# Patient Record
Sex: FEMALE | Race: WHITE | NOT HISPANIC OR LATINO | Employment: OTHER | URBAN - METROPOLITAN AREA
[De-identification: names, ages, dates, MRNs, and addresses within clinical notes are randomized per-mention and may not be internally consistent; named-entity substitution may affect disease eponyms.]

---

## 2017-01-02 NOTE — PROGRESS NOTES
"  SUBJECTIVE:                                                    Enid Lombardo is a 56 year old female who presents to clinic today for the following health issues:      HPI    Rash     Onset: 7 weeks    Description:   Location: Right side of nose  Character: blotchy, raised, flakey, red  Itching (Pruritis): no     Progression of Symptoms:  intermittent    Accompanying Signs & Symptoms:  Fever: no   Body aches or joint pain: no   Sore throat symptoms: no   Recent cold symptoms: YES   History:   Previous similar rash: YES - left upper chest 8 months    Precipitating factors:   Exposure to similar rash: no   New exposures: None   Recent travel: no     Alleviating factors:  Hydrocortisone cream x 1 week     Therapies Tried and outcome: Hydrocortisone cream x 1 week - symptoms improving, not resolved      Problem list and histories reviewed & adjusted, as indicated.  Additional history: as documented        Problem list, Medication list, Allergies, and Medical/Social/Surgical histories reviewed in EPIC and updated as appropriate.    ROS:  C: NEGATIVE for fever, chills, change in weight  INTEGUMENTARY/SKIN: red skin patch which flakes and look better at times   E/M: NEGATIVE for ear, mouth and throat problems  R: NEGATIVE for significant cough or SOB  CV: NEGATIVE for chest pain, palpitations or peripheral edema    OBJECTIVE:                                                    /72 mmHg  Pulse 60  Temp(Src) 98.5  F (36.9  C) (Temporal)  Resp 18  Ht 5' 5\" (1.651 m)  Wt 170 lb (77.111 kg)  BMI 28.29 kg/m2  LMP 10/14/2001  Body mass index is 28.29 kg/(m^2).   GENERAL: healthy, alert, well nourished, well hydrated, no distress  RESP: lungs clear to auscultation - no rales, no rhonchi, no wheezes  CV: regular rates and rhythm, normal S1 S2, no S3 or S4 and no murmur, no click or rub -  SKIN: Examination of the rash reveals: dry skin patch with a few extra vessels leading to it.           ASSESSMENT/PLAN:        "                                                 ICD-10-CM    1. AK (actinic keratosis) L57.0 DESTRUCT PREMALIGNANT LESION, FIRST       Discussed that this is a precancerous lesion and that it should be destroyed.  After discussion risks/benefits, we destroyed with LN*3 today with good results and no immediate complications.  Will put petroleum jelly on area to decrease scarring for the next 2 weeks or until healed.  RTC to repeat if some is left behind following this.    Shi Alonso MD, MD  [facility]

## 2017-01-05 ENCOUNTER — OFFICE VISIT (OUTPATIENT)
Dept: FAMILY MEDICINE | Facility: OTHER | Age: 57
End: 2017-01-05
Payer: COMMERCIAL

## 2017-01-05 VITALS
HEIGHT: 65 IN | BODY MASS INDEX: 28.32 KG/M2 | DIASTOLIC BLOOD PRESSURE: 72 MMHG | HEART RATE: 60 BPM | RESPIRATION RATE: 18 BRPM | TEMPERATURE: 98.5 F | SYSTOLIC BLOOD PRESSURE: 108 MMHG | WEIGHT: 170 LBS

## 2017-01-05 DIAGNOSIS — L57.0 AK (ACTINIC KERATOSIS): Primary | ICD-10-CM

## 2017-01-05 PROCEDURE — 17000 DESTRUCT PREMALG LESION: CPT | Performed by: FAMILY MEDICINE

## 2017-01-05 ASSESSMENT — PAIN SCALES - GENERAL: PAINLEVEL: NO PAIN (0)

## 2017-01-05 NOTE — NURSING NOTE
"Chief Complaint   Patient presents with     Derm Problem     skin issues     Panel Management     height,  flu, hep c       Initial /72 mmHg  Pulse 60  Temp(Src) 98.5  F (36.9  C) (Temporal)  Resp 18  Ht 5' 5\" (1.651 m)  Wt 170 lb (77.111 kg)  BMI 28.29 kg/m2  LMP 10/14/2001 Estimated body mass index is 28.29 kg/(m^2) as calculated from the following:    Height as of this encounter: 5' 5\" (1.651 m).    Weight as of this encounter: 170 lb (77.111 kg).  BP completed using cuff size: regular  "

## 2017-01-11 ENCOUNTER — MYC MEDICAL ADVICE (OUTPATIENT)
Dept: FAMILY MEDICINE | Facility: OTHER | Age: 57
End: 2017-01-11

## 2017-01-11 DIAGNOSIS — J01.90 ACUTE SINUSITIS WITH SYMPTOMS > 10 DAYS: Primary | ICD-10-CM

## 2017-02-04 ENCOUNTER — HOSPITAL ENCOUNTER (EMERGENCY)
Facility: CLINIC | Age: 57
Discharge: HOME | End: 2017-02-04
Attending: NURSE PRACTITIONER | Admitting: NURSE PRACTITIONER
Payer: COMMERCIAL

## 2017-02-04 VITALS
WEIGHT: 165 LBS | BODY MASS INDEX: 27.49 KG/M2 | RESPIRATION RATE: 20 BRPM | TEMPERATURE: 97.7 F | OXYGEN SATURATION: 99 % | DIASTOLIC BLOOD PRESSURE: 89 MMHG | SYSTOLIC BLOOD PRESSURE: 131 MMHG | HEIGHT: 65 IN

## 2017-02-04 DIAGNOSIS — M54.41 ACUTE RIGHT-SIDED LOW BACK PAIN WITH RIGHT-SIDED SCIATICA: ICD-10-CM

## 2017-02-04 LAB
ALBUMIN UR-MCNC: NEGATIVE MG/DL
APPEARANCE UR: CLEAR
BILIRUB UR QL STRIP: NEGATIVE
COLOR UR AUTO: YELLOW
GLUCOSE UR STRIP-MCNC: NEGATIVE MG/DL
HGB UR QL STRIP: NEGATIVE
KETONES UR STRIP-MCNC: NEGATIVE MG/DL
LEUKOCYTE ESTERASE UR QL STRIP: NEGATIVE
NITRATE UR QL: NEGATIVE
NON-SQ EPI CELLS #/AREA URNS LPF: NORMAL /LPF
PH UR STRIP: 6 PH (ref 5–7)
RBC #/AREA URNS AUTO: NORMAL /HPF (ref 0–2)
SP GR UR STRIP: <=1.005 (ref 1–1.03)
URN SPEC COLLECT METH UR: NORMAL
UROBILINOGEN UR STRIP-ACNC: 0.2 EU/DL (ref 0.2–1)
WBC #/AREA URNS AUTO: NORMAL /HPF (ref 0–2)

## 2017-02-04 PROCEDURE — 81001 URINALYSIS AUTO W/SCOPE: CPT | Performed by: NURSE PRACTITIONER

## 2017-02-04 PROCEDURE — 25000132 ZZH RX MED GY IP 250 OP 250 PS 637: Performed by: NURSE PRACTITIONER

## 2017-02-04 PROCEDURE — 99283 EMERGENCY DEPT VISIT LOW MDM: CPT

## 2017-02-04 PROCEDURE — 99284 EMERGENCY DEPT VISIT MOD MDM: CPT | Performed by: NURSE PRACTITIONER

## 2017-02-04 RX ORDER — LIDOCAINE 50 MG/G
2 PATCH TOPICAL ONCE
Status: COMPLETED | OUTPATIENT
Start: 2017-02-04 | End: 2017-02-04

## 2017-02-04 RX ORDER — LIDOCAINE 50 MG/G
PATCH TOPICAL
Qty: 30 PATCH | Refills: 0 | Status: SHIPPED | OUTPATIENT
Start: 2017-02-04 | End: 2017-02-04

## 2017-02-04 RX ORDER — DIAZEPAM 5 MG
10 TABLET ORAL ONCE
Status: COMPLETED | OUTPATIENT
Start: 2017-02-04 | End: 2017-02-04

## 2017-02-04 RX ORDER — OXYCODONE AND ACETAMINOPHEN 5; 325 MG/1; MG/1
1-2 TABLET ORAL EVERY 6 HOURS PRN
Qty: 20 TABLET | Refills: 0 | Status: SHIPPED | OUTPATIENT
Start: 2017-02-04 | End: 2017-06-14

## 2017-02-04 RX ORDER — CYCLOBENZAPRINE HCL 10 MG
10 TABLET ORAL 3 TIMES DAILY PRN
Qty: 20 TABLET | Refills: 0 | Status: SHIPPED | OUTPATIENT
Start: 2017-02-04 | End: 2017-02-10

## 2017-02-04 RX ORDER — LIDOCAINE 50 MG/G
PATCH TOPICAL
Qty: 30 PATCH | Refills: 0 | Status: SHIPPED | OUTPATIENT
Start: 2017-02-04 | End: 2017-06-14

## 2017-02-04 RX ORDER — OXYCODONE AND ACETAMINOPHEN 5; 325 MG/1; MG/1
2 TABLET ORAL ONCE
Status: COMPLETED | OUTPATIENT
Start: 2017-02-04 | End: 2017-02-04

## 2017-02-04 RX ADMIN — OXYCODONE HYDROCHLORIDE AND ACETAMINOPHEN 2 TABLET: 5; 325 TABLET ORAL at 21:05

## 2017-02-04 RX ADMIN — DIAZEPAM 10 MG: 5 TABLET ORAL at 21:27

## 2017-02-04 RX ADMIN — LIDOCAINE 2 PATCH: 50 PATCH CUTANEOUS at 21:29

## 2017-02-04 ASSESSMENT — ENCOUNTER SYMPTOMS: BACK PAIN: 1

## 2017-02-04 NOTE — ED AVS SNAPSHOT
[facility] Emergency Department    [address] [place] [address]    Phone:  [phone]    Fax:  [phone]                                       Enid Lombardo   MRN: [id number]    Department:  [facility] Emergency Department   Date of Visit:  2/4/2017           Patient Information     Date Of Birth          1960        Your diagnoses for this visit were:     Acute right-sided low back pain with right-sided sciatica        You were seen by Nasreen Toribio, HERMILO CNP.      Follow-up Information     Follow up with Shi Alonso MD In 1 week.    Specialty:  Family Practice    Contact information:    [facility]   [address]  [facility] [address]  [phone]          Follow up with [facility] Emergency Department.    Specialty:  EMERGENCY MEDICINE    Why:  If symptoms worsen    Contact information:    [address] [place] [address] [phone]    Additional information:    From [address]: Exit at [address] on [address] side of [place]. Turn right on [address]. Turn left at stoplight on [address]. [facility] will be in view two blocks ahead        Discharge Instructions       Home  Back  SP  RU  VI  CH     *BACK PAIN [acute or chronic]    Back pain is usually caused by an injury to the muscles or ligaments of the spine. Sometimes the disks that separate each bone in the spine may bulge and cause pain by pressing on a nearby nerve. Back pain may also appear after a sudden twisting/bending force (such as in a car accident), after a simple awkward movement, or lifting something heavy with poor body positioning. In either case, muscle spasm is often present and adds to the pain.  Acute back pain usually gets better in one to two weeks. Back pain related to disk disease, arthritis in the spinal joints or spinal stenosis (narrowing of the spinal canal) can become chronic and last for months or  years.  Unless you had a physical injury (for example, a car accident or fall) X-rays are usually not ordered for the initial evaluation of back pain. If pain continues and does not respond to medical treatment, x-rays and other tests may be performed at a later time.  HOME CARE:  1. You should rest as needed. But, as soon as possible, begin sitting or walking to avoid problems with prolonged bed rest (muscle weakness, worsening back stiffness and pain, blood clots in the legs).  2. When in bed, try to find a position of comfort. A firm mattress is best. Try lying flat on your back with pillows under your knees. You can also try lying on your side with your knees bent up towards your chest and a pillow between your knees.  3. Avoid prolonged sitting. This puts more stress on the lower back than standing or walking.  4. During the first two days after injury, apply an ICE PACK to the painful area for 20 minutes every 2-4 hours. This will reduce swelling and pain. HEAT (hot shower, hot bath or heating pad) works well for muscle spasm. You can start with ice, then switch to heat after two days. Some patients feel best alternating ice and heat treatments. Use the one method that feels the best to you.  5. You may use acetaminophen (Tylenol) 650-1000 mg every 6 hours or ibuprofen (Motrin, Advil) 600 mg every 6-8 hours with food to control pain, if you are able to take these medicines. [NOTE: If you have chronic liver or kidney disease or ever had a stomach ulcer or GI bleeding, talk with your doctor before using these medicines.]  6. Be aware of safe lifting methods and do not lift anything over 15 pounds until all the pain is gone.   FOLLOW UP with your doctor if your symptoms do not start to improve after one week. Your doctor may consider using physical therapy to help your recover.   GET PROMPT MEDICAL ATTENTION if any of the following occur:     Pain becomes worse or spreads to your legs    Weakness or numbness in  one or both legs    Loss of bowel or bladder control    Numbness in the groin or genital area     8572-8695 [facility], [address] [place] [address], [place] [address]. All rights reserved. This information is not intended as a substitute for professional medical care. Always follow your healthcare professional's instructions.    Future Appointments        Provider Department Dept Phone Center    6/29/2017 1:00 PM [facility] [phone] [facility]    6/29/2017 1:30 PM Ghislaine Putnam PA-C [facility] [phone] [facility]    10/9/2017 10:00 AM Paz Osborn MD [facility] [phone] [facility]      24 Hour Appointment Hotline       To make an appointment at any [facility], call [phone] ([phone]). If you don't have a family doctor or clinic, we will help you find one. [facility] are conveniently located to serve the needs of you and your family.             Review of your medicines      START taking        Dose / Directions Last dose taken    cyclobenzaprine 10 MG tablet   Commonly known as:  FLEXERIL   Dose:  10 mg   Quantity:  20 tablet        Take 1 tablet (10 mg) by mouth 3 times daily as needed for muscle spasms   Refills:  0        lidocaine 5 % Patch   Commonly known as:  LIDODERM   Quantity:  30 patch        Apply up to 3 patches to painful area at once for up to 12 h within a 24 h period.  Remove after 12 hours.   Refills:  0        oxyCODONE-acetaminophen 5-325 MG per tablet   Commonly known as:  PERCOCET   Dose:  1-2 tablet   Quantity:  20 tablet        Take 1-2 tablets by mouth every 6 hours as needed for moderate to severe pain   Refills:  0          Our records show that you are taking the medicines listed below. If these are incorrect, please call your family doctor or clinic.        Dose / Directions Last dose taken    ADVIL PO   Dose:  200 mg        Take 200 mg by mouth as needed for moderate pain    Refills:  0        CENTRUM ULTRA WOMENS PO   Dose:  1 tablet        Take 1 tablet by mouth daily.   Refills:  0        CITRUCEL 500 MG Tabs tablet   Dose:  500 mg   Generic drug:  methylcellulose        Take 500 mg by mouth daily   Refills:  0        Fish Oil 1000 MG Cpdr        Take  by mouth.   Refills:  0        fluticasone 50 MCG/ACT spray   Commonly known as:  FLONASE   Quantity:  16 mL        USE ONE OR TWO SPRAYS IN EACH NOSTRIL DAILY   Refills:  6        loratadine 10 MG tablet   Commonly known as:  CLARITIN   Quantity:  30 tablet        TAKE ONE TABLET BY MOUTH DAILY AS NEEDED FOR ALLERGY SYMPTOMS ### DO NOT FILL NOW.  Please update patient's profile to reflect additional refills.  ####   Refills:  10        omeprazole 20 MG CR capsule   Commonly known as:  priLOSEC   Dose:  20 mg   Quantity:  90 capsule        Take 1 capsule (20 mg) by mouth daily   Refills:  3        sertraline 25 MG tablet   Commonly known as:  ZOLOFT   Quantity:  90 tablet        Take 1/2 orally daily   Refills:  3                Prescriptions were sent or printed at these locations (3 Prescriptions)                   [facility]   [address] [address]    Telephone:  [phone]   Fax:  [phone]   Hours:                  Printed at Department/Unit printer (1 of 3)         oxyCODONE-acetaminophen (PERCOCET) 5-325 MG per tablet                 These medications are not ready yet because we are checking if your insurance will help you pay for them. Call your pharmacy to confirm that your medication is ready for pickup. It may take up to 24 hours for them to receive the prescription. If the prescription is not ready within 3 business days, please contact your clinic or your provider (2 of 3)         cyclobenzaprine (FLEXERIL) 10 MG tablet               lidocaine (LIDODERM) 5 % Patch                Procedures and tests performed during your visit     UA with Microscopic      Orders  Needing Specimen Collection     None      Pending Results     No orders found from 2/3/2017 to 2/5/2017.            Pending Culture Results     No orders found from 2/3/2017 to 2/5/2017.            Thank you for choosing [facility]       Thank you for choosing [facility] for your care. Our goal is always to provide you with excellent care. Hearing back from our patients is one way we can continue to improve our services. Please take a few minutes to complete the written survey that you may receive in the mail after you visit with us. Thank you!        [employer] Information     [employer] gives you secure access to your electronic health record. If you see a primary care provider, you can also send messages to your care team and make appointments. If you have questions, please call your primary care clinic.  If you do not have a primary care provider, please call [phone] and they will assist you.        Care EveryWhere ID     This is your Care EveryWhere ID. This could be used by other organizations to access your [facility] medical records  [id number]        After Visit Summary       This is your record. Keep this with you and show to your community pharmacist(s) and doctor(s) at your next visit.

## 2017-02-04 NOTE — ED AVS SNAPSHOT
[facility] Emergency Department    [address] [place] [address]    Phone:  [phone]    Fax:  [phone]                                       Enid Lombardo   MRN: [id number]    Department:  [facility] Emergency Department   Date of Visit:  2/4/2017           After Visit Summary Signature Page     I have received my discharge instructions, and my questions have been answered. I have discussed any challenges I see with this plan with the nurse or doctor.    ..........................................................................................................................................  Patient/Patient Representative Signature      ..........................................................................................................................................  Patient Representative Print Name and Relationship to Patient    ..................................................               ................................................  Date                                            Time    ..........................................................................................................................................  Reviewed by Signature/Title    ...................................................              ..............................................  Date                                                            Time

## 2017-02-05 NOTE — DISCHARGE INSTRUCTIONS
Home  Back  SP  RU  VI  CH     *BACK PAIN [acute or chronic]    Back pain is usually caused by an injury to the muscles or ligaments of the spine. Sometimes the disks that separate each bone in the spine may bulge and cause pain by pressing on a nearby nerve. Back pain may also appear after a sudden twisting/bending force (such as in a car accident), after a simple awkward movement, or lifting something heavy with poor body positioning. In either case, muscle spasm is often present and adds to the pain.  Acute back pain usually gets better in one to two weeks. Back pain related to disk disease, arthritis in the spinal joints or spinal stenosis (narrowing of the spinal canal) can become chronic and last for months or years.  Unless you had a physical injury (for example, a car accident or fall) X-rays are usually not ordered for the initial evaluation of back pain. If pain continues and does not respond to medical treatment, x-rays and other tests may be performed at a later time.  HOME CARE:  1. You should rest as needed. But, as soon as possible, begin sitting or walking to avoid problems with prolonged bed rest (muscle weakness, worsening back stiffness and pain, blood clots in the legs).  2. When in bed, try to find a position of comfort. A firm mattress is best. Try lying flat on your back with pillows under your knees. You can also try lying on your side with your knees bent up towards your chest and a pillow between your knees.  3. Avoid prolonged sitting. This puts more stress on the lower back than standing or walking.  4. During the first two days after injury, apply an ICE PACK to the painful area for 20 minutes every 2-4 hours. This will reduce swelling and pain. HEAT (hot shower, hot bath or heating pad) works well for muscle spasm. You can start with ice, then switch to heat after two days. Some patients feel best alternating ice and heat treatments. Use the one method that feels the best to  you.  5. You may use acetaminophen (Tylenol) 650-1000 mg every 6 hours or ibuprofen (Motrin, Advil) 600 mg every 6-8 hours with food to control pain, if you are able to take these medicines. [NOTE: If you have chronic liver or kidney disease or ever had a stomach ulcer or GI bleeding, talk with your doctor before using these medicines.]  6. Be aware of safe lifting methods and do not lift anything over 15 pounds until all the pain is gone.   FOLLOW UP with your doctor if your symptoms do not start to improve after one week. Your doctor may consider using physical therapy to help your recover.   GET PROMPT MEDICAL ATTENTION if any of the following occur:     Pain becomes worse or spreads to your legs    Weakness or numbness in one or both legs    Loss of bowel or bladder control    Numbness in the groin or genital area     2609-5758 [facility], [address] [place] [address], [place] [address]. All rights reserved. This information is not intended as a substitute for professional medical care. Always follow your healthcare professional's instructions.

## 2017-02-05 NOTE — ED PROVIDER NOTES
"  History     Chief Complaint   Patient presents with     Back Pain     HPI  Enid Lombardo is a 57 year old female who presents to the emergency department today with complaints of right lower back pain that radiates to her right buttock.  Patient reports the pain started last night but has become more intense throughout the day, patient reports she has been working a lot this last week and lifting a lot as well.  Patient has been taking ibuprofen for pain with minimal relief.  Patient denies any loss of bowel or bladder control, she denies any lower extremity weakness.  Patient denies any history of kidney stones.    I have reviewed the Medications, Allergies, Past Medical and Surgical History, and Social History in the Epic system.    Review of Systems   Musculoskeletal: Positive for back pain.   All other systems reviewed and are negative.      Physical Exam   BP: (!) 158/101 mmHg  Heart Rate: 95  Temp: 97.7  F (36.5  C)  Resp: 20  Height: 165.1 cm (5' 5\")  Weight: 74.844 kg (165 lb)  SpO2: 99 %  Physical Exam   Constitutional: She is oriented to person, place, and time. She appears well-developed and well-nourished. She appears distressed (secondary to pain).   HENT:   Head: Normocephalic.   Eyes: Conjunctivae are normal.   Cardiovascular: Normal rate and regular rhythm.    Pulmonary/Chest: Effort normal and breath sounds normal.   Abdominal: Soft. Bowel sounds are normal.   Musculoskeletal: She exhibits tenderness.   Patient exhibits tenderness to right sciatica.  Strength is 5 out of 5 to bilateral lower extremities, sensation is intact bilaterally, distal proximal pulses are intact.  Patient exhibits no concerning signs for cauda equina.   Neurological: She is alert and oriented to person, place, and time. She has normal reflexes.   Skin: Skin is warm and dry.   Psychiatric: She has a normal mood and affect.       ED Course   Procedures      Labs Ordered and Resulted from Time of ED Arrival Up to the Time " of Departure from the ED   UA WITH MICROSCOPIC       Assessments & Plan (with Medical Decision Making)  Enid is an otherwise healthy 57-year-old female who presents to the emergency department today with acute onset of right lower lumbar back pain with sciatica.  Please refer to HPI and focused exam, patient's pain is consistent with an acute back pain with sciatica, however, pain does start around right flank area, urinalysis was obtained to rule out any concerns for kidney stone, it is negative for infection or blood.  Patient exhibits no neural/focal deficits on exam.  Patient was given oral Percocet and Valium while in the emergency department, lidocaine patches were applied to area of pain, patient was monitored for close to one hour and is reporting that her symptoms are vastly improved.  Patient feels she is stable to be discharged home.  I discussed with patient's ongoing care at home including continuing ibuprofen, I will give her Percocet for severe pain, I will give her Flexeril for muscle spasms as well as lidocaine patches for home use.  Patient denies any history of chronic back issues and has not been on hardly any narcotics in the past.  I did discuss with her narcotic safety at home, I would like her evaluated in clinic in the next week, reasons to return to the emergency department were discussed.  Also recommend alternating ice and heat to area pain as well as gentle stretching exercises.  Patient is agreeable to plan of care questions were answered prior to discharge.    Patient discharged from the emergency department with her  driving in a stable condition.       I have reviewed the nursing notes.    I have reviewed the findings, diagnosis, plan and need for follow up with the patient.    New Prescriptions    CYCLOBENZAPRINE (FLEXERIL) 10 MG TABLET    Take 1 tablet (10 mg) by mouth 3 times daily as needed for muscle spasms    LIDOCAINE (LIDODERM) 5 % PATCH    Apply up to 3 patches to  painful area at once for up to 12 h within a 24 h period.  Remove after 12 hours.    OXYCODONE-ACETAMINOPHEN (PERCOCET) 5-325 MG PER TABLET    Take 1-2 tablets by mouth every 6 hours as needed for moderate to severe pain       Final diagnoses:   Acute right-sided low back pain with right-sided sciatica       2/4/2017   [facility] EMERGENCY DEPARTMENT      Nasreen Toribio APRN CNP  02/04/17 [id number]

## 2017-02-06 ENCOUNTER — MYC MEDICAL ADVICE (OUTPATIENT)
Dept: FAMILY MEDICINE | Facility: OTHER | Age: 57
End: 2017-02-06

## 2017-03-27 ENCOUNTER — MYC MEDICAL ADVICE (OUTPATIENT)
Dept: FAMILY MEDICINE | Facility: OTHER | Age: 57
End: 2017-03-27

## 2017-03-27 DIAGNOSIS — R03.0 ELEVATED BLOOD PRESSURE (NOT HYPERTENSION): Primary | ICD-10-CM

## 2017-03-31 DIAGNOSIS — Z11.59 NEED FOR HEPATITIS C SCREENING TEST: ICD-10-CM

## 2017-03-31 DIAGNOSIS — R03.0 ELEVATED BLOOD PRESSURE (NOT HYPERTENSION): ICD-10-CM

## 2017-03-31 DIAGNOSIS — E78.5 HYPERLIPIDEMIA WITH TARGET LDL LESS THAN 130: ICD-10-CM

## 2017-03-31 LAB
ANION GAP SERPL CALCULATED.3IONS-SCNC: 7 MMOL/L (ref 3–14)
BUN SERPL-MCNC: 12 MG/DL (ref 7–30)
CALCIUM SERPL-MCNC: 8.9 MG/DL (ref 8.5–10.1)
CHLORIDE SERPL-SCNC: 106 MMOL/L (ref 94–109)
CHOLEST SERPL-MCNC: 210 MG/DL
CO2 SERPL-SCNC: 28 MMOL/L (ref 20–32)
CREAT SERPL-MCNC: 0.7 MG/DL (ref 0.52–1.04)
GFR SERPL CREATININE-BSD FRML MDRD: 87 ML/MIN/1.7M2
GLUCOSE SERPL-MCNC: 95 MG/DL (ref 70–99)
HCV AB SERPL QL IA: NORMAL
HDLC SERPL-MCNC: 71 MG/DL
LDLC SERPL CALC-MCNC: 119 MG/DL
NONHDLC SERPL-MCNC: 139 MG/DL
POTASSIUM SERPL-SCNC: 3.9 MMOL/L (ref 3.4–5.3)
SODIUM SERPL-SCNC: 141 MMOL/L (ref 133–144)
TRIGL SERPL-MCNC: 102 MG/DL

## 2017-03-31 PROCEDURE — 36415 COLL VENOUS BLD VENIPUNCTURE: CPT | Performed by: FAMILY MEDICINE

## 2017-03-31 PROCEDURE — 80048 BASIC METABOLIC PNL TOTAL CA: CPT | Performed by: FAMILY MEDICINE

## 2017-03-31 PROCEDURE — 86803 HEPATITIS C AB TEST: CPT | Performed by: FAMILY MEDICINE

## 2017-03-31 PROCEDURE — 80061 LIPID PANEL: CPT | Performed by: FAMILY MEDICINE

## 2017-04-01 NOTE — PROGRESS NOTES
Enid, your Hep C is negative and cholesterol is mildly increased.  Please let me know if you have any questions.    Shi Alonso MD

## 2017-04-03 DIAGNOSIS — J31.0 CHRONIC RHINITIS: ICD-10-CM

## 2017-04-03 NOTE — TELEPHONE ENCOUNTER
claritin      Last Written Prescription Date: 0/29/16  Last Fill Quantity: 30,  # refills: 10   Last Office Visit with G, P or [facility] prescribing provider: 01/05/17

## 2017-04-04 DIAGNOSIS — J31.0 CHRONIC RHINITIS: ICD-10-CM

## 2017-04-04 RX ORDER — LORATADINE 10 MG/1
TABLET ORAL
Qty: 30 TABLET | Refills: 9 | Status: SHIPPED | OUTPATIENT
Start: 2017-04-04 | End: 2018-01-18

## 2017-04-04 NOTE — TELEPHONE ENCOUNTER
flonase      Last Written Prescription Date: 09/13/16  Last Fill Quantity: 16ml,  # refills: 6   Last Office Visit with [facility], [facility] or [facility] prescribing provider: 01/05/17

## 2017-04-04 NOTE — TELEPHONE ENCOUNTER
Prescription approved per [facility] Refill Protocol.  Tiffanie Bass, RN, BSN

## 2017-04-05 RX ORDER — FLUTICASONE PROPIONATE 50 MCG
SPRAY, SUSPENSION (ML) NASAL
Qty: 16 ML | Refills: 6 | Status: SHIPPED | OUTPATIENT
Start: 2017-04-05 | End: 2017-10-21

## 2017-04-05 NOTE — TELEPHONE ENCOUNTER
Prescription approved per [facility] Refill Protocol.  Vy Moore, RN, BSN

## 2017-06-08 NOTE — PROGRESS NOTES
SUBJECTIVE:     CC: Enid Lombardo is an 57 year old woman who presents for preventive health visit.     Physical   Annual:     Getting at least 3 servings of Calcium per day::  Yes    Bi-annual eye exam::  Yes    Dental care twice a year::  Yes    Sleep apnea or symptoms of sleep apnea::  None    Diet::  Regular (no restrictions)    Frequency of exercise::  4-5 days/week    Duration of exercise::  30-45 minutes    Taking medications regularly::  Yes    Medication side effects::  None    Additional concerns today::  No    Answers for HPI/ROS submitted by the patient on 6/14/2017   PHQ-2 Score: 0    ENDO: The patient reports that her hot flashes symptoms have resolved with the Zoloft.    Today's PHQ-2 Score:   PHQ-2 ( 1999 Pfizer) 6/14/2017   Q1: Little interest or pleasure in doing things 0   Q2: Feeling down, depressed or hopeless 0   PHQ-2 Score 0   Q1: Little interest or pleasure in doing things Not at all   Q2: Feeling down, depressed or hopeless Not at all   PHQ-2 Score 0       Abuse: Current or Past(Physical, Sexual or Emotional)- No  Do you feel safe in your environment - Yes    Social History   Substance Use Topics     Smoking status: Never Smoker     Smokeless tobacco: Never Used     Alcohol use Yes      Comment: occasionally     The patient does not drink >3 drinks per day nor >7 drinks per week.    Recent Labs   Lab Test  03/31/17   0741  03/11/16   0753  08/06/14   0744  05/17/12   0759   CHOL  210*  223*  232*  223*   HDL  71  61  60  55   LDL  119*  142*  148*  150*   TRIG  102  100  122  91   CHOLHDLRATIO   --    --   3.9  4.0   NHDL  139*  162*   --    --        Reviewed orders with patient.  Reviewed health maintenance and updated orders accordingly - Yes    Mammo Decision Support:  Patient over age 50, mutual decision to screen reflected in health maintenance.    Pertinent mammograms are reviewed under the imaging tab.  History of abnormal Pap smear: NO - age 30- 65 PAP every 3 years  "recommended    Reviewed and updated as needed this visit by clinical staff  Tobacco  Allergies  Med Hx  Surg Hx  Fam Hx  Soc Hx        Reviewed and updated as needed this visit by Provider        Past Medical History:   Diagnosis Date     Breast cancer (H) 2004    lumpectomy, radiation, tamoxifen     Cancer (H) 2004    Breast     Cataracts, bilateral      Coronary artery disease 11/24/2014     Diabetes (H)     Gestational     Enthesopathy of hip region 6/06    right hip     Esophageal reflux 2/06     History of gestational diabetes      Hypertension 2012     Macular drusen      Shingles 01/23/2012    left T6-T7 dermatome        ROS:  Constitutional, HEENT, cardiovascular, pulmonary, GI, , musculoskeletal, neuro, skin, endocrine and psych systems are negative, except as in HPI or otherwise noted     This document serves as a record of the services and decisions personally performed and made by Shi Alonso MD. It was created on her behalf by Mohinder Jacob , a trained medical scribe. The creation of this document is based the provider's statements to the medical scribe.  Mohinder Jacob, June 14, 2017 9:53 AM     Problem list, Medication list, Allergies, and Medical/Social/Surgical histories reviewed in [facility] and updated as appropriate.  OBJECTIVE:     /74 (BP Location: Right arm, Patient Position: Chair, Cuff Size: Adult Regular)  Pulse 69  Temp 97.9  F (36.6  C)  Resp 16  Ht 5' 5\" (1.651 m)  Wt 174 lb (78.9 kg)  LMP 10/14/2001  SpO2 96%  BMI 28.96 kg/m2  EXAM:  GENERAL APPEARANCE: healthy, alert and no distress  EYES: Eyes grossly normal to inspection, PERRL and conjunctivae and sclerae normal  HENT: ear canals and TM's normal, nose and mouth without ulcers or lesions, oropharynx clear and oral mucous membranes moist  NECK: no adenopathy, no asymmetry, masses, or scars and thyroid normal to palpation  RESP: lungs clear to auscultation - no rales, rhonchi or wheezes  BREAST: normal without masses, " tenderness or nipple discharge and no palpable axillary masses or adenopathy  CV: regular rate and rhythm, normal S1 S2, no S3 or S4, no murmur, click or rub, no peripheral edema and peripheral pulses strong  ABDOMEN: soft, nontender, no hepatosplenomegaly, no masses and bowel sounds normal  MS: no musculoskeletal defects are noted and gait is age appropriate without ataxia  SKIN: no suspicious lesions or rashes  NEURO: Normal strength and tone, sensory exam grossly normal, mentation intact and speech normal  PSYCH: mentation appears normal and affect normal/bright    ASSESSMENT/PLAN:         ICD-10-CM    1. Encounter for routine adult health examination without abnormal findings Z00.00    2. Malignant neoplasm of right female breast, unspecified site of breast (H) C50.911    3. Hyperlipidemia with target LDL less than 130 E78.5    4. Elevated blood pressure (not hypertension) R03.0    5. Hot flushes, perimenopausal N95.1 sertraline (ZOLOFT) 25 MG tablet   6. Gastroesophageal reflux disease without esophagitis K21.9    7. Macular drusen, unspecified laterality H35.369    8. Meibomian gland dysfunction - Both Eyes H02.89      Patient reports being stable and well at the moment, will be retiring soon. Advised continued healthy diet and exercise.  -advised the patient that she may discontinue her Zoloft since her hot flash symptoms have resolved, the patient will consider discontinuing after her daughters wedding.  -Discussed cancer screening due to past hx and will continue annual pelvic exam, and pap 5 year cotest  Seeing ophthal for eyes.  Oncology for cancer survivorship program  BP looks great now, will resolve HTN.      COUNSELING:  Reviewed preventive health counseling, as reflected in patient instructions       Regular exercise       Healthy diet/nutrition       Vision screening       Hearing screening       Colon cancer screening       Cancer screening due to past hx     reports that she has never smoked. She  "has never used smokeless tobacco.    Estimated body mass index is 28.96 kg/(m^2) as calculated from the following:    Height as of this encounter: 5' 5\" (1.651 m).    Weight as of this encounter: 174 lb (78.9 kg).   Weight management plan: Discussed healthy diet and exercise guidelines and patient will follow up in 12 months in clinic to re-evaluate.    Counseling Resources:  ATP IV Guidelines  Pooled Cohorts Equation Calculator  Breast Cancer Risk Calculator  FRAX Risk Assessment  ICSI Preventive Guidelines  Dietary Guidelines for Americans, 2010  USDA's MyPlate  ASA Prophylaxis  Lung CA Screening    The information in this document, created by the medical scribe for me, accurately reflects the services I personally performed and the decisions made by me. I have reviewed and approved this document for accuracy.   MD Shi Medel MD, MD  [facility]  "

## 2017-06-14 ENCOUNTER — OFFICE VISIT (OUTPATIENT)
Dept: FAMILY MEDICINE | Facility: OTHER | Age: 57
End: 2017-06-14
Payer: COMMERCIAL

## 2017-06-14 VITALS
OXYGEN SATURATION: 96 % | WEIGHT: 174 LBS | HEIGHT: 65 IN | SYSTOLIC BLOOD PRESSURE: 106 MMHG | RESPIRATION RATE: 16 BRPM | BODY MASS INDEX: 28.99 KG/M2 | TEMPERATURE: 97.9 F | DIASTOLIC BLOOD PRESSURE: 74 MMHG | HEART RATE: 69 BPM

## 2017-06-14 DIAGNOSIS — Z00.00 ENCOUNTER FOR ROUTINE ADULT HEALTH EXAMINATION WITHOUT ABNORMAL FINDINGS: Primary | ICD-10-CM

## 2017-06-14 DIAGNOSIS — R03.0 ELEVATED BLOOD PRESSURE (NOT HYPERTENSION): ICD-10-CM

## 2017-06-14 DIAGNOSIS — C50.911 MALIGNANT NEOPLASM OF RIGHT FEMALE BREAST, UNSPECIFIED SITE OF BREAST: ICD-10-CM

## 2017-06-14 DIAGNOSIS — N95.1 HOT FLUSHES, PERIMENOPAUSAL: ICD-10-CM

## 2017-06-14 DIAGNOSIS — H02.889 MEIBOMIAN GLAND DYSFUNCTION: ICD-10-CM

## 2017-06-14 DIAGNOSIS — K21.9 GASTROESOPHAGEAL REFLUX DISEASE WITHOUT ESOPHAGITIS: ICD-10-CM

## 2017-06-14 DIAGNOSIS — E78.5 HYPERLIPIDEMIA WITH TARGET LDL LESS THAN 130: ICD-10-CM

## 2017-06-14 DIAGNOSIS — H35.369: ICD-10-CM

## 2017-06-14 PROCEDURE — 99396 PREV VISIT EST AGE 40-64: CPT | Performed by: FAMILY MEDICINE

## 2017-06-14 RX ORDER — SERTRALINE HYDROCHLORIDE 25 MG/1
TABLET, FILM COATED ORAL
Qty: 90 TABLET | Refills: 3 | Status: CANCELLED | OUTPATIENT
Start: 2017-06-14

## 2017-06-14 RX ORDER — SERTRALINE HYDROCHLORIDE 25 MG/1
TABLET, FILM COATED ORAL
Qty: 90 TABLET | Refills: 0 | Status: SHIPPED | OUTPATIENT
Start: 2017-06-14 | End: 2017-07-06

## 2017-06-14 ASSESSMENT — PAIN SCALES - GENERAL: PAINLEVEL: NO PAIN (0)

## 2017-06-14 NOTE — MR AVS SNAPSHOT
After Visit Summary   6/14/2017    Enid Lombardo    MRN: [id number]           Patient Information     Date Of Birth          1960        Visit Information        Provider Department      6/14/2017 9:30 AM Shi Alonso MD [facility]        Today's Diagnoses     Encounter for routine adult health examination without abnormal findings    -  1    Malignant neoplasm of right female breast, unspecified site of breast (H)        Hyperlipidemia with target LDL less than 130        Elevated blood pressure (not hypertension)        Hot flushes, perimenopausal        Gastroesophageal reflux disease without esophagitis        Macular drusen, unspecified laterality        Meibomian gland dysfunction - Both Eyes          Care Instructions      Preventive Health Recommendations  Female Ages 50 - 64    Yearly exam: See your health care provider every year in order to  o Review health changes.   o Discuss preventive care.    o Review your medicines if your doctor has prescribed any.      Get a Pap test every three years (unless you have an abnormal result and your provider advises testing more often).    If you get Pap tests with HPV test, you only need to test every 5 years, unless you have an abnormal result.     You do not need a Pap test if your uterus was removed (hysterectomy) and you have not had cancer.    You should be tested each year for STDs (sexually transmitted diseases) if you're at risk.     Have a mammogram every 1 to 2 years.    Have a colonoscopy at age 50, or have a yearly FIT test (stool test). These exams screen for colon cancer.      Have a cholesterol test every 5 years, or more often if advised.    Have a diabetes test (fasting glucose) every three years. If you are at risk for diabetes, you should have this test more often.     If you are at risk for osteoporosis (brittle bone disease), think about having a bone density scan (DEXA).    Shots: Get a flu shot each  year. Get a tetanus shot every 10 years.    Nutrition:     Eat at least 5 servings of fruits and vegetables each day.    Eat whole-grain bread, whole-wheat pasta and brown rice instead of white grains and rice.    Talk to your provider about Calcium and Vitamin D.     Lifestyle    Exercise at least 150 minutes a week (30 minutes a day, 5 days a week). This will help you control your weight and prevent disease.    Limit alcohol to one drink per day.    No smoking.     Wear sunscreen to prevent skin cancer.     See your dentist every six months for an exam and cleaning.    See your eye doctor every 1 to 2 years.            Follow-ups after your visit        Your next 10 appointments already scheduled     Jul 06, 2017 10:30 AM CDT   (Arrive by 10:15 AM)   MA DIAGNOSTIC DIGITAL BILATERAL with [id number]   [facility] ([facility])    [address] [place] [id number] [phone]           Do not use any powder, lotion or deodorant under your arms or on your breast. If you do, we will ask you to remove it before your exam.  Wear comfortable, two-piece clothing.  If you have any allergies, tell your care team.  Bring any previous mammograms from other facilities or have them mailed to the breast center.            Jul 06, 2017 11:45 AM CDT   (Arrive by 11:30 AM)   Survivorship Visit with Ghislaine Putnam PA-C   [facility] ([facility])    [address] [place] [id number] [phone]            Oct 09, 2017 10:00 AM CDT   RETURN RETINA with Paz Osborn MD   Eye Clinic ([facility])    [address]  [address]  [address] Clin [address]a  [facility] [id number] [phone]              Who to contact     If you have questions or need follow up information about today's clinic visit or your schedule please contact [facility] REJI QUINTANILLA directly at  "[phone].  Normal or non-critical lab and imaging results will be communicated to you by Telderihart, letter or phone within 4 business days after the clinic has received the results. If you do not hear from us within 7 days, please contact the clinic through Uni-Controlt or phone. If you have a critical or abnormal lab result, we will notify you by phone as soon as possible.  Submit refill requests through GateMe or call your pharmacy and they will forward the refill request to us. Please allow 3 business days for your refill to be completed.          Additional Information About Your Visit        Telderihart Information     GateMe gives you secure access to your electronic health record. If you see a primary care provider, you can also send messages to your care team and make appointments. If you have questions, please call your primary care clinic.  If you do not have a primary care provider, please call [phone] and they will assist you.        Care EveryWhere ID     This is your Care EveryWhere ID. This could be used by other organizations to access your [facility] medical records  [id number]        Your Vitals Were     Pulse Temperature Respirations Height Last Period Pulse Oximetry    69 97.9  F (36.6  C) 16 5' 5\" (1.651 m) 10/14/2001 96%    BMI (Body Mass Index)                   28.96 kg/m2            Blood Pressure from Last 3 Encounters:   06/14/17 106/74   02/04/17 131/89   01/05/17 108/72    Weight from Last 3 Encounters:   06/14/17 174 lb (78.9 kg)   02/04/17 165 lb (74.8 kg)   01/05/17 170 lb (77.1 kg)              Today, you had the following     No orders found for display         Today's Medication Changes          These changes are accurate as of: 6/14/17 12:07 PM.  If you have any questions, ask your nurse or doctor.               Stop taking these medicines if you haven't already. Please contact your care team if you have questions.     lidocaine 5 % Patch   Commonly known as:  LIDODERM   Stopped " by:  Shi Alonso MD           oxyCODONE-acetaminophen 5-325 MG per tablet   Commonly known as:  PERCOCET   Stopped by:  Shi Alonso MD                Where to get your medicines      These medications were sent to [place] [id number] [place] [facility] [place] - [address]  [address], [place] [address]     Phone:  [phone]     sertraline 25 MG tablet                Primary Care Provider Office Phone # Fax #    Shi Alonso [phone]       [facility]  [address]  [place] [address]        Thank you!     Thank you for choosing [facility] [place]  for your care. Our goal is always to provide you with excellent care. Hearing back from our patients is one way we can continue to improve our services. Please take a few minutes to complete the written survey that you may receive in the mail after your visit with us. Thank you!             Your Updated Medication List - Protect others around you: Learn how to safely use, store and throw away your medicines at www.disposemymeds.org.          This list is accurate as of: 6/14/17 12:07 PM.  Always use your most recent med list.                   Brand Name Dispense Instructions for use    ADVIL PO      Take 200 mg by mouth as needed for moderate pain       CENTRUM ULTRA WOMENS PO      Take 1 tablet by mouth daily.       CITRUCEL 500 MG Tabs tablet   Generic drug:  methylcellulose      Take 500 mg by mouth daily       Fish Oil 1000 MG Cpdr      Take  by mouth.       fluticasone 50 MCG/ACT spray    FLONASE    16 mL    USE ONE OR TWO SPRAYS IN EACH NOSTRIL DAILY       loratadine 10 MG tablet    CLARITIN    30 tablet    TAKE ONE TABLET BY MOUTH EVERY DAY AS NEEDED FOR ALLERGY SYMPTOMS       omeprazole 20 MG CR capsule    priLOSEC    90 capsule    Take 1 capsule (20 mg) by mouth daily       sertraline 25 MG tablet    ZOLOFT    90 tablet    Take 1/2 orally daily

## 2017-06-14 NOTE — NURSING NOTE
"Chief Complaint   Patient presents with     Physical     Panel Management       Initial /74 (BP Location: Right arm, Patient Position: Chair, Cuff Size: Adult Regular)  Pulse 69  Temp 97.9  F (36.6  C)  Resp 16  Ht 5' 5\" (1.651 m)  Wt 174 lb (78.9 kg)  LMP 10/14/2001  SpO2 96%  BMI 28.96 kg/m2 Estimated body mass index is 28.96 kg/(m^2) as calculated from the following:    Height as of this encounter: 5' 5\" (1.651 m).    Weight as of this encounter: 174 lb (78.9 kg).  Medication Reconciliation: complete   Jacqueline Agrawal CMA (AAMA)      "

## 2017-06-23 ENCOUNTER — MYC MEDICAL ADVICE (OUTPATIENT)
Dept: FAMILY MEDICINE | Facility: OTHER | Age: 57
End: 2017-06-23

## 2017-07-06 ENCOUNTER — ONCOLOGY SURVIVORSHIP VISIT (OUTPATIENT)
Dept: ONCOLOGY | Facility: CLINIC | Age: 57
End: 2017-07-06
Attending: PHYSICIAN ASSISTANT
Payer: COMMERCIAL

## 2017-07-06 VITALS
HEIGHT: 65 IN | RESPIRATION RATE: 16 BRPM | TEMPERATURE: 97.4 F | WEIGHT: 176.2 LBS | SYSTOLIC BLOOD PRESSURE: 118 MMHG | DIASTOLIC BLOOD PRESSURE: 74 MMHG | OXYGEN SATURATION: 98 % | HEART RATE: 63 BPM | BODY MASS INDEX: 29.36 KG/M2

## 2017-07-06 DIAGNOSIS — C50.911 MALIGNANT NEOPLASM OF RIGHT BREAST IN FEMALE, ESTROGEN RECEPTOR POSITIVE, UNSPECIFIED SITE OF BREAST (H): Primary | ICD-10-CM

## 2017-07-06 DIAGNOSIS — Z17.0 MALIGNANT NEOPLASM OF RIGHT BREAST IN FEMALE, ESTROGEN RECEPTOR POSITIVE, UNSPECIFIED SITE OF BREAST (H): Primary | ICD-10-CM

## 2017-07-06 DIAGNOSIS — N95.1 HOT FLUSHES, PERIMENOPAUSAL: ICD-10-CM

## 2017-07-06 PROCEDURE — 99213 OFFICE O/P EST LOW 20 MIN: CPT | Mod: ZP | Performed by: PHYSICIAN ASSISTANT

## 2017-07-06 PROCEDURE — 99212 OFFICE O/P EST SF 10 MIN: CPT | Mod: ZF

## 2017-07-06 RX ORDER — SERTRALINE HYDROCHLORIDE 25 MG/1
TABLET, FILM COATED ORAL
Qty: 90 TABLET | Refills: 3 | Status: SHIPPED | OUTPATIENT
Start: 2017-07-06 | End: 2018-07-05

## 2017-07-06 ASSESSMENT — PAIN SCALES - GENERAL: PAINLEVEL: NO PAIN (0)

## 2017-07-06 NOTE — MR AVS SNAPSHOT
After Visit Summary   7/6/2017    Enid Lombardo    MRN: [id number]           Patient Information     Date Of Birth          1960        Visit Information        Provider Department      7/6/2017 11:45 AM Ghislaine Putnam PA-C [facility]        Today's Diagnoses     Malignant neoplasm of right breast in female, estrogen receptor positive, unspecified site of breast (H)    -  1       Follow-ups after your visit        Your next 10 appointments already scheduled     Oct 10, 2017 10:00 AM CDT   RETURN RETINA with Paz Osborn MD   Eye Clinic ([facility])    [address]  [address]  9[address] Clin 9a  [place] [address] [phone]              Who to contact     If you have questions or need follow up information about today's clinic visit or your schedule please contact [facility] directly at [phone].  Normal or non-critical lab and imaging results will be communicated to you by MyChart, letter or phone within 4 business days after the clinic has received the results. If you do not hear from us within 7 days, please contact the clinic through Datameerhart or phone. If you have a critical or abnormal lab result, we will notify you by phone as soon as possible.  Submit refill requests through Noom or call your pharmacy and they will forward the refill request to us. Please allow 3 business days for your refill to be completed.          Additional Information About Your Visit        MyChart Information     Noom gives you secure access to your electronic health record. If you see a primary care provider, you can also send messages to your care team and make appointments. If you have questions, please call your primary care clinic.  If you do not have a primary care provider, please call [phone] and they will assist you.        Care EveryWhere ID     This is your Care EveryWhere ID. This could be used by  "other organizations to access your [facility] medical records  [id number]        Your Vitals Were     Pulse Temperature Respirations Height Last Period Pulse Oximetry    63 97.4  F (36.3  C) (Oral) 16 1.651 m (5' 5\") 10/14/2001 98%    BMI (Body Mass Index)                   29.32 kg/m2            Blood Pressure from Last 3 Encounters:   07/06/17 118/74   06/14/17 106/74   02/04/17 131/89    Weight from Last 3 Encounters:   07/06/17 79.9 kg (176 lb 3.2 oz)   06/14/17 78.9 kg (174 lb)   02/04/17 74.8 kg (165 lb)              Today, you had the following     No orders found for display         Today's Medication Changes          These changes are accurate as of: 7/6/17 11:59 PM.  If you have any questions, ask your nurse or doctor.               These medicines have changed or have updated prescriptions.        Dose/Directions    sertraline 25 MG tablet   Commonly known as:  ZOLOFT   This may have changed:  additional instructions   Used for:  Hot flushes, perimenopausal   Changed by:  Ghislaine Putnam PA-C        Take 1 tablet daily.   Quantity:  90 tablet   Refills:  3            Where to get your medicines      These medications were sent to [place] [id number] [place] [facility] [place] [place] - [address] [address] [place] [address]  [address] [address] [place] [address]     Phone:  [phone]     sertraline 25 MG tablet                Primary Care Provider Office Phone # Fax #    Shi Alonso [phone]       [facility]  [address] [facility] [address]        Equal Access to Services     [facility] Hospital[facility]ICE AH: Hadii prem osorio hadasho Soomaali, waaxda luqadaha, qaybta kaalmada adeegyada, michelle tong. So [facility] [phone].    ATENCIÓN: Si habla español, tiene a rivera disposición servicios gratuitos de asistencia lingüística. Llame al [phone].    We comply with applicable federal civil rights laws and [place] laws. We do not discriminate on the basis of race, color, national origin, age, " disability sex, sexual orientation or gender identity.            Thank you!     Thank you for choosing [facility]  for your care. Our goal is always to provide you with excellent care. Hearing back from our patients is one way we can continue to improve our services. Please take a few minutes to complete the written survey that you may receive in the mail after your visit with us. Thank you!             Your Updated Medication List - Protect others around you: Learn how to safely use, store and throw away your medicines at www.disposemymeds.org.          This list is accurate as of: 7/6/17 11:59 PM.  Always use your most recent med list.                   Brand Name Dispense Instructions for use Diagnosis    ADVIL PO      Take 200 mg by mouth as needed for moderate pain        CENTRUM ULTRA WOMENS PO      Take 1 tablet by mouth daily.        CITRUCEL 500 MG Tabs tablet   Generic drug:  methylcellulose      Take 500 mg by mouth daily        Fish Oil 1000 MG Cpdr      Take  by mouth.        fluticasone 50 MCG/ACT spray    FLONASE    16 mL    USE ONE OR TWO SPRAYS IN EACH NOSTRIL DAILY    Chronic rhinitis       loratadine 10 MG tablet    CLARITIN    30 tablet    TAKE ONE TABLET BY MOUTH EVERY DAY AS NEEDED FOR ALLERGY SYMPTOMS    Chronic rhinitis       omeprazole 20 MG CR capsule    priLOSEC    90 capsule    Take 1 capsule (20 mg) by mouth daily    Gastroesophageal reflux disease, esophagitis presence not specified       sertraline 25 MG tablet    ZOLOFT    90 tablet    Take 1 tablet daily.    Hot flushes, perimenopausal

## 2017-07-06 NOTE — PROGRESS NOTES
DIAGNOSIS:  CANCER SURVIVORSHIP PROGRAM  Stage I (TI N0 M0) infiltrating ductal carcinoma of the right breast. She was originally diagnosed in 12/2004. The patient had an abnormal screening mammogram with abnormalities noted on the right. She had a biopsy performed at an outside facility which demonstrated infiltrating ductal carcinoma, grade 1, ER positive, MI negative, HER2 negative. On 01/07/2005, she had a right-sided lumpectomy with sentinel lymph node dissection. This demonstrated infiltrating ductal carcinoma, grade 1. There was no angiolymphatic invasion. Tumor size was 0.6 cm. Close margins were noted on the lumpectomy specimen. She had 0 of 3 sentinel nodes positive for malignancy. She underwent a repeat excision for margins on 01/21/2005 with no further malignancy noted. She completed right breast radiation and then was on tamoxifen from 03/2005 until 11/2008.      CANCER TREATMENT SUMMARY:  *Abnormal screening mammogram with abnormalities noted on the right.  *Biopsy performed at an outside facility which demonstrated infiltrating ductal carcinoma, grade 1, ER positive, MI negative, HER2 negative.  *On 01/07/2005, she had a right-sided lumpectomy with sentinel lymph node dissection.   *Repeat excision for margins on 01/21/2005 with no further malignancy noted.  *Right breast radiation.  *Tamoxifen from 03/2005 until 11/2008.     INTERVAL HISTORY:  Ms. Lombardo returns to the clinic for follow up of her breast cancer.  She has done well over the last year.  Her hot flashes are better on the Zoloft 25 mg daily.  She is exercising 150 minutes of cardio a week.  She is also doing strength training.  She is struggling with losing weight.  She denies chest pain, shortness of breath, cough, abdominal pain, nausea, vomiting, new bone pains or headaches.    MEDICATIONS:   Current Outpatient Prescriptions   Medication Sig Dispense Refill     [DISCONTINUED] sertraline (ZOLOFT) 25 MG tablet Take 1/2 orally daily 90  "tablet 0     fluticasone (FLONASE) 50 MCG/ACT spray USE ONE OR TWO SPRAYS IN EACH NOSTRIL DAILY 16 mL 6     loratadine (CLARITIN) 10 MG tablet TAKE ONE TABLET BY MOUTH EVERY DAY AS NEEDED FOR ALLERGY SYMPTOMS 30 tablet 9     omeprazole (PRILOSEC) 20 MG CR capsule Take 1 capsule (20 mg) by mouth daily 90 capsule 3     Ibuprofen (ADVIL PO) Take 200 mg by mouth as needed for moderate pain       methylcellulose (CITRUCEL) 500 MG TABS Take 500 mg by mouth daily       Omega-3 Fatty Acids (FISH OIL) 1000 MG CPDR Take  by mouth.       Multiple Vitamins-Minerals (CENTRUM ULTRA WOMENS PO) Take 1 tablet by mouth daily.       sertraline (ZOLOFT) 25 MG tablet Take 1 tablet daily. 90 tablet 3         ALLERGIES:    Allergies   Allergen Reactions     Alphagan P Rash     Thrush and numbness in mouth     Benadryl [Anti-Itch]      palpitations       PHYSICAL EXAMINATION:  Vitals: /74  Pulse 63  Temp 97.4  F (36.3  C) (Oral)  Resp 16  Ht 1.651 m (5' 5\")  Wt 79.9 kg (176 lb 3.2 oz)  LMP 10/14/2001  SpO2 98%  BMI 29.32 kg/m2  GENERAL:  A pleasant person in no acute distress.   HEENT:  Sclerae are nonicteric.    NECK:  Supple.   LYMPH NODES:  No peripheral lymphadenopathy noted in the axillary, supraclavicular, or cervical regions.   LUNGS:  Clear to auscultation bilaterally.   HEART:  Regular rate and rhythm, with no murmur appreciated.   ABDOMEN:  Bowel sounds are active.  Soft and nontender.  No hepatosplenomegaly or other masses appreciated.  LOWER EXTREMITIES:  Without pitting edema to the knees bilaterally.   NEUROLOGICAL:  Alert/orientated/able to answer all questions.  CN grossly intact.  PSYCH:  Normal affect.  SKIN:  No suspicious lesions on areas of exposed skin.  BREAST: Right breast notable for the well healed surgical incision 10-12 o'clock, no masses. Left breast normal to inspection, no masses.       RADIOLOGY:  Bilateral mammogram today: prelim, no cancer.  Awaiting final report.     IMPRESSION/PLAN:   1. " Stage I (TI N0 M0) infiltrating ductal carcinoma of the right breast. ER positive, SD negative, HER2 negative.  She was treated as above with surgery, radiation and tamoxifen. Ms. Lombardo continues to do well at this time. She is not having any signs or symptoms that would suggest recurrence of her breast carcinoma. She had her yearly mammogram performed today and preliminary results showed no cancer. I will wait for the final interpretation. Ms. Lombardo wishes to continue to follow up in the Cancer Survivorship Program, and I will see her in a year with her mammogram.   2. Bone health. DEXA scan on 06/2015 showed low bone density.   She will continue calcium 1200 daily with associated vitamin D. She does do weightbearing exercises 2-3 days a week.  3. Radiation side effects. Radiation would have included the bones, lung and skin. Currently, she is asymptomatic from a respiratory standpoint, and no further imaging is advised. Should she develop hemoptysis, shortness of breath or cough, we could consider a chest x-ray or CT scan of the chest. She should watch for any new lesions in the area of her radiation and be seen by Primary Care or Dermatology.   Her bones are at risk for fractures.  4. Cancer screening.  She should undergo routine screening for women in her age group. She continues to have Pap and pelvic exams through her primary care provider. She is status post hysterectomy secondary to fibroids. She had her colonoscopy in July 2015 and had a hyperplastic polyp removed.  Follow up colonoscopy in 3-5 years.  She should limit her sun exposure and when out in the sun use sunscreens.   5. Healthy lifestyle. I encouraged her to continue her exercise routine with recommendations for 150 minutes of cardio exercise a week. Her diet should include lots of fruits and vegetables and low fat. Her BMI should be between 20 and 25. She should see her primary care provider for routine screening/treatment for  cholesterol, diabetes and blood pressure. She should continue to see eye doctor and dentist.  She should receive the annual influenza vaccine. When age appropriate, she should receive the pneumococcal vaccine.    6.  Hot flashes.  Ms. Lombardo will continue on the Zoloft 25 mg daily.  7.  Weight.  I suggested calorie counting to try and lost weight.    If there are no interval concerns, she will follow up in 1 year.    ADDENDUM:  Mammogram 07/06/17:  Benign finding.  Result released to [facility].  DANIE

## 2017-07-06 NOTE — NURSING NOTE
"Oncology Rooming Note    July 6, 2017 11:29 AM   Enid Lombardo is a 57 year old female who presents for:    Chief Complaint   Patient presents with     Oncology Clinic Visit     Breast Ca- F/U     Initial Vitals: /74  Pulse 63  Temp 97.4  F (36.3  C) (Oral)  Resp 16  Ht 1.651 m (5' 5\")  Wt 79.9 kg (176 lb 3.2 oz)  LMP 10/14/2001  SpO2 98%  BMI 29.32 kg/m2 Estimated body mass index is 29.32 kg/(m^2) as calculated from the following:    Height as of this encounter: 1.651 m (5' 5\").    Weight as of this encounter: 79.9 kg (176 lb 3.2 oz). Body surface area is 1.91 meters squared.  No Pain (0) Comment: Data Unavailable   Patient's last menstrual period was 10/14/2001.  Allergies reviewed: Yes  Medications reviewed: Yes    Medications: Medication refills not needed today.  Pharmacy name entered into [facility]:    [id number] [place], [place] - [address] [place] [address] [id number] - [place], [place] - [address] [facility]    Clinical concerns: no clinical concerns  provider was notified.    7 minutes for nursing intake (face to face time)     yV Damico CMA              "

## 2017-07-06 NOTE — LETTER
7/6/2017      RE: Enid Lombardo  [address] [facility] [address]       DIAGNOSIS:  CANCER SURVIVORSHIP PROGRAM  Stage I (TI N0 M0) infiltrating ductal carcinoma of the right breast. She was originally diagnosed in 12/2004. The patient had an abnormal screening mammogram with abnormalities noted on the right. She had a biopsy performed at an outside facility which demonstrated infiltrating ductal carcinoma, grade 1, ER positive, AR negative, HER2 negative. On 01/07/2005, she had a right-sided lumpectomy with sentinel lymph node dissection. This demonstrated infiltrating ductal carcinoma, grade 1. There was no angiolymphatic invasion. Tumor size was 0.6 cm. Close margins were noted on the lumpectomy specimen. She had 0 of 3 sentinel nodes positive for malignancy. She underwent a repeat excision for margins on 01/21/2005 with no further malignancy noted. She completed right breast radiation and then was on tamoxifen from 03/2005 until 11/2008.      CANCER TREATMENT SUMMARY:  *Abnormal screening mammogram with abnormalities noted on the right.  *Biopsy performed at an outside facility which demonstrated infiltrating ductal carcinoma, grade 1, ER positive, AR negative, HER2 negative.  *On 01/07/2005, she had a right-sided lumpectomy with sentinel lymph node dissection.   *Repeat excision for margins on 01/21/2005 with no further malignancy noted.  *Right breast radiation.  *Tamoxifen from 03/2005 until 11/2008.     INTERVAL HISTORY:  Ms. Lombardo returns to the clinic for follow up of her breast cancer.  She has done well over the last year.  Her hot flashes are better on the Zoloft 25 mg daily.  She is exercising 150 minutes of cardio a week.  She is also doing strength training.  She is struggling with losing weight.  She denies chest pain, shortness of breath, cough, abdominal pain, nausea, vomiting, new bone pains or headaches.    MEDICATIONS:   Current Outpatient Prescriptions   Medication Sig Dispense  "Refill     [DISCONTINUED] sertraline (ZOLOFT) 25 MG tablet Take 1/2 orally daily 90 tablet 0     fluticasone (FLONASE) 50 MCG/ACT spray USE ONE OR TWO SPRAYS IN EACH NOSTRIL DAILY 16 mL 6     loratadine (CLARITIN) 10 MG tablet TAKE ONE TABLET BY MOUTH EVERY DAY AS NEEDED FOR ALLERGY SYMPTOMS 30 tablet 9     omeprazole (PRILOSEC) 20 MG CR capsule Take 1 capsule (20 mg) by mouth daily 90 capsule 3     Ibuprofen (ADVIL PO) Take 200 mg by mouth as needed for moderate pain       methylcellulose (CITRUCEL) 500 MG TABS Take 500 mg by mouth daily       Omega-3 Fatty Acids (FISH OIL) 1000 MG CPDR Take  by mouth.       Multiple Vitamins-Minerals (CENTRUM ULTRA WOMENS PO) Take 1 tablet by mouth daily.       sertraline (ZOLOFT) 25 MG tablet Take 1 tablet daily. 90 tablet 3         ALLERGIES:    Allergies   Allergen Reactions     Alphagan P Rash     Thrush and numbness in mouth     Benadryl [Anti-Itch]      palpitations       PHYSICAL EXAMINATION:  Vitals: /74  Pulse 63  Temp 97.4  F (36.3  C) (Oral)  Resp 16  Ht 1.651 m (5' 5\")  Wt 79.9 kg (176 lb 3.2 oz)  LMP 10/14/2001  SpO2 98%  BMI 29.32 kg/m2  GENERAL:  A pleasant person in no acute distress.   HEENT:  Sclerae are nonicteric.    NECK:  Supple.   LYMPH NODES:  No peripheral lymphadenopathy noted in the axillary, supraclavicular, or cervical regions.   LUNGS:  Clear to auscultation bilaterally.   HEART:  Regular rate and rhythm, with no murmur appreciated.   ABDOMEN:  Bowel sounds are active.  Soft and nontender.  No hepatosplenomegaly or other masses appreciated.  LOWER EXTREMITIES:  Without pitting edema to the knees bilaterally.   NEUROLOGICAL:  Alert/orientated/able to answer all questions.  CN grossly intact.  PSYCH:  Normal affect.  SKIN:  No suspicious lesions on areas of exposed skin.  BREAST: Right breast notable for the well healed surgical incision 10-12 o'clock, no masses. Left breast normal to inspection, no masses.       RADIOLOGY:  Bilateral " mammogram today: prelim, no cancer.  Awaiting final report.     IMPRESSION/PLAN:   1. Stage I (TI N0 M0) infiltrating ductal carcinoma of the right breast. ER positive, [place] negative, HER2 negative.  She was treated as above with surgery, radiation and tamoxifen. Ms. Lomabrdo continues to do well at this time. She is not having any signs or symptoms that would suggest recurrence of her breast carcinoma. She had her yearly mammogram performed today and preliminary results showed no cancer. I will wait for the final interpretation. Ms. Lombardo wishes to continue to follow up in the Cancer Survivorship Program, and I will see her in a year with her mammogram.   2. Bone health. DEXA scan on 06/2015 showed low bone density.   She will continue calcium 1200 daily with associated vitamin D. She does do weightbearing exercises 2-3 days a week.  3. Radiation side effects. Radiation would have included the bones, lung and skin. Currently, she is asymptomatic from a respiratory standpoint, and no further imaging is advised. Should she develop hemoptysis, shortness of breath or cough, we could consider a chest x-ray or CT scan of the chest. She should watch for any new lesions in the area of her radiation and be seen by Primary Care or Dermatology.   Her bones are at risk for fractures.  4. Cancer screening.  She should undergo routine screening for women in her age group. She continues to have Pap and pelvic exams through her primary care provider. She is status post hysterectomy secondary to fibroids. She had her colonoscopy in July 2015 and had a hyperplastic polyp removed.  Follow up colonoscopy in 3-5 years.  She should limit her sun exposure and when out in the sun use sunscreens.   5. Healthy lifestyle. I encouraged her to continue her exercise routine with recommendations for 150 minutes of cardio exercise a week. Her diet should include lots of fruits and vegetables and low fat. Her BMI should be between 20 and 25.  She should see her primary care provider for routine screening/treatment for cholesterol, diabetes and blood pressure. She should continue to see eye doctor and dentist.  She should receive the annual influenza vaccine. When age appropriate, she should receive the pneumococcal vaccine.    6.  Hot flashes.  Ms. Lombardo will continue on the Zoloft 25 mg daily.  7.  Weight.  I suggested calorie counting to try and lost weight.    If there are no interval concerns, she will follow up in 1 year.    ADDENDUM:  Mammogram 07/06/17:  Benign finding.  Result released to [facility].  DANIE Putnam PA-C

## 2017-09-18 ENCOUNTER — TELEPHONE (OUTPATIENT)
Dept: FAMILY MEDICINE | Facility: OTHER | Age: 57
End: 2017-09-18

## 2017-09-18 NOTE — TELEPHONE ENCOUNTER
"Enid Lombardo is a 57 year old female who calls with rash.    NURSING ASSESSMENT:  Description: Patient has a blistery rash on her left arm that is spreading. \"It feels like shingles, I've had it before\". The blisters are painful and itchy. They ooze when they are broken open.   Onset/duration:  6 days ago she first noticed it  Precip. factors:  History of shingles  Associated symptoms:  Pain and itching.  Improves/worsens symptoms:  Not asked  Allergies:   Allergies   Allergen Reactions     Alphagan P Rash     Thrush and numbness in mouth     Benadryl [Anti-Itch]      palpitations       RECOMMENDED DISPOSITION: No available appointments today - patient will try [facility]. If they are able to help, she will call back to cancel her appointment in the morning.   Will comply with recommendation: YES   If further questions/concerns or if Sx do not improve, worsen or new Sx develop, call your PCP or [facility] as soon as possible.    NOTES:  Disposition was determined by the first positive assessment question, therefore all previous assessment questions were negative.     Guideline used:  Telephone Triage Protocols for Nurses, Fifth Edition, Fadumo Moore, RN, BSN      "

## 2017-09-18 NOTE — PROGRESS NOTES
"  SUBJECTIVE:                                                    Enid Lombardo is a 57 year old female who presents to clinic today for the following health issues:      HPI    Rash  Onset: 1 week    Description:   Location: Left arm and torso  Character: round, raised, painful, red  Itching (Pruritis): YES    Progression of Symptoms:  worsening    Accompanying Signs & Symptoms:  Fever: no   Body aches or joint pain: no   Sore throat symptoms: no   Recent cold symptoms: no     History:   Previous similar rash: no     Precipitating factors:   Exposure to similar rash: no   New exposures: None   Recent travel: no     Alleviating factors:  Benadryl     Therapies Tried and outcome: Benadryl - helps to sleep, cortisone - doesn't seem to be doing anything      - Started in antecubital area on a hot day  - Spread down arm  - When new one forms gets prinky sensation   - Has had shingles in the past  - Itches  - Starts with tiny red dot, then get blister but no oozing, then scab over   - Now on torso (front only) but not on other arm  - Sleeping not good, since itchy and prickly  - Tried cold compresses, hydrocortisone, and benadryl oral   - Takes claritin for allergies, usually more sinus stuff     Problem list and histories reviewed & adjusted, as indicated.  Additional history: as documented  medro  Labs reviewed in EPIC    ROS:  Constitutional, HEENT, cardiovascular, pulmonary, gi and gu systems are negative, except as otherwise noted.      OBJECTIVE:   /80 (BP Location: Left arm, Patient Position: Chair, Cuff Size: Adult Regular)  Pulse 68  Temp 98  F (36.7  C) (Oral)  Resp 16  Ht 5' 5\" (1.651 m)  Wt 176 lb 9.6 oz (80.1 kg)  LMP 10/14/2001  BMI 29.39 kg/m2  Body mass index is 29.39 kg/(m^2).  GENERAL APPEARANCE: healthy, alert and no distress  EYES: Eyes grossly normal to inspection, PERRLA, conjunctivae and sclerae without injection or discharge, EOM intact   MS: No musculoskeletal defects are noted " and gait is age appropriate without ataxia   SKIN: Scattered maculopapular rash on left palmar forearm and a couple on olecranon process, varying sizes but all circular, blanchable, non-tender, not warm, no other suspicious lesions or rashes, hydration status appears adeuqate with normal skin turgor   PSYCH: Alert and oriented x3; speech- coherent , normal rate and volume; able to articulate logical thoughts, able to abstract reason, no tangential thoughts, no hallucinations or delusions, mentation appears normal, Mood is euthymic. Affect is appropriate for this mood state and bright. Thought content is free of suicidal ideation, hallucinations, and delusions. Dress is adequate and upkept. Eye contact is good during conversation.       Diagnostic Test Results:  none     ASSESSMENT/PLAN:       ICD-10-CM    1. Dermatitis L30.9 methylPREDNISolone (MEDROL DOSEPAK) 4 MG tablet     hydrOXYzine (ATARAX) 25 MG tablet     - Unclear etiology, discussed with patient appearance of dermatitis, doesn't appear to be shingles, bacterial, or fungal, like either contact dermatitis or allergic dermatitis due to patient's history of allergies   - Either way, recommend treatment to calm immune system  - Discussed Medrol dose pack, discussed use and side effects  - Will also give Hydroxyzine for break though itching, discussed use and side effects  - Can continue OTC topical hydrocortisone, use sparingly, not more than 2 weeks   - If not improved in 1 week, will refer to dermatology     The patient indicates understanding of these issues and agrees with the plan.    Follow up: JESSICA Bethea PA-C  [facility]

## 2017-09-18 NOTE — TELEPHONE ENCOUNTER
Reason for Call:  Same Day Appointment, Requested Provider:  Any     PCP: Shi Alonso    Reason for visit: Rash     Duration of symptoms: 1 wk    Have you been treated for this in the past? Yes    Additional comments: patient stated it looks and feels like it is shingles. Has appointment for tomorrow but would like to come in today if possible     Can we leave a detailed message on this number? YES    Phone number patient can be reached at: Home number on file [phone] (home)    Best Time: any    Call taken on 9/18/2017 at 1:09 PM by Dipika Jones

## 2017-09-19 ENCOUNTER — OFFICE VISIT (OUTPATIENT)
Dept: FAMILY MEDICINE | Facility: OTHER | Age: 57
End: 2017-09-19
Payer: COMMERCIAL

## 2017-09-19 VITALS
BODY MASS INDEX: 29.42 KG/M2 | HEART RATE: 68 BPM | HEIGHT: 65 IN | SYSTOLIC BLOOD PRESSURE: 118 MMHG | TEMPERATURE: 98 F | RESPIRATION RATE: 16 BRPM | WEIGHT: 176.6 LBS | DIASTOLIC BLOOD PRESSURE: 80 MMHG

## 2017-09-19 DIAGNOSIS — L30.9 DERMATITIS: Primary | ICD-10-CM

## 2017-09-19 PROCEDURE — 99213 OFFICE O/P EST LOW 20 MIN: CPT | Performed by: PHYSICIAN ASSISTANT

## 2017-09-19 RX ORDER — HYDROXYZINE HYDROCHLORIDE 25 MG/1
25-50 TABLET, FILM COATED ORAL EVERY 6 HOURS PRN
Qty: 20 TABLET | Refills: 1 | Status: SHIPPED | OUTPATIENT
Start: 2017-09-19 | End: 2017-09-25

## 2017-09-19 RX ORDER — METHYLPREDNISOLONE 4 MG
TABLET, DOSE PACK ORAL
Qty: 21 TABLET | Refills: 0 | Status: SHIPPED | OUTPATIENT
Start: 2017-09-19 | End: 2017-09-25

## 2017-09-19 NOTE — PATIENT INSTRUCTIONS
- Start Medrol Dose pack (steriod)   - Hydroxyzine use 1-2 tablets as needed for itching   - OTC topical hydrocortisone also as needed     - If not better in 5-7 days, call for referral to [facility]

## 2017-09-19 NOTE — NURSING NOTE
"Chief Complaint   Patient presents with     Derm Problem     Panel Management     flu, honoring choices       Initial /80 (BP Location: Left arm, Patient Position: Chair, Cuff Size: Adult Regular)  Pulse 68  Temp 98  F (36.7  C) (Oral)  Resp 16  Ht 5' 5\" (1.651 m)  Wt 176 lb 9.6 oz (80.1 kg)  LMP 10/14/2001  BMI 29.39 kg/m2 Estimated body mass index is 29.39 kg/(m^2) as calculated from the following:    Height as of this encounter: 5' 5\" (1.651 m).    Weight as of this encounter: 176 lb 9.6 oz (80.1 kg).  Medication Reconciliation: complete   Tanesha Castro CMA      "

## 2017-09-19 NOTE — MR AVS SNAPSHOT
After Visit Summary   9/19/2017    Enid Lombardo    MRN: [id number]           Patient Information     Date Of Birth          1960        Visit Information        Provider Department      9/19/2017 9:00 AM Ev Bethea PA-C [facility]        Today's Diagnoses     Dermatitis    -  1      Care Instructions    - Start Medrol Dose pack (steriod)   - Hydroxyzine use 1-2 tablets as needed for itching   - OTC topical hydrocortisone also as needed     - If not better in 5-7 days, call for referral to [facility]           Follow-ups after your visit        Your next 10 appointments already scheduled     Oct 10, 2017 10:00 AM CDT   RETURN RETINA with Paz Osborn MD   Eye Clinic ([facility])    [facility]  [address]  [address] [facility] [address]  [facility] [id number] [phone]            Jul 05, 2018 11:45 AM CDT   (Arrive by 11:30 AM)   Survivorship Visit with Ghislaine Putnam PA-C   [facility] ([facility])    [address]  [address] [facility] [place] [address] [phone]              Who to contact     If you have questions or need follow up information about today's clinic visit or your schedule please contact [facility] directly at [phone].  Normal or non-critical lab and imaging results will be communicated to you by MyChart, letter or phone within 4 business days after the clinic has received the results. If you do not hear from us within 7 days, please contact the clinic through MyChart or phone. If you have a critical or abnormal lab result, we will notify you by phone as soon as possible.  Submit refill requests through [employer] or call your pharmacy and they will forward the refill request to us. Please allow 3 business days for your refill to be completed.          Additional Information About Your Visit        MyChart Information      "[employer] gives you secure access to your electronic health record. If you see a primary care provider, you can also send messages to your care team and make appointments. If you have questions, please call your primary care clinic.  If you do not have a primary care provider, please call [phone] and they will assist you.        Care EveryWhere ID     This is your Care EveryWhere ID. This could be used by other organizations to access your [facility] medical records  [id number]        Your Vitals Were     Pulse Temperature Respirations Height Last Period BMI (Body Mass Index)    68 98  F (36.7  C) (Oral) 16 5' 5\" (1.651 m) 10/14/2001 29.39 kg/m2       Blood Pressure from Last 3 Encounters:   09/19/17 118/80   07/06/17 118/74   06/14/17 106/74    Weight from Last 3 Encounters:   09/19/17 176 lb 9.6 oz (80.1 kg)   07/06/17 176 lb 3.2 oz (79.9 kg)   06/14/17 174 lb (78.9 kg)              Today, you had the following     No orders found for display         Today's Medication Changes          These changes are accurate as of: 9/19/17  9:37 AM.  If you have any questions, ask your nurse or doctor.               Start taking these medicines.        Dose/Directions    hydrOXYzine 25 MG tablet   Commonly known as:  ATARAX   Used for:  Dermatitis   Started by:  Ev Bethea PA-C        Dose:  25-50 mg   Take 1-2 tablets (25-50 mg) by mouth every 6 hours as needed for itching   Quantity:  20 tablet   Refills:  1       methylPREDNISolone 4 MG tablet   Commonly known as:  MEDROL DOSEPAK   Used for:  Dermatitis   Started by:  Ev Bethea PA-C        Follow package instructions   Quantity:  21 tablet   Refills:  0            Where to get your medicines      These medications were sent to [place] [address] [place] [facility] [place] - [address]  [address], [place] [address]     Phone:  [phone]     hydrOXYzine 25 MG tablet    methylPREDNISolone 4 MG tablet                Primary Care " Provider Office Phone # Fax #    Shi Sasha Alonso [phone]       [address] [place] [address]        Equal Access to Services     RAIZA TOLBERT : Hadii aad ku hadjacsilverio Morrison, wendyshruthi johnsontimurha, sunshineta kabacilio oscarkath, michelle brennain hayaatrevon moorerosita delgadillo liliana tong. So [facility] [phone].    ATENCIÓN: Si habla español, tiene a rivera disposición servicios gratuitos de asistencia lingüística. Llame al [phone].    We comply with applicable federal civil rights laws and [place] laws. We do not discriminate on the basis of race, color, national origin, age, disability sex, sexual orientation or gender identity.            Thank you!     Thank you for choosing [facility]  for your care. Our goal is always to provide you with excellent care. Hearing back from our patients is one way we can continue to improve our services. Please take a few minutes to complete the written survey that you may receive in the mail after your visit with us. Thank you!             Your Updated Medication List - Protect others around you: Learn how to safely use, store and throw away your medicines at www.disposemymeds.org.          This list is accurate as of: 9/19/17  9:37 AM.  Always use your most recent med list.                   Brand Name Dispense Instructions for use Diagnosis    ADVIL PO      Take 200 mg by mouth as needed for moderate pain        CENTRUM ULTRA WOMENS PO      Take 1 tablet by mouth daily.        CITRUCEL 500 MG Tabs tablet   Generic drug:  methylcellulose      Take 500 mg by mouth daily        Fish Oil 1000 MG Cpdr      Take  by mouth.        fluticasone 50 MCG/ACT spray    FLONASE    16 mL    USE ONE OR TWO SPRAYS IN EACH NOSTRIL DAILY    Chronic rhinitis       hydrOXYzine 25 MG tablet    ATARAX    20 tablet    Take 1-2 tablets (25-50 mg) by mouth every 6 hours as needed for itching    Dermatitis       loratadine 10 MG tablet    CLARITIN    30 tablet    TAKE ONE TABLET BY MOUTH  EVERY DAY AS NEEDED FOR ALLERGY SYMPTOMS    Chronic rhinitis       methylPREDNISolone 4 MG tablet    MEDROL DOSEPAK    21 tablet    Follow package instructions    Dermatitis       omeprazole 20 MG CR capsule    priLOSEC    90 capsule    Take 1 capsule (20 mg) by mouth daily    Gastroesophageal reflux disease, esophagitis presence not specified       sertraline 25 MG tablet    ZOLOFT    90 tablet    Take 1 tablet daily.    Hot flushes, perimenopausal

## 2017-09-24 ENCOUNTER — MYC MEDICAL ADVICE (OUTPATIENT)
Dept: FAMILY MEDICINE | Facility: OTHER | Age: 57
End: 2017-09-24

## 2017-09-25 ENCOUNTER — OFFICE VISIT (OUTPATIENT)
Dept: FAMILY MEDICINE | Facility: OTHER | Age: 57
End: 2017-09-25
Payer: COMMERCIAL

## 2017-09-25 VITALS
WEIGHT: 173 LBS | TEMPERATURE: 99 F | DIASTOLIC BLOOD PRESSURE: 64 MMHG | OXYGEN SATURATION: 95 % | RESPIRATION RATE: 16 BRPM | SYSTOLIC BLOOD PRESSURE: 110 MMHG | BODY MASS INDEX: 28.79 KG/M2 | HEART RATE: 80 BPM

## 2017-09-25 DIAGNOSIS — L30.9 DERMATITIS: Primary | ICD-10-CM

## 2017-09-25 PROCEDURE — 99212 OFFICE O/P EST SF 10 MIN: CPT | Performed by: FAMILY MEDICINE

## 2017-09-25 RX ORDER — HYDROXYZINE HYDROCHLORIDE 25 MG/1
25-50 TABLET, FILM COATED ORAL EVERY 6 HOURS PRN
Qty: 20 TABLET | Refills: 1 | Status: SHIPPED | OUTPATIENT
Start: 2017-09-25 | End: 2018-02-05

## 2017-09-25 RX ORDER — DESONIDE 0.5 MG/G
CREAM TOPICAL
Qty: 15 G | Refills: 1 | Status: SHIPPED | OUTPATIENT
Start: 2017-09-25 | End: 2018-02-05

## 2017-09-25 NOTE — PROGRESS NOTES
"  SUBJECTIVE:                                                    Enid Lombardo is a 57 year old female who presents to clinic today for the following health issues:    HPI    Rash  Onset: 2 weeks    Description:   Location: left forearm/torso  Character: round, red  Itching (Pruritis): YES    Progression of Symptoms:  Improved while taking Prednisone, rash started to return as she tapered down    Accompanying Signs & Symptoms:  Fever: no   Body aches or joint pain: no   Sore throat symptoms: no   Recent cold symptoms: no     History:   Previous similar rash: no     Precipitating factors:   Exposure to similar rash: no   New exposures: None   Recent travel: no     Alleviating factors:  Prednisone    Therapies Tried and outcome: Prednisone     Started 2 weeks ago, thought it was a heat rash in her left elbow and abdominal region. She tried a cold compress. She notes pruritis and that her symptoms were alleviated with prednisone but when she tapered off they returned in her left forearm area and right wrist area. She says she can feel when they are about to come on because she can feel a \"prickly\" and pruritic sensation.     She states she uses dry free lotions and soaps.     Problem list and histories reviewed & adjusted, as indicated.  Additional history: as documented    Patient Active Problem List   Diagnosis     Malignant neoplasm of female breast (H)     Esophageal reflux     Disorder of synovium, tendon, and bursa     Cataract     Shingles     Pain, radicular, lumbar     Right hip pain     Low back pain     IT band syndrome     Macular drusen     Cervicalgia     Headache     Keratoconus     Meibomian gland dysfunction - Both Eyes     Tear film insufficiency     Hyperlipidemia with target LDL less than 130     Benign neoplasm of colon     Skin abscess     Past Surgical History:   Procedure Laterality Date     BIOPSY  2004    Breast     BREAST SURGERY  2004     C VAGINAL HYSTERECTOMY  12/2001    Ovaries " intact, due to fibroids     COLONOSCOPY       ENDOSCOPY  2007    Upper GI     EYE SURGERY       GYN SURGERY       HC BIOPSY/EXCISION LYMPH NODE OPEN DEEP CERVICAL W EXC FAT PAD  2005    Right axillary sentinel node biopsy X 3.     HC COLONOSCOPY W BIOPSY  05/10/10     HC EXCISION BREAST LESION, OPEN >=1  2005    Right breast.     HC REMV CATARACT EXTRACAP,INSERT LENS  08    bilateral     HC REMV TARSAL/METATARSAL BENIGN BONE LESN  1982    Bone spur - right     ORTHOPEDIC SURGERY  1983    Right foot       Social History   Substance Use Topics     Smoking status: Never Smoker     Smokeless tobacco: Never Used     Alcohol use Yes      Comment: occasionally     Family History   Problem Relation Age of Onset     Adopted: Yes     CANCER Mother      lung cancer  at age 52     Thyroid Disease Sister      half sister, had thyroid removed     Unknown/Adopted Father      Unknown/Adopted Maternal Grandmother      Unknown/Adopted Maternal Grandfather      Unknown/Adopted Paternal Grandmother      Unknown/Adopted Paternal Grandfather      Glaucoma No family hx of      DIABETES No family hx of          ROS:  Constitutional, HEENT, cardiovascular, pulmonary, GI, , musculoskeletal, neuro, skin, endocrine and psych systems are negative, except as in HPI or otherwise noted.     This document serves as a record of the services and decisions personally performed and made by Shi Alonso MD. It was created on her behalf by Cleve Rodriguez, a trained medical scribe. The creation of this document is based the provider's statements to the medical scribe.  Cleve Rodriguez, 2017 12:38 PM     OBJECTIVE:                                                    /64 (BP Location: Right arm, Patient Position: Chair, Cuff Size: Adult Regular)  Pulse 80  Temp 99  F (37.2  C) (Temporal)  Resp 16  Wt 78.5 kg (173 lb)  LMP 10/14/2001  SpO2 95%  BMI 28.79 kg/m2  Body mass index is 28.79 kg/(m^2).      GENERAL: healthy, alert, well nourished, well hydrated, no distress, overweight  SKIN: Examination of the rash reveals: intensely inflamed clusters of erythematous papules on left forearm and right wrist  PSYCH: Alert and oriented times 3; speech- coherent , normal rate and volume; able to articulate logical thoughts, able to abstract reason, no tangential thoughts, no hallucinations or delusions, affect- normal    No results found for this or any previous visit (from the past 24 hour(s)).     ASSESSMENT/PLAN:                                                        ICD-10-CM    1. Dermatitis L30.9      Patient is encouraged to use topical treatments (see patient instructions) rather than systemic for her dermatitis.    Order placed for steroid cream. Medication direction, dosage, and side effects discussed with patient. Atarax refilled.     Patient Instructions   Topical steroids to break itch/scratch cycle and therefore, reoccurrences of the dermatitis.     Try not to scratch or itch the areas to avoid aggravating the rash.     The information in this document, created by the medical scribe for me, accurately reflects the services I personally performed and the decisions made by me. I have reviewed and approved this document for accuracy.   MD Shi Medel MD, MD  [facility]

## 2017-09-25 NOTE — MR AVS SNAPSHOT
After Visit Summary   9/25/2017    Enid Lombardo    MRN: [id number]           Patient Information     Date Of Birth          1960        Visit Information        Provider Department      9/25/2017 12:30 PM Shi Alonso MD [facility]        Today's Diagnoses     Dermatitis    -  1      Care Instructions    Topical steroids to break itch/scratch cycle and therefore, reoccurrences of the dermatitis.     Try not to scratch or itch the areas to avoid aggravating the rash.           Follow-ups after your visit        Your next 10 appointments already scheduled     Jan 16, 2018 10:00 AM CST   RETURN RETINA with Paz Osborn MD   Eye Clinic ([facility])    [address]  [address]  9th Fl Clin 9a  [facility] [address] [phone]            Jul 05, 2018 11:45 AM CDT   (Arrive by 11:30 AM)   Survivorship Visit with Ghislaine Putnam PA-C   [facility]    [address]  [address] [facility] [address] [phone]              Who to contact     If you have questions or need follow up information about today's clinic visit or your schedule please contact [facility] directly at [phone].  Normal or non-critical lab and imaging results will be communicated to you by MyChart, letter or phone within 4 business days after the clinic has received the results. If you do not hear from us within 7 days, please contact the clinic through MyChart or phone. If you have a critical or abnormal lab result, we will notify you by phone as soon as possible.  Submit refill requests through [employer] or call your pharmacy and they will forward the refill request to us. Please allow 3 business days for your refill to be completed.          Additional Information About Your Visit        [employer]icanvash[employer]plience Information     [employer] gives you secure access to your electronic health record.  If you see a primary care provider, you can also send messages to your care team and make appointments. If you have questions, please call your primary care clinic.  If you do not have a primary care provider, please call [phone] and they will assist you.        Care EveryWhere ID     This is your Care EveryWhere ID. This could be used by other organizations to access your [facility] medical records  [id number]        Your Vitals Were     Pulse Temperature Respirations Last Period Pulse Oximetry BMI (Body Mass Index)    80 99  F (37.2  C) (Temporal) 16 10/14/2001 95% 28.79 kg/m2       Blood Pressure from Last 3 Encounters:   09/25/17 110/64   09/19/17 118/80   07/06/17 118/74    Weight from Last 3 Encounters:   09/25/17 173 lb (78.5 kg)   09/19/17 176 lb 9.6 oz (80.1 kg)   07/06/17 176 lb 3.2 oz (79.9 kg)              Today, you had the following     No orders found for display         Today's Medication Changes          These changes are accurate as of: 9/25/17 12:51 PM.  If you have any questions, ask your nurse or doctor.               Start taking these medicines.        Dose/Directions    desonide 0.05 % cream   Commonly known as:  DESOWEN   Used for:  Dermatitis   Started by:  Shi Alonso MD        Apply sparingly to affected area three times daily for 14 days.   Quantity:  15 g   Refills:  1            Where to get your medicines      These medications were sent to [place] [address] [place] [facility] [address] [address]  [address] [address] [facility] [address]     Phone:  [phone]     desonide 0.05 % cream    hydrOXYzine 25 MG tablet                Primary Care Provider Office Phone # Fax #    Shi Alonso [phone]       [address] [facility] [address]        Equal Access to Services     ELISEO TOLBERT AH: Lillie Morrison, wajreda luqadaha, qaybta kaalmashruthi crockett, michelle tong. So [facility] [phone].    ATENCIÓN: Si cullen ma  rivera disposición servicios gratuitos de asistencia lingüística. Wei doss [phone].    We comply with applicable federal civil rights laws and [place] laws. We do not discriminate on the basis of race, color, national origin, age, disability sex, sexual orientation or gender identity.            Thank you!     Thank you for choosing [facility]  for your care. Our goal is always to provide you with excellent care. Hearing back from our patients is one way we can continue to improve our services. Please take a few minutes to complete the written survey that you may receive in the mail after your visit with us. Thank you!             Your Updated Medication List - Protect others around you: Learn how to safely use, store and throw away your medicines at www.disposemymeds.org.          This list is accurate as of: 9/25/17 12:51 PM.  Always use your most recent med list.                   Brand Name Dispense Instructions for use Diagnosis    ADVIL PO      Take 200 mg by mouth as needed for moderate pain        CENTRUM ULTRA WOMENS PO      Take 1 tablet by mouth daily.        CITRUCEL 500 MG Tabs tablet   Generic drug:  methylcellulose      Take 500 mg by mouth daily        desonide 0.05 % cream    DESOWEN    15 g    Apply sparingly to affected area three times daily for 14 days.    Dermatitis       Fish Oil 1000 MG Cpdr      Take  by mouth.        fluticasone 50 MCG/ACT spray    FLONASE    16 mL    USE ONE OR TWO SPRAYS IN EACH NOSTRIL DAILY    Chronic rhinitis       hydrOXYzine 25 MG tablet    ATARAX    20 tablet    Take 1-2 tablets (25-50 mg) by mouth every 6 hours as needed for itching    Dermatitis       loratadine 10 MG tablet    CLARITIN    30 tablet    TAKE ONE TABLET BY MOUTH EVERY DAY AS NEEDED FOR ALLERGY SYMPTOMS    Chronic rhinitis       omeprazole 20 MG CR capsule    priLOSEC    90 capsule    Take 1 capsule (20 mg) by mouth daily    Gastroesophageal reflux disease, esophagitis presence not  specified       sertraline 25 MG tablet    ZOLOFT    90 tablet    Take 1 tablet daily.    Hot flushes, perimenopausal

## 2017-09-25 NOTE — NURSING NOTE
"No chief complaint on file.      Initial /64 (BP Location: Right arm, Patient Position: Chair, Cuff Size: Adult Regular)  Pulse 80  Temp 99  F (37.2  C) (Temporal)  Resp 16  Wt 173 lb (78.5 kg)  LMP 10/14/2001  SpO2 95%  BMI 28.79 kg/m2 Estimated body mass index is 28.79 kg/(m^2) as calculated from the following:    Height as of 9/19/17: 5' 5\" (1.651 m).    Weight as of this encounter: 173 lb (78.5 kg).  Medication Reconciliation: complete   Mahi Montes CMA      "

## 2017-09-25 NOTE — PATIENT INSTRUCTIONS
Topical steroids to break itch/scratch cycle and therefore, reoccurrences of the dermatitis.     Try not to scratch or itch the areas to avoid aggravating the rash.

## 2017-10-06 ENCOUNTER — TELEPHONE (OUTPATIENT)
Dept: FAMILY MEDICINE | Facility: OTHER | Age: 57
End: 2017-10-06

## 2017-10-06 DIAGNOSIS — L30.9 DERMATITIS: Primary | ICD-10-CM

## 2017-10-06 RX ORDER — TRIAMCINOLONE ACETONIDE 5 MG/G
CREAM TOPICAL
Qty: 30 G | Refills: 1 | Status: SHIPPED | OUTPATIENT
Start: 2017-10-06 | End: 2018-02-05

## 2017-10-06 NOTE — TELEPHONE ENCOUNTER
Fax from [facility], they have Rx for:        Disp Refills Start End BRIGIDO      desonide (DESOWEN) 0.05 % cream 15 g 1 9/25/2017  --     Sig: Apply sparingly to affected area three times daily for 14 days.     Class: E-Prescribe     Order: [id number]       They are stating patient would like new Rx for different cream that may be less expensive.

## 2017-10-12 DIAGNOSIS — L30.9 DERMATITIS: ICD-10-CM

## 2017-10-12 RX ORDER — DESONIDE 0.5 MG/G
CREAM TOPICAL
Qty: 15 G | Refills: 1 | Status: CANCELLED | OUTPATIENT
Start: 2017-10-12

## 2017-10-12 NOTE — TELEPHONE ENCOUNTER
Desonide 0.05% cream  Last Written Prescription Date: 9-25-17  Last Fill Quantity: 15g,  # refills: 1   Last Office Visit with G, P or [facility] prescribing provider: 9-25-17

## 2017-10-16 NOTE — TELEPHONE ENCOUNTER
Routing refill request to provider for review/approval because:  Prescription is written for short term use.     Vy Moore, RN, BSN

## 2017-10-21 DIAGNOSIS — J31.0 CHRONIC RHINITIS: ICD-10-CM

## 2017-10-24 RX ORDER — FLUTICASONE PROPIONATE 50 MCG
SPRAY, SUSPENSION (ML) NASAL
Qty: 16 ML | Refills: 10 | Status: SHIPPED | OUTPATIENT
Start: 2017-10-24 | End: 2018-08-30

## 2017-10-24 NOTE — TELEPHONE ENCOUNTER
Flonase:  Prescription approved per [facility] Refill Protocol.    Vy Moore, RN, BSN

## 2017-11-05 ENCOUNTER — MYC MEDICAL ADVICE (OUTPATIENT)
Dept: FAMILY MEDICINE | Facility: OTHER | Age: 57
End: 2017-11-05

## 2017-11-06 NOTE — TELEPHONE ENCOUNTER
Spoke with pharmacy and clarified prescription. Patient notified via MyChart.    Gabriella Diaz CMA

## 2017-11-29 ENCOUNTER — MYC MEDICAL ADVICE (OUTPATIENT)
Dept: FAMILY MEDICINE | Facility: OTHER | Age: 57
End: 2017-11-29

## 2017-11-29 NOTE — TELEPHONE ENCOUNTER
Will flag for RN to review/advise, if appropriate. May have different home cares patient could try. But would need an appointment for abx etc.  Lilia Richards, CMA

## 2018-01-02 DIAGNOSIS — C50.911 MALIGNANT NEOPLASM OF RIGHT BREAST IN FEMALE, ESTROGEN RECEPTOR POSITIVE, UNSPECIFIED SITE OF BREAST (H): Primary | ICD-10-CM

## 2018-01-02 DIAGNOSIS — Z17.0 MALIGNANT NEOPLASM OF RIGHT BREAST IN FEMALE, ESTROGEN RECEPTOR POSITIVE, UNSPECIFIED SITE OF BREAST (H): Primary | ICD-10-CM

## 2018-01-16 ENCOUNTER — OFFICE VISIT (OUTPATIENT)
Dept: OPHTHALMOLOGY | Facility: CLINIC | Age: 58
End: 2018-01-16
Attending: OPHTHALMOLOGY
Payer: COMMERCIAL

## 2018-01-16 DIAGNOSIS — H43.813 VITREOUS DEGENERATION, BILATERAL: ICD-10-CM

## 2018-01-16 DIAGNOSIS — H35.363 DRUSEN (DEGENERATIVE) OF RETINA, BILATERAL: ICD-10-CM

## 2018-01-16 PROCEDURE — 92134 CPTRZ OPH DX IMG PST SGM RTA: CPT | Mod: ZF | Performed by: OPHTHALMOLOGY

## 2018-01-16 PROCEDURE — G0463 HOSPITAL OUTPT CLINIC VISIT: HCPCS | Mod: ZF

## 2018-01-16 RX ORDER — ANTIOX #8/OM3/DHA/EPA/LUT/ZEAX 250-2.5 MG
1 CAPSULE ORAL 2 TIMES DAILY
Qty: 60 CAPSULE | Refills: 11 | Status: SHIPPED | OUTPATIENT
Start: 2018-01-16 | End: 2018-11-01

## 2018-01-16 ASSESSMENT — VISUAL ACUITY
OS_CC+: -1
OD_CC: 20/15
METHOD: SNELLEN - LINEAR
OD_CC+: -1
OS_CC: 20/15
CORRECTION_TYPE: GLASSES

## 2018-01-16 ASSESSMENT — TONOMETRY
IOP_METHOD: TONOPEN
OS_IOP_MMHG: 13
OD_IOP_MMHG: 13

## 2018-01-16 ASSESSMENT — REFRACTION_WEARINGRX
OD_SPHERE: +0.75
OS_AXIS: 167
OS_CYLINDER: +0.50
OD_AXIS: 050
OD_CYLINDER: +1.25
OS_SPHERE: +0.50
OS_ADD: +2.75
OD_ADD: +2.75
SPECS_TYPE: PAL

## 2018-01-16 ASSESSMENT — CUP TO DISC RATIO
OD_RATIO: 0.2
OS_RATIO: 0.2

## 2018-01-16 ASSESSMENT — EXTERNAL EXAM - LEFT EYE: OS_EXAM: NORMAL

## 2018-01-16 ASSESSMENT — CONF VISUAL FIELD
OS_NORMAL: 1
METHOD: COUNTING FINGERS
OD_NORMAL: 1

## 2018-01-16 ASSESSMENT — EXTERNAL EXAM - RIGHT EYE: OD_EXAM: NORMAL

## 2018-01-16 ASSESSMENT — SLIT LAMP EXAM - LIDS
COMMENTS: NORMAL
COMMENTS: NORMAL

## 2018-01-16 NOTE — MR AVS SNAPSHOT
"              After Visit Summary   1/16/2018    Enid Lombardo    MRN: [id number]           Patient Information     Date Of Birth          1960        Visit Information        Provider Department      1/16/2018 10:00 AM [facility]        Today's Diagnoses     Drusen (degenerative) of retina, bilateral        Vitreous degeneration, bilateral          Care Instructions    Future Appointments  Date Time Provider Department Center   7/5/2018 10:30 AM [facility]MA2 [facility]   7/5/2018 11:45 AM Ghislaine Putnam PA-C [facility]   1/21/2019 10:15 AM Paz Osborn MD [facility] CLIN               Follow-ups after your visit        Follow-up notes from your care team     Return in about 1 year (around 1/16/2019) for OCT OU.      Your next 10 appointments already scheduled     Jul 05, 2018 10:30 AM CDT   (Arrive by 10:15 AM)   MA DIAGNOSTIC DIGITAL BILATERAL with UCBCMA2   [facility] ([facility])    [address] [place] [address] [phone]           Do not use any powder, lotion or deodorant under your arms or on your breast. If you do, we will ask you to remove it before your exam.  Wear comfortable, two-piece clothing.  If you have any allergies, tell your care team.  Bring any previous mammograms from other facilities or have them mailed to the breast center.  Three-dimensional (3D) mammograms are available at [place] locations in [facility], [facility], [place], [place], and [place]. [facility] locations include [place] [facility] in [place]. Benefits of 3D mammograms include: - Improved rate of cancer detection - Decreases your chance of having to go back for more tests, which means fewer: - \"False-positive\" results (This means that there is an abnormal area but it isn't cancer.) - Invasive testing procedures, such as a biopsy or surgery " - Can provide clearer images of the breast if you have dense breast tissue. 3D mammography is an optional exam that anyone can have with a 2D mammogram. It doesn't replace or take the place of a 2D mammogram. 2D mammograms remain an effective screening test for all women.  Not all insurance companies cover the cost of a 3D mammogram. Check with your insurance.            Jul 05, 2018 11:45 AM CDT   (Arrive by 11:30 AM)   Survivorship Visit with Ghislaine Putnam PA-C   [facility] ([facility])    [address] [place] [address]  [address]  [facility] [address] [phone]              Future tests that were ordered for you today     Open Future Orders        Priority Expected Expires Ordered    OCT Retina Spectralis OU (both eyes) Routine  7/20/2019 1/16/2018            Who to contact     Please call your clinic at [phone] to:    Ask questions about your health    Make or cancel appointments    Discuss your medicines    Learn about your test results    Speak to your doctor   If you have compliments or concerns about an experience at your clinic, or if you wish to file a complaint, please contact [facility] Patient Relations at [phone] or email us at [email or web address]         Additional Information About Your Visit        [employer] Information     [employer] gives you secure access to your electronic health record. If you see a primary care provider, you can also send messages to your care team and make appointments. If you have questions, please call your primary care clinic.  If you do not have a primary care provider, please call [phone] and they will assist you.      [employer] is an electronic gateway that provides easy, online access to your medical records. With [employer], you can request a clinic appointment, read your test results, renew a prescription or communicate with your care team.     To access your existing  account, please contact your [facility] or call [phone] for assistance.        Care EveryWhere ID     This is your Care EveryWhere ID. This could be used by other organizations to access your [facility] medical records  [id number]        Your Vitals Were     Last Period                   10/14/2001            Blood Pressure from Last 3 Encounters:   09/25/17 110/64   09/19/17 118/80   07/06/17 118/74    Weight from Last 3 Encounters:   09/25/17 78.5 kg (173 lb)   09/19/17 80.1 kg (176 lb 9.6 oz)   07/06/17 79.9 kg (176 lb 3.2 oz)              We Performed the Following     OCT Retina Spectralis OU (both eyes)          Today's Medication Changes          These changes are accurate as of: 1/16/18 11:29 AM.  If you have any questions, ask your nurse or doctor.               These medicines have changed or have updated prescriptions.        Dose/Directions    * PRESERVISION AREDS 2 Caps   This may have changed:  You were already taking a medication with the same name, and this prescription was added. Make sure you understand how and when to take each.   Used for:  Drusen (degenerative) of retina, bilateral, Vitreous degeneration, bilateral   Changed by:  Paz Osborn MD        Dose:  1 tablet   Take 1 tablet by mouth 2 times daily   Quantity:  60 capsule   Refills:  11       * CENTRUM ULTRA WOMENS PO   This may have changed:  Another medication with the same name was added. Make sure you understand how and when to take each.   Changed by:  Kaden Holt MD        Dose:  1 tablet   Take 1 tablet by mouth daily.   Refills:  0       * Notice:  This list has 2 medication(s) that are the same as other medications prescribed for you. Read the directions carefully, and ask your doctor or other care provider to review them with you.         Where to get your medicines      These medications were sent to [facility] [address] [place], [place] - [address]  [address]  [address], [facility] [address]     Phone:  [phone]     PRESERVISION AREDS 2 Caps                Primary Care Provider Office Phone # Fax #    Shi Alonso [phone]       [address] [place] [address]        Equal Access to Services     RAIZA TOLBERT : Hadii prem ku hadasho Soomaali, waaxda luqadaha, qaybta kaalmada adeegyada, waxay trena carytrevon sandsgladiscircket tong. So [facility] [phone].    ATENCIÓN: Si habla español, tiene a rivera disposición servicios gratuitos de asistencia lingüística. [facility] [phone].    We comply with applicable federal civil rights laws and [place] laws. We do not discriminate on the basis of race, color, national origin, age, disability, sex, sexual orientation, or gender identity.            Thank you!     Thank you for choosing [facility]  for your care. Our goal is always to provide you with excellent care. Hearing back from our patients is one way we can continue to improve our services. Please take a few minutes to complete the written survey that you may receive in the mail after your visit with us. Thank you!             Your Updated Medication List - Protect others around you: Learn how to safely use, store and throw away your medicines at www.disposemymeds.org.          This list is accurate as of: 1/16/18 11:29 AM.  Always use your most recent med list.                   Brand Name Dispense Instructions for use Diagnosis    ADVIL PO      Take 200 mg by mouth as needed for moderate pain        * PRESERVISION AREDS 2 Caps     60 capsule    Take 1 tablet by mouth 2 times daily    Drusen (degenerative) of retina, bilateral, Vitreous degeneration, bilateral       * CENTRUM ULTRA WOMENS PO      Take 1 tablet by mouth daily.        CITRUCEL 500 MG Tabs tablet   Generic drug:  methylcellulose      Take 500 mg by mouth daily        desonide 0.05 % cream    DESOWEN    15 g    Apply sparingly to affected area three times daily for 14 days.    Dermatitis       Fish  Oil 1000 MG Cpdr      Take  by mouth.        fluticasone 50 MCG/ACT spray    FLONASE    16 mL    USE ONE OR TWO SPRAYS IN EACH NOSTRIL DAILY    Chronic rhinitis       hydrOXYzine 25 MG tablet    ATARAX    20 tablet    Take 1-2 tablets (25-50 mg) by mouth every 6 hours as needed for itching    Dermatitis       loratadine 10 MG tablet    CLARITIN    30 tablet    TAKE ONE TABLET BY MOUTH EVERY DAY AS NEEDED FOR ALLERGY SYMPTOMS    Chronic rhinitis       omeprazole 20 MG CR capsule    priLOSEC    90 capsule    Take 1 capsule (20 mg) by mouth daily    Gastroesophageal reflux disease, esophagitis presence not specified       sertraline 25 MG tablet    ZOLOFT    90 tablet    Take 1 tablet daily.    Hot flushes, perimenopausal       triamcinolone 0.5 % cream    KENALOG    30 g    Apply sparingly to affected area three times daily.    Dermatitis       * Notice:  This list has 2 medication(s) that are the same as other medications prescribed for you. Read the directions carefully, and ask your doctor or other care provider to review them with you.

## 2018-01-16 NOTE — PATIENT INSTRUCTIONS
Future Appointments  Date Time Provider Department Center   7/5/2018 10:30 AM [facility]BCMA2 [facility]   7/5/2018 11:45 AM Ghislaine Putnam PA-C [facility]   1/21/2019 10:15 AM Paz Osborn MD [facility] CLIN

## 2018-01-16 NOTE — NURSING NOTE
Chief Complaints and History of Present Illnesses   Patient presents with     Follow Up For     15 month f/u for Drusen OU      HPI    Affected eye(s):  Both   Symptoms:     No floaters (Comment: nothing new, OU )   No flashes   No itching   No burning            Comments:  Patient was last seen by [facility] 10/03/2016 and is back for a f/u for Drusen OU and OCTNolberto Rossi isn't noticing any changes with her vision or to the [place] Grid. She wears her glasses full time and feels like she's seeing well overall.     Hx. Gestational diabetes but no issues today     Kaylyn KHAN 10:07 AM 01/16/18  CARIDAD Mccormick 10:24 AM 01/16/2018

## 2018-01-16 NOTE — PROGRESS NOTES
CC - drusen    INTERVAL HISTORY -    no chagnes since NILES    [facility] [place] -  Enid Lombardo is a  57 year old year-old patient presenting for drusen both eyes.   No h/o smoking.  H/o breast CA diagnosis ~ 2004, tamoxifen x 3 years. No va changes or amsler changes noted by patient.    PAST OCULAR SURGERY  CE/IOL OU ~2008      RETINAL IMAGING  OCT 1-16-18  OD - drusen vs pseudodrusen, no fluid  OS -  drusen vs pseudodrusen, no fluid    AF  9-29-14  both eyes - irregular c/w drusen      ASSESSMENT & PLAN  1.  Drusen OU   - doubt AMD, doubt from tamoxifen   - observe for now   - unclear benefit from AREDS but could try   - drusen meet AREDS 3 criteria if AMD   - gave Rx for AREDS vs healthy diet    2.  Vitreous degeneration OU   - advised S/Sx RD    3.  H/o breast CA s/p tamoxifen   - no ocular involvement    4.  Pseudophakia OU        return to clinic: 1 year, OCT OU    ATTESTATION     Attending Physician Attestation:      Complete documentation of historical and exam elements from today's encounter can be found in the full encounter summary report (not reduplicated in this progress note).  I personally obtained the chief complaint(s) and history of present illness.  I confirmed and edited as necessary the review of systems, past medical/surgical history, family history, social history, and examination findings as documented by others; and I examined the patient myself.  I personally reviewed the relevant tests, images, and reports as documented above.  I formulated and edited as necessary the assessment and plan and discussed the findings and management plan with the patient and family    Paz Osborn MD, PhD  , Vitreoretinal Surgery  Department of Ophthalmology  [facility]

## 2018-01-18 DIAGNOSIS — J31.0 CHRONIC RHINITIS: ICD-10-CM

## 2018-01-19 RX ORDER — LORATADINE 10 MG/1
TABLET ORAL
Qty: 30 TABLET | Refills: 4 | Status: SHIPPED | OUTPATIENT
Start: 2018-01-19 | End: 2018-05-14

## 2018-01-19 NOTE — TELEPHONE ENCOUNTER
"Requested Prescriptions   Pending Prescriptions Disp Refills     loratadine (CLARITIN) 10 MG tablet [Pharmacy Med Name: LORATADINE 10 MG TABLET] 30 tablet 9     Sig: TAKE ONE TABLET BY MOUTH EVERY DAY AS NEEDED FOR ALLERGY SYMPTOMS    Antihistamines Protocol Passed    1/18/2018  1:10 AM       Passed - Recent or future visit with authorizing provider's specialty    Patient had office visit in the last year or has a visit in the next 30 days with authorizing provider.  See \"Patient Info\" tab in inbasket, or \"Choose Columns\" in Meds & Orders section of the refill encounter.            Passed - Patient is age 3 or older    Apply age and/or weight-based dosing for peds patients age 3 and older.    Forward request to provider for patients under the age of 3.          loratadine (CLARITIN) 10 MG tablet  Prescription approved per [facility] Refill Protocol.    Due for next physical 06/2018  Liliya Pepper, RN, BSN     "

## 2018-01-29 NOTE — PROGRESS NOTES
SUBJECTIVE:   Enid Lombardo is a 57 year old female who presents to clinic today for the following health issues:       HPI  Concern - Skin changes on upper lip/under nose  Onset: September    Description:   Area under the nose, above the lip has a small sore that is coming and going but never fully healing.  Sometimes it gets crusty and red.      Intensity: moderate    Progression of Symptoms:  intermittent    Precipitating factors:   Worsened by: if it gets dry, then it cracks and gets worse. Pt tries to keep it moist    Alleviating factors:  Improved by: none     Therapies Tried and outcome: keeping it clean by washing the area       Problem list and histories reviewed & adjusted, as indicated.  Additional history: as documented      Patient Active Problem List   Diagnosis     Malignant neoplasm of female breast (H)     Esophageal reflux     Disorder of synovium, tendon, and bursa     Cataract     Shingles     Pain, radicular, lumbar     Right hip pain     Low back pain     IT band syndrome     Macular drusen     Cervicalgia     Headache     Keratoconus     Meibomian gland dysfunction - Both Eyes     Tear film insufficiency     Hyperlipidemia with target LDL less than 130     Benign neoplasm of colon     Skin abscess     Past Surgical History:   Procedure Laterality Date     BIOPSY  2004    Breast     BREAST SURGERY  2004     C VAGINAL HYSTERECTOMY  12/2001    Ovaries intact, due to fibroids     COLONOSCOPY       ENDOSCOPY  05/09/2007    Upper GI     EYE SURGERY       GYN SURGERY  2001     HC BIOPSY/EXCISION LYMPH NODE OPEN DEEP CERVICAL W EXC FAT PAD  1/7/2005    Right axillary sentinel node biopsy X 3.     HC COLONOSCOPY W BIOPSY  05/10/10     HC EXCISION BREAST LESION, OPEN >=1  1/7/2005    Right breast.     HC REMV CATARACT EXTRACAP,INSERT LENS  12/18/08    bilateral     HC REMV TARSAL/METATARSAL BENIGN BONE LESN  1982    Bone spur - right     ORTHOPEDIC SURGERY  1983    Right foot       Social History  "  Substance Use Topics     Smoking status: Never Smoker     Smokeless tobacco: Never Used     Alcohol use Yes      Comment: occasionally     Family History   Problem Relation Age of Onset     Adopted: Yes     CANCER Mother      lung cancer  at age 52     Thyroid Disease Sister      half sister, had thyroid removed     Unknown/Adopted Father      Unknown/Adopted Maternal Grandmother      Unknown/Adopted Maternal Grandfather      Unknown/Adopted Paternal Grandmother      Unknown/Adopted Paternal Grandfather      Glaucoma No family hx of      DIABETES No family hx of            ROS:  Constitutional, HEENT, cardiovascular, pulmonary, GI, , musculoskeletal, neuro, skin, endocrine and psych systems are negative, except as in HPI or otherwise noted.     This document serves as a record of the services and decisions personally performed and made by Shi Alonso MD. It was created on her behalf by Shahida Briseno, a trained medical scribe. The creation of this document is based the provider's statements to the medical scribe.  Shahida Briseno, 2018 12:59 PM       OBJECTIVE:                                                    /72 (BP Location: Right arm, Patient Position: Chair, Cuff Size: Adult Regular)  Pulse 78  Temp 99  F (37.2  C) (Temporal)  Resp 16  Ht 5' 4.96\" (1.65 m)  Wt 178 lb (80.7 kg)  LMP 10/14/2001  BMI 29.66 kg/m2  Body mass index is 29.66 kg/(m^2).   GENERAL: healthy, alert, well nourished, well hydrated, no distress  SKIN: raised lesion on her left upper lip, about 5 mm in diameter, with some developing vasculature, suspect basal cell carcinoma  PSYCH: Alert and oriented times 3; speech- coherent , normal rate and volume; able to articulate logical thoughts, able to abstract reason, no tangential thoughts, no hallucinations or delusions, affect- normal    Diagnostic test results:  No results found for this or any previous visit (from the past 24 hour(s)).     ASSESSMENT/PLAN:                "                                         ICD-10-CM    1. Atypical mole D22.9 Surgical pathology exam     Single Lesion       Skin Lesion: Discussed with patient options for having the lesion biopsied as it is suspected to be basal cell carcinoma, which will need to be removed by dermatology, most likely with Mohs surgery. Will biopsy today to determine precisely what it is. It is most likely either cancerous or pre-cancerous. If it is not cancerous then it can be destroyed with freezing when she comes for suture removal. She has another lesion on the right side of her neck that is pre-cancerous and can be frozen today as well.    PROCEDURE-Freeze removal: Explained the risks and benefits of the procedure with the patient and obtained the patient's verbal consent. The area was treated with liquid nitrogen in freeze/thaw cycles x 3. Patient tolerated the procedure well.     PROCEDURE-Skin Biopsy:  Explained the risks and benefits of the procedure with the patient and obtained the patient's written consent. Area was cleansed with 70% alcohol and numbed with Lidocaine 1% with epinephrine. Under sterile conditions, the lesion was biopsied using a 2 mm punch and bleeding was stopped using 4-0 prolene suture times 1. Wound dresses and lesion sent to pathology. Appropriate wound care dressing applied. Patient tolerated the procedure well.  Wound care instructions given.  Will call with pathology results. Expect suture removal in 1 week.    Patient instructions discussed with patient    The information in this document, created by the medical scribe for me, accurately reflects the services I personally performed and the decisions made by me. I have reviewed and approved this document for accuracy.   MD Shi Medel MD, MD  [facility]

## 2018-02-05 ENCOUNTER — TELEPHONE (OUTPATIENT)
Dept: LAB | Facility: OTHER | Age: 58
End: 2018-02-05

## 2018-02-05 ENCOUNTER — OFFICE VISIT (OUTPATIENT)
Dept: FAMILY MEDICINE | Facility: OTHER | Age: 58
End: 2018-02-05
Payer: COMMERCIAL

## 2018-02-05 ENCOUNTER — TELEPHONE (OUTPATIENT)
Dept: FAMILY MEDICINE | Facility: OTHER | Age: 58
End: 2018-02-05

## 2018-02-05 VITALS
TEMPERATURE: 99 F | HEART RATE: 78 BPM | RESPIRATION RATE: 16 BRPM | SYSTOLIC BLOOD PRESSURE: 118 MMHG | BODY MASS INDEX: 29.66 KG/M2 | WEIGHT: 178 LBS | HEIGHT: 65 IN | DIASTOLIC BLOOD PRESSURE: 72 MMHG

## 2018-02-05 DIAGNOSIS — D22.9 ATYPICAL MOLE: Primary | ICD-10-CM

## 2018-02-05 PROCEDURE — 11100 HC BIOPSY SKIN/SUBQ/MUC MEM, SINGLE LESION: CPT | Performed by: FAMILY MEDICINE

## 2018-02-05 PROCEDURE — 88305 TISSUE EXAM BY PATHOLOGIST: CPT | Performed by: FAMILY MEDICINE

## 2018-02-05 NOTE — NURSING NOTE
"Chief Complaint   Patient presents with     Skin Spots     spot on face     Panel Management     height, honoring choices       Initial /72 (BP Location: Right arm, Patient Position: Chair, Cuff Size: Adult Regular)  Pulse 78  Temp 99  F (37.2  C) (Temporal)  Resp 16  Ht 5' 4.96\" (1.65 m)  Wt 178 lb (80.7 kg)  LMP 10/14/2001  BMI 29.66 kg/m2 Estimated body mass index is 29.66 kg/(m^2) as calculated from the following:    Height as of this encounter: 5' 4.96\" (1.65 m).    Weight as of this encounter: 178 lb (80.7 kg).  Medication Reconciliation: complete    "

## 2018-02-05 NOTE — PATIENT INSTRUCTIONS
Biopsy will be done today to determine the pathology of the sore. You will be contacted with the results of this test. If it is basal cell carcinoma as suspected, you should plan on seeing dermatology for removal.    Follow-up for suture removal Thursday afternoon.     Suture Care:    Wash the area with soap and water. Use water on a cotton swab to loosen and remove any blood or crust that forms.    After cleaning, keep the wound clean and dry. Talk with your doctor before applying any antibiotic ointment to the wound.    You may shower as usual after the first 24 hours. But don't soak the area in water until the sutures are removed. This means no tub baths or swimming.

## 2018-02-05 NOTE — MR AVS SNAPSHOT
After Visit Summary   2/5/2018    Enid Lombardo    MRN: [id number]           Patient Information     Date Of Birth          1960        Visit Information        Provider Department      2/5/2018 12:30 PM Shi Alonso MD [facility]        Today's Diagnoses     Atypical mole    -  1      Care Instructions    Biopsy will be done today to determine the pathology of the sore. You will be contacted with the results of this test. If it is basal cell carcinoma as suspected, you should plan on seeing dermatology for removal.    Follow-up for suture removal Thursday afternoon.     Suture Care:    Wash the area with soap and water. Use water on a cotton swab to loosen and remove any blood or crust that forms.    After cleaning, keep the wound clean and dry. Talk with your doctor before applying any antibiotic ointment to the wound.    You may shower as usual after the first 24 hours. But don't soak the area in water until the sutures are removed. This means no tub baths or swimming.            Follow-ups after your visit        Follow-up notes from your care team     Return as needed.      Your next 10 appointments already scheduled     Feb 08, 2018  4:00 PM CST   Nurse Only with NL RN TEAM A, ERC   [facility]    [address] [place] [address] [address]  [facility] [address]   [phone]            Jul 05, 2018 10:30 AM CDT   (Arrive by 10:15 AM)   MA DIAGNOSTIC DIGITAL BILATERAL with UCBCMA2   [facility] ([facility])    [address]  2nd Floor  [facility] [id number] [phone]           Do not use any powder, lotion or deodorant under your arms or on your breast. If you do, we will ask you to remove it before your exam.  Wear comfortable, two-piece clothing.  If you have any allergies, tell your care team.  Bring any previous mammograms from other facilities or have them mailed to the breast  "center.  Three-dimensional (3D) mammograms are available at [place] locations in [place], [place], [place], [place], [facility], [place], [place], and [place]. [facility] locations include [facility] [place] in [place]. Benefits of 3D mammograms include: - Improved rate of cancer detection - Decreases your chance of having to go back for more tests, which means fewer: - \"False-positive\" results (This means that there is an abnormal area but it isn't cancer.) - Invasive testing procedures, such as a biopsy or surgery - Can provide clearer images of the breast if you have dense breast tissue. 3D mammography is an optional exam that anyone can have with a 2D mammogram. It doesn't replace or take the place of a 2D mammogram. 2D mammograms remain an effective screening test for all women.  Not all insurance companies cover the cost of a 3D mammogram. Check with your insurance.            Jul 05, 2018 11:45 AM CDT   (Arrive by 11:30 AM)   Survivorship Visit with Ghislaine Putnam PA-C   [facility] ([facility])    [address]  [address]  [facility] [id number] [phone]            Jan 21, 2019 10:15 AM CST   RETURN RETINA with Paz Osborn MD   [facility]    [facility]  [address] [place] [address] Clin [address]  [facility] [address] [phone]              Who to contact     If you have questions or need follow up information about today's clinic visit or your schedule please contact [facility] directly at [phone].  Normal or non-critical lab and imaging results will be communicated to you by MyChart, letter or phone within 4 business days after the clinic has received the results. If you do not hear from us within 7 days, please contact the clinic through MyChart or phone. If you have a critical or abnormal lab result, we will notify you by phone as soon as " "possible.  Submit refill requests through [employer] or call your pharmacy and they will forward the refill request to us. Please allow 3 business days for your refill to be completed.          Additional Information About Your Visit        [employer] Capture Information     [employer] gives you secure access to your electronic health record. If you see a primary care provider, you can also send messages to your care team and make appointments. If you have questions, please call your primary care clinic.  If you do not have a primary care provider, please call [phone] and they will assist you.        Care EveryWhere ID     This is your Care EveryWhere ID. This could be used by other organizations to access your [facility] medical records  [id number]        Your Vitals Were     Pulse Temperature Respirations Height Last Period BMI (Body Mass Index)    78 99  F (37.2  C) (Temporal) 16 5' 4.96\" (1.65 m) 10/14/2001 29.66 kg/m2       Blood Pressure from Last 3 Encounters:   02/05/18 118/72   09/25/17 110/64   09/19/17 118/80    Weight from Last 3 Encounters:   02/05/18 178 lb (80.7 kg)   09/25/17 173 lb (78.5 kg)   09/19/17 176 lb 9.6 oz (80.1 kg)              We Performed the Following     Single Lesion     Surgical pathology exam          Today's Medication Changes          These changes are accurate as of 2/5/18  8:09 PM.  If you have any questions, ask your nurse or doctor.               Stop taking these medicines if you haven't already. Please contact your care team if you have questions.     desonide 0.05 % cream   Commonly known as:  DESOWEN   Stopped by:  Shi Alonso MD           hydrOXYzine 25 MG tablet   Commonly known as:  ATARAX   Stopped by:  Shi Alonso MD           triamcinolone 0.5 % cream   Commonly known as:  KENALOG   Stopped by:  Shi Alonso MD                    Primary Care Provider Office Phone # Fax #    Shi Alonso [phone]       [address] [facility] " [phone]        Equal Access to Services     [facility] : Hadii prem osorio alexandresilverio La Nenaali, wajerda luqtimurha, qaybta kadipeshmichelle sagastume. So [facility] [phone].    ATENCIÓN: Si habla español, tiene a rivera disposición servicios gratuitos de asistencia lingüística. Ricoame al [phone].    We comply with applicable federal civil rights laws and [place] laws. We do not discriminate on the basis of race, color, national origin, age, disability, sex, sexual orientation, or gender identity.            Thank you!     Thank you for choosing [facility]  for your care. Our goal is always to provide you with excellent care. Hearing back from our patients is one way we can continue to improve our services. Please take a few minutes to complete the written survey that you may receive in the mail after your visit with us. Thank you!             Your Updated Medication List - Protect others around you: Learn how to safely use, store and throw away your medicines at www.disposemymeds.org.          This list is accurate as of 2/5/18  8:09 PM.  Always use your most recent med list.                   Brand Name Dispense Instructions for use Diagnosis    ADVIL PO      Take 200 mg by mouth as needed for moderate pain        CITRUCEL 500 MG Tabs tablet   Generic drug:  methylcellulose      Take 500 mg by mouth daily        Fish Oil 1000 MG Cpdr      Take  by mouth.        fluticasone 50 MCG/ACT spray    FLONASE    16 mL    USE ONE OR TWO SPRAYS IN EACH NOSTRIL DAILY    Chronic rhinitis       loratadine 10 MG tablet    CLARITIN    30 tablet    TAKE ONE TABLET BY MOUTH EVERY DAY AS NEEDED FOR ALLERGY SYMPTOMS    Chronic rhinitis       omeprazole 20 MG CR capsule    priLOSEC    90 capsule    Take 1 capsule (20 mg) by mouth daily    Gastroesophageal reflux disease, esophagitis presence not specified       PRESERVISION AREDS 2 Caps     60 capsule    Take 1 tablet by mouth 2 times daily     Drusen (degenerative) of retina, bilateral, Vitreous degeneration, bilateral       sertraline 25 MG tablet    ZOLOFT    90 tablet    Take 1 tablet daily.    Hot flushes, perimenopausal

## 2018-02-06 NOTE — TELEPHONE ENCOUNTER
Called to let her know Thursday this week is too early.  Can we change the appt for the 2/15 in the AM.  Call to let her know what time works.  Patient expecting a call to confirm time.  Shi Alonso MD

## 2018-02-08 ENCOUNTER — MYC MEDICAL ADVICE (OUTPATIENT)
Dept: FAMILY MEDICINE | Facility: OTHER | Age: 58
End: 2018-02-08

## 2018-02-08 LAB — COPATH REPORT: NORMAL

## 2018-02-08 NOTE — PROGRESS NOTES
Enid, your results are in and normal. We can freeze the area whenever you are ready to destroy the rest.  Please let me know if you have any questions.    Shi Alonso MD

## 2018-02-09 ENCOUNTER — ALLIED HEALTH/NURSE VISIT (OUTPATIENT)
Dept: FAMILY MEDICINE | Facility: OTHER | Age: 58
End: 2018-02-09
Payer: COMMERCIAL

## 2018-02-09 DIAGNOSIS — Z48.02 VISIT FOR SUTURE REMOVAL: Primary | ICD-10-CM

## 2018-02-09 PROCEDURE — 99207 ZZC NO CHARGE NURSE ONLY: CPT

## 2018-02-09 NOTE — PROGRESS NOTES
Enid presents to the clinic today for  removal of suture.  The patient has had the suture in place for 4 days.    There has been no history of infection or drainage.    O: 1 suture are seen located on the upper lip, left side.  The wound is healing well with no signs of infection.    Tetanus status is up to date.    A: Suture removal.    P:  All suture were easily removed today.  Routine wound care discussed.  The patient will follow up as needed.    Liliya Pepper RN, BSN

## 2018-02-09 NOTE — MR AVS SNAPSHOT
"              After Visit Summary   2/9/2018    Enid Lombardo    MRN: [id number]           Patient Information     Date Of Birth          1960        Visit Information        Provider Department      2/9/2018 1:30 PM NL RN TEAM A, [facility]        Today's Diagnoses     Visit for suture removal    -  1       Follow-ups after your visit        Your next 10 appointments already scheduled     Feb 26, 2018  8:30 AM CST   Office Visit with Shi Alonso MD   [facility] ([facility])    [address]  [facility] [address] [phone]           Bring a current list of meds and any records pertaining to this visit. For Physicals, please bring immunization records and any forms needing to be filled out. Please arrive 10 minutes early to complete paperwork.            Jul 05, 2018 10:30 AM CDT   (Arrive by 10:15 AM)   MA DIAGNOSTIC DIGITAL BILATERAL with [email or web address]   [facility] ([facility] [place])    [address]  2nd Floor  [facility] [id number] [phone]           Do not use any powder, lotion or deodorant under your arms or on your breast. If you do, we will ask you to remove it before your exam.  Wear comfortable, two-piece clothing.  If you have any allergies, tell your care team.  Bring any previous mammograms from other facilities or have them mailed to the breast center.  Three-dimensional (3D) mammograms are available at [place] locations in [facility], [place], [place], [facility], [place], [place], and [place]. [facility] locations include [place] [facility] Center in [place]. Benefits of 3D mammograms include: - Improved rate of cancer detection - Decreases your chance of having to go back for more tests, which means fewer: - \"False-positive\" results (This means that there is an abnormal area but it isn't cancer.) - Invasive testing procedures, such as a " biopsy or surgery - Can provide clearer images of the breast if you have dense breast tissue. 3D mammography is an optional exam that anyone can have with a 2D mammogram. It doesn't replace or take the place of a 2D mammogram. 2D mammograms remain an effective screening test for all women.  Not all insurance companies cover the cost of a 3D mammogram. Check with your insurance.            Jul 05, 2018 11:45 AM CDT   (Arrive by 11:30 AM)   Survivorship Visit with Ghislaine Putnam PA-C   [facility] ([facility])    [address]  [address]  [facility] [id number] [phone]            Jan 21, 2019 10:15 AM CST   RETURN RETINA with Paz Osborn MD   [facility] Clinic ([facility])    [address]  [facility] [address] [phone]              Who to contact     If you have questions or need follow up information about today's clinic visit or your schedule please contact [facility] directly at [phone].  Normal or non-critical lab and imaging results will be communicated to you by [employer], letter or phone within 4 business days after the clinic has received the results. If you do not hear from us within 7 days, please contact the clinic through [employer] or phone. If you have a critical or abnormal lab result, we will notify you by phone as soon as possible.  Submit refill requests through [email or web address] or call your pharmacy and they will forward the refill request to us. Please allow 3 business days for your refill to be completed.          Additional Information About Your Visit        [employer] Information     [email or web address] gives you secure access to your electronic health record. If you see a primary care provider, you can also send messages to your care team and make appointments. If you have questions, please call your primary care clinic.  If you do not have a primary care provider,  please call [phone] and they will assist you.        Care EveryWhere ID     This is your Care EveryWhere ID. This could be used by other organizations to access your [place] medical records  [id number]        Your Vitals Were     Last Period                   10/14/2001            Blood Pressure from Last 3 Encounters:   02/05/18 118/72   09/25/17 110/64   09/19/17 118/80    Weight from Last 3 Encounters:   02/05/18 178 lb (80.7 kg)   09/25/17 173 lb (78.5 kg)   09/19/17 176 lb 9.6 oz (80.1 kg)              Today, you had the following     No orders found for display       Primary Care Provider Office Phone # Fax #    Shi Alonso [phone]       [address] [facility] [address]        Equal Access to Services     [facility] HospitalEUNICE : Hadii aad jo hadasho Soomaali, waaxda luqadaha, qaybta kaalmada adeegyada, michelle ford . So [facility] [phone].    ATENCIÓN: Si habla español, tiene a rivera disposición servicios gratuitos de asistencia lingüística. Wei al [phone].    We comply with applicable federal civil rights laws and [place] laws. We do not discriminate on the basis of race, color, national origin, age, disability, sex, sexual orientation, or gender identity.            Thank you!     Thank you for choosing [facility]  for your care. Our goal is always to provide you with excellent care. Hearing back from our patients is one way we can continue to improve our services. Please take a few minutes to complete the written survey that you may receive in the mail after your visit with us. Thank you!             Your Updated Medication List - Protect others around you: Learn how to safely use, store and throw away your medicines at www.disposemymeds.org.          This list is accurate as of 2/9/18 11:59 PM.  Always use your most recent med list.                   Brand Name Dispense Instructions for use Diagnosis    ADVIL PO      Take 200 mg by  mouth as needed for moderate pain        CITRUCEL 500 MG Tabs tablet   Generic drug:  methylcellulose      Take 500 mg by mouth daily        Fish Oil 1000 MG Cpdr      Take  by mouth.        fluticasone 50 MCG/ACT spray    FLONASE    16 mL    USE ONE OR TWO SPRAYS IN EACH NOSTRIL DAILY    Chronic rhinitis       loratadine 10 MG tablet    CLARITIN    30 tablet    TAKE ONE TABLET BY MOUTH EVERY DAY AS NEEDED FOR ALLERGY SYMPTOMS    Chronic rhinitis       omeprazole 20 MG CR capsule    priLOSEC    90 capsule    Take 1 capsule (20 mg) by mouth daily    Gastroesophageal reflux disease, esophagitis presence not specified       PRESERVISION AREDS 2 Caps     60 capsule    Take 1 tablet by mouth 2 times daily    Drusen (degenerative) of retina, bilateral, Vitreous degeneration, bilateral       sertraline 25 MG tablet    ZOLOFT    90 tablet    Take 1 tablet daily.    Hot flushes, perimenopausal

## 2018-02-20 DIAGNOSIS — K21.9 GASTROESOPHAGEAL REFLUX DISEASE, ESOPHAGITIS PRESENCE NOT SPECIFIED: ICD-10-CM

## 2018-02-20 NOTE — PROGRESS NOTES
SUBJECTIVE:   Enid Lombardo is a 58 year old female who presents to clinic today for the following health issues:    HPI     Patient is here to recheck spot on face (above lips) and possibly have it freezed.    The spot healed well after her last treatment. Was seen on 2/5/18 and the lesion was suspected basal cell carcinoma or pre-cancerous lesion. Results of biopsy did not show BCC, so patient has returned today to have the lesion frozen.     Problem list and histories reviewed & adjusted, as indicated.  Additional history: as documented    Patient Active Problem List   Diagnosis     Malignant neoplasm of female breast (H)     Esophageal reflux     Disorder of synovium, tendon, and bursa     Cataract     Shingles     Pain, radicular, lumbar     Right hip pain     Low back pain     IT band syndrome     Macular drusen     Cervicalgia     Headache     Keratoconus     Meibomian gland dysfunction - Both Eyes     Tear film insufficiency     Hyperlipidemia with target LDL less than 130     Benign neoplasm of colon     Skin abscess     Past Surgical History:   Procedure Laterality Date     BIOPSY  2004    Breast     BREAST SURGERY  2004     C VAGINAL HYSTERECTOMY  12/2001    Ovaries intact, due to fibroids     COLONOSCOPY       ENDOSCOPY  05/09/2007    Upper GI     EYE SURGERY       GYN SURGERY  2001     HC BIOPSY/EXCISION LYMPH NODE OPEN DEEP CERVICAL W EXC FAT PAD  1/7/2005    Right axillary sentinel node biopsy X 3.     HC COLONOSCOPY W BIOPSY  05/10/10     HC EXCISION BREAST LESION, OPEN >=1  1/7/2005    Right breast.     HC REMV CATARACT EXTRACAP,INSERT LENS  12/18/08    bilateral     HC REMV TARSAL/METATARSAL BENIGN BONE LESN  1982    Bone spur - right     ORTHOPEDIC SURGERY  1983    Right foot       Social History   Substance Use Topics     Smoking status: Never Smoker     Smokeless tobacco: Never Used     Alcohol use Yes      Comment: occasionally     Family History   Problem Relation Age of Onset      Adopted: Yes     CANCER Mother      lung cancer  at age 52     Thyroid Disease Sister      half sister, had thyroid removed     Unknown/Adopted Father      Unknown/Adopted Maternal Grandmother      Unknown/Adopted Maternal Grandfather      Unknown/Adopted Paternal Grandmother      Unknown/Adopted Paternal Grandfather      Glaucoma No family hx of      DIABETES No family hx of            ROS:  Constitutional, HEENT, cardiovascular, pulmonary, GI, , musculoskeletal, neuro, skin, endocrine and psych systems are negative, except as in HPI or otherwise noted.     This document serves as a record of the services and decisions personally performed and made by Shi Alonso MD. It was created on her behalf by Shahida Briseno, a trained medical scribe. The creation of this document is based the provider's statements to the medical scribe.  Shahida Briseno, 2018 8:43 AM       OBJECTIVE:                                                    /64  Pulse 76  Temp 98.3  F (36.8  C) (Temporal)  Wt 182 lb (82.6 kg)  LMP 10/14/2001  SpO2 96%  Breastfeeding? No  BMI 30.32 kg/m2  Body mass index is 30.32 kg/(m^2).   GENERAL: healthy, alert, well nourished, well hydrated, no distress  SKIN: actinic keratosis on left upper lip, previously treated  PSYCH: Alert and oriented times 3; speech- coherent , normal rate and volume; able to articulate logical thoughts, able to abstract reason, no tangential thoughts, no hallucinations or delusions, affect- normal    Diagnostic test results:  No results found for this or any previous visit (from the past 24 hour(s)).     ASSESSMENT/PLAN:                                                        ICD-10-CM    1. Actinic keratosis L57.0 DESTRUCT PREMALIGNANT LESION, FIRST     Had biopsy previously and did well from this, is healing well, but still with remnants that have to be destroyed.  Discussed and plans and would like freezing with LN.    PROCEDURE-Skin Lesion Freeze: Explained the  risks and benefits of the procedure with the patient and obtained the patient's verbal consent. The area was treated with liquid nitrogen in freeze/thaw cycles x 3. Patient tolerated the procedure well.       The information in this document, created by the medical scribe for me, accurately reflects the services I personally performed and the decisions made by me. I have reviewed and approved this document for accuracy.   MD Shi Medel MD, MD  [facility]

## 2018-02-20 NOTE — TELEPHONE ENCOUNTER
omeprazole (PRILOSEC) 20 MG CR capsule  Routing refill request to provider for review/approval because:  A break in medication, should have needed refills 12/2017    Next 5 appointments (look out 90 days)     Feb 26, 2018  8:30 AM CST   [facility]    [address] [address]   [phone]              Liliya Pepper RN, BSN

## 2018-02-26 ENCOUNTER — OFFICE VISIT (OUTPATIENT)
Dept: FAMILY MEDICINE | Facility: OTHER | Age: 58
End: 2018-02-26
Payer: COMMERCIAL

## 2018-02-26 VITALS
WEIGHT: 182 LBS | OXYGEN SATURATION: 96 % | TEMPERATURE: 98.3 F | BODY MASS INDEX: 30.32 KG/M2 | SYSTOLIC BLOOD PRESSURE: 110 MMHG | DIASTOLIC BLOOD PRESSURE: 64 MMHG | HEART RATE: 76 BPM

## 2018-02-26 DIAGNOSIS — L57.0 ACTINIC KERATOSIS: Primary | ICD-10-CM

## 2018-02-26 PROCEDURE — 99207 ZZC DROP WITH A PROCEDURE: CPT | Performed by: FAMILY MEDICINE

## 2018-02-26 PROCEDURE — 17000 DESTRUCT PREMALG LESION: CPT | Performed by: FAMILY MEDICINE

## 2018-02-26 NOTE — PATIENT INSTRUCTIONS
Advised to follow up if the lesion does not disappear after it has healed from the liquid nitrogen treatment and it can be refrozen.

## 2018-02-26 NOTE — MR AVS SNAPSHOT
"              After Visit Summary   2/26/2018    Enid Lombardo    MRN: [id number]           Patient Information     Date Of Birth          1960        Visit Information        Provider Department      2/26/2018 8:30 AM Shi Alonso MD [facility]        Today's Diagnoses     Actinic keratosis    -  1      Care Instructions    Advised to follow up if the lesion does not disappear after it has healed from the liquid nitrogen treatment and it can be refrozen.           Follow-ups after your visit        Follow-up notes from your care team     Return if symptoms worsen or fail to improve.      Your next 10 appointments already scheduled     Jul 05, 2018 10:30 AM CDT   (Arrive by 10:15 AM)   MA DIAGNOSTIC DIGITAL BILATERAL with UCBCMA2   [facility] ([facility] [place])    [address] [place] [address] [phone]           Do not use any powder, lotion or deodorant under your arms or on your breast. If you do, we will ask you to remove it before your exam.  Wear comfortable, two-piece clothing.  If you have any allergies, tell your care team.  Bring any previous mammograms from other facilities or have them mailed to the breast center.  Three-dimensional (3D) mammograms are available at [place] locations in [facility], [facility], [facility], and [place]. [facility] locations include [place] [facility] in [place]. Benefits of 3D mammograms include: - Improved rate of cancer detection - Decreases your chance of having to go back for more tests, which means fewer: - \"False-positive\" results (This means that there is an abnormal area but it isn't cancer.) - Invasive testing procedures, such as a biopsy or surgery - Can provide clearer images of the breast if you have dense breast tissue. 3D mammography is an optional exam that anyone can have with a 2D mammogram. It " doesn't replace or take the place of a 2D mammogram. 2D mammograms remain an effective screening test for all women.  Not all insurance companies cover the cost of a 3D mammogram. Check with your insurance.            Jul 05, 2018 11:45 AM CDT   (Arrive by 11:30 AM)   Survivorship Visit with Ghislaine Putnam PA-C   [facility] ([facility])    [address]  [address]  [facility] [id number] [phone]            Jan 21, 2019 10:15 AM CST   RETURN RETINA with Paz Osborn MD   Eye Clinic ([email or web address] Clinics)    [facility]  [address] [place] [address]  [address] [address]  [facility] [id number] [phone]              Who to contact     If you have questions or need follow up information about today's clinic visit or your schedule please contact [facility] directly at [phone].  Normal or non-critical lab and imaging results will be communicated to you by [employer], letter or phone within 4 business days after the clinic has received the results. If you do not hear from us within 7 days, please contact the clinic through [employer] or phone. If you have a critical or abnormal lab result, we will notify you by phone as soon as possible.  Submit refill requests through "[employer]" or call your pharmacy and they will forward the refill request to us. Please allow 3 business days for your refill to be completed.          Additional Information About Your Visit        [employer] Information     "[employer]" gives you secure access to your electronic health record. If you see a primary care provider, you can also send messages to your care team and make appointments. If you have questions, please call your primary care clinic.  If you do not have a primary care provider, please call [phone] and they will assist you.        Care EveryWhere ID     This is your Care EveryWhere ID. This could be used by other organizations to access your  [facility] medical records  [id number]        Your Vitals Were     Pulse Temperature Last Period Pulse Oximetry Breastfeeding? BMI (Body Mass Index)    76 98.3  F (36.8  C) (Temporal) 10/14/2001 96% No 30.32 kg/m2       Blood Pressure from Last 3 Encounters:   02/26/18 110/64   02/05/18 118/72   09/25/17 110/64    Weight from Last 3 Encounters:   02/26/18 182 lb (82.6 kg)   02/05/18 178 lb (80.7 kg)   09/25/17 173 lb (78.5 kg)              We Performed the Following     DESTRUCT PREMALIGNANT LESION, FIRST        Primary Care Provider Office Phone # Fax #    Shi Alonso [phone]       [address] [place] [address]        Equal Access to Services     ELISEO TOLBERT : Lillie ornelas Sojohanny, waaxda luqadaha, qaybta kaalmada keira, michelle ford . So [facility] [phone].    ATENCIÓN: Si habla español, tiene a rivera disposición servicios gratuitos de asistencia lingüística. Wei al [phone].    We comply with applicable federal civil rights laws and [place] laws. We do not discriminate on the basis of race, color, national origin, age, disability, sex, sexual orientation, or gender identity.            Thank you!     Thank you for choosing [facility]  for your care. Our goal is always to provide you with excellent care. Hearing back from our patients is one way we can continue to improve our services. Please take a few minutes to complete the written survey that you may receive in the mail after your visit with us. Thank you!             Your Updated Medication List - Protect others around you: Learn how to safely use, store and throw away your medicines at www.disposemymeds.org.          This list is accurate as of 2/26/18 10:35 AM.  Always use your most recent med list.                   Brand Name Dispense Instructions for use Diagnosis    ADVIL PO      Take 200 mg by mouth as needed for moderate pain        CITRUCEL 500 MG Tabs tablet    Generic drug:  methylcellulose      Take 500 mg by mouth daily        Fish Oil 1000 MG Cpdr      Take  by mouth.        fluticasone 50 MCG/ACT spray    FLONASE    16 mL    USE ONE OR TWO SPRAYS IN EACH NOSTRIL DAILY    Chronic rhinitis       loratadine 10 MG tablet    CLARITIN    30 tablet    TAKE ONE TABLET BY MOUTH EVERY DAY AS NEEDED FOR ALLERGY SYMPTOMS    Chronic rhinitis       omeprazole 20 MG CR capsule    priLOSEC    90 capsule    TAKE 1 CAPSULE (20 MG) BY MOUTH DAILY    Gastroesophageal reflux disease, esophagitis presence not specified       PRESERVISION AREDS 2 Caps     60 capsule    Take 1 tablet by mouth 2 times daily    Drusen (degenerative) of retina, bilateral, Vitreous degeneration, bilateral       sertraline 25 MG tablet    ZOLOFT    90 tablet    Take 1 tablet daily.    Hot flushes, perimenopausal

## 2018-03-08 ENCOUNTER — MYC MEDICAL ADVICE (OUTPATIENT)
Dept: FAMILY MEDICINE | Facility: OTHER | Age: 58
End: 2018-03-08

## 2018-04-09 ENCOUNTER — RADIANT APPOINTMENT (OUTPATIENT)
Dept: GENERAL RADIOLOGY | Facility: OTHER | Age: 58
End: 2018-04-09
Attending: PHYSICIAN ASSISTANT
Payer: COMMERCIAL

## 2018-04-09 ENCOUNTER — OFFICE VISIT (OUTPATIENT)
Dept: FAMILY MEDICINE | Facility: OTHER | Age: 58
End: 2018-04-09
Payer: COMMERCIAL

## 2018-04-09 VITALS
SYSTOLIC BLOOD PRESSURE: 116 MMHG | BODY MASS INDEX: 29.99 KG/M2 | DIASTOLIC BLOOD PRESSURE: 78 MMHG | WEIGHT: 180 LBS | TEMPERATURE: 98.3 F | RESPIRATION RATE: 16 BRPM | OXYGEN SATURATION: 97 % | HEART RATE: 68 BPM

## 2018-04-09 DIAGNOSIS — S63.502A WRIST SPRAIN, LEFT, INITIAL ENCOUNTER: Primary | ICD-10-CM

## 2018-04-09 DIAGNOSIS — M25.532 LEFT WRIST PAIN: ICD-10-CM

## 2018-04-09 PROCEDURE — 73110 X-RAY EXAM OF WRIST: CPT | Mod: LT

## 2018-04-09 PROCEDURE — 99214 OFFICE O/P EST MOD 30 MIN: CPT | Performed by: PHYSICIAN ASSISTANT

## 2018-04-09 ASSESSMENT — PAIN SCALES - GENERAL: PAINLEVEL: SEVERE PAIN (6)

## 2018-04-09 NOTE — MR AVS SNAPSHOT
After Visit Summary   4/9/2018    Enid Lombardo    MRN: [id number]           Patient Information     Date Of Birth          1960        Visit Information        Provider Department      4/9/2018 9:00 AM Ev Bethea PA-C [facility]        Today's Diagnoses     Wrist sprain, left, initial encounter    -  1    Left wrist pain          Care Instructions    Rest the affected painful area as much as possible by wearing brace as much as possible for 1-2 weeks. Remove 2-3/day and do stretches     Apply ice for 15-20 minutes intermittently (3-5x/day) as needed and especially after any offending activity.     Daily stretching.  As pain recedes, begin normal activities slowly as tolerated.  Consider Physical Therapy or orthopedics referral if symptoms not better with symptomatic care in 2-3 weeks                        Wrist Sprain            What is a wrist sprain?   A sprain is an injury to a joint that causes a stretch or tear in a ligament. Ligaments are strong bands of tissue that connect one bone to another. Your wrist is made up of 8 bones that are attached to your hand bones and the bones of your forearm. The wrist joint is covered by a joint capsule and the bones are connected by ligaments.   How does it occur?   A wrist sprain can happen when you fall on your wrist or hand, when you are struck by an object, or during a forced motion of the wrist.   What are the symptoms?   You have pain, swelling, and tenderness in your wrist.   How is it diagnosed?   Your healthcare provider will review your symptoms and examine your wrist. You may have an X-ray to be sure you have not broken any bones in your wrist.   How is it treated?   To treat this condition:   Put an ice pack, gel pack, or package of frozen vegetables, wrapped in a cloth on the wrist every 3 to 4 hours, for up to 20 minutes at a time.   Raise your wrist on a pillow when you sit or lie down.   Take an  anti-inflammatory such as ibuprofen, or other medicine as directed by your provider. Nonsteroidal anti-inflammatory medicines (NSAIDs) may cause stomach bleeding and other problems. These risks increase with age. Read the label and take as directed. Unless recommended by your healthcare provider, do not take for more than 10 days.   Wear a splint or cast on your wrist as directed by your provider.   Follow your provider's instructions for doing exercises to help you recover.   Some serious wrist sprains that involve ligament tears may need surgery.   While you are recovering from your injury you will need to change your sport or activity to one that does not make your condition worse. For example, you may need to run instead of playing basketball.   How long will the effects last?   The length of recovery depends on many factors such as your age and health, and if you have had a previous wrist injury. Recovery time also depends on the severity of the wrist sprain. Pain from a wrist sprain may last several weeks or longer. You need to stop doing the activities that cause pain until your wrist has improved. If you continue doing activities that cause pain, your symptoms will return and it will take longer to recover.   When can I return to my normal activities?   Everyone recovers from an injury at a different rate. Return to your activities depends on how soon your wrist recovers, not by how many days or weeks it has been since your injury has occurred. In general, the longer you have symptoms before you start treatment, the longer it will take to get better. The goal of rehabilitation is to return you to your normal activities as soon as is safely possible. If you return too soon you may worsen your injury.   You may return to your activities when the injured wrist can move normally without pain. Your injured wrist, hand, and forearm need to have the same strength as the uninjured side.   How can I prevent a wrist  sprain?   A wrist sprain usually occurs during an accident that is not preventable. However, when you are doing activities such as rollerblading be sure to wear protective wrist guards.          Wrist Sprain Rehabilitation Exercises              You may do the stretching exercises when the sharp wrist pain goes away. You may do the strengthening exercises when stretching is nearly painless.   Stretching exercises   Wrist Range of Motion   0. Flexion: Gently bend your wrist forward. Hold for 5 seconds. Do 3 sets of 10.   0. Extension: Gently bend your wrist backward. Hold this position 5 seconds. Do 3 sets of 10.   0. Side to side: Gently move your wrist from side to side (a handshake motion). Hold for 5 seconds in each direction. Do 3 sets of 10.   Wrist stretch: Press the back of the hand on your injured side with your other hand to help bend your wrist. Hold for 15 to 30 seconds. Next, stretch the hand back by pressing the fingers in a backward direction. Hold for 15 to 30 seconds. Keep the arm on your injured side straight during this exercise. Do 3 sets.   Wrist extension stretch: Stand at a table with your palms down, fingers flat, and elbows straight. Lean your body weight forward. Hold this position for 15 seconds. Repeat 3 times.   Wrist flexion stretch: Stand with the back of your hands on a table, palms facing up, fingers pointing toward your body, and elbows straight. Lean away from the table. Hold this position for 15 to 30 seconds. Repeat 3 times.   Forearm pronation and supination: Bend the elbow of your injured arm 90 degrees, keeping your elbow at your side. Turn your palm up and hold for 5 seconds. Then slowly turn your palm down and hold for 5 seconds. Make sure you keep your elbow at your side and bent 90 degrees while you do the exercise. Do 3 sets of 10. When this exercise becomes pain free, do it with some weight in your hand such   as a soup can or hammer handle.   Strengthening exercises    Wrist flexion: Hold a can or hammer handle in your hand with your palm facing up. Bend your wrist upward. Slowly lower the weight and return to the starting position. Do 3 sets of 10. Gradually increase the weight of the can or weight you are holding.   Wrist extension: Hold a soup can or hammer handle in your hand with your palm facing down. Slowly bend your wrist up. Slowly lower the weight down into the starting position. Do 3 sets of 10. Gradually increase the weight of the object you are holding.    strengthening: Squeeze a soft rubber ball and hold the squeeze for 5 seconds. Do 3 sets of 10.     Published by [employer].  This content is reviewed periodically and is subject to change as new health information becomes available. The information is intended to inform and educate and is not a replacement for medical evaluation, advice, diagnosis or treatment by a healthcare professional.   Written by Fe Hernandez, MS, PT, and Ashley Koenig PT, [facility], \A Chronology of [place] [facility]\"", for [employer].     ? 2010 [facility] and/or its affiliates. All Rights Reserved.   Copyright   Clinical Reference Systems 2011  Adult Health Advisor                        Follow-ups after your visit        Follow-up notes from your care team     Return if symptoms worsen or fail to improve.      Your next 10 appointments already scheduled     Jul 05, 2018 10:30 AM CDT   (Arrive by 10:15 AM)   MA DIAGNOSTIC DIGITAL BILATERAL with UCBCMA2   [facility] ([facility])    [address] [place] [address] [phone]           Do not use any powder, lotion or deodorant under your arms or on your breast. If you do, we will ask you to remove it before your exam.  Wear comfortable, two-piece clothing.  If you have any allergies, tell your care team.  Bring any previous mammograms from other facilities or have them mailed to the breast center.  Three-dimensional (3D) mammograms are available  "at [place] locations in [place], [place], [place], [place], [facility], [place], [place], and [place]. [facility] locations include [place] and [facility] in [place]. Benefits of 3D mammograms include: - Improved rate of cancer detection - Decreases your chance of having to go back for more tests, which means fewer: - \"False-positive\" results (This means that there is an abnormal area but it isn't cancer.) - Invasive testing procedures, such as a biopsy or surgery - Can provide clearer images of the breast if you have dense breast tissue. 3D mammography is an optional exam that anyone can have with a 2D mammogram. It doesn't replace or take the place of a 2D mammogram. 2D mammograms remain an effective screening test for all women.  Not all insurance companies cover the cost of a 3D mammogram. Check with your insurance.            Jul 05, 2018 11:45 AM CDT   (Arrive by 11:30 AM)   Survivorship Visit with Ghislaine Putnam PA-C   [facility] ([facility] [place])    [address]  [address]  [facility] [address] [phone]            Jan 21, 2019 10:15 AM CST   RETURN RETINA with Paz Osborn MD   Eye Clinic ([facility])    [address] Clin [address]  [facility] [address] [phone]              Who to contact     If you have questions or need follow up information about today's clinic visit or your schedule please contact [facility] directly at [phone].  Normal or non-critical lab and imaging results will be communicated to you by MyChart, letter or phone within 4 business days after the clinic has received the results. If you do not hear from us within 7 days, please contact the clinic through MyChart or phone. If you have a critical or abnormal lab result, we will notify you by phone as soon as possible.  Submit refill requests through MyChart or call " your pharmacy and they will forward the refill request to us. Please allow 3 business days for your refill to be completed.          Additional Information About Your Visit        MyChart Information     [employer] gives you secure access to your electronic health record. If you see a primary care provider, you can also send messages to your care team and make appointments. If you have questions, please call your primary care clinic.  If you do not have a primary care provider, please call [phone] and they will assist you.        Care EveryWhere ID     This is your Care EveryWhere ID. This could be used by other organizations to access your [facility] medical records  [id number]        Your Vitals Were     Pulse Temperature Respirations Last Period Pulse Oximetry BMI (Body Mass Index)    68 98.3  F (36.8  C) (Oral) 16 10/14/2001 97% 29.99 kg/m2       Blood Pressure from Last 3 Encounters:   04/09/18 116/78   02/26/18 110/64   02/05/18 118/72    Weight from Last 3 Encounters:   04/09/18 180 lb (81.6 kg)   02/26/18 182 lb (82.6 kg)   02/05/18 178 lb (80.7 kg)               Primary Care Provider Office Phone # Fax #    Shi Alonso [phone]       [address] [place] [address]        Equal Access to Services     RAIZA TOLBERT AH: Hadii prem ku hadasho Soomaali, waaxda luqadaha, qaybta kaalmada adeegyada, michelle albrecht hayjhoana ford . So [facility] [phone].    ATENCIÓN: Si habla español, tiene a rivera disposición servicios gratuitos de asistencia lingüística. Llame al [phone].    We comply with applicable federal civil rights laws and [place] laws. We do not discriminate on the basis of race, color, national origin, age, disability, sex, sexual orientation, or gender identity.            Thank you!     Thank you for choosing [facility]  for your care. Our goal is always to provide you with excellent care. Hearing back from our patients is one way we can continue  to improve our services. Please take a few minutes to complete the written survey that you may receive in the mail after your visit with us. Thank you!             Your Updated Medication List - Protect others around you: Learn how to safely use, store and throw away your medicines at www.disposemymeds.org.          This list is accurate as of 4/9/18 10:01 AM.  Always use your most recent med list.                   Brand Name Dispense Instructions for use Diagnosis    ADVIL PO      Take 200 mg by mouth as needed for moderate pain        Fish Oil 1000 MG Cpdr      Take  by mouth.        fluticasone 50 MCG/ACT spray    FLONASE    16 mL    USE ONE OR TWO SPRAYS IN EACH NOSTRIL DAILY    Chronic rhinitis       loratadine 10 MG tablet    CLARITIN    30 tablet    TAKE ONE TABLET BY MOUTH EVERY DAY AS NEEDED FOR ALLERGY SYMPTOMS    Chronic rhinitis       omeprazole 20 MG CR capsule    priLOSEC    90 capsule    TAKE 1 CAPSULE (20 MG) BY MOUTH DAILY    Gastroesophageal reflux disease, esophagitis presence not specified       PRESERVISION AREDS 2 Caps     60 capsule    Take 1 tablet by mouth 2 times daily    Drusen (degenerative) of retina, bilateral, Vitreous degeneration, bilateral       sertraline 25 MG tablet    ZOLOFT    90 tablet    Take 1 tablet daily.    Hot flushes, perimenopausal

## 2018-04-09 NOTE — NURSING NOTE
"Chief Complaint   Patient presents with     Trauma     wrist injury     Panel Management       Initial /78 (BP Location: Left arm, Patient Position: Chair, Cuff Size: Adult Regular)  Pulse 68  Temp 98.3  F (36.8  C) (Oral)  Resp 16  Wt 180 lb (81.6 kg)  LMP 10/14/2001  SpO2 97%  BMI 29.99 kg/m2 Estimated body mass index is 29.99 kg/(m^2) as calculated from the following:    Height as of 2/5/18: 5' 4.96\" (1.65 m).    Weight as of this encounter: 180 lb (81.6 kg).  Medication Reconciliation: complete  "

## 2018-04-09 NOTE — PROGRESS NOTES
SUBJECTIVE:   Enid Lombardo is a 58 year old female who presents to clinic today for the following health issues:      HPI  Joint Pain    Onset: 3 weeks ago    Description:   Location: left wrist  Character: throbbing    Intensity: moderate    Progression of Symptoms: worse    Accompanying Signs & Symptoms:  Other symptoms: loss of strength in wrist, swelling, pain with certain movements like pushing, can't lift sideways, push up or push down    History:   Previous similar pain: no       Precipitating factors:   Trauma or overuse: YES, was pushing herself up in bed and bent it forward very far    Alleviating factors:  Improved by: rest/inactivity, ice (consistently), and support wrap (ace wrap) not consistently  Therapies Tried and outcome:     - Didn't bruise   - Sore and gets swollen by end of day  - Hurts inside   - Can't lift things       Problem list and histories reviewed & adjusted, as indicated.  Additional history: as documented    ROS:  Constitutional, HEENT, cardiovascular, pulmonary, gi and gu systems are negative, except as otherwise noted.    OBJECTIVE:   /78 (BP Location: Left arm, Patient Position: Chair, Cuff Size: Adult Regular)  Pulse 68  Temp 98.3  F (36.8  C) (Oral)  Resp 16  Wt 180 lb (81.6 kg)  LMP 10/14/2001  SpO2 97%  BMI 29.99 kg/m2  Body mass index is 29.99 kg/(m^2).  GENERAL APPEARANCE: healthy, alert and no distress  EYES: Eyes grossly normal to inspection, PERRLA, conjunctivae and sclerae without injection or discharge, EOM intact   RESP: Lungs clear to auscultation - no rales, rhonchi or wheezes    CV: Regular rates and rhythm, normal S1 S2, no S3 or S4, no murmur, click or rub, no peripheral edema and peripheral pulses strong and symmetric bilaterally   MS: No musculoskeletal defects are noted and gait is age appropriate without ataxia       Left wrist: Decreased ROM with flexion only, remainder ROM normal, tender to palpation of palmar radial side and dorsal radial  side of wrist, no scaphoid tenderness, mild edema to radial side with small area of resolving ecchymosis dorsally, normal sensory exam, decreased strength       Left elbow: Normal ROM, non-tender, no edema, CMS intact, normal sensory exam, normal strength      Right wrist: Normal ROM, non-tender, no edema, CMS intact, normal sensory exam, normal strength  SKIN: No suspicious lesions or rashes, hydration status appears adeuqate with normal skin turgor   PSYCH: Alert and oriented x3; speech- coherent , normal rate and volume; able to articulate logical thoughts, able to abstract reason, no tangential thoughts, no hallucinations or delusions, mentation appears normal, Mood is euthymic. Affect is appropriate for this mood state and bright. Thought content is free of suicidal ideation, hallucinations, and delusions. Dress is adequate and upkept. Eye contact is good during conversation.       Diagnostic Test Results:  XR left wrist: Preliminary reading - normal bone structure with no evidence of fracture. Final pending review by radiology.       ASSESSMENT/PLAN:       ICD-10-CM    1. Wrist sprain, left, initial encounter S63.502A    2. Left wrist pain M25.532 XR Wrist Left G/E 3 Views     - No signs of acute ventura pathology   - Likely sprain that wasn't given enough rest to heal  - Recommend RICE therapy   - Brace given     Patient instructions:   Rest the affected painful area as much as possible by wearing brace as much as possible for 1-2 weeks. Remove 2-3/day and do stretches     Apply ice for 15-20 minutes intermittently (3-5x/day) as needed and especially after any offending activity.     Daily stretching.  As pain recedes, begin normal activities slowly as tolerated.  Consider Physical Therapy or orthopedics referral if symptoms not better with symptomatic care in 2-3 weeks       The patient indicates understanding of these issues and agrees with the plan.    Follow up: JESSICA Bethea,  KAMLESH  [facility]

## 2018-04-09 NOTE — PATIENT INSTRUCTIONS
Rest the affected painful area as much as possible by wearing brace as much as possible for 1-2 weeks. Remove 2-3/day and do stretches     Apply ice for 15-20 minutes intermittently (3-5x/day) as needed and especially after any offending activity.     Daily stretching.  As pain recedes, begin normal activities slowly as tolerated.  Consider Physical Therapy or orthopedics referral if symptoms not better with symptomatic care in 2-3 weeks                        Wrist Sprain            What is a wrist sprain?   A sprain is an injury to a joint that causes a stretch or tear in a ligament. Ligaments are strong bands of tissue that connect one bone to another. Your wrist is made up of 8 bones that are attached to your hand bones and the bones of your forearm. The wrist joint is covered by a joint capsule and the bones are connected by ligaments.   How does it occur?   A wrist sprain can happen when you fall on your wrist or hand, when you are struck by an object, or during a forced motion of the wrist.   What are the symptoms?   You have pain, swelling, and tenderness in your wrist.   How is it diagnosed?   Your healthcare provider will review your symptoms and examine your wrist. You may have an X-ray to be sure you have not broken any bones in your wrist.   How is it treated?   To treat this condition:   Put an ice pack, gel pack, or package of frozen vegetables, wrapped in a cloth on the wrist every 3 to 4 hours, for up to 20 minutes at a time.   Raise your wrist on a pillow when you sit or lie down.   Take an anti-inflammatory such as ibuprofen, or other medicine as directed by your provider. Nonsteroidal anti-inflammatory medicines (NSAIDs) may cause stomach bleeding and other problems. These risks increase with age. Read the label and take as directed. Unless recommended by your healthcare provider, do not take for more than 10 days.   Wear a splint or cast on your wrist as directed by your provider.   Follow your  provider's instructions for doing exercises to help you recover.   Some serious wrist sprains that involve ligament tears may need surgery.   While you are recovering from your injury you will need to change your sport or activity to one that does not make your condition worse. For example, you may need to run instead of playing basketball.   How long will the effects last?   The length of recovery depends on many factors such as your age and health, and if you have had a previous wrist injury. Recovery time also depends on the severity of the wrist sprain. Pain from a wrist sprain may last several weeks or longer. You need to stop doing the activities that cause pain until your wrist has improved. If you continue doing activities that cause pain, your symptoms will return and it will take longer to recover.   When can I return to my normal activities?   Everyone recovers from an injury at a different rate. Return to your activities depends on how soon your wrist recovers, not by how many days or weeks it has been since your injury has occurred. In general, the longer you have symptoms before you start treatment, the longer it will take to get better. The goal of rehabilitation is to return you to your normal activities as soon as is safely possible. If you return too soon you may worsen your injury.   You may return to your activities when the injured wrist can move normally without pain. Your injured wrist, hand, and forearm need to have the same strength as the uninjured side.   How can I prevent a wrist sprain?   A wrist sprain usually occurs during an accident that is not preventable. However, when you are doing activities such as rollerblading be sure to wear protective wrist guards.          Wrist Sprain Rehabilitation Exercises              You may do the stretching exercises when the sharp wrist pain goes away. You may do the strengthening exercises when stretching is nearly painless.   Stretching exercises    Wrist Range of Motion   0. Flexion: Gently bend your wrist forward. Hold for 5 seconds. Do 3 sets of 10.   0. Extension: Gently bend your wrist backward. Hold this position 5 seconds. Do 3 sets of 10.   0. Side to side: Gently move your wrist from side to side (a handshake motion). Hold for 5 seconds in each direction. Do 3 sets of 10.   Wrist stretch: Press the back of the hand on your injured side with your other hand to help bend your wrist. Hold for 15 to 30 seconds. Next, stretch the hand back by pressing the fingers in a backward direction. Hold for 15 to 30 seconds. Keep the arm on your injured side straight during this exercise. Do 3 sets.   Wrist extension stretch: Stand at a table with your palms down, fingers flat, and elbows straight. Lean your body weight forward. Hold this position for 15 seconds. Repeat 3 times.   Wrist flexion stretch: Stand with the back of your hands on a table, palms facing up, fingers pointing toward your body, and elbows straight. Lean away from the table. Hold this position for 15 to 30 seconds. Repeat 3 times.   Forearm pronation and supination: Bend the elbow of your injured arm 90 degrees, keeping your elbow at your side. Turn your palm up and hold for 5 seconds. Then slowly turn your palm down and hold for 5 seconds. Make sure you keep your elbow at your side and bent 90 degrees while you do the exercise. Do 3 sets of 10. When this exercise becomes pain free, do it with some weight in your hand such   as a soup can or hammer handle.   Strengthening exercises   Wrist flexion: Hold a can or hammer handle in your hand with your palm facing up. Bend your wrist upward. Slowly lower the weight and return to the starting position. Do 3 sets of 10. Gradually increase the weight of the can or weight you are holding.   Wrist extension: Hold a soup can or hammer handle in your hand with your palm facing down. Slowly bend your wrist up. Slowly lower the weight down into the starting  position. Do 3 sets of 10. Gradually increase the weight of the object you are holding.    strengthening: Squeeze a soft rubber ball and hold the squeeze for 5 seconds. Do 3 sets of 10.     Published by [employer].  This content is reviewed periodically and is subject to change as new health information becomes available. The information is intended to inform and educate and is not a replacement for medical evaluation, advice, diagnosis or treatment by a healthcare professional.   Written by Fe Hernandez MS, PT, and Ashley Koenig PT, [facility], [facility], for [employer].     ? 2010 [facility] and/or its affiliates. All Rights Reserved.   Copyright   [employer] 2011  [employer]

## 2018-04-10 ENCOUNTER — MYC MEDICAL ADVICE (OUTPATIENT)
Dept: FAMILY MEDICINE | Facility: OTHER | Age: 58
End: 2018-04-10

## 2018-04-10 PROBLEM — M85.842 OSTEOPENIA OF BOTH HANDS: Status: ACTIVE | Noted: 2018-04-10

## 2018-04-10 PROBLEM — M85.841 OSTEOPENIA OF BOTH HANDS: Status: ACTIVE | Noted: 2018-04-10

## 2018-04-10 NOTE — PROGRESS NOTES
Cal Rossi     Radiologist didn't see any abnormalities in your wrist (no fracture). However, it did show osteopenia which is a precursor to osteoporosis. Make sure you are taking 1,200 mg of calcium daily and 800-1,000 IU of vitamin D. This should help prevent getting osteoporosis.       Ev Bethea PA-C

## 2018-05-14 DIAGNOSIS — J31.0 CHRONIC RHINITIS: ICD-10-CM

## 2018-05-15 RX ORDER — LORATADINE 10 MG/1
TABLET ORAL
Qty: 30 TABLET | Refills: 1 | Status: SHIPPED | OUTPATIENT
Start: 2018-05-15 | End: 2018-06-11

## 2018-05-15 NOTE — TELEPHONE ENCOUNTER
Loratadine    Prescription approved per [facility] [place].    Ivet Dunbar, RN, BSN

## 2018-06-11 ENCOUNTER — TELEPHONE (OUTPATIENT)
Dept: FAMILY MEDICINE | Facility: OTHER | Age: 58
End: 2018-06-11

## 2018-06-11 DIAGNOSIS — J31.0 CHRONIC RHINITIS, UNSPECIFIED TYPE: ICD-10-CM

## 2018-06-11 RX ORDER — LORATADINE 10 MG/1
TABLET ORAL
Qty: 90 TABLET | Refills: 1 | Status: SHIPPED | OUTPATIENT
Start: 2018-06-11 | End: 2018-12-08

## 2018-06-11 RX ORDER — LORATADINE 10 MG/1
TABLET ORAL
Qty: 90 TABLET | Refills: 1 | Status: CANCELLED | OUTPATIENT
Start: 2018-06-11

## 2018-06-11 NOTE — TELEPHONE ENCOUNTER
Message from Pharmacy: Patient is requesting to have a 90 day supply of her loratadine 10 mg tablet.

## 2018-07-05 ENCOUNTER — ONCOLOGY SURVIVORSHIP VISIT (OUTPATIENT)
Dept: ONCOLOGY | Facility: CLINIC | Age: 58
End: 2018-07-05
Attending: PHYSICIAN ASSISTANT
Payer: COMMERCIAL

## 2018-07-05 ENCOUNTER — RADIANT APPOINTMENT (OUTPATIENT)
Dept: MAMMOGRAPHY | Facility: CLINIC | Age: 58
End: 2018-07-05
Attending: PHYSICIAN ASSISTANT
Payer: COMMERCIAL

## 2018-07-05 VITALS
RESPIRATION RATE: 14 BRPM | TEMPERATURE: 98 F | HEART RATE: 63 BPM | OXYGEN SATURATION: 97 % | WEIGHT: 179 LBS | BODY MASS INDEX: 29.82 KG/M2 | DIASTOLIC BLOOD PRESSURE: 83 MMHG | SYSTOLIC BLOOD PRESSURE: 141 MMHG

## 2018-07-05 DIAGNOSIS — N95.1 HOT FLUSHES, PERIMENOPAUSAL: ICD-10-CM

## 2018-07-05 DIAGNOSIS — C50.911 MALIGNANT NEOPLASM OF RIGHT BREAST IN FEMALE, ESTROGEN RECEPTOR POSITIVE, UNSPECIFIED SITE OF BREAST (H): ICD-10-CM

## 2018-07-05 DIAGNOSIS — Z17.0 MALIGNANT NEOPLASM OF RIGHT BREAST IN FEMALE, ESTROGEN RECEPTOR POSITIVE, UNSPECIFIED SITE OF BREAST (H): Primary | ICD-10-CM

## 2018-07-05 DIAGNOSIS — C50.911 MALIGNANT NEOPLASM OF RIGHT BREAST IN FEMALE, ESTROGEN RECEPTOR POSITIVE, UNSPECIFIED SITE OF BREAST (H): Primary | ICD-10-CM

## 2018-07-05 DIAGNOSIS — K13.0 LIP LESION: ICD-10-CM

## 2018-07-05 DIAGNOSIS — Z17.0 MALIGNANT NEOPLASM OF RIGHT BREAST IN FEMALE, ESTROGEN RECEPTOR POSITIVE, UNSPECIFIED SITE OF BREAST (H): ICD-10-CM

## 2018-07-05 DIAGNOSIS — R92.8 ABNORMAL MAMMOGRAM: ICD-10-CM

## 2018-07-05 PROCEDURE — 99214 OFFICE O/P EST MOD 30 MIN: CPT | Mod: ZP | Performed by: PHYSICIAN ASSISTANT

## 2018-07-05 PROCEDURE — G0463 HOSPITAL OUTPT CLINIC VISIT: HCPCS | Mod: ZF

## 2018-07-05 RX ORDER — SERTRALINE HYDROCHLORIDE 25 MG/1
TABLET, FILM COATED ORAL
Qty: 30 TABLET | Refills: 11 | Status: SHIPPED | OUTPATIENT
Start: 2018-07-05 | End: 2019-10-31

## 2018-07-05 NOTE — NURSING NOTE
"Oncology Rooming Note    July 5, 2018 11:43 AM   Enid Lombardo is a 58 year old female who presents for:    Chief Complaint   Patient presents with     Oncology Clinic Visit     Initial Vitals: /83  Pulse 63  Temp 98  F (36.7  C) (Oral)  Resp 14  Wt 81.2 kg (179 lb)  LMP 10/14/2001  SpO2 97%  BMI 29.82 kg/m2 Estimated body mass index is 29.82 kg/(m^2) as calculated from the following:    Height as of 2/5/18: 1.65 m (5' 4.96\").    Weight as of this encounter: 81.2 kg (179 lb). Body surface area is 1.93 meters squared.  Data Unavailable Comment: Data Unavailable   Patient's last menstrual period was 10/14/2001.  Allergies reviewed: Yes  Medications reviewed: Yes    Medications: Medication refills not needed today.  Pharmacy name entered into [employer]:    [id number] [place], [place] - [address] [place] [address] [id number] - [place], [place] - [address] [facility]    Clinical concerns: Yes, Mammogram that was done today, and a spot that is not healing well on left side of upper lip. Ghislaine was notified.    10 minutes for nursing intake (face to face time)     Ria Rodriguez CMA            "

## 2018-07-05 NOTE — LETTER
7/5/2018      RE: Enid Lombardo  [address] [facility] [address]       DIAGNOSIS:  CANCER SURVIVORSHIP PROGRAM  Stage I (TI N0 M0) infiltrating ductal carcinoma of the right breast. She was originally diagnosed in 12/2004. The patient had an abnormal screening mammogram with abnormalities noted on the right. She had a biopsy performed at an outside facility which demonstrated infiltrating ductal carcinoma, grade 1, ER positive, CA negative, HER2 negative. On 01/07/2005, she had a right-sided lumpectomy with sentinel lymph node dissection. This demonstrated infiltrating ductal carcinoma, grade 1. There was no angiolymphatic invasion. Tumor size was 0.6 cm. Close margins were noted on the lumpectomy specimen. She had 0 of 3 sentinel nodes positive for malignancy. She underwent a repeat excision for margins on 01/21/2005 with no further malignancy noted. She completed right breast radiation and then was on tamoxifen from 03/2005 until 11/2008.       CANCER TREATMENT SUMMARY:  *Abnormal screening mammogram with abnormalities noted on the right.  *Biopsy performed at an outside facility which demonstrated infiltrating ductal carcinoma, grade 1, ER positive, CA negative, HER2 negative.  *On 01/07/2005, she had a right-sided lumpectomy with sentinel lymph node dissection.   *Repeat excision for margins on 01/21/2005 with no further malignancy noted.  *Right breast radiation.  *Tamoxifen from 03/2005 until 11/2008.     INTERVAL HISTORY:  Enid returns to the clinic for follow up of her breast cancer.  She feeling well at this time.  She is exercising with an incline  and strength training.  She is exercising greater than 150 minutes of cardiovascular exercise per week.  Her hot flashes are controlled with the Zoloft.  She had a spot removed by dermatology on her upper lip.  Recently has noted irritation in that area and wanted me to take a look at the area today.  She denies chest pain, shortness of breath,  cough, abdominal pain, nausea, vomiting, or new bone pains.     Mammogram today showed calcifications, US and mammogram did not show a mass.  Further evaluation advised with a contrast mammogram and biopsy.  Enid has not noted any changes in the breast by her exam.    MEDICATIONS:   Current Outpatient Prescriptions   Medication Sig Dispense Refill     fluticasone (FLONASE) 50 MCG/ACT spray USE ONE OR TWO SPRAYS IN EACH NOSTRIL DAILY 16 mL 10     Ibuprofen (ADVIL PO) Take 200 mg by mouth as needed for moderate pain       loratadine (CLARITIN) 10 MG tablet TAKE ONE TABLET BY MOUTH EVERY DAY AS NEEDED FOR ALLERGY SYMPTOMS 90 tablet 1     Multiple Vitamins-Minerals (PRESERVISION AREDS 2) CAPS Take 1 tablet by mouth 2 times daily 60 capsule 11     Omega-3 Fatty Acids (FISH OIL) 1000 MG CPDR Take  by mouth.       omeprazole (PRILOSEC) 20 MG CR capsule TAKE 1 CAPSULE (20 MG) BY MOUTH DAILY 90 capsule 3     [DISCONTINUED] sertraline (ZOLOFT) 25 MG tablet Take 1 tablet daily. 90 tablet 3     sertraline (ZOLOFT) 25 MG tablet Take 1 tablet daily. 30 tablet 11         ALLERGIES:    Allergies   Allergen Reactions     Alphagan P Rash     Thrush and numbness in mouth       PHYSICAL EXAMINATION:  Vitals: /83  Pulse 63  Temp 98  F (36.7  C) (Oral)  Resp 14  Wt 81.2 kg (179 lb)  LMP 10/14/2001  SpO2 97%  BMI 29.82 kg/m2  GENERAL:  A pleasant person in no acute distress.   HEENT:  Sclerae are nonicteric.    NECK:  Supple.   LYMPH NODES:  No peripheral lymphadenopathy noted in the axillary, supraclavicular, or cervical regions.   LUNGS:  Clear to auscultation bilaterally.   HEART:  Regular rate and rhythm, with no murmur appreciated.   ABDOMEN:  Bowel sounds are active.  Soft and nontender.  No hepatosplenomegaly or other masses appreciated.  LOWER EXTREMITIES:  Without pitting edema to the knees bilaterally.   NEUROLOGICAL:  Alert/orientated/able to answer all questions.  CN grossly intact.  PSYCH:  Normal affect.  SKIN:   Small raised erythematous lesion on the upper lip, medial/left side.  No discharge.  BREAST: Right breast, well healed surgical incision along the areola.  Mild fullness (no palpable mass) in the 6 o'clock position.  Left breast, normal to inspection, no masses.         RADIOLOGY:  Examination: Bilateral digital diagnostic mammography and digital  breast tomosynthesis with computer aided detection, and focused right  breast ultrasound. 7/5/2018     Comparison: 7/6/2017, 6/28/2016, 6/23/2015, 6/22/2012, 6/18/2009,  12/21/2004     History/Family history: No current breast symptom.  Right breast  cancer status post breast conservation therapy - infiltrating ductal  carcinoma with DCIS.     Technique:  Tomosynthesis     BREAST DENSITY: Scattered fibroglandular densities     Findings:   Mammographic views demonstrate approximately 3.2 cm segmental  amorphous calcifications inferior right breast mid to posterior depth  6:00 with associated focal asymmetry.  Right breast conservation  therapy changes superior right breast.  No significant change on the  left.      Targeted right breast ultrasound by radiologist and technologist  demonstrates a benign appearing cyst 6:00 position 5 cm from nipple  that measures up to 9 mm.  Posterior to this cyst there is a poorly  defined heterogeneous island of glandular tissue with calcifications.   Ultrasound findings appear to correlate with focal asymmetry on  mammography; however suspicious right breast findings are best seen  with mammography.  No discrete mass with ultrasound.        Benign right axillary lymph nodes.         IMPRESSION: BI-RADS CATEGORY: 4 - Suspicious Abnormality-Biopsy Should  Be Considered.     RECOMMENDED FOLLOW-UP: Biopsy.     Contrast enhanced mammography followed by stereotactic guided right  breast biopsy.    IMPRESSION/PLAN:   1. Stage I (TI N0 M0) infiltrating ductal carcinoma of the right breast. ER positive, TX negative, HER2 negative.  She was  treated as above with surgery, radiation and tamoxifen. Enid's breast imaging showed calcifications, but no mass.  Radiologist recommended contrast mammogram and biopsy.  This testing will be scheduled in the near future.  I reviewed the results with Enid today and she had no further questions.  I will plan to contact her with biopsy results and the plan.  2. Radiation side effects. Radiation would have included the bones, lung and skin. Currently, she is asymptomatic from a respiratory standpoint, and no further imaging is advised. Should she develop hemoptysis, shortness of breath or cough, we could consider a chest x-ray or CT scan of the chest. She should watch for any new lesions in the area of her radiation and be seen by Primary Care or Dermatology.   Her bones are at risk for fractures.  No concerns today.  3. Cancer screening.  She should undergo routine screening for women in her age group. Pap and pelvic exams as directed by her primary care provider. She is status post hysterectomy secondary to fibroids. She had her colonoscopy in July 2015 and had a hyperplastic polyp removed.  Follow up colonoscopy in 3-5 years.  She should limit her sun exposure and when out in the sun use sunscreens.   4. Healthy lifestyle. I encouraged her to continue her current exercise routine. Her diet should include lots of fruits and vegetables and low fat. Her BMI should be between 20 and 25. She should see her primary care provider for routine health maintenance.  She should continue to see eye doctor and dentist.  She should receive the annual influenza vaccine. When age appropriate, she should receive the pneumococcal vaccine.    5.  Hot flashes.  Ms. Lombardo will continue on the Zoloft 25 mg daily.  Prescription refilled today.  6.  Lip lesion.  I recommended trying keeping the area clean, no makeup.  She can try vaseline or aquaphor for a couple of days.  If not better, try OTC hydrocortisone cream.  If the area  persists or worsens, she will follow up with dermatology.      Contrast mammogram and biopsy to be scheduled asap.  I will plan to contact Enid with the results of the biopsy and further plan of care.  Follow up appointments will be determined by results of the biopsy.        Ghislaine Putnam PA-C

## 2018-07-05 NOTE — MR AVS SNAPSHOT
After Visit Summary   7/5/2018    Enid Lombardo    MRN: [id number]           Patient Information     Date Of Birth          1960        Visit Information        Provider Department      7/5/2018 11:45 AM Ghislaine Putnam PA-C [facility]        Today's Diagnoses     Malignant neoplasm of right breast in female, estrogen receptor positive, unspecified site of breast (H)    -  1    Abnormal mammogram        Lip lesion           Follow-ups after your visit        Your next 10 appointments already scheduled     Jul 11, 2018  1:15 PM CDT   MA CONTRAST ENHANCED DIGITAL MAMMOGRAM with MGMA2, MG MA TECH, MGCT1   [facility] ([facility]    [address] [place] [id number] [phone]           Do not use any powder, lotion or deodorant under your arms or on your breast. If you do, we will ask you to remove it before your exam.  Wear comfortable, two-piece clothing.  If you have any allergies, tell your care team.  Bring any previous mammograms from other facilities or have them mailed to the breast center.            Jul 11, 2018  1:45 PM CDT   MA STEREOTACTIC BREAST BIOPSY VACUUM ASST RIGHT with MGMA2, MG MA TECH, MG BREAST NURSE, MG NM RAD   [facility] ([facility])    [address] [place] [id number] [phone]           Do not use any powder, lotion or deodorant under your arms or on your breast. If you do, we will ask you to remove it before your exam.  Wear comfortable clothing and a well-fitted bra to the clinic. (A sports bra is best.) Bring a list of your medicines, including vitamins, minerals and over-the-counter drugs. It is best to leave valuables at home.  Tell your care team if:   You have any allergies.   You are taking aspirin, ibuprofen, naproxen or any drug that could increase bleeding.  Bring any previous mammograms from other facilities or have them mailed  to the breast center.    Stop taking Coumadin (warfarin) 5 days before treatment. Restart the day after treatment.   If you take Plavix, Ticlid, Pletal or Persantine, ask your doctor if you should stop these medicines. You may need extra tests on the morning of your scan            Jul 23, 2018  9:45 AM CDT   MyChart Physical Adult with Shi Alonso MD   [facility] ([facility])    [address]  [facility] [address]   [phone]            Jan 21, 2019 10:15 AM CST   RETURN RETINA with Paz Osborn MD   [facility] ([facility])    [address]  [address]  [facility] [id number] [phone]              Future tests that were ordered for you today     Open Future Orders        Priority Expected Expires Ordered    MA Stereotactic Breast Biopsy Vacuum Assist Right Routine  7/5/2019 7/5/2018    MA Contrast Enhanced Digital Mammogram Routine  7/5/2019 7/5/2018            Who to contact     If you have questions or need follow up information about today's clinic visit or your schedule please contact [facility] directly at [phone].  Normal or non-critical lab and imaging results will be communicated to you by MyChart, letter or phone within 4 business days after the clinic has received the results. If you do not hear from us within 7 days, please contact the clinic through [employer] or phone. If you have a critical or abnormal lab result, we will notify you by phone as soon as possible.  Submit refill requests through [employer] or call your pharmacy and they will forward the refill request to us. Please allow 3 business days for your refill to be completed.          Additional Information About Your Visit        [employer] Information     [employer] gives you secure access to your electronic health record. If you see a primary care provider, you can also send messages to your care team and make  appointments. If you have questions, please call your primary care clinic.  If you do not have a primary care provider, please call [phone] and they will assist you.        Care EveryWhere ID     This is your Care EveryWhere ID. This could be used by other organizations to access your [place] medical records  [id number]        Your Vitals Were     Pulse Temperature Respirations Last Period Pulse Oximetry BMI (Body Mass Index)    63 98  F (36.7  C) (Oral) 14 10/14/2001 97% 29.82 kg/m2       Blood Pressure from Last 3 Encounters:   07/05/18 141/83   04/09/18 116/78   02/26/18 110/64    Weight from Last 3 Encounters:   07/05/18 81.2 kg (179 lb)   04/09/18 81.6 kg (180 lb)   02/26/18 82.6 kg (182 lb)              Today, you had the following     No orders found for display         Where to get your medicines      These medications were sent to [place] [address] [place] [facility] - [facility] [address] [address] [place] [address]  [address] [address] [place] [address], [facility] [address]     Phone:  [phone]     sertraline 25 MG tablet          Primary Care Provider Office Phone # Fax #    Shi Alonso [phone]       [address] [place] [address]        Equal Access to Services     RAIZA TOLBERT AH: Hadii prem ku hadasho Soomaali, waaxda luqadaha, qaybta kaalmada adeegyada, michelle albrecht hayjhoana ford . So [facility] [phone].    ATENCIÓN: Si habla español, tiene a rivera disposición servicios gratuitos de asistencia lingüística. Llame al [phone].    We comply with applicable federal civil rights laws and [place] laws. We do not discriminate on the basis of race, color, national origin, age, disability, sex, sexual orientation, or gender identity.            Thank you!     Thank you for choosing [facility]  for your care. Our goal is always to provide you with excellent care. Hearing back from our patients is one way we can continue to improve our services. Please take a few minutes to complete the written  survey that you may receive in the mail after your visit with us. Thank you!             Your Updated Medication List - Protect others around you: Learn how to safely use, store and throw away your medicines at www.disposemymeds.org.          This list is accurate as of 7/5/18 12:41 PM.  Always use your most recent med list.                   Brand Name Dispense Instructions for use Diagnosis    ADVIL PO      Take 200 mg by mouth as needed for moderate pain        Fish Oil 1000 MG Cpdr      Take  by mouth.        fluticasone 50 MCG/ACT spray    FLONASE    16 mL    USE ONE OR TWO SPRAYS IN EACH NOSTRIL DAILY    Chronic rhinitis       loratadine 10 MG tablet    CLARITIN    90 tablet    TAKE ONE TABLET BY MOUTH EVERY DAY AS NEEDED FOR ALLERGY SYMPTOMS    Chronic rhinitis, unspecified type       omeprazole 20 MG CR capsule    priLOSEC    90 capsule    TAKE 1 CAPSULE (20 MG) BY MOUTH DAILY    Gastroesophageal reflux disease, esophagitis presence not specified       PRESERVISION AREDS 2 Caps     60 capsule    Take 1 tablet by mouth 2 times daily    Drusen (degenerative) of retina, bilateral, Vitreous degeneration, bilateral       sertraline 25 MG tablet    ZOLOFT    30 tablet    Take 1 tablet daily.    Hot flushes, perimenopausal

## 2018-07-05 NOTE — PROGRESS NOTES
DIAGNOSIS:  CANCER SURVIVORSHIP PROGRAM  Stage I (TI N0 M0) infiltrating ductal carcinoma of the right breast. She was originally diagnosed in 12/2004. The patient had an abnormal screening mammogram with abnormalities noted on the right. She had a biopsy performed at an outside facility which demonstrated infiltrating ductal carcinoma, grade 1, ER positive, RI negative, HER2 negative. On 01/07/2005, she had a right-sided lumpectomy with sentinel lymph node dissection. This demonstrated infiltrating ductal carcinoma, grade 1. There was no angiolymphatic invasion. Tumor size was 0.6 cm. Close margins were noted on the lumpectomy specimen. She had 0 of 3 sentinel nodes positive for malignancy. She underwent a repeat excision for margins on 01/21/2005 with no further malignancy noted. She completed right breast radiation and then was on tamoxifen from 03/2005 until 11/2008.       CANCER TREATMENT SUMMARY:  *Abnormal screening mammogram with abnormalities noted on the right.  *Biopsy performed at an outside facility which demonstrated infiltrating ductal carcinoma, grade 1, ER positive, RI negative, HER2 negative.  *On 01/07/2005, she had a right-sided lumpectomy with sentinel lymph node dissection.   *Repeat excision for margins on 01/21/2005 with no further malignancy noted.  *Right breast radiation.  *Tamoxifen from 03/2005 until 11/2008.     INTERVAL HISTORY:  Enid returns to the clinic for follow up of her breast cancer.  She feeling well at this time.  She is exercising with an incline  and strength training.  She is exercising greater than 150 minutes of cardiovascular exercise per week.  Her hot flashes are controlled with the Zoloft.  She had a spot removed by dermatology on her upper lip.  Recently has noted irritation in that area and wanted me to take a look at the area today.  She denies chest pain, shortness of breath, cough, abdominal pain, nausea, vomiting, or new bone pains.     Mammogram today  showed calcifications, US and mammogram did not show a mass.  Further evaluation advised with a contrast mammogram and biopsy.  Enid has not noted any changes in the breast by her exam.    MEDICATIONS:   Current Outpatient Prescriptions   Medication Sig Dispense Refill     fluticasone (FLONASE) 50 MCG/ACT spray USE ONE OR TWO SPRAYS IN EACH NOSTRIL DAILY 16 mL 10     Ibuprofen (ADVIL PO) Take 200 mg by mouth as needed for moderate pain       loratadine (CLARITIN) 10 MG tablet TAKE ONE TABLET BY MOUTH EVERY DAY AS NEEDED FOR ALLERGY SYMPTOMS 90 tablet 1     Multiple Vitamins-Minerals (PRESERVISION AREDS 2) CAPS Take 1 tablet by mouth 2 times daily 60 capsule 11     Omega-3 Fatty Acids (FISH OIL) 1000 MG CPDR Take  by mouth.       omeprazole (PRILOSEC) 20 MG CR capsule TAKE 1 CAPSULE (20 MG) BY MOUTH DAILY 90 capsule 3     [DISCONTINUED] sertraline (ZOLOFT) 25 MG tablet Take 1 tablet daily. 90 tablet 3     sertraline (ZOLOFT) 25 MG tablet Take 1 tablet daily. 30 tablet 11         ALLERGIES:    Allergies   Allergen Reactions     Alphagan P Rash     Thrush and numbness in mouth       PHYSICAL EXAMINATION:  Vitals: /83  Pulse 63  Temp 98  F (36.7  C) (Oral)  Resp 14  Wt 81.2 kg (179 lb)  LMP 10/14/2001  SpO2 97%  BMI 29.82 kg/m2  GENERAL:  A pleasant person in no acute distress.   HEENT:  Sclerae are nonicteric.    NECK:  Supple.   LYMPH NODES:  No peripheral lymphadenopathy noted in the axillary, supraclavicular, or cervical regions.   LUNGS:  Clear to auscultation bilaterally.   HEART:  Regular rate and rhythm, with no murmur appreciated.   ABDOMEN:  Bowel sounds are active.  Soft and nontender.  No hepatosplenomegaly or other masses appreciated.  LOWER EXTREMITIES:  Without pitting edema to the knees bilaterally.   NEUROLOGICAL:  Alert/orientated/able to answer all questions.  CN grossly intact.  PSYCH:  Normal affect.  SKIN:  Small raised erythematous lesion on the upper lip, medial/left side.  No  discharge.  BREAST: Right breast, well healed surgical incision along the areola.  Mild fullness (no palpable mass) in the 6 o'clock position.  Left breast, normal to inspection, no masses.         RADIOLOGY:  Examination: Bilateral digital diagnostic mammography and digital  breast tomosynthesis with computer aided detection, and focused right  breast ultrasound. 7/5/2018     Comparison: 7/6/2017, 6/28/2016, 6/23/2015, 6/22/2012, 6/18/2009,  12/21/2004     History/Family history: No current breast symptom.  Right breast  cancer status post breast conservation therapy - infiltrating ductal  carcinoma with DCIS.     Technique:  Tomosynthesis     BREAST DENSITY: Scattered fibroglandular densities     Findings:   Mammographic views demonstrate approximately 3.2 cm segmental  amorphous calcifications inferior right breast mid to posterior depth  6:00 with associated focal asymmetry.  Right breast conservation  therapy changes superior right breast.  No significant change on the  left.      Targeted right breast ultrasound by radiologist and technologist  demonstrates a benign appearing cyst 6:00 position 5 cm from nipple  that measures up to 9 mm.  Posterior to this cyst there is a poorly  defined heterogeneous island of glandular tissue with calcifications.   Ultrasound findings appear to correlate with focal asymmetry on  mammography; however suspicious right breast findings are best seen  with mammography.  No discrete mass with ultrasound.        Benign right axillary lymph nodes.         IMPRESSION: BI-RADS CATEGORY: 4 - Suspicious Abnormality-Biopsy Should  Be Considered.     RECOMMENDED FOLLOW-UP: Biopsy.     Contrast enhanced mammography followed by stereotactic guided right  breast biopsy.    IMPRESSION/PLAN:   1. Stage I (TI N0 M0) infiltrating ductal carcinoma of the right breast. ER positive, [place] negative, HER2 negative.  She was treated as above with surgery, radiation and tamoxifen. [facility]'s breast imaging  showed calcifications, but no mass.  Radiologist recommended contrast mammogram and biopsy.  This testing will be scheduled in the near future.  I reviewed the results with Enid today and she had no further questions.  I will plan to contact her with biopsy results and the plan.  2. Radiation side effects. Radiation would have included the bones, lung and skin. Currently, she is asymptomatic from a respiratory standpoint, and no further imaging is advised. Should she develop hemoptysis, shortness of breath or cough, we could consider a chest x-ray or CT scan of the chest. She should watch for any new lesions in the area of her radiation and be seen by Primary Care or Dermatology.   Her bones are at risk for fractures.  No concerns today.  3. Cancer screening.  She should undergo routine screening for women in her age group. Pap and pelvic exams as directed by her primary care provider. She is status post hysterectomy secondary to fibroids. She had her colonoscopy in July 2015 and had a hyperplastic polyp removed.  Follow up colonoscopy in 3-5 years.  She should limit her sun exposure and when out in the sun use sunscreens.   4. Healthy lifestyle. I encouraged her to continue her current exercise routine. Her diet should include lots of fruits and vegetables and low fat. Her BMI should be between 20 and 25. She should see her primary care provider for routine health maintenance.  She should continue to see eye doctor and dentist.  She should receive the annual influenza vaccine. When age appropriate, she should receive the pneumococcal vaccine.    5.  Hot flashes.  Ms. Lombardo will continue on the Zoloft 25 mg daily.  Prescription refilled today.  6.  Lip lesion.  I recommended trying keeping the area clean, no makeup.  She can try vaseline or aquaphor for a couple of days.  If not better, try OTC hydrocortisone cream.  If the area persists or worsens, she will follow up with dermatology.      Contrast mammogram  and biopsy to be scheduled asap.  I will plan to contact Enid with the results of the biopsy and further plan of care.  Follow up appointments will be determined by results of the biopsy.

## 2018-07-11 ENCOUNTER — RADIANT APPOINTMENT (OUTPATIENT)
Dept: MAMMOGRAPHY | Facility: CLINIC | Age: 58
End: 2018-07-11
Attending: PHYSICIAN ASSISTANT
Payer: COMMERCIAL

## 2018-07-11 DIAGNOSIS — Z17.0 MALIGNANT NEOPLASM OF RIGHT BREAST IN FEMALE, ESTROGEN RECEPTOR POSITIVE, UNSPECIFIED SITE OF BREAST (H): ICD-10-CM

## 2018-07-11 DIAGNOSIS — R92.0 MAMMOGRAPHIC MICROCALCIFICATION: Primary | ICD-10-CM

## 2018-07-11 DIAGNOSIS — C50.911 MALIGNANT NEOPLASM OF RIGHT BREAST IN FEMALE, ESTROGEN RECEPTOR POSITIVE, UNSPECIFIED SITE OF BREAST (H): ICD-10-CM

## 2018-07-11 PROCEDURE — 88360 TUMOR IMMUNOHISTOCHEM/MANUAL: CPT | Mod: 59 | Performed by: PHYSICIAN ASSISTANT

## 2018-07-11 PROCEDURE — 88305 TISSUE EXAM BY PATHOLOGIST: CPT | Performed by: PHYSICIAN ASSISTANT

## 2018-07-11 PROCEDURE — 88360 TUMOR IMMUNOHISTOCHEM/MANUAL: CPT | Performed by: PHYSICIAN ASSISTANT

## 2018-07-11 PROCEDURE — 19081 BX BREAST 1ST LESION STRTCTC: CPT | Mod: RT

## 2018-07-11 PROCEDURE — 77066 DX MAMMO INCL CAD BI: CPT

## 2018-07-11 RX ORDER — IOPAMIDOL 755 MG/ML
100 INJECTION, SOLUTION INTRAVASCULAR ONCE
Status: COMPLETED | OUTPATIENT
Start: 2018-07-11 | End: 2018-07-11

## 2018-07-11 RX ORDER — LIDOCAINE HYDROCHLORIDE AND EPINEPHRINE 10; 10 MG/ML; UG/ML
16 INJECTION, SOLUTION INFILTRATION; PERINEURAL ONCE
Status: COMPLETED | OUTPATIENT
Start: 2018-07-11 | End: 2018-07-11

## 2018-07-11 RX ADMIN — IOPAMIDOL 100 ML: 755 INJECTION, SOLUTION INTRAVASCULAR at 13:47

## 2018-07-11 RX ADMIN — LIDOCAINE HYDROCHLORIDE AND EPINEPHRINE 16 ML: 10; 10 INJECTION, SOLUTION INFILTRATION; PERINEURAL at 14:27

## 2018-07-11 RX ADMIN — Medication 1 MEQ: at 14:27

## 2018-07-15 LAB — COPATH REPORT: NORMAL

## 2018-07-17 ENCOUNTER — TELEPHONE (OUTPATIENT)
Dept: ONCOLOGY | Facility: CLINIC | Age: 58
End: 2018-07-17

## 2018-07-17 ENCOUNTER — MYC MEDICAL ADVICE (OUTPATIENT)
Dept: FAMILY MEDICINE | Facility: OTHER | Age: 58
End: 2018-07-17

## 2018-07-17 NOTE — TELEPHONE ENCOUNTER
I received an inbasket message stating patient had not heard about an appointment with Dr. Sanchez (information was given to the surgery group yesterday).      I called Enid and she stated she now has been contacted regarding the appointment with surgery.  She asked about potential surgery options and treatment after the surgery based on the pathology results.  I answered all her questions to the best of my ability.  Enid had no further questions.    I updated Dr. Brar who agrees she should see her after the surgery.  I will send a message to the surgical staff regarding this information.    Ghislaine Putnam PA-C

## 2018-07-17 NOTE — TELEPHONE ENCOUNTER
1.  Date of consult: 7/26/18    2.  Reason for consult: right DCIS    3.  Referring provider/facility: [facility]    4. Scheduled by: per Supriya banks / patito    5.  Outside records requested from: [facility], [facility], [place] [facility]    6.  Additional Information:

## 2018-07-17 NOTE — TELEPHONE ENCOUNTER
Sent to provider for review.    Shi Perez, [facility]  July 17, 2018

## 2018-07-18 NOTE — PROGRESS NOTES
SUBJECTIVE:   CC: Enid Lombardo is an 58 year old woman who presents for preventive health visit.     Physical   Annual:     Getting at least 3 servings of Calcium per day:  Yes    Bi-annual eye exam:  Yes    Dental care twice a year:  Yes    Sleep apnea or symptoms of sleep apnea:  None    Diet:  Regular (no restrictions)    Frequency of exercise:  6-7 days/week    Duration of exercise:  30-45 minutes    Taking medications regularly:  Yes    Medication side effects:  Not applicable    Additional concerns today:  YES (recently diagnosed with breast cancer)      Answers for HPI/ROS submitted by the patient on 7/23/2018   PHQ-2 Score: 0    Today's PHQ-2 Score:   PHQ-2 ( 1999 [employer]) 7/5/2018   Q1: Little interest or pleasure in doing things 0   Q2: Feeling down, depressed or hopeless 0   PHQ-2 Score 0   Q1: Little interest or pleasure in doing things -   Q2: Feeling down, depressed or hopeless -   PHQ-2 Score -     Abuse: Current or Past(Physical, Sexual or Emotional)- No  Do you feel safe in your environment - Yes    Social History   Substance Use Topics     Smoking status: Never Smoker     Smokeless tobacco: Never Used     Alcohol use Yes      Comment: occasionally     No flowsheet data found.No flowsheet data found.    Reviewed orders with patient.  Reviewed health maintenance and updated orders accordingly - Yes  Labs reviewed in EPIC  BP Readings from Last 3 Encounters:   07/23/18 108/60   07/05/18 141/83   04/09/18 116/78    Wt Readings from Last 3 Encounters:   07/23/18 79.8 kg (176 lb)   07/05/18 81.2 kg (179 lb)   04/09/18 81.6 kg (180 lb)         Alternate mammogram schedule due to breast cancer history - Recent diagnosis of Breast Cancer    Pertinent mammograms are reviewed under the imaging tab.  History of abnormal Pap smear: NO - age 30-65 PAP every 5 years with negative HPV co-testing recommended  PAP / HPV 5/19/2014 8/14/2009 8/8/2006   PAP NIL NIL NIL     Reviewed and updated as needed this  visit by clinical staff         Reviewed and updated as needed this visit by Provider        Past Medical History:   Diagnosis Date     Breast cancer (H) 2004    lumpectomy, radiation, tamoxifen     Cancer (H) 2004    Breast     Cataracts, bilateral      Coronary artery disease 11/24/2014     Diabetes (H)     Gestational     Enthesopathy of hip region 6/06    right hip     Esophageal reflux 2/06     History of gestational diabetes      Hypertension 2012     Macular drusen      Shingles 01/23/2012    left T6-T7 dermatome      Past Surgical History:   Procedure Laterality Date     BIOPSY  2004    Breast     BREAST SURGERY  2004     C VAGINAL HYSTERECTOMY  12/2001    Ovaries intact, due to fibroids     COLONOSCOPY       ENDOSCOPY  05/09/2007    Upper GI     EYE SURGERY       GYN SURGERY  2001     HC BIOPSY/EXCISION LYMPH NODE OPEN DEEP CERVICAL W EXC FAT PAD  1/7/2005    Right axillary sentinel node biopsy X 3.     HC COLONOSCOPY W BIOPSY  05/10/10     HC EXCISION BREAST LESION, OPEN >=1  1/7/2005    Right breast.     HC REMV CATARACT EXTRACAP,INSERT LENS  12/18/08    bilateral     HC REMV TARSAL/METATARSAL BENIGN BONE LESN  1982    Bone spur - right     ORTHOPEDIC SURGERY  1983    Right foot       Review of Systems   Constitutional: Negative for chills and fever.   HENT: Negative for congestion, ear pain, hearing loss and sore throat.    Eyes: Negative for pain and visual disturbance.   Respiratory: Negative for cough and shortness of breath.    Cardiovascular: Negative for chest pain, palpitations and peripheral edema.   Gastrointestinal: Negative for abdominal pain, constipation, diarrhea, heartburn, hematochezia and nausea.   Breasts:  Negative for tenderness, breast mass and discharge.   Genitourinary: Negative for dysuria, frequency, genital sores, hematuria, pelvic pain, urgency, vaginal bleeding and vaginal discharge.   Musculoskeletal: Negative for arthralgias, joint swelling and myalgias.   Skin: Negative for  rash.   Neurological: Negative for dizziness, weakness, headaches and paresthesias.   Psychiatric/Behavioral: Negative for mood changes. The patient is nervous/anxious.      This document serves as a record of the services and decisions personally performed and made by Shi Alonso MD. It was created on her behalf by Shahida Briseno, a trained medical scribe. The creation of this document is based the provider's statements to the medical scribe.  Shahida Briseno, July 23, 2018 10:06 AM     OBJECTIVE:   LMP 10/14/2001  Physical Exam  GENERAL APPEARANCE: healthy, alert and no distress  EYES: Eyes grossly normal to inspection, PERRL and conjunctivae and sclerae normal  HENT: ear canals and TM's normal, nose and mouth without ulcers or lesions, oropharynx clear and oral mucous membranes moist  NECK: no adenopathy, no asymmetry, masses, or scars and thyroid normal to palpation  RESP: lungs clear to auscultation - no rales, rhonchi or wheezes  BREAST: normal without masses, tenderness or nipple discharge and no palpable axillary masses or adenopathy  CV: regular rate and rhythm, normal S1 S2, no S3 or S4, no murmur, click or rub, no peripheral edema and peripheral pulses strong  ABDOMEN: soft, nontender, no hepatosplenomegaly, no masses and bowel sounds normal  MS: no musculoskeletal defects are noted and gait is age appropriate without ataxia  SKIN: no suspicious lesions or rashes  NEURO: Normal strength and tone, sensory exam grossly normal, mentation intact and speech normal  PSYCH: mentation appears normal and affect normal/bright    Diagnostic Test Results:  No results found for this or any previous visit (from the past 24 hour(s)).    ASSESSMENT/PLAN:       ICD-10-CM    1. Encounter for routine adult health examination without abnormal findings Z00.00 EKG 12-lead complete w/read - Clinics   2. Hyperlipidemia with target LDL less than 130 E78.5 Lipid panel reflex to direct LDL Non-fasting   3. Malignant neoplasm of right breast  "in female, estrogen receptor positive, unspecified site of breast (H) C50.911     Z17.0    4. Pre-op exam Z01.818 EKG 12-lead complete w/read - Clinics     HLD: Patient would like to have a cholesterol lab checked today.     Breast Cancer: Patient is expecting to have lumpectomy or a mastectomy to treat her breast cancer soon, Pre-op completed today, see additional note.     COUNSELING:  Reviewed preventive health counseling, as reflected in patient instructions       Consider Hep C screening for patients born between 1945 and 1965       HIV screeninx in teen years, 1x in adult years, and at intervals if high risk    Patient defers these screenings today.     BP Readings from Last 1 Encounters:   18 141/83     Estimated body mass index is 29.82 kg/(m^2) as calculated from the following:    Height as of 18: 5' 4.96\" (1.65 m).    Weight as of 18: 179 lb (81.2 kg).    Weight management plan: Discussed healthy diet and exercise guidelines and patient will follow up in 12 months in clinic to re-evaluate.     reports that she has never smoked. She has never used smokeless tobacco.    Counseling Resources:  ATP IV Guidelines  Pooled Cohorts Equation Calculator  Breast Cancer Risk Calculator  FRAX Risk Assessment  ICSI Preventive Guidelines  Dietary Guidelines for Americans, 2010  USDA's MyPlate  ASA Prophylaxis  Lung CA Screening      The information in this document, created by the medical scribe for me, accurately reflects the services I personally performed and the decisions made by me. I have reviewed and approved this document for accuracy.   MD Shi Medel MD, MD  [facility]"

## 2018-07-23 ENCOUNTER — OFFICE VISIT (OUTPATIENT)
Dept: FAMILY MEDICINE | Facility: OTHER | Age: 58
End: 2018-07-23
Payer: COMMERCIAL

## 2018-07-23 VITALS
TEMPERATURE: 98.4 F | OXYGEN SATURATION: 97 % | BODY MASS INDEX: 29.32 KG/M2 | HEIGHT: 65 IN | DIASTOLIC BLOOD PRESSURE: 60 MMHG | HEART RATE: 79 BPM | SYSTOLIC BLOOD PRESSURE: 108 MMHG | WEIGHT: 176 LBS

## 2018-07-23 DIAGNOSIS — C50.911 MALIGNANT NEOPLASM OF RIGHT BREAST IN FEMALE, ESTROGEN RECEPTOR POSITIVE, UNSPECIFIED SITE OF BREAST (H): ICD-10-CM

## 2018-07-23 DIAGNOSIS — Z00.00 ENCOUNTER FOR ROUTINE ADULT HEALTH EXAMINATION WITHOUT ABNORMAL FINDINGS: Primary | ICD-10-CM

## 2018-07-23 DIAGNOSIS — Z17.0 MALIGNANT NEOPLASM OF RIGHT BREAST IN FEMALE, ESTROGEN RECEPTOR POSITIVE, UNSPECIFIED SITE OF BREAST (H): ICD-10-CM

## 2018-07-23 DIAGNOSIS — E78.5 HYPERLIPIDEMIA WITH TARGET LDL LESS THAN 130: ICD-10-CM

## 2018-07-23 DIAGNOSIS — Z01.818 PRE-OP EXAM: ICD-10-CM

## 2018-07-23 LAB
CHOLEST SERPL-MCNC: 269 MG/DL
HDLC SERPL-MCNC: 62 MG/DL
LDLC SERPL CALC-MCNC: 153 MG/DL
NONHDLC SERPL-MCNC: 207 MG/DL
TRIGL SERPL-MCNC: 270 MG/DL

## 2018-07-23 PROCEDURE — 80061 LIPID PANEL: CPT | Performed by: FAMILY MEDICINE

## 2018-07-23 PROCEDURE — 99396 PREV VISIT EST AGE 40-64: CPT | Performed by: FAMILY MEDICINE

## 2018-07-23 PROCEDURE — 93000 ELECTROCARDIOGRAM COMPLETE: CPT | Performed by: FAMILY MEDICINE

## 2018-07-23 PROCEDURE — 36415 COLL VENOUS BLD VENIPUNCTURE: CPT | Performed by: FAMILY MEDICINE

## 2018-07-23 ASSESSMENT — ENCOUNTER SYMPTOMS
FEVER: 0
HEARTBURN: 0
DIZZINESS: 0
ARTHRALGIAS: 0
NAUSEA: 0
HEADACHES: 0
HEMATURIA: 0
PARESTHESIAS: 0
BREAST MASS: 0
EYE PAIN: 0
JOINT SWELLING: 0
DYSURIA: 0
FREQUENCY: 0
DIARRHEA: 0
MYALGIAS: 0
PALPITATIONS: 0
NERVOUS/ANXIOUS: 1
SHORTNESS OF BREATH: 0
CHILLS: 0
WEAKNESS: 0
HEMATOCHEZIA: 0
COUGH: 0
CONSTIPATION: 0
SORE THROAT: 0
ABDOMINAL PAIN: 0

## 2018-07-23 NOTE — PROGRESS NOTES
[address]  [facility] [address]  [phone]  Dept: [phone]    PRE-OP EVALUATION:  Today's date: 2018    Enid Lombardo (: 1960) presents for pre-operative evaluation assessment as requested by Dr. Sanchez.  She requires evaluation and anesthesia risk assessment prior to undergoing surgery/procedure for treatment of Breast Cancer, DCIS Stage 0.     Proposed Surgery/ Procedure: Lumpectomy or Mastectomy  Date of Surgery/ Procedure: Unknown  Time of Surgery/ Procedure: Unknown  Hospital/Surgical Facility: Unknown  Primary Physician: Shi Alonso  Type of Anesthesia Anticipated: General    Patient has a Health Care Directive or Living Will:  NO    1. NO - Do you have a history of heart attack, stroke, stent, bypass or surgery on an artery in the head, neck, heart or legs?  2. NO - Do you ever have any pain or discomfort in your chest?  3. NO - Do you have a history of  Heart Failure?  4. NO - Are you troubled by shortness of breath when: walking on the level, up a slight hill or at night?  5. NO - Do you currently have a cold, bronchitis or other respiratory infection?  6. NO - Do you have a cough, shortness of breath or wheezing?  7. NO - Do you sometimes get pains in the calves of your legs when you walk?  8. NO - Do you or anyone in your family have previous history of blood clots?  9. NO - Do you or does anyone in your family have a serious bleeding problem such as prolonged bleeding following surgeries or cuts?  10. NO - Have you ever had problems with anemia or been told to take iron pills?  11. NO - Have you had any abnormal blood loss such as black, tarry or bloody stools, or abnormal vaginal bleeding?  12. NO - Have you ever had a blood transfusion?  13. NO - Have you or any of your relatives ever had problems with anesthesia?  14. NO - Do you have sleep apnea, excessive snoring or daytime drowsiness?  15. NO - Do you have any prosthetic heart  valves?  16. NO - Do you have prosthetic joints?  17. NO - Is there any chance that you may be pregnant?      HPI:     HPI related to upcoming procedure: She was diagnosed with breast cancer recurrence at the beginning of July. She was first diagnosed with breast cancer in 2004, and was successfully treated with surgery and radiation. She hasn't scheduled the surgery yet, but is meeting with her oncologist, Dr. Sanchez on Thursday 7/26.     See problem list for active medical problems.  Problems all longstanding and stable, except as noted/documented.  See ROS for pertinent symptoms related to these conditions.                                                                                                                                                          .  MEDICAL HISTORY:     Patient Active Problem List    Diagnosis Date Noted     Osteopenia of both hands 04/10/2018     Priority: Medium     Skin abscess 05/03/2016     Priority: Medium     Benign neoplasm of colon 03/18/2015     Priority: Medium     Keratoconus 10/16/2014     Priority: Medium     Meibomian gland dysfunction - Both Eyes 10/16/2014     Priority: Medium     Tear film insufficiency 10/16/2014     Priority: Medium     Problem list name updated by automated process. Provider to review       Cervicalgia 06/11/2014     Priority: Medium     Headache 06/11/2014     Priority: Medium     Problem list name updated by automated process. Provider to review       Hyperlipidemia with target LDL less than 130 05/19/2014     Priority: Medium     Diagnosis updated by automated process. Provider to review and confirm.       Macular drusen      Priority: Medium     Low back pain 07/22/2013     Priority: Medium     Diagnosis updated by automated process. Provider to review and confirm.       IT band syndrome 07/22/2013     Priority: Medium     Right hip pain 04/08/2013     Priority: Medium     Pain, radicular, lumbar 03/21/2013     Priority: Medium     Shingles  01/27/2012     Priority: Medium     Cataract 12/11/2008     Priority: Medium     Utility update for deleted IMO code  Imo Update utility       Disorder of synovium, tendon, and bursa 12/11/2007     Priority: Medium     Problem list name updated by automated process. Provider to review       Esophageal reflux 08/08/2006     Priority: Medium     Malignant neoplasm of female breast (H) 12/22/2004     Priority: Medium     s/p lumpectomy 1/05 and radiation  Problem list name updated by automated process. Provider to review        Past Medical History:   Diagnosis Date     Breast cancer (H) 2004    lumpectomy, radiation, tamoxifen     Cancer (H) 2004    Breast     Cataracts, bilateral      Coronary artery disease 11/24/2014     Diabetes (H)     Gestational     Enthesopathy of hip region 6/06    right hip     Esophageal reflux 2/06     History of gestational diabetes      Hypertension 2012     Macular drusen      Shingles 01/23/2012    left T6-T7 dermatome     Past Surgical History:   Procedure Laterality Date     BIOPSY  2004    Breast     BREAST SURGERY  2004     C VAGINAL HYSTERECTOMY  12/2001    Ovaries intact, due to fibroids     COLONOSCOPY       ENDOSCOPY  05/09/2007    Upper GI     EYE SURGERY       GYN SURGERY  2001     HC BIOPSY/EXCISION LYMPH NODE OPEN DEEP CERVICAL W EXC FAT PAD  1/7/2005    Right axillary sentinel node biopsy X 3.     HC COLONOSCOPY W BIOPSY  05/10/10     HC EXCISION BREAST LESION, OPEN >=1  1/7/2005    Right breast.     HC REMV CATARACT EXTRACAP,INSERT LENS  12/18/08    bilateral     HC REMV TARSAL/METATARSAL BENIGN BONE LESN  1982    Bone spur - right     ORTHOPEDIC SURGERY  1983    Right foot     Current Outpatient Prescriptions   Medication Sig Dispense Refill     fluticasone (FLONASE) 50 MCG/ACT spray USE ONE OR TWO SPRAYS IN EACH NOSTRIL DAILY 16 mL 10     Ibuprofen (ADVIL PO) Take 200 mg by mouth as needed for moderate pain       loratadine (CLARITIN) 10 MG tablet TAKE ONE TABLET BY  "MOUTH EVERY DAY AS NEEDED FOR ALLERGY SYMPTOMS 90 tablet 1     Multiple Vitamins-Minerals (PRESERVISION AREDS 2) CAPS Take 1 tablet by mouth 2 times daily 60 capsule 11     Omega-3 Fatty Acids (FISH OIL) 1000 MG CPDR Take  by mouth.       omeprazole (PRILOSEC) 20 MG CR capsule TAKE 1 CAPSULE (20 MG) BY MOUTH DAILY 90 capsule 3     sertraline (ZOLOFT) 25 MG tablet Take 1 tablet daily. 30 tablet 11     OTC products: NSAIDS    Allergies   Allergen Reactions     Alphagan P Rash     Thrush and numbness in mouth      Latex Allergy: NO    Social History   Substance Use Topics     Smoking status: Never Smoker     Smokeless tobacco: Never Used     Alcohol use Yes      Comment: occasionally     History   Drug Use No       REVIEW OF SYSTEMS:   Constitutional, HEENT, cardiovascular, pulmonary, GI, , musculoskeletal, neuro, skin, endocrine and psych systems are negative, except as in HPI or otherwise noted.     This document serves as a record of the services and decisions personally performed and made by Shi Alonso MD. It was created on her behalf by Shahida Briseno, a trained medical scribe. The creation of this document is based the provider's statements to the medical scribe.  Shahida Briseno, July 23, 2018 10:08 AM     EXAM:   /60  Pulse 79  Temp 98.4  F (36.9  C) (Temporal)  Ht 1.651 m (5' 5\")  Wt 79.8 kg (176 lb)  LMP 10/14/2001  SpO2 97%  Breastfeeding? No  BMI 29.29 kg/m2    GENERAL APPEARANCE: healthy, alert and no distress     EYES: EOMI, PERRL     HENT: ear canals and TM's normal and nose and mouth without ulcers or lesions     NECK: no adenopathy, no asymmetry, masses, or scars and thyroid normal to palpation     RESP: lungs clear to auscultation - no rales, rhonchi or wheezes     BREAST: normal without masses, tenderness or nipple discharge and no palpable axillary masses or adenopathy     CV: regular rates and rhythm, normal S1 S2, no S3 or S4 and no murmur, click or rub     ABDOMEN:  soft, nontender, no HSM " "or masses and bowel sounds normal     MS: extremities normal- no gross deformities noted, no evidence of inflammation in joints, FROM in all extremities.     SKIN: no suspicious lesions or rashes to visible skin     NEURO: Normal strength and tone, sensory exam grossly normal, mentation intact and speech normal     PSYCH: mentation appears normal. and affect normal/bright     LYMPHATICS: No cervical adenopathy    DIAGNOSTICS:     EKG: appears normal, NSR, normal axis, normal intervals, no acute ST/T changes c/w ischemia, no LVH by voltage criteria, unchanged from previous tracings    Labs Resulted Today:   Results for orders placed or performed in visit on 07/11/18   MA Stereotactic Breast Biopsy Vacuum Assist Right    Addendum: 7/16/2018    Addendum: Pathology results of grade 2 DCIS, solid and cribriform  types with comedonecrosis are concordant with imaging.    Recommendations: Surgical consultation.    UMER TOLENTINO MD      Narrative    EXAMINATION: MA STEREOTACTIC BREAST BIOPSY VACUUM RT, 7/11/2018 3:09  PM     COMPARISON: Contrast-enhanced mammogram 7/11/2018, mammogram 7/5/2018    HISTORY:  History of right-sided breast cancer status post breast  conserving therapy. Enhancing focal asymmetry and microcalcifications  in the inferior right breast.    FINDINGS: Procedure, risks, benefits and alternatives were discussed  with the patient and the patient gave written and verbal consent. The  patient was positioned for stereotactic biopsy. Aseptic technique was  utilized. Approximately 5 cc of 1% lidocaine with bicarbonate and 15  cc of 1% lidocaine with epinephrine were utilized for local  anesthesia.      Using stereotactic technique and an 9 gauge vacuum assisted biopsy  needle, multiple specimens were obtained. Calcifications are seen in  the biopsy specimen. A SecurMark (shaped like a \"T\") clip was placed.   Pressure was held over this area for approximately 15 minutes. A  dressing was placed and care " instructions were discussed with the  patient.    Post biopsy mammogram demonstrates clip deployment corresponding to  the focal asymmetry with mass enhancement and calcification.      Impression    IMPRESSION:    Uncomplicated stereotactic vacuum assisted core needle  biopsy right breast.    Procedure performed by Dr. Snider under my supervision.  I, Umer Grey MD, was present for the entire procedure.    I have personally reviewed the examination and initial interpretation  and I agree with the findings.    UMER GREY MD   Surgical pathology exam   Result Value Ref Range    Copath Report       Patient Name: MICHEL LOMAX  MR#: [id number]  Specimen #: [id number]  Collected: 7/11/2018  Received: 7/12/2018  Reported: 7/15/2018 21:12  Ordering Phy(s): MARK FRANCISCO    For improved result formatting, select 'View Enhanced Report Format' under   Linked Documents section.    SPECIMEN(S):  Breast needle biopsy, right 6:00    FINAL DIAGNOSIS:  Breast, right, 6:00, calcifications, stereotactic core biopsy:  -Ductal carcinoma in situ (DCIS), nuclear grade 2, solid and cribriform   type(s), with comedonecrosis  -DCIS is estrogen receptor positive and progesterone receptor positive by   immunohistochemistry (see below)  -Calcifications associated with DCIS    Report Name: Breast Biomarkers       Status: Submitted Checklist Inst: 1      Last Updated By: Shakila Lipscomb M.D., [facility], 07/15/2018  21:11:04  Part(s) Involved:  A: Breast needle biopsy, right 6:00    Synoptic Report:    TEST(S) PERFORMED    ER      Estrogen Receptor (ER) Status:          - Positive        Percenta ge of Cells With Nuclear Positivity: 98%        Average Intensity of Staining:            - Strong      Test Type:          - Food and Drug Administration (FDA) cleared: [place]      Primary Antibody:          - SP1      Scoring System:          - No separate scoring system used    PgR      Progesterone Receptor (PgR)  "Status:          - Positive        Percentage of Cells with Nuclear Positivity: 75%        Average Intensity of Staining:            - Moderate      Test Type:          - Food and Drug Administration (FDA) cleared: [place]      Primary Antibody:          - 1E2      Scoring System:          - No separate scoring system used    METHODS    Cold Ischemia and Fixation Times:        - Meet requirements specified in latest version of the ASCO / CAP        Guidelines    Cold Ischemia Time: 0 minutes    Time of Fixation: 25.3 hours    Testing Performed on Block Number(s): A2    Fixative:        - Formalin    Image Analysis:        - Not performed    2017 June AJCC 8th Edition CAP  Annual Release    I have personally reviewed all specimens and/or slides, including the   listed special stains, and used them  with my medical judgement to determine or confirm the final diagnosis.    Electronically signed out by:    Shakila Lipscomb M.D., [facility]    CLINICAL HISTORY:  The patient is a 58 year old woman with a history of right breast invasive   ductal carcinoma (grade 1,  pT1bN0(sn)), DCIS and LCIS, treated with lumpectomy and sentinel lymph   node biopsy in 2005, then adjuvant  radiation and Tamoxifen (Tamoxifen until 2008)).  A recent diagnostic   mammogram showed 3.2 cm segmental right  breast calcifications, mid to posterior depth at 6:00 with associated   focal asymmetry (lumpectomy site changes  in superior right breast).  Targeted right breast ultrasound showed a   right breast 9 mm benign appearing cyst  at 6:00, 5 cm from nipple, and poorly defined heterogeneous island of   glandular tissue with calcifications,  posterior to the cyst.  Contrast enhanced m ammogram shows 2.4 x 1.2 cm   right breast mass enhancement at 6:00,  6 cm from the nipple, and shows non-mass enhancement in a similar   distribution to the calcifications,  measuring up to 3.5 cm.  Procedure: right breast stereotactic core biopsy   (\"T\" shaped " "biopsy marker).  GROSS:  The specimen is received in formalin with proper patient identification,   labeled \"right breast 6:00\".  The  specimen includes multiple tan-yellow, lobulated segments of adipose   tissue, 3.7 x 2.8 x 0.8 cm in aggregate.  Also in the container are five tan-yellow soft tissue fragments that range   from 0.5-1.3 cm in greatest  dimension, received in a yellow cassette.  The specimen is entirely   submitted:    Summary of Sections:  A1CA - tissue received in a yellow cassette  A2-A3 - remainder of specimen    The specimen is collected and placed in formalin at 1500 on 7/11/2018.   (Dictated by: Tiffanie ROJAS  7/12/2018 11:19 AM)    MICROSCOPIC:  Microscopic examination is performed.    CPT Codes:   A: 10949-OV0, 60074-FX, 77082-QL    TESTING LAB LOCATION:  [facility], [address]   [address] [phone]    COLLECTION SITE:  Client: [facility]  Location: [facility] ()         Labs Drawn and in Process:   Unresulted Labs Ordered in the Past 30 Days of this Admission     No orders found from 5/24/2018 to 7/24/2018.          Recent Labs   Lab Test  03/31/17   0741  11/23/14   1805   HGB   --   13.8   PLT   --   217   NA  141  142   POTASSIUM  3.9  3.6   CR  0.70  0.70     IMPRESSION:   Reason for surgery/procedure: DCIS / Lumpectomy OR Mastectomy  Diagnosis/reason for consult: Healthy female patient with recurrence of DCIS breast cancer, initial diagnosis in 2004. Is consulting with oncologist on 7/26/18.     The proposed surgical procedure is considered LOW risk.    REVISED CARDIAC RISK INDEX  The patient has the following serious cardiovascular risks for perioperative complications such as (MI, PE, VFib and 3  AV Block):  No serious cardiac risks  INTERPRETATION: 0 risks: Class I (very low risk - 0.4% complication rate)    The patient has the following additional risks " for perioperative complications:  No identified additional risks      ICD-10-CM    1. Encounter for routine adult health examination without abnormal findings Z00.00    2. Hyperlipidemia with target LDL less than 130 E78.5    3. Malignant neoplasm of right breast in female, estrogen receptor positive, unspecified site of breast (H) C50.911     Z17.0        RECOMMENDATIONS:     --Consult hospital rounder / IM to assist post-op medical management    --Patient is to take all scheduled medications on the day of surgery EXCEPT for modifications listed below.    Anticoagulant or Antiplatelet Medication Use  NSAIDS: Ibuprofen (Motrin):         Stop five days prior to surgery        APPROVAL GIVEN to proceed with proposed procedure, without further diagnostic evaluation     The information in this document, created by the medical scribe for me, accurately reflects the services I personally performed and the decisions made by me. I have reviewed and approved this document for accuracy.   Shi Alonso MD     Signed Electronically by: Shi Alonso MD, MD    Copy of this evaluation report is provided to requesting physician.    [place] Preop Guidelines    Revised Cardiac Risk Index

## 2018-07-23 NOTE — MR AVS SNAPSHOT
After Visit Summary   7/23/2018    Enid Lombardo    MRN: [id number]           Patient Information     Date Of Birth          1960        Visit Information        Provider Department      7/23/2018 9:45 AM Shi Alonso MD [facility]        Today's Diagnoses     Encounter for routine adult health examination without abnormal findings    -  1    Hyperlipidemia with target LDL less than 130        Malignant neoplasm of right breast in female, estrogen receptor positive, unspecified site of breast (H)        Pre-op exam          Care Instructions      Preventive Health Recommendations  Female Ages 50 - 64    Yearly exam: See your health care provider every year in order to  o Review health changes.   o Discuss preventive care.    o Review your medicines if your doctor has prescribed any.      Get a Pap test every three years (unless you have an abnormal result and your provider advises testing more often).    If you get Pap tests with HPV test, you only need to test every 5 years, unless you have an abnormal result.     You do not need a Pap test if your uterus was removed (hysterectomy) and you have not had cancer.    You should be tested each year for STDs (sexually transmitted diseases) if you're at risk.     Have a mammogram every 1 to 2 years.    Have a colonoscopy at age 50, or have a yearly FIT test (stool test). These exams screen for colon cancer.      Have a cholesterol test every 5 years, or more often if advised.    Have a diabetes test (fasting glucose) every three years. If you are at risk for diabetes, you should have this test more often.     If you are at risk for osteoporosis (brittle bone disease), think about having a bone density scan (DEXA).    Shots: Get a flu shot each year. Get a tetanus shot every 10 years.    Nutrition:     Eat at least 5 servings of fruits and vegetables each day.    Eat whole-grain bread, whole-wheat pasta and brown rice instead of  white grains and rice.    Get adequate Calcium and Vitamin D.     Lifestyle    Exercise at least 150 minutes a week (30 minutes a day, 5 days a week). This will help you control your weight and prevent disease.    Limit alcohol to one drink per day.    No smoking.     Wear sunscreen to prevent skin cancer.     See your dentist every six months for an exam and cleaning.    See your eye doctor every 1 to 2 years.            Follow-ups after your visit        Your next 10 appointments already scheduled     Jul 26, 2018  9:00 AM CDT   (Arrive by 8:45 AM)   New Patient Visit with Rakesh Sanchez MD   [facility] ([facility])    [address]  [address]  [facility] [address] [phone]            Jan 21, 2019 10:15 AM CST   RETURN RETINA with Paz Osborn MD   [facility] ([facility])    [address]  [address] [address]  [facility] [id number] [phone]              Who to contact     If you have questions or need follow up information about today's clinic visit or your schedule please contact [facility] directly at [phone].  Normal or non-critical lab and imaging results will be communicated to you by MyChart, letter or phone within 4 business days after the clinic has received the results. If you do not hear from us within 7 days, please contact the clinic through MyChart or phone. If you have a critical or abnormal lab result, we will notify you by phone as soon as possible.  Submit refill requests through [employer] or call your pharmacy and they will forward the refill request to us. Please allow 3 business days for your refill to be completed.          Additional Information About Your Visit        [employer] Information     [employer] gives you secure access to your electronic health record. If you see a primary care provider, you can also send messages to your care team and make appointments. If  "you have questions, please call your primary care clinic.  If you do not have a primary care provider, please call [phone] and they will assist you.        Care EveryWhere ID     This is your Care EveryWhere ID. This could be used by other organizations to access your [place] medical records  [id number]        Your Vitals Were     Pulse Temperature Height Last Period Pulse Oximetry Breastfeeding?    79 98.4  F (36.9  C) (Temporal) 5' 5\" (1.651 m) 10/14/2001 97% No    BMI (Body Mass Index)                   29.29 kg/m2            Blood Pressure from Last 3 Encounters:   07/23/18 108/60   07/05/18 141/83   04/09/18 116/78    Weight from Last 3 Encounters:   07/23/18 176 lb (79.8 kg)   07/05/18 179 lb (81.2 kg)   04/09/18 180 lb (81.6 kg)              We Performed the Following     EKG 12-lead complete w/read - Clinics     Lipid panel reflex to direct LDL Non-fasting        Primary Care Provider Office Phone # Fax #    Shi Alonso [phone]       [address] [facility] [address]        Equal Access to Services     ELISEO TOLBERT AH: Hadii prem schroedero Sojohanny, waaxda luqadaha, qaybta kaalmada adeegyada, michelle tong. So [facility] [phone].    ATENCIÓN: Si habla español, tiene a rivera disposición servicios gratuitos de asistencia lingüística. Ll[facility] [phone].    We comply with applicable federal civil rights laws and [place] laws. We do not discriminate on the basis of race, color, national origin, age, disability, sex, sexual orientation, or gender identity.            Thank you!     Thank you for choosing [facility]  for your care. Our goal is always to provide you with excellent care. Hearing back from our patients is one way we can continue to improve our services. Please take a few minutes to complete the written survey that you may receive in the mail after your visit with us. Thank you!             Your Updated Medication List - " Protect others around you: Learn how to safely use, store and throw away your medicines at www.disposemymeds.org.          This list is accurate as of 7/23/18 11:50 AM.  Always use your most recent med list.                   Brand Name Dispense Instructions for use Diagnosis    ADVIL PO      Take 200 mg by mouth as needed for moderate pain        Fish Oil 1000 MG Cpdr      Take  by mouth.        fluticasone 50 MCG/ACT spray    FLONASE    16 mL    USE ONE OR TWO SPRAYS IN EACH NOSTRIL DAILY    Chronic rhinitis       loratadine 10 MG tablet    CLARITIN    90 tablet    TAKE ONE TABLET BY MOUTH EVERY DAY AS NEEDED FOR ALLERGY SYMPTOMS    Chronic rhinitis, unspecified type       omeprazole 20 MG CR capsule    priLOSEC    90 capsule    TAKE 1 CAPSULE (20 MG) BY MOUTH DAILY    Gastroesophageal reflux disease, esophagitis presence not specified       PRESERVISION AREDS 2 Caps     60 capsule    Take 1 tablet by mouth 2 times daily    Drusen (degenerative) of retina, bilateral, Vitreous degeneration, bilateral       sertraline 25 MG tablet    ZOLOFT    30 tablet    Take 1 tablet daily.    Hot flushes, perimenopausal

## 2018-07-26 ENCOUNTER — PRE VISIT (OUTPATIENT)
Dept: ONCOLOGY | Facility: CLINIC | Age: 58
End: 2018-07-26

## 2018-07-26 ENCOUNTER — TELEPHONE (OUTPATIENT)
Dept: SURGERY | Facility: CLINIC | Age: 58
End: 2018-07-26

## 2018-07-26 ENCOUNTER — ONCOLOGY VISIT (OUTPATIENT)
Dept: ONCOLOGY | Facility: CLINIC | Age: 58
End: 2018-07-26
Attending: SURGERY
Payer: COMMERCIAL

## 2018-07-26 VITALS
RESPIRATION RATE: 16 BRPM | BODY MASS INDEX: 29.87 KG/M2 | HEART RATE: 75 BPM | SYSTOLIC BLOOD PRESSURE: 133 MMHG | TEMPERATURE: 97.9 F | DIASTOLIC BLOOD PRESSURE: 79 MMHG | OXYGEN SATURATION: 96 % | WEIGHT: 179.31 LBS | HEIGHT: 65 IN

## 2018-07-26 DIAGNOSIS — C50.911 MALIGNANT NEOPLASM OF RIGHT BREAST (H): Primary | ICD-10-CM

## 2018-07-26 PROCEDURE — G0463 HOSPITAL OUTPT CLINIC VISIT: HCPCS | Mod: ZF

## 2018-07-26 RX ORDER — CEFAZOLIN SODIUM 1 G/50ML
1 INJECTION, SOLUTION INTRAVENOUS SEE ADMIN INSTRUCTIONS
Status: CANCELLED | OUTPATIENT
Start: 2018-07-26 | End: 2019-07-26

## 2018-07-26 ASSESSMENT — PAIN SCALES - GENERAL: PAINLEVEL: NO PAIN (0)

## 2018-07-26 NOTE — TELEPHONE ENCOUNTER
Patient returned call and scheduled with Dr. Amin tomorrow 7/27/18 at 2pm.    Kajal Mcallister LPN

## 2018-07-26 NOTE — MR AVS SNAPSHOT
After Visit Summary   7/26/2018    Enid Lombardo    MRN: [id number]           Patient Information     Date Of Birth          1960        Visit Information        Provider Department      7/26/2018 9:00 AM Rakesh Sanchez MD [facility]        Today's Diagnoses     Malignant neoplasm of right breast (H)    -  1       Follow-ups after your visit        Your next 10 appointments already scheduled     Jul 27, 2018  2:00 PM CDT   New Visit with DENNISE Amin MD   [facility]    [address] [place] [id number]   [phone]            Jan 21, 2019 10:15 AM CST   RETURN RETINA with Paz Osborn MD   [facility] ([facility])    [address] [id number] [phone]              Future tests that were ordered for you today     Open Future Orders        Priority Expected Expires Ordered    NM Lymphoscintigraphy Injection only Routine  7/26/2019 7/26/2018            Who to contact     If you have questions or need follow up information about today's clinic visit or your schedule please contact [facility] directly at [phone].  Normal or non-critical lab and imaging results will be communicated to you by MyChart, letter or phone within 4 business days after the clinic has received the results. If you do not hear from us within 7 days, please contact the clinic through VIDDIXhart or phone. If you have a critical or abnormal lab result, we will notify you by phone as soon as possible.  Submit refill requests through Hypereight or call your pharmacy and they will forward the refill request to us. Please allow 3 business days for your refill to be completed.          Additional Information About Your Visit        VIDDIXhart Information     H[employer]ight gives you secure access to your electronic health record. If you see a primary care  "provider, you can also send messages to your care team and make appointments. If you have questions, please call your primary care clinic.  If you do not have a primary care provider, please call [phone] and they will assist you.        Care EveryWhere ID     This is your Care EveryWhere ID. This could be used by other organizations to access your [place] medical records  [id number]        Your Vitals Were     Pulse Temperature Respirations Height Last Period Pulse Oximetry    75 97.9  F (36.6  C) (Oral) 16 1.651 m (5' 5\") 10/14/2001 96%    Breastfeeding? BMI (Body Mass Index)                No 29.84 kg/m2           Blood Pressure from Last 3 Encounters:   07/26/18 133/79   07/23/18 108/60   07/05/18 141/83    Weight from Last 3 Encounters:   07/26/18 81.3 kg (179 lb 5 oz)   07/23/18 79.8 kg (176 lb)   07/05/18 81.2 kg (179 lb)              We Performed the Following     [facility]-Operative Worksheet ([facility] - Plastics)        Primary Care Provider Office Phone # Fax #    Shi Alonso -[phone]       [address] [place] [address]        Equal Access to Services     RAIZA TOLBERT : Hadii prem ku hadasho Solorenaali, waaxda luqadaha, qaybta kaalmada adeegyada, michelle ford . So [facility] [phone].    ATENCIÓN: Si habla español, tiene a rivera disposición servicios gratuitos de asistencia lingüística. Llame al [phone].    We comply with applicable federal civil rights laws and [place] laws. We do not discriminate on the basis of race, color, national origin, age, disability, sex, sexual orientation, or gender identity.            Thank you!     Thank you for choosing [facility]  for your care. Our goal is always to provide you with excellent care. Hearing back from our patients is one way we can continue to improve our services. Please take a few minutes to complete the written survey that you may receive in the mail after your visit with us. " Thank you!             Your Updated Medication List - Protect others around you: Learn how to safely use, store and throw away your medicines at www.disposemymeds.org.          This list is accurate as of 7/26/18 11:59 PM.  Always use your most recent med list.                   Brand Name Dispense Instructions for use Diagnosis    ADVIL PO      Take 200 mg by mouth as needed for moderate pain        Fish Oil 1000 MG Cpdr      Take  by mouth.        fluticasone 50 MCG/ACT spray    FLONASE    16 mL    USE ONE OR TWO SPRAYS IN EACH NOSTRIL DAILY    Chronic rhinitis       loratadine 10 MG tablet    CLARITIN    90 tablet    TAKE ONE TABLET BY MOUTH EVERY DAY AS NEEDED FOR ALLERGY SYMPTOMS    Chronic rhinitis, unspecified type       omeprazole 20 MG CR capsule    priLOSEC    90 capsule    TAKE 1 CAPSULE (20 MG) BY MOUTH DAILY    Gastroesophageal reflux disease, esophagitis presence not specified       PRESERVISION AREDS 2 Caps     60 capsule    Take 1 tablet by mouth 2 times daily    Drusen (degenerative) of retina, bilateral, Vitreous degeneration, bilateral       sertraline 25 MG tablet    ZOLOFT    30 tablet    Take 1 tablet daily.    Hot flushes, perimenopausal

## 2018-07-26 NOTE — NURSING NOTE
"Oncology Rooming Note    July 26, 2018 9:04 AM   Enid Lombardo is a 58 year old female who presents for:    Chief Complaint   Patient presents with     Oncology Clinic Visit     New Pt. DCIS     Initial Vitals: /79  Pulse 75  Temp 97.9  F (36.6  C) (Oral)  Resp 16  Ht 1.651 m (5' 5\")  Wt 81.3 kg (179 lb 5 oz)  LMP 10/14/2001  SpO2 96%  Breastfeeding? No  BMI 29.84 kg/m2 Estimated body mass index is 29.84 kg/(m^2) as calculated from the following:    Height as of this encounter: 1.651 m (5' 5\").    Weight as of this encounter: 81.3 kg (179 lb 5 oz). Body surface area is 1.93 meters squared.  No Pain (0) Comment: Data Unavailable   Patient's last menstrual period was 10/14/2001.  Allergies reviewed: Yes  Medications reviewed: Yes    Medications: Medication refills not needed today.  Pharmacy name entered into wildcraft:    [id number] [place], [place] - [address] [address] [place] [address] [id number] [place], [place] - [address] [facility]    Clinical concerns: new patient here today for DCIS consult Dr. Sanchez was notified.    10 minutes for nursing intake (face to face time)     Jerome Izaguirre CMA              "

## 2018-07-26 NOTE — TELEPHONE ENCOUNTER
Pt called, No answer.  does not identify pt. Left general message for pt to call the [facility] back at [phone].    Per Dr. Amin's staff message request dated 7/26/18, writer called and left message for patient to call to schedule a breast reconstruction consult. Ok to double book 7/27/18.    Kajal Mcallister LPN

## 2018-07-26 NOTE — PROGRESS NOTES
Enid Lombardo is a 58-year-old woman who I was asked to see at the request of Ghislaine Putnam for evaluation of ductal carcinoma in situ of the right breast.  In 2015, I performed a right lumpectomy and a sentinel lymph node biopsy for a stage I breast cancer.  She was  estrogen receptor positive.  She received radiation therapy and tamoxifen for about 2-1/2 years which then she had to stop.  She had been doing well but had a screening mammogram, which revealed a new area of microcalcifications in her right breast.  It measures approximately 3 cm in size.  She had a contrast-enhanced mammography that showed the same thing, about 3 cm in size.  This was remote from her lumpectomy cavity.  Her contralateral breast is normal.  She underwent a needle biopsy which demonstrated a grade 2 ductal carcinoma in situ with comedo necrosis, ER positive, HI positive.  She is now here to talk about treatment options.  She has not had any new breast complaints.      PAST MEDICAL HISTORY:  Remains negative for any cardiac, pulmonary, renal or hepatic diseases.  She does not have diabetes.      FAMILY HISTORY:  Unknown, she is adopted.  She underwent genetic testing 13 years ago and did not have a mutation.      PHYSICAL EXAMINATION:  She is a well-appearing woman in no apparent distress.  Head and neck examination reveals no cervical, supraclavicular or infraclavicular lymphadenopathy.  Her right breast is slightly smaller than left, but otherwise, she had a very good cosmetic result.  She has a periareolar incision.  I can appreciate no palpable masses or lymphadenopathy.      IMPRESSION:  Ductal carcinoma in situ, right breast.      PLAN:  I talked to her about the diagnosis and treatment options.  I told her the options would be either repeat lumpectomy or a mastectomy with or without reconstruction.  I told her the survival outcomes would be the same.  However, she may not have a great cosmetic outcome after having a repeat  lumpectomy in that breast.  She also brought up having a double mastectomy.  I told her the benefits of a double mastectomy in her situation would be avoidance of endocrine therapy and future mammograms surveillance and potentially better symmetry.  The disadvantage is that her complication rate would be higher and her recovery would be longer.  I am going to have her see Plastic Surgery how to decide her reconstruction options.  I am going to have her follow up again with genetic counseling to determine whether or not she needs further genetic testing.  We will tentatively schedule her for a mastectomy, non-nipple sparing, and a sentinel lymph node biopsy.      TT:  40 minutes.  CT:  30 minutes.      cc:   Charity Brar MD   [address], [place] [id number]      Ghislaine Putnam PA-C   [address], [place] [id number]

## 2018-07-27 ENCOUNTER — OFFICE VISIT (OUTPATIENT)
Dept: SURGERY | Facility: CLINIC | Age: 58
End: 2018-07-27
Payer: COMMERCIAL

## 2018-07-27 VITALS — WEIGHT: 179.5 LBS | BODY MASS INDEX: 29.91 KG/M2 | HEIGHT: 65 IN

## 2018-07-27 DIAGNOSIS — D05.91 RECURRENT CARCINOMA IN SITU OF BREAST, RIGHT: Primary | ICD-10-CM

## 2018-07-27 PROCEDURE — 99203 OFFICE O/P NEW LOW 30 MIN: CPT | Performed by: PLASTIC SURGERY

## 2018-07-27 RX ORDER — CEFAZOLIN SODIUM 1 G/50ML
1 INJECTION, SOLUTION INTRAVENOUS SEE ADMIN INSTRUCTIONS
Status: CANCELLED | OUTPATIENT
Start: 2018-07-27 | End: 2019-07-27

## 2018-07-27 NOTE — PROGRESS NOTES
REFERRING PHYSICIAN:  Rakesh Sanchez MD, [facility]      PRESENTING COMPLAINT:  Consultation for breast reconstruction.      HISTORY OF PRESENTING COMPLAINT:   Ms. Lombardo is 58 years old.  She was diagnosed with right-sided breast cancer in 2004.  Underwent a lumpectomy and adjuvant radiation therapy thereafter.  No left-sided surgeries were done.  She recently has been diagnosed with DCIS of her right breast.  She is now interested in a bilateral mastectomy, including a prophylactic left-sided mastectomy.  Genetic workup is still pending, but irrespective to the findings of genetic workup, she wants both breasts removed.  She wears a B-cup bra.  She would like to be around the same size.  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  She has no back pain.  She would prefer to keep her original nipple areolar complex, but is okay losing it if it is recommended to do so, either oncologically or aesthetically.      PAST MEDICAL HISTORY:  Nil.      PAST SURGICAL HISTORY:  Vaginal hysterectomy, breast surgery and right foot surgery.      MEDICATIONS:  Sertraline and omeprazole.      ALLERGIES:  Nil.      SOCIAL HISTORY:  Does not smoke, socially drinks.  Lives in [place].  Is a retired teacher.       REVIEW OF SYSTEMS:  Denies chest pain, shortness of breath, MI, CVA, DVT and PE.      PHYSICAL EXAMINATION:  On exam vital signs stable.  She is afebrile in no obvious distress.  She is 5 feet 5 inches, 179 pounds with BMI 29.9 kg/m2.  On examination of her breasts, she has a larger left breast compared to the right side.  The right side has minor radiation changes.  She has an old scar in the periareolar area that has some indentation.  On examination of her abdomen, she has no hernia, no scars.  She has enough abdominal tissue to give her a similar sized breast on both sides.  She has no scars in the back.  Skin pinch thickness about 4 cm.      ASSESSMENT AND PLAN:  Based on above findings, a  diagnosis of recurrent right-sided breast cancer with a history of radiation therapy.  Being worked up for bilateral mastectomy, interested in immediate breast reconstruction was made.  I had a long discussion with the patient and her  about breast reconstruction.  I explained to them the concept behind breast reconstruction, the staged nature of reconstruction, immediate versus delayed reconstruction and autologous versus expander-based reconstruction.  We did discuss the fact that she has had radiation in the past that puts her at high risk for complications including wound problems, capsular contracture and infections.  The patient is very interested in a bilateral mastectomy.  I will leave the oncologic reasons for keeping or not keeping the nipple to Dr. Sanchez and his team.  From an aesthetic standpoint, obviously the natural nipple always gives the best aesthetic results but the patient has undergone radiation therapy in the past and, therefore, has a higher risk for nipple issues and skin healing issues with a nipple-sparing mastectomy.  This was advised to the patient.  The patient would like both nipples removed if she goes down the route of a nipple nonsparing mastectomy which seems to be the way the patient is inclined to proceed.  I went over the first stage of reconstruction being an expander-based reconstruction.  All risks, benefits and alternatives of the expander based reconstruction including pain, infection, bleeding, scarring, asymmetry, seromas, hematomas, wound breakdown, wound dehiscence, implant problems, implant visibility, palpability, capsule contracture, exposure, requirement of further surgeries in the future, injury to deeper structures, loss of the implant, DVT, PE, MI, CVA, pneumonia and death were all explained.  She understood them all and wants to proceed.  I had a renetta conversation about the fact that at the time of surgery if the blood flow to the breast skin flaps is  poor, delayed reconstruction will be advocated.  The use of acellular dermal matrix and SPY angiogram were explained.  All questions were answered.  They were happy with the visit.  They are inclined toward free TRAM flap reconstruction in the future.  Look forward to helping them out in the near future.  We will schedule her as soon as possible.  Consent obtained.  Photographs obtained.        Total time spent with patient 30 minutes, of which more than half was counseling.      cc:   Rakesh Sanchez MD   [facility]

## 2018-07-27 NOTE — LETTER
7/27/2018         RE: Enid Lombardo  [address] [facility] [address]        Dear Colleague,    Thank you for referring your patient, Enid Lombardo, to the [facility]. Please see a copy of my visit note below.    REFERRING PHYSICIAN:  Rakesh Sanchez MD, [facility]      PRESENTING COMPLAINT:  Consultation for breast reconstruction.      HISTORY OF PRESENTING COMPLAINT:   Ms. Lombardo is 58 years old.  She was diagnosed with right-sided breast cancer in 2004.  Underwent a lumpectomy and adjuvant radiation therapy thereafter.  No left-sided surgeries were done.  She recently has been diagnosed with DCIS of her right breast.  She is now interested in a bilateral mastectomy, including a prophylactic left-sided mastectomy.  Genetic workup is still pending, but irrespective to the findings of genetic workup, she wants both breasts removed.  She wears a B-cup bra.  She would like to be around the same size.  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  She has no back pain.  She would prefer to keep her original nipple areolar complex, but is okay losing it if it is recommended to do so, either oncologically or aesthetically.      PAST MEDICAL HISTORY:  Nil.      PAST SURGICAL HISTORY:  Vaginal hysterectomy, breast surgery and right foot surgery.      MEDICATIONS:  Sertraline and omeprazole.      ALLERGIES:  Nil.      SOCIAL HISTORY:  Does not smoke, socially drinks.  Lives in [place].  Is a retired teacher.       REVIEW OF SYSTEMS:  Denies chest pain, shortness of breath, MI, CVA, DVT and PE.      PHYSICAL EXAMINATION:  On exam vital signs stable.  She is afebrile in no obvious distress.  She is 5 feet 5 inches, 179 pounds with BMI 29.9 kg/m2.  On examination of her breasts, she has a larger left breast compared to the right side.  The right side has minor radiation changes.  She has an old scar in the periareolar area that has some indentation.  On examination of  her abdomen, she has no hernia, no scars.  She has enough abdominal tissue to give her a similar sized breast on both sides.  She has no scars in the back.  Skin pinch thickness about 4 cm.      ASSESSMENT AND PLAN:  Based on above findings, a diagnosis of recurrent right-sided breast cancer with a history of radiation therapy.  Being worked up for bilateral mastectomy, interested in immediate breast reconstruction was made.  I had a long discussion with the patient and her  about breast reconstruction.  I explained to them the concept behind breast reconstruction, the staged nature of reconstruction, immediate versus delayed reconstruction and autologous versus expander-based reconstruction.  We did discuss the fact that she has had radiation in the past that puts her at high risk for complications including wound problems, capsular contracture and infections.  The patient is very interested in a bilateral mastectomy.  I will leave the oncologic reasons for keeping or not keeping the nipple to Dr. Sanchez and his team.  From an aesthetic standpoint, obviously the natural nipple always gives the best aesthetic results but the patient has undergone radiation therapy in the past and, therefore, has a higher risk for nipple issues and skin healing issues with a nipple-sparing mastectomy.  This was advised to the patient.  The patient would like both nipples removed if she goes down the route of a nipple nonsparing mastectomy which seems to be the way the patient is inclined to proceed.  I went over the first stage of reconstruction being an expander-based reconstruction.  All risks, benefits and alternatives of the expander based reconstruction including pain, infection, bleeding, scarring, asymmetry, seromas, hematomas, wound breakdown, wound dehiscence, implant problems, implant visibility, palpability, capsule contracture, exposure, requirement of further surgeries in the future, injury to deeper structures,  loss of the implant, DVT, PE, MI, CVA, pneumonia and death were all explained.  She understood them all and wants to proceed.  I had a renetta conversation about the fact that at the time of surgery if the blood flow to the breast skin flaps is poor, delayed reconstruction will be advocated.  The use of acellular dermal matrix and SPY angiogram were explained.  All questions were answered.  They were happy with the visit.  They are inclined toward free TRAM flap reconstruction in the future.  Look forward to helping them out in the near future.  We will schedule her as soon as possible.  Consent obtained.  Photographs obtained.        Total time spent with patient 30 minutes, of which more than half was counseling.      cc:   Rakesh Sanchez MD   [facility]         Again, thank you for allowing me to participate in the care of your patient.        Sincerely,        DENNISE Amin MD

## 2018-07-27 NOTE — NURSING NOTE
"Enid Lombardo's goals for this visit include: consult breast reconstruction   She requests these members of her care team be copied on today's visit information:     PCP: Shi Alonso    Referring Provider:  No referring provider defined for this encounter.    Ht 1.651 m (5' 5\")  Wt 81.4 kg (179 lb 8 oz)  LMP 10/14/2001  BMI 29.87 kg/m2    Do you need any medication refills at today's visit? N/a.Dorina Zepeda RN      "

## 2018-07-30 ENCOUNTER — DOCUMENTATION ONLY (OUTPATIENT)
Dept: SURGERY | Facility: CLINIC | Age: 58
End: 2018-07-30

## 2018-08-01 ENCOUNTER — ONCOLOGY VISIT (OUTPATIENT)
Dept: ONCOLOGY | Facility: CLINIC | Age: 58
End: 2018-08-01
Payer: COMMERCIAL

## 2018-08-01 DIAGNOSIS — Z85.3 HISTORY OF RIGHT BREAST CANCER: ICD-10-CM

## 2018-08-01 DIAGNOSIS — D05.91 RECURRENT CARCINOMA IN SITU OF BREAST, RIGHT: Primary | ICD-10-CM

## 2018-08-01 LAB — MISCELLANEOUS TEST: NORMAL

## 2018-08-01 PROCEDURE — 96040 ZZC GENETIC COUNSELING, EACH 30 MIN: CPT | Performed by: GENETIC COUNSELOR, MS

## 2018-08-01 NOTE — PATIENT INSTRUCTIONS
Assessing Cancer Risk  Only about 5-10% of cancers are thought to be due to an inherited cancer susceptibility gene.    These families often have:    Several people with the same or related types of cancer    Cancers diagnosed at a young age (before age 50)    Individuals with more than one primary cancer    Multiple generations of the family affected with cancer    Some people may be candidates for genetic testing of more than one gene.  For these families, genetic testing using a cancer panel may be offered.  These panels will test different genes known to increase the risk for breast, ovarian, uterine, and/or other cancers. All of the genes discussed below have published clinical management guidelines for individuals who are found to carry a mutation. The purpose of this handout is to serve as a brief summary of the genes analyzed by the panels used to inquire about hereditary breast and gynecologic cancer:  BRIAN, BRCA1, BRCA2, BRIP1, CDH1, CHEK2, MLH1, MSH2, MSH6, PMS2, EPCAM, PTEN, PALB2, RAD51C, RAD51D, and TP53.  ______________________________________________________________________________  Hereditary Breast and Ovarian Cancer Syndrome   (BRCA1 and BRCA2)  A single mutation in one of the copies of BRCA1 or BRCA2 increases the risk for breast and ovarian cancer, among others.  The risk for pancreatic cancer and melanoma may also be slightly increased in some families.  The chart below shows the chance that someone with a BRCA mutation would develop cancer in his or her lifetime1,2,3,4.        A person s ethnic background is also important to consider, as individuals of Ashkenazi Taoist ancestry have a higher chance of having a BRCA gene mutation.  There are three BRCA mutations that occur more frequently in this population.    Orozco Syndrome   (MLH1, MSH2, MSH6, PMS2, and EPCAM)  Currently five genes are known to cause Orozco Syndrome: MLH1, MSH2, MSH6, PMS2, and EPCAM.  A single mutation in one of the  Orozco Syndrome genes increases the risk for colon, endometrial, ovarian, and stomach cancers.  Other cancers that occur less commonly in Orozco Syndrome include urinary tract, skin, and brain cancers.  The chart below shows the chance that a person with Orozco syndrome would develop cancer in his or her lifetime5.      *Cancer risk varies depending on Orozco syndrome gene found    Cowden Syndrome   (PTEN)  Cowden syndrome is a hereditary condition that increases the risk for breast, thyroid, endometrial, colon, and kidney cancer.  Cowden syndrome is caused by a mutation in the PTEN gene.  A single mutation in one of the copies of PTEN causes Cowden syndrome and increases cancer risk.  The chart below shows the chance that someone with a PTEN mutation would develop cancer in their lifetime6,7.  Other benign features seen in some individuals with Cowden syndrome include benign skin lesions (facial papules, keratoses, lipomas), learning disability, autism, thyroid nodules, colon polyps, and larger head size.      *One recent study found breast cancer risk to be increased to 85%    Li-Fraumeni Syndrome   (TP53)  Li-Fraumeni Syndrome (LFS) is a cancer predisposition syndrome caused by a mutation in the TP53 gene. A single mutation in one of the copies of TP53 increases the risk for multiple cancers. Individuals with LFS are at an increased risk for developing cancer at a young age. The lifetime risk for development of a LFS-associated cancer is 50% by age 30 and 90% by age 60.     Core Cancers: Sarcomas, Breast, Brain, Lung, Leukemias/Lymphomas, Adrenocortical carcinomas  Other Cancers: Gastrointestinal, Thyroid, Skin, Genitourinary    Hereditary Diffuse Gastric Cancer   (CDH1)  Currently, one gene is known to cause hereditary diffuse gastric cancer (HDGC): CDH1.  Individuals with HDGC are at increased risk for diffuse gastric cancer and lobular breast cancer. Of people diagnosed with HDGC, 30-50% have a mutation in the  CDH1 gene.  This suggests there are likely other genes that may cause HDGC that have not been identified yet.      Lifetime Cancer Risks    General Population HDGC    Diffuse Gastric  <1% ~80%   Breast 12% 39-52%         Additional Genes  BRIAN  BRIAN is a moderate-risk breast cancer gene. Women who have a mutation in BRIAN can have between a 2-4 fold increased risk for breast cancer compared to the general population8. BRIAN mutations have also been associated with increased risk for pancreatic cancer, however an estimate of this cancer risk is not well understood9. Individuals who inherit two BRIAN mutations have a condition called ataxia-telangiectasia (AT).  This rare autosomal recessive condition affects the nervous system and immune system, and is associated with progressive cerebellar ataxia beginning in childhood.  Individuals with ataxia-telangiectasia often have a weakened immune system and have an increased risk for childhood cancers.    PALB2  Mutations in PALB2 have been shown to increase the risk of breast cancer up to 33-58% in some families; where individuals fall within this risk range is dependent upon family [id number]. PALB2 mutations have also been associated with increased risk for pancreatic cancer, although this risk has not been quantified yet.  Individuals who inherit two PALB2 mutations--one from their mother and one from their father--have a condition called Fanconi Anemia.  This rare autosomal recessive condition is associated with short stature, developmental delay, bone marrow failure, and increased risk for childhood cancers.    CHEK2   CHEK2 is a moderate-risk breast cancer gene.  Women who have a mutation in CHEK2 have around a 2-fold increased risk for breast cancer compared to the general population, and this risk may be higher depending upon family history.11,12,13 Mutations in CHEK2 have also been shown to increase the risk of a number of other cancers, including colon and prostate,  however these cancer risks are currently not well understood.    BRIP1, RAD51C and RAD51D  Mutations in BRIP1, RAD51C, and RAD51D have been shown to increase the risk of ovarian cancer and possibly female breast cancer as well14,15 .       Lifetime Cancer Risk    General Population BRIP1 RAD51C RAD51D   Ovarian 1-2% ~5-8% ~5-9% ~7-15%               Inheritance  All of the cancer syndromes reviewed above are inherited in an autosomal dominant pattern.  This means that if a parent has a mutation, each of his or her children will have a 50% chance of inheriting that same mutation.  Therefore, each child--male or female--would have a 50% chance of being at increased risk for developing cancer.      Image obtained from [employer], 2013     Mutations in some genes can occur de xavier, which means that a person s mutation occurred for the first time in them and was not inherited from a parent.  Now that they have the mutation, however, it can be passed on to future generations.    Genetic Testing  Genetic testing involves a blood test and will look at the genetic information in the BRIAN, BRCA1, BRCA2, BRIP1, CDH1, CHEK2, MLH1, MSH2, MSH6, PMS2, EPCAM, PTEN, PALB2, RAD51C, RAD51D, and TP53 genes for any harmful mutations that are associated with increased cancer risk.  If possible, it is recommended that the person(s) who has had cancer be tested before other family members.  That person will give us the most useful information about whether or not a specific gene is associated with the cancer in the family.    Results  There are three possible results of genetic testing:    Positive--a harmful mutation was identified in one or more of the genes    Negative--no mutation was identified in any of the genes on this panel    Variant of unknown significance--a variation in one of the genes was identified, but it is unclear how this impacts cancer risk in the family    Advantages and Disadvantages   There are advantages  and disadvantages to genetic testing.    Advantages    May clarify your cancer risk    Can help you make medical decisions    May explain the cancers in your family    May give useful information to your family members (if you share your results)    Disadvantages    Possible negative emotional impact of learning about inherited cancer risk    Uncertainty in interpreting a negative test result in some situations    Possible genetic discrimination concerns (see below)    Genetic Information Nondiscrimination Act (SAMANTHA)  SAMANTHA is a federal law that protects individuals from health insurance or employment discrimination based on a genetic test result alone.  Although rare, there are currently no legal discrimination protections in terms of life insurance, long term care, or disability insurances.  Visit the [employer] website to learn more.    Reducing Cancer Risk  All of the genes described above have nationally recognized cancer screening guidelines that would be recommended for individuals who test positive.  In addition to increased cancer screening, surgeries may be offered or recommended to reduce cancer risk.  Recommendations are based upon an individual s genetic test result as well as their personal and family history of cancer.    Questions to Think About Regarding Genetic Testing:    What effect will the test result have on me and my relationship with my family members if I have an inherited gene mutation?  If I don t have a gene mutation?    Should I share my test results, and how will my family react to this news, which may also affect them?    Are my children ready to learn new information that may one day affect their own health?    Hereditary Cancer Resources    FORCE: Facing Our Risk of Cancer Empowered facingourrisk.org   Bright Pink bebrightpink.org   Li-Fraumeni Syndrome Association lfsassociation.org   PTEN World PTENworld.com   No stomach for cancer, Inc.  nostomachforcancer.org   Stomach cancer relief network Scrnet.org   Collaborative Group of the Americas on Inherited Colorectal Cancer (CGA) cgaicc.com    Cancer Care cancercare.org   American Cancer Society (ACS) cancer.org   National Cancer [place] (NCI) cancer.gov     Cancer Risk Management Program [phone]-CANCER ([phone])  ? Nereyda Fernandez, MS, [facility]  [phone]  ? Alethea Sue, MS, [facility]  [phone]  ? Liane Goel, MS, [facility]  [phone]  ? Anu Elizabeth, MS, [facility]  [phone]  ? Ruthie Moore, MS, [facility]  [phone]    References  1. Evelyne MOSLEY, Chris PDP, Anne Marie S, Baldemar KENDALL, Gregorio JE, Terri JL, Fernando N, Michael H, Nereyda O, Joseph A, Yelitza B, Ruthy P, Mankadeem S, Guerrero DM, Enrique N, Nasir E, Gustabo H, Keith E, Dorothy J, Grondemetrio J, Hayley B, Tulinius H, Thorlacius S, Eerola H, Nevibanlinna H, Korin K, Isabelle OP. Average risks of breast and ovarian cancer associated with BRCA1 or BRCA2 mutations detected in case series unselected for family history: a combined analysis of 222 studies. Am J Hum Jodi. 2003;72:1117-30.  2. Dipak N, Belén M, Moise G.  BRCA1 and BRCA2 Hereditary Breast and Ovarian Cancer. Gene Reviews online. 2013.  3. Nicolas YC, Sachi S, Brendon G, Nobles S. Breast cancer risk among male BRCA1 and BRCA2 mutation carriers. J Natl Cancer Inst. 2007;99:1811-4.  4. Keith OLIVARES, Vesta I, Andrzej J, Gale E, Marina ER, Jess F. Risk of breast cancer in male BRCA2 carriers. J Med Jodi. 2010;47:710-1.  5. National Comprehensive Cancer Network. Clinical practice guidelines in oncology, colorectal cancer screening. Available online (registration required). 2015.  6. Jef ROBLES, Francine GOYAL, Lainey GOYAL, Kaitlynn LA, Wagner MS, Eng C. Lifetime cancer risks in individuals with germline PTEN mutations. Clin Cancer Res. 2012;18:400-7.  7. Ruy DAWSON. Cowden Syndrome: A Critical Review of the Clinical Literature. J Jodi . 2009:18:13-27.  8. Maisha MOSLEY, David NAQVI, Wali S, Beverly P,  Kira T, Caitie M, Rod B, Jalen H, Leisa R, Trell K, Natalie L, Keith DG, Guerrero D, Eligio DF, Mae MR, The Breast Cancer Susceptibility Collaboration () & David NAPIER. BRIAN mutations that cause ataxia-telangiectasia are breast cancer susceptibility alleles. Nature Genetics. 2006;38:873-875  9. Joshua N , Karthik Y, Jessica J, Tobias L, Hayden GM , Jhony ML, Gallinger S, Baltazar AG, Syngal S, Linda ML, Sammie J , Mel R, Saniya SZ, Esdaniel JR, Anton VE, Brad M, Vogelstein B, Evelin N, Victoria RH, Keyonna KW, and Ruel AP. BRIAN mutations in patients with hereditary pancreatic cancer. Cancer Discover. 2012;2:41-46  10. Evelyne KAHN, et al. Breast-Cancer Risk in Families with Mutations in PALB2. NEJM. 2014; 371(6):497-506.  11. CHEK2 Breast Cancer Case-Control Consortium. CHEK2*1100delC and susceptibility to breast cancer: A collaborative analysis involving 10,860 breast cancer cases and 9,065 controls from 10 studies. Am J Hum Jodi, 74 (2004), pp. 3161-7715  12. Duyen T, Dread S, Garett K, et al. Spectrum of Mutations in BRCA1, BRCA2, CHEK2, and TP53 in Families at High Risk of Breast Cancer. DARYA. 2006;295(12):1116-6557.   13. Marya C, Marivel D, Jasvir A, et al. Risk of breast cancer in women with a CHEK2 mutation with and without a family history of breast cancer. J Clin Oncol. 2011;29:8470-5125.  14. Dannie H, Didi E, Moy SJ, et al. Contribution of germline mutations in the RAD51B, RAD51C, and RAD51D genes to ovarian cancer in the population. J Clin Oncol. 2015;33(26):6046-5438. Doi:10.1200/JCO.2015.61.2408.  15. Ki LIMA, Hernán WYATT, Kei P, et al. Mutations in BRIP1 confer high risk of ovarian cancer. Michelle Jodi. 2011;43(11):6493-3655. doi:10.1038/ng.955.

## 2018-08-01 NOTE — PROGRESS NOTES
2018    Referring Provider: Rakesh Sanchez MD    Presenting Information:   I met with Enid Lombardo today for genetic counseling at the Cancer Risk Management Program at the [facility] to discuss her personal history of breast cancer.  She is here alone today to review this history, cancer screening recommendations, and available genetic testing options.    Personal History:  Enid is a 58 year old female.  She was diagnosed with infiltrating ductal carcinoma, grade 1 at age 44. She had a lumpectomy and right breast radiation. She was then was on tamoxifen from [id number]. She is being followed by Ghislaine Putnam PA-C in the survivorship program. Enid also had genetic testing for the BRCA1/2 genes in  that was negative (please see notes by Jinny Drew [facility]). 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 Please see Dr. Sanchez's notes for further details. Enid has surgery planned for the end of August.      Enid had a hysterectomy at 41 for fibroids; she retains her ovaries. She has had two colonoscopies. One was in  that demonstrated a 3mm adenomatous polyp of the mid-ascending colon. Subsequent colonoscopy in  demonstrated one small hyperplastic polyp.  Enid reported no tobacco use, and occasional alcohol use.    Family History: (Please see scanned pedigree for detailed family history information). Originally obtained in  by Jinny Drew, and was updated today.     Enid is adopted and has limited information on her biological family.     One maternal half-sister has thyroid issues.    Mother  at 52 from lung cancer.    Maternal grandfather  from colon cancer.    Paternal family history is unknown.     Enid has three healthy adult children.     Discussion:    Enid's personal history of early-onset breast cancer is suggestive of a hereditary cancer syndrome. She did have previous negative BRCA1/2 sequencing and LUCINA testing. We reviewed  BRCA1/2 again today. Mutations in either BRCA1 or BRCA2 cause Hereditary Breast and Ovarian Cancer syndrome (HBOC).  Women with HBOC have an increased risk for breast and ovarian cancer.  There is also an increased risk for prostate and breast cancer among males with a BRCA1/BRCA2 mutation.  Males and females with BRCA1/BRCA2 mutations also face a slightly increased risk for pancreatic cancer.    We did discuss that newer testing technology may be able to identify mutations in BRCA1/2 that were missed by older testing technology. Testing is also available for additional cancer risk genes via panel testing.     Based on her personal and family history, Enid meets current National Comprehensive Cancer Network (NCCN) criteria for genetic testing of BRCA1/2.  NCCN guidelines also mention that there may be a role for multi-gene panel genetic testing for patients who have previously tested negative for a single hereditary cancer syndrome, but have a suspicious personal or family history.    A detailed handout regarding breast and gynecologic cancer risk genes and the information we discussed was provided to Enid at the end of our appointment today and can be found in the after visit summary.  Topics included: inheritance pattern, cancer risks, cancer screening recommendations, and also risks, benefits and limitations of testing.    We discussed that there are additional genes that could cause increased risk for breast and other cancers.  As many of these genes present with overlapping features in a family and accurate cancer risk cannot always be established based upon the pedigree analysis alone, it would be reasonable for Enid to consider panel genetic testing to analyze multiple genes at once.    We discussed that given her upcoming surgery, we could start testing with a smaller panel, then add additional genes later. The BRCAPlus panel has a quick turnaround time, and results can be obtained in 1-2 weeks.     We  discussed that the genes in the BRCAplus panel all have published management guidelines: Hereditary Breast and Ovarian Cancer syndrome (BRCA1, BRCA2), Hereditary Diffuse Gastric Cancer (CDH1), Cowden syndrome (PTEN), and Li Fraumeni syndrome (TP53). Published screening and/or surgery guidelines are available to help manage these cancer risks.  The remaining genes (BRIAN, CHEK2, and PALB2) are associated with moderate breast cancer risk and have published screening guidelines.    Consent was obtained and genetic testing for BRCAplus was sent to [facility].    We also discussed some further options for testing, which can be added later on: actionable high/moderate risk panel testing (GYNplus, 13 genes), expanded high and moderate risk panel testing (OvaNext, 25 genes), and expanded testing of genes for other cancer risks (CancerNext, 34 genes).  Enid expressed an interest in learning as much information as possible from the testing, and would like to add the genes from CancerNext later.    Medical Management: For Enid, we reviewed that the information from genetic testing may determine:    a surgical decision for her current diagnosis, though Enid is currently considering double-mastectomy,    additional cancer screening for which Enid may qualify (i.e. more frequent colonoscopies, more frequent dermatologic exams, etc.),    options for risk reducing surgeries Enid could consider (i.e. surgery to remove the ovaries, etc.),      and targeted chemotherapies under certain circumstances (i.e. immunotherapy, PARP inhibitors, etc.).     These recommendations and possible targeted chemotherapies will be discussed in detail once genetic testing is completed.     Plan:  1) Today Enid elected to proceed with BRCAPlus.  2) This information should be available in 1-2 weeks.  3) I will call Enid to discuss the results    Face to face time: 35 minutes    Alethea Sue MS, [facility]  Licensed Genetic Counselor  [place]  [phone]  F. [phone]

## 2018-08-01 NOTE — MR AVS SNAPSHOT
After Visit Summary   8/1/2018    Enid Lombardo    MRN: [id number]           Patient Information     Date Of Birth          1960        Visit Information        Provider Department      8/1/2018 3:30 PM Alethea Sue, [facility]        Today's Diagnoses     Recurrent carcinoma in situ of breast, right    -  1    History of right breast cancer          Care Instructions        Assessing Cancer Risk  Only about 5-10% of cancers are thought to be due to an inherited cancer susceptibility gene.    These families often have:    Several people with the same or related types of cancer    Cancers diagnosed at a young age (before age 50)    Individuals with more than one primary cancer    Multiple generations of the family affected with cancer    Some people may be candidates for genetic testing of more than one gene.  For these families, genetic testing using a cancer panel may be offered.  These panels will test different genes known to increase the risk for breast, ovarian, uterine, and/or other cancers. All of the genes discussed below have published clinical management guidelines for individuals who are found to carry a mutation. The purpose of this handout is to serve as a brief summary of the genes analyzed by the panels used to inquire about hereditary breast and gynecologic cancer:  BRIAN, BRCA1, BRCA2, BRIP1, CDH1, CHEK2, MLH1, MSH2, MSH6, PMS2, EPCAM, PTEN, PALB2, RAD51C, RAD51D, and TP53.  ______________________________________________________________________________  Hereditary Breast and Ovarian Cancer Syndrome   (BRCA1 and BRCA2)  A single mutation in one of the copies of BRCA1 or BRCA2 increases the risk for breast and ovarian cancer, among others.  The risk for pancreatic cancer and melanoma may also be slightly increased in some families.  The chart below shows the chance that someone with a BRCA mutation would develop cancer in his or her lifetime1,2,3,4.         A person s ethnic background is also important to consider, as individuals of Ashkenazi Sabianist ancestry have a higher chance of having a BRCA gene mutation.  There are three BRCA mutations that occur more frequently in this population.    Orozco Syndrome   (MLH1, MSH2, MSH6, PMS2, and EPCAM)  Currently five genes are known to cause Orozco Syndrome: MLH1, MSH2, MSH6, PMS2, and EPCAM.  A single mutation in one of the Orozco Syndrome genes increases the risk for colon, endometrial, ovarian, and stomach cancers.  Other cancers that occur less commonly in Orozco Syndrome include urinary tract, skin, and brain cancers.  The chart below shows the chance that a person with Orozco syndrome would develop cancer in his or her lifetime5.      *Cancer risk varies depending on Orozco syndrome gene found    Cowden Syndrome   (PTEN)  Cowden syndrome is a hereditary condition that increases the risk for breast, thyroid, endometrial, colon, and kidney cancer.  Cowden syndrome is caused by a mutation in the PTEN gene.  A single mutation in one of the copies of PTEN causes Cowden syndrome and increases cancer risk.  The chart below shows the chance that someone with a PTEN mutation would develop cancer in their lifetime6,7.  Other benign features seen in some individuals with Cowden syndrome include benign skin lesions (facial papules, keratoses, lipomas), learning disability, autism, thyroid nodules, colon polyps, and larger head size.      *One recent study found breast cancer risk to be increased to 85%    Li-Fraumeni Syndrome   (TP53)  Li-Fraumeni Syndrome (LFS) is a cancer predisposition syndrome caused by a mutation in the TP53 gene. A single mutation in one of the copies of TP53 increases the risk for multiple cancers. Individuals with LFS are at an increased risk for developing cancer at a young age. The lifetime risk for development of a LFS-associated cancer is 50% by age 30 and 90% by age 60.     Core Cancers: Sarcomas,  Breast, Brain, Lung, Leukemias/Lymphomas, Adrenocortical carcinomas  Other Cancers: Gastrointestinal, Thyroid, Skin, Genitourinary    Hereditary Diffuse Gastric Cancer   (CDH1)  Currently, one gene is known to cause hereditary diffuse gastric cancer (HDGC): CDH1.  Individuals with HDGC are at increased risk for diffuse gastric cancer and lobular breast cancer. Of people diagnosed with HDGC, 30-50% have a mutation in the CDH1 gene.  This suggests there are likely other genes that may cause HDGC that have not been identified yet.      Lifetime Cancer Risks    General Population HDGC    Diffuse Gastric  <1% ~80%   Breast 12% 39-52%         Additional Genes  BRIAN  BRIAN is a moderate-risk breast cancer gene. Women who have a mutation in BRIAN can have between a 2-4 fold increased risk for breast cancer compared to the general population8. BRIAN mutations have also been associated with increased risk for pancreatic cancer, however an estimate of this cancer risk is not well understood9. Individuals who inherit two BRIAN mutations have a condition called ataxia-telangiectasia (AT).  This rare autosomal recessive condition affects the nervous system and immune system, and is associated with progressive cerebellar ataxia beginning in childhood.  Individuals with ataxia-telangiectasia often have a weakened immune system and have an increased risk for childhood cancers.    PALB2  Mutations in PALB2 have been shown to increase the risk of breast cancer up to 33-58% in some families; where individuals fall within this risk range is dependent upon family qathqmb20. PALB2 mutations have also been associated with increased risk for pancreatic cancer, although this risk has not been quantified yet.  Individuals who inherit two PALB2 mutations--one from their mother and one from their father--have a condition called Fanconi Anemia.  This rare autosomal recessive condition is associated with short stature, developmental delay, bone marrow  failure, and increased risk for childhood cancers.    CHEK2   CHEK2 is a moderate-risk breast cancer gene.  Women who have a mutation in CHEK2 have around a 2-fold increased risk for breast cancer compared to the general population, and this risk may be higher depending upon family history.11,12,13 Mutations in CHEK2 have also been shown to increase the risk of a number of other cancers, including colon and prostate, however these cancer risks are currently not well understood.    BRIP1, RAD51C and RAD51D  Mutations in BRIP1, RAD51C, and RAD51D have been shown to increase the risk of ovarian cancer and possibly female breast cancer as well14,15 .       Lifetime Cancer Risk    General Population BRIP1 RAD51C RAD51D   Ovarian 1-2% ~5-8% ~5-9% ~7-15%               Inheritance  All of the cancer syndromes reviewed above are inherited in an autosomal dominant pattern.  This means that if a parent has a mutation, each of his or her children will have a 50% chance of inheriting that same mutation.  Therefore, each child--male or female--would have a 50% chance of being at increased risk for developing cancer.      Image obtained from Genetics Home Reference, 2013     Mutations in some genes can occur de xavier, which means that a person s mutation occurred for the first time in them and was not inherited from a parent.  Now that they have the mutation, however, it can be passed on to future generations.    Genetic Testing  Genetic testing involves a blood test and will look at the genetic information in the BRIAN, BRCA1, BRCA2, BRIP1, CDH1, CHEK2, MLH1, MSH2, MSH6, PMS2, EPCAM, PTEN, PALB2, RAD51C, RAD51D, and TP53 genes for any harmful mutations that are associated with increased cancer risk.  If possible, it is recommended that the person(s) who has had cancer be tested before other family members.  That person will give us the most useful information about whether or not a specific gene is associated with the cancer in the  family.    Results  There are three possible results of genetic testing:    Positive--a harmful mutation was identified in one or more of the genes    Negative--no mutation was identified in any of the genes on this panel    Variant of unknown significance--a variation in one of the genes was identified, but it is unclear how this impacts cancer risk in the family    Advantages and Disadvantages   There are advantages and disadvantages to genetic testing.    Advantages    May clarify your cancer risk    Can help you make medical decisions    May explain the cancers in your family    May give useful information to your family members (if you share your results)    Disadvantages    Possible negative emotional impact of learning about inherited cancer risk    Uncertainty in interpreting a negative test result in some situations    Possible genetic discrimination concerns (see below)    Genetic Information Nondiscrimination Act (SAMANTHA)  SAMANTHA is a federal law that protects individuals from health insurance or employment discrimination based on a genetic test result alone.  Although rare, there are currently no legal discrimination protections in terms of life insurance, long term care, or disability insurances.  Visit the National [employer] [place] website to learn more.    Reducing Cancer Risk  All of the genes described above have nationally recognized cancer screening guidelines that would be recommended for individuals who test positive.  In addition to increased cancer screening, surgeries may be offered or recommended to reduce cancer risk.  Recommendations are based upon an individual s genetic test result as well as their personal and family history of cancer.    Questions to Think About Regarding Genetic Testing:    What effect will the test result have on me and my relationship with my family members if I have an inherited gene mutation?  If I don t have a gene mutation?    Should I share my test  results, and how will my family react to this news, which may also affect them?    Are my children ready to learn new information that may one day affect their own health?    Hereditary Cancer Resources    FORCE: Facing Our Risk of Cancer Empowered facingourrisk.org   Bright Pink bebrightpink.org   Li-Fraumeni Syndrome Association lfsassociation.org   PTEN World PTENworld.MumumÃ­o   No stomach for cancer, Inc. nostomacforcancer.org   Stomach cancer relief network Scrnet.org   Collaborative Group of the Americas on Inherited Colorectal Cancer (CGA) cgaicc.com    Cancer Care cancercare.org   American Cancer Society (ACS) cancer.org   National Cancer Aurora (NCI) cancer.gov     Cancer Risk Management Program [phone] ([phone])  ? Nereyda Jim, MS, [facility]  [phone]  ? Alethea Sue, MS, [facility]  [phone]  ? Liane Goel, MS, [facility]  [phone]  ? Anu Elizabeth, MS, [facility]  [phone]  ? Ruthie Moore, MS, [facility]  [phone]    References  1. Evelyne A, Chris PDP, Anne Marie S, Baldemar KENDALL, Gregorio JE, Terri JL, Fernando N, Michael H, Nereyda O, Joseph A, Yelitza B, Ruthy P, Ender S, Guerrero DM, Enrique N, Nasir E, Gustabo H, Parker E, Lubinski J, Gronwald J, Hayley B, Tuljacyus H, Thorlacius S, Eerola H, Galina H, Korin K, Isabelle OP. Average risks of breast and ovarian cancer associated with BRCA1 or BRCA2 mutations detected in case series unselected for family history: a combined analysis of 222 studies. Am J Hum Jodi. 2003;72:1117-30.  2. Dipak NAPIER, Belén BOWDEN, Suma BYERS.  BRCA1 and BRCA2 Hereditary Breast and Ovarian Cancer. Gene Reviews online. 2013.  3. Nicolas YC, Sachi S, Brendon G, Nobles S. Breast cancer risk among male BRCA1 and BRCA2 mutation carriers. J Natl Cancer Inst. 2007;99:1811-4.  4. Keith OLIVARES, Vetsa I, Andrzej J, Gale E, Marina ER, Jess F. Risk of breast cancer in male BRCA2 carriers. J Med Jodi. 2010;47:710-1.  5. National Comprehensive Cancer Network. Clinical practice  guidelines in oncology, colorectal cancer screening. Available online (registration required). 2015.  6. Jef MH, Francine J, Lainey J, Kaitlynn LA, Wagner MS, Latonya C. Lifetime cancer risks in individuals with germline PTEN mutations. Clin Cancer Res. 2012;18:400-7.  7. Ruy DAWSON. Cowden Syndrome: A Critical Review of the Clinical Literature. J Jodi . 2009:18:13-27.  8. Maisha A, David D, Wali S, Beverly P, Kira T, Caitie M, Rod B, Jalen H, Leisa R, Trell K, Natalie L, Keith DG, Guerrero D, Eligio DF, Mae MR, The Breast Cancer Susceptibility Collaboration (UK) & David NAPIER. BRIAN mutations that cause ataxia-telangiectasia are breast cancer susceptibility alleles. Nature Genetics. 2006;38:873-875  9. Joshua N , Karthik Y, Jessica J, Tobias L, Hayden GM , Jhony ML, Gallinger S, Baltazar AG, Syngal S, Linda ML, Sammie J , Mel R, Saniya SZ, Valerie JR, Anton VE, Brad M, Vogelstein B, Evelin N, Danieln RH, Keyonna KW, and Lipscomb AP. BRIAN mutations in patients with hereditary pancreatic cancer. Cancer Discover. 2012;2:41-46  10. Evelyne KAHN, et al. Breast-Cancer Risk in Families with Mutations in PALB2. NEJM. 2014; 371(6):497-506.  11. CHEK2 Breast Cancer Case-Control Consortium. CHEK2*1100delC and susceptibility to breast cancer: A collaborative analysis involving 10,860 breast cancer cases and 9,065 controls from 10 studies. Am J Hum Jodi, 74 (2004), pp. 7211-1818  12. Duyen T, Dread S, Garett K, et al. Spectrum of Mutations in BRCA1, BRCA2, CHEK2, and TP53 in Families at High Risk of Breast Cancer. DARYA. 2006;295(12):7194-2462.   13. Marya SUAREZ, Marivel NAQVI, Jasvir MOSLEY, et al. Risk of breast cancer in women with a CHEK2 mutation with and without a family history of breast cancer. J Clin Oncol. 2011;29:2117-1158.  14. Dannie H, Didi E, Moy WOLF, et al. Contribution of germline mutations in the RAD51B, RAD51C, and RAD51D genes to ovarian cancer in the population. J Clin Oncol.  2015;33(26):0549-7512. Doi:10.1200/JCO.2015.61.2408.  15. Ki T, Hernán DF, Kei P, et al. Mutations in BRIP1 confer high risk of ovarian cancer. Michelle Jodi. 2011;43(11):7648-0991. doi:10.1038/ng.955.            Follow-ups after your visit        Your next 10 appointments already scheduled     Aug 22, 2018  9:15 AM CDT   NM SENTINEL NODE INJECTION BILATERAL with [id number]   [facility], [place], Nuclear Medicine ([facility], [facility])    [address] [address]   [phone]           Please bring a list of your medicines to the exam. (Include vitamins, minerals and over-the-counter drugs.) You should wear comfortable clothes. Leave your valuables at home. Please bring related prior results and films.  Tell your doctor:   If you are breastfeeding or may be pregnant.   If you have had a barium test within the past few days. Barium may change the results of certain exams.   If you think you may need sedation (medicine to help you relax).  You may eat and drink as normal.  Please call your Imaging Department at your exam site with any questions.            Aug 22, 2018   Procedure with Rakesh Sanchez MD   [facility], Same Day Surgery (--)    [address] [place] [address]   [phone]            Aug 28, 2018 11:45 AM CDT   (Arrive by 11:30 AM)   Post-Op with DENNISE Amin MD   [facility])    [address] [place] [address]  [address] [place] [address]   [phone]            Sep 06, 2018 10:30 AM CDT   (Arrive by 10:15 AM)   Post-Op with Rakesh Sanchez MD   [facility])    [address] [place] [address]  [address]  [facility] [address]   [phone]            Jan 21, 2019 10:15 AM CST   RETURN RETINA with Paz Osborn MD   Eye Clinic ([facility])    [place] [address]  9th [address] Clin 9a  [facility]  [address]   [phone]              Who to contact     If you have questions or need follow up information about today's clinic visit or your schedule please contact [facility] directly at [phone].  Normal or non-critical lab and imaging results will be communicated to you by Clickatellhart, letter or phone within 4 business days after the clinic has received the results. If you do not hear from us within 7 days, please contact the clinic through Clickatellhart or phone. If you have a critical or abnormal lab result, we will notify you by phone as soon as possible.  Submit refill requests through ieCrowd or call your pharmacy and they will forward the refill request to us. Please allow 3 business days for your refill to be completed.          Additional Information About Your Visit        ClickatellharRayneer Information     ieCrowd gives you secure access to your electronic health record. If you see a primary care provider, you can also send messages to your care team and make appointments. If you have questions, please call your primary care clinic.  If you do not have a primary care provider, please call [phone] and they will assist you.      ieCrowd is an electronic gateway that provides easy, online access to your medical records. With ieCrowd, you can request a clinic appointment, read your test results, renew a prescription or communicate with your care team.     To access your existing account, please contact your [facility] or call [phone] for assistance.        Care EveryWhere ID     This is your Care EveryWhere ID. This could be used by other organizations to access your [facility] medical records  [id number]        Your Vitals Were     Last Period                   10/14/2001            Blood Pressure from Last 3 Encounters:   07/26/18 133/79   07/23/18 108/60   07/05/18 141/83    Weight from Last 3 Encounters:   07/27/18 81.4 kg (179 lb 8 oz)   07/26/18 81.3 kg  (179 lb 5 oz)   07/23/18 79.8 kg (176 lb)               Primary Care Provider Office Phone # Fax #    Shi Alonso [phone]       [address] [place] [address]        Equal Access to Services     RAIZA TOLBERT AH: Hadii prem osorio hadjaco Soomaali, waaxda luqadaha, qaybta kaalmada adeegyada, waxtom albrecht caryn jh leona la'niralitrevon tong. So [facility] [phone].    ATENCIÓN: Si habla español, tiene a rivera disposición servicios gratuitos de asistencia lingüística. Llame al [phone].    We comply with applicable federal civil rights laws and [place] laws. We do not discriminate on the basis of race, color, national origin, age, disability, sex, sexual orientation, or gender identity.            Thank you!     Thank you for choosing [facility]  for your care. Our goal is always to provide you with excellent care. Hearing back from our patients is one way we can continue to improve our services. Please take a few minutes to complete the written survey that you may receive in the mail after your visit with us. Thank you!             Your Updated Medication List - Protect others around you: Learn how to safely use, store and throw away your medicines at www.disposemymeds.org.          This list is accurate as of 8/1/18  3:58 PM.  Always use your most recent med list.                   Brand Name Dispense Instructions for use Diagnosis    ADVIL PO      Take 200 mg by mouth as needed for moderate pain        Fish Oil 1000 MG Cpdr      Take  by mouth.        fluticasone 50 MCG/ACT spray    FLONASE    16 mL    USE ONE OR TWO SPRAYS IN EACH NOSTRIL DAILY    Chronic rhinitis       loratadine 10 MG tablet    CLARITIN    90 tablet    TAKE ONE TABLET BY MOUTH EVERY DAY AS NEEDED FOR ALLERGY SYMPTOMS    Chronic rhinitis, unspecified type       omeprazole 20 MG CR capsule    priLOSEC    90 capsule    TAKE 1 CAPSULE (20 MG) BY MOUTH DAILY    Gastroesophageal reflux disease, esophagitis presence  not specified       PRESERVISION AREDS 2 Caps     60 capsule    Take 1 tablet by mouth 2 times daily    Drusen (degenerative) of retina, bilateral, Vitreous degeneration, bilateral       sertraline 25 MG tablet    ZOLOFT    30 tablet    Take 1 tablet daily.    Hot flushes, perimenopausal

## 2018-08-01 NOTE — LETTER
2018         RE: Enid Lombardo  [address] [facility] [address]        Dear Colleague,    Thank you for referring your patient, Enid Lombardo, to the [facility]. Please see a copy of my visit note below.    2018    Referring Provider: Rakesh Sanchez MD    Presenting Information:   I met with Enid Lombardo today for genetic counseling at the Cancer Risk Management Program at the [facility] to discuss her personal history of breast cancer.  She is here alone today to review this history, cancer screening recommendations, and available genetic testing options.    Personal History:  Enid is a 58 year old female.  She was diagnosed with infiltrating ductal carcinoma, grade 1 at age 44. She had a lumpectomy and right breast radiation. She was then was on tamoxifen from 8441-8774. She is being followed by Ghislaine Putnam PA-C in the survivorship program. Enid also had genetic testing for the BRCA1/2 genes in  that was negative (please see notes by Jinny Drew [facility]). 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 Please see Dr. Sanchez's notes for further details. Enid has surgery planned for the end of August.      Enid had a hysterectomy at 41 for fibroids; she retains her ovaries. She has had two colonoscopies. One was in  that demonstrated a 3mm adenomatous polyp of the mid-ascending colon. Subsequent colonoscopy in  demonstrated one small hyperplastic polyp.  Enid reported no tobacco use, and occasional alcohol use.    Family History: (Please see scanned pedigree for detailed family history information). Originally obtained in  by Jinny Drew, and was updated today.     Enid is adopted and has limited information on her biological family.     One maternal half-sister has thyroid issues.    Mother  at 52 from lung cancer.    Maternal grandfather  from colon cancer.    Paternal family history is unknown.      Enid has three healthy adult children.     Discussion:    Enid's personal history of early-onset breast cancer is suggestive of a hereditary cancer syndrome. She did have previous negative BRCA1/2 sequencing and LUCINA testing. We reviewed BRCA1/2 again today. Mutations in either BRCA1 or BRCA2 cause Hereditary Breast and Ovarian Cancer syndrome (HBOC).  Women with HBOC have an increased risk for breast and ovarian cancer.  There is also an increased risk for prostate and breast cancer among males with a BRCA1/BRCA2 mutation.  Males and females with BRCA1/BRCA2 mutations also face a slightly increased risk for pancreatic cancer.    We did discuss that newer testing technology may be able to identify mutations in BRCA1/2 that were missed by older testing technology. Testing is also available for additional cancer risk genes via panel testing.     Based on her personal and family history, Enid meets current National Comprehensive Cancer Network (NCCN) criteria for genetic testing of BRCA1/2.  NCCN guidelines also mention that there may be a role for multi-gene panel genetic testing for patients who have previously tested negative for a single hereditary cancer syndrome, but have a suspicious personal or family history.    A detailed handout regarding breast and gynecologic cancer risk genes and the information we discussed was provided to Enid at the end of our appointment today and can be found in the after visit summary.  Topics included: inheritance pattern, cancer risks, cancer screening recommendations, and also risks, benefits and limitations of testing.    We discussed that there are additional genes that could cause increased risk for breast and other cancers.  As many of these genes present with overlapping features in a family and accurate cancer risk cannot always be established based upon the pedigree analysis alone, it would be reasonable for Enid to consider panel genetic testing to analyze  multiple genes at once.    We discussed that given her upcoming surgery, we could start testing with a smaller panel, then add additional genes later. The BRCAPlus panel has a quick turnaround time, and results can be obtained in 1-2 weeks.     We discussed that the genes in the BRCAplus panel all have published management guidelines: Hereditary Breast and Ovarian Cancer syndrome (BRCA1, BRCA2), Hereditary Diffuse Gastric Cancer (CDH1), Cowden syndrome (PTEN), and Li Fraumeni syndrome (TP53). Published screening and/or surgery guidelines are available to help manage these cancer risks.  The remaining genes (BRIAN, CHEK2, and PALB2) are associated with moderate breast cancer risk and have published screening guidelines.    Consent was obtained and genetic [facility] for BRCAplus [facility].    We also discussed some further options for testing, which can be added later on: actionable high/moderate risk panel testing (GYNplus, 13 genes), expanded high and moderate risk panel testing (OvaNext, 25 genes), and expanded testing of genes for other cancer risks (CancerNext, 34 genes).  Enid expressed an interest in learning as much information as possible from the testing, and would like to add the genes from CancerNext later.    Medical Management: For Enid, we reviewed that the information from genetic testing may determine:    a surgical decision for her current diagnosis, though Enid is currently considering double-mastectomy,    additional cancer screening for which Enid may qualify (i.e. more frequent colonoscopies, more frequent dermatologic exams, etc.),    options for risk reducing surgeries Enid could consider (i.e. surgery to remove the ovaries, etc.),      and targeted chemotherapies under certain circumstances (i.e. immunotherapy, PARP inhibitors, etc.).     These recommendations and possible targeted chemotherapies will be discussed in detail once genetic testing is completed.      Plan:  1) Today Enid elected to proceed with BRCAPlus.  2) This information should be available in 1-2 weeks.  3) I will call Enid to discuss the results    Face to face time: 35 minutes    Alethea Sue MS, [facility]  Licensed Genetic Counselor  P. [phone]  F. [phone]      Again, thank you for allowing me to participate in the care of your patient.        Sincerely,        Alethea Sue, GC

## 2018-08-01 NOTE — LETTER
Cancer Risk Management  Program Locations    [facility]  [facility]  Mailing Address  Cancer Risk Management Program  [facility]  [address]  [facility] [address]  [place] [address]    New patient appointments  [phone]  August 1, 2018    Enid Lombardo  [address]  [facility] [address]      Dear Enid,    It was a pleasure meeting with you at the [facility] on 8/1/2018.  Here is a copy of the progress note from your recent genetic counseling visit to the Cancer Risk Management Program.  If you have any additional questions, please feel free to call.      Referring Provider: Rakesh Sanchez MD    Presenting Information:   I met with Enid Lombardo today for genetic counseling at the Cancer Risk Management Program at the [facility] to discuss her personal history of breast cancer.  She is here alone today to review this history, cancer screening recommendations, and available genetic testing options.    Personal History:  Enid is a 58 year old female.  She was diagnosed with infiltrating ductal carcinoma, grade 1 at age 44. She had a lumpectomy and right breast radiation. She was then was on tamoxifen from 5298-6939. She is being followed by Ghislaine Putnam PA-C in the survivorship program. Enid also had genetic testing for the BRCA1/2 genes in 2008 that was negative (please see notes by Jinny Drew [facility]). 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 Please see Dr. Sanchez's notes for further details. Enid has surgery planned for the end of August.      Enid had a hysterectomy at 41 for fibroids; she retains her ovaries. She has had two colonoscopies. One was in 2010 that demonstrated a 3mm adenomatous polyp of the mid-ascending colon. Subsequent colonoscopy in 2015  demonstrated one small hyperplastic polyp.  Enid reported no tobacco use, and occasional alcohol use.      Family History: (Please see scanned pedigree for detailed family history information). Originally obtained in  by Jinyn Drew, and was updated today.     Enid is adopted and has limited information on her biological family.     One maternal half-sister has thyroid issues.    Mother  at 52 from lung cancer.    Maternal grandfather  from colon cancer.    Paternal family history is unknown.     Enid has three healthy adult children.     Discussion:    Enid's personal history of early-onset breast cancer is suggestive of a hereditary cancer syndrome. She did have previous negative BRCA1/2 sequencing and LUCINA testing. We reviewed BRCA1/2 again today. Mutations in either BRCA1 or BRCA2 cause Hereditary Breast and Ovarian Cancer syndrome (HBOC).  Women with HBOC have an increased risk for breast and ovarian cancer.  There is also an increased risk for prostate and breast cancer among males with a BRCA1/BRCA2 mutation.  Males and females with BRCA1/BRCA2 mutations also face a slightly increased risk for pancreatic cancer.    We did discuss that newer testing technology may be able to identify mutations in BRCA1/2 that were missed by older testing technology. Testing is also available for additional cancer risk genes via panel testing.     Based on her personal and family history, Enid meets current National Comprehensive Cancer Network (NCCN) criteria for genetic testing of BRCA1/2.  NCCN guidelines also mention that there may be a role for multi-gene panel genetic testing for patients who have previously tested negative for a single hereditary cancer syndrome, but have a suspicious personal or family history.    A detailed handout regarding breast and gynecologic cancer risk genes and the information we discussed was provided to Enid at the end of our appointment today and can be found in the  after visit summary.  Topics included: inheritance pattern, cancer risks, cancer screening recommendations, and also risks, benefits and limitations of testing.    We discussed that there are additional genes that could cause increased risk for breast and other cancers.  As many of these genes present with overlapping features in a family and accurate cancer risk cannot always be established based upon the pedigree analysis alone, it would be reasonable for Enid to consider panel genetic testing to analyze multiple genes at once.    We discussed that given her upcoming surgery, we could start testing with a smaller panel, then add additional genes later. The BRCAPlus panel has a quick turnaround time, and results can be obtained in 1-2 weeks.     We discussed that the genes in the BRCAplus panel all have published management guidelines: Hereditary Breast and Ovarian Cancer syndrome (BRCA1, BRCA2), Hereditary Diffuse Gastric Cancer (CDH1), Cowden syndrome (PTEN), and Li Fraumeni syndrome (TP53). Published screening and/or surgery guidelines are available to help manage these cancer risks.  The remaining genes (BRIAN, CHEK2, and PALB2) are associated with moderate breast cancer risk and have published screening guidelines.    Consent was obtained and genetic testing for BRCAplus was sent to [facility].    We also discussed some further options for testing, which can be added later on: actionable high/moderate risk panel testing (GYNplus, 13 genes), expanded high and moderate risk panel testing (OvaNext, 25 genes), and expanded testing of genes for other cancer risks (CancerNext, 34 genes).  Enid expressed an interest in learning as much information as possible from the testing, and would like to add the genes from CancerNext later.    Medical Management: For Enid, we reviewed that the information from genetic testing may determine:    a surgical decision for her current diagnosis, though Enid is  currently considering double-mastectomy,    additional cancer screening for which Enid may qualify (i.e. more frequent colonoscopies, more frequent dermatologic exams, etc.),    options for risk reducing surgeries Enid could consider (i.e. surgery to remove the ovaries, etc.),      and targeted chemotherapies under certain circumstances (i.e. immunotherapy, PARP inhibitors, etc.).     These recommendations and possible targeted chemotherapies will be discussed in detail once genetic testing is completed.     Plan:  1) Today Enid elected to proceed with BRCAPlus.  2) This information should be available in 1-2 weeks.  3) I will call Enid to discuss the results    Face to face time: 35 minutes    Alethea Sue MS, [facility]  Licensed Genetic Counselor  P. [phone]  F. [phone]

## 2018-08-03 ENCOUNTER — MYC MEDICAL ADVICE (OUTPATIENT)
Dept: FAMILY MEDICINE | Facility: OTHER | Age: 58
End: 2018-08-03

## 2018-08-03 NOTE — TELEPHONE ENCOUNTER
Provider please review/advise.  Sent [employer] message to patient informing her provider will be back in clinic on Monday.  Sim Caruso, CMA

## 2018-08-04 DIAGNOSIS — N95.1 HOT FLUSHES, PERIMENOPAUSAL: ICD-10-CM

## 2018-08-06 ENCOUNTER — MYC MEDICAL ADVICE (OUTPATIENT)
Dept: FAMILY MEDICINE | Facility: OTHER | Age: 58
End: 2018-08-06

## 2018-08-06 ENCOUNTER — TELEPHONE (OUTPATIENT)
Dept: FAMILY MEDICINE | Facility: OTHER | Age: 58
End: 2018-08-06

## 2018-08-06 NOTE — TELEPHONE ENCOUNTER
"Forms placed in AE bin for review. I printed the pre-op note and attached it. EKG printed.    The form patient brought in says she needs \"Hemoglobin, wbc, Hemogram, BMP/CMP\"  Not done at appointment. May need to come in for lab only for this?    iLlia Richards, CHERYL        "

## 2018-08-06 NOTE — TELEPHONE ENCOUNTER
These are check boxes taht are usually chosen and clicked. None are checked on this list. It looks like she should be good to go. Preop is in system, so should not need to be faxed either.  Shi Alonso MD

## 2018-08-06 NOTE — TELEPHONE ENCOUNTER
Reason for call:  Form  Reason for Call:  Form, our goal is to have forms completed with 72 hours, however, some forms may require a visit or additional information.     Type of letter, form or note:  FMLA     Who is the form from?: Patient     Where did the form come from: Patient or family brought in        What clinic location was the form placed at?: [facility] - [phone]     Where the form was placed: [facility]     What number is listed as a contact on the form?:      Additional comments: fax to [phone]     Call taken on 8/6/2018 at 10:13 AM by Valerie Parry

## 2018-08-06 NOTE — TELEPHONE ENCOUNTER
Reason for call:  Form  Reason for Call:  Form, our goal is to have forms completed with 72 hours, however, some forms may require a visit or additional information.    Type of letter, form or note:  FMLA    Who is the form from?: Patient    Where did the form come from: Patient or family brought in       What clinic location was the form placed at?: [facility] - [phone]    Where the form was placed: Dr's [facility]    What number is listed as a contact on the form?:        Additional comments: fax to [phone]    Call taken on 8/6/2018 at 10:13 AM by Valerie Parry

## 2018-08-07 ENCOUNTER — DOCUMENTATION ONLY (OUTPATIENT)
Dept: OTHER | Facility: CLINIC | Age: 58
End: 2018-08-07

## 2018-08-07 RX ORDER — SERTRALINE HYDROCHLORIDE 25 MG/1
TABLET, FILM COATED ORAL
Qty: 90 TABLET | Refills: 2 | OUTPATIENT
Start: 2018-08-07

## 2018-08-12 ENCOUNTER — OFFICE VISIT (OUTPATIENT)
Dept: URGENT CARE | Facility: RETAIL CLINIC | Age: 58
End: 2018-08-12
Payer: COMMERCIAL

## 2018-08-12 VITALS — DIASTOLIC BLOOD PRESSURE: 85 MMHG | HEART RATE: 77 BPM | SYSTOLIC BLOOD PRESSURE: 139 MMHG | TEMPERATURE: 98.2 F

## 2018-08-12 DIAGNOSIS — H60.393 INFECTIVE OTITIS EXTERNA, BILATERAL: Primary | ICD-10-CM

## 2018-08-12 PROCEDURE — 99213 OFFICE O/P EST LOW 20 MIN: CPT | Performed by: PHYSICIAN ASSISTANT

## 2018-08-12 RX ORDER — NEOMYCIN SULFATE, POLYMYXIN B SULFATE, HYDROCORTISONE 3.5; 10000; 1 MG/ML; [USP'U]/ML; MG/ML
SOLUTION/ DROPS AURICULAR (OTIC)
Qty: 20 ML | Refills: 0
Start: 2018-08-12 | End: 2018-09-04

## 2018-08-12 RX ORDER — NEOMYCIN SULFATE, POLYMYXIN B SULFATE AND HYDROCORTISONE 10; 3.5; 1 MG/ML; MG/ML; [USP'U]/ML
4 SUSPENSION/ DROPS AURICULAR (OTIC) 3 TIMES DAILY
Qty: 10 ML | Refills: 0 | Status: SHIPPED | OUTPATIENT
Start: 2018-08-12 | End: 2018-08-12

## 2018-08-12 NOTE — PROGRESS NOTES
Chief Complaint   Patient presents with     Otalgia     left ear pain x 2 days, now right ear started hurting, ear hurts to touch and both itch, pain seem like its more on the outside, no fevers        SUBJECTIVE:  Enid Lombardo is a 58 year old female who presents with bilateral ear itching pain for 2-3 days. Tender to touch left ear. No drainage from ears. Worse on left side  Severity: moderate   Timing:gradual onset, still present and worsening  Additional symptoms include none.    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, states symptoms feel very similar  Diagnosed with breast CA, scheduled for surgery on 8/22/18  Was advised by surgeon to discontinue NSAIDs 5 day prior to surgery    Past Medical History:   Diagnosis Date     Breast cancer (H) 2004    lumpectomy, radiation, tamoxifen     Cancer (H) 2004    Breast     Cataracts, bilateral      Coronary artery disease 11/24/2014     Diabetes (H)     Gestational     Enthesopathy of hip region 6/06    right hip     Esophageal reflux 2/06     History of gestational diabetes      Hypertension 2012     Macular drusen      Shingles 01/23/2012    left T6-T7 dermatome     Current Outpatient Prescriptions   Medication Sig Dispense Refill     fluticasone (FLONASE) 50 MCG/ACT spray USE ONE OR TWO SPRAYS IN EACH NOSTRIL DAILY 16 mL 10     Ibuprofen (ADVIL PO) Take 200 mg by mouth as needed for moderate pain       loratadine (CLARITIN) 10 MG tablet TAKE ONE TABLET BY MOUTH EVERY DAY AS NEEDED FOR ALLERGY SYMPTOMS 90 tablet 1     Multiple Vitamins-Minerals (PRESERVISION AREDS 2) CAPS Take 1 tablet by mouth 2 times daily 60 capsule 11     neomycin-polymyxin-HC (CORTISPORIN) 1 % SOLN  Place 4 drops in both ears 3 times daily for 9 days 20 mL 0     Omega-3 Fatty Acids (FISH OIL) 1000 MG CPDR Take  by mouth.       omeprazole (PRILOSEC) 20 MG CR capsule TAKE 1 CAPSULE (20 MG) BY MOUTH DAILY 90 capsule 3     sertraline  (ZOLOFT) 25 MG tablet Take 1 tablet daily. 30 tablet 11        Allergies   Allergen Reactions     Alphagan P Rash     Thrush and numbness in mouth        History   Smoking Status     Never Smoker   Smokeless Tobacco     Never Used       ROS:   CONSTITUTIONAL:NEGATIVE for fever, chills  ENT/MOUTH: POSITIVE for ear discomfort, tender to touch, itching and NEGATIVE for nasal congestion and sore throat  RESP:NEGATIVE for significant cough or wheezing    OBJECTIVE:  /85 (BP Location: Left arm)  Pulse 77  Temp 98.2  F (36.8  C) (Oral)  LMP 10/14/2001  The right auditory canal is slightly swollen, erythematous.   The right TM is normal: no effusions, no erythema, and normal landmarks     Left tragus tender to manipulation.  The left auditory canal is erythematous, swollen and tender, small amt of cerumen in distal canal, non obstructing  The left TM is normal: no effusions, no erythema, and normal landmarks  Oropharynx exam is normal: no lesions, erythema, adenopathy or exudate.  NECK: supple, non-tender to palpation, no adenopathy noted  RESP: lungs clear to auscultation - no rales, rhonchi or wheezes  CV: regular rates and rhythm, normal S1 S2, no murmur noted  SKIN: no suspicious lesions or rashes     ASSESSMENT:  (H60.393) Infective otitis externa, bilateral  (primary encounter diagnosis)    PLAN:  Initially prescribed CORTISPORIN otic suspension but pharmacy out of stock  Changed Rx to neomycin-polymyxin-HC (CORTISPORIN) 1 % SOLN,   Use antibiotic ear drop 4 drops in both ears 3x a day for 9 days - to use until day before surgery  Symptomatic measures reviewed including over the counter pain reliever such asi tylenol or buprofen as needed. - ibuprofen for first few days until advised to discontinue by surgeon, ok to use tylenol after that    Keep water out of ear until the infection is cleared.   During long showers this week, use cotton ball in ear to keep water out of ear.  May swim with wax ear plug in  affected ear this week if not painful.   No Qtips.   Warm compress next to ear   Call surgeon's nurse - and discuss diagnosed with outer ear infection (bilateral otitis externa and prescribed antibiotic ear drops for 9 days, will finish day before surgery)  Please follow up with primary care provider if not improving, worsening or new symptoms or for any adverse reactions to medications.     Barbara Bey PA-C  [facility]

## 2018-08-12 NOTE — MR AVS SNAPSHOT
After Visit Summary   8/12/2018    Enid Lombardo    MRN: [id number]           Patient Information     Date Of Birth          1960        Visit Information        Provider Department      8/12/2018 9:00 AM Barbara Bey PA-C [facility] [place] [facility]        Today's Diagnoses     Infective otitis externa, bilateral    -  1      Care Instructions    Use antibiotic ear drop 4 drops in both ears 3x a day for 9 days (start with right ear first, then left ear)  Symptomatic measures reviewed including over the counter pain reliever such asi tylenol or buprofen as needed. - ibuprofen for few days until advised to discontinue by surgeon, ok to use tylenol after that    Keep water out of ear until the infection is cleared.   During long showers this week, use cotton ball in ear to keep water out of ear.  May swim with wax ear plug in affected ear this week if not painful.   No Qtips.   Warm compress next to ear   Call surgeon nurse - and discuss diagnosed with outer ear infection (bialteral otitis externa and prescribed antibiotic ear drops for 9 days, will finish day before surgery)  Please follow up with primary care provider if not improving, worsening or new symptoms or for any adverse reactions to medications.           Follow-ups after your visit        Your next 10 appointments already scheduled     Aug 22, 2018  9:15 AM CDT   NM SENTINEL NODE INJECTION BILATERAL with [id number]   [facility]    [address] [place] [id number] [phone]           Please bring a list of your medicines to the exam. (Include vitamins, minerals and over-the-counter drugs.) You should wear comfortable clothes. Leave your valuables at home. Please bring related prior results and films.  Tell your doctor:   If you are breastfeeding or may be pregnant.   If you have had a barium test within the past few days.  Barium may change the results of certain exams.   If you think you may need sedation (medicine to help you relax).  You may eat and drink as normal.  Please call your Imaging Department at your exam site with any questions.            Aug 22, 2018   Procedure with Rakesh Sanchez MD   [facility], [place], Same Day Surgery (--)    [address] [facility] [address]   [phone]            Aug 28, 2018 11:45 AM CDT   (Arrive by 11:30 AM)   Post-Op with DENNISE Amin MD   [facility] ([facility])    [address] [place] [address]  [address]  [facility] [address]   [phone]            Aug 28, 2018  3:00 PM CDT   (Arrive by 2:45 PM)   New Patient Visit with Charity Brar MD   [facility] ([facility])    [address]  [address]  [facility] [address]   [phone]            Sep 06, 2018 10:30 AM CDT   (Arrive by 10:15 AM)   Post-Op with Rakesh Sanchez MD   [facility] ([facility])    [address]  [address]  [facility] [address]   [phone]            Jan 21, 2019 10:15 AM CST   RETURN RETINA with Paz Osborn MD   [facility] ([facility])    [address]  [facility] [address]   [phone]              Who to contact     You can reach your care team any time of the day by calling [phone].  Notification of test results:  If you have an abnormal lab result, we will notify you by phone as soon as possible.         Additional Information About Your Visit        [employer] Information     [employer] gives you secure access to your electronic health record. If you see a primary care provider, you can also send messages to your care team and make appointments. If you have questions, please call your primary care clinic.  If you do not have a primary care provider, please call [phone] and they will  assist you.        Care EveryWhere ID     This is your Care EveryWhere ID. This could be used by other organizations to access your [facility] medical records  [id number]        Your Vitals Were     Pulse Temperature Last Period             77 98.2  F (36.8  C) (Oral) 10/14/2001          Blood Pressure from Last 3 Encounters:   08/12/18 139/85   07/26/18 133/79   07/23/18 108/60    Weight from Last 3 Encounters:   07/27/18 179 lb 8 oz (81.4 kg)   07/26/18 179 lb 5 oz (81.3 kg)   07/23/18 176 lb (79.8 kg)              Today, you had the following     No orders found for display         Today's Medication Changes          These changes are accurate as of 8/12/18  9:53 AM.  If you have any questions, ask your nurse or doctor.               Start taking these medicines.        Dose/Directions    neomycin-polymyxin-HC 1 % Soln   Commonly known as:  CORTISPORIN   Used for:  Infective otitis externa, bilateral        Place 4 drops in both ears 3 times daily for 9 days   Quantity:  20 mL   Refills:  0            Where to get your medicines      Some of these will need a paper prescription and others can be bought over the counter.  Ask your nurse if you have questions.     You don't need a prescription for these medications     neomycin-polymyxin-HC 1 % Soln                Primary Care Provider Office Phone # Fax #    Shi Sasha Alonso [phone]       [address] [place] [address]        Equal Access to Services     [facility]: Hadii prem ornelas Sojohanny, waaxda luqadaha, qaybta kaalmada keira, waxay trena tong. So [facility] [phone].    ATENCIÓN: Si habla español, tiene a rivera disposición servicios gratuitos de asistencia lingüística. Llame al [phone].    We comply with applicable federal civil rights laws and [place] laws. We do not discriminate on the basis of race, color, national origin, age, disability, sex, sexual orientation, or gender identity.             Thank you!     Thank you for choosing [facility]  for your care. Our goal is always to provide you with excellent care. Hearing back from our patients is one way we can continue to improve our services. Please take a few minutes to complete the written survey that you may receive in the mail after your visit with us. Thank you!             Your Updated Medication List - Protect others around you: Learn how to safely use, store and throw away your medicines at www.disposemymeds.org.          This list is accurate as of 8/12/18  9:53 AM.  Always use your most recent med list.                   Brand Name Dispense Instructions for use Diagnosis    ADVIL PO      Take 200 mg by mouth as needed for moderate pain        Fish Oil 1000 MG Cpdr      Take  by mouth.        fluticasone 50 MCG/ACT spray    FLONASE    16 mL    USE ONE OR TWO SPRAYS IN EACH NOSTRIL DAILY    Chronic rhinitis       loratadine 10 MG tablet    CLARITIN    90 tablet    TAKE ONE TABLET BY MOUTH EVERY DAY AS NEEDED FOR ALLERGY SYMPTOMS    Chronic rhinitis, unspecified type       neomycin-polymyxin-HC 1 % Soln    CORTISPORIN    20 mL    Place 4 drops in both ears 3 times daily for 9 days    Infective otitis externa, bilateral       omeprazole 20 MG CR capsule    priLOSEC    90 capsule    TAKE 1 CAPSULE (20 MG) BY MOUTH DAILY    Gastroesophageal reflux disease, esophagitis presence not specified       PRESERVISION AREDS 2 Caps     60 capsule    Take 1 tablet by mouth 2 times daily    Drusen (degenerative) of retina, bilateral, Vitreous degeneration, bilateral       sertraline 25 MG tablet    ZOLOFT    30 tablet    Take 1 tablet daily.    Hot flushes, perimenopausal

## 2018-08-12 NOTE — PATIENT INSTRUCTIONS
Use antibiotic ear drop 4 drops in both ears 3x a day for 9 days (start with right ear first, then left ear)  Symptomatic measures reviewed including over the counter pain reliever such asi tylenol or buprofen as needed. - ibuprofen for few days until advised to discontinue by surgeon, ok to use tylenol after that    Keep water out of ear until the infection is cleared.   During long showers this week, use cotton ball in ear to keep water out of ear.  May swim with wax ear plug in affected ear this week if not painful.   No Qtips.   Warm compress next to ear   Call surgeon nurse - and discuss diagnosed with outer ear infection (bialteral otitis externa and prescribed antibiotic ear drops for 9 days, will finish day before surgery)  Please follow up with primary care provider if not improving, worsening or new symptoms or for any adverse reactions to medications.

## 2018-08-14 ENCOUNTER — TELEPHONE (OUTPATIENT)
Dept: ONCOLOGY | Facility: CLINIC | Age: 58
End: 2018-08-14

## 2018-08-14 NOTE — TELEPHONE ENCOUNTER
8/14/2018    I called Enid today to discuss her genetic test results, but was unable to reach her.  I left a non-detailed voicemail with my name and phone number.    Alethea Sue MS, [facility]  Licensed Genetic Counselor  P. [phone]  F. [phone]

## 2018-08-15 ENCOUNTER — TELEPHONE (OUTPATIENT)
Dept: ONCOLOGY | Facility: CLINIC | Age: 58
End: 2018-08-15

## 2018-08-15 NOTE — TELEPHONE ENCOUNTER
8/15/2018    I called Enid with her BRCAPlus results today.  Enid is NEGATIVE for mutations in BRIAN, BRCA1, BRCA2, CHEK2, CDH1, PALB2, PTEN, and TP53 by sequencing and deletion/duplication analysis.     We had previously discussed CancerNext testing. I will notify the lab to start this panel. These results should be completed within the next 2-3 weeks.  I will call Enid to discuss the results of the panel testing.      Plan:  1. She plans to follow-up with her physicians.  2. I will call to discuss the remainder of the CancerNext panel, once results are available    If Enid has any further questions, I encouraged her to contact me at [phone].    Alethea Sue MS, [facility]  Licensed Genetic Counselor  P. [phone]  F. [phone]

## 2018-08-21 ENCOUNTER — TELEPHONE (OUTPATIENT)
Dept: ONCOLOGY | Facility: CLINIC | Age: 58
End: 2018-08-21

## 2018-08-21 ENCOUNTER — ANESTHESIA EVENT (OUTPATIENT)
Dept: SURGERY | Facility: CLINIC | Age: 58
DRG: 583 | End: 2018-08-21
Payer: COMMERCIAL

## 2018-08-21 ASSESSMENT — ENCOUNTER SYMPTOMS: DYSRHYTHMIAS: 0

## 2018-08-21 NOTE — TELEPHONE ENCOUNTER
8/21/2018    Referring Provider: Rakesh Sanchez MD    Presenting Information:  I spoke to Enid by phone today to discuss her genetic testing results. Her blood was drawn on 8/1/2018. The CustomNext-Cancer (CancerNext genes) test was ordered  from [employer]. This testing was done because of Enid's personal and family history of cancer.    Genetic Testing Result: NEGATIVE  Enid is negative for mutations in the APC, BRIAN, BARD1, BRCA1, BRCA2, BRIP1, BMPR1A, CDH1, CDK4, CDKN2A, CHEK2, DICER1, EPCAM, HOXB13, GREM1, MLH1, MRE11A, MSH2, MSH6, MUTYH, NBN, NF1, PALB2, PMS2, POLD1, POLE, PTEN, RAD50, RAD51C, RAD51D, SMAD4, SMARCA4, STK11, and TP53 genes. No mutations were found in any of the 34 genes analyzed.  This test involved sequencing and deletion/duplication analysis of all genes with the exception of EPCAM and GREM1 (deletions only).    Testing did not detect an identifiable mutation associated with  Hereditary Breast and Ovarian Cancer syndrome (BRCA1, BRCA2), Orozco syndrome (MLH1, MSH2, MSH6, PMS2, EPCAM), Familial Adenomatous Polyposis (APC), Hereditary Diffuse Gastric Cancer (CDH1), Familial Atypical Multiple Mole Melanoma syndrome (CDK4, CDKN2A), Juvenile Polyposis syndrome (BMPR1A, SMAD4), Cowden syndrome (PTEN), Li Fraumeni syndrome (TP53), Peutz-Jeghers syndrome (STK11), MUTYH Associated Polyposis (MUTYH), or Neurofibromatosis type 1 (NF1).    Interpretation:  We discussed several different interpretations of this negative test result.    1. One explanation may be that there is a different gene or combination of genes and environment that are associated with the cancers in Enid and/or her relatives.    2. It is possible that another biological relative did have a mutation in a cancer risk gene and she did not inherit it.  3. There is also a small possibility that there is a mutation in one of these genes, and we could not find it with our current testing methods.       Screening:  Based on this negative  test result, it is important for Enid and her relatives to refer back to the family history for appropriate cancer screening.      Enid s close female relatives remain at increased risk for breast cancer given their family history.  Breast cancer screening is generally recommended to begin approximately 10 years younger than the earliest age of breast cancer diagnosis in the family, or at age 40, whichever comes first.  In this family, screening may begin at age 32.  Breast screening options should be discussed with an individual's primary care provider and a genetic counselor, to determine what screening is appropriate, or if additional screening (such as breast MRI) is necessary based on personal/family history factors.      Other population cancer screening, such as recommendations by the American Cancer Society and the National Comprehensive Cancer Network (NCCN), are also appropriate for Enid and her family.  These screening recommendations may change if there are changes to Enid's personal and/or family history.  Final screening recommendations should be made by each individual's managing physician.    Inheritance:  We discussed that Enid did not pass on an identifiable mutation in these genes to her children based on this test result.  Mutations in these genes do not skip generations.      Summary:  We do not have an explanation for Enid's personal or family history of cancer.  Because of that, it is important that she continue with cancer screening based on her personal and family history as discussed above. Genetic testing is rapidly advancing, and new cancer susceptibility genes will most likely be identified in the future.  Therefore, I encouraged Enid to contact me annually or if there are changes in her personal or family history.  This may change how we assess her cancer risk, screening, and the testing we would offer.    Plan:  1. A copy of the test results and a handout summarizing negative  genetic test results (see after visit summary) will be mailed to Enid.  2. She plans to follow-up with her physicians.  3. She should contact me annually, or sooner if her personal and/or family history changes.    If Enid has any further questions, I encouraged her to contact me at [phone].    Time spent on the phone: 10 minutes.    Alethea Sue MS, [facility]  Licensed Genetic Counselor  P. [phone]  F. [phone]

## 2018-08-21 NOTE — LETTER
Cancer Risk Management  Program Locations    [facility]  [facility]  [facility]  Mailing Address  Cancer Risk Management Program  [facility]  [address] [place] [address]    New patient appointments  [phone]  August 21, 2018    Enid Lombardo  [address]  [facility] [address]      Dear Enid,    It was a pleasure speaking with you on the phone on 8/21.  Here is a copy of the progress note from our discussion.  If you have any additional questions, please feel free to call.    Referring Provider: Rakesh Sanchez MD    Presenting Information:  I spoke to Enid by phone today to discuss her genetic testing results. Her blood was drawn on 8/1/2018. The CustomNext-Cancer (CancerNext genes) test was ordered  from [employer]. This testing was done because of Enid's personal and family history of cancer.    Genetic Testing Result: NEGATIVE  Enid is negative for mutations in the APC, BRIAN, BARD1, BRCA1, BRCA2, BRIP1, BMPR1A, CDH1, CDK4, CDKN2A, CHEK2, DICER1, EPCAM, HOXB13, GREM1, MLH1, MRE11A, MSH2, MSH6, MUTYH, NBN, NF1, PALB2, PMS2, POLD1, POLE, PTEN, RAD50, RAD51C, RAD51D, SMAD4, SMARCA4, STK11, and TP53 genes. No mutations were found in any of the 34 genes analyzed.  This test involved sequencing and deletion/duplication analysis of all genes with the exception of EPCAM and GREM1 (deletions only).    Testing did not detect an identifiable mutation associated with  Hereditary Breast and Ovarian Cancer syndrome (BRCA1, BRCA2), Orozco syndrome (MLH1, MSH2, MSH6, PMS2, EPCAM), Familial Adenomatous Polyposis (APC), Hereditary Diffuse Gastric Cancer (CDH1), Familial Atypical Multiple Mole Melanoma syndrome (CDK4, CDKN2A), Juvenile Polyposis syndrome (BMPR1A, SMAD4), Cowden syndrome (PTEN), Li Fraumeni syndrome (TP53),  Peutz-Jeghers syndrome (STK11), MUTYH Associated Polyposis (MUTYH), or Neurofibromatosis type 1 (NF1).      Interpretation:  We discussed several different interpretations of this negative test result.    1. One explanation may be that there is a different gene or combination of genes and environment that are associated with the cancers in Enid and/or her relatives.    2. It is possible that another biological relative did have a mutation in a cancer risk gene and she did not inherit it.  3. There is also a small possibility that there is a mutation in one of these genes, and we could not find it with our current testing methods.       Screening:  Based on this negative test result, it is important for Enid and her relatives to refer back to the family history for appropriate cancer screening.      Enid s close female relatives remain at increased risk for breast cancer given their family history.  Breast cancer screening is generally recommended to begin approximately 10 years younger than the earliest age of breast cancer diagnosis in the family, or at age 40, whichever comes first.  In this family, screening may begin at age 32.  Breast screening options should be discussed with an individual's primary care provider and a genetic counselor, to determine what screening is appropriate, or if additional screening (such as breast MRI) is necessary based on personal/family history factors.      Other population cancer screening, such as recommendations by the American Cancer Society and the National Comprehensive Cancer Network (NCCN), are also appropriate for Enid and her family.  These screening recommendations may change if there are changes to Enid's personal and/or family history.  Final screening recommendations should be made by each individual's managing physician.    Inheritance:  We discussed that Enid did not pass on an identifiable mutation in these genes to her children based on this test result.   Mutations in these genes do not skip generations.      Summary:  We do not have an explanation for Enid's personal or family history of cancer.  Because of that, it is important that she continue with cancer screening based on her personal and family history as discussed above. Genetic testing is rapidly advancing, and new cancer susceptibility genes will most likely be identified in the future.  Therefore, I encouraged Enid to contact me annually or if there are changes in her personal or family history.  This may change how we assess her cancer risk, screening, and the testing we would offer.    Plan:  1. A copy of the test results and a handout summarizing negative genetic test results (see after visit summary) will be mailed to Enid.  2. She plans to follow-up with her physicians.  3. She should contact me annually, or sooner if her personal and/or family history changes.    If Enid has any further questions, I encouraged her to contact me at [phone].    Alethea Sue MS, [facility]  Licensed Genetic Counselor  P. [phone]  F. [phone]

## 2018-08-22 ENCOUNTER — ANESTHESIA (OUTPATIENT)
Dept: SURGERY | Facility: CLINIC | Age: 58
DRG: 583 | End: 2018-08-22
Payer: COMMERCIAL

## 2018-08-22 ENCOUNTER — HOSPITAL ENCOUNTER (INPATIENT)
Facility: CLINIC | Age: 58
LOS: 2 days | Discharge: HOME | DRG: 583 | End: 2018-08-24
Attending: SURGERY | Admitting: SURGERY
Payer: COMMERCIAL

## 2018-08-22 ENCOUNTER — HOSPITAL ENCOUNTER (OUTPATIENT)
Dept: NUCLEAR MEDICINE | Facility: CLINIC | Age: 58
Setting detail: NUCLEAR MEDICINE
Discharge: HOME | DRG: 583 | End: 2018-08-22
Attending: SURGERY | Admitting: SURGERY
Payer: COMMERCIAL

## 2018-08-22 ENCOUNTER — TRANSFERRED RECORDS (OUTPATIENT)
Dept: HEALTH INFORMATION MANAGEMENT | Facility: CLINIC | Age: 58
End: 2018-08-22

## 2018-08-22 DIAGNOSIS — Z90.13 S/P BILATERAL MASTECTOMY: Primary | ICD-10-CM

## 2018-08-22 DIAGNOSIS — G89.18 POSTOPERATIVE PAIN: ICD-10-CM

## 2018-08-22 DIAGNOSIS — C50.911 MALIGNANT NEOPLASM OF RIGHT BREAST (H): ICD-10-CM

## 2018-08-22 PROBLEM — D05.90 BREAST CANCER IN SITU: Status: ACTIVE | Noted: 2018-08-22

## 2018-08-22 LAB
CREAT SERPL-MCNC: 0.7 MG/DL (ref 0.52–1.04)
GFR SERPL CREATININE-BSD FRML MDRD: 86 ML/MIN/1.7M2
GLUCOSE BLDC GLUCOMTR-MCNC: 90 MG/DL (ref 70–99)
HGB BLD-MCNC: 14.2 G/DL (ref 11.7–15.7)
POTASSIUM SERPL-SCNC: 4 MMOL/L (ref 3.4–5.3)

## 2018-08-22 PROCEDURE — 25000128 H RX IP 250 OP 636: Performed by: PLASTIC SURGERY

## 2018-08-22 PROCEDURE — 85018 HEMOGLOBIN: CPT | Performed by: ANESTHESIOLOGY

## 2018-08-22 PROCEDURE — 25000128 H RX IP 250 OP 636: Performed by: ANESTHESIOLOGY

## 2018-08-22 PROCEDURE — P9041 ALBUMIN (HUMAN),5%, 50ML: HCPCS | Performed by: NURSE ANESTHETIST, CERTIFIED REGISTERED

## 2018-08-22 PROCEDURE — 37000008 ZZH ANESTHESIA TECHNICAL FEE, 1ST 30 MIN: Performed by: SURGERY

## 2018-08-22 PROCEDURE — 88342 IMHCHEM/IMCYTCHM 1ST ANTB: CPT | Performed by: SURGERY

## 2018-08-22 PROCEDURE — 36000057 ZZH SURGERY LEVEL 3 1ST 30 MIN - UMMC: Performed by: SURGERY

## 2018-08-22 PROCEDURE — 82565 ASSAY OF CREATININE: CPT | Performed by: ANESTHESIOLOGY

## 2018-08-22 PROCEDURE — 25000128 H RX IP 250 OP 636: Performed by: STUDENT IN AN ORGANIZED HEALTH CARE EDUCATION/TRAINING PROGRAM

## 2018-08-22 PROCEDURE — 36000059 ZZH SURGERY LEVEL 3 EA 15 ADDTL MIN UMMC: Performed by: SURGERY

## 2018-08-22 PROCEDURE — 25000565 ZZH ISOFLURANE, EA 15 MIN: Performed by: SURGERY

## 2018-08-22 PROCEDURE — 71000014 ZZH RECOVERY PHASE 1 LEVEL 2 FIRST HR: Performed by: SURGERY

## 2018-08-22 PROCEDURE — L8600 IMPLANT BREAST SILICONE/EQ: HCPCS | Performed by: SURGERY

## 2018-08-22 PROCEDURE — 25000132 ZZH RX MED GY IP 250 OP 250 PS 637: Performed by: SURGERY

## 2018-08-22 PROCEDURE — 4A1GXSH MONITORING OF SKIN AND BREAST VASCULAR PERFUSION USING INDOCYANINE GREEN DYE, EXTERNAL APPROACH: ICD-10-PCS | Performed by: PLASTIC SURGERY

## 2018-08-22 PROCEDURE — 25000128 H RX IP 250 OP 636: Performed by: NURSE ANESTHETIST, CERTIFIED REGISTERED

## 2018-08-22 PROCEDURE — A9520 TC99 TILMANOCEPT DIAG 0.5MCI: HCPCS | Performed by: SURGERY

## 2018-08-22 PROCEDURE — 27210995 ZZH RX 272: Performed by: PLASTIC SURGERY

## 2018-08-22 PROCEDURE — 25000132 ZZH RX MED GY IP 250 OP 250 PS 637

## 2018-08-22 PROCEDURE — 71000015 ZZH RECOVERY PHASE 1 LEVEL 2 EA ADDTL HR: Performed by: SURGERY

## 2018-08-22 PROCEDURE — 25000125 ZZHC RX 250: Performed by: SURGERY

## 2018-08-22 PROCEDURE — 34300033 ZZH RX 343: Performed by: SURGERY

## 2018-08-22 PROCEDURE — 00000146 ZZHCL STATISTIC GLUCOSE BY METER IP

## 2018-08-22 PROCEDURE — 00000159 ZZHCL STATISTIC H-SEND OUTS PREP: Performed by: SURGERY

## 2018-08-22 PROCEDURE — 38792 RA TRACER ID OF SENTINL NODE: CPT

## 2018-08-22 PROCEDURE — 27210794 ZZH OR GENERAL SUPPLY STERILE: Performed by: SURGERY

## 2018-08-22 PROCEDURE — 25000125 ZZHC RX 250: Performed by: ANESTHESIOLOGY

## 2018-08-22 PROCEDURE — 37000009 ZZH ANESTHESIA TECHNICAL FEE, EACH ADDTL 15 MIN: Performed by: SURGERY

## 2018-08-22 PROCEDURE — 40000171 ZZH STATISTIC PRE-PROCEDURE ASSESSMENT III: Performed by: SURGERY

## 2018-08-22 PROCEDURE — 0HBV0ZZ EXCISION OF BILATERAL BREAST, OPEN APPROACH: ICD-10-PCS | Performed by: SURGERY

## 2018-08-22 PROCEDURE — 12000008 ZZH R&B INTERMEDIATE UMMC

## 2018-08-22 PROCEDURE — 0HHV0NZ INSERTION OF TISSUE EXPANDER INTO BILATERAL BREAST, OPEN APPROACH: ICD-10-PCS | Performed by: PLASTIC SURGERY

## 2018-08-22 PROCEDURE — 88341 IMHCHEM/IMCYTCHM EA ADD ANTB: CPT | Performed by: SURGERY

## 2018-08-22 PROCEDURE — 36415 COLL VENOUS BLD VENIPUNCTURE: CPT | Performed by: ANESTHESIOLOGY

## 2018-08-22 PROCEDURE — 88360 TUMOR IMMUNOHISTOCHEM/MANUAL: CPT | Performed by: SURGERY

## 2018-08-22 PROCEDURE — 84132 ASSAY OF SERUM POTASSIUM: CPT | Performed by: ANESTHESIOLOGY

## 2018-08-22 PROCEDURE — 88377 M/PHMTRC ALYS ISHQUANT/SEMIQ: CPT | Performed by: PATHOLOGY

## 2018-08-22 PROCEDURE — C9290 INJ, BUPIVACAINE LIPOSOME: HCPCS | Performed by: STUDENT IN AN ORGANIZED HEALTH CARE EDUCATION/TRAINING PROGRAM

## 2018-08-22 PROCEDURE — 00000158 ZZHCL STATISTIC H-FISH PROCESS B/S: Performed by: SURGERY

## 2018-08-22 PROCEDURE — 88307 TISSUE EXAM BY PATHOLOGIST: CPT | Performed by: SURGERY

## 2018-08-22 PROCEDURE — 25000128 H RX IP 250 OP 636: Performed by: SURGERY

## 2018-08-22 PROCEDURE — 25000125 ZZHC RX 250: Performed by: NURSE ANESTHETIST, CERTIFIED REGISTERED

## 2018-08-22 PROCEDURE — C9399 UNCLASSIFIED DRUGS OR BIOLOG: HCPCS | Performed by: NURSE ANESTHETIST, CERTIFIED REGISTERED

## 2018-08-22 DEVICE — IMPLANTABLE DEVICE
Type: IMPLANTABLE DEVICE | Site: BREAST | Status: NON-FUNCTIONAL
Removed: 2018-10-08

## 2018-08-22 RX ORDER — LIDOCAINE 40 MG/G
CREAM TOPICAL
Status: DISCONTINUED | OUTPATIENT
Start: 2018-08-22 | End: 2018-08-22 | Stop reason: HOSPADM

## 2018-08-22 RX ORDER — ACETAMINOPHEN 325 MG/1
975 TABLET ORAL EVERY 8 HOURS
Status: DISCONTINUED | OUTPATIENT
Start: 2018-08-22 | End: 2018-08-24 | Stop reason: HOSPADM

## 2018-08-22 RX ORDER — ACETAMINOPHEN 325 MG/1
975 TABLET ORAL ONCE
Status: COMPLETED | OUTPATIENT
Start: 2018-08-22 | End: 2018-08-22

## 2018-08-22 RX ORDER — SULFAMETHOXAZOLE/TRIMETHOPRIM 800-160 MG
1 TABLET ORAL 2 TIMES DAILY
Qty: 28 TABLET | Refills: 0 | Status: SHIPPED | OUTPATIENT
Start: 2018-08-22 | End: 2018-09-05

## 2018-08-22 RX ORDER — NALOXONE HYDROCHLORIDE 0.4 MG/ML
.1-.4 INJECTION, SOLUTION INTRAMUSCULAR; INTRAVENOUS; SUBCUTANEOUS
Status: DISCONTINUED | OUTPATIENT
Start: 2018-08-22 | End: 2018-08-22 | Stop reason: HOSPADM

## 2018-08-22 RX ORDER — FENTANYL CITRATE 50 UG/ML
25-50 INJECTION, SOLUTION INTRAMUSCULAR; INTRAVENOUS
Status: DISCONTINUED | OUTPATIENT
Start: 2018-08-22 | End: 2018-08-22 | Stop reason: HOSPADM

## 2018-08-22 RX ORDER — ONDANSETRON 4 MG/1
4 TABLET, ORALLY DISINTEGRATING ORAL EVERY 6 HOURS PRN
Status: DISCONTINUED | OUTPATIENT
Start: 2018-08-22 | End: 2018-08-24 | Stop reason: HOSPADM

## 2018-08-22 RX ORDER — ACETAMINOPHEN 325 MG/1
975 TABLET ORAL EVERY 8 HOURS
Status: DISCONTINUED | OUTPATIENT
Start: 2018-08-22 | End: 2018-08-22

## 2018-08-22 RX ORDER — SODIUM CHLORIDE, SODIUM LACTATE, POTASSIUM CHLORIDE, CALCIUM CHLORIDE 600; 310; 30; 20 MG/100ML; MG/100ML; MG/100ML; MG/100ML
INJECTION, SOLUTION INTRAVENOUS CONTINUOUS
Status: DISCONTINUED | OUTPATIENT
Start: 2018-08-22 | End: 2018-08-23

## 2018-08-22 RX ORDER — NALOXONE HYDROCHLORIDE 0.4 MG/ML
.1-.4 INJECTION, SOLUTION INTRAMUSCULAR; INTRAVENOUS; SUBCUTANEOUS
Status: DISCONTINUED | OUTPATIENT
Start: 2018-08-22 | End: 2018-08-22

## 2018-08-22 RX ORDER — ALBUMIN, HUMAN INJ 5% 5 %
SOLUTION INTRAVENOUS CONTINUOUS PRN
Status: DISCONTINUED | OUTPATIENT
Start: 2018-08-22 | End: 2018-08-22

## 2018-08-22 RX ORDER — DEXAMETHASONE SODIUM PHOSPHATE 4 MG/ML
INJECTION, SOLUTION INTRA-ARTICULAR; INTRALESIONAL; INTRAMUSCULAR; INTRAVENOUS; SOFT TISSUE PRN
Status: DISCONTINUED | OUTPATIENT
Start: 2018-08-22 | End: 2018-08-22

## 2018-08-22 RX ORDER — FENTANYL CITRATE 50 UG/ML
INJECTION, SOLUTION INTRAMUSCULAR; INTRAVENOUS PRN
Status: DISCONTINUED | OUTPATIENT
Start: 2018-08-22 | End: 2018-08-22

## 2018-08-22 RX ORDER — CEFAZOLIN SODIUM 1 G/3ML
1 INJECTION, POWDER, FOR SOLUTION INTRAMUSCULAR; INTRAVENOUS SEE ADMIN INSTRUCTIONS
Status: DISCONTINUED | OUTPATIENT
Start: 2018-08-22 | End: 2018-08-22 | Stop reason: HOSPADM

## 2018-08-22 RX ORDER — ONDANSETRON 2 MG/ML
4 INJECTION INTRAMUSCULAR; INTRAVENOUS EVERY 30 MIN PRN
Status: DISCONTINUED | OUTPATIENT
Start: 2018-08-22 | End: 2018-08-22 | Stop reason: HOSPADM

## 2018-08-22 RX ORDER — GABAPENTIN 300 MG/1
300 CAPSULE ORAL ONCE
Status: COMPLETED | OUTPATIENT
Start: 2018-08-22 | End: 2018-08-22

## 2018-08-22 RX ORDER — LIDOCAINE 40 MG/G
CREAM TOPICAL
Status: DISCONTINUED | OUTPATIENT
Start: 2018-08-22 | End: 2018-08-24 | Stop reason: HOSPADM

## 2018-08-22 RX ORDER — NALOXONE HYDROCHLORIDE 0.4 MG/ML
.1-.4 INJECTION, SOLUTION INTRAMUSCULAR; INTRAVENOUS; SUBCUTANEOUS
Status: DISCONTINUED | OUTPATIENT
Start: 2018-08-22 | End: 2018-08-24 | Stop reason: HOSPADM

## 2018-08-22 RX ORDER — ONDANSETRON 4 MG/1
4 TABLET, ORALLY DISINTEGRATING ORAL EVERY 30 MIN PRN
Status: DISCONTINUED | OUTPATIENT
Start: 2018-08-22 | End: 2018-08-22 | Stop reason: HOSPADM

## 2018-08-22 RX ORDER — BUPIVACAINE HYDROCHLORIDE 2.5 MG/ML
INJECTION, SOLUTION EPIDURAL; INFILTRATION; INTRACAUDAL PRN
Status: DISCONTINUED | OUTPATIENT
Start: 2018-08-22 | End: 2018-08-22

## 2018-08-22 RX ORDER — PROCHLORPERAZINE MALEATE 5 MG
10 TABLET ORAL EVERY 6 HOURS PRN
Status: DISCONTINUED | OUTPATIENT
Start: 2018-08-22 | End: 2018-08-24 | Stop reason: HOSPADM

## 2018-08-22 RX ORDER — FLUMAZENIL 0.1 MG/ML
0.2 INJECTION, SOLUTION INTRAVENOUS
Status: DISCONTINUED | OUTPATIENT
Start: 2018-08-22 | End: 2018-08-22 | Stop reason: HOSPADM

## 2018-08-22 RX ORDER — SODIUM CHLORIDE, SODIUM LACTATE, POTASSIUM CHLORIDE, CALCIUM CHLORIDE 600; 310; 30; 20 MG/100ML; MG/100ML; MG/100ML; MG/100ML
INJECTION, SOLUTION INTRAVENOUS CONTINUOUS
Status: DISCONTINUED | OUTPATIENT
Start: 2018-08-22 | End: 2018-08-22 | Stop reason: HOSPADM

## 2018-08-22 RX ORDER — ONDANSETRON 2 MG/ML
4 INJECTION INTRAMUSCULAR; INTRAVENOUS EVERY 6 HOURS PRN
Status: DISCONTINUED | OUTPATIENT
Start: 2018-08-22 | End: 2018-08-24 | Stop reason: HOSPADM

## 2018-08-22 RX ORDER — PROPOFOL 10 MG/ML
INJECTION, EMULSION INTRAVENOUS PRN
Status: DISCONTINUED | OUTPATIENT
Start: 2018-08-22 | End: 2018-08-22

## 2018-08-22 RX ORDER — ISOSULFAN BLUE 50 MG/5ML
INJECTION, SOLUTION SUBCUTANEOUS PRN
Status: DISCONTINUED | OUTPATIENT
Start: 2018-08-22 | End: 2018-08-22 | Stop reason: HOSPADM

## 2018-08-22 RX ORDER — HYDROMORPHONE HYDROCHLORIDE 1 MG/ML
.3-.5 INJECTION, SOLUTION INTRAMUSCULAR; INTRAVENOUS; SUBCUTANEOUS EVERY 5 MIN PRN
Status: DISCONTINUED | OUTPATIENT
Start: 2018-08-22 | End: 2018-08-22 | Stop reason: HOSPADM

## 2018-08-22 RX ORDER — SERTRALINE HYDROCHLORIDE 25 MG/1
25 TABLET, FILM COATED ORAL EVERY MORNING
Status: DISCONTINUED | OUTPATIENT
Start: 2018-08-23 | End: 2018-08-24 | Stop reason: HOSPADM

## 2018-08-22 RX ORDER — ONDANSETRON 2 MG/ML
INJECTION INTRAMUSCULAR; INTRAVENOUS PRN
Status: DISCONTINUED | OUTPATIENT
Start: 2018-08-22 | End: 2018-08-22

## 2018-08-22 RX ORDER — SULFAMETHOXAZOLE/TRIMETHOPRIM 800-160 MG
1 TABLET ORAL 2 TIMES DAILY
Status: DISCONTINUED | OUTPATIENT
Start: 2018-08-22 | End: 2018-08-24 | Stop reason: HOSPADM

## 2018-08-22 RX ORDER — CEFAZOLIN SODIUM 2 G/100ML
2 INJECTION, SOLUTION INTRAVENOUS
Status: COMPLETED | OUTPATIENT
Start: 2018-08-22 | End: 2018-08-22

## 2018-08-22 RX ORDER — EPHEDRINE SULFATE 50 MG/ML
INJECTION, SOLUTION INTRAMUSCULAR; INTRAVENOUS; SUBCUTANEOUS PRN
Status: DISCONTINUED | OUTPATIENT
Start: 2018-08-22 | End: 2018-08-22

## 2018-08-22 RX ORDER — INDOCYANINE GREEN AND WATER 25 MG
KIT INJECTION PRN
Status: DISCONTINUED | OUTPATIENT
Start: 2018-08-22 | End: 2018-08-22

## 2018-08-22 RX ORDER — CEFAZOLIN SODIUM 2 G/100ML
2 INJECTION, SOLUTION INTRAVENOUS
Status: DISCONTINUED | OUTPATIENT
Start: 2018-08-22 | End: 2018-08-22 | Stop reason: HOSPADM

## 2018-08-22 RX ADMIN — PROPOFOL 20 MG: 10 INJECTION, EMULSION INTRAVENOUS at 13:41

## 2018-08-22 RX ADMIN — INDOCYANINE GREEN 12.5 MG: KIT INTRAVENOUS at 15:03

## 2018-08-22 RX ADMIN — BUPIVACAINE HYDROCHLORIDE 20 ML: 2.5 INJECTION, SOLUTION EPIDURAL; INFILTRATION; INTRACAUDAL at 10:11

## 2018-08-22 RX ADMIN — PROCHLORPERAZINE EDISYLATE 10 MG: 5 INJECTION INTRAMUSCULAR; INTRAVENOUS at 17:05

## 2018-08-22 RX ADMIN — PHENYLEPHRINE HYDROCHLORIDE 100 MCG: 10 INJECTION, SOLUTION INTRAMUSCULAR; INTRAVENOUS; SUBCUTANEOUS at 12:57

## 2018-08-22 RX ADMIN — FENTANYL CITRATE 50 MCG: 50 INJECTION INTRAMUSCULAR; INTRAVENOUS at 10:01

## 2018-08-22 RX ADMIN — BUPIVACAINE 20 ML: 13.3 INJECTION, SUSPENSION, LIPOSOMAL INFILTRATION at 10:11

## 2018-08-22 RX ADMIN — Medication 5 MG: at 14:41

## 2018-08-22 RX ADMIN — PHENYLEPHRINE HYDROCHLORIDE 100 MCG: 10 INJECTION, SOLUTION INTRAMUSCULAR; INTRAVENOUS; SUBCUTANEOUS at 15:41

## 2018-08-22 RX ADMIN — PHENYLEPHRINE HYDROCHLORIDE 100 MCG: 10 INJECTION, SOLUTION INTRAMUSCULAR; INTRAVENOUS; SUBCUTANEOUS at 15:00

## 2018-08-22 RX ADMIN — PROPOFOL 30 MG: 10 INJECTION, EMULSION INTRAVENOUS at 12:56

## 2018-08-22 RX ADMIN — Medication 5 MG: at 14:02

## 2018-08-22 RX ADMIN — ROCURONIUM BROMIDE 10 MG: 10 INJECTION INTRAVENOUS at 15:59

## 2018-08-22 RX ADMIN — Medication 5 MG: at 11:20

## 2018-08-22 RX ADMIN — SULFAMETHOXAZOLE AND TRIMETHOPRIM 1 TABLET: 800; 160 TABLET ORAL at 20:33

## 2018-08-22 RX ADMIN — SUGAMMADEX 150 MG: 100 INJECTION, SOLUTION INTRAVENOUS at 16:22

## 2018-08-22 RX ADMIN — ALBUMIN HUMAN: 0.05 INJECTION, SOLUTION INTRAVENOUS at 13:12

## 2018-08-22 RX ADMIN — FENTANYL CITRATE 25 MCG: 50 INJECTION, SOLUTION INTRAMUSCULAR; INTRAVENOUS at 17:01

## 2018-08-22 RX ADMIN — ROCURONIUM BROMIDE 10 MG: 10 INJECTION INTRAVENOUS at 13:58

## 2018-08-22 RX ADMIN — FENTANYL CITRATE 50 MCG: 50 INJECTION, SOLUTION INTRAMUSCULAR; INTRAVENOUS at 17:15

## 2018-08-22 RX ADMIN — CEFAZOLIN 1 G: 1 INJECTION, POWDER, FOR SOLUTION INTRAMUSCULAR; INTRAVENOUS at 13:35

## 2018-08-22 RX ADMIN — CEFAZOLIN 1 G: 1 INJECTION, POWDER, FOR SOLUTION INTRAMUSCULAR; INTRAVENOUS at 15:30

## 2018-08-22 RX ADMIN — PHENYLEPHRINE HYDROCHLORIDE 100 MCG: 10 INJECTION, SOLUTION INTRAMUSCULAR; INTRAVENOUS; SUBCUTANEOUS at 15:29

## 2018-08-22 RX ADMIN — ROCURONIUM BROMIDE 10 MG: 10 INJECTION INTRAVENOUS at 12:10

## 2018-08-22 RX ADMIN — FENTANYL CITRATE 75 MCG: 50 INJECTION, SOLUTION INTRAMUSCULAR; INTRAVENOUS at 11:18

## 2018-08-22 RX ADMIN — MIDAZOLAM 1 MG: 1 INJECTION INTRAMUSCULAR; INTRAVENOUS at 13:47

## 2018-08-22 RX ADMIN — FENTANYL CITRATE 25 MCG: 50 INJECTION, SOLUTION INTRAMUSCULAR; INTRAVENOUS at 17:08

## 2018-08-22 RX ADMIN — FENTANYL CITRATE 25 MCG: 50 INJECTION, SOLUTION INTRAMUSCULAR; INTRAVENOUS at 12:01

## 2018-08-22 RX ADMIN — MIDAZOLAM 1 MG: 1 INJECTION INTRAMUSCULAR; INTRAVENOUS at 15:34

## 2018-08-22 RX ADMIN — ONDANSETRON 4 MG: 2 INJECTION INTRAMUSCULAR; INTRAVENOUS at 16:48

## 2018-08-22 RX ADMIN — ROCURONIUM BROMIDE 20 MG: 10 INJECTION INTRAVENOUS at 14:31

## 2018-08-22 RX ADMIN — PROPOFOL 140 MG: 10 INJECTION, EMULSION INTRAVENOUS at 11:18

## 2018-08-22 RX ADMIN — ACETAMINOPHEN 975 MG: 325 TABLET, FILM COATED ORAL at 20:32

## 2018-08-22 RX ADMIN — DEXAMETHASONE SODIUM PHOSPHATE 6 MG: 4 INJECTION, SOLUTION INTRA-ARTICULAR; INTRALESIONAL; INTRAMUSCULAR; INTRAVENOUS; SOFT TISSUE at 16:12

## 2018-08-22 RX ADMIN — ALBUMIN HUMAN: 0.05 INJECTION, SOLUTION INTRAVENOUS at 15:43

## 2018-08-22 RX ADMIN — SODIUM CHLORIDE, POTASSIUM CHLORIDE, SODIUM LACTATE AND CALCIUM CHLORIDE: 600; 310; 30; 20 INJECTION, SOLUTION INTRAVENOUS at 10:58

## 2018-08-22 RX ADMIN — CEFAZOLIN SODIUM 2 G: 2 INJECTION, SOLUTION INTRAVENOUS at 11:36

## 2018-08-22 RX ADMIN — Medication 0.2 MG: at 17:40

## 2018-08-22 RX ADMIN — ROCURONIUM BROMIDE 20 MG: 10 INJECTION INTRAVENOUS at 15:06

## 2018-08-22 RX ADMIN — FENTANYL CITRATE 50 MCG: 50 INJECTION, SOLUTION INTRAMUSCULAR; INTRAVENOUS at 15:09

## 2018-08-22 RX ADMIN — ROCURONIUM BROMIDE 50 MG: 10 INJECTION INTRAVENOUS at 11:18

## 2018-08-22 RX ADMIN — ROCURONIUM BROMIDE 20 MG: 10 INJECTION INTRAVENOUS at 12:56

## 2018-08-22 RX ADMIN — FENTANYL CITRATE 50 MCG: 50 INJECTION INTRAMUSCULAR; INTRAVENOUS at 10:16

## 2018-08-22 RX ADMIN — SODIUM CHLORIDE, POTASSIUM CHLORIDE, SODIUM LACTATE AND CALCIUM CHLORIDE: 600; 310; 30; 20 INJECTION, SOLUTION INTRAVENOUS at 20:54

## 2018-08-22 RX ADMIN — ROCURONIUM BROMIDE 20 MG: 10 INJECTION INTRAVENOUS at 13:40

## 2018-08-22 RX ADMIN — FENTANYL CITRATE 25 MCG: 50 INJECTION, SOLUTION INTRAMUSCULAR; INTRAVENOUS at 14:33

## 2018-08-22 RX ADMIN — Medication 5 MG: at 14:46

## 2018-08-22 RX ADMIN — Medication: at 17:04

## 2018-08-22 RX ADMIN — GABAPENTIN 300 MG: 300 CAPSULE ORAL at 09:38

## 2018-08-22 RX ADMIN — ONDANSETRON 4 MG: 2 INJECTION INTRAMUSCULAR; INTRAVENOUS at 16:12

## 2018-08-22 RX ADMIN — ACETAMINOPHEN 975 MG: 325 TABLET, FILM COATED ORAL at 09:38

## 2018-08-22 RX ADMIN — FENTANYL CITRATE 25 MCG: 50 INJECTION, SOLUTION INTRAMUSCULAR; INTRAVENOUS at 13:46

## 2018-08-22 RX ADMIN — SODIUM CHLORIDE, POTASSIUM CHLORIDE, SODIUM LACTATE AND CALCIUM CHLORIDE: 600; 310; 30; 20 INJECTION, SOLUTION INTRAVENOUS at 17:14

## 2018-08-22 ASSESSMENT — ACTIVITIES OF DAILY LIVING (ADL): ADLS_ACUITY_SCORE: 9

## 2018-08-22 ASSESSMENT — PAIN DESCRIPTION - DESCRIPTORS
DESCRIPTORS: ACHING
DESCRIPTORS: SORE
DESCRIPTORS: ACHING

## 2018-08-22 NOTE — ANESTHESIA POSTPROCEDURE EVALUATION
Patient: Enid Lombardo    Procedure(s):  Bilateral Mastectomy with Right Transfer Node Biopsy, Bilateral Breast Reconstruction, Anesthesia Block - Wound Class: I-Clean   - Wound Class: I-Clean    Diagnosis:Right Breast Cancer   Diagnosis Additional Information: No value filed.    Anesthesia Type:  General, ETT    Note:  Anesthesia Post Evaluation    Patient location during evaluation: PACU  Patient participation: Able to fully participate in evaluation  Level of consciousness: awake and alert  Pain management: adequate  Airway patency: patent  Cardiovascular status: acceptable  Respiratory status: acceptable  Hydration status: acceptable  PONV: none             Last vitals:  Vitals:    08/22/18 1700 08/22/18 1715 08/22/18 1730   BP: 107/66 102/63 113/64   Resp: 16 14 13   Temp:  36.8  C (98.2  F)    SpO2: 99% 99% 98%         Electronically Signed By: Erick Peterson MD  August 22, 2018  5:54 PM

## 2018-08-22 NOTE — IP AVS SNAPSHOT
[address] [address]    Phone:  [phone]                                       After Visit Summary   8/22/2018    Enid Lombardo    MRN: [id number]           After Visit Summary Signature Page     I have received my discharge instructions, and my questions have been answered. I have discussed any challenges I see with this plan with the nurse or doctor.    ..........................................................................................................................................  Patient/Patient Representative Signature      ..........................................................................................................................................  Patient Representative Print Name and Relationship to Patient    ..................................................               ................................................  Date                                            Time    ..........................................................................................................................................  Reviewed by Signature/Title    ...................................................              ..............................................  Date                                                            Time          22EPIC Rev 08/18

## 2018-08-22 NOTE — BRIEF OP NOTE
[facility] [place]    Brief Operative Note    Pre-operative diagnosis: Right Breast Cancer   Post-operative diagnosis * No post-op diagnosis entered *  Procedure: Procedure(s):  Bilateral Mastectomy with Right [place] Node Biopsy, Bilateral Breast Reconstruction, Anesthesia Block - Wound Class: I-Clean   - Wound Class: I-Clean  Surgeon: Surgeon(s) and Role:  Panel 1:     * Rakesh Sanchez MD - Primary     * Jacqueline Banuelos MD - Resident - Assisting    Panel 2:     * DENNISE Amin MD - Primary  Anesthesia: Combined General with Block   Estimated blood loss: Less than 50 ml  Drains: None  Specimens:   ID Type Source Tests Collected by Time Destination   1 : Blood and urine sent to [facility] Other (specify in comments) Other OR DOCUMENTATION ONLY Rakesh Sanchez MD 8/22/2018 12:24 PM    A : Right Breast    Stitch at 12 O'clock Tissue Breast, Right SURGICAL PATHOLOGY EXAM Rakesh Sanchez MD 8/22/2018  1:03 PM    B : Left Breast Tissue Breast, Left SURGICAL PATHOLOGY EXAM Rakesh Sanchez MD 8/22/2018  2:35 PM      Findings:   None.  Complications: No axillary lymph node on R found, likely due to prior R lumpectomy with SLNB in 2005.  Implants: None.    Dr. Amin's team will take over for reconstruction.

## 2018-08-22 NOTE — OP NOTE
PREOPERATIVE DIAGNOSIS: Status post right breast cancer requiring bilateral nipple-non-sparing mastectomy and immediate reconstruction.     POSTOPERATIVE DIAGNOSIS: Status post right breast cancer requiring bilateral nipple-non-sparing mastectomy and immediate reconstruction.     PROCEDURES:   1. Bilateral immediate breast reconstruction using pre-pectoral expander (CPX4 anatomic [place] breast expander with a base diameter 14.0 cm and a total fill volume of 650 mL filled with air without tension for about a 550 gram mastectomy specimen. Medical Indication for Use in the Pre-pectoral plane: Prevent animation defect, decrease daphne-operative pain, decrease anatomical destruction/distortion of the pectoral muscle).   2. Bilateral placement of acellular dermal matrix (AlloDerm, Ready To Use, Cobtour, LARGE perforated thick to cover the expander anteriorly, all used: Medical  Indication for Use: Help thicken the subcutaneous tissues, prevent capsular contracture, and get a better breast reconstruction shape).   3. Intraoperative chemical angiography with injection of ICG dye and interpretation of the angiogram using the SPY Elite System (indication of use was the fact the patient had had XRT in the past, had undergone a nipple-non-sparing mastectomy and we needed to evaluate the blood flow to the skin flaps to decide appropriate reconstruction as well as debridement).    IMPLANTS:       Implant Name Type Inv. Item Serial No.  Lot No. LRB No. Used   mentor breast tissue expander with suture tabs    [employer] [id number] Right 1   mentor breast tissue expander with suture tabs    [employer] [id number] Right 1   ALLODERM SELECT DUO       [id number] [id number] Right 1         SURGEON: Nedra Amin MD     RESIDENT: Rosendo Beaver MD    ANESTHESIA: General anesthesia with endotracheal intubation.     COMPLICATIONS: Nil.     DRAINS: One 15-Arabic channel ALESSANDRA drain on each side.    SPECIMENS: Nil.      BLOOD LOSS: 5 mL      DESCRIPTION OF PROCEDURE: After informed consent was taken from the patient, the proper site and procedure was ascertained with her and she was appropriately marked and taken to the operating room. She was placed in a supine position with her knees comfortably flexed, pillows underneath them and pneumoboots placed and running prior to induction of anesthesia. Preoperative antibiotics were given in the OR and appropriately redosed during the case. General anesthesia was administered without any complications. A Rodríguez catheter was inserted. Her arms were abducted to about 50 degrees and appropriately padded. Surgical Oncology took over and began the procedure. The bilateral nipple-non sparing mastectomy was completed and I was called to the operating room. She was reprepped and draped and I began the procedure by first analyzing the skin flaps. Clinically everything looked good. I went ahead and carried out the preoperative chemical angiogram using the SPY Elite System and injection of ICG dye. A total of 5 mL was injected at this time and at 30 seconds we evaluated the blood flow to the surrounding skin flaps. Overall both skin flaps had some dark daphne-incisional areas especially in the superior flaps. These were marked out for later excision. Given the clinical findings, we decided to proceed with the immediate pre-pectoral reconstruction. Identical steps were performed bilaterally.The dead-space in the lateral areas were closed off and the LMF and IMF were recreated with 0-vicryl sutures. 0-Vicryl sutures were placed in an inturrupted fashion in the MMF and IMF for the Alloderm to be sewn to. We measured the base width at about 14.0 cm. The surgical pocket was then irrigated with triple antibiotic solution and betadine. We changed our gloves. The expander was then removed from the package and a piece of large AlloDerm was opened, rinsed with triple antibiotic irrigation (1L), and then was  wrapped around each expander with the Alloderm anteriorly while filled to the amount we chose. The Alloderm was held in place with 0 vicryl spanning sutures posteriorly. The expander was placed in the pockets. The medial and inferior edges of the Alloderm were sutured down to the present sutures. Air was removed to fill out the breast skin envelop well, without tension. A 15-[place] channeled ALESSANDRA drain was then placed on each side and brought out through a separate stab incision and sewn into position. The edges of the wounds were refreshened per the SPY and there was bleeding seen at the edges of the wounds and then the skin was closed using 2-0 Monocryl suture in a deep dermal layer. We went ahead and then placed a 3-0 Monocryl suture in a running intracuticular manner followed by Sophia. Appropriate dressings were placed over the drain sites. The patient was wrapped. The patient tolerated the procedure well. All counts were correct at the end of the case. The patient was extubated and sent to recovery room in stable condition.

## 2018-08-22 NOTE — ANESTHESIA CARE TRANSFER NOTE
Patient: Enid Lombardo    Procedure(s):  Bilateral Mastectomy with Right [place] Node Biopsy, Bilateral Breast Reconstruction, Anesthesia Block - Wound Class: I-Clean   - Wound Class: I-Clean    Diagnosis: Right Breast Cancer   Diagnosis Additional Information: No value filed.    Anesthesia Type:   General, ETT     Note:  Airway :Nasal Cannula  Patient transferred to:PACU  Comments: Spont resp, VSS, report to RNHandoff Report: Identifed the Patient, Identified the Reponsible Provider, Reviewed the pertinent medical history, Discussed the surgical course, Reviewed Intra-OP anesthesia mangement and issues during anesthesia, Set expectations for post-procedure period and Allowed opportunity for questions and acknowledgement of understanding      Vitals: (Last set prior to Anesthesia Care Transfer)    CRNA VITALS  8/22/2018 1612 - 8/22/2018 1645      8/22/2018             Resp Rate (set): 10                Electronically Signed By: HERMILO Berkowitz CRNA  August 22, 2018  4:45 PM

## 2018-08-22 NOTE — ANESTHESIA PREPROCEDURE EVALUATION
Anesthesia Evaluation     . Pt has had prior anesthetic. Type: General           ROS/MED HX    ENT/Pulmonary:  - neg pulmonary ROS     Neurologic:  - neg neurologic ROS    (-) CVA   Cardiovascular:     (+) hypertension--CAD (Stress ECHO 2014 found to be normal), --. : . . . :. . Previous cardiac testing date:results:Stress Testdate:2014 results:No stress induced wall motion abnormalities  Normal stress ECHOECG reviewed date:7/23/18 results:Sinus Rhythm date: results:         (-) arrhythmias   METS/Exercise Tolerance:     Hematologic:         Musculoskeletal:  - neg musculoskeletal ROS       GI/Hepatic:     (+) GERD Asymptomatic on medication,       Renal/Genitourinary:         Endo: Comment: Gestational diabetes resolved        Psychiatric:  - neg psychiatric ROS       Infectious Disease:  - neg infectious disease ROS       Malignancy:   (+) Malignancy History of Breast  Breast CA Active status post.         Other:                                    Anesthesia Plan      History & Physical Review      ASA Status:  2 .    NPO Status:  > 8 hours    Plan for General and ETT with Intravenous induction. Maintenance will be Balanced.    PONV prophylaxis:  Ondansetron (or other 5HT-3) and Dexamethasone or Solumedrol       Postoperative Care  Postoperative pain management:  IV analgesics and Oral pain medications.      Consents          ANESTHESIA PREOP EVALUATION    PROCEDURE: Procedure(s):  Bilateral Mastectomy with Right [place] Node Biopsy, Bilateral Breast Reconstruction, Anesthesia Block - Wound Class: I-Clean   - Wound Class: I-Clean    HPI: Enid Lombardo is a 58 year old female with hx of breast cancer s/p lumpectomy in 2015 now with new microcalcifications on mammogram who presents for the above procedure.     PAST MEDICAL HISTORY:  Past Medical History:   Diagnosis Date     Breast cancer (H) 2004    lumpectomy, radiation, tamoxifen     Cancer (H) 2004    Breast     Cataracts, bilateral      Complication of  anesthesia     No versed. It stresses her out. She wants to remember everything.     Coronary artery disease 11/24/2014     Diabetes (H)     Gestational     Enthesopathy of hip region 6/06    right hip     Esophageal reflux 2/06     History of gestational diabetes      Hypertension 2012     Macular drusen      Shingles 01/23/2012    left T6-T7 dermatome       PAST SURGICAL HISTORY:  Past Surgical History:   Procedure Laterality Date     BIOPSY  2004    Breast     BREAST SURGERY  2004     C VAGINAL HYSTERECTOMY  12/2001    Ovaries intact, due to fibroids     COLONOSCOPY       ENDOSCOPY  05/09/2007    Upper GI     EYE SURGERY       GYN SURGERY  2001     HC BIOPSY/EXCISION LYMPH NODE OPEN DEEP CERVICAL W EXC FAT PAD  1/7/2005    Right axillary sentinel node biopsy X 3.     HC COLONOSCOPY W BIOPSY  05/10/10     HC EXCISION BREAST LESION, OPEN >=1  1/7/2005    Right breast.     HC REMV CATARACT EXTRACAP,INSERT LENS  12/18/08    bilateral     HC REMV TARSAL/METATARSAL BENIGN BONE LESN  1982    Bone spur - right     ORTHOPEDIC SURGERY  1983    Right foot       SOCIAL HISTORY:   Social History   Substance Use Topics     Smoking status: Never Smoker     Smokeless tobacco: Never Used     Alcohol use Yes      Comment: occasionally       ALLERGIES:   Allergies   Allergen Reactions     Alphagan P Rash     Thrush and numbness in mouth       MEDICATIONS:  No prescriptions prior to admission.       ASSESSMENT & PLAN:  - ASA 2  - Preinduction with Versed   - GETA with standard ASA monitors, IV induction, and balanced anesthetic  - PIV x1  - Antibiotics per surgery  - PONV prophylaxis with Zofran and Decadron    Mark Anthony Faria CA-1                    .

## 2018-08-22 NOTE — IP AVS SNAPSHOT
MRN:[id number]                      After Visit Summary   8/22/2018    Enid Lombardo    MRN: [id number]           Thank you!     Thank you for choosing [facility] for your care. Our goal is always to provide you with excellent care. Hearing back from our patients is one way we can continue to improve our services. Please take a few minutes to complete the written survey that you may receive in the mail after you visit with us. Thank you!        Patient Information     Date Of Birth          1960        Designated Caregiver       Most Recent Value    Caregiver    Will someone help with your care after discharge? yes    Name of designated caregiver jay spouse [phone]    Phone number of caregiver jay spouse [phone]    Caregiver address per facesheet      About your hospital stay     You were admitted on:  August 22, 2018 You last received care in the:  [address] [facility]    You were discharged on:  August 24, 2018        Reason for your hospital stay       Bilateral skin sparing mastectomy with immediate reconstruction.                  Who to Call     For medical emergencies, please call 911.  For non-urgent questions about your medical care, please call your primary care provider or clinic, [phone]  For questions related to your surgery, please call your surgery clinic        Attending Provider     Provider Specialty    Rakesh Sanchez MD General Surgery       Primary Care Provider Office Phone # Fax #    Shi Alonso [phone]      After Care Instructions     Discharge Instructions       From Dr Sanchez:    1. Patient to follow up with appointment in 2 weeks with Dr. Sanchez. Follow up with Dr. Amin in 1 week.   2. Do not drive while taking narcotic pain medication (oxycodone).   3. May take plain Tylenol as needed for pain.   4. Avoid non-steroidal anti-inflammatory medications (Advil, Ibuprofen, Naproxen, aspirin, etc) for 5-7 days or until  "approved by the Plastic Surgery team.   5. Caution with putting ice on the incision as it will be numb you will not realize it if you leave the ice for too long and can damage your skin.  6. If you develop any fever/chills, worsening pain, redness, swelling, or drainage from your wound please call the clinic (Monday through Friday 8:00am-5:00pm [phone] Supriya BOYER) or on-call surgical oncology resident (nights and weekends [phone] and ask \"I would like to page the Surgical Oncology Resident on call.\")   7. No lifting over 5-10 lbs and no strenuous physical activity for 6 weeks.   8. Keep dressing clean and dry. Please refer to Dr Amin's wound care instructions.   9. Monitor the drain output. Record the drain output per 24 hours. Your drain will be removed by Dr. Amin at a follow up appointment.   Drain care:  Please keep drain site clean and dry.   Please call the clinic (see phone numbers above) if:  Your drain falls out.  The stitch that is holding the drain in place at the skin is coming loose or missing.  The drainage fluid is very thick and/or has a bad odor.  The squeeze bulb does not stay collapsed.  The drainage suddenly stops or dramatically decreases when the drain has been having steady amounts of drainage.  Please keep a log of the drainage amounts every time you empty the drain.    Please \"strip\" the drain 3 times per day:  Wash your hands with soap and water and dry.  Gently squeeze the tubing if you see a clot to loosen it up.   and pinch the drain where it comes out of the skin with one hand, to steady it.  With the other hand,  and pinch the drain and slide your fingers down the tubing, towards the drainage bulb.  Then release your  on the end where the tube comes out of your body, followed by releasing your  at the end of the drainage bulb.    Repeat several times.  It may be easier to 'strip' the drain with an alcohol swab or with hand cleanser on the hand that is " moving along the tube.  Wash your hands again.            Discharge Instructions       From Dr. Amin:  - Please avoid taking showers while drains are in place to prevent infections.    - Strip drains 2-3 times/day and record output volume for each drain separately everyday. Bring the log of outputs to clinic visit. The output volume determines when the drains can be removed.   - Take the antibiotics (Bactrim) while drains are in place.    - You may keep an ACE wrap on the breasts for comfort as needed. May wear a cotton bra instead. Make sure it is not too tight.  - Rest with head of bed elevated to relieve swelling at the surgical site.    You already have follow up scheduled on 8/28/2018 at 11:45 AM. Please come to this appointment and call with any questions (may call the clinic at [phone] during normal business hours; if after hours, please call [phone] and ask to speak to the Plastic Surgery resident on call - but for questions for Dr. Sanchez's team, please ask to speak to the Surgical Oncology resident on call).                  Follow-up Appointments     Adult [facility] Follow-up and recommended labs and tests       Follow up with Dr. Amin in 1 week. Follow up with Dr. Sanchez in 2 weeks.     Appointments on [place] and/or [facility] (with [facility] or [facility] provider or service). Call [phone] if you haven't heard regarding these appointments within 7 days of discharge.                  Your next 10 appointments already scheduled     Aug 28, 2018 11:45 AM CDT   (Arrive by 11:30 AM)   Post-Op with  Nedra Amin MD   [facility] ([facility])    [address] [place] [address]  [address] [place] [address]   [phone]            Aug 28, 2018  3:00 PM CDT   (Arrive by 2:45 PM)   New Patient Visit with Charity Brar MD   [facility] ([facility])    [address] [place] [address]  [address] [place] [address]  "[id number]   [phone]            Sep 06, 2018 10:30 AM CDT   (Arrive by 10:15 AM)   Post-Op with Rakesh Sanchez MD   [facility] ([facility])    [address]  [address]  [facility] [address]   [phone]            Jan 21, 2019 10:15 AM CST   RETURN RETINA with Paz Osborn MD   [facility] ([facility])    [address]  [facility] [address]   [phone]              Additional Information     If you use hormonal birth control (such as the pill, patch, ring or implants): You'll need a second form of birth control for 7 days (condoms, a diaphragm or contraceptive foam). While in the hospital, you received a medicine called Bridion. Your normal birth control will not work as well for a week after taking this medicine.          Pending Results     Date and Time Order Name Status Description    8/22/2018 1304 Surgical pathology exam In process             Statement of Approval     Ordered          08/24/18 0650  I have reviewed and agree with all the recommendations and orders detailed in this document.  EFFECTIVE NOW     Approved and electronically signed by:  Sena Rodrigues PA-C           08/23/18 4470  I have reviewed and agree with all the recommendations and orders detailed in this document.  EFFECTIVE NOW     Approved and electronically signed by:  Sena Rodrigues PA-C             Admission Information     Date & Time Provider Department Dept. Phone    8/22/2018 Rakesh Sanchez MD [address] [facility] [phone]      Your Vitals Were     Blood Pressure Pulse Temperature Respirations Height Weight    107/61 (BP Location: Left arm) 72 97.5  F (36.4  C) (Oral) 18 1.651 m (5' 5\") 81.9 kg (180 lb 8 oz)    Last Period Pulse Oximetry BMI (Body Mass Index)             10/14/2001 93% 30.04 kg/m2         MyChart Information     [facility] gives you secure access to your electronic health " record. If you see a primary care provider, you can also send messages to your care team and make appointments. If you have questions, please call your primary care clinic.  If you do not have a primary care provider, please call [phone] and they will assist you.        Care EveryWhere ID     This is your Care EveryWhere ID. This could be used by other organizations to access your [facility] medical records  [id number]        Equal Access to Services     RAIZA TOLBERT : Hadmaria victoria ornelas Sojohanny, waaxda luqadaha, qaybta kaalmada johnda, michelle sandsgladiscricket ford . So [facility] [phone].    ATENCIÓN: Si iadanla carley, tiene a rivera disposición servicios gratuitos de asistencia lingüística. Llame al [phone].    We comply with applicable federal civil rights laws and [place] laws. We do not discriminate on the basis of race, color, national origin, age, disability, sex, sexual orientation, or gender identity.               Review of your medicines      START taking        Dose / Directions    cyclobenzaprine 5 MG tablet   Commonly known as:  FLEXERIL   Used for:  Postoperative pain        Dose:  5 mg   Take 1 tablet (5 mg) by mouth 3 times daily as needed (For intercostal muscle spasm.)   Quantity:  10 tablet   Refills:  0       oxyCODONE IR 5 MG tablet   Commonly known as:  ROXICODONE   Used for:  Postoperative pain        Dose:  5-10 mg   Take 1-2 tablets (5-10 mg) by mouth every 6 hours as needed for severe pain   Quantity:  30 tablet   Refills:  0       polyethylene glycol Packet   Commonly known as:  MIRALAX/GLYCOLAX   Used for:  S/P bilateral mastectomy        Dose:  17 g   Take 17 g by mouth daily as needed for constipation   Quantity:  7 packet   Refills:  0       sulfamethoxazole-trimethoprim 800-160 MG per tablet   Commonly known as:  BACTRIM DS/SEPTRA DS   Used for:  S/P bilateral mastectomy        Dose:  1 tablet   Take 1 tablet by mouth 2 times daily for 14 days   Quantity:  28  tablet   Refills:  0         CONTINUE these medicines which may have CHANGED, or have new prescriptions. If we are uncertain of the size of tablets/capsules you have at home, strength may be listed as something that might have changed.        Dose / Directions    sertraline 25 MG tablet   Commonly known as:  ZOLOFT   This may have changed:    - how much to take  - when to take this  - additional instructions   Used for:  Hot flushes, perimenopausal        Take 1 tablet daily.   Quantity:  30 tablet   Refills:  11         CONTINUE these medicines which have NOT CHANGED        Dose / Directions    ACETAMINOPHEN PO        Dose:  325 mg   Take 325 mg by mouth every 8 hours as needed for pain   Refills:  0       Fish Oil 1000 MG Cpdr        Take  by mouth.   Refills:  0       fluticasone 50 MCG/ACT spray   Commonly known as:  FLONASE   Used for:  Chronic rhinitis        USE ONE OR TWO SPRAYS IN EACH NOSTRIL DAILY   Quantity:  16 mL   Refills:  10       loratadine 10 MG tablet   Commonly known as:  CLARITIN   Used for:  Chronic rhinitis, unspecified type        TAKE ONE TABLET BY MOUTH EVERY DAY AS NEEDED FOR ALLERGY SYMPTOMS   Quantity:  90 tablet   Refills:  1       neomycin-polymyxin-HC 1 % Soln   Commonly known as:  CORTISPORIN   Used for:  Infective otitis externa, bilateral        Place 4 drops in both ears 3 times daily for 9 days   Quantity:  20 mL   Refills:  0       omeprazole 20 MG CR capsule   Commonly known as:  priLOSEC   Used for:  Gastroesophageal reflux disease, esophagitis presence not specified        TAKE 1 CAPSULE (20 MG) BY MOUTH DAILY   Quantity:  90 capsule   Refills:  3       PRESERVISION AREDS 2 Caps   Used for:  Drusen (degenerative) of retina, bilateral, Vitreous degeneration, bilateral        Dose:  1 tablet   Take 1 tablet by mouth 2 times daily   Quantity:  60 capsule   Refills:  11         STOP taking     ADVIL PO                Where to get your medicines      These medications were sent  "to [facility] - [place], [place] - [address]  [address], [facility] [address]     Phone:  [phone]     cyclobenzaprine 5 MG tablet    polyethylene glycol Packet    sulfamethoxazole-trimethoprim 800-160 MG per tablet         Some of these will need a paper prescription and others can be bought over the counter. Ask your nurse if you have questions.     Bring a paper prescription for each of these medications     oxyCODONE IR 5 MG tablet                Protect others around you: Learn how to safely use, store and throw away your medicines at www.disposemymeds.org.        Information about your nerve block     Today you received a block to numb the nerves near your surgery site.    This is a block using local anesthetic or \"numbing\" medication injected around the nerves to anesthetize or \"numb\" the area supplied by those nerves. This block is injected into the muscle layer near your surgical site. The type of anesthesia (Exparel) your anesthesia team used to numb your abdomen may give you relief for up to 72 hours.     Diet: There are no diet restrictions, but you should drink plenty of fluids, unless you are on a fluid-restricted diet.     Activity: If your surgical site is an arm or leg you should be careful with your affected limb, since it is possible to injure your limb without being aware of it due to the numbing. Until full feeling returns, you should guard against bumping or hitting your limb, and avoid extreme hot or cold temperatures on the skin.    Pain Medication: As the block wears off, the feeling will return as a tingling or prickly sensation near your surgical site. You will experience more discomfort from your incisions as the feeling returns. You may want to take a pain pill (a narcotic or Tylenol if this was prescribed by your surgeon) when you start to experience mild pain, before the pain becomes more severe. If your pain medications do not control your " pain, you should notify your surgeon. If you are taking narcotics for pain management, do not drink alcohol, drive a car, or perform hazardous activities.  If you have questions or concerns you may call your surgeon at the number provided with your discharge instructions.     Call your surgeon if you experience blurry vision, ringing in the ears or metallic taste in your mouth.         ANTIBIOTIC INSTRUCTION     You've Been Prescribed an Antibiotic - Now What?  Your healthcare team thinks that you or your loved one might have an infection. Some infections can be treated with antibiotics, which are powerful, life-saving drugs. Like all medications, antibiotics have side effects and should only be used when necessary. There are some important things you should know about your antibiotic treatment.      Your healthcare team may run tests before you start taking an antibiotic.    Your team may take samples (e.g., from your blood, urine or other areas) to run tests to look for bacteria. These test can be important to determine if you need an antibiotic at all and, if you do, which antibiotic will work best.      Within a few days, your healthcare team might change or even stop your antibiotic.    Your team may start you on an antibiotic while they are working to find out what is making you sick.    Your team might change your antibiotic because test results show that a different antibiotic would be better to treat your infection.    In some cases, once your team has more information, they learn that you do not need an antibiotic at all. They may find out that you don't have an infection, or that the antibiotic you're taking won't work against your infection. For example, an infection caused by a virus can't be treated with antibiotics. Staying on an antibiotic when you don't need it is more likely to be harmful than helpful.      You may experience side effects from your antibiotic.    Like all medications, antibiotics  have side effects. Some of these can be serious.    Let you healthcare team know if you have any known allergies when you are admitted to the hospital.    One significant side effect of nearly all antibiotics is the risk of severe and sometimes deadly diarrhea caused by Clostridium difficile (C. Difficile). This occurs when a person takes antibiotics because some good germs are destroyed. Antibiotic use allows C. diificile to take over, putting patients at high risk for this serious infection.    As a patient or caregiver, it is important to understand your or your loved one's antibiotic treatment. It is especially important for caregivers to speak up when patients can't speak for themselves. Here are some important questions to ask your healthcare team.    What infection is this antibiotic treating and how do you know I have that infection?    What side effects might occur from this antibiotic?    How long will I need to take this antibiotic?    Is it safe to take this antibiotic with other medications or supplements (e.g., vitamins) that I am taking?     Are there any special directions I need to know about taking this antibiotic? For example, should I take it with food?    How will I be monitored to know whether my infection is responding to the antibiotic?    What tests may help to make sure the right antibiotic is prescribed for me?      Information provided by:  www.cdc.gov/getsmart  U.S. Department of Health and Human Services  Centers for disease Control and Prevention  National Center for Emerging and Zoonotic Infectious Diseases  Division of Healthcare Quality Promotion        Information about OPIOIDS     PRESCRIPTION OPIOIDS: WHAT YOU NEED TO KNOW   We gave you an opioid (narcotic) pain medicine. It is important to manage your pain, but opioids are not always the best choice. You should first try all the other options your care team gave you. Take this medicine for as short a time (and as few doses) as  possible.    Some activities can increase your pain, such as bandage changes or therapy sessions. It may help to take your pain medicine 30 to 60 minutes before these activities. Reduce your stress by getting enough sleep, working on hobbies you enjoy and practicing relaxation or meditation. Talk to your care team about ways to manage your pain beyond prescription opioids.    These medicines have risks:    DO NOT drive when on new or higher doses of pain medicine. These medicines can affect your alertness and reaction times, and you could be arrested for driving under the influence (DUI). If you need to use opioids long-term, talk to your care team about driving.    DO NOT operate heavy machinery    DO NOT do any other dangerous activities while taking these medicines.    DO NOT drink any alcohol while taking these medicines.     If the opioid prescribed includes acetaminophen, DO NOT take with any other medicines that contain acetaminophen. Read all labels carefully. Look for the word  acetaminophen  or  Tylenol.  Ask your pharmacist if you have questions or are unsure.    You can get addicted to pain medicines, especially if you have a history of addiction (chemical, alcohol or substance dependence). Talk to your care team about ways to reduce this risk.    All opioids tend to cause constipation. Drink plenty of water and eat foods that have a lot of fiber, such as fruits, vegetables, prune juice, apple juice and high-fiber cereal. Take a laxative (Miralax, milk of magnesia, Colace, Senna) if you don t move your bowels at least every other day. Other side effects include upset stomach, sleepiness, dizziness, throwing up, tolerance (needing more of the medicine to have the same effect), physical dependence and slowed breathing.    Store your pills in a secure place, locked if possible. We will not replace any lost or stolen medicine. If you don t finish your medicine, please throw away (dispose) as directed by your  pharmacist. The [place] Pollution Control Agency has more information about safe disposal: https://www.pca.[facility].[place].us/living-green/managing-unwanted-medications             Medication List: This is a list of all your medications and when to take them. Check marks below indicate your daily home schedule. Keep this list as a reference.      Medications           Morning Afternoon Evening Bedtime As Needed    ACETAMINOPHEN PO   Take 325 mg by mouth every 8 hours as needed for pain   Last time this was given:  975 mg on 8/24/2018  7:55 AM                                cyclobenzaprine 5 MG tablet   Commonly known as:  FLEXERIL   Take 1 tablet (5 mg) by mouth 3 times daily as needed (For intercostal muscle spasm.)   Last time this was given:  5 mg on 8/23/2018  6:36 PM                                Fish Oil 1000 MG Cpdr   Take  by mouth.                                fluticasone 50 MCG/ACT spray   Commonly known as:  FLONASE   USE ONE OR TWO SPRAYS IN EACH NOSTRIL DAILY                                loratadine 10 MG tablet   Commonly known as:  CLARITIN   TAKE ONE TABLET BY MOUTH EVERY DAY AS NEEDED FOR ALLERGY SYMPTOMS                                neomycin-polymyxin-HC 1 % Soln   Commonly known as:  CORTISPORIN   Place 4 drops in both ears 3 times daily for 9 days                                omeprazole 20 MG CR capsule   Commonly known as:  priLOSEC   TAKE 1 CAPSULE (20 MG) BY MOUTH DAILY                                oxyCODONE IR 5 MG tablet   Commonly known as:  ROXICODONE   Take 1-2 tablets (5-10 mg) by mouth every 6 hours as needed for severe pain   Last time this was given:  10 mg on 8/24/2018  4:42 AM                                polyethylene glycol Packet   Commonly known as:  MIRALAX/GLYCOLAX   Take 17 g by mouth daily as needed for constipation                                PRESERVISION AREDS 2 Caps   Take 1 tablet by mouth 2 times daily                                sertraline 25 MG tablet    Commonly known as:  ZOLOFT   Take 1 tablet daily.   Last time this was given:  25 mg on 8/24/2018  7:55 AM                                sulfamethoxazole-trimethoprim 800-160 MG per tablet   Commonly known as:  BACTRIM DS/SEPTRA DS   Take 1 tablet by mouth 2 times daily for 14 days   Last time this was given:  1 tablet on 8/24/2018  7:55 AM

## 2018-08-22 NOTE — ANESTHESIA PROCEDURE NOTES
Peripheral Nerve Block Procedure Note    Staff:     Anesthesiologist:  ZOHREH JARVIS    Resident/CRNA:  TERRY BERKOWITZ    Block performed by resident/CRNA in the presence of a teaching physician    Location: Pre-op  Procedure Start/Stop TImes:      8/22/2018 9:41 AM     8/22/2018 10:18 AM    patient identified, IV checked, site marked, risks and benefits discussed, informed consent, monitors and equipment checked, pre-op evaluation, at physician/surgeon's request and post-op pain management      Correct Patient: Yes      Correct Position: Yes      Correct Site: Yes      Correct Procedure: Yes      Correct Laterality:  Yes    Site Marked:  Yes  Procedure details:     Procedure:  Pectoralis    ASA:  2    Diagnosis:  Post operative pain control    Laterality:  Bilateral    Position:  Supine    Sterile Prep: chloraprep, mask and sterile gloves      Local skin infiltration:  1% lidocaine    amount (mL):  3    Needle:  Short bevel    Needle gauge:  21    Needle length (mm):  110    Ultrasound: Yes      Ultrasound used to identify targeted nerve, plexus, or vascular structure and placed a needle adjacent to it      Permanent Image entered into patiient's record      Abnormal pain on injection: No      Blood Aspirated: No      Paresthesias:  No    Bleeding at site: No      Bolus via:  Needle    Infusion Method:  Single Shot    Complications:  None

## 2018-08-23 LAB
ANION GAP SERPL CALCULATED.3IONS-SCNC: 4 MMOL/L (ref 3–14)
BUN SERPL-MCNC: 6 MG/DL (ref 7–30)
CALCIUM SERPL-MCNC: 8.3 MG/DL (ref 8.5–10.1)
CHLORIDE SERPL-SCNC: 106 MMOL/L (ref 94–109)
CO2 SERPL-SCNC: 28 MMOL/L (ref 20–32)
CREAT SERPL-MCNC: 0.65 MG/DL (ref 0.52–1.04)
ERYTHROCYTE [DISTWIDTH] IN BLOOD BY AUTOMATED COUNT: 13.7 % (ref 10–15)
GFR SERPL CREATININE-BSD FRML MDRD: >90 ML/MIN/1.7M2
GLUCOSE SERPL-MCNC: 102 MG/DL (ref 70–99)
HCT VFR BLD AUTO: 35.5 % (ref 35–47)
HGB BLD-MCNC: 11.6 G/DL (ref 11.7–15.7)
MCH RBC QN AUTO: 30.7 PG (ref 26.5–33)
MCHC RBC AUTO-ENTMCNC: 32.7 G/DL (ref 31.5–36.5)
MCV RBC AUTO: 94 FL (ref 78–100)
PLATELET # BLD AUTO: 172 10E9/L (ref 150–450)
POTASSIUM SERPL-SCNC: 4.1 MMOL/L (ref 3.4–5.3)
RBC # BLD AUTO: 3.78 10E12/L (ref 3.8–5.2)
SODIUM SERPL-SCNC: 138 MMOL/L (ref 133–144)
WBC # BLD AUTO: 8.1 10E9/L (ref 4–11)

## 2018-08-23 PROCEDURE — 80048 BASIC METABOLIC PNL TOTAL CA: CPT | Performed by: SURGERY

## 2018-08-23 PROCEDURE — 25000132 ZZH RX MED GY IP 250 OP 250 PS 637: Performed by: PHYSICIAN ASSISTANT

## 2018-08-23 PROCEDURE — 12000008 ZZH R&B INTERMEDIATE UMMC

## 2018-08-23 PROCEDURE — 25000132 ZZH RX MED GY IP 250 OP 250 PS 637: Performed by: SURGERY

## 2018-08-23 PROCEDURE — 36415 COLL VENOUS BLD VENIPUNCTURE: CPT | Performed by: SURGERY

## 2018-08-23 PROCEDURE — 25000128 H RX IP 250 OP 636: Performed by: SURGERY

## 2018-08-23 PROCEDURE — 85027 COMPLETE CBC AUTOMATED: CPT | Performed by: SURGERY

## 2018-08-23 RX ORDER — OXYCODONE HYDROCHLORIDE 5 MG/1
5-10 TABLET ORAL EVERY 4 HOURS PRN
Status: DISCONTINUED | OUTPATIENT
Start: 2018-08-23 | End: 2018-08-23

## 2018-08-23 RX ORDER — OXYCODONE HYDROCHLORIDE 5 MG/1
5-10 TABLET ORAL EVERY 6 HOURS PRN
Qty: 30 TABLET | Refills: 0 | Status: SHIPPED | OUTPATIENT
Start: 2018-08-23 | End: 2018-09-04

## 2018-08-23 RX ORDER — CYCLOBENZAPRINE HCL 5 MG
5 TABLET ORAL 3 TIMES DAILY PRN
Status: DISCONTINUED | OUTPATIENT
Start: 2018-08-23 | End: 2018-08-24 | Stop reason: HOSPADM

## 2018-08-23 RX ORDER — HYDROMORPHONE HYDROCHLORIDE 1 MG/ML
.3-.5 INJECTION, SOLUTION INTRAMUSCULAR; INTRAVENOUS; SUBCUTANEOUS
Status: DISCONTINUED | OUTPATIENT
Start: 2018-08-23 | End: 2018-08-24 | Stop reason: HOSPADM

## 2018-08-23 RX ORDER — OXYCODONE HYDROCHLORIDE 5 MG/1
5-10 TABLET ORAL
Status: DISCONTINUED | OUTPATIENT
Start: 2018-08-23 | End: 2018-08-24 | Stop reason: HOSPADM

## 2018-08-23 RX ADMIN — SODIUM CHLORIDE, POTASSIUM CHLORIDE, SODIUM LACTATE AND CALCIUM CHLORIDE: 600; 310; 30; 20 INJECTION, SOLUTION INTRAVENOUS at 05:34

## 2018-08-23 RX ADMIN — CYCLOBENZAPRINE HYDROCHLORIDE 5 MG: 5 TABLET, FILM COATED ORAL at 10:28

## 2018-08-23 RX ADMIN — Medication 0.3 MG: at 20:02

## 2018-08-23 RX ADMIN — SERTRALINE HYDROCHLORIDE 25 MG: 25 TABLET ORAL at 08:13

## 2018-08-23 RX ADMIN — ACETAMINOPHEN 975 MG: 325 TABLET, FILM COATED ORAL at 15:25

## 2018-08-23 RX ADMIN — OXYCODONE HYDROCHLORIDE 10 MG: 5 TABLET ORAL at 18:35

## 2018-08-23 RX ADMIN — OXYCODONE HYDROCHLORIDE 10 MG: 5 TABLET ORAL at 15:26

## 2018-08-23 RX ADMIN — OXYCODONE HYDROCHLORIDE 5 MG: 5 TABLET ORAL at 09:32

## 2018-08-23 RX ADMIN — SULFAMETHOXAZOLE AND TRIMETHOPRIM 1 TABLET: 800; 160 TABLET ORAL at 20:52

## 2018-08-23 RX ADMIN — CYCLOBENZAPRINE HYDROCHLORIDE 5 MG: 5 TABLET, FILM COATED ORAL at 18:36

## 2018-08-23 RX ADMIN — Medication 0.3 MG: at 16:38

## 2018-08-23 RX ADMIN — Medication 0.3 MG: at 13:40

## 2018-08-23 RX ADMIN — OXYCODONE HYDROCHLORIDE 5 MG: 5 TABLET ORAL at 09:05

## 2018-08-23 RX ADMIN — ACETAMINOPHEN 975 MG: 325 TABLET, FILM COATED ORAL at 08:12

## 2018-08-23 RX ADMIN — OXYCODONE HYDROCHLORIDE 10 MG: 5 TABLET ORAL at 21:43

## 2018-08-23 RX ADMIN — OXYCODONE HYDROCHLORIDE 10 MG: 5 TABLET ORAL at 12:09

## 2018-08-23 RX ADMIN — SULFAMETHOXAZOLE AND TRIMETHOPRIM 1 TABLET: 800; 160 TABLET ORAL at 08:13

## 2018-08-23 ASSESSMENT — PAIN DESCRIPTION - DESCRIPTORS
DESCRIPTORS: ACHING;SORE
DESCRIPTORS: SHARP
DESCRIPTORS: HEADACHE
DESCRIPTORS: ACHING;SORE
DESCRIPTORS: ACHING;SORE
DESCRIPTORS: SHARP

## 2018-08-23 ASSESSMENT — ACTIVITIES OF DAILY LIVING (ADL)
ADLS_ACUITY_SCORE: 9

## 2018-08-23 NOTE — PLAN OF CARE
Problem: Patient Care Overview  Goal: Plan of Care/Patient Progress Review  Outcome: No Change  Patient up from PACU at 1830 after bilateral mastectomy with reconsrtuction. Received TAP blocks. Bilateral breast incisions CDI, covered with abd and ace wrap. Bilateral ALESSANDRA drains putting out bloody drainage. Tolerating clear liquids. Nausea has resolved. Urinary catheter in place with adequate urine output. Pain controlled with tylenol and dilaudid PCA. Stood at bedside and marched in place. Oxygen at 1 LPM while sleeping to keep sats above 92%. Encouraged incentive spirometer.

## 2018-08-23 NOTE — PROGRESS NOTES
"Surg Onc Progress Note  08/23/2018    Stable overnight. Pain controlled with PCA. Minimal PO. Good UOP. Aguilar still in.     /56 (BP Location: Left arm)  Pulse 96  Temp 98  F (36.7  C) (Oral)  Resp 15  Ht 1.651 m (5' 5\")  Wt 80.5 kg (177 lb 7.5 oz)  LMP 10/14/2001  SpO2 94%  BMI 29.53 kg/m2    ALESSANDRA R 50, 20 since mn  ALESSANDRA L 45, 40 since mn  UOP 1125, 475 since mn    PE  NAD  NLB on RA  B/l mastectomy incisions with expanders covered, CDI, JPs s/s    Labs pending    A/P: 58F with PMH of recurrent DCIS, POD1 s/p b/l mastectomy with tissue expanders with plastics. Stable.    -PO pain meds, dc PCA  -regular diet  -aguilar out  -ambulate  -home later today if pain controlled, eating, voiding, ambulating    Will discuss with staff.    Jacqueline Jose MD PGY3  General Surgery      "

## 2018-08-23 NOTE — PROGRESS NOTES
SURGERY POST-OP CHECK  08/22/2018 9:24 PM    Patient: Enid Lombardo  MRN: [id number]    SUBJECTIVE  Patient was evaluated at the bedside and was in no acute distress. Pain well-controlled on PCA. Tolerating sips. Patient denies any nausea, vomiting, chest pain, or shortness of breath. Recovering well. Right ALESSANDRA output 50 ml sanguineous fluid and left ALESSANDRA with 50 ml sanguineous output.    OBJECTIVE  Temp:  [95.9  F (35.5  C)-98.3  F (36.8  C)] 95.9  F (35.5  C)  Pulse:  [83-96] 96  Heart Rate:  [68-96] 80  Resp:  [10-20] 15  BP: (100-150)/(56-92) 118/60  SpO2:  [89 %-100 %] 96 %     I/O last 3 completed shifts:  In: 1250 [I.V.:1000]  Out: 875 [Urine:825; Blood:50]    Physical Exam:  General: AAO, NAD, lying in bed, appears comfortable  CV: RRR, no murmurs, gallops, or rubs  Pulm: CTAB, breathing comfortably on 1L O2 NC.  Abd: soft, nt/nd  Incisions c/d/i with with ace wrap. B/L ALESSANDRA drains in place with s/s output  Extremities: warm, well-perfused without edema  Neuro: No focal deficits noted, patient moves all extremities spontaneously      Recent Labs  Lab 08/22/18  0929   HGB 14.2         Recent Labs  Lab 08/22/18  0929   POTASSIUM 4.0   CR 0.70         ASSESSMENT/PLAN  Enid Lombardo is a 58 year old female POD#0 s/p bilateral mastectomy with immediate reconstruction. Patient recovering as expected.    -Neuro: Continue current pain management   -CV: Hemodynamically stable. Continue monitoring vitals.  -Pulm: Satting 96% on 1L O2 NC; encouraged IS use.  -FEN: Continue sips; Advance Diet as Tolerated,LR @ 100 ml/hr; monitor I/O  -Prophylaxis: SCDs  -Disposition per primary team's decision.      Gerald Morgan MD  General Surgery (PGY-1)  [phone] (pager)

## 2018-08-23 NOTE — PROGRESS NOTES
Plastic Surgery Progress Note      Subjective/Interval History:  NAEON. Doing well. Pain well controlled, using PCA and not taking Tylenol. Tolerating po but clear only. Only got up to stand yesterday, plans to walk today.      Objective:  Temp:  [95.9  F (35.5  C)-98.3  F (36.8  C)] 98  F (36.7  C)  Pulse:  [83-96] 96  Heart Rate:  [68-96] 81  Resp:  [10-20] 15  BP: (100-150)/(56-92) 113/56  SpO2:  [89 %-100 %] 94 %     General appearance: in NAD  Pulm: Non-labored breathing on RA  CV: WWP  Breast: ACE wrap in place, breast flaps warm and well perfused with cap refill < 2 sec, no ecchymosis, ALESSANDRA x2 serosanguinous  Extremities: SCDs in place       I/O last 3 completed shifts:  In: 2820 [P.O.:245; I.V.:2075]  Out: 1280 [Urine:1125; Drains:100; Blood:55]   L ALESSANDRA 50 / 20  R ALESSANDRA 50 / 40     Assessment/Plan:   Enid Lombardo is a 58 year old woman with a h/o right breast cancer s/p prior lumpectomy and radiation (2004) now with R DCIS s/p bilateral mastectomy and R SLNB with immediate reconstruction with tissue expanders 8/22/2018.      - Strip JPs q4h and record output qshift.   - ALESSANDRA teaching by RN prior to discharge, pt to record outputs and bring to to follow up.  - Loosen ACE wrap today (ordered).  - Bactrim while drains in place (ordered in Discharge Navigator).  - No showers while drains in place (instructions ordered in Discharge Navigator).  - Encourage ambulation as tolerated.  - Pain control per primary.  - Ok to discharge per primary team, follow up with Plastic Surgery in 1 week (already scheduled).      - - - - - - - - - - - - - - - - - -  Katerina Byers MD  General Surgery PGY-3      See [facility] for on-call pager information.

## 2018-08-23 NOTE — PLAN OF CARE
Problem: Patient Care Overview  Goal: Plan of Care/Patient Progress Review  Outcome: Improving  A&Ox4. VSS. DC'd PCA at 0900. Pts pain partially controlled with tylenol, Oxycodone given q3hrs, iv dilaudid given x1, and flexeril x1. Pt is up SBA. Breast incisions transverse, covered with ABDs and ACE wrap. C/d/i with derma bond, slight bruising. ACE wrap loosened per orders. Per MD ace wrap is for comfort measures after 24 hours, pt can use sports bra if more comfortable. Bilateral JPs, with sanguinous drainage. ALESSANDRA education completed with pt and spouse, demonstrated understanding.  +bowel sounds, not passing gas. Voiding spontaneously with adequate urine output. Pt voided x2 one of the times missed the hat. PIVx2 saline locked.Tolerating regular diet with fair appetite. PLAN: Control pain,

## 2018-08-23 NOTE — OP NOTE
Procedure Date: 08/22/2018      DATE OF OPERATION: 08/22/2018      PREOPERATIVE DIAGNOSIS:  Recurrent ductal carcinoma in situ, right breast.      PROCEDURE:  Lymphatic mapping, bilateral skin-sparing mastectomies.      ATTENDING SURGEON:  Rakesh Sanchez MD      ANESTHESIA:  General with endotracheal tube intubation.      INDICATIONS FOR SURGERY:  The patient is a 58-year-old woman who had a previous lumpectomy and radiation for right-sided breast cancer.  She now presents with ductal carcinoma in situ of the right breast and she desires a bilateral mastectomy.      PROCEDURE IN DETAIL:  After informed consent, the patient was brought to the operating room, given a general anesthetic and orotracheally intubated.  I injected technetium sulfur colloid and isosulfan blue in the right breast.  She was prepped and draped in the usual fashion.  We started on the right side and made a small elliptical skin incision to include the nipple areolar complex. This was extended laterally.  The Bovie cautery was used to incise the subcutaneous tissues.  Flaps were raised in all directions with the Bovie cautery.  A superior skin flap was raised to the clavicle, a medial skin flap was raised to the lateral border of the sternum, and inferior skin flap was raised to the inframammary fold.  Next, we removed the breast and pectoralis fascia from the pectoralis major muscle from superior to inferior and from medial to lateral.  After the specimen was dissected off the lateral border of the pectoralis major muscle, it was divided, oriented and sent to pathology.        Next, we attempted to identify a right axillary sentinel lymph node. There was no radioactivity identified in the clavipectoral fascia.  We did incise and tried to find a blue lymph node.  We could find no blue or radioactive lymph nodes.        We then made a mirror image incision on the patient's left side.  Again, the Bovie cautery was used to incise the subcutaneous  tissues.  Flaps were raised in all directions.  A superior skin flap was raised to the clavicle, a medial skin flap was raised to the lateral border of the sternum, and inferior skin flap was raised to the inframammary fold.  The breast and pectoralis fascia were removed from the pectoralis major muscle from superior to inferior and from medial to lateral.  After the specimen was dissected off the lateral border of the pectoralis major muscle, it was sent to pathology.  Again, strict hemostasis was obtained with the Bovie cautery.  I packed both chests with a laparotomy pad and stapled the skin shut temporarily so that Dr. Amin could assess the skin flaps before reconstruction.         JORGE BOCANEGRA MD             D: 2018   T: 2018   MT:       Name:     MICHEL LOMAX   MRN:      [id number]        Account:        [id number]   :      1960           Procedure Date: 2018      Document: [id number]

## 2018-08-23 NOTE — PLAN OF CARE
"Problem: Patient Care Overview  Goal: Plan of Care/Patient Progress Review  Outcome: Improving  Assumed care of pt from 1006-4066. AVSS. A&Ox4. Apical pulse regular. Lungs CTA. Bilateral breast incisions c/d/i with dermabond, some bruising. Bilateral JPs with sanguinous output. Bowel sounds active in all quadrants. Tolerating clear liquid diet, advance as tolerated. Aguilar catheter draining with adequate output, urine is clear blue, aguilar to be removed today after pt is moving better. MIVF running at 100cc/hr. Pain controlled with PCA Dilaudid, Tylenol not given at this time per pt preference, states PCA is \"covering my pain adequately.\" Continue with POC. Pt would like bedside report if awake.    Addendum: PCA to be discontinued this morning, Aguilar removed around 0645.  "

## 2018-08-23 NOTE — PROGRESS NOTES
"[facility]  SUBJECTIVE  Interval history: Patient reports pain moderately controlled with current analgesics (see below) and nerve block.  Currently rating pain 5/10 at rest and 8/10 with activity.  Patient is tolerating a diet, denies nausea.  Denies any weakness, paresthesias, circumoral numbness, metallic taste or tinnitus.       Clinically Aligned Pain Assessment (CAPA):  Comfort (How is your pain?): Tolerable with discomfort  Change in Pain (Since your last medication/intervention?): About the same  Pain Control (How are your pain treatments working?):  Partially effective pain control    Medications related to Pain Management (Future)    Start     Dose/Rate Route Frequency Ordered Stop    08/26/18 0000  lidocaine 1 % 1 mL      1 mL Other EVERY 1 HOUR PRN 08/22/18 1825      08/23/18 0632  HYDROmorphone (PF) (DILAUDID) injection 0.3-0.5 mg      0.3-0.5 mg Intravenous EVERY 2 HOURS PRN 08/23/18 0632      08/23/18 0631  oxyCODONE IR (ROXICODONE) tablet 5-10 mg      5-10 mg Oral EVERY 4 HOURS PRN 08/23/18 0632      08/23/18 0000  oxyCODONE IR (ROXICODONE) 5 MG tablet      5-10 mg Oral EVERY 6 HOURS PRN 08/23/18 0600      08/22/18 1825  bupivacaine liposome (EXPAREL) LONG ACTING injection was administered into the infiltration site to produce postsurgical analgesia. Duration of action is up to 72 hours, and other \"zeinab\" medications should not be given for 96 hours with the exception of the lidocaine 5% patch (LIDODERM) and the lidocaine 10mg in potassium infusions. This entry is for INFORMATION ONLY.       Does not apply CONTINUOUS PRN 08/22/18 1825 08/26/18 1824    08/22/18 1825  lidocaine (LMX4) cream       Topical EVERY 1 HOUR PRN 08/22/18 1825      08/22/18 1730  acetaminophen (TYLENOL) tablet 975 mg      975 mg Oral EVERY 8 HOURS 08/22/18 1726            OBJECTIVE:    /60 (BP Location: Left arm)  Pulse 96  Temp 97.3  F (36.3  C) (Oral)  Resp 17  Ht 1.651 m (5' 5\")  Wt 80.5 kg " (177 lb 7.5 oz)  LMP 10/14/2001  SpO2 96%  BMI 29.53 kg/m2  Exam:  General: alert and no distress  Neuro: Strength 5/5 LE    ASSESSMENT/PLAN:    Enid Lombardo is a 58 year old female with R) breast cancer, now POD #1 s/p  COMBINED MASTECTOMY SIMPLE BILATERAL, SENTINEL NODE BILATERAL  RECONSTRUCT BREAST with single shot injection bilateral pectoral (PECS) nerve block.  Total bupivacaine 0.25% with epinephrine 1:200,000 20mL total and liposomal (long-acting) bupivacaine (Exparel) 1.3% 20mL total administered 8/22/18 for postop pain control.  Pt is ambulating without difficulty.  No evidence of adverse side effects associated with nerve block injections.  Pt acheiving adequate pain control, with nerve block, oral and IV analgesics.  Anticipate 48-72 hours of pain control with long-acting local anesthetic. Pt will continue to require multimodal analgesia for visceral/muscle/musculoskeletal pain not controlled with long-acting local anesthetic.      - NO other local anesthetic use within 96 hours of liposomal bupivacaine (Exparel), unless approved by [facility]  - patient received verbal and written instructions about liposomal bupivacaine and counseling about pharmacologic and nonpharmacologic measures for acute postoperative pain management  - please call [facility] if questions or concerns    HERMILO Camara CNP  [facility] ([facility])  8/23/2018 9:51 AM    [facility] Contact Info (for in-house use only):  Job code ID: [id number]   [facility] 0599  Peds [phone]  [employer] phone: dial [phone], enter jobcode ID, then enter call-back number.    Text: Use [employer] on the Intranet <Paging/Directory> tab and enter Jobcode ID.   If no call back at any time, contact the hospital  and ask for [facility] attending or backup

## 2018-08-24 VITALS
WEIGHT: 180.5 LBS | TEMPERATURE: 97.5 F | SYSTOLIC BLOOD PRESSURE: 107 MMHG | HEART RATE: 72 BPM | OXYGEN SATURATION: 93 % | BODY MASS INDEX: 30.07 KG/M2 | DIASTOLIC BLOOD PRESSURE: 61 MMHG | HEIGHT: 65 IN | RESPIRATION RATE: 18 BRPM

## 2018-08-24 PROCEDURE — 25000132 ZZH RX MED GY IP 250 OP 250 PS 637: Performed by: PHYSICIAN ASSISTANT

## 2018-08-24 PROCEDURE — 25000132 ZZH RX MED GY IP 250 OP 250 PS 637: Performed by: SURGERY

## 2018-08-24 PROCEDURE — 25000125 ZZHC RX 250: Performed by: SURGERY

## 2018-08-24 RX ORDER — POLYETHYLENE GLYCOL 3350 17 G/17G
17 POWDER, FOR SOLUTION ORAL DAILY
Status: DISCONTINUED | OUTPATIENT
Start: 2018-08-24 | End: 2018-08-24 | Stop reason: HOSPADM

## 2018-08-24 RX ORDER — POLYETHYLENE GLYCOL 3350 17 G/17G
17 POWDER, FOR SOLUTION ORAL DAILY PRN
Qty: 7 PACKET | Refills: 0 | Status: SHIPPED | OUTPATIENT
Start: 2018-08-24 | End: 2018-09-04

## 2018-08-24 RX ORDER — CYCLOBENZAPRINE HCL 5 MG
5 TABLET ORAL 3 TIMES DAILY PRN
Qty: 10 TABLET | Refills: 0 | Status: SHIPPED | OUTPATIENT
Start: 2018-08-24 | End: 2018-09-04

## 2018-08-24 RX ADMIN — OXYCODONE HYDROCHLORIDE 10 MG: 5 TABLET ORAL at 00:40

## 2018-08-24 RX ADMIN — SERTRALINE HYDROCHLORIDE 25 MG: 25 TABLET ORAL at 07:55

## 2018-08-24 RX ADMIN — ACETAMINOPHEN 975 MG: 325 TABLET, FILM COATED ORAL at 00:40

## 2018-08-24 RX ADMIN — ONDANSETRON 4 MG: 4 TABLET, ORALLY DISINTEGRATING ORAL at 02:46

## 2018-08-24 RX ADMIN — POLYETHYLENE GLYCOL 3350 17 G: 17 POWDER, FOR SOLUTION ORAL at 07:57

## 2018-08-24 RX ADMIN — OXYCODONE HYDROCHLORIDE 10 MG: 5 TABLET ORAL at 04:42

## 2018-08-24 RX ADMIN — OXYCODONE HYDROCHLORIDE 10 MG: 5 TABLET ORAL at 07:58

## 2018-08-24 RX ADMIN — SULFAMETHOXAZOLE AND TRIMETHOPRIM 1 TABLET: 800; 160 TABLET ORAL at 07:55

## 2018-08-24 RX ADMIN — ACETAMINOPHEN 975 MG: 325 TABLET, FILM COATED ORAL at 07:55

## 2018-08-24 ASSESSMENT — PAIN DESCRIPTION - DESCRIPTORS
DESCRIPTORS: SORE
DESCRIPTORS: ACHING;CONSTANT
DESCRIPTORS: ACHING;CONSTANT

## 2018-08-24 ASSESSMENT — ACTIVITIES OF DAILY LIVING (ADL)
ADLS_ACUITY_SCORE: 9

## 2018-08-24 NOTE — PLAN OF CARE
Problem: Patient Care Overview  Goal: Plan of Care/Patient Progress Review  Outcome: Improving  VSS. Pain controlled with scheduled Tylenol and PRN Oxy. Nausea controlled with Zofran. ALESSANDRA x2. UTV incisions, chest wrapped with ace bandage, CDI. Regular diet. +gas, denies BM. SBA. Possible DC home today. Continue to monitor.

## 2018-08-24 NOTE — PROGRESS NOTES
Surgical Oncology Progress Note    Interval History:  No acute events overnight. Pain better controlled this morning. Tolerating regular diet.     Physical Exam:   Temp:  [97.3  F (36.3  C)-99  F (37.2  C)] 98  F (36.7  C)  Pulse:  [76-80] 80  Heart Rate:  [81-89] 81  Resp:  [16-21] 16  BP: (107-146)/(60-78) 129/66  SpO2:  [87 %-96 %] 93 %  General: Alert, oriented, appears comfortable, NAD.  Respiratory: breathing non labored  Chest incisions are clean dry and intact without bruising or sign of hematoma. Right ALESSANDRA drain serosanguinous but more serous, left drain serosanguinous but more sanguinous.     Data:   All laboratory and imaging data in the past 24 hours reviewed  I/O last 3 completed shifts:  In: 2310 [P.O.:1520; I.V.:790]  Out: 2410 [Urine:2150; Drains:260]  Recent Labs   Lab Test  08/23/18   0730  08/22/18   0929  11/23/14   1805   WBC  8.1   --   5.0   HGB  11.6*  14.2  13.8   PLT  172   --   217      Recent Labs   Lab Test  08/23/18   0730  08/22/18   0929  03/31/17   0741  11/23/14   1805   NA  138   --   141  142   POTASSIUM  4.1  4.0  3.9  3.6   CHLORIDE  106   --   106  109   CO2  28   --   28  26   BUN  6*   --   12  11   CR  0.65  0.70  0.70  0.70   ANIONGAP  4   --   7  7   ROSENDO  8.3*   --   8.9  8.7   GLC  102*   --   95  111*     Recent Labs   Lab Test  11/23/14   1805   PROTTOTAL  6.4*   ALBUMIN  3.5   BILITOTAL  0.2   ALKPHOS  48   AST  15   ALT  25       Assessment and Plan:     Enid Lombardo is a 58F with PMH of recurrent DCIS, POD2 s/p b/l mastectomy with tissue expanders with plastics. Stable.     -Post operative pain: oxycodone PRN, tylenol, flexeril PRN  -regular diet  -ALESSANDRA drain teaching  -home today    Seen with Chief resident who will discuss with Staff.     Sena Rodrigues PAAlanC   Surgical Oncology

## 2018-08-24 NOTE — PLAN OF CARE
Problem: Surgery Nonspecified (Adult)  Goal: Signs and Symptoms of Listed Potential Problems Will be Absent, Minimized or Managed (Surgery Nonspecified)  Signs and symptoms of listed potential problems will be absent, minimized or managed by discharge/transition of care (reference Surgery Nonspecified (Adult) CPG).   Outcome: Improving  POD1. VSS. Pt reports continued pain, better control with Oxy x2, Flexeril x1, Tylenol x1, & IV Dil x2. Incisions with dermabond, some bruising, no redness or drainage. L ALESSANDRA: 50 ml BRB & R ALESSANDRA: 35 ml BRB. CMS intact BUE. Tolerating reg diet, BS active x4, pt reports passing gas. Pt up with SBA, amb in halls x2, voiding adequately. Capnography D/C'd @ 2000. Possible D/C tomorrow if pain controlled.

## 2018-08-24 NOTE — DISCHARGE SUMMARY
SURGICAL ONCOLOGY DISCHARGE SUMMARY  Patient Name: Enid Lombardo  MR#: [id number]  Date of Admission: 8/22/2018  7:05 AM  Date of Discharge: 8/24/2018  Discharging Physician: Dr. Sanchez  Operating Physician: Dr. Sanchez    Discharge Diagnoses:  1. S/P bilateral mastectomy    2. Postoperative pain        Procedures Performed this admission:  Bilateral Mastectomy with Right [place] Node Biopsy, Bilateral Breast Reconstruction 8/22    Consultations:  Plastic surgery    Brief HPI:  58F with DCIS of right breast who was seen in clinic with Dr. Sanchez. In 2015, she underwent a right lumpectomy and a sentinel lymph node biopsy for a stage I breast cancer.  She was estrogen receptor positive.  She received radiation therapy and tamoxifen for about 2-1/2 years. She had been doing well but had a screening mammogram, which revealed a new area of microcalcifications in her right breast.  It measured approximately 3 cm in size.  She had a contrast-enhanced mammography that showed the same thing, about 3 cm in size.  This was remote from her lumpectomy cavity.  Her contralateral breast was normal.  She underwent a needle biopsy which demonstrated a grade 2 ductal carcinoma in situ with comedo necrosis, ER positive, NE positive. She was seen in clinic to talk about treatment options and a decision was made to undergo a bilateral mastectomy with reconstruction. She saw Dr. Amin preoperatively and a joint case between surgical oncology and plastic surgery was booked.     Hospital Course:   Enid Lombardo was admitted s/p the aforementioned procedure which she tolerated well. The patient was transferred to the general floor for postoperative recovery. Cardiopulmonary and renal status remained stable throughout the admission. Diet was advanced and patient achieved full return of bowel function. The post-operative course was significant for postoperative pain that required a PCA and then IV pain medication and muscle  "relaxants. On POD2, her pain was controlled with PO pain meds, she was tolerating a diet, ambulating, and received appropriate drain cares. She was discharged to home in stable condition.      Pathology:  Pending    Discharge Exam:  /61 (BP Location: Left arm)  Pulse 72  Temp 97.5  F (36.4  C) (Oral)  Resp 18  Ht 1.651 m (5' 5\")  Wt 81.9 kg (180 lb 8 oz)  LMP 10/14/2001  SpO2 93%  BMI 30.04 kg/m2.  General: Alert, in no acute distress   HEENT: Normocephalic, atraumatic. Patent nares.   Respiratory: Breathing comfortably on room air  Cardiovascular: Regular rate and rhythm   Gastrointestinal: abdomen benign  Bilateral chest wall incisions c/d/i, expanders in place, ALESSANDRA drains s/s  Extremities: Moving all four extremities. No limb deformities. No pedal edema   Skin: No rashes or lesions appreciated      Medications on Discharge:      Review of your medicines      START taking       Dose / Directions    cyclobenzaprine 5 MG tablet   Commonly known as:  FLEXERIL   Used for:  Postoperative pain        Dose:  5 mg   Take 1 tablet (5 mg) by mouth 3 times daily as needed (For intercostal muscle spasm.)   Quantity:  10 tablet   Refills:  0       oxyCODONE IR 5 MG tablet   Commonly known as:  ROXICODONE   Used for:  Postoperative pain        Dose:  5-10 mg   Take 1-2 tablets (5-10 mg) by mouth every 6 hours as needed for severe pain   Quantity:  30 tablet   Refills:  0       polyethylene glycol Packet   Commonly known as:  MIRALAX/GLYCOLAX   Used for:  S/P bilateral mastectomy        Dose:  17 g   Take 17 g by mouth daily as needed for constipation   Quantity:  7 packet   Refills:  0       sulfamethoxazole-trimethoprim 800-160 MG per tablet   Commonly known as:  BACTRIM DS/SEPTRA DS   Used for:  S/P bilateral mastectomy        Dose:  1 tablet   Take 1 tablet by mouth 2 times daily for 14 days   Quantity:  28 tablet   Refills:  0         CONTINUE these medicines which may have CHANGED, or have new prescriptions. " If we are uncertain of the size of tablets/capsules you have at home, strength may be listed as something that might have changed.       Dose / Directions    sertraline 25 MG tablet   Commonly known as:  ZOLOFT   This may have changed:    - how much to take  - when to take this  - additional instructions   Used for:  Hot flushes, perimenopausal        Take 1 tablet daily.   Quantity:  30 tablet   Refills:  11         CONTINUE these medicines which have NOT CHANGED       Dose / Directions    ACETAMINOPHEN PO        Dose:  325 mg   Take 325 mg by mouth every 8 hours as needed for pain   Refills:  0       Fish Oil 1000 MG Cpdr        Take  by mouth.   Refills:  0       fluticasone 50 MCG/ACT spray   Commonly known as:  FLONASE   Used for:  Chronic rhinitis        USE ONE OR TWO SPRAYS IN EACH NOSTRIL DAILY   Quantity:  16 mL   Refills:  10       loratadine 10 MG tablet   Commonly known as:  CLARITIN   Used for:  Chronic rhinitis, unspecified type        TAKE ONE TABLET BY MOUTH EVERY DAY AS NEEDED FOR ALLERGY SYMPTOMS   Quantity:  90 tablet   Refills:  1       neomycin-polymyxin-HC 1 % Soln   Commonly known as:  CORTISPORIN   Used for:  Infective otitis externa, bilateral        Place 4 drops in both ears 3 times daily for 9 days   Quantity:  20 mL   Refills:  0       omeprazole 20 MG CR capsule   Commonly known as:  priLOSEC   Used for:  Gastroesophageal reflux disease, esophagitis presence not specified        TAKE 1 CAPSULE (20 MG) BY MOUTH DAILY   Quantity:  90 capsule   Refills:  3       PRESERVISION AREDS 2 Caps   Used for:  Drusen (degenerative) of retina, bilateral, Vitreous degeneration, bilateral        Dose:  1 tablet   Take 1 tablet by mouth 2 times daily   Quantity:  60 capsule   Refills:  11         STOP taking          ADVIL PO                Where to get your medicines      These medications were sent to [facility] - [place], [place] - [address]  [address] [facility],  "[facility] [address]     Phone:  [phone]      cyclobenzaprine 5 MG tablet     polyethylene glycol Packet     sulfamethoxazole-trimethoprim 800-160 MG per tablet         Some of these will need a paper prescription and others can be bought over the counter. Ask your nurse if you have questions.     Bring a paper prescription for each of these medications      oxyCODONE IR 5 MG tablet             Discharge Procedure Orders  Reason for your hospital stay   Order Comments: Bilateral skin sparing mastectomy with immediate reconstruction.     Adult [facility] Follow-up and recommended labs and tests   Order Comments: Follow up with Dr. Amin in 1 week. Follow up with Dr. Sanchez in 2 weeks.     Appointments on [place] and/or [facility] (with [facility] or [facility] provider or service). Call [phone] if you haven't heard regarding these appointments within 7 days of discharge.     Discharge Instructions   Order Comments: From Dr Sanchez:    1. Patient to follow up with appointment in 2 weeks with Dr. Sanchez. Follow up with Dr. Amin in 1 week.   2. Do not drive while taking narcotic pain medication (oxycodone).   3. May take plain Tylenol as needed for pain.   4. Avoid non-steroidal anti-inflammatory medications (Advil, Ibuprofen, Naproxen, aspirin, etc) for 5-7 days or until approved by the Plastic Surgery team.   5. Caution with putting ice on the incision as it will be numb you will not realize it if you leave the ice for too long and can damage your skin.  6. If you develop any fever/chills, worsening pain, redness, swelling, or drainage from your wound please call the clinic (Monday through Friday 8:00am-5:00pm [phone] Supriya BOYER) or on-call surgical oncology resident (nights and weekends [phone] and ask \"I would like to page the Surgical Oncology Resident on call.\")   7. No lifting over 5-10 lbs and no strenuous physical activity for 6 weeks.   8. Keep dressing clean and dry. Please refer to Dr" "Eloisa's wound care instructions.   9. Monitor the drain output. Record the drain output per 24 hours. Your drain will be removed by Dr. Amin at a follow up appointment.   Drain care:  Please keep drain site clean and dry.   Please call the clinic (see phone numbers above) if:  Your drain falls out.  The stitch that is holding the drain in place at the skin is coming loose or missing.  The drainage fluid is very thick and/or has a bad odor.  The squeeze bulb does not stay collapsed.  The drainage suddenly stops or dramatically decreases when the drain has been having steady amounts of drainage.  Please keep a log of the drainage amounts every time you empty the drain.    Please \"strip\" the drain 3 times per day:  Wash your hands with soap and water and dry.  Gently squeeze the tubing if you see a clot to loosen it up.   and pinch the drain where it comes out of the skin with one hand, to steady it.  With the other hand,  and pinch the drain and slide your fingers down the tubing, towards the drainage bulb.  Then release your  on the end where the tube comes out of your body, followed by releasing your  at the end of the drainage bulb.    Repeat several times.  It may be easier to 'strip' the drain with an alcohol swab or with hand cleanser on the hand that is moving along the tube.  Wash your hands again.     Discharge Instructions   Order Comments: From Dr. Amin:  - Please avoid taking showers while drains are in place to prevent infections.    - Strip drains 2-3 times/day and record output volume for each drain separately everyday. Bring the log of outputs to clinic visit. The output volume determines when the drains can be removed.   - Take the antibiotics (Bactrim) while drains are in place.    - You may keep an ACE wrap on the breasts for comfort as needed. May wear a cotton bra instead. Make sure it is not too tight.  - Rest with head of bed elevated to relieve swelling at the surgical " site.    You already have follow up scheduled on 8/28/2018 at 11:45 AM. Please come to this appointment and call with any questions (may call the clinic at [phone] during normal business hours; if after hours, please call [phone] and ask to speak to the Plastic Surgery resident on call - but for questions for Dr. Sanchez's team, please ask to speak to the Surgical Oncology resident on call).         All patient's and family's concerns were addressed prior to discharge. Patient discussed with team on the day of discharge.    Discussed with Dr. Sanchez on day of discharge.     Jacqueline Jose MD PGY3  General Surgery

## 2018-08-24 NOTE — PROGRESS NOTES
Plastic Surgery Progress Note      Subjective/Interval History:  NAEON. Doing well. Pain well controlled with oral medication (scheduled tylenol with oxycodone). Tolerating po intake. Ambulating independently without issue.      Objective:  Temp:  [97.3  F (36.3  C)-99  F (37.2  C)] 97.5  F (36.4  C)  Pulse:  [72-80] 72  Heart Rate:  [81-89] 81  Resp:  [16-21] 18  BP: (107-146)/(60-78) 107/61  SpO2:  [87 %-96 %] 93 %     General appearance: in NAD  Pulm: Non-labored breathing on RA  CV: WWP  Breast: ACE wrap in place, breast flaps warm and well perfused with cap refill < 2 sec, no ecchymosis, no hematoma apparent, ALESSANDRA x2 serosanguinous (left more sanguinous than serous, but no increased output when stripped)  Extremities: SCDs in place       I/O last 3 completed shifts:  In: 1880 [P.O.:1880]  Out: 2900 [Urine:2650; Drains:250]   L ALESSANDRA 155 / 20 (50)  R ALESSANDRA 105 / 30 (50)     Assessment/Plan:   Enid Lombardo is a 58 year old woman with a h/o right breast cancer s/p prior lumpectomy and radiation (2004) now with R DCIS s/p bilateral mastectomy and R SLNB with immediate reconstruction with tissue expanders 8/22/2018.      - Strip JPs q4h and record output qshift.   - ALESSANDRA teaching by RN prior to discharge, pt to record outputs and bring to to follow up.  - ACE wrap PRN for comfort; ok to wear sports bra if more comfortable.  - Bactrim while drains in place (ordered in Discharge Navigator).  - No showers while drains in place (instructions ordered in Discharge Navigator).  - Encourage ambulation as tolerated.  - Pain control per primary.  - Ok to discharge per primary team, follow up with Plastic Surgery in 1 week (already scheduled).      - - - - - - - - - - - - - - - - - -  Katerina Byers MD  General Surgery PGY-3      See [facility] for on-call pager information.

## 2018-08-24 NOTE — PLAN OF CARE
Problem: Patient Care Overview  Goal: Plan of Care/Patient Progress Review  Outcome: Adequate for Discharge Date Met: 08/24/18  Pt DC to home. DC instructions discussed with Pt and paperwork signed. PIV removed. Medications picked up at DC pharmacy. All belongings with Pt.

## 2018-08-27 ENCOUNTER — TELEPHONE (OUTPATIENT)
Dept: FAMILY MEDICINE | Facility: OTHER | Age: 58
End: 2018-08-27

## 2018-08-27 ASSESSMENT — ENCOUNTER SYMPTOMS
TASTE DISTURBANCE: 0
SINUS PAIN: 0
BREAST PAIN: 1
NAUSEA: 0
JAUNDICE: 0
BLOOD IN STOOL: 0
ABDOMINAL PAIN: 0
TASTE DISTURBANCE: 0
HOARSE VOICE: 0
BREAST PAIN: 1
BOWEL INCONTINENCE: 0
SMELL DISTURBANCE: 0
VOMITING: 0
CONSTIPATION: 1
SORE THROAT: 0
NECK MASS: 0
DIARRHEA: 0
NAUSEA: 0
BLOOD IN STOOL: 0
BLOATING: 1
SMELL DISTURBANCE: 0
SINUS PAIN: 0
RECTAL PAIN: 0
BREAST MASS: 0
ABDOMINAL PAIN: 0
NECK MASS: 0
SORE THROAT: 0
BOWEL INCONTINENCE: 0
SINUS CONGESTION: 0
DIARRHEA: 0
SINUS CONGESTION: 0
HEARTBURN: 0
BREAST MASS: 0
RECTAL PAIN: 0
HEARTBURN: 0
CONSTIPATION: 1
VOMITING: 0
HOARSE VOICE: 0
JAUNDICE: 0
TROUBLE SWALLOWING: 0
BLOATING: 1
TROUBLE SWALLOWING: 0

## 2018-08-27 NOTE — TELEPHONE ENCOUNTER
"Hospital/TCU/ED for chronic condition Discharge Protocol    \"Hi, my name is Liliya Pepper, a registered nurse, and I am calling from [facility]  I am calling to follow up and see how things are going for you after your recent emergency visit/hospital/TCU stay.\"    Tell me how you are doing now that you are home? \"Okay. Getting better each day.\"      Discharge Instructions    \"Let's review your discharge instructions.  What is/are the follow-up recommendations?  Pt. Response: Dr. Amin in 1 week. Follow up with Dr. Sanchez in 2 weeks    \"Has an appointment with your primary care provider been scheduled?\"   Yes. (confirm)     \"When you see the provider, I would recommend that you bring your medications with you.\"    Medications    \"Tell me what changed about your medicines when you discharged?\"    Changes to chronic meds?    0-1    \"What questions do you have about your medications?\"    None     New diagnoses of heart failure, COPD, diabetes, or MI?    No    Medication reconciliation completed? Yes  Was MTM referral placed (*Make sure to put transitions as reason for referral)?   No    Call Summary    \"What questions or concerns do you have about your recent visit and your follow-up care?\"     none    \"If you have questions or things don't continue to improve, we encourage you contact us through the main clinic number (give number).  Even if the clinic is not open, triage nurses are available 24/7 to help you.     We would like you to know that our clinic has extended hours (provide information).  We also have urgent care (provide details on closest location and hours/contact info)\"      \"Thank you for your time and take care!\"    Liliya Pepper, RN, BSN     "

## 2018-08-27 NOTE — TELEPHONE ENCOUNTER
RN to call for hospital follow up:    Reason for follow up: Enid Lombardo appeared on our list for being seen in an Emergency Room or a recent Hospital discharge.    Admitting date: 8/22/18  Discharge date: 8/24/18  Location: [place]  Reason for visit:   Chief Complaint   Patient presents with     Hospital F/U     Bilateral Mastectomy     Vy Moore RN, BSN

## 2018-08-28 ENCOUNTER — CARE COORDINATION (OUTPATIENT)
Dept: ONCOLOGY | Facility: CLINIC | Age: 58
End: 2018-08-28

## 2018-08-28 ENCOUNTER — OFFICE VISIT (OUTPATIENT)
Dept: PLASTIC SURGERY | Facility: CLINIC | Age: 58
End: 2018-08-28
Attending: PLASTIC SURGERY
Payer: COMMERCIAL

## 2018-08-28 ENCOUNTER — ONCOLOGY VISIT (OUTPATIENT)
Dept: ONCOLOGY | Facility: CLINIC | Age: 58
End: 2018-08-28
Attending: INTERNAL MEDICINE
Payer: COMMERCIAL

## 2018-08-28 VITALS
RESPIRATION RATE: 16 BRPM | WEIGHT: 174.56 LBS | SYSTOLIC BLOOD PRESSURE: 108 MMHG | BODY MASS INDEX: 29.08 KG/M2 | DIASTOLIC BLOOD PRESSURE: 77 MMHG | HEIGHT: 65 IN | HEART RATE: 81 BPM | TEMPERATURE: 98 F | OXYGEN SATURATION: 97 %

## 2018-08-28 VITALS
HEIGHT: 65 IN | SYSTOLIC BLOOD PRESSURE: 108 MMHG | RESPIRATION RATE: 16 BRPM | DIASTOLIC BLOOD PRESSURE: 77 MMHG | OXYGEN SATURATION: 97 % | BODY MASS INDEX: 29.09 KG/M2 | HEART RATE: 81 BPM | TEMPERATURE: 98 F | WEIGHT: 174.6 LBS

## 2018-08-28 DIAGNOSIS — Z98.890 S/P BREAST RECONSTRUCTION, BILATERAL: ICD-10-CM

## 2018-08-28 DIAGNOSIS — D05.91 RECURRENT CARCINOMA IN SITU OF BREAST, RIGHT: Primary | ICD-10-CM

## 2018-08-28 DIAGNOSIS — Z98.890 S/P BREAST RECONSTRUCTION: Primary | ICD-10-CM

## 2018-08-28 PROCEDURE — G0463 HOSPITAL OUTPT CLINIC VISIT: HCPCS | Mod: ZF

## 2018-08-28 PROCEDURE — 99214 OFFICE O/P EST MOD 30 MIN: CPT | Mod: GC | Performed by: INTERNAL MEDICINE

## 2018-08-28 RX ORDER — CEFAZOLIN SODIUM 1 G/50ML
1 INJECTION, SOLUTION INTRAVENOUS SEE ADMIN INSTRUCTIONS
Status: CANCELLED | OUTPATIENT
Start: 2018-08-28 | End: 2019-08-28

## 2018-08-28 RX ORDER — TRAMADOL HYDROCHLORIDE 50 MG/1
50 TABLET ORAL EVERY 6 HOURS PRN
Qty: 20 TABLET | Refills: 0 | Status: SHIPPED | OUTPATIENT
Start: 2018-08-28 | End: 2018-10-03

## 2018-08-28 ASSESSMENT — PAIN SCALES - GENERAL
PAINLEVEL: MODERATE PAIN (4)
PAINLEVEL: MODERATE PAIN (4)

## 2018-08-28 NOTE — LETTER
8/28/2018       RE: Enid Lombardo  [address] [facility] [address]     Dear Colleague,    Thank you for referring your patient, Enid Lombardo, to the [facility]. Please see a copy of my visit note below.    PRESENTING COMPLAINT:  Postoperative visit, status post right breast cancer requiring bilateral nipple-nonsparing mastectomy and immediate expander-based reconstruction with a prepectoral expander and AlloDerm done on 08/22/2018.      HISTORY OF PRESENTING COMPLAINT:  Ms. Lombardo is 58 years old.  She is less than a week out from surgery, overall doing very well.  ALESSANDRA drainage is about 30-40 mL a day.  No major issues.  She is on antibiotics and tolerating it well.  She does not require chemo or radiation therapy.      PHYSICAL EXAMINATION:  Vital signs are stable.  She is afebrile, in no obvious distress.  She is healing in well.  No evidence for infection, seroma or hematoma.      ASSESSMENT AND PLAN:  Based upon the above findings, a diagnosis of bilateral breast reconstruction for breast cancer was made.  The plan now is to start moisturizing.  See me back in a week's time to remove the ALESSANDRA drains.  The patient will then start expansion in about 2 weeks' time, and then we will get her ready for the next stage of reconstruction, which she would like autologous reconstruction.  I went over all her questions, I gave her an overview of the next steps, and all her questions were answered.  She was happy with the visit.  I will see her back in a week's time.       Again, thank you for allowing me to participate in the care of your patient.      Sincerely,    DENNISE Amin MD

## 2018-08-28 NOTE — MR AVS SNAPSHOT
After Visit Summary   8/28/2018    Enid Lombardo    MRN: [id number]           Patient Information     Date Of Birth          1960        Visit Information        Provider Department      8/28/2018 11:45 AM DENNISE Amin MD [facility]        Today's Diagnoses     S/P breast reconstruction    -  1       Follow-ups after your visit        Follow-up notes from your care team     Return in about 1 week (around 9/4/2018).      Your next 10 appointments already scheduled     Sep 04, 2018  3:50 PM CDT   [facility]ISE [facility]   [facility]    [address] [place] [address]   [phone]            Sep 06, 2018 10:30 AM CDT   (Arrive by 10:15 AM)   Post-Op with Rakesh Sanchez MD   [facility] ([facility])    [address] [place] [address]  [address] [place] [id number] [phone]            Jan 21, 2019 10:15 AM CST   RETURN RETINA with Paz Osborn MD   [facility] ([facility])    [address]  [facility] [address] [phone]            Aug 27, 2019 10:15 AM CDT   (Arrive by 10:00 AM)   Return Visit with Mira Bryant MD   [facility] ([facility])    [address] [place] [address]  [address] [place] [id number] [phone]              Who to contact     If you have questions or need follow up information about today's clinic visit or your schedule please contact [facility] directly at [phone].  Normal or non-critical lab and imaging results will be communicated to you by MyChart, letter or phone within 4 business days after the clinic has received the results. If you do not hear from us within 7 days, please contact the clinic through MyChart or phone. If you have a critical or abnormal lab result, we will  "notify you by phone as soon as possible.  Submit refill requests through [employer] or call your pharmacy and they will forward the refill request to us. Please allow 3 business days for your refill to be completed.          Additional Information About Your Visit        [employer] Information     [employer] gives you secure access to your electronic health record. If you see a primary care provider, you can also send messages to your care team and make appointments. If you have questions, please call your primary care clinic.  If you do not have a primary care provider, please call [phone] and they will assist you.        Care EveryWhere ID     This is your Care EveryWhere ID. This could be used by other organizations to access your [facility] medical records  [id number]        Your Vitals Were     Pulse Temperature Respirations Height Last Period Pulse Oximetry    81 98  F (36.7  C) (Oral) 16 5' 5\" 10/14/2001 97%    BMI (Body Mass Index)                   29.05 kg/m2            Blood Pressure from Last 3 Encounters:   08/28/18 108/77   08/28/18 108/77   08/24/18 107/61    Weight from Last 3 Encounters:   08/28/18 174 lb 9 oz   08/28/18 174 lb 9.6 oz   08/23/18 180 lb 8 oz              Today, you had the following     No orders found for display         Today's Medication Changes          These changes are accurate as of 8/28/18 11:59 PM.  If you have any questions, ask your nurse or doctor.               Start taking these medicines.        Dose/Directions    traMADol 50 MG tablet   Commonly known as:  ULTRAM   Used for:  S/P breast reconstruction   Started by:  DENNISE Amin MD        Dose:  50 mg   Take 1 tablet (50 mg) by mouth every 6 hours as needed for severe pain   Quantity:  20 tablet   Refills:  0         These medicines have changed or have updated prescriptions.        Dose/Directions    sertraline 25 MG tablet   Commonly known as:  ZOLOFT   This may have changed:    - how much to take  - when to " take this  - additional instructions   Used for:  Hot flushes, perimenopausal        Take 1 tablet daily.   Quantity:  30 tablet   Refills:  11            Where to get your medicines      Some of these will need a paper prescription and others can be bought over the counter.  Ask your nurse if you have questions.     Bring a paper prescription for each of these medications     traMADol 50 MG tablet               Information about OPIOIDS     PRESCRIPTION OPIOIDS: WHAT YOU NEED TO KNOW   We gave you an opioid (narcotic) pain medicine. It is important to manage your pain, but opioids are not always the best choice. You should first try all the other options your care team gave you. Take this medicine for as short a time (and as few doses) as possible.    Some activities can increase your pain, such as bandage changes or therapy sessions. It may help to take your pain medicine 30 to 60 minutes before these activities. Reduce your stress by getting enough sleep, working on hobbies you enjoy and practicing relaxation or meditation. Talk to your care team about ways to manage your pain beyond prescription opioids.    These medicines have risks:    DO NOT drive when on new or higher doses of pain medicine. These medicines can affect your alertness and reaction times, and you could be arrested for driving under the influence (DUI). If you need to use opioids long-term, talk to your care team about driving.    DO NOT operate heavy machinery    DO NOT do any other dangerous activities while taking these medicines.    DO NOT drink any alcohol while taking these medicines.     If the opioid prescribed includes acetaminophen, DO NOT take with any other medicines that contain acetaminophen. Read all labels carefully. Look for the word  acetaminophen  or  Tylenol.  Ask your pharmacist if you have questions or are unsure.    You can get addicted to pain medicines, especially if you have a history of addiction (chemical, alcohol or  substance dependence). Talk to your care team about ways to reduce this risk.    All opioids tend to cause constipation. Drink plenty of water and eat foods that have a lot of fiber, such as fruits, vegetables, prune juice, apple juice and high-fiber cereal. Take a laxative (Miralax, milk of magnesia, Colace, Senna) if you don t move your bowels at least every other day. Other side effects include upset stomach, sleepiness, dizziness, throwing up, tolerance (needing more of the medicine to have the same effect), physical dependence and slowed breathing.    Store your pills in a secure place, locked if possible. We will not replace any lost or stolen medicine. If you don t finish your medicine, please throw away (dispose) as directed by your pharmacist. The [place] Pollution Control Agency has more information about safe disposal: https://www.pca.[facility].[place].us/living-green/managing-unwanted-medications         Primary Care Provider Office Phone # Fax #    Shi Alonso [phone]       [address] [place] [address]        Equal Access to Services     : Hadii prem osorio hadasho Sojohanny, waaxda luqadaha, qaybta kaalmashruthi crockett, michelle ford . So [facility] [phone].    ATENCIÓN: Si habla español, tiene a rivera disposición servicios gratuitos de asistencia lingüística. Wei al [phone].    We comply with applicable federal civil rights laws and [place] laws. We do not discriminate on the basis of race, color, national origin, age, disability, sex, sexual orientation, or gender identity.            Thank you!     Thank you for choosing [facility]  for your care. Our goal is always to provide you with excellent care. Hearing back from our patients is one way we can continue to improve our services. Please take a few minutes to complete the written survey that you may receive in the mail after your visit with us. Thank you!             Your  Updated Medication List - Protect others around you: Learn how to safely use, store and throw away your medicines at www.disposemymeds.org.          This list is accurate as of 8/28/18 11:59 PM.  Always use your most recent med list.                   Brand Name Dispense Instructions for use Diagnosis    ACETAMINOPHEN PO      Take 325 mg by mouth every 8 hours as needed for pain        cyclobenzaprine 5 MG tablet    FLEXERIL    10 tablet    Take 1 tablet (5 mg) by mouth 3 times daily as needed (For intercostal muscle spasm.)    Postoperative pain       Fish Oil 1000 MG Cpdr      Take  by mouth.        fluticasone 50 MCG/ACT spray    FLONASE    16 mL    USE ONE OR TWO SPRAYS IN EACH NOSTRIL DAILY    Chronic rhinitis       loratadine 10 MG tablet    CLARITIN    90 tablet    TAKE ONE TABLET BY MOUTH EVERY DAY AS NEEDED FOR ALLERGY SYMPTOMS    Chronic rhinitis, unspecified type       neomycin-polymyxin-HC 1 % Soln    CORTISPORIN    20 mL    Place 4 drops in both ears 3 times daily for 9 days    Infective otitis externa, bilateral       omeprazole 20 MG CR capsule    priLOSEC    90 capsule    TAKE 1 CAPSULE (20 MG) BY MOUTH DAILY    Gastroesophageal reflux disease, esophagitis presence not specified       oxyCODONE IR 5 MG tablet    ROXICODONE    30 tablet    Take 1-2 tablets (5-10 mg) by mouth every 6 hours as needed for severe pain    Postoperative pain       polyethylene glycol Packet    MIRALAX/GLYCOLAX    7 packet    Take 17 g by mouth daily as needed for constipation    S/P bilateral mastectomy       PRESERVISION AREDS 2 Caps     60 capsule    Take 1 tablet by mouth 2 times daily    Drusen (degenerative) of retina, bilateral, Vitreous degeneration, bilateral       sertraline 25 MG tablet    ZOLOFT    30 tablet    Take 1 tablet daily.    Hot flushes, perimenopausal       sulfamethoxazole-trimethoprim 800-160 MG per tablet    BACTRIM DS/SEPTRA DS    28 tablet    Take 1 tablet by mouth 2 times daily for 14 days    S/P  bilateral mastectomy       traMADol 50 MG tablet    ULTRAM    20 tablet    Take 1 tablet (50 mg) by mouth every 6 hours as needed for severe pain    S/P breast reconstruction

## 2018-08-28 NOTE — MR AVS SNAPSHOT
After Visit Summary   8/28/2018    Enid Lombardo    MRN: [id number]           Patient Information     Date Of Birth          1960        Visit Information        Provider Department      8/28/2018 3:00 PM Charity Brar MD [facility]         Follow-ups after your visit        Your next 10 appointments already scheduled     Aug 28, 2018  3:00 PM CDT   (Arrive by 2:45 PM)   New Patient Visit with Charity Brar MD   [facility] ([facility])    [address] [place] [address]  [address]  [facility] [address]   [phone]            Sep 04, 2018  3:50 PM CDT   Return Visit with DENNISE Amin MD   [facility])    [address] [place] [address]   [phone]            Sep 06, 2018 10:30 AM CDT   (Arrive by 10:15 AM)   Post-Op with Rakesh Sanchez MD   [facility] ([facility])    [address] [place] [address]  [address]  [facility] [id number] [phone]            Jan 21, 2019 10:15 AM CST   RETURN RETINA with Paz Osborn MD   [facility] ([facility])    [address]  [facility] [address] [phone]            Aug 27, 2019 10:15 AM CDT   (Arrive by 10:00 AM)   Return Visit with Mira Bryant MD   [facility] ([facility])    [address] [place] [address]  [address]  [facility] [id number] [phone]              Who to contact     If you have questions or need follow up information about today's clinic visit or your schedule please contact [facility] directly at [phone].  Normal or non-critical lab and imaging results will be communicated to you by MyChart, letter or phone within 4 business days after the clinic has received the results. If you do not hear from us within 7  "days, please contact the clinic through [employer] or phone. If you have a critical or abnormal lab result, we will notify you by phone as soon as possible.  Submit refill requests through [employer] or call your pharmacy and they will forward the refill request to us. Please allow 3 business days for your refill to be completed.          Additional Information About Your Visit        [employer] Information     [employer] gives you secure access to your electronic health record. If you see a primary care provider, you can also send messages to your care team and make appointments. If you have questions, please call your primary care clinic.  If you do not have a primary care provider, please call [phone] and they will assist you.        Care EveryWhere ID     This is your Care EveryWhere ID. This could be used by other organizations to access your [facility] medical records  [id number]        Your Vitals Were     Pulse Temperature Respirations Height Last Period Pulse Oximetry    81 98  F (36.7  C) (Oral) 16 1.651 m (5' 5\") 10/14/2001 97%    BMI (Body Mass Index)                   29.05 kg/m2            Blood Pressure from Last 3 Encounters:   08/28/18 108/77   08/28/18 108/77   08/24/18 107/61    Weight from Last 3 Encounters:   08/28/18 79.2 kg (174 lb 9 oz)   08/28/18 79.2 kg (174 lb 9.6 oz)   08/23/18 81.9 kg (180 lb 8 oz)              Today, you had the following     No orders found for display         Today's Medication Changes          These changes are accurate as of 8/28/18  2:34 PM.  If you have any questions, ask your nurse or doctor.               Start taking these medicines.        Dose/Directions    traMADol 50 MG tablet   Commonly known as:  ULTRAM   Used for:  S/P breast reconstruction   Started by:  DENNISE Amin MD        Dose:  50 mg   Take 1 tablet (50 mg) by mouth every 6 hours as needed for severe pain   Quantity:  20 tablet   Refills:  0         These medicines have changed or have updated " prescriptions.        Dose/Directions    sertraline 25 MG tablet   Commonly known as:  ZOLOFT   This may have changed:    - how much to take  - when to take this  - additional instructions   Used for:  Hot flushes, perimenopausal        Take 1 tablet daily.   Quantity:  30 tablet   Refills:  11            Where to get your medicines      Some of these will need a paper prescription and others can be bought over the counter.  Ask your nurse if you have questions.     Bring a paper prescription for each of these medications     traMADol 50 MG tablet                Primary Care Provider Office Phone # Fax #    Shi Alonso [phone]       [address] [place] [address]        Equal Access to Services     [facility]: Hadii prem Morrison, watoi betts, charline cifuentesalmashruthi crockett, michelle tong. So [facility] [phone].    ATENCIÓN: Si habla español, tiene a rivera disposición servicios gratuitos de asistencia lingüística. [facility] [phone].    We comply with applicable federal civil rights laws and [place] laws. We do not discriminate on the basis of race, color, national origin, age, disability, sex, sexual orientation, or gender identity.            Thank you!     Thank you for choosing [facility]  for your care. Our goal is always to provide you with excellent care. Hearing back from our patients is one way we can continue to improve our services. Please take a few minutes to complete the written survey that you may receive in the mail after your visit with us. Thank you!             Your Updated Medication List - Protect others around you: Learn how to safely use, store and throw away your medicines at www.disposemymeds.org.          This list is accurate as of 8/28/18  2:34 PM.  Always use your most recent med list.                   Brand Name Dispense Instructions for use Diagnosis    ACETAMINOPHEN PO      Take 325 mg by mouth  every 8 hours as needed for pain        cyclobenzaprine 5 MG tablet    FLEXERIL    10 tablet    Take 1 tablet (5 mg) by mouth 3 times daily as needed (For intercostal muscle spasm.)    Postoperative pain       Fish Oil 1000 MG Cpdr      Take  by mouth.        fluticasone 50 MCG/ACT spray    FLONASE    16 mL    USE ONE OR TWO SPRAYS IN EACH NOSTRIL DAILY    Chronic rhinitis       loratadine 10 MG tablet    CLARITIN    90 tablet    TAKE ONE TABLET BY MOUTH EVERY DAY AS NEEDED FOR ALLERGY SYMPTOMS    Chronic rhinitis, unspecified type       neomycin-polymyxin-HC 1 % Soln    CORTISPORIN    20 mL    Place 4 drops in both ears 3 times daily for 9 days    Infective otitis externa, bilateral       omeprazole 20 MG CR capsule    priLOSEC    90 capsule    TAKE 1 CAPSULE (20 MG) BY MOUTH DAILY    Gastroesophageal reflux disease, esophagitis presence not specified       oxyCODONE IR 5 MG tablet    ROXICODONE    30 tablet    Take 1-2 tablets (5-10 mg) by mouth every 6 hours as needed for severe pain    Postoperative pain       polyethylene glycol Packet    MIRALAX/GLYCOLAX    7 packet    Take 17 g by mouth daily as needed for constipation    S/P bilateral mastectomy       PRESERVISION AREDS 2 Caps     60 capsule    Take 1 tablet by mouth 2 times daily    Drusen (degenerative) of retina, bilateral, Vitreous degeneration, bilateral       sertraline 25 MG tablet    ZOLOFT    30 tablet    Take 1 tablet daily.    Hot flushes, perimenopausal       sulfamethoxazole-trimethoprim 800-160 MG per tablet    BACTRIM DS/SEPTRA DS    28 tablet    Take 1 tablet by mouth 2 times daily for 14 days    S/P bilateral mastectomy       traMADol 50 MG tablet    ULTRAM    20 tablet    Take 1 tablet (50 mg) by mouth every 6 hours as needed for severe pain    S/P breast reconstruction

## 2018-08-28 NOTE — PROGRESS NOTES
Spoke to patient and  [place] and gave a business card to contact the [facility]. Patient is active on MyChart and instructed patient to contact the nurse triage with any symptom management rather than sent a MyChart message. Gave brochures for [employer], [employer], [employer] and [employer] for patient's .  Took to scheduling to arrange follow-up as Dr Brar recommended.  Answered all patient and 's questions and verbalized understanding. Lissy Waddell RN, BSN.

## 2018-08-28 NOTE — PROGRESS NOTES
[facility] Hematology/Oncology Consultation    Outpatient Visit Note:    Patient: Enid Lombardo  MRN: [id number]  : 1960  DEIDRA: 2018    Reason for Consultation:  DCIS s/p bilateral mastectomy.     History of Present Illness:  Enid Lombardo is a 58 year old female who presents today for oncology consultation regarding new DCIS.  She was originally diagnosed in 2004 with a stage I infiltrating ductal carcinoma of the right breast.  This was found on abnormal screening mammogram.  Biopsy was obtained which showed a grade 1 infiltrating ductal carcinoma, ER positive, WI negative, HER-2 negative.  In 2005 she underwent a right-sided lumpectomy with sentinel lymph node dissection.  Tumor size was 0.6 cm and she had 0 of 3 sentinel lymph nodes positive for malignancy.  She underwent further excision for close margins and no further malignancy was seen on pathology.  She then completed right breast radiation and was started on tamoxifen in 2005 and remained on that until 2008.  She stopped tamoxifen therapy early due to the diagnosis of cataracts.  She had been doing well until screening mammogram in 2018 showed calcifications and she was then underwent further imaging which showed approximately 3 cm size area of calcifications in an area separate from her previous lumpectomy.  She then underwent a needle biopsy which showed a grade 2 ductal carcinoma in situ which was ER, WI positive.  She then underwent a bilateral mastectomy with reconstruction with Dr. Sanchez.  She also saw a genetic counselor and further BCRA testing was negative.    Currently she is having  a significant amount of pain from her surgery.  She likens it to a sock full of rocks in her breast.  She otherwise denies any other symptoms including shortness of breath, cough, swelling in her legs or rash.  She has had some issues with constipation that was finally relieved with senna.   She is here today to discuss if she needs further treatment with chemotherapy or radiation prior to next steps in her breast reconstruction surgery.      Past Medical History:  Past Medical History:   Diagnosis Date     Breast cancer (H) 2004    lumpectomy, radiation, tamoxifen     Cancer (H) 2004    Breast     Cataracts, bilateral      Complication of anesthesia     No versed. It stresses her out. She wants to remember everything.     Coronary artery disease 11/24/2014     Diabetes (H)     Gestational     Enthesopathy of hip region 6/06    right hip     Esophageal reflux 2/06     History of gestational diabetes      Hypertension 2012     Macular drusen      Shingles 01/23/2012    left T6-T7 dermatome       Past Surgical History:  Past Surgical History:   Procedure Laterality Date     BIOPSY  2004    Breast     BREAST SURGERY  2004     C VAGINAL HYSTERECTOMY  12/2001    Ovaries intact, due to fibroids     COLONOSCOPY       ENDOSCOPY  05/09/2007    Upper GI     EYE SURGERY       GYN SURGERY  2001     HC BIOPSY/EXCISION LYMPH NODE OPEN DEEP CERVICAL W EXC FAT PAD  1/7/2005    Right axillary sentinel node biopsy X 3.     HC COLONOSCOPY W BIOPSY  05/10/10     HC EXCISION BREAST LESION, OPEN >=1  1/7/2005    Right breast.     HC REMV CATARACT EXTRACAP,INSERT LENS  12/18/08    bilateral     HC REMV TARSAL/METATARSAL BENIGN BONE LESN  1982    Bone spur - right     MASTECTOMY SIMPLE BILATERAL, SENTINEL NODE BILATERAL, COMBINED Bilateral 8/22/2018    Procedure: COMBINED MASTECTOMY SIMPLE BILATERAL, SENTINEL NODE BILATERAL;  Bilateral Mastectomy with Right [place] Node Biopsy, Bilateral Breast Reconstruction, Anesthesia Block;  Surgeon: Rakesh Sanchez MD;  Location:  OR     ORTHOPEDIC SURGERY  1983    Right foot     RECONSTRUCT BREAST Bilateral 8/22/2018    Procedure: RECONSTRUCT BREAST;;  Surgeon: DENNISE Amin MD;  Location: [facility] OR       Medications:  Current Outpatient Prescriptions   Medication Sig Dispense  "Refill     ACETAMINOPHEN PO Take 325 mg by mouth every 8 hours as needed for pain       cyclobenzaprine (FLEXERIL) 5 MG tablet Take 1 tablet (5 mg) by mouth 3 times daily as needed (For intercostal muscle spasm.) 10 tablet 0     fluticasone (FLONASE) 50 MCG/ACT spray USE ONE OR TWO SPRAYS IN EACH NOSTRIL DAILY 16 mL 10     loratadine (CLARITIN) 10 MG tablet TAKE ONE TABLET BY MOUTH EVERY DAY AS NEEDED FOR ALLERGY SYMPTOMS 90 tablet 1     Multiple Vitamins-Minerals (PRESERVISION AREDS 2) CAPS Take 1 tablet by mouth 2 times daily 60 capsule 11     neomycin-polymyxin-HC (CORTISPORIN) 1 % SOLN  Place 4 drops in both ears 3 times daily for 9 days 20 mL 0     Omega-3 Fatty Acids (FISH OIL) 1000 MG CPDR Take  by mouth.       omeprazole (PRILOSEC) 20 MG CR capsule TAKE 1 CAPSULE (20 MG) BY MOUTH DAILY 90 capsule 3     oxyCODONE IR (ROXICODONE) 5 MG tablet Take 1-2 tablets (5-10 mg) by mouth every 6 hours as needed for severe pain 30 tablet 0     polyethylene glycol (MIRALAX/GLYCOLAX) Packet Take 17 g by mouth daily as needed for constipation 7 packet 0     sertraline (ZOLOFT) 25 MG tablet Take 1 tablet daily. (Patient taking differently: 25 mg every morning Take 1 tablet daily.) 30 tablet 11     sulfamethoxazole-trimethoprim (BACTRIM DS/SEPTRA DS) 800-160 MG per tablet Take 1 tablet by mouth 2 times daily for 14 days 28 tablet 0        Allergies:  Allergies   Allergen Reactions     Alphagan P Rash     Thrush and numbness in mouth       ROS:  A 14 point ROS is negative except as stated in the HPI    Social History:  Denies any tobacco use. No significant alcohol use. Denies any illicit drug use. Patient worked as a , is now retired.  Lives with  in [place], [place].  3 grown children.     Family History:  Adopted.  Mother had lung cancer but was smoker.  Grandfather may have had pancreatic cancer.      Objective:  /77  Pulse 81  Temp 98  F (36.7  C) (Oral)  Resp 16  Ht 1.651 m (5' 5\")  Wt 79.2 " kg (174 lb 9 oz)  LMP 10/14/2001  SpO2 97%  BMI 29.05 kg/m2  Exam:   General: awake, sitting in chair, appears comfortable, in NAD  HEENT: MMM, EOM intact, sclerae clear and anicteric  CV: RRR, no murmurs appreciated  Resp: Clear to auscultation bilaterally, breathing comfortably on RA, occasional crackle  Abd: Soft, non-tender, no masses appreciated, drains in place  MSK:  Warm, FROM, no joint swelling  Skin: no rash or lesions on limited exam  Neuro: CN2-12 grossly intact, no lateralizing symptoms or focal neurologic deficits  Psych: Mood and affect WNL      Data:  Patient Name: MICHEL LOMAX   MR#: [id number]   Specimen #: [id number]   Collected: 7/11/2018   Received: 7/12/2018   Reported: 7/15/2018 21:12   Ordering Phy(s): MARK FRANCISCO       SPECIMEN(S):   Breast needle biopsy, right 6:00   FINAL DIAGNOSIS:   Breast, right, 6:00, calcifications, stereotactic core biopsy:   -Ductal carcinoma in situ (DCIS), nuclear grade 2, solid and cribriform   type(s), with comedonecrosis   -DCIS is estrogen receptor positive and progesterone receptor positive by   immunohistochemistry (see below)   -Calcifications associated with DCIS     Final mastectomy pathology pending.    Imaging:  Diagnostic MMG 7/11/18  History: Right-sided breast cancer status post breast conserving  therapy 2005. Focal asymmetry and calcifications in the inferior right  breast.     Contrast: 100 mL isovue-370     Technique: Following the administration of intravenous contrast,  dual-energy mammography of both breasts is performed.     Findings: Changes of breast conserving therapy in the right breast. At  the 6:00 position 6 cm from the nipple there is mass enhancement  associated with the focal asymmetry measuring up to 2.4 x 1.2 cm on  the craniocaudal image (although not completely included in the  field-of-view). There is additional circumferential non-mass  enhancement in a similar distribution to adjacent  microcalcifications  measuring a total diameter of up to 3.5 x 3.0 cm on the craniocaudal  image. No suspicious enhancement in the left breast.         IMPRESSION: BI-RADS CATEGORY: 4 - Suspicious Abnormality-Biopsy Should  Be Considered.     RECOMMENDED FOLLOW-UP:  Biopsy. Stereotactic right breast biopsy  subsequently performed same day.    Assessment:  In summary, Enid Lombardo is a 58 year old female with history of Stage 1 ER positive infiltrating ductal carcinoma diagnosed in 2004 s/p lumpectomy, radiation and 2.5 years tamoxifen who presents now after bilateral mastectomy for DCIS.  Final pathology has not been completed at this time and we will plan to follow-up with the patient after that.  We primarily spent today discussing DCIS and that with a bilateral mastectomy there is no role for chemotherapy or even hormone therapy.  We did discuss that if final pathology does reveal any component of invasive disease, we would bring her back to discuss the possibility of endocrine therapy.  We also discussed that there is no role for radiation at this time.  We discussed that we would continue to clinically observe her for signs of recurrence but that given her mastectomies we would not need to continue mammogram imaging.  We also discussed that her daughters are at increased risk of breast cancer given her history and would recommend that they be seen in our high risk clinic and have a screening mammogram between ages of 32 and 35.    Plan:  1. Will call the patient with final pathology results  2. Continue annual follow up in our clinic (stagger with PCP visits every 6 months)    Patient seen and discussed with attending physician, Dr. Brar.     Iesha Marin MD  Heme/Onc PGY6  08/28/2018          The patient was seen and evaluated by me with Dr. Marin.  I reviewed the above note to reflect my evaluation.  The patient is a 58-year-old postmenopausal female with a history of a stage I breast cancer  treated more than 10 years ago with resection, radiation and adjuvant tamoxifen.  She subsequently developed a new mass identifiable on mammogram, measuring approximately 3.5 cm.  Biopsy was consistent with ductal carcinoma in situ, estrogen receptor positive, progesterone receptor positive.  She underwent a mastectomy bilaterally with Dr. Rakesh Sanchez.  She had immediate reconstruction.  She comes in today to discuss next steps.      I reviewed with her that overall we unfortunately do not have her final pathology.  We did discuss that assuming this is only DCIS, she will not need any further therapy for her breast cancer.  She would need to focus on surgical recovery as well as reconstruction.      In the event that she has invasive disease present, we would discuss the role of adjuvant endocrine therapy.  I discussed that I think the chances of something such as chemotherapy are extremely small.  We will review this with her when we have these results.      She had met with Genetics and her testing was negative.     Charity Brar MD      Addendum:  On review of path, pt has invasive disease 8mm.  I'd like oncotype to be run on the tumor sample, and for patient to return to see me in 2-3 weeks to discuss the results.    She will need adjuvant endocrine therapy.    Charity Brar MD

## 2018-08-28 NOTE — PROGRESS NOTES
PRESENTING COMPLAINT:  Postoperative visit, status post right breast cancer requiring bilateral nipple-nonsparing mastectomy and immediate expander-based reconstruction with a prepectoral expander and AlloDerm done on 08/22/2018.      HISTORY OF PRESENTING COMPLAINT:  Ms. Lombardo is 58 years old.  She is less than a week out from surgery, overall doing very well.  ALESSANDRA drainage is about 30-40 mL a day.  No major issues.  She is on antibiotics and tolerating it well.  She does not require chemo or radiation therapy.      PHYSICAL EXAMINATION:  Vital signs are stable.  She is afebrile, in no obvious distress.  She is healing in well.  No evidence for infection, seroma or hematoma.      ASSESSMENT AND PLAN:  Based upon the above findings, a diagnosis of bilateral breast reconstruction for breast cancer was made.  The plan now is to start moisturizing.  See me back in a week's time to remove the ALESSANDRA drains.  The patient will then start expansion in about 2 weeks' time, and then we will get her ready for the next stage of reconstruction, which she would like autologous reconstruction.  I went over all her questions, I gave her an overview of the next steps, and all her questions were answered.  She was happy with the visit.  I will see her back in a week's time.

## 2018-08-28 NOTE — LETTER
2018       RE: Enid Lombardo  [address] [facility] [address]     Dear Colleague,    Thank you for referring your patient, Enid Lombardo, to the [facility]. Please see a copy of my visit note below.        [facility] Hematology/Oncology Consultation    Outpatient Visit Note:    Patient: Enid Lombardo  MRN: [id number]  : 1960  DEIDRA: 2018    Reason for Consultation:  DCIS s/p bilateral mastectomy.     History of Present Illness:  Enid Lombardo is a 58 year old female who presents today for oncology consultation regarding new DCIS.  She was originally diagnosed in 2004 with a stage I infiltrating ductal carcinoma of the right breast.  This was found on abnormal screening mammogram.  Biopsy was obtained which showed a grade 1 infiltrating ductal carcinoma, ER positive, OR negative, HER-2 negative.  In 2005 she underwent a right-sided lumpectomy with sentinel lymph node dissection.  Tumor size was 0.6 cm and she had 0 of 3 sentinel lymph nodes positive for malignancy.  She underwent further excision for close margins and no further malignancy was seen on pathology.  She then completed right breast radiation and was started on tamoxifen in 2005 and remained on that until 2008.  She stopped tamoxifen therapy early due to the diagnosis of cataracts.  She had been doing well until screening mammogram in 2018 showed calcifications and she was then underwent further imaging which showed approximately 3 cm size area of calcifications in an area separate from her previous lumpectomy.  She then underwent a needle biopsy which showed a grade 2 ductal carcinoma in situ which was ER, OR positive.  She then underwent a bilateral mastectomy with reconstruction with Dr. Sanchez.  She also saw a genetic counselor and further BCRA testing was negative.    Currently she is having  a significant amount of pain from her surgery.   She likens it to a sock full of rocks in her breast.  She otherwise denies any other symptoms including shortness of breath, cough, swelling in her legs or rash.  She has had some issues with constipation that was finally relieved with senna.  She is here today to discuss if she needs further treatment with chemotherapy or radiation prior to next steps in her breast reconstruction surgery.      Past Medical History:  Past Medical History:   Diagnosis Date     Breast cancer (H) 2004    lumpectomy, radiation, tamoxifen     Cancer (H) 2004    Breast     Cataracts, bilateral      Complication of anesthesia     No versed. It stresses her out. She wants to remember everything.     Coronary artery disease 11/24/2014     Diabetes (H)     Gestational     Enthesopathy of hip region 6/06    right hip     Esophageal reflux 2/06     History of gestational diabetes      Hypertension 2012     Macular drusen      Shingles 01/23/2012    left T6-T7 dermatome       Past Surgical History:  Past Surgical History:   Procedure Laterality Date     BIOPSY  2004    Breast     BREAST SURGERY  2004     C VAGINAL HYSTERECTOMY  12/2001    Ovaries intact, due to fibroids     COLONOSCOPY       ENDOSCOPY  05/09/2007    Upper GI     EYE SURGERY       GYN SURGERY  2001     HC BIOPSY/EXCISION LYMPH NODE OPEN DEEP CERVICAL W EXC FAT PAD  1/7/2005    Right axillary sentinel node biopsy X 3.     HC COLONOSCOPY W BIOPSY  05/10/10     HC EXCISION BREAST LESION, OPEN >=1  1/7/2005    Right breast.     HC REMV CATARACT EXTRACAP,INSERT LENS  12/18/08    bilateral     HC REMV TARSAL/METATARSAL BENIGN BONE LESN  1982    Bone spur - right     MASTECTOMY SIMPLE BILATERAL, SENTINEL NODE BILATERAL, COMBINED Bilateral 8/22/2018    Procedure: COMBINED MASTECTOMY SIMPLE BILATERAL, SENTINEL NODE BILATERAL;  Bilateral Mastectomy with Right [place] Node Biopsy, Bilateral Breast Reconstruction, Anesthesia Block;  Surgeon: Rakesh Sanchez MD;  Location:  OR      ORTHOPEDIC SURGERY  1983    Right foot     RECONSTRUCT BREAST Bilateral 8/22/2018    Procedure: RECONSTRUCT BREAST;;  Surgeon: DENNISE Amin MD;  Location:  OR       Medications:  Current Outpatient Prescriptions   Medication Sig Dispense Refill     ACETAMINOPHEN PO Take 325 mg by mouth every 8 hours as needed for pain       cyclobenzaprine (FLEXERIL) 5 MG tablet Take 1 tablet (5 mg) by mouth 3 times daily as needed (For intercostal muscle spasm.) 10 tablet 0     fluticasone (FLONASE) 50 MCG/ACT spray USE ONE OR TWO SPRAYS IN EACH NOSTRIL DAILY 16 mL 10     loratadine (CLARITIN) 10 MG tablet TAKE ONE TABLET BY MOUTH EVERY DAY AS NEEDED FOR ALLERGY SYMPTOMS 90 tablet 1     Multiple Vitamins-Minerals (PRESERVISION AREDS 2) CAPS Take 1 tablet by mouth 2 times daily 60 capsule 11     neomycin-polymyxin-HC (CORTISPORIN) 1 % SOLN  Place 4 drops in both ears 3 times daily for 9 days 20 mL 0     Omega-3 Fatty Acids (FISH OIL) 1000 MG CPDR Take  by mouth.       omeprazole (PRILOSEC) 20 MG CR capsule TAKE 1 CAPSULE (20 MG) BY MOUTH DAILY 90 capsule 3     oxyCODONE IR (ROXICODONE) 5 MG tablet Take 1-2 tablets (5-10 mg) by mouth every 6 hours as needed for severe pain 30 tablet 0     polyethylene glycol (MIRALAX/GLYCOLAX) Packet Take 17 g by mouth daily as needed for constipation 7 packet 0     sertraline (ZOLOFT) 25 MG tablet Take 1 tablet daily. (Patient taking differently: 25 mg every morning Take 1 tablet daily.) 30 tablet 11     sulfamethoxazole-trimethoprim (BACTRIM DS/SEPTRA DS) 800-160 MG per tablet Take 1 tablet by mouth 2 times daily for 14 days 28 tablet 0        Allergies:  Allergies   Allergen Reactions     Alphagan P Rash     Thrush and numbness in mouth       ROS:  A 14 point ROS is negative except as stated in the HPI    Social History:  Denies any tobacco use. No significant alcohol use. Denies any illicit drug use. Patient worked as a , is now retired.  Lives [place]  in  [place], [place].  3 grown children.     Family History:  Adopted.  Mother had lung cancer but was smoker.  Grandfather may have had pancreatic cancer.      Objective:  /77  Pulse 81  Temp 98  F (36.7  C) (Oral)  Resp 16  Ht 1.651 m (5' 5\")  Wt 79.2 kg (174 lb 9 oz)  LMP 10/14/2001  SpO2 97%  BMI 29.05 kg/m2  Exam:   General: awake, sitting in chair, appears comfortable, in NAD  HEENT: MMM, EOM intact, sclerae clear and anicteric  CV: RRR, no murmurs appreciated  Resp: Clear to auscultation bilaterally, breathing comfortably on RA, occasional crackle  Abd: Soft, non-tender, no masses appreciated, drains in place  MSK:  Warm, FROM, no joint swelling  Skin: no rash or lesions on limited exam  Neuro: CN2-12 grossly intact, no lateralizing symptoms or focal neurologic deficits  Psych: Mood and affect WNL      Data:  Patient Name: MICHEL LOMAX   MR#: [id number]   Specimen #: [id number]   Collected: 7/11/2018   Received: 7/12/2018   Reported: 7/15/2018 21:12   Ordering Phy(s): MARK FRANCISCO       SPECIMEN(S):   Breast needle biopsy, right 6:00   FINAL DIAGNOSIS:   Breast, right, 6:00, calcifications, stereotactic core biopsy:   -Ductal carcinoma in situ (DCIS), nuclear grade 2, solid and cribriform   type(s), with comedonecrosis   -DCIS is estrogen receptor positive and progesterone receptor positive by   immunohistochemistry (see below)   -Calcifications associated with DCIS     Final mastectomy pathology pending.    Imaging:  Diagnostic MMG 7/11/18  History: Right-sided breast cancer status post breast conserving  therapy 2005. Focal asymmetry and calcifications in the inferior right  breast.     Contrast: 100 mL isovue-370     Technique: Following the administration of intravenous contrast,  dual-energy mammography of both breasts is performed.     Findings: Changes of breast conserving therapy in the right breast. At  the 6:00 position 6 cm from the nipple there is mass enhancement  associated with " the focal asymmetry measuring up to 2.4 x 1.2 cm on  the craniocaudal image (although not completely included in the  field-of-view). There is additional circumferential non-mass  enhancement in a similar distribution to adjacent microcalcifications  measuring a total diameter of up to 3.5 x 3.0 cm on the craniocaudal  image. No suspicious enhancement in the left breast.         IMPRESSION: BI-RADS CATEGORY: 4 - Suspicious Abnormality-Biopsy Should  Be Considered.     RECOMMENDED FOLLOW-UP:  Biopsy. Stereotactic right breast biopsy  subsequently performed same day.    Assessment:  In summary, Enid Lombardo is a 58 year old female with history of Stage 1 ER positive infiltrating ductal carcinoma diagnosed in 2004 s/p lumpectomy, radiation and 2.5 years tamoxifen who presents now after bilateral mastectomy for DCIS.  Final pathology has not been completed at this time and we will plan to follow-up with the patient after that.  We primarily spent today discussing DCIS and that with a bilateral mastectomy there is no role for chemotherapy or even hormone therapy.  We did discuss that if final pathology does reveal any component of invasive disease, we would bring her back to discuss the possibility of endocrine therapy.  We also discussed that there is no role for radiation at this time.  We discussed that we would continue to clinically observe her for signs of recurrence but that given her mastectomies we would not need to continue mammogram imaging.  We also discussed that her daughters are at increased risk of breast cancer given her history and would recommend that they be seen in our high risk clinic and have a screening mammogram between ages of 32 and 35.    Plan:  1. Will call the patient with final pathology results  2. Continue annual follow up in our clinic (stagger with PCP visits every 6 months)    Patient seen and discussed with attending physician, Dr. Brar.     Iesha Marin MD  Heme/Onc  PGY6  08/28/2018          The patient was seen and evaluated by me with Dr. Marin.  I reviewed the above note to reflect my evaluation.  The patient is a 58-year-old postmenopausal female with a history of a stage I breast cancer treated more than 10 years ago with resection, radiation and adjuvant tamoxifen.  She subsequently developed a new mass identifiable on mammogram, measuring approximately 3.5 cm.  Biopsy was consistent with ductal carcinoma in situ, estrogen receptor positive, progesterone receptor positive.  She underwent a mastectomy bilaterally with Dr. Rakesh Sanchez.  She had immediate reconstruction.  She comes in today to discuss next steps.      I reviewed with her that overall we unfortunately do not have her final pathology.  We did discuss that assuming this is only DCIS, she will not need any further therapy for her breast cancer.  She would need to focus on surgical recovery as well as reconstruction.      In the event that she has invasive disease present, we would discuss the role of adjuvant endocrine therapy.  I discussed that I think the chances of something such as chemotherapy are extremely small.  We will review this with her when we have these results.      She had met with Genetics and her testing was negative.     Charity Brar MD      Addendum:  On review of path, pt has invasive disease 8mm.  I'd like oncotype to be run on the tumor sample, and for patient to return to see me in 2-3 weeks to discuss the results.    She will need adjuvant endocrine therapy.    Charity Brar MD

## 2018-08-28 NOTE — NURSING NOTE
"Oncology Rooming Note    August 28, 2018 1:02 PM   Enid Lombardo is a 58 year old female who presents for:    Chief Complaint   Patient presents with     Oncology Clinic Visit     New Pt. Consult     Initial Vitals: /77  Pulse 81  Temp 98  F (36.7  C) (Oral)  Resp 16  Ht 1.651 m (5' 5\")  Wt 79.2 kg (174 lb 9 oz)  LMP 10/14/2001  SpO2 97%  BMI 29.05 kg/m2 Estimated body mass index is 29.05 kg/(m^2) as calculated from the following:    Height as of this encounter: 1.651 m (5' 5\").    Weight as of this encounter: 79.2 kg (174 lb 9 oz). Body surface area is 1.91 meters squared.  Moderate Pain (4) Comment: across entire bottom of breasts   Patient's last menstrual period was 10/14/2001.  Allergies reviewed: Yes  Medications reviewed: Yes    Medications: Medication refills not needed today.  Pharmacy name entered into vushaper:    [id number] [place] [facility] [place], [place] - [address] [address] [place] [address] [id number] [place], [place] - [address] [facility]    Clinical concerns: new patient here today for consult Dr. Brar was notified.    10 minutes for nursing intake (face to face time)     Jerome Izaguirre CMA              "

## 2018-08-28 NOTE — NURSING NOTE
"Oncology Rooming Note    August 28, 2018 11:45 AM   Enid Lombardo is a 58 year old female who presents for:    Chief Complaint   Patient presents with     Oncology Clinic Visit     Return : Post Op     Initial Vitals: /77 (BP Location: Right arm, Patient Position: Sitting, Cuff Size: Adult Regular)  Pulse 81  Temp 98  F (36.7  C) (Oral)  Resp 16  Ht 1.651 m (5' 5\")  Wt 79.2 kg (174 lb 9.6 oz)  LMP 10/14/2001  SpO2 97%  BMI 29.05 kg/m2 Estimated body mass index is 29.05 kg/(m^2) as calculated from the following:    Height as of this encounter: 1.651 m (5' 5\").    Weight as of this encounter: 79.2 kg (174 lb 9.6 oz). Body surface area is 1.91 meters squared.  Moderate Pain (4) Comment: all the way across, bottom of breast   Patient's last menstrual period was 10/14/2001.  Allergies reviewed: Yes  Medications reviewed: Yes    Medications: Medication refills not needed today.  Pharmacy name entered into [facility]:    [id number] [place] [facility] - [place], [place] - [address] [address] [place] [address] [id number] - [place], [place] - [address] [facility]    Clinical concerns: Patient states no further concerns at this time.     5 minutes for nursing intake (face to face time)     Florinda Altamirano CMA              "

## 2018-08-29 LAB — COPATH REPORT: NORMAL

## 2018-08-30 DIAGNOSIS — J31.0 CHRONIC RHINITIS: ICD-10-CM

## 2018-08-30 LAB
COPATH REPORT: NORMAL
LAB SCANNED RESULT: NORMAL

## 2018-08-30 RX ORDER — FLUTICASONE PROPIONATE 50 MCG
SPRAY, SUSPENSION (ML) NASAL
Qty: 16 ML | Refills: 10 | Status: SHIPPED | OUTPATIENT
Start: 2018-08-30 | End: 2019-07-09

## 2018-08-30 NOTE — TELEPHONE ENCOUNTER
Flonase:  Prescription approved per [facility] Refill Protocol.    Vy [facility], RN, BSN

## 2018-08-31 ENCOUNTER — TELEPHONE (OUTPATIENT)
Dept: ONCOLOGY | Facility: CLINIC | Age: 58
End: 2018-08-31

## 2018-08-31 NOTE — TELEPHONE ENCOUNTER
Call to pt who had bilateral mastectomies/recomstruction on 8/22 for recurrent breast cancer. Pt has been having moderate discomfort since surgery but states that has lessened in last last few days. Discussed her pathology which was an 8 mm invasive cancer, grade 2, ER/DC + and Her 2 negative. Dr Brar decided to order an Oncotype which was requested today. Pt will see Dr Brar when results are available to discuss next steps. All patient's questions were answered.

## 2018-09-04 ENCOUNTER — OFFICE VISIT (OUTPATIENT)
Dept: SURGERY | Facility: CLINIC | Age: 58
End: 2018-09-04
Payer: COMMERCIAL

## 2018-09-04 DIAGNOSIS — Z98.890 S/P BREAST RECONSTRUCTION, BILATERAL: Primary | ICD-10-CM

## 2018-09-04 PROCEDURE — 99024 POSTOP FOLLOW-UP VISIT: CPT | Performed by: PLASTIC SURGERY

## 2018-09-04 RX ORDER — OMEGA-3 FATTY ACIDS/FISH OIL 300-1000MG
200 CAPSULE ORAL EVERY 4 HOURS PRN
Status: ON HOLD | COMMUNITY
End: 2018-10-11

## 2018-09-04 ASSESSMENT — PAIN SCALES - GENERAL: PAINLEVEL: MILD PAIN (2)

## 2018-09-04 NOTE — NURSING NOTE
Enid Lombardo's goals for this visit include: return    She requests these members of her care team be copied on today's visit information:     PCP: Shi Alonso    Referring Provider:  Referred Self, MD  No address on file    LMP 10/14/2001    Do you need any medication refills at today's visit?

## 2018-09-04 NOTE — LETTER
9/4/2018         RE: Enid Lombardo  [address] [facility] [address]        Dear Colleague,    Thank you for referring your patient, Enid Lombardo, to the [facility]. Please see a copy of my visit note below.    PRESENTING COMPLAINT:  Postoperative visit, status post bilateral breast reconstruction for right-sided breast cancer, underwent bilateral nipple-nonsparing mastectomy and immediate expander placement on 08/22/2018.        HISTORY OF PRESENTING COMPLAINT:  Ms. Lombardo is 58 years old.  She is 10 days out from surgery and has done extremely well.  ALESSANDRA drainage is about 30-40 mL a day.  No issues.  No radiation is required.  She had a bit of an invasive cancer in her specimen.  They are still deciding whether chemotherapy is needed.      PHYSICAL EXAMINATION:  Vital signs are stable.  She is afebrile, in no obvious distress.  Both breasts are healing in well.      ASSESSMENT AND PLAN:  Based upon the above findings, a diagnosis of bilateral breast reconstruction for breast cancer was made.  The plan is to continue with aggressive moisturization.  The patient will call me in the next few days to let us know once the drainage is less than 30 mL to come in and have my nurse pull the ALESSANDRA drains.  In the interim, she will see me back in about 1 week to 10 days to start expansion.  By then, hopefully we will know if she needs chemotherapy, and we can plan the timing of the next stage of reconstruction.  She would like autologous reconstruction.  All questions were answered.  She was happy with the visit.         Again, thank you for allowing me to participate in the care of your patient.        Sincerely,        DENNISE Amin MD

## 2018-09-04 NOTE — PROGRESS NOTES
PRESENTING COMPLAINT:  Postoperative visit, status post bilateral breast reconstruction for right-sided breast cancer, underwent bilateral nipple-nonsparing mastectomy and immediate expander placement on 08/22/2018.        HISTORY OF PRESENTING COMPLAINT:  Ms. Lombardo is 58 years old.  She is 10 days out from surgery and has done extremely well.  ALESSANDRA drainage is about 30-40 mL a day.  No issues.  No radiation is required.  She had a bit of an invasive cancer in her specimen.  They are still deciding whether chemotherapy is needed.      PHYSICAL EXAMINATION:  Vital signs are stable.  She is afebrile, in no obvious distress.  Both breasts are healing in well.      ASSESSMENT AND PLAN:  Based upon the above findings, a diagnosis of bilateral breast reconstruction for breast cancer was made.  The plan is to continue with aggressive moisturization.  The patient will call me in the next few days to let us know once the drainage is less than 30 mL to come in and have my nurse pull the ALESSANDRA drains.  In the interim, she will see me back in about 1 week to 10 days to start expansion.  By then, hopefully we will know if she needs chemotherapy, and we can plan the timing of the next stage of reconstruction.  She would like autologous reconstruction.  All questions were answered.  She was happy with the visit.

## 2018-09-04 NOTE — MR AVS SNAPSHOT
After Visit Summary   9/4/2018    Enid Lombardo    MRN: [id number]           Patient Information     Date Of Birth          1960        Visit Information        Provider Department      9/4/2018 3:50 PM DENNISE Amin MD [facility]        Today's Diagnoses     S/P breast reconstruction, bilateral    -  1       Follow-ups after your visit        Follow-up notes from your care team     Return in about 1 week (around 9/11/2018).      Your next 10 appointments already scheduled     Sep 06, 2018 10:30 AM CDT   (Arrive by 10:15 AM)   Post-Op with Rakesh Sanchez MD   [facility] [place] [place] ([facility])    [address] [place] [address]  [address]  [facility] [address]   [phone]            Sep 18, 2018  2:30 PM CDT   (Arrive by 2:15 PM)   Return Visit with Charity Brar MD   [facility] ([facility])    [address] [place] [address]  [address]  [facility] [address]   [phone]            Jan 21, 2019 10:15 AM CST   RETURN RETINA with Paz Osborn MD   Eye Clinic ([facility])    [address]  [facility] [address]   [phone]            Aug 27, 2019 10:15 AM CDT   (Arrive by 10:00 AM)   Return Visit with Mira Bryant MD   [facility] ([facility])    [address] [place] [address]  [address]  [facility] [address]   [phone]              Who to contact     If you have questions or need follow up information about today's clinic visit or your schedule please contact [facility] directly at [phone].  Normal or non-critical lab and imaging results will be communicated to you by MyChart, letter or phone within 4 business days after the clinic has received the results. If you do not hear from us within 7 days, please contact the clinic through MyChart or phone.  If you have a critical or abnormal lab result, we will notify you by phone as soon as possible.  Submit refill requests through Kliqed or call your pharmacy and they will forward the refill request to us. Please allow 3 business days for your refill to be completed.          Additional Information About Your Visit        Oneflarehart Information     Kliqed gives you secure access to your electronic health record. If you see a primary care provider, you can also send messages to your care team and make appointments. If you have questions, please call your primary care clinic.  If you do not have a primary care provider, please call [phone] and they will assist you.      Kliqed is an electronic gateway that provides easy, online access to your medical records. With Kliqed, you can request a clinic appointment, read your test results, renew a prescription or communicate with your care team.     To access your existing account, please contact your [facility] or call [phone] for assistance.        Care EveryWhere ID     This is your Care EveryWhere ID. This could be used by other organizations to access your [facility] medical records  [id number]        Your Vitals Were     Last Period                   10/14/2001            Blood Pressure from Last 3 Encounters:   08/28/18 108/77   08/28/18 108/77   08/24/18 107/61    Weight from Last 3 Encounters:   08/28/18 174 lb 9 oz   08/28/18 174 lb 9.6 oz   08/23/18 180 lb 8 oz              Today, you had the following     No orders found for display         Today's Medication Changes          These changes are accurate as of 9/4/18 11:59 PM.  If you have any questions, ask your nurse or doctor.               These medicines have changed or have updated prescriptions.        Dose/Directions    sertraline 25 MG tablet   Commonly known as:  ZOLOFT   This may have changed:    - how much to take  - when to take this  - additional instructions    Used for:  Hot flushes, perimenopausal        Take 1 tablet daily.   Quantity:  30 tablet   Refills:  11                Primary Care Provider Office Phone # Fax #    Shi Alonso [phone]       [address] [place] [address]        Equal Access to Services     RAIZA TOLBERT : Hadii prem osorio hadjaco Soomaali, waaxda luqadaha, qaybta kaalmada adeegyada, waxtom sandsgladiscricket tong. So [facility] [phone].    ATENCIÓN: Si habla español, tiene a rivera disposición servicios gratuitos de asistencia lingüística. Ricoame al [phone].    We comply with applicable federal civil rights laws and [place] laws. We do not discriminate on the basis of race, color, national origin, age, disability, sex, sexual orientation, or gender identity.            Thank you!     Thank you for choosing [facility]  for your care. Our goal is always to provide you with excellent care. Hearing back from our patients is one way we can continue to improve our services. Please take a few minutes to complete the written survey that you may receive in the mail after your visit with us. Thank you!             Your Updated Medication List - Protect others around you: Learn how to safely use, store and throw away your medicines at www.disposemymeds.org.          This list is accurate as of 9/4/18 11:59 PM.  Always use your most recent med list.                   Brand Name Dispense Instructions for use Diagnosis    ACETAMINOPHEN PO      Take 325 mg by mouth every 8 hours as needed for pain        ADVIL 200 MG capsule   Generic drug:  ibuprofen      Take 200 mg by mouth every 4 hours as needed for fever        Fish Oil 1000 MG Cpdr      Take  by mouth.        fluticasone 50 MCG/ACT spray    FLONASE    16 mL    USE ONE OR TWO SPRAYS IN EACH NOSTRIL DAILY    Chronic rhinitis       loratadine 10 MG tablet    CLARITIN    90 tablet    TAKE ONE TABLET BY MOUTH EVERY DAY AS NEEDED FOR ALLERGY SYMPTOMS     Chronic rhinitis, unspecified type       omeprazole 20 MG CR capsule    priLOSEC    90 capsule    TAKE 1 CAPSULE (20 MG) BY MOUTH DAILY    Gastroesophageal reflux disease, esophagitis presence not specified       PRESERVISION AREDS 2 Caps     60 capsule    Take 1 tablet by mouth 2 times daily    Drusen (degenerative) of retina, bilateral, Vitreous degeneration, bilateral       sertraline 25 MG tablet    ZOLOFT    30 tablet    Take 1 tablet daily.    Hot flushes, perimenopausal       sulfamethoxazole-trimethoprim 800-160 MG per tablet    BACTRIM DS/SEPTRA DS    28 tablet    Take 1 tablet by mouth 2 times daily for 14 days    S/P bilateral mastectomy       traMADol 50 MG tablet    ULTRAM    20 tablet    Take 1 tablet (50 mg) by mouth every 6 hours as needed for severe pain    S/P breast reconstruction

## 2018-09-06 ENCOUNTER — TELEPHONE (OUTPATIENT)
Dept: PLASTIC SURGERY | Facility: CLINIC | Age: 58
End: 2018-09-06

## 2018-09-06 ENCOUNTER — OFFICE VISIT (OUTPATIENT)
Dept: ONCOLOGY | Facility: CLINIC | Age: 58
End: 2018-09-06
Attending: SURGERY
Payer: COMMERCIAL

## 2018-09-06 VITALS
HEIGHT: 65 IN | BODY MASS INDEX: 29.02 KG/M2 | SYSTOLIC BLOOD PRESSURE: 118 MMHG | RESPIRATION RATE: 16 BRPM | HEART RATE: 65 BPM | DIASTOLIC BLOOD PRESSURE: 82 MMHG | WEIGHT: 174.2 LBS | TEMPERATURE: 97.6 F | OXYGEN SATURATION: 98 %

## 2018-09-06 DIAGNOSIS — C50.919 BREAST CANCER (H): Primary | ICD-10-CM

## 2018-09-06 DIAGNOSIS — C50.911 MALIGNANT NEOPLASM OF RIGHT BREAST IN FEMALE, ESTROGEN RECEPTOR POSITIVE, UNSPECIFIED SITE OF BREAST (H): Primary | ICD-10-CM

## 2018-09-06 DIAGNOSIS — Z17.0 MALIGNANT NEOPLASM OF RIGHT BREAST IN FEMALE, ESTROGEN RECEPTOR POSITIVE, UNSPECIFIED SITE OF BREAST (H): Primary | ICD-10-CM

## 2018-09-06 PROCEDURE — G0463 HOSPITAL OUTPT CLINIC VISIT: HCPCS | Mod: ZF

## 2018-09-06 ASSESSMENT — PAIN SCALES - GENERAL: PAINLEVEL: NO PAIN (0)

## 2018-09-06 NOTE — LETTER
9/6/2018       RE: Enid Lombardo  [address] [facility] [place] [address]     Dear Colleague,    Thank you for referring your patient, Enid Lombardo, to the [facility] at [facility]. Please see a copy of my visit note below.    Enid Lombardo is here for a postoperative visit after undergoing a bilateral mastectomy.  Her final pathology report demonstrated that she had an 8 mm invasive cancer.  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  She has seen Dr. Brar who has ordered a 21 gene recurrence score.  She has seen Dr. Amin.  Her drain output is still higher than 30 mL per day.      PHYSICAL EXAMINATION:  Her incisions are healing well.  She has no evidence of infection.      IMPRESSION:  Postop check.      PLAN:  I will see her in the future if any problems arise.         Again, thank you for allowing me to participate in the care of your patient.      Sincerely,    Rakesh Sanchez MD

## 2018-09-06 NOTE — PROGRESS NOTES
Enid Lombardo is here for a postoperative visit after undergoing a bilateral mastectomy.  Her final pathology report demonstrated that she had an 8 mm invasive cancer.  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  She has seen Dr. Brar who has ordered a 21 gene recurrence score.  She has seen Dr. Amin.  Her drain output is still higher than 30 mL per day.      PHYSICAL EXAMINATION:  Her incisions are healing well.  She has no evidence of infection.      IMPRESSION:  Postop check.      PLAN:  I will see her in the future if any problems arise.

## 2018-09-06 NOTE — NURSING NOTE
"Oncology Rooming Note    September 6, 2018 10:44 AM   Enid Lombardo is a 58 year old female who presents for:    Chief Complaint   Patient presents with     RECHECK     Post op surgery      Initial Vitals: /82 (BP Location: Right arm, Patient Position: Sitting, Cuff Size: Adult Regular)  Pulse 65  Temp 97.6  F (36.4  C) (Oral)  Resp 16  Ht 1.651 m (5' 5\")  Wt 79 kg (174 lb 3.2 oz)  LMP 10/14/2001  SpO2 98%  BMI 28.99 kg/m2 Estimated body mass index is 28.99 kg/(m^2) as calculated from the following:    Height as of this encounter: 1.651 m (5' 5\").    Weight as of this encounter: 79 kg (174 lb 3.2 oz). Body surface area is 1.9 meters squared.  No Pain (0) Comment: Data Unavailable   Patient's last menstrual period was 10/14/2001.  Allergies reviewed: Yes  Medications reviewed: Yes    Medications: Medication refills not needed today.  Pharmacy name entered into [facility]:    [id number] [place] [facility] [place], [place] - [address] [address] [place] [address] [id number] - [place], [place] - [address] [facility]    Clinical concerns: none      8 minutes for nursing intake (face to face time)     Mechelle CHERYL Jacobson              "

## 2018-09-06 NOTE — MR AVS SNAPSHOT
After Visit Summary   9/6/2018    Enid Lombardo    MRN: [id number]           Patient Information     Date Of Birth          1960        Visit Information        Provider Department      9/6/2018 10:30 AM Rakesh Sanchez MD [facility]        Today's Diagnoses     Malignant neoplasm of right breast in female, estrogen receptor positive, unspecified site of breast (H)    -  1       Follow-ups after your visit        Your next 10 appointments already scheduled     Sep 10, 2018  1:30 PM CDT   Nurse Only with NURSE ONLY MG DERM   [facility]    [address] [place] [address]   [phone]            Sep 18, 2018 11:30 AM CDT   (Arrive by 11:15 AM)   Return Visit with DENNISE Amin MD   [facility] ([facility])    [address] [place] [address]  [address]  [facility] [id number] [phone]            Sep 18, 2018  2:30 PM CDT   (Arrive by 2:15 PM)   Return Visit with Charity Brar MD   [facility] ([facility])    [address] [place] [address]  [address]  [facility] [id number] [phone]            Jan 21, 2019 10:15 AM CST   RETURN RETINA with Paz Osborn MD   [facility] ([facility])    [address]  [facility] [address]   [phone]            Aug 27, 2019 10:15 AM CDT   (Arrive by 10:00 AM)   Return Visit with Mira Bryant MD   [facility] ([facility])    [address] [place] [address]  [address]  [facility] [id number] [phone]              Who to contact     If you have questions or need follow up information about today's clinic visit or your schedule please contact [facility] directly at [phone].  Normal or non-critical lab and imaging results will be communicated to you by [facility]  "letter or phone within 4 business days after the clinic has received the results. If you do not hear from us within 7 days, please contact the clinic through bitmovin or phone. If you have a critical or abnormal lab result, we will notify you by phone as soon as possible.  Submit refill requests through bitmovin or call your pharmacy and they will forward the refill request to us. Please allow 3 business days for your refill to be completed.          Additional Information About Your Visit        [facility] Information     bitmovin gives you secure access to your electronic health record. If you see a primary care provider, you can also send messages to your care team and make appointments. If you have questions, please call your primary care clinic.  If you do not have a primary care provider, please call [phone] and they will assist you.        Care EveryWhere ID     This is your Care EveryWhere ID. This could be used by other organizations to access your [facility] medical records  [id number]        Your Vitals Were     Pulse Temperature Respirations Height Last Period Pulse Oximetry    65 97.6  F (36.4  C) (Oral) 16 1.651 m (5' 5\") 10/14/2001 98%    BMI (Body Mass Index)                   28.99 kg/m2            Blood Pressure from Last 3 Encounters:   09/06/18 118/82   08/28/18 108/77   08/28/18 108/77    Weight from Last 3 Encounters:   09/06/18 79 kg (174 lb 3.2 oz)   08/28/18 79.2 kg (174 lb 9 oz)   08/28/18 79.2 kg (174 lb 9.6 oz)              Today, you had the following     No orders found for display         Today's Medication Changes          These changes are accurate as of 9/6/18 11:59 PM.  If you have any questions, ask your nurse or doctor.               These medicines have changed or have updated prescriptions.        Dose/Directions    sertraline 25 MG tablet   Commonly known as:  ZOLOFT   This may have changed:    - how much to take  - when to take this  - additional instructions   Used for:  Hot " flushes, perimenopausal        Take 1 tablet daily.   Quantity:  30 tablet   Refills:  11                Primary Care Provider Office Phone # Fax #    Shi Alonso [phone]       [address] [place] [address]        Equal Access to Services     RAIZA TOLBERT AH: Hadii prem osorio hadjaco Soomaali, waaxda luqadaha, qaybta kaalmada adeegyada, michelle albrecht carytrevon sandsgladiscricket tong. So [facility] [phone].    ATENCIÓN: Si habla español, tiene a rivera disposición servicios gratuitos de asistencia lingüística. Llame al [phone].    We comply with applicable federal civil rights laws and [place] laws. We do not discriminate on the basis of race, color, national origin, age, disability, sex, sexual orientation, or gender identity.            Thank you!     Thank you for choosing [facility]  for your care. Our goal is always to provide you with excellent care. Hearing back from our patients is one way we can continue to improve our services. Please take a few minutes to complete the written survey that you may receive in the mail after your visit with us. Thank you!             Your Updated Medication List - Protect others around you: Learn how to safely use, store and throw away your medicines at www.disposemymeds.org.          This list is accurate as of 9/6/18 11:59 PM.  Always use your most recent med list.                   Brand Name Dispense Instructions for use Diagnosis    ACETAMINOPHEN PO      Take 325 mg by mouth every 8 hours as needed for pain        ADVIL 200 MG capsule   Generic drug:  ibuprofen      Take 200 mg by mouth every 4 hours as needed for fever        Fish Oil 1000 MG Cpdr      Take  by mouth.        fluticasone 50 MCG/ACT spray    FLONASE    16 mL    USE ONE OR TWO SPRAYS IN EACH NOSTRIL DAILY    Chronic rhinitis       loratadine 10 MG tablet    CLARITIN    90 tablet    TAKE ONE TABLET BY MOUTH EVERY DAY AS NEEDED FOR ALLERGY SYMPTOMS    Chronic rhinitis,  unspecified type       omeprazole 20 MG CR capsule    priLOSEC    90 capsule    TAKE 1 CAPSULE (20 MG) BY MOUTH DAILY    Gastroesophageal reflux disease, esophagitis presence not specified       PRESERVISION AREDS 2 Caps     60 capsule    Take 1 tablet by mouth 2 times daily    Drusen (degenerative) of retina, bilateral, Vitreous degeneration, bilateral       sertraline 25 MG tablet    ZOLOFT    30 tablet    Take 1 tablet daily.    Hot flushes, perimenopausal       traMADol 50 MG tablet    ULTRAM    20 tablet    Take 1 tablet (50 mg) by mouth every 6 hours as needed for severe pain    S/P breast reconstruction

## 2018-09-06 NOTE — TELEPHONE ENCOUNTER
Patient would like a call to discuss the flap vs. Implants.     I spoke with the RNCC and she will call her

## 2018-09-07 ENCOUNTER — MYC MEDICAL ADVICE (OUTPATIENT)
Dept: ONCOLOGY | Facility: CLINIC | Age: 58
End: 2018-09-07

## 2018-09-07 ENCOUNTER — MYC MEDICAL ADVICE (OUTPATIENT)
Dept: PLASTIC SURGERY | Facility: CLINIC | Age: 58
End: 2018-09-07

## 2018-09-07 NOTE — TELEPHONE ENCOUNTER
Pt called in to triage after sending [employer] message regarding rash under L armpit. She state now she also has a bright, red, flat splotch under R armpit. Areas are not tender, swollen, raised, draining, itchy or painful. She has been wearing a front zip sports bra since her double mastectomy 2 weeks ago but this does not cover up area where rash is. Advised pt ok to continue washing with water and cetaphil, she may apply hydrocortisone 1% to the areas as needed through out the day to see if redness decreases. If areas become worse, itchy or painful to call us back. Pt verbalized understanding.

## 2018-09-10 ENCOUNTER — CARE COORDINATION (OUTPATIENT)
Dept: PLASTIC SURGERY | Facility: CLINIC | Age: 58
End: 2018-09-10

## 2018-09-10 ENCOUNTER — ALLIED HEALTH/NURSE VISIT (OUTPATIENT)
Dept: NURSING | Facility: CLINIC | Age: 58
End: 2018-09-10
Payer: COMMERCIAL

## 2018-09-10 DIAGNOSIS — Z48.03 CHANGE OR REMOVAL OF DRAINS: Primary | ICD-10-CM

## 2018-09-10 PROCEDURE — 99207 ZZC NO CHARGE NURSE ONLY: CPT

## 2018-09-10 NOTE — NURSING NOTE
Enid Lombardo comes into clinic today at the request of   Ordering Provider for Drain removal .    Per Dr Amin if output <30cc for two days drains to be removed. Pt reports drain output <30cc for two days.  Drains removed without difficulty. Drain sites cleaned with sterile water and covered with vaseline and a dry dressing. Patient advised to monitor sites for increased redness, swelling, pain or drainage and call the clinic back if any of those symptoms develop.  Patient to follow up on 9/18/18 with  .      This service provided today was under the supervising provider of the day Dr. Trevino , who was available if needed.    Dorina Zepeda RN

## 2018-09-10 NOTE — PROGRESS NOTES
Order placed for CTA and pt contacted and given number to schedule. Instructed to schedule in the next 2-3 weeks.  Pt verbalized understanding.

## 2018-09-10 NOTE — MR AVS SNAPSHOT
After Visit Summary   9/10/2018    Enid Lombardo    MRN: [id number]           Patient Information     Date Of Birth          1960        Visit Information        Provider Department      9/10/2018 1:30 PM NURSE ONLY [facility]        Today's Diagnoses     Change or removal of drains    -  1       Follow-ups after your visit        Your next 10 appointments already scheduled     Sep 11, 2018  1:30 PM CDT   CTA ABDOMEN PELVIS WITH CONTRAST with MGCT1   [facility] ([facility])    [address] [place] [address] [phone]           How do I prepare for my exam? (Food and drink instructions) **You will have contrast for this exam.** Do not eat or drink for 2 hours before your exam. If you need to take medicine, you may take it with small sips of water. (We may ask you to take liquid medicine as well.)  The day before your exam, drink extra fluids at least six 8-ounce glasses (unless your doctor tells you to restrict your fluids).  How do I prepare for my exam? (Other instructions) Patients over 70 or patients with diabetes or kidney problems: If you haven t had a blood test (creatinine test) within the last 30 days, the Cardiologist/Radiologist may require you to get this test prior to your exam.  What should I wear: Please wear loose clothing, such as a sweat suit or jogging clothes.  Avoid snaps, zippers and other metal. We may ask you to undress and put on a hospital gown.  How long does the exam take: Most scans take less than 20 minutes.  What should I bring: Please bring any scans or X-rays taken at other hospitals, if similar tests were done. Also bring a list of your medicines, including vitamins, minerals and over-the-counter drugs. It is safest to leave personal items at home.  Do I need a :  No  is needed.  What do I need to tell my doctor? Be sure to tell your doctor: * If you have any  allergies. * If there s any chance you are pregnant. * If you are breastfeeding. * If you have diabetes as your medication may need to be adjusted for this exam.  What should I do after the exam: No restrictions, You may resume normal activities.  What is this test: A CT (computed tomography) scan is a series of pictures that allows us to look inside your body. The scanner creates images of the body in cross sections, much like slices of bread. This helps us see any problems more clearly. You may receive contrast (X-ray dye) before or during your scan. Contrast is given through an IV (small needle in your arm).  Who should I call with questions: If you have any questions, please call the Imaging Department where you will have your exam. Directions, parking instructions, and other information is available on our website, [email or web address].            Sep 18, 2018 11:30 AM CDT   (Arrive by 11:15 AM)   Return Visit with DENNISE Amin MD   [facility] ([facility])    [address] [place] [address]  [address] [place] [address] [phone]            Sep 18, 2018  2:30 PM CDT   (Arrive by 2:15 PM)   Return Visit with Charity Brar MD   [facility] ([facility])    [address] [place] [address]  [address]  [facility] [address] [phone]            Jan 21, 2019 10:15 AM CST   RETURN RETINA with Paz Osborn MD   [facility] ([facility])    [address]  [facility] [address]   [phone]            Aug 27, 2019 10:15 AM CDT   (Arrive by 10:00 AM)   Return Visit with Mira Bryant MD   [facility] ([facility])    [address] [place] [address]  [address]  [facility] [address] [phone]              Who to contact     If you have questions or need follow up information about today's clinic visit or  your schedule please contact [facility] directly at [phone].  Normal or non-critical lab and imaging results will be communicated to you by [employer], letter or phone within 4 business days after the clinic has received the results. If you do not hear from us within 7 days, please contact the clinic through [employer] or phone. If you have a critical or abnormal lab result, we will notify you by phone as soon as possible.  Submit refill requests through "[employer] or call your pharmacy and they will forward the refill request to us. Please allow 3 business days for your refill to be completed.          Additional Information About Your Visit        "[employer] Information     "[employer] gives you secure access to your electronic health record. If you see a primary care provider, you can also send messages to your care team and make appointments. If you have questions, please call your primary care clinic.  If you do not have a primary care provider, please call [phone] and they will assist you.      "[employer] is an electronic gateway that provides easy, online access to your medical records. With "[employer] you can request a clinic appointment, read your test results, renew a prescription or communicate with your care team.     To access your existing account, please contact your [facility] or call [phone] for assistance.        Care EveryWhere ID     This is your Care EveryWhere ID. This could be used by other organizations to access your [place] medical records  [id number]        Your Vitals Were     Last Period                   10/14/2001            Blood Pressure from Last 3 Encounters:   09/06/18 118/82   08/28/18 108/77   08/28/18 108/77    Weight from Last 3 Encounters:   09/06/18 79 kg (174 lb 3.2 oz)   08/28/18 79.2 kg (174 lb 9 oz)   08/28/18 79.2 kg (174 lb 9.6 oz)              Today, you had the following     No orders found for display         Today's  Medication Changes          These changes are accurate as of 9/10/18  2:01 PM.  If you have any questions, ask your nurse or doctor.               These medicines have changed or have updated prescriptions.        Dose/Directions    sertraline 25 MG tablet   Commonly known as:  ZOLOFT   This may have changed:    - how much to take  - when to take this  - additional instructions   Used for:  Hot flushes, perimenopausal        Take 1 tablet daily.   Quantity:  30 tablet   Refills:  11                Primary Care Provider Office Phone # Fax #    Shi Sasha Alonso [phone]       [address] [place] [address]        Equal Access to Services     [facility]: Hadii prem ornelas Sojohanny, waaxda alpesh, qaybta kaalmashruthi crockett, michelle ford . So [facility] [phone].    ATENCIÓN: Si habla español, tiene a rivera disposición servicios gratuitos de asistencia lingüística. [facility] [phone].    We comply with applicable federal civil rights laws and [place] laws. We do not discriminate on the basis of race, color, national origin, age, disability, sex, sexual orientation, or gender identity.            Thank you!     Thank you for choosing [facility]  for your care. Our goal is always to provide you with excellent care. Hearing back from our patients is one way we can continue to improve our services. Please take a few minutes to complete the written survey that you may receive in the mail after your visit with us. Thank you!             Your Updated Medication List - Protect others around you: Learn how to safely use, store and throw away your medicines at www.disposemymeds.org.          This list is accurate as of 9/10/18  2:01 PM.  Always use your most recent med list.                   Brand Name Dispense Instructions for use Diagnosis    ACETAMINOPHEN PO      Take 325 mg by mouth every 8 hours as needed for pain        ADVIL 200 MG capsule    Generic drug:  ibuprofen      Take 200 mg by mouth every 4 hours as needed for fever        Fish Oil 1000 MG Cpdr      Take  by mouth.        fluticasone 50 MCG/ACT spray    FLONASE    16 mL    USE ONE OR TWO SPRAYS IN EACH NOSTRIL DAILY    Chronic rhinitis       loratadine 10 MG tablet    CLARITIN    90 tablet    TAKE ONE TABLET BY MOUTH EVERY DAY AS NEEDED FOR ALLERGY SYMPTOMS    Chronic rhinitis, unspecified type       omeprazole 20 MG CR capsule    priLOSEC    90 capsule    TAKE 1 CAPSULE (20 MG) BY MOUTH DAILY    Gastroesophageal reflux disease, esophagitis presence not specified       PRESERVISION AREDS 2 Caps     60 capsule    Take 1 tablet by mouth 2 times daily    Drusen (degenerative) of retina, bilateral, Vitreous degeneration, bilateral       sertraline 25 MG tablet    ZOLOFT    30 tablet    Take 1 tablet daily.    Hot flushes, perimenopausal       traMADol 50 MG tablet    ULTRAM    20 tablet    Take 1 tablet (50 mg) by mouth every 6 hours as needed for severe pain    S/P breast reconstruction

## 2018-09-11 ENCOUNTER — RADIANT APPOINTMENT (OUTPATIENT)
Dept: CT IMAGING | Facility: CLINIC | Age: 58
End: 2018-09-11
Attending: PLASTIC SURGERY
Payer: COMMERCIAL

## 2018-09-11 DIAGNOSIS — C50.919 BREAST CANCER (H): ICD-10-CM

## 2018-09-11 PROCEDURE — 74174 CTA ABD&PLVS W/CONTRAST: CPT | Performed by: RADIOLOGY

## 2018-09-11 RX ORDER — IOPAMIDOL 755 MG/ML
100 INJECTION, SOLUTION INTRAVASCULAR ONCE
Status: COMPLETED | OUTPATIENT
Start: 2018-09-11 | End: 2018-09-11

## 2018-09-11 RX ADMIN — IOPAMIDOL 100 ML: 755 INJECTION, SOLUTION INTRAVASCULAR at 13:31

## 2018-09-16 LAB — LAB SCANNED RESULT: NORMAL

## 2018-09-17 ENCOUNTER — TELEPHONE (OUTPATIENT)
Dept: PLASTIC SURGERY | Facility: CLINIC | Age: 58
End: 2018-09-17

## 2018-09-17 NOTE — TELEPHONE ENCOUNTER
Patient is scheduled for surgery with Dr. Amin      Spoke or left message with: Spoke to patient on 09/17/18    Date of Surgery: 10/22/18 @ 7:30 a.m.    Location: [facility]    Informed patient they will need an adult      Pre-op with surgeon (if applicable): 10/09/18 @ 10:30 a.m.    H&P: Scheduled with PAC on 10/09/18 @ 8:45 a.m.    Additional imaging/appointments: Post op - 10/30/18 @ 10:30 a.m.    Surgery packet:     Additional comments:

## 2018-09-18 ENCOUNTER — OFFICE VISIT (OUTPATIENT)
Dept: PLASTIC SURGERY | Facility: CLINIC | Age: 58
End: 2018-09-18
Attending: PLASTIC SURGERY
Payer: COMMERCIAL

## 2018-09-18 ENCOUNTER — ONCOLOGY VISIT (OUTPATIENT)
Dept: ONCOLOGY | Facility: CLINIC | Age: 58
End: 2018-09-18
Attending: INTERNAL MEDICINE
Payer: COMMERCIAL

## 2018-09-18 VITALS
HEIGHT: 65 IN | BODY MASS INDEX: 29.09 KG/M2 | HEART RATE: 77 BPM | WEIGHT: 174.6 LBS | SYSTOLIC BLOOD PRESSURE: 122 MMHG | DIASTOLIC BLOOD PRESSURE: 78 MMHG | RESPIRATION RATE: 16 BRPM | TEMPERATURE: 97.7 F | OXYGEN SATURATION: 97 %

## 2018-09-18 VITALS
BODY MASS INDEX: 29.09 KG/M2 | RESPIRATION RATE: 16 BRPM | HEART RATE: 77 BPM | OXYGEN SATURATION: 97 % | SYSTOLIC BLOOD PRESSURE: 122 MMHG | DIASTOLIC BLOOD PRESSURE: 78 MMHG | TEMPERATURE: 97.7 F | WEIGHT: 174.6 LBS | HEIGHT: 65 IN

## 2018-09-18 DIAGNOSIS — C50.919 RECURRENT MALIGNANT NEOPLASM OF BREAST, UNSPECIFIED LATERALITY (H): ICD-10-CM

## 2018-09-18 DIAGNOSIS — Z98.890 S/P BREAST RECONSTRUCTION, BILATERAL: Primary | ICD-10-CM

## 2018-09-18 DIAGNOSIS — D05.91 RECURRENT CARCINOMA IN SITU OF BREAST, RIGHT: Primary | ICD-10-CM

## 2018-09-18 PROCEDURE — G0463 HOSPITAL OUTPT CLINIC VISIT: HCPCS | Mod: ZF,27

## 2018-09-18 PROCEDURE — 99214 OFFICE O/P EST MOD 30 MIN: CPT | Mod: ZP | Performed by: INTERNAL MEDICINE

## 2018-09-18 PROCEDURE — G0463 HOSPITAL OUTPT CLINIC VISIT: HCPCS | Mod: ZF

## 2018-09-18 ASSESSMENT — PAIN SCALES - GENERAL
PAINLEVEL: NO PAIN (0)
PAINLEVEL: NO PAIN (0)

## 2018-09-18 NOTE — PROGRESS NOTES
PRESENTING COMPLAINT:  Postoperative visit status post bilateral breast reconstruction for right-sided breast cancer, underwent bilateral nipple non-sparing mastectomy and immediate expander placement on 08/22/2018.      HISTORY OF PRESENTING COMPLAINT:  Ms. Lombardo is 58 years old.  She is a month out from surgery, overall doing very well, no major issues.  Final decisions on adjuvant therapies are still not available to us yet.  Her ALESSANDRA drains are less than 20 mL a day.      PHYSICAL EXAMINATION:     VITALS SIGNS:  Stable.  She is afebrile, in no obvious distress.     CHEST:  Both breasts are healing in well.  No evidence for infection, seroma or hematoma.      ASSESSMENT AND PLAN:  Based on above findings, a diagnosis of bilateral breast reconstruction for breast cancer was made.  Took out the ALESSANDRA drains under sterile conditions.  Removed all the air and replaced with 360 mL of saline bilaterally.  Plan is to see her back next week for further expansion.  In the interim, we will await the final decision whether she requires adjuvant therapies.  She is tentatively on the books for the end of October for her next stage of reconstruction.  That may have to be postponed if further adjuvant therapies are needed.  We will decide that next week.  All questions were answered.  She was happy with the visit.

## 2018-09-18 NOTE — NURSING NOTE
"Oncology Rooming Note    September 18, 2018 2:37 PM   Enid Lombardo is a 58 year old female who presents for:    Chief Complaint   Patient presents with     Oncology Clinic Visit     Return : Breast cancer in situ     Initial Vitals: /78 (BP Location: Right arm, Patient Position: Sitting, Cuff Size: Adult Regular)  Pulse 77  Temp 97.7  F (36.5  C) (Oral)  Resp 16  Ht 1.651 m (5' 5\")  Wt 79.2 kg (174 lb 9.7 oz)  LMP 10/14/2001  SpO2 97%  BMI 29.06 kg/m2 Estimated body mass index is 29.06 kg/(m^2) as calculated from the following:    Height as of this encounter: 1.651 m (5' 5\").    Weight as of this encounter: 79.2 kg (174 lb 9.7 oz). Body surface area is 1.91 meters squared.  No Pain (0) Comment: Data Unavailable   Patient's last menstrual period was 10/14/2001.  Allergies reviewed: Yes  Medications reviewed: Yes    Medications: Medication refills not needed today.  Pharmacy name entered into [employer]:    [id number] [place], [place] - [address] [address] [place] [address] [id number] - [place], [place] - [address] [facility]    Clinical concerns: Patient states no further concerns at this time.     5 minutes for nursing intake (face to face time)     Florinda Altamirano CMA              "

## 2018-09-18 NOTE — MR AVS SNAPSHOT
After Visit Summary   9/18/2018    Enid Lombardo    MRN: [id number]           Patient Information     Date Of Birth          1960        Visit Information        Provider Department      9/18/2018 11:30 AM DENNISE Amin MD [facility]         Follow-ups after your visit        Your next 10 appointments already scheduled     Sep 18, 2018  2:30 PM CDT   (Arrive by 2:15 PM)   Return Visit with Charity Brar MD   [facility] ([facility])    [address] [place] [address]  [address]  [facility] [address]   [phone]            Sep 25, 2018 10:15 AM CDT   (Arrive by 10:00 AM)   Return Visit with DENNISE Amin MD   [facility] ([facility])    [address] [place] [address]  [address]  [facility] [address]   [phone]            Oct 09, 2018  9:00 AM CDT   (Arrive by 8:45 AM)   PAC EVALUATION with HERMILO Villalobos [facility] ([facility])    [address] [place] [address]  4th Floor  [facility] [address]   [phone]            Oct 09, 2018 10:45 AM CDT   (Arrive by 10:30 AM)   Return Visit with DENNISE Amin MD   [facility] ([facility])    [address] [place] [address]  [address]  [facility] [address]   [phone]            Oct 22, 2018   Procedure with DENNISE Amin MD   [facility] [place] (--)    [address] [facility] [address]   [phone]            Oct 30, 2018 10:45 AM CDT   (Arrive by 10:30 AM)   Post-Op with DENNISE Amin MD   [facility] ([facility])    [address] [place] [address]  [address]  [facility] [address]   [phone]            Jan 21, 2019 10:15 AM CST   RETURN RETINA with Paz Osborn MD   [facility] ([facility])    [address]  "[address]  [facility] [address]   [phone]            Aug 27, 2019 10:15 AM CDT   (Arrive by 10:00 AM)   Return Visit with Mira Bryant MD   [facility] ([facility])    [address] [facility]  [address]  [facility] [address] [phone]              Who to contact     If you have questions or need follow up information about today's clinic visit or your schedule please contact [facility] directly at [phone].  Normal or non-critical lab and imaging results will be communicated to you by [employer], letter or phone within 4 business days after the clinic has received the results. If you do not hear from us within 7 days, please contact the clinic through [employer] or phone. If you have a critical or abnormal lab result, we will notify you by phone as soon as possible.  Submit refill requests through "[employer] or call your pharmacy and they will forward the refill request to us. Please allow 3 business days for your refill to be completed.          Additional Information About Your Visit        MyChart Information     "[employer] gives you secure access to your electronic health record. If you see a primary care provider, you can also send messages to your care team and make appointments. If you have questions, please call your primary care clinic.  If you do not have a primary care provider, please call [phone] and they will assist you.        Care EveryWhere ID     This is your Care EveryWhere ID. This could be used by other organizations to access your [place] medical records  [id number]        Your Vitals Were     Pulse Temperature Respirations Height Last Period Pulse Oximetry    77 97.7  F (36.5  C) (Oral) 16 1.651 m (5' 5\") 10/14/2001 97%    BMI (Body Mass Index)                   29.05 kg/m2            Blood Pressure from Last 3 Encounters:   09/18/18 122/78   09/06/18 118/82   08/28/18 108/77    Weight from Last 3 Encounters: "   09/18/18 79.2 kg (174 lb 9.6 oz)   09/06/18 79 kg (174 lb 3.2 oz)   08/28/18 79.2 kg (174 lb 9 oz)              Today, you had the following     No orders found for display         Today's Medication Changes          These changes are accurate as of 9/18/18 12:49 PM.  If you have any questions, ask your nurse or doctor.               These medicines have changed or have updated prescriptions.        Dose/Directions    sertraline 25 MG tablet   Commonly known as:  ZOLOFT   This may have changed:    - how much to take  - when to take this  - additional instructions   Used for:  Hot flushes, perimenopausal        Take 1 tablet daily.   Quantity:  30 tablet   Refills:  11                Primary Care Provider Office Phone # Fax #    Shi Alonso [phone]       [address] [place] [address]        Equal Access to Services     [facility]: Lillie Morrison, watoi betts, charline cifuentesalmashruthi crockett, michelle ford . So [facility] [phone].    ATENCIÓN: Si habla español, tiene a rivera disposición servicios gratuitos de asistencia lingüística. Llame al [phone].    We comply with applicable federal civil rights laws and [place] laws. We do not discriminate on the basis of race, color, national origin, age, disability, sex, sexual orientation, or gender identity.            Thank you!     Thank you for choosing [facility]  for your care. Our goal is always to provide you with excellent care. Hearing back from our patients is one way we can continue to improve our services. Please take a few minutes to complete the written survey that you may receive in the mail after your visit with us. Thank you!             Your Updated Medication List - Protect others around you: Learn how to safely use, store and throw away your medicines at www.disposemymeds.org.          This list is accurate as of 9/18/18 12:49 PM.  Always use your most recent med  list.                   Brand Name Dispense Instructions for use Diagnosis    ACETAMINOPHEN PO      Take 325 mg by mouth every 8 hours as needed for pain        ADVIL 200 MG capsule   Generic drug:  ibuprofen      Take 200 mg by mouth every 4 hours as needed for fever        Fish Oil 1000 MG Cpdr      Take  by mouth.        fluticasone 50 MCG/ACT spray    FLONASE    16 mL    USE ONE OR TWO SPRAYS IN EACH NOSTRIL DAILY    Chronic rhinitis       loratadine 10 MG tablet    CLARITIN    90 tablet    TAKE ONE TABLET BY MOUTH EVERY DAY AS NEEDED FOR ALLERGY SYMPTOMS    Chronic rhinitis, unspecified type       omeprazole 20 MG CR capsule    priLOSEC    90 capsule    TAKE 1 CAPSULE (20 MG) BY MOUTH DAILY    Gastroesophageal reflux disease, esophagitis presence not specified       PRESERVISION AREDS 2 Caps     60 capsule    Take 1 tablet by mouth 2 times daily    Drusen (degenerative) of retina, bilateral, Vitreous degeneration, bilateral       sertraline 25 MG tablet    ZOLOFT    30 tablet    Take 1 tablet daily.    Hot flushes, perimenopausal       traMADol 50 MG tablet    ULTRAM    20 tablet    Take 1 tablet (50 mg) by mouth every 6 hours as needed for severe pain    S/P breast reconstruction

## 2018-09-18 NOTE — LETTER
9/18/2018       RE: Enid Lombardo  [address] [facility] [address]     Dear Colleague,    Thank you for referring your patient, Enid Lombardo, to the [facility]. Please see a copy of my visit note below.    Oncology Follow-up Visit:  September 23, 2018  Diagnosis: recurrent breast cancer, stage 1    History Of Present Illness:  Ms. Lombardo is a 58 year old female is here for follow-up of new recurrent breast cancer.    She was originally diagnosed in December 2004 with a stage I infiltrating ductal carcinoma of the right breast.  This was found on abnormal screening mammogram.  Biopsy was obtained which showed a grade 1 infiltrating ductal carcinoma, ER positive, KY negative, HER-2 negative.  In January 2005 she underwent a right-sided lumpectomy with sentinel lymph node dissection.  Tumor size was 0.6 cm and she had 0 of 3 sentinel lymph nodes positive for malignancy.  She underwent further excision for close margins and no further malignancy was seen on pathology.  She then completed right breast radiation and was started on tamoxifen in March 2005 and remained on that until November 2008.  She stopped tamoxifen therapy early due to the diagnosis of cataracts.      She had been doing well until screening mammogram in July 2018 showed calcifications and she was then underwent further imaging which showed approximately 3 cm size area of calcifications in an area separate from her previous lumpectomy.  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  She then underwent a bilateral mastectomy with reconstruction with Dr. Sanchez.  She also saw a genetic counselor and further BCRA testing was negative.  Final pathology revealed 8 mm invasive cancer, total 4 cm dcis.  Final staging T1cN0, stage 1 breast cancer.  ER + KY + her 2 negative.    Oncotype pending.      She is healing well, and wondering about next steps.  No other  "complaints today.         Review Of Systems:  Nursing Notes:   Florinda Altamirano CMA  9/18/2018  2:37 PM  Signed  Oncology Rooming Note    September 18, 2018 2:37 PM   Enid Lombardo is a 58 year old female who presents for:    Chief Complaint   Patient presents with     Oncology Clinic Visit     Return : Breast cancer in situ     Initial Vitals: /78 (BP Location: Right arm, Patient Position: Sitting, Cuff Size: Adult Regular)  Pulse 77  Temp 97.7  F (36.5  C) (Oral)  Resp 16  Ht 1.651 m (5' 5\")  Wt 79.2 kg (174 lb 9.7 oz)  LMP 10/14/2001  SpO2 97%  BMI 29.06 kg/m2 Estimated body mass index is 29.06 kg/(m^2) as calculated from the following:    Height as of this encounter: 1.651 m (5' 5\").    Weight as of this encounter: 79.2 kg (174 lb 9.7 oz). Body surface area is 1.91 meters squared.  No Pain (0) Comment: Data Unavailable   Patient's last menstrual period was 10/14/2001.  Allergies reviewed: Yes  Medications reviewed: Yes    Medications: Medication refills not needed today.  Pharmacy name entered into [employer]:    [id number] [place], [place] [facility] [address] [address] [place], [place] [id number] [place], [place] - [address] [facility]    Clinical concerns: Patient states no further concerns at this time.     5 minutes for nursing intake (face to face time)     Florinda Altamirano CMA         ROS: 10 point ROS neg other than the symptoms noted above in the HPI.          Past medical, social, surgical, and family histories reviewed.    Allergies:  Allergies as of 09/18/2018 - Noah as Reviewed 09/18/2018   Allergen Reaction Noted     Alphagan p Rash 05/11/2009       Current Medications:  Current Outpatient Prescriptions   Medication Sig Dispense Refill     ACETAMINOPHEN PO Take 325 mg by mouth every 8 hours as needed for pain       fluticasone (FLONASE) 50 MCG/ACT spray USE ONE OR TWO SPRAYS IN EACH NOSTRIL DAILY 16 mL 10     ibuprofen (ADVIL) 200 MG capsule Take " "200 mg by mouth every 4 hours as needed for fever       loratadine (CLARITIN) 10 MG tablet TAKE ONE TABLET BY MOUTH EVERY DAY AS NEEDED FOR ALLERGY SYMPTOMS 90 tablet 1     Multiple Vitamins-Minerals (PRESERVISION AREDS 2) CAPS Take 1 tablet by mouth 2 times daily 60 capsule 11     Omega-3 Fatty Acids (FISH OIL) 1000 MG CPDR Take  by mouth.       omeprazole (PRILOSEC) 20 MG CR capsule TAKE 1 CAPSULE (20 MG) BY MOUTH DAILY 90 capsule 3     sertraline (ZOLOFT) 25 MG tablet Take 1 tablet daily. (Patient taking differently: 25 mg every morning Take 1 tablet daily.) 30 tablet 11     traMADol (ULTRAM) 50 MG tablet Take 1 tablet (50 mg) by mouth every 6 hours as needed for severe pain 20 tablet 0        Physical Exam:  /78 (BP Location: Right arm, Patient Position: Sitting, Cuff Size: Adult Regular)  Pulse 77  Temp 97.7  F (36.5  C) (Oral)  Resp 16  Ht 1.651 m (5' 5\")  Wt 79.2 kg (174 lb 9.7 oz)  LMP 10/14/2001  SpO2 97%  BMI 29.06 kg/m2      > 25 min were spent in counseling and coordinating care.  Full exam not performed      Laboratory/Imaging Studies  No visits with results within 2 Week(s) from this visit.  Latest known visit with results is:    Oncology Visit on 08/01/2018   Component Date Value Ref Range Status     Miscellaneous Test 08/01/2018      Final                    Value:Specimen Received, Reordered and sent to Performing laboratory - Report to follow upon   completion.       Lab Scanned Result 08/01/2018 SEND OUTS MISC TEST-Scanned   Final     Lab Scanned Result 08/01/2018 SEND OUTS MISC TEST-Scanned   Final        ASSESSMENT/PLAN:    The patient is a 58-year-old postmenopausal female with a history of a stage I breast cancer treated more than 10 years ago with resection, radiation and adjuvant tamoxifen.  She subsequently developed a new mass identifiable on mammogram, measuring approximately 3.5 cm.  Biopsy was consistent with ductal carcinoma in situ, estrogen receptor positive, " progesterone receptor positive.  She underwent a mastectomy bilaterally with Dr. Rakesh Sanchez.  She had immediate reconstruction.    1. New T1cN0 breast cancer now s/p bilateral mastectomy.  We discussed I'd like to await her oncotype testing.  Assuming her genomic oncotype number is < 26, I would not recommend adjuvant chemotherapy as per the TAILORx study.    We reviewed endocrine therapy.  She has previously been on tamoxifen and developed cataracts.  We reviewed the use of AIs for preventing recurrent breast cancers as has been outlined in multiple trials including the ATAC study.  The side effects were discussed.  Once I have her oncotype report, we can prescribe an AI.      She will need a new baseline dexa scan.  We can discuss prophylactic bisophophonates at her next visit depending on the results of this.    Encouraged exercise.    She is coping well.    Charity Brar      Addendum:  Oncotype came back with score of 0.  No chemotherapy.  Will plan on 5 years of AI.  Script for arimidex sent out.  Will have patient return in 3 months for SCP.      Again, thank you for allowing me to participate in the care of your patient.      Sincerely,    Charity Brar MD

## 2018-09-18 NOTE — NURSING NOTE
"Oncology Rooming Note    September 18, 2018 11:31 AM   Enid Lombardo is a 58 year old female who presents for:    Chief Complaint   Patient presents with     Oncology Clinic Visit     Return : Breast Cancer in situ     Initial Vitals: /78 (BP Location: Right arm, Patient Position: Sitting, Cuff Size: Adult Regular)  Pulse 77  Temp 97.7  F (36.5  C) (Oral)  Resp 16  Ht 1.651 m (5' 5\")  Wt 79.2 kg (174 lb 9.6 oz)  LMP 10/14/2001  SpO2 97%  BMI 29.05 kg/m2 Estimated body mass index is 29.05 kg/(m^2) as calculated from the following:    Height as of this encounter: 1.651 m (5' 5\").    Weight as of this encounter: 79.2 kg (174 lb 9.6 oz). Body surface area is 1.91 meters squared.  No Pain (0) Comment: Data Unavailable   Patient's last menstrual period was 10/14/2001.  Allergies reviewed: Yes  Medications reviewed: Yes    Medications: Medication refills not needed today.  Pharmacy name entered into [employer]:    [id number] [place], [place] - [address] [address] [place] [address] [id number] - [place], [place] - [address] [facility]    Clinical concerns: Patient states that she would like to discuss getting a refill on the tramadol to help her sleep at night when she is uncomfortable.     10 minutes for nursing intake (face to face time)     Florinda Altamirano CMA              "

## 2018-09-18 NOTE — MR AVS SNAPSHOT
After Visit Summary   9/18/2018    Enid Lombardo    MRN: [id number]           Patient Information     Date Of Birth          1960        Visit Information        Provider Department      9/18/2018 2:30 PM Charity Brar MD [facility]        Today's Diagnoses     Recurrent carcinoma in situ of breast, right    -  1    Recurrent malignant neoplasm of breast, unspecified laterality (H)           Follow-ups after your visit        Your next 10 appointments already scheduled     Sep 25, 2018 10:15 AM CDT   (Arrive by 10:00 AM)   Return Visit with DENNISE Amin MD   [facility] ([facility] [place])    [address]  [address]  [facility] [address]   [phone]            Oct 09, 2018  9:00 AM CDT   (Arrive by 8:45 AM)   PAC EVALUATION with HERMILO Villalobos [facility] Preoperative [facility] [place] ([facility])    [address]  4th Floor  [facility] [address]   [phone]            Oct 09, 2018 10:45 AM CDT   (Arrive by 10:30 AM)   Return Visit with DENNISE Amin MD   [facility] ([facility])    [address]  [address]  [facility] [address]   [phone]            Oct 22, 2018   Procedure with DENNISE Amin MD   [facility] [place], Same Day Surgery (--)    [address] [address]   [phone]            Oct 30, 2018 10:45 AM CDT   (Arrive by 10:30 AM)   Post-Op with DENNISE Amin MD   [facility] ([facility])    [address]  [address]  [facility] [address]   [phone]            Jan 21, 2019 10:15 AM CST   RETURN RETINA with Paz Osborn MD   [facility] ([facility])    [place]  9th [address]  [facility] [address]   [phone]            Sep 23, 2019  2:00 PM CDT   (Arrive  "by 1:45 PM)   Return Visit with Charity Brar MD   [facility] ([facility] [place])    [address]  [address]  [facility] [address] [phone]              Who to contact     If you have questions or need follow up information about today's clinic visit or your schedule please contact [facility] directly at [phone].  Normal or non-critical lab and imaging results will be communicated to you by YadaHomehart, letter or phone within 4 business days after the clinic has received the results. If you do not hear from us within 7 days, please contact the clinic through [employer] Mobile Paymentt or phone. If you have a critical or abnormal lab result, we will notify you by phone as soon as possible.  Submit refill requests through [employer] or call your pharmacy and they will forward the refill request to us. Please allow 3 business days for your refill to be completed.          Additional Information About Your Visit        YadaHomeharInstallMonetizer Information     [employer] gives you secure access to your electronic health record. If you see a primary care provider, you can also send messages to your care team and make appointments. If you have questions, please call your primary care clinic.  If you do not have a primary care provider, please call [phone] and they will assist you.        Care EveryWhere ID     This is your Care EveryWhere ID. This could be used by other organizations to access your [facility] medical records  [id number]        Your Vitals Were     Pulse Temperature Respirations Height Last Period Pulse Oximetry    77 97.7  F (36.5  C) (Oral) 16 1.651 m (5' 5\") 10/14/2001 97%    BMI (Body Mass Index)                   29.06 kg/m2            Blood Pressure from Last 3 Encounters:   09/18/18 122/78   09/18/18 122/78   09/06/18 118/82    Weight from Last 3 Encounters:   09/18/18 79.2 kg (174 lb 9.7 oz)   09/18/18 79.2 kg (174 lb 9.6 oz)   09/06/18 79 kg (174 lb 3.2 " oz)                 Today's Medication Changes          These changes are accurate as of 9/18/18 11:59 PM.  If you have any questions, ask your nurse or doctor.               Start taking these medicines.        Dose/Directions    anastrozole 1 MG tablet   Commonly known as:  ARIMIDEX   Used for:  Recurrent malignant neoplasm of breast, unspecified laterality (H)   Started by:  Charity Brar MD        Dose:  1 mg   Take 1 tablet (1 mg) by mouth daily   Quantity:  90 tablet   Refills:  3         These medicines have changed or have updated prescriptions.        Dose/Directions    sertraline 25 MG tablet   Commonly known as:  ZOLOFT   This may have changed:    - how much to take  - when to take this  - additional instructions   Used for:  Hot flushes, perimenopausal        Take 1 tablet daily.   Quantity:  30 tablet   Refills:  11            Where to get your medicines      These medications were sent to [facility] [address] [place] [facility] - [place], [place] - [address] [address] [place] [address]  [address] [address] [facility] [address]     Phone:  [phone]     anastrozole 1 MG tablet                Primary Care Provider Office Phone # Fax #    Shi Alonso [phone]       [address] [place] [address]        Equal Access to Services     [facility] Hospital[facility]UNICE AH: Hadii prem osorio hadjaco Sojohanny, waaxda luqadaha, qaybta kaalmada adeegyada, michelle tong. So [facility] [phone].    ATENCIÓN: Si habla español, tiene a rivera disposición servicios gratuitos de asistencia lingüística. Llame al [phone].    We comply with applicable federal civil rights laws and [place] laws. We do not discriminate on the basis of race, color, national origin, age, disability, sex, sexual orientation, or gender identity.            Thank you!     Thank you for choosing [facility]  for your care. Our goal is always to provide you with excellent care. Hearing back from our patients is one way we can continue to  improve our services. Please take a few minutes to complete the written survey that you may receive in the mail after your visit with us. Thank you!             Your Updated Medication List - Protect others around you: Learn how to safely use, store and throw away your medicines at www.disposemymeds.org.          This list is accurate as of 9/18/18 11:59 PM.  Always use your most recent med list.                   Brand Name Dispense Instructions for use Diagnosis    ACETAMINOPHEN PO      Take 325 mg by mouth every 8 hours as needed for pain        ADVIL 200 MG capsule   Generic drug:  ibuprofen      Take 200 mg by mouth every 4 hours as needed for fever        anastrozole 1 MG tablet    ARIMIDEX    90 tablet    Take 1 tablet (1 mg) by mouth daily    Recurrent malignant neoplasm of breast, unspecified laterality (H)       Fish Oil 1000 MG Cpdr      Take  by mouth.        fluticasone 50 MCG/ACT spray    FLONASE    16 mL    USE ONE OR TWO SPRAYS IN EACH NOSTRIL DAILY    Chronic rhinitis       loratadine 10 MG tablet    CLARITIN    90 tablet    TAKE ONE TABLET BY MOUTH EVERY DAY AS NEEDED FOR ALLERGY SYMPTOMS    Chronic rhinitis, unspecified type       omeprazole 20 MG CR capsule    priLOSEC    90 capsule    TAKE 1 CAPSULE (20 MG) BY MOUTH DAILY    Gastroesophageal reflux disease, esophagitis presence not specified       PRESERVISION AREDS 2 Caps     60 capsule    Take 1 tablet by mouth 2 times daily    Drusen (degenerative) of retina, bilateral, Vitreous degeneration, bilateral       sertraline 25 MG tablet    ZOLOFT    30 tablet    Take 1 tablet daily.    Hot flushes, perimenopausal       traMADol 50 MG tablet    ULTRAM    20 tablet    Take 1 tablet (50 mg) by mouth every 6 hours as needed for severe pain    S/P breast reconstruction

## 2018-09-18 NOTE — LETTER
9/18/2018       RE: Enid Lombardo  [address]  [facility] [address]     Dear Colleague,    Thank you for referring your patient, Enid Lombardo, to the [facility] at [facility] Please see a copy of my visit note below.    PRESENTING COMPLAINT:  Postoperative visit status post bilateral breast reconstruction for right-sided breast cancer, underwent bilateral nipple non-sparing mastectomy and immediate expander placement on 08/22/2018.      HISTORY OF PRESENTING COMPLAINT:  Ms. Lombardo is 58 years old.  She is a month out from surgery, overall doing very well, no major issues.  Final decisions on adjuvant therapies are still not available to us yet.  Her ALESSANDRA drains are less than 20 mL a day.      PHYSICAL EXAMINATION:     VITALS SIGNS:  Stable.  She is afebrile, in no obvious distress.     CHEST:  Both breasts are healing in well.  No evidence for infection, seroma or hematoma.      ASSESSMENT AND PLAN:  Based on above findings, a diagnosis of bilateral breast reconstruction for breast cancer was made.  Took out the ALESSANDRA drains under sterile conditions.  Removed all the air and replaced with 360 mL of saline bilaterally.  Plan is to see her back next week for further expansion.  In the interim, we will await the final decision whether she requires adjuvant therapies.  She is tentatively on the books for the end of October for her next stage of reconstruction.  That may have to be postponed if further adjuvant therapies are needed.  We will decide that next week.  All questions were answered.  She was happy with the visit.         Again, thank you for allowing me to participate in the care of your patient.      Sincerely,    DENNISE Amin MD

## 2018-09-20 LAB — LAB SCANNED RESULT: NORMAL

## 2018-09-23 RX ORDER — ANASTROZOLE 1 MG/1
1 TABLET ORAL DAILY
Qty: 90 TABLET | Refills: 3 | Status: SHIPPED | OUTPATIENT
Start: 2018-09-23 | End: 2018-10-01

## 2018-09-23 NOTE — PROGRESS NOTES
Oncology Follow-up Visit:  September 23, 2018  Diagnosis: recurrent breast cancer, stage 1    History Of Present Illness:  Ms. Lombardo is a 58 year old female is here for follow-up of new recurrent breast cancer.    She was originally diagnosed in December 2004 with a stage I infiltrating ductal carcinoma of the right breast.  This was found on abnormal screening mammogram.  Biopsy was obtained which showed a grade 1 infiltrating ductal carcinoma, ER positive, AZ negative, HER-2 negative.  In January 2005 she underwent a right-sided lumpectomy with sentinel lymph node dissection.  Tumor size was 0.6 cm and she had 0 of 3 sentinel lymph nodes positive for malignancy.  She underwent further excision for close margins and no further malignancy was seen on pathology.  She then completed right breast radiation and was started on tamoxifen in March 2005 and remained on that until November 2008.  She stopped tamoxifen therapy early due to the diagnosis of cataracts.      She had been doing well until screening mammogram in July 2018 showed calcifications and she was then underwent further imaging which showed approximately 3 cm size area of calcifications in an area separate from her previous lumpectomy.  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  She then underwent a bilateral mastectomy with reconstruction with Dr. Sanchez.  She also saw a genetic counselor and further BCRA testing was negative.  Final pathology revealed 8 mm invasive cancer, total 4 cm dcis.  Final staging T1cN0, stage 1 breast cancer.  ER + AZ + her 2 negative.    Oncotype pending.      She is healing well, and wondering about next steps.  No other complaints today.         Review Of Systems:  Nursing Notes:   Florinda Altamirano CMA  9/18/2018  2:37 PM  Signed  Oncology Rooming Note    September 18, 2018 2:37 PM   Enid Lombardo is a 58 year old female who presents  "for:    Chief Complaint   Patient presents with     Oncology Clinic Visit     Return : Breast cancer in situ     Initial Vitals: /78 (BP Location: Right arm, Patient Position: Sitting, Cuff Size: Adult Regular)  Pulse 77  Temp 97.7  F (36.5  C) (Oral)  Resp 16  Ht 1.651 m (5' 5\")  Wt 79.2 kg (174 lb 9.7 oz)  LMP 10/14/2001  SpO2 97%  BMI 29.06 kg/m2 Estimated body mass index is 29.06 kg/(m^2) as calculated from the following:    Height as of this encounter: 1.651 m (5' 5\").    Weight as of this encounter: 79.2 kg (174 lb 9.7 oz). Body surface area is 1.91 meters squared.  No Pain (0) Comment: Data Unavailable   Patient's last menstrual period was 10/14/2001.  Allergies reviewed: Yes  Medications reviewed: Yes    Medications: Medication refills not needed today.  Pharmacy name entered into [employer]:    [id number] [place], [place] - [address] [address] [place], [place] [id number] - [place], [place] - [address] [facility]    Clinical concerns: Patient states no further concerns at this time.     5 minutes for nursing intake (face to face time)     Florinda Altamirano CMA         ROS: 10 point ROS neg other than the symptoms noted above in the HPI.          Past medical, social, surgical, and family histories reviewed.    Allergies:  Allergies as of 09/18/2018 - Noah as Reviewed 09/18/2018   Allergen Reaction Noted     Alphagan p Rash 05/11/2009       Current Medications:  Current Outpatient Prescriptions   Medication Sig Dispense Refill     ACETAMINOPHEN PO Take 325 mg by mouth every 8 hours as needed for pain       fluticasone (FLONASE) 50 MCG/ACT spray USE ONE OR TWO SPRAYS IN EACH NOSTRIL DAILY 16 mL 10     ibuprofen (ADVIL) 200 MG capsule Take 200 mg by mouth every 4 hours as needed for fever       loratadine (CLARITIN) 10 MG tablet TAKE ONE TABLET BY MOUTH EVERY DAY AS NEEDED FOR ALLERGY SYMPTOMS 90 tablet 1     Multiple Vitamins-Minerals (PRESERVISION AREDS 2) CAPS " "Take 1 tablet by mouth 2 times daily 60 capsule 11     Omega-3 Fatty Acids (FISH OIL) 1000 MG CPDR Take  by mouth.       omeprazole (PRILOSEC) 20 MG CR capsule TAKE 1 CAPSULE (20 MG) BY MOUTH DAILY 90 capsule 3     sertraline (ZOLOFT) 25 MG tablet Take 1 tablet daily. (Patient taking differently: 25 mg every morning Take 1 tablet daily.) 30 tablet 11     traMADol (ULTRAM) 50 MG tablet Take 1 tablet (50 mg) by mouth every 6 hours as needed for severe pain 20 tablet 0        Physical Exam:  /78 (BP Location: Right arm, Patient Position: Sitting, Cuff Size: Adult Regular)  Pulse 77  Temp 97.7  F (36.5  C) (Oral)  Resp 16  Ht 1.651 m (5' 5\")  Wt 79.2 kg (174 lb 9.7 oz)  LMP 10/14/2001  SpO2 97%  BMI 29.06 kg/m2      > 25 min were spent in counseling and coordinating care.  Full exam not performed      Laboratory/Imaging Studies  No visits with results within 2 Week(s) from this visit.  Latest known visit with results is:    Oncology Visit on 08/01/2018   Component Date Value Ref Range Status     Miscellaneous Test 08/01/2018      Final                    Value:Specimen Received, Reordered and sent to Performing laboratory - Report to follow upon   completion.       Lab Scanned Result 08/01/2018 SEND OUTS MISC TEST-Scanned   Final     Lab Scanned Result 08/01/2018 SEND OUTS MISC TEST-Scanned   Final        ASSESSMENT/PLAN:    The patient is a 58-year-old postmenopausal female with a history of a stage I breast cancer treated more than 10 years ago with resection, radiation and adjuvant tamoxifen.  She subsequently developed a new mass identifiable on mammogram, measuring approximately 3.5 cm.  Biopsy was consistent with ductal carcinoma in situ, estrogen receptor positive, progesterone receptor positive.  She underwent a mastectomy bilaterally with Dr. Rakesh Sanchez.  She had immediate reconstruction.    1. New T1cN0 breast cancer now s/p bilateral mastectomy.  We discussed I'd like to await her oncotype " testing.  Assuming her genomic oncotype number is < 26, I would not recommend adjuvant chemotherapy as per the TAILORx study.    We reviewed endocrine therapy.  She has previously been on tamoxifen and developed cataracts.  We reviewed the use of AIs for preventing recurrent breast cancers as has been outlined in multiple trials including the ATAC study.  The side effects were discussed.  Once I have her oncotype report, we can prescribe an AI.      She will need a new baseline dexa scan.  We can discuss prophylactic bisophophonates at her next visit depending on the results of this.    Encouraged exercise.    She is coping well.    Charity Brar      Addendum:  Oncotype came back with score of 0.  No chemotherapy.  Will plan on 5 years of AI.  Script for arimidex sent out.  Will have patient return in 3 months for SCP.

## 2018-09-25 ENCOUNTER — OFFICE VISIT (OUTPATIENT)
Dept: PLASTIC SURGERY | Facility: CLINIC | Age: 58
End: 2018-09-25
Attending: PLASTIC SURGERY
Payer: COMMERCIAL

## 2018-09-25 VITALS
HEIGHT: 65 IN | SYSTOLIC BLOOD PRESSURE: 123 MMHG | HEART RATE: 78 BPM | DIASTOLIC BLOOD PRESSURE: 80 MMHG | BODY MASS INDEX: 29.42 KG/M2 | TEMPERATURE: 97.1 F | WEIGHT: 176.6 LBS | OXYGEN SATURATION: 98 % | RESPIRATION RATE: 14 BRPM

## 2018-09-25 DIAGNOSIS — Z98.890 S/P BREAST RECONSTRUCTION, BILATERAL: Primary | ICD-10-CM

## 2018-09-25 PROCEDURE — G0463 HOSPITAL OUTPT CLINIC VISIT: HCPCS | Mod: ZF

## 2018-09-25 ASSESSMENT — PAIN SCALES - GENERAL: PAINLEVEL: NO PAIN (0)

## 2018-09-25 NOTE — PROGRESS NOTES
POSTOPERATIVE VISIT NOTE      PRESENTING COMPLAINT:  Postoperative visit, status post bilateral breast reconstruction for right-sided breast cancer with expander and AlloDerm done 08/22/2018.      HISTORY OF PRESENTING COMPLAINT:  Ms. Lombardo is 58 years old.  She is about 4 weeks out from surgery, overall doing extremely well.  No major issues.  No chemoradiation required.      PHYSICAL EXAMINATION:  Vital signs stable.  She is afebrile, in no obvious distress.  Both breasts are healing in well.  No evidence for infection, seroma, hematoma.        ASSESSMENT AND PLAN:  Based on the above findings, a diagnosis of bilateral breast reconstruction was made.  Went ahead and under sterile conditions injected 150 mL in the right expander, bringing the total to 410 mL and injected 180 mL in the left expander, bringing the total to 440.  We will see her back in a week's time for further fills if necessary.  She is interested in pursuing next stage of reconstruction.  We can tentatively plan for some time in November.  I have asked her to continue with aggressive moisturization.  She wants to proceed with autologous reconstruction.  We will go ahead and get a CTA to plan the appropriate abdominally based free flap.  I went over the planned procedure with her and her  in detail.  They understood everything and want to proceed.  All questions were answered.  She was happy with the visit.

## 2018-09-25 NOTE — LETTER
9/25/2018       RE: Enid Lombardo  [address] [facility] [address]     Dear Colleague,    Thank you for referring your patient, Enid Lombardo, to the [facility] at [facility]. Please see a copy of my visit note below.    POSTOPERATIVE VISIT NOTE      PRESENTING COMPLAINT:  Postoperative visit, status post bilateral breast reconstruction for right-sided breast cancer with expander and AlloDerm done 08/22/2018.      HISTORY OF PRESENTING COMPLAINT:  Ms. Lombardo is 58 years old.  She is about 4 weeks out from surgery, overall doing extremely well.  No major issues.  No chemoradiation required.      PHYSICAL EXAMINATION:  Vital signs stable.  She is afebrile, in no obvious distress.  Both breasts are healing in well.  No evidence for infection, seroma, hematoma.        ASSESSMENT AND PLAN:  Based on the above findings, a diagnosis of bilateral breast reconstruction was made.  Went ahead and under sterile conditions injected 150 mL in the right expander, bringing the total to 410 mL and injected 180 mL in the left expander, bringing the total to 440.  We will see her back in a week's time for further fills if necessary.  She is interested in pursuing next stage of reconstruction.  We can tentatively plan for some time in November.  I have asked her to continue with aggressive moisturization.  She wants to proceed with autologous reconstruction.  We will go ahead and get a CTA to plan the appropriate abdominally based free flap.  I went over the planned procedure with her and her  in detail.  They understood everything and want to proceed.  All questions were answered.  She was happy with the visit.         Again, thank you for allowing me to participate in the care of your patient.      Sincerely,    DENNISE Amin MD

## 2018-09-25 NOTE — NURSING NOTE
"Oncology Rooming Note    September 25, 2018 10:02 AM   Enid Lombardo is a 58 year old female who presents for:    Chief Complaint   Patient presents with     Oncology Clinic Visit     [facility] RETURN- BREAST CA     Initial Vitals: LMP 10/14/2001 Estimated body mass index is 29.06 kg/(m^2) as calculated from the following:    Height as of 9/18/18: 1.651 m (5' 5\").    Weight as of 9/18/18: 79.2 kg (174 lb 9.7 oz). There is no height or weight on file to calculate BSA.  Data Unavailable Comment: Data Unavailable   Patient's last menstrual period was 10/14/2001.  Allergies reviewed: Yes  Medications reviewed: Yes    Medications: Medication refills not needed today.  Pharmacy name entered into [employer]:    [id number] [place] [place] - [address] [address] [place] [address] [id number] - [place], [place] - [address] [facility]    Clinical concerns: No new concerns. Provider was notified.    10 minutes for nursing intake (face to face time)     Lemuel Wolf LPN            "

## 2018-09-27 ENCOUNTER — TELEPHONE (OUTPATIENT)
Dept: SURGERY | Facility: CLINIC | Age: 58
End: 2018-09-27

## 2018-09-27 NOTE — TELEPHONE ENCOUNTER
----- Message from Ninfa Sanchez RN sent at 9/25/2018  1:55 PM CDT -----  Regarding: Change post op appt  Hi Ladies,    She would like to do her post op appt in [place] on 11/2.  Can you call to schedule?    We will take her off the schedule here on 10/30.    Thanks!  Iesha

## 2018-10-01 ENCOUNTER — CARE COORDINATION (OUTPATIENT)
Dept: ONCOLOGY | Facility: CLINIC | Age: 58
End: 2018-10-01

## 2018-10-01 DIAGNOSIS — C50.919 RECURRENT MALIGNANT NEOPLASM OF BREAST, UNSPECIFIED LATERALITY (H): ICD-10-CM

## 2018-10-01 RX ORDER — ANASTROZOLE 1 MG/1
1 TABLET ORAL DAILY
Qty: 90 TABLET | Refills: 3 | COMMUNITY
Start: 2018-10-01 | End: 2019-09-19

## 2018-10-01 NOTE — PROGRESS NOTES
Spoke to patient to clarifying medications and started Anastrozole today. Discussed with Dr Brar and was OK with starting Anastrozole. Updated medication list with current list of medications patient is currently taking.  Answered all patient's questions and verbalized understanding. Lissy Waddell RN, BSN.

## 2018-10-02 ENCOUNTER — OFFICE VISIT (OUTPATIENT)
Dept: PLASTIC SURGERY | Facility: CLINIC | Age: 58
End: 2018-10-02
Attending: PLASTIC SURGERY
Payer: COMMERCIAL

## 2018-10-02 VITALS
HEIGHT: 65 IN | HEART RATE: 80 BPM | RESPIRATION RATE: 16 BRPM | TEMPERATURE: 97.7 F | BODY MASS INDEX: 29.49 KG/M2 | OXYGEN SATURATION: 97 % | SYSTOLIC BLOOD PRESSURE: 129 MMHG | WEIGHT: 177 LBS | DIASTOLIC BLOOD PRESSURE: 78 MMHG

## 2018-10-02 DIAGNOSIS — Z98.890 S/P BREAST RECONSTRUCTION, BILATERAL: Primary | ICD-10-CM

## 2018-10-02 PROCEDURE — G0463 HOSPITAL OUTPT CLINIC VISIT: HCPCS | Mod: ZF

## 2018-10-02 ASSESSMENT — PAIN SCALES - GENERAL: PAINLEVEL: NO PAIN (0)

## 2018-10-02 NOTE — NURSING NOTE
"Oncology Rooming Note    October 2, 2018 10:43 AM   Enid Lombardo is a 58 year old female who presents for:    Chief Complaint   Patient presents with     Oncology Clinic Visit     return - breast ca- 1 week       Initial Vitals: /78 (BP Location: Right arm, Patient Position: Chair, Cuff Size: Adult Regular)  Pulse 80  Temp 97.7  F (36.5  C) (Oral)  Resp 16  Ht 1.651 m (5' 5\")  Wt 80.3 kg (177 lb)  LMP 10/14/2001  SpO2 97%  BMI 29.45 kg/m2 Estimated body mass index is 29.45 kg/(m^2) as calculated from the following:    Height as of this encounter: 1.651 m (5' 5\").    Weight as of this encounter: 80.3 kg (177 lb). Body surface area is 1.92 meters squared.  No Pain (0) Comment: Data Unavailable   Patient's last menstrual period was 10/14/2001.  Allergies reviewed: Yes  Medications reviewed: Yes    Medications: Medication refills not needed today.  Pharmacy name entered into [facility]:    [id number] [place] [facility] - [place], [place] - [address] [address] [place] [address] [id number] - [place], [place] - [address] [facility]    Clinical concerns: possible fill, would like to go over CAT scan     6 minutes for nursing intake (face to face time)     Mitzy Amanda CMA            "

## 2018-10-02 NOTE — LETTER
10/2/2018       RE: Enid Lombardo  [address]  [facility] [address]     Dear Colleague,    Thank you for referring your patient, Enid Lombardo, to the [facility]ENTER at [facility]. Please see a copy of my visit note below.    PREOPERATIVE VISIT      PRESENTING COMPLAINT:  Preoperative visit for upcoming bilateral abdominally based free flap reconstruction for breast cancer scheduled for 10/08/2018      HISTORY OF PRESENT ILLNESS:  Ms. Lombardo is 58 years old, here for regular followup as well as a preoperative visit.  No change in her history and physical exam.  Her expanders are in place, healed well, no issues, wants to be slightly larger.      PHYSICAL EXAMINATION:  On exam vital signs stable.  She is afebrile in no obvious distress.  Breasts are healing in well.      ASSESSMENT AND PLAN:  Based on above findings, a diagnosis of bilateral breast reconstruction for right-sided breast cancer was made.  Went ahead and under sterile conditions injected 100 mL in each side, bringing the total on the right to 510 mL and in the left 540 mL.  Plan now is to proceed with abdominally based free flap reconstructions.  Based on the CTA, most likely DID flaps will be undertaken on each side.  She will be cleared by her medical doctors.  All risks, benefits and alternatives of potential procedure including pain, infection, bleeding, scarring, asymmetry, seromas, hematomas, wound breakdown, wound dehiscence, loss of the flaps, requirement of further surgeries as a backup implant placement, abdominal wall problems like bulges, hernias, bowel issues as well as obstruction and injury, umbilical necrosis, infectious complications, fat necrosis, skin necrosis, asymmetries, requirement of further surgery, DVT, PE, MI, CVA, pneumonia, renal failure and death were all explained.  She understood them all and wants to proceed.  All questions were answered.  She was happy with  the visit.  Consent obtained.  Photographs obtained.  Look forward to helping her out in the near future.        Total time spent with the patient 15 minutes, which more than half was counseling.         Again, thank you for allowing me to participate in the care of your patient.      Sincerely,    DENNISE Amin MD

## 2018-10-02 NOTE — MR AVS SNAPSHOT
After Visit Summary   10/2/2018    Enid Lombardo    MRN: [id number]           Patient Information     Date Of Birth          1960        Visit Information        Provider Department      10/2/2018 10:45 AM DENNISE Amin MD [facility]        Today's Diagnoses     S/P breast reconstruction, bilateral    -  1       Follow-ups after your visit        Follow-up notes from your care team     Return if symptoms worsen or fail to improve.      Your next 10 appointments already scheduled     Oct 03, 2018  2:00 PM CDT   Pre-Op physical with HERMILO Cardenas [facility] ([facility])    [address]  [facility] [address] [phone]            Oct 08, 2018   Procedure with DENNISE Amin MD   [facility], Same Day Surgery (--)    [address] [facility] [address]   [phone]            Oct 12, 2018  4:00 PM CDT   Return Visit with DENNISE Amin MD   [facility] ([facility])    [address] [place] [id number] [phone]            Jan 14, 2019  8:00 AM CST   DX HIP/PELVIS/SPINE with UCDX1   [facility]a ([facility])    [address] [id number] [phone]           How do I prepare for my exam? (Food and drink instructions) No Food and Drink Restrictions.  How do I prepare for my exam? (Other instructions) Please do not take any of the following 24 hours prior to the day of your exam: vitamins, calcium tablets, antacids.  What should I wear: If possible, please wear clothes without metal (snaps, zippers). A sweat suit works well.  How long does the exam take: The exam takes about 20 minutes.  What should I bring: Bring a list of your current medicines to your exam (including vitamins, minerals and over-the-counter drugs).  Do I need a :  No  is needed.  What should  I do after the exam: No restrictions, You may resume normal activities.  How do I prepare for my exam? (Food and drink instructions) A DEXA scan is a bone-density scan. It uses a low level of radiation to check the strength of your bones. As you lie on a padded table, a machine will take X-rays. We most often scan the hips and lower spine.  Who should I call with questions: If you have any questions, please call the Imaging Department where you will have your exam. Directions, parking instructions, and other information is available on our website, [email or web address].            Jan 14, 2019  9:00 AM CST   [facility] Lab Draw with  [facility] LAB DRAW   [facility] Lab Draw ([facility])    [address] [place] [address]  [address] [place] [address] [phone]            Jan 14, 2019 11:30 AM CST   (Arrive by 11:15 AM)   Return Visit with Charity Brar MD   [facility] ([facility])    [address] [place] [address]  [address]  [facility] [id number] [phone]            Jan 21, 2019 10:15 AM CST   RETURN RETINA with Paz Osborn MD   [facility]    [address]  [facility] [address]   [phone]            Sep 23, 2019  2:00 PM CDT   (Arrive by 1:45 PM)   Return Visit with Charity Brar MD   [facility] ([facility])    [address] [place] [address]  [address] [place] [id number] [phone]              Who to contact     If you have questions or need follow up information about today's clinic visit or your schedule please contact [facility] directly at [phone].  Normal or non-critical lab and imaging results will be communicated to you by MyChart, letter or phone within 4 business days after the clinic has received the results. If you do not hear from us within 7 days, please contact the  "clinic through [employer] or phone. If you have a critical or abnormal lab result, we will notify you by phone as soon as possible.  Submit refill requests through [employer] or call your pharmacy and they will forward the refill request to us. Please allow 3 business days for your refill to be completed.          Additional Information About Your Visit        [facility] Information     [employer] gives you secure access to your electronic health record. If you see a primary care provider, you can also send messages to your care team and make appointments. If you have questions, please call your primary care clinic.  If you do not have a primary care provider, please call [phone] and they will assist you.        Care EveryWhere ID     This is your Care EveryWhere ID. This could be used by other organizations to access your [place] medical records  [id number]        Your Vitals Were     Pulse Temperature Respirations Height Last Period Pulse Oximetry    80 97.7  F (36.5  C) (Oral) 16 5' 5\" 10/14/2001 97%    BMI (Body Mass Index)                   29.45 kg/m2            Blood Pressure from Last 3 Encounters:   10/02/18 129/78   09/25/18 123/80   09/18/18 122/78    Weight from Last 3 Encounters:   10/02/18 177 lb   09/25/18 176 lb 9.6 oz   09/18/18 174 lb 9.7 oz              Today, you had the following     No orders found for display         Today's Medication Changes          These changes are accurate as of 10/2/18  4:57 PM.  If you have any questions, ask your nurse or doctor.               These medicines have changed or have updated prescriptions.        Dose/Directions    sertraline 25 MG tablet   Commonly known as:  ZOLOFT   This may have changed:    - how much to take  - when to take this  - additional instructions   Used for:  Hot flushes, perimenopausal        Take 1 tablet daily.   Quantity:  30 tablet   Refills:  11                Primary Care Provider Office Phone # Fax #    Shi Alonso [phone] " [phone]       [address] [place] [address]        Equal Access to Services     TIENELISEO DIDI : Hadii aad ku hadjacsilverio Morrison, wendyda alpesh, janyalfonso cifuentesdipeshshruthi crockett, michelle sandsgladiscricket tong. So [facility] [phone].    ATENCIÓN: Si habla español, tiene a rivera disposición servicios gratuitos de asistencia lingüística. Wei al [phone].    We comply with applicable federal civil rights laws and [place] laws. We do not discriminate on the basis of race, color, national origin, age, disability, sex, sexual orientation, or gender identity.            Thank you!     Thank you for choosing [facility]  for your care. Our goal is always to provide you with excellent care. Hearing back from our patients is one way we can continue to improve our services. Please take a few minutes to complete the written survey that you may receive in the mail after your visit with us. Thank you!             Your Updated Medication List - Protect others around you: Learn how to safely use, store and throw away your medicines at www.disposemymeds.org.          This list is accurate as of 10/2/18  4:57 PM.  Always use your most recent med list.                   Brand Name Dispense Instructions for use Diagnosis    ACETAMINOPHEN PO      Take 325 mg by mouth every 8 hours as needed for pain        ADVIL 200 MG capsule   Generic drug:  ibuprofen      Take 200 mg by mouth every 4 hours as needed for fever        ARIMIDEX 1 MG tablet   Generic drug:  anastrozole     90 tablet    Take 1 tablet (1 mg) by mouth daily    Recurrent malignant neoplasm of breast, unspecified laterality (H)       Fish Oil 1000 MG Cpdr      Take  by mouth.        fluticasone 50 MCG/ACT spray    FLONASE    16 mL    USE ONE OR TWO SPRAYS IN EACH NOSTRIL DAILY    Chronic rhinitis       loratadine 10 MG tablet    CLARITIN    90 tablet    TAKE ONE TABLET BY MOUTH EVERY DAY AS NEEDED FOR ALLERGY SYMPTOMS    Chronic rhinitis,  unspecified type       omeprazole 20 MG CR capsule    priLOSEC    90 capsule    TAKE 1 CAPSULE (20 MG) BY MOUTH DAILY    Gastroesophageal reflux disease, esophagitis presence not specified       PRESERVISION AREDS 2 Caps     60 capsule    Take 1 tablet by mouth 2 times daily    Drusen (degenerative) of retina, bilateral, Vitreous degeneration, bilateral       sertraline 25 MG tablet    ZOLOFT    30 tablet    Take 1 tablet daily.    Hot flushes, perimenopausal       traMADol 50 MG tablet    ULTRAM    20 tablet    Take 1 tablet (50 mg) by mouth every 6 hours as needed for severe pain    S/P breast reconstruction

## 2018-10-02 NOTE — PATIENT INSTRUCTIONS
Before Your Surgery      Call your surgeon if there is any change in your health. This includes signs of a cold or flu (such as a sore throat, runny nose, cough, rash or fever).    Do not smoke, drink alcohol or take over the counter medicine (unless your surgeon or primary care doctor tells you to) for the 24 hours before and after surgery.    If you take prescribed drugs: Follow your doctor s orders about which medicines to take and which to stop until after surgery.    Eating and drinking prior to surgery: follow the instructions from your surgeon    Take a shower or bath the night before surgery. Use the soap your surgeon gave you to gently clean your skin. If you do not have soap from your surgeon, use your regular soap. Do not shave or scrub the surgery site.  Wear clean pajamas and have clean sheets on your bed.     Hold Ibuprofen from today until after surgery. Tylenol for aches and pains.     Stop Omga 3 fatty acids from now until after surgery    Stop Multivitamin from now until after surgery.

## 2018-10-02 NOTE — PROGRESS NOTES
PREOPERATIVE VISIT      PRESENTING COMPLAINT:  Preoperative visit for upcoming bilateral abdominally based free flap reconstruction for breast cancer scheduled for 10/08/2018      HISTORY OF PRESENT ILLNESS:  Ms. Lombardo is 58 years old, here for regular followup as well as a preoperative visit.  No change in her history and physical exam.  Her expanders are in place, healed well, no issues, wants to be slightly larger.      PHYSICAL EXAMINATION:  On exam vital signs stable.  She is afebrile in no obvious distress.  Breasts are healing in well.      ASSESSMENT AND PLAN:  Based on above findings, a diagnosis of bilateral breast reconstruction for right-sided breast cancer was made.  Went ahead and under sterile conditions injected 100 mL in each side, bringing the total on the right to 510 mL and in the left 540 mL.  Plan now is to proceed with abdominally based free flap reconstructions.  Based on the CTA, most likely DID flaps will be undertaken on each side.  She will be cleared by her medical doctors.  All risks, benefits and alternatives of potential procedure including pain, infection, bleeding, scarring, asymmetry, seromas, hematomas, wound breakdown, wound dehiscence, loss of the flaps, requirement of further surgeries as a backup implant placement, abdominal wall problems like bulges, hernias, bowel issues as well as obstruction and injury, umbilical necrosis, infectious complications, fat necrosis, skin necrosis, asymmetries, requirement of further surgery, DVT, PE, MI, CVA, pneumonia, renal failure and death were all explained.  She understood them all and wants to proceed.  All questions were answered.  She was happy with the visit.  Consent obtained.  Photographs obtained.  Look forward to helping her out in the near future.        Total time spent with the patient 15 minutes, which more than half was counseling.

## 2018-10-02 NOTE — PROGRESS NOTES
[address]  [facility] [address]  [phone]  Dept: [phone]    PRE-OP EVALUATION:  Today's date: 10/3/2018    Enid Lombardo (: 1960) presents for pre-operative evaluation assessment as requested by Dr. Amin.  She requires evaluation and anesthesia risk assessment prior to undergoing surgery/procedure for treatment of  Bilateral breast reconstruction with free abdominal flaps general with block.     Fax number for surgical facility:   Primary Physician: Shi Alonso  Type of Anesthesia Anticipated: Combined General with Block     Patient has a Health Care Directive or Living Will:  NO    Preop Questions 10/3/2018   Who is doing your surgery? Dr. Amin   What are you having done? Breast reconstruction after breast cancer double mastectomy -  DEIP   Date of Surgery/Procedure: 10/08/2018   Facility or Hospital where procedure/surgery will be performed: [facility]   1.  Do you have a history of Heart attack, stroke, stent, coronary bypass surgery, or other heart surgery? No   2.  Do you ever have any pain or discomfort in your chest? No   3.  Do you have a history of  Heart Failure? No   4.   Are you troubled by shortness of breath when:  walking on a level surface, or up a slight hill, or at night? No   5.  Do you currently have a cold, bronchitis or other respiratory infection? No   6.  Do you have a cough, shortness of breath, or wheezing? No   7.  Do you sometimes get pains in the calves of your legs when you walk? No   8. Do you or anyone in your family have previous history of blood clots? No   9.  Do you or does anyone in your family have a serious bleeding problem such as prolonged bleeding following surgeries or cuts? No   10. Have you ever had problems with anemia or been told to take iron pills? No   11. Have you had any abnormal blood loss such as black, tarry or bloody stools, or abnormal vaginal  bleeding? No   12. Have you ever had a blood transfusion? No   13. Have you or any of your relatives ever had problems with anesthesia? No   14. Do you have sleep apnea, excessive snoring or daytime drowsiness? No   15. Do you have any prosthetic heart valves? No   16. Do you have prosthetic joints? No   17. Is there any chance that you may be pregnant? No         HPI:     HPI related to upcoming procedure:   Diagnosed of right-sided breast cancer. Originally diagnosed in 12/2004. In 2005 she had a right -sided lumpectomy with sentinel lymph node dissection. She then underwent right breast radiation. Tamoxifen from 03/2005-11/2008. Reoccurrence of her breast cancer in July of 2018 DCIS of right breast. Had a bilateral mastectomy including prophylactic left-sided mastectomy in August of this year. Now plans to have abdominal free flap breast reconstruction surgery.       See problem list for active medical problems.  Problems all longstanding and stable, except as noted/documented.  See ROS for pertinent symptoms related to these conditions.                                                                                                                                                          .  HYPERLIPIDEMIA - Patient has a long history of significant Hyperlipidemia requiring lifestyle and diet modifications.   The 10-year ASCVD risk score (Mckenna POLLO Jr, et al., 2013) is: 1.8%    Values used to calculate the score:      Age: 58 years      Sex: Female      Is Non- : No      Diabetic: No      Tobacco smoker: No      Systolic Blood Pressure: 106 mmHg      Is BP treated: No      HDL Cholesterol: 62 mg/dL      Total Cholesterol: 269 mg/dL                                                                                                                                                    .    MEDICAL HISTORY:     Patient Active Problem List    Diagnosis Date Noted     S/P breast reconstruction, bilateral  08/28/2018     Priority: Medium     Breast cancer in situ 08/22/2018     Priority: Medium     Recurrent carcinoma in situ of breast, right 07/27/2018     Priority: Medium     Osteopenia of both hands 04/10/2018     Priority: Medium     Skin abscess 05/03/2016     Priority: Medium     Benign neoplasm of colon 03/18/2015     Priority: Medium     Keratoconus 10/16/2014     Priority: Medium     Meibomian gland dysfunction - Both Eyes 10/16/2014     Priority: Medium     Tear film insufficiency 10/16/2014     Priority: Medium     Problem list name updated by automated process. Provider to review       Cervicalgia 06/11/2014     Priority: Medium     Headache 06/11/2014     Priority: Medium     Problem list name updated by automated process. Provider to review       Hyperlipidemia with target LDL less than 130 05/19/2014     Priority: Medium     Diagnosis updated by automated process. Provider to review and confirm.       Macular drusen      Priority: Medium     Low back pain 07/22/2013     Priority: Medium     Diagnosis updated by automated process. Provider to review and confirm.       IT band syndrome 07/22/2013     Priority: Medium     Right hip pain 04/08/2013     Priority: Medium     Pain, radicular, lumbar 03/21/2013     Priority: Medium     Shingles 01/27/2012     Priority: Medium     Cataract 12/11/2008     Priority: Medium     Utility update for deleted IMO code  [employer] Update utility       Disorder of synovium, tendon, and bursa 12/11/2007     Priority: Medium     Problem list name updated by automated process. Provider to review       Esophageal reflux 08/08/2006     Priority: Medium     History of right breast cancer 12/22/2004     Priority: Medium     s/p lumpectomy 1/05 and radiation  Problem list name updated by automated process. Provider to review        Past Medical History:   Diagnosis Date     Breast cancer (H) 2004    lumpectomy, radiation, tamoxifen     Cancer (H) 2004    Breast     Cataracts, bilateral       Complication of anesthesia     No versed. It stresses her out. She wants to remember everything.     Coronary artery disease 11/24/2014     Diabetes (H)     Gestational     Enthesopathy of hip region 6/06    right hip     Esophageal reflux 2/06     History of gestational diabetes      Hypertension 2012     Macular drusen      Shingles 01/23/2012    left T6-T7 dermatome     Past Surgical History:   Procedure Laterality Date     BIOPSY  2004    Breast     BREAST SURGERY  2004     C VAGINAL HYSTERECTOMY  12/2001    Ovaries intact, due to fibroids     COLONOSCOPY       ENDOSCOPY  05/09/2007    Upper GI     EYE SURGERY       GYN SURGERY  2001     HC BIOPSY/EXCISION LYMPH NODE OPEN DEEP CERVICAL W EXC FAT PAD  1/7/2005    Right axillary sentinel node biopsy X 3.     HC COLONOSCOPY W BIOPSY  05/10/10     HC EXCISION BREAST LESION, OPEN >=1  1/7/2005    Right breast.     HC REMV CATARACT EXTRACAP,INSERT LENS  12/18/08    bilateral     HC REMV TARSAL/METATARSAL BENIGN BONE LESN  1982    Bone spur - right     MASTECTOMY SIMPLE BILATERAL, SENTINEL NODE BILATERAL, COMBINED Bilateral 8/22/2018    Procedure: COMBINED MASTECTOMY SIMPLE BILATERAL, SENTINEL NODE BILATERAL;  Bilateral Mastectomy with Right Gray Node Biopsy, Bilateral Breast Reconstruction, Anesthesia Block;  Surgeon: Rakesh Sanchez MD;  Location: [facility] OR     ORTHOPEDIC SURGERY  1983    Right foot     RECONSTRUCT BREAST Bilateral 8/22/2018    Procedure: RECONSTRUCT BREAST;;  Surgeon: DENNISE Amin MD;  Location: [facility] OR     Current Outpatient Prescriptions   Medication Sig Dispense Refill     ACETAMINOPHEN PO Take 325 mg by mouth every 8 hours as needed for pain       anastrozole (ARIMIDEX) 1 MG tablet Take 1 tablet (1 mg) by mouth daily 90 tablet 3     fluticasone (FLONASE) 50 MCG/ACT spray USE ONE OR TWO SPRAYS IN EACH NOSTRIL DAILY 16 mL 10     ibuprofen (ADVIL) 200 MG capsule Take 200 mg by mouth every 4 hours as needed for fever       loratadine  "(CLARITIN) 10 MG tablet TAKE ONE TABLET BY MOUTH EVERY DAY AS NEEDED FOR ALLERGY SYMPTOMS 90 tablet 1     Multiple Vitamins-Minerals (PRESERVISION AREDS 2) CAPS Take 1 tablet by mouth 2 times daily 60 capsule 11     sertraline (ZOLOFT) 25 MG tablet Take 1 tablet daily. (Patient taking differently: 25 mg every morning Take 1 tablet daily.) 30 tablet 11     Omega-3 Fatty Acids (FISH OIL) 1000 MG CPDR Take  by mouth.       omeprazole (PRILOSEC) 20 MG CR capsule TAKE 1 CAPSULE (20 MG) BY MOUTH DAILY 90 capsule 3     OTC products: no recent use of OTC ASA, NSAIDS or Steroids    Allergies   Allergen Reactions     Alphagan P Rash     Thrush and numbness in mouth      Latex Allergy: NO    Social History   Substance Use Topics     Smoking status: Never Smoker     Smokeless tobacco: Never Used     Alcohol use Yes      Comment: occasionally     History   Drug Use No       REVIEW OF SYSTEMS:   CONSTITUTIONAL: NEGATIVE for fever, chills, change in weight  INTEGUMENTARY/SKIN: NEGATIVE for worrisome rashes, moles or lesions  EYES: NEGATIVE for vision changes or irritation  ENT/MOUTH: NEGATIVE for ear, mouth and throat problems  RESP: NEGATIVE for significant cough or SOB  BREAST: NEGATIVE for masses, tenderness or discharge  CV: NEGATIVE for chest pain, palpitations or peripheral edema  GI: NEGATIVE for nausea, abdominal pain, heartburn, or change in bowel habits  : NEGATIVE for frequency, dysuria, or hematuria  MUSCULOSKELETAL: NEGATIVE for significant arthralgias or myalgia  NEURO: NEGATIVE for weakness, dizziness or paresthesias  ENDOCRINE: NEGATIVE for temperature intolerance, skin/hair changes  HEME: NEGATIVE for bleeding problems  PSYCHIATRIC: NEGATIVE for changes in mood or affect    EXAM:   /78  Pulse 80  Temp 98.8  F (37.1  C)  Ht 5' 5\" (1.651 m)  Wt 177 lb (80.3 kg)  LMP 10/14/2001  SpO2 96%  BMI 29.45 kg/m2    GENERAL APPEARANCE: healthy, alert and no distress     EYES: EOMI, PERRL     HENT: ear canals " and TM's normal and nose and mouth without ulcers or lesions     NECK: no adenopathy, no asymmetry, masses, or scars and thyroid normal to palpation     RESP: lungs clear to auscultation - no rales, rhonchi or wheezes     CV: regular rates and rhythm, normal S1 S2, no S3 or S4 and no murmur, click or rub     ABDOMEN:  soft, nontender, no HSM or masses and bowel sounds normal     MS: extremities normal- no gross deformities noted, no evidence of inflammation in joints, FROM in all extremities.     SKIN: no suspicious lesions or rashes     NEURO: Normal strength and tone, sensory exam grossly normal, mentation intact and speech normal     PSYCH: mentation appears normal. and affect normal/bright     LYMPHATICS: No cervical adenopathy    DIAGNOSTICS:     EKG: appears normal, NSR, normal axis, normal intervals, no acute ST/T changes c/w ischemia, no LVH by voltage criteria,   Sinus  Rhythm   -  Negative precordial T-waves.     Labs Drawn and in Process:   CBC RESULTS:   Sodium 141  133 - 144 mmol/L Final 10/03/2018  2:57 PM [facility]   Potassium 4.1  3.4 - 5.3 mmol/L Final 10/03/2018  2:57 PM [facility]   Chloride 105  94 - 109 mmol/L Final 10/03/2018  2:57 PM [facility]   Carbon Dioxide 29  20 - 32 mmol/L Final 10/03/2018  2:57 PM [facility]   Anion Gap 7  3 - 14 mmol/L Final 10/03/2018  2:57 PM [facility]   Glucose 92  70 - 99 mg/dL Final 10/03/2018  2:57 PM [facility]   Urea Nitrogen 8  7 - 30 mg/dL Final 10/03/2018  2:57 PM [facility]   Creatinine 0.70  0.52 - 1.04 mg/dL Final 10/03/2018  2:57 PM [facility]   GFR Estimate 87  >60 mL/min/1.7m2 Final 10/03/2018  2:57 PM [facility]   Comment:   Non  GFR Calc   GFR Estimate If Black >90  >60 mL/min/1.7m2 Final 10/03/2018  2:57 PM [facility]   Comment:   African American GFR Calc   Calcium 9.1  8.5 - 10.1 mg/dL Final 10/03/2018  2:57 PM          IMPRESSION:   Reason for surgery/procedure: Reconstructive Breast Surgery   Diagnosis/reason for consult: Pre-operative evaluation     The  proposed surgical procedure is considered INTERMEDIATE risk.    REVISED CARDIAC RISK INDEX  The patient has the following serious cardiovascular risks for perioperative complications such as (MI, PE, VFib and 3  AV Block):  No serious cardiac risks  INTERPRETATION: 0 risks: Class I (very low risk - 0.4% complication rate)    The patient has the following additional risks for perioperative complications:  No identified additional risks      ICD-10-CM    1. Preop general physical exam Z01.818 CBC with platelets and differential     Basic metabolic panel  (Ca, Cl, CO2, Creat, Gluc, K, Na, BUN)     EKG 12-lead complete w/read - Clinics   2. Hyperlipidemia with target LDL less than 130 E78.5    3. Carcinoma in situ of breast, unspecified laterality, unspecified type D05.90 CBC with platelets and differential     EKG 12-lead complete w/read - Clinics   4. S/P breast reconstruction, bilateral Z98.890 CBC with platelets and differential     EKG 12-lead complete w/read - Clinics   5. Recurrent carcinoma in situ of breast, right D05.91 EKG 12-lead complete w/read - Clinics       RECOMMENDATIONS:     --Consult hospital rounder / IM to assist post-op medical management    --Patient is to take all scheduled medications on the day of surgery EXCEPT for modifications listed below.    Hold Ibuprofen from today until after surgery. Tylenol for aches and pains.     Stop Omga 3 fatty acids from now until after surgery    Stop Multivitamin from now until after surgery.     Continue with your Arimidex as discussed with your oncologist.   APPROVAL GIVEN to proceed with proposed procedure, without further diagnostic evaluation       Signed Electronically by: HERMILO Juan CNP    Copy of this evaluation report is provided to requesting physician.    [facility] Preop Guidelines    Revised Cardiac Risk Index

## 2018-10-03 ENCOUNTER — OFFICE VISIT (OUTPATIENT)
Dept: FAMILY MEDICINE | Facility: OTHER | Age: 58
End: 2018-10-03
Payer: COMMERCIAL

## 2018-10-03 VITALS
SYSTOLIC BLOOD PRESSURE: 106 MMHG | HEART RATE: 80 BPM | DIASTOLIC BLOOD PRESSURE: 78 MMHG | TEMPERATURE: 98.8 F | WEIGHT: 177 LBS | OXYGEN SATURATION: 96 % | HEIGHT: 65 IN | BODY MASS INDEX: 29.49 KG/M2

## 2018-10-03 DIAGNOSIS — Z98.890 S/P BREAST RECONSTRUCTION, BILATERAL: ICD-10-CM

## 2018-10-03 DIAGNOSIS — D05.90 CARCINOMA IN SITU OF BREAST, UNSPECIFIED LATERALITY, UNSPECIFIED TYPE: ICD-10-CM

## 2018-10-03 DIAGNOSIS — E78.5 HYPERLIPIDEMIA WITH TARGET LDL LESS THAN 130: ICD-10-CM

## 2018-10-03 DIAGNOSIS — Z01.818 PREOP GENERAL PHYSICAL EXAM: Primary | ICD-10-CM

## 2018-10-03 DIAGNOSIS — D05.91 RECURRENT CARCINOMA IN SITU OF BREAST, RIGHT: ICD-10-CM

## 2018-10-03 LAB
ANION GAP SERPL CALCULATED.3IONS-SCNC: 7 MMOL/L (ref 3–14)
BUN SERPL-MCNC: 8 MG/DL (ref 7–30)
CALCIUM SERPL-MCNC: 9.1 MG/DL (ref 8.5–10.1)
CHLORIDE SERPL-SCNC: 105 MMOL/L (ref 94–109)
CO2 SERPL-SCNC: 29 MMOL/L (ref 20–32)
CREAT SERPL-MCNC: 0.7 MG/DL (ref 0.52–1.04)
GFR SERPL CREATININE-BSD FRML MDRD: 87 ML/MIN/1.7M2
GLUCOSE SERPL-MCNC: 92 MG/DL (ref 70–99)
POTASSIUM SERPL-SCNC: 4.1 MMOL/L (ref 3.4–5.3)
SODIUM SERPL-SCNC: 141 MMOL/L (ref 133–144)

## 2018-10-03 PROCEDURE — 99214 OFFICE O/P EST MOD 30 MIN: CPT | Performed by: NURSE PRACTITIONER

## 2018-10-03 PROCEDURE — 80048 BASIC METABOLIC PNL TOTAL CA: CPT | Performed by: NURSE PRACTITIONER

## 2018-10-03 PROCEDURE — 93000 ELECTROCARDIOGRAM COMPLETE: CPT | Performed by: NURSE PRACTITIONER

## 2018-10-03 PROCEDURE — 36415 COLL VENOUS BLD VENIPUNCTURE: CPT | Performed by: NURSE PRACTITIONER

## 2018-10-03 ASSESSMENT — PAIN SCALES - GENERAL: PAINLEVEL: NO PAIN (0)

## 2018-10-03 NOTE — MR AVS SNAPSHOT
After Visit Summary   10/3/2018    Enid Lombardo    MRN: [id number]           Patient Information     Date Of Birth          1960        Visit Information        Provider Department      10/3/2018 2:00 PM Beverly Harman APRN [facility]        Today's Diagnoses     Preop general physical exam    -  1    Hyperlipidemia with target LDL less than 130        Carcinoma in situ of breast, unspecified laterality, unspecified type        S/P breast reconstruction, bilateral        Recurrent carcinoma in situ of breast, right          Care Instructions      Before Your Surgery      Call your surgeon if there is any change in your health. This includes signs of a cold or flu (such as a sore throat, runny nose, cough, rash or fever).    Do not smoke, drink alcohol or take over the counter medicine (unless your surgeon or primary care doctor tells you to) for the 24 hours before and after surgery.    If you take prescribed drugs: Follow your doctor s orders about which medicines to take and which to stop until after surgery.    Eating and drinking prior to surgery: follow the instructions from your surgeon    Take a shower or bath the night before surgery. Use the soap your surgeon gave you to gently clean your skin. If you do not have soap from your surgeon, use your regular soap. Do not shave or scrub the surgery site.  Wear clean pajamas and have clean sheets on your bed.     Hold Ibuprofen from today until after surgery. Tylenol for aches and pains.     Stop Omga 3 fatty acids from now until after surgery    Stop Multivitamin from now until after surgery.           Follow-ups after your visit        Your next 10 appointments already scheduled     Oct 08, 2018   Procedure with DENNISE Amin MD   [facility], Same Day Surgery (--)    [address]   [place] [address]   [phone]            Oct 16, 2018 10:45 AM CDT   (Arrive by 10:30 AM)   Post-Op with DENNISE Dallas  MD Eloisa   [facility] ([facility])    [address]  [address]  [place] [address]   [phone]            Jan 14, 2019  8:00 AM CST   DX HIP/PELVIS/SPINE with UCDX1   [facility]a ([facility])    [address]  1st [address]  [place] [address]   [phone]           How do I prepare for my exam? (Food and drink instructions) No Food and Drink Restrictions.  How do I prepare for my exam? (Other instructions) Please do not take any of the following 24 hours prior to the day of your exam: vitamins, calcium tablets, antacids.  What should I wear: If possible, please wear clothes without metal (snaps, zippers). A sweat suit works well.  How long does the exam take: The exam takes about 20 minutes.  What should I bring: Bring a list of your current medicines to your exam (including vitamins, minerals and over-the-counter drugs).  Do I need a :  No  is needed.  What should I do after the exam: No restrictions, You may resume normal activities.  How do I prepare for my exam? (Food and drink instructions) A DEXA scan is a bone-density scan. It uses a low level of radiation to check the strength of your bones. As you lie on a padded table, a machine will take X-rays. We most often scan the hips and lower spine.  Who should I call with questions: If you have any questions, please call the Imaging Department where you will have your exam. Directions, parking instructions, and other information is available on our website, [email or web address].            Jan 14, 2019  9:00 AM CST   [facility] Draw with  [facility] DRAW   [facility] ([facility])    [address]  [address]  [place] [address]   [phone]            Jan 14, 2019 11:30 AM CST   (Arrive by 11:15 AM)   Return Visit with Charity Brar MD   [facility] ([facility])    [address]  [address]  [place] [address]   [phone]            Jan 21, 2019 10:15 AM CST   RETURN RETINA with Paz Osborn MD   [facility] ([facility])    [address]  [address] [address]  [place] [facility] [address]   [phone]            Sep 23, 2019  2:00 PM CDT   (Arrive by 1:45 PM)   Return Visit with Charity Brar MD   [facility] ([facility])    [address]  [address]  [place] [facility] [address]   [phone]              Future tests that were ordered for you today     Open Future Orders        Priority Expected Expires Ordered    CBC with platelets and differential Routine 10/3/2018 10/3/2019 10/3/2018            Who to contact     If you have questions or need follow up information about today's clinic visit or your schedule please contact [facility] directly at [phone].  Normal or non-critical lab and imaging results will be communicated to you by [place], letter or phone within 4 business days after the clinic has received the results. If you do not hear from us within 7 days, please contact the clinic through [facility]tapprovedt or phone. If you have a critical or abnormal lab result, we will notify you by phone as soon as possible.  Submit refill requests through Time[employer] or call your pharmacy and they will forward the refill request to us. Please allow 3 business days for your refill to be completed.          Additional Information About Your Visit        [place] Information     [employer] gives you secure access to your electronic health record. If you see a primary care provider, you can also send messages to your care team and make appointments. If you have questions, please call your primary care clinic.  If you do not have a primary care provider, please call [phone] and they will assist you.        Care EveryWhere ID     This  "is your Care EveryWhere ID. This could be used by other organizations to access your [facility] medical records  [id number]        Your Vitals Were     Pulse Temperature Height Last Period Pulse Oximetry BMI (Body Mass Index)    80 98.8  F (37.1  C) 5' 5\" (1.651 m) 10/14/2001 96% 29.45 kg/m2       Blood Pressure from Last 3 Encounters:   10/03/18 106/78   10/02/18 129/78   09/25/18 123/80    Weight from Last 3 Encounters:   10/03/18 177 lb (80.3 kg)   10/02/18 177 lb (80.3 kg)   09/25/18 176 lb 9.6 oz (80.1 kg)              We Performed the Following     Basic metabolic panel  (Ca, Cl, CO2, Creat, Gluc, K, Na, BUN)     EKG 12-lead complete w/read - Clinics          Today's Medication Changes          These changes are accurate as of 10/3/18  2:41 PM.  If you have any questions, ask your nurse or doctor.               These medicines have changed or have updated prescriptions.        Dose/Directions    sertraline 25 MG tablet   Commonly known as:  ZOLOFT   This may have changed:    - how much to take  - when to take this  - additional instructions   Used for:  Hot flushes, perimenopausal        Take 1 tablet daily.   Quantity:  30 tablet   Refills:  11                Primary Care Provider Office Phone # Fax #    Shi Alonso [phone]       [address] [place] [address]        Equal Access to Services     ELISEO [facility]: Lillie oMrrison, watoi betts, qaalfonso kaaldavid crockett, michelle tong. So [facility] [phone].    ATENCIÓN: Si habla español, tiene a rivera disposición servicios gratuitos de asistencia lingüística. Llame al [phone].    We comply with applicable federal civil rights laws and [place] laws. We do not discriminate on the basis of race, color, national origin, age, disability, sex, sexual orientation, or gender identity.            Thank you!     Thank you for choosing [facility]  for your care. Our goal is always " to provide you with excellent care. Hearing back from our patients is one way we can continue to improve our services. Please take a few minutes to complete the written survey that you may receive in the mail after your visit with us. Thank you!             Your Updated Medication List - Protect others around you: Learn how to safely use, store and throw away your medicines at www.disposemymeds.org.          This list is accurate as of 10/3/18  2:41 PM.  Always use your most recent med list.                   Brand Name Dispense Instructions for use Diagnosis    ACETAMINOPHEN PO      Take 325 mg by mouth every 8 hours as needed for pain        ADVIL 200 MG capsule   Generic drug:  ibuprofen      Take 200 mg by mouth every 4 hours as needed for fever        ARIMIDEX 1 MG tablet   Generic drug:  anastrozole     90 tablet    Take 1 tablet (1 mg) by mouth daily    Recurrent malignant neoplasm of breast, unspecified laterality (H)       Fish Oil 1000 MG Cpdr      Take  by mouth.        fluticasone 50 MCG/ACT spray    FLONASE    16 mL    USE ONE OR TWO SPRAYS IN EACH NOSTRIL DAILY    Chronic rhinitis       loratadine 10 MG tablet    CLARITIN    90 tablet    TAKE ONE TABLET BY MOUTH EVERY DAY AS NEEDED FOR ALLERGY SYMPTOMS    Chronic rhinitis, unspecified type       omeprazole 20 MG CR capsule    priLOSEC    90 capsule    TAKE 1 CAPSULE (20 MG) BY MOUTH DAILY    Gastroesophageal reflux disease, esophagitis presence not specified       PRESERVISION AREDS 2 Caps     60 capsule    Take 1 tablet by mouth 2 times daily    Drusen (degenerative) of retina, bilateral, Vitreous degeneration, bilateral       sertraline 25 MG tablet    ZOLOFT    30 tablet    Take 1 tablet daily.    Hot flushes, perimenopausal

## 2018-10-04 ENCOUNTER — ANESTHESIA EVENT (OUTPATIENT)
Dept: SURGERY | Facility: CLINIC | Age: 58
DRG: 582 | End: 2018-10-04
Payer: COMMERCIAL

## 2018-10-05 ENCOUNTER — TELEPHONE (OUTPATIENT)
Dept: SURGERY | Facility: CLINIC | Age: 58
End: 2018-10-05

## 2018-10-05 ENCOUNTER — TELEPHONE (OUTPATIENT)
Dept: FAMILY MEDICINE | Facility: OTHER | Age: 58
End: 2018-10-05

## 2018-10-05 DIAGNOSIS — D05.90 CARCINOMA IN SITU OF BREAST, UNSPECIFIED LATERALITY, UNSPECIFIED TYPE: ICD-10-CM

## 2018-10-05 DIAGNOSIS — Z98.890 S/P BREAST RECONSTRUCTION, BILATERAL: ICD-10-CM

## 2018-10-05 DIAGNOSIS — Z01.818 PREOP GENERAL PHYSICAL EXAM: ICD-10-CM

## 2018-10-05 LAB
BASOPHILS # BLD AUTO: 0 10E9/L (ref 0–0.2)
BASOPHILS NFR BLD AUTO: 0.4 %
DIFFERENTIAL METHOD BLD: NORMAL
EOSINOPHIL # BLD AUTO: 0.2 10E9/L (ref 0–0.7)
EOSINOPHIL NFR BLD AUTO: 3 %
ERYTHROCYTE [DISTWIDTH] IN BLOOD BY AUTOMATED COUNT: 13.1 % (ref 10–15)
HCT VFR BLD AUTO: 41.5 % (ref 35–47)
HGB BLD-MCNC: 13.7 G/DL (ref 11.7–15.7)
LYMPHOCYTES # BLD AUTO: 1.2 10E9/L (ref 0.8–5.3)
LYMPHOCYTES NFR BLD AUTO: 21 %
MCH RBC QN AUTO: 30.9 PG (ref 26.5–33)
MCHC RBC AUTO-ENTMCNC: 33 G/DL (ref 31.5–36.5)
MCV RBC AUTO: 94 FL (ref 78–100)
MONOCYTES # BLD AUTO: 0.5 10E9/L (ref 0–1.3)
MONOCYTES NFR BLD AUTO: 9.5 %
NEUTROPHILS # BLD AUTO: 3.8 10E9/L (ref 1.6–8.3)
NEUTROPHILS NFR BLD AUTO: 66.1 %
PLATELET # BLD AUTO: 257 10E9/L (ref 150–450)
RBC # BLD AUTO: 4.43 10E12/L (ref 3.8–5.2)
WBC # BLD AUTO: 5.7 10E9/L (ref 4–11)

## 2018-10-05 PROCEDURE — 85025 COMPLETE CBC W/AUTO DIFF WBC: CPT | Performed by: NURSE PRACTITIONER

## 2018-10-05 PROCEDURE — 36415 COLL VENOUS BLD VENIPUNCTURE: CPT | Performed by: NURSE PRACTITIONER

## 2018-10-05 NOTE — TELEPHONE ENCOUNTER
LM for patient to return call.   Please see if she can come in to get CBC redrawn as it was missed at her preop.   Transfer to the team with questions.     Vy Moore, RN, BSN

## 2018-10-05 NOTE — TELEPHONE ENCOUNTER
KV - the CBC can't be added because they are different tubes. Lab would need to redraw patient.  Lilia Richards, CMA

## 2018-10-05 NOTE — TELEPHONE ENCOUNTER
Do we have patients blood from her pre-op this week. If so can we run the CBC. I am not sure why this was not ran with her BMP I ordered.     HERMILO Juan CNP

## 2018-10-05 NOTE — TELEPHONE ENCOUNTER
Received a staff message from Iesha on 10/2 to reschedule pt's post op appt at the [facility] the week of 10/15 to the [place] location. RN notified Iesha via staff message today 10/5/18 that  is not in clinic on Tuesdays here in plastics anymore and his Friday clinic on 10/19 is blocked most of the day and full the rest of the afternoon so Rn advised pt keeping appt on 10/16 at the [facility]. As RN finishing documenting pt actually called RN to ask if she should stop the Arimidex medication prior to surgery. Rn reviewed preop notes from pcp which advised consulting with Oncology. RN advised pt to consult with her Oncologist regarding medication use prior to surgery. Pt states understanding. RN also notified pt of 's clinic schedule the week of 10/15 and pt states that she is fine keeping her appt at the [facility] on 10/16/18. RN notified pt that any subsequent appts needed can be easily scheduled in [facility]. Pt states understanding and voiced appreciation for this.....Dorina Zepeda RN

## 2018-10-05 NOTE — TELEPHONE ENCOUNTER
Ok can we call patient and apologize and see if she would be willing to come in. If not let me know. Again I apologize.     HERMILO Juan CNP

## 2018-10-06 NOTE — ANESTHESIA PREPROCEDURE EVALUATION
Physical Exam      Airway   Mallampati: II  TM distance: >3 FB  Neck ROM: full    Dental     Cardiovascular   Rhythm and rate: regular      Pulmonary    breath sounds clear to auscultation                    Anesthesia Plan      History & Physical Review  History and physical reviewed and following examination; no interval change.    ASA Status:  3 .    NPO Status:  > 8 hours (clear up to 2 hr, no solids past midnight)    Plan for General and Peripheral Nerve Block with Intravenous and Propofol induction. Maintenance will be Inhalation and Balanced.    PONV prophylaxis:  Ondansetron (or other 5HT-3) and Dexamethasone or Solumedrol  Additional equipment: 2nd IV Low propofol infusion,  Scopolamine, Gabapentin, 975 PO acetaminophen.  Small dose ketamine 20 mg at the beginning of the procedure      Postoperative Care  Postoperative pain management:  Peripheral nerve block (Single Shot), IV analgesics and Oral pain medications.      Consents  Anesthetic plan, risks, benefits and alternatives discussed with:  Patient and Spouse..                        ANESTHESIA PREOP EVALUATION    Procedure: GRAFT FREE VASCULARIZED TRANSVERSE RECTUS ABDOMINIS MYOCUTA    HPI: 57 yo female with recurrent breast cancer this year, originally dx in 2004, s/p brandi mastectomies now for reconstruction. Pt was seen in the preop clinic and is optimized for surgery. No major cardiac risk factors. NO Versed is noted as per patient request.  History of CP 2014, neg stress test, pain attributed to esophageal spasm.  Great exercise tolerance    PMHx/PSHx/ROS:  Past Medical History:   Diagnosis Date     Breast cancer (H) 2004    lumpectomy, radiation, tamoxifen     Cancer (H) 2004    Breast     Cataracts, bilateral      Complication of anesthesia     No versed. It stresses her out. She wants to remember everything.     Coronary artery disease 11/24/2014     Diabetes (H)     Gestational     Enthesopathy of hip region 6/06    right hip      Esophageal reflux 2/06     History of gestational diabetes      Hypertension 2012     Macular drusen      Shingles 01/23/2012    left T6-T7 dermatome       Past Surgical History:   Procedure Laterality Date     BIOPSY  2004    Breast     BREAST SURGERY  2004     C VAGINAL HYSTERECTOMY  12/2001    Ovaries intact, due to fibroids     COLONOSCOPY       ENDOSCOPY  05/09/2007    Upper GI     EYE SURGERY       GYN SURGERY  2001     HC BIOPSY/EXCISION LYMPH NODE OPEN DEEP CERVICAL W EXC FAT PAD  1/7/2005    Right axillary sentinel node biopsy X 3.     HC COLONOSCOPY W BIOPSY  05/10/10     HC EXCISION BREAST LESION, OPEN >=1  1/7/2005    Right breast.     HC REMV CATARACT EXTRACAP,INSERT LENS  12/18/08    bilateral     HC REMV TARSAL/METATARSAL BENIGN BONE LESN  1982    Bone spur - right     MASTECTOMY SIMPLE BILATERAL, SENTINEL NODE BILATERAL, COMBINED Bilateral 8/22/2018    Procedure: COMBINED MASTECTOMY SIMPLE BILATERAL, SENTINEL NODE BILATERAL;  Bilateral Mastectomy with Right [place] Node Biopsy, Bilateral Breast Reconstruction, Anesthesia Block;  Surgeon: Rakesh Sanchez MD;  Location: [facility] OR     ORTHOPEDIC SURGERY  1983    Right foot     RECONSTRUCT BREAST Bilateral 8/22/2018    Procedure: RECONSTRUCT BREAST;;  Surgeon: DENNISE Amin MD;  Location: [facility] OR         Past Anes Hx: No personal or family h/o anesthesia problems    Soc Hx:   Tobacco: never  EtOH: occ    Allergies:   Allergies   Allergen Reactions     Alphagan P Rash     Thrush and numbness in mouth       Meds:   No prescriptions prior to admission.       Current Outpatient Prescriptions   Medication Sig Dispense Refill     anastrozole (ARIMIDEX) 1 MG tablet Take 1 tablet (1 mg) by mouth daily 90 tablet 3     fluticasone (FLONASE) 50 MCG/ACT spray USE ONE OR TWO SPRAYS IN EACH NOSTRIL DAILY 16 mL 10     ibuprofen (ADVIL) 200 MG capsule Take 200 mg by mouth every 4 hours as needed for fever       Multiple Vitamins-Minerals (PRESERVISION AREDS 2) CAPS  "Take 1 tablet by mouth 2 times daily 60 capsule 11     Omega-3 Fatty Acids (FISH OIL) 1000 MG CPDR Take  by mouth.       sertraline (ZOLOFT) 25 MG tablet Take 1 tablet daily. (Patient taking differently: 25 mg every morning Take 1 tablet daily.) 30 tablet 11     ACETAMINOPHEN PO Take 325 mg by mouth every 8 hours as needed for pain       loratadine (CLARITIN) 10 MG tablet TAKE ONE TABLET BY MOUTH EVERY DAY AS NEEDED FOR ALLERGY SYMPTOMS 90 tablet 1     omeprazole (PRILOSEC) 20 MG CR capsule TAKE 1 CAPSULE (20 MG) BY MOUTH DAILY 90 capsule 3       Physical Exam:  VS: T Data Unavailable, P Data Unavailable, BP Data Unavailable, R Data Unavailable, SpO2       Weight:   Wt Readings from Last 2 Encounters:   10/03/18 80.3 kg (177 lb)   10/02/18 80.3 kg (177 lb)     Height:   Ht Readings from Last 2 Encounters:   10/03/18 1.651 m (5' 5\")   10/02/18 1.651 m (5' 5\")       NPO Status:     Labs:      BMP:  Lab Results   Component Value Date     10/03/2018      Lab Results   Component Value Date    POTASSIUM 4.1 10/03/2018     Lab Results   Component Value Date    CHLORIDE 105 10/03/2018     Lab Results   Component Value Date    ROSENDO 9.1 10/03/2018     Lab Results   Component Value Date    CO2 29 10/03/2018     Lab Results   Component Value Date    BUN 8 10/03/2018     Lab Results   Component Value Date    CR 0.70 10/03/2018     Lab Results   Component Value Date    GLC 92 10/03/2018        CBC:  Lab Results   Component Value Date    WBC 5.7 10/05/2018     Lab Results   Component Value Date    HGB 13.7 10/05/2018     Lab Results   Component Value Date    HCT 41.5 10/05/2018     Lab Results   Component Value Date     10/05/2018      Hgb 13.7, plts 257    EKG: SR, T Wave inversion precordial leads.    2014 stress echo was negative stress test, no ischemia         10/6/2018  3:48 PM  58  "

## 2018-10-08 ENCOUNTER — HOSPITAL ENCOUNTER (INPATIENT)
Facility: CLINIC | Age: 58
LOS: 3 days | Discharge: HOME | DRG: 582 | End: 2018-10-11
Attending: PLASTIC SURGERY | Admitting: INTERNAL MEDICINE
Payer: COMMERCIAL

## 2018-10-08 ENCOUNTER — ANESTHESIA (OUTPATIENT)
Dept: SURGERY | Facility: CLINIC | Age: 58
DRG: 582 | End: 2018-10-08
Payer: COMMERCIAL

## 2018-10-08 DIAGNOSIS — Z85.3 HISTORY OF RIGHT BREAST CANCER: Primary | ICD-10-CM

## 2018-10-08 PROBLEM — Z98.890 S/P FLAP GRAFT: Status: ACTIVE | Noted: 2018-10-08

## 2018-10-08 LAB — GLUCOSE BLDC GLUCOMTR-MCNC: 91 MG/DL (ref 70–99)

## 2018-10-08 PROCEDURE — 25000125 ZZHC RX 250: Performed by: STUDENT IN AN ORGANIZED HEALTH CARE EDUCATION/TRAINING PROGRAM

## 2018-10-08 PROCEDURE — 87640 STAPH A DNA AMP PROBE: CPT | Performed by: PLASTIC SURGERY

## 2018-10-08 PROCEDURE — 25000128 H RX IP 250 OP 636: Performed by: ANESTHESIOLOGY

## 2018-10-08 PROCEDURE — 25000125 ZZHC RX 250: Performed by: PLASTIC SURGERY

## 2018-10-08 PROCEDURE — 87641 MR-STAPH DNA AMP PROBE: CPT | Performed by: PLASTIC SURGERY

## 2018-10-08 PROCEDURE — C9290 INJ, BUPIVACAINE LIPOSOME: HCPCS | Performed by: STUDENT IN AN ORGANIZED HEALTH CARE EDUCATION/TRAINING PROGRAM

## 2018-10-08 PROCEDURE — 37000008 ZZH ANESTHESIA TECHNICAL FEE, 1ST 30 MIN: Performed by: PLASTIC SURGERY

## 2018-10-08 PROCEDURE — 25000128 H RX IP 250 OP 636: Performed by: PLASTIC SURGERY

## 2018-10-08 PROCEDURE — 36000068 ZZH SURGERY LEVEL 5 1ST 30 MIN - UMMC: Performed by: PLASTIC SURGERY

## 2018-10-08 PROCEDURE — 71000015 ZZH RECOVERY PHASE 1 LEVEL 2 EA ADDTL HR: Performed by: PLASTIC SURGERY

## 2018-10-08 PROCEDURE — 36000070 ZZH SURGERY LEVEL 5 EA 15 ADDTL MIN - UMMC: Performed by: PLASTIC SURGERY

## 2018-10-08 PROCEDURE — 27210794 ZZH OR GENERAL SUPPLY STERILE: Performed by: PLASTIC SURGERY

## 2018-10-08 PROCEDURE — 25000128 H RX IP 250 OP 636: Performed by: NURSE ANESTHETIST, CERTIFIED REGISTERED

## 2018-10-08 PROCEDURE — P9041 ALBUMIN (HUMAN),5%, 50ML: HCPCS | Performed by: NURSE ANESTHETIST, CERTIFIED REGISTERED

## 2018-10-08 PROCEDURE — 25000125 ZZHC RX 250: Performed by: NURSE ANESTHETIST, CERTIFIED REGISTERED

## 2018-10-08 PROCEDURE — C1781 MESH (IMPLANTABLE): HCPCS | Performed by: PLASTIC SURGERY

## 2018-10-08 PROCEDURE — 20000004 ZZH R&B ICU UMMC

## 2018-10-08 PROCEDURE — 0HRV077 REPLACEMENT OF BILATERAL BREAST USING DEEP INFERIOR EPIGASTRIC ARTERY PERFORATOR FLAP, OPEN APPROACH: ICD-10-PCS | Performed by: PLASTIC SURGERY

## 2018-10-08 PROCEDURE — 99221 1ST HOSP IP/OBS SF/LOW 40: CPT | Mod: GC | Performed by: INTERNAL MEDICINE

## 2018-10-08 PROCEDURE — 0HPT0NZ REMOVAL OF TISSUE EXPANDER FROM RIGHT BREAST, OPEN APPROACH: ICD-10-PCS | Performed by: PLASTIC SURGERY

## 2018-10-08 PROCEDURE — 88305 TISSUE EXAM BY PATHOLOGIST: CPT | Performed by: PLASTIC SURGERY

## 2018-10-08 PROCEDURE — 00000146 ZZHCL STATISTIC GLUCOSE BY METER IP

## 2018-10-08 PROCEDURE — 25000128 H RX IP 250 OP 636: Performed by: STUDENT IN AN ORGANIZED HEALTH CARE EDUCATION/TRAINING PROGRAM

## 2018-10-08 PROCEDURE — 0HPU0NZ REMOVAL OF TISSUE EXPANDER FROM LEFT BREAST, OPEN APPROACH: ICD-10-PCS | Performed by: PLASTIC SURGERY

## 2018-10-08 PROCEDURE — 25000125 ZZHC RX 250: Performed by: ANESTHESIOLOGY

## 2018-10-08 PROCEDURE — 25000132 ZZH RX MED GY IP 250 OP 250 PS 637: Performed by: PLASTIC SURGERY

## 2018-10-08 PROCEDURE — 71000014 ZZH RECOVERY PHASE 1 LEVEL 2 FIRST HR: Performed by: PLASTIC SURGERY

## 2018-10-08 PROCEDURE — 37000009 ZZH ANESTHESIA TECHNICAL FEE, EACH ADDTL 15 MIN: Performed by: PLASTIC SURGERY

## 2018-10-08 PROCEDURE — 27810169 ZZH OR IMPLANT GENERAL: Performed by: PLASTIC SURGERY

## 2018-10-08 PROCEDURE — 25000566 ZZH SEVOFLURANE, EA 15 MIN: Performed by: PLASTIC SURGERY

## 2018-10-08 PROCEDURE — 40000171 ZZH STATISTIC PRE-PROCEDURE ASSESSMENT III: Performed by: PLASTIC SURGERY

## 2018-10-08 PROCEDURE — 25000131 ZZH RX MED GY IP 250 OP 636 PS 637: Performed by: PLASTIC SURGERY

## 2018-10-08 PROCEDURE — 25000132 ZZH RX MED GY IP 250 OP 250 PS 637: Performed by: ANESTHESIOLOGY

## 2018-10-08 DEVICE — IMP DEVICE ANASTOMOTIC 3.5MM COUPLER PURPLE GEM2755: Type: IMPLANTABLE DEVICE | Site: BREAST | Status: FUNCTIONAL

## 2018-10-08 DEVICE — IMP DEVICE ANASTOMOTIC 2.5MM COUPLER RED GEM2753: Type: IMPLANTABLE DEVICE | Site: VEIN | Status: FUNCTIONAL

## 2018-10-08 DEVICE — MESH VICRYL 06X6" UND VKMM: Type: IMPLANTABLE DEVICE | Site: ABDOMEN | Status: FUNCTIONAL

## 2018-10-08 RX ORDER — HYDROMORPHONE HYDROCHLORIDE 1 MG/ML
.3-.5 INJECTION, SOLUTION INTRAMUSCULAR; INTRAVENOUS; SUBCUTANEOUS EVERY 5 MIN PRN
Status: DISCONTINUED | OUTPATIENT
Start: 2018-10-08 | End: 2018-10-08 | Stop reason: HOSPADM

## 2018-10-08 RX ORDER — NALOXONE HYDROCHLORIDE 0.4 MG/ML
.1-.4 INJECTION, SOLUTION INTRAMUSCULAR; INTRAVENOUS; SUBCUTANEOUS
Status: DISCONTINUED | OUTPATIENT
Start: 2018-10-08 | End: 2018-10-08

## 2018-10-08 RX ORDER — DEXAMETHASONE SODIUM PHOSPHATE 10 MG/ML
INJECTION, SOLUTION INTRAMUSCULAR; INTRAVENOUS PRN
Status: DISCONTINUED | OUTPATIENT
Start: 2018-10-08 | End: 2018-10-08

## 2018-10-08 RX ORDER — ONDANSETRON 2 MG/ML
INJECTION INTRAMUSCULAR; INTRAVENOUS PRN
Status: DISCONTINUED | OUTPATIENT
Start: 2018-10-08 | End: 2018-10-08

## 2018-10-08 RX ORDER — HYDROMORPHONE HYDROCHLORIDE 1 MG/ML
0.2 INJECTION, SOLUTION INTRAMUSCULAR; INTRAVENOUS; SUBCUTANEOUS
Status: DISCONTINUED | OUTPATIENT
Start: 2018-10-08 | End: 2018-10-09

## 2018-10-08 RX ORDER — ONDANSETRON 2 MG/ML
4 INJECTION INTRAMUSCULAR; INTRAVENOUS EVERY 6 HOURS PRN
Status: DISCONTINUED | OUTPATIENT
Start: 2018-10-08 | End: 2018-10-11 | Stop reason: HOSPADM

## 2018-10-08 RX ORDER — ONDANSETRON 2 MG/ML
4 INJECTION INTRAMUSCULAR; INTRAVENOUS EVERY 30 MIN PRN
Status: DISCONTINUED | OUTPATIENT
Start: 2018-10-08 | End: 2018-10-08 | Stop reason: HOSPADM

## 2018-10-08 RX ORDER — VIT C/E/ZN/COPPR/LUTEIN/ZEAXAN 60 MG-6 MG
1 CAPSULE ORAL 2 TIMES DAILY
Status: DISCONTINUED | OUTPATIENT
Start: 2018-10-08 | End: 2018-10-11 | Stop reason: HOSPADM

## 2018-10-08 RX ORDER — ONDANSETRON 4 MG/1
4 TABLET, ORALLY DISINTEGRATING ORAL EVERY 30 MIN PRN
Status: DISCONTINUED | OUTPATIENT
Start: 2018-10-08 | End: 2018-10-08 | Stop reason: HOSPADM

## 2018-10-08 RX ORDER — PAPAVERINE HYDROCHLORIDE 30 MG/ML
INJECTION INTRAMUSCULAR; INTRAVENOUS PRN
Status: DISCONTINUED | OUTPATIENT
Start: 2018-10-08 | End: 2018-10-08 | Stop reason: HOSPADM

## 2018-10-08 RX ORDER — CALCIUM CHLORIDE 100 MG/ML
INJECTION INTRAVENOUS; INTRAVENTRICULAR PRN
Status: DISCONTINUED | OUTPATIENT
Start: 2018-10-08 | End: 2018-10-08

## 2018-10-08 RX ORDER — SODIUM CHLORIDE, SODIUM LACTATE, POTASSIUM CHLORIDE, CALCIUM CHLORIDE 600; 310; 30; 20 MG/100ML; MG/100ML; MG/100ML; MG/100ML
INJECTION, SOLUTION INTRAVENOUS CONTINUOUS
Status: DISCONTINUED | OUTPATIENT
Start: 2018-10-08 | End: 2018-10-08 | Stop reason: HOSPADM

## 2018-10-08 RX ORDER — GINSENG 100 MG
CAPSULE ORAL PRN
Status: DISCONTINUED | OUTPATIENT
Start: 2018-10-08 | End: 2018-10-08 | Stop reason: HOSPADM

## 2018-10-08 RX ORDER — FENTANYL CITRATE 50 UG/ML
INJECTION, SOLUTION INTRAMUSCULAR; INTRAVENOUS PRN
Status: DISCONTINUED | OUTPATIENT
Start: 2018-10-08 | End: 2018-10-08

## 2018-10-08 RX ORDER — FENTANYL CITRATE 50 UG/ML
25-50 INJECTION, SOLUTION INTRAMUSCULAR; INTRAVENOUS
Status: DISCONTINUED | OUTPATIENT
Start: 2018-10-08 | End: 2018-10-08 | Stop reason: HOSPADM

## 2018-10-08 RX ORDER — AMOXICILLIN 250 MG
2 CAPSULE ORAL 2 TIMES DAILY PRN
Status: DISCONTINUED | OUTPATIENT
Start: 2018-10-08 | End: 2018-10-10

## 2018-10-08 RX ORDER — MAGNESIUM HYDROXIDE 1200 MG/15ML
LIQUID ORAL PRN
Status: DISCONTINUED | OUTPATIENT
Start: 2018-10-08 | End: 2018-10-08 | Stop reason: HOSPADM

## 2018-10-08 RX ORDER — OXYCODONE HYDROCHLORIDE 5 MG/1
5-10 TABLET ORAL EVERY 4 HOURS PRN
Status: DISCONTINUED | OUTPATIENT
Start: 2018-10-08 | End: 2018-10-09

## 2018-10-08 RX ORDER — MEPERIDINE HYDROCHLORIDE 50 MG/ML
12.5 INJECTION INTRAMUSCULAR; INTRAVENOUS; SUBCUTANEOUS
Status: DISCONTINUED | OUTPATIENT
Start: 2018-10-08 | End: 2018-10-08 | Stop reason: HOSPADM

## 2018-10-08 RX ORDER — GABAPENTIN 100 MG/1
100 CAPSULE ORAL ONCE
Status: COMPLETED | OUTPATIENT
Start: 2018-10-08 | End: 2018-10-08

## 2018-10-08 RX ORDER — HYDROMORPHONE HYDROCHLORIDE 1 MG/ML
.3-.5 INJECTION, SOLUTION INTRAMUSCULAR; INTRAVENOUS; SUBCUTANEOUS EVERY 10 MIN PRN
Status: DISCONTINUED | OUTPATIENT
Start: 2018-10-08 | End: 2018-10-08 | Stop reason: HOSPADM

## 2018-10-08 RX ORDER — PROPOFOL 10 MG/ML
INJECTION, EMULSION INTRAVENOUS PRN
Status: DISCONTINUED | OUTPATIENT
Start: 2018-10-08 | End: 2018-10-08

## 2018-10-08 RX ORDER — EPHEDRINE SULFATE 50 MG/ML
INJECTION, SOLUTION INTRAMUSCULAR; INTRAVENOUS; SUBCUTANEOUS PRN
Status: DISCONTINUED | OUTPATIENT
Start: 2018-10-08 | End: 2018-10-08

## 2018-10-08 RX ORDER — CEFAZOLIN SODIUM 1 G/3ML
1 INJECTION, POWDER, FOR SOLUTION INTRAMUSCULAR; INTRAVENOUS SEE ADMIN INSTRUCTIONS
Status: DISCONTINUED | OUTPATIENT
Start: 2018-10-08 | End: 2018-10-08 | Stop reason: HOSPADM

## 2018-10-08 RX ORDER — ANASTROZOLE 1 MG/1
1 TABLET ORAL DAILY
Status: DISCONTINUED | OUTPATIENT
Start: 2018-10-09 | End: 2018-10-11 | Stop reason: HOSPADM

## 2018-10-08 RX ORDER — LIDOCAINE HYDROCHLORIDE 20 MG/ML
INJECTION, SOLUTION INFILTRATION; PERINEURAL PRN
Status: DISCONTINUED | OUTPATIENT
Start: 2018-10-08 | End: 2018-10-08

## 2018-10-08 RX ORDER — LIDOCAINE 40 MG/G
CREAM TOPICAL
Status: DISCONTINUED | OUTPATIENT
Start: 2018-10-08 | End: 2018-10-08 | Stop reason: HOSPADM

## 2018-10-08 RX ORDER — FLUMAZENIL 0.1 MG/ML
0.2 INJECTION, SOLUTION INTRAVENOUS
Status: DISCONTINUED | OUTPATIENT
Start: 2018-10-08 | End: 2018-10-08 | Stop reason: HOSPADM

## 2018-10-08 RX ORDER — CEFAZOLIN SODIUM 2 G/100ML
2 INJECTION, SOLUTION INTRAVENOUS
Status: COMPLETED | OUTPATIENT
Start: 2018-10-08 | End: 2018-10-08

## 2018-10-08 RX ORDER — NALOXONE HYDROCHLORIDE 0.4 MG/ML
.1-.4 INJECTION, SOLUTION INTRAMUSCULAR; INTRAVENOUS; SUBCUTANEOUS
Status: DISCONTINUED | OUTPATIENT
Start: 2018-10-08 | End: 2018-10-11 | Stop reason: HOSPADM

## 2018-10-08 RX ORDER — AMOXICILLIN 250 MG
1 CAPSULE ORAL 2 TIMES DAILY PRN
Status: DISCONTINUED | OUTPATIENT
Start: 2018-10-08 | End: 2018-10-10

## 2018-10-08 RX ORDER — SERTRALINE HYDROCHLORIDE 25 MG/1
25 TABLET, FILM COATED ORAL EVERY MORNING
Status: DISCONTINUED | OUTPATIENT
Start: 2018-10-09 | End: 2018-10-11 | Stop reason: HOSPADM

## 2018-10-08 RX ORDER — NALOXONE HYDROCHLORIDE 0.4 MG/ML
.1-.4 INJECTION, SOLUTION INTRAMUSCULAR; INTRAVENOUS; SUBCUTANEOUS
Status: DISCONTINUED | OUTPATIENT
Start: 2018-10-08 | End: 2018-10-08 | Stop reason: HOSPADM

## 2018-10-08 RX ORDER — BUPIVACAINE HYDROCHLORIDE AND EPINEPHRINE 2.5; 5 MG/ML; UG/ML
INJECTION, SOLUTION INFILTRATION; PERINEURAL PRN
Status: DISCONTINUED | OUTPATIENT
Start: 2018-10-08 | End: 2018-10-08

## 2018-10-08 RX ORDER — KETAMINE HYDROCHLORIDE 10 MG/ML
INJECTION, SOLUTION INTRAMUSCULAR; INTRAVENOUS PRN
Status: DISCONTINUED | OUTPATIENT
Start: 2018-10-08 | End: 2018-10-08

## 2018-10-08 RX ORDER — LORATADINE 10 MG/1
10 TABLET ORAL DAILY
Status: DISCONTINUED | OUTPATIENT
Start: 2018-10-09 | End: 2018-10-11 | Stop reason: HOSPADM

## 2018-10-08 RX ORDER — ONDANSETRON 4 MG/1
4 TABLET, ORALLY DISINTEGRATING ORAL EVERY 6 HOURS PRN
Status: DISCONTINUED | OUTPATIENT
Start: 2018-10-08 | End: 2018-10-11 | Stop reason: HOSPADM

## 2018-10-08 RX ORDER — ACETAMINOPHEN 325 MG/1
975 TABLET ORAL EVERY 8 HOURS
Status: DISCONTINUED | OUTPATIENT
Start: 2018-10-08 | End: 2018-10-11 | Stop reason: HOSPADM

## 2018-10-08 RX ORDER — ACETAMINOPHEN 325 MG/1
975 TABLET ORAL ONCE
Status: COMPLETED | OUTPATIENT
Start: 2018-10-08 | End: 2018-10-08

## 2018-10-08 RX ORDER — ALBUMIN, HUMAN INJ 5% 5 %
SOLUTION INTRAVENOUS CONTINUOUS PRN
Status: DISCONTINUED | OUTPATIENT
Start: 2018-10-08 | End: 2018-10-08

## 2018-10-08 RX ORDER — SCOLOPAMINE TRANSDERMAL SYSTEM 1 MG/1
1 PATCH, EXTENDED RELEASE TRANSDERMAL ONCE
Status: COMPLETED | OUTPATIENT
Start: 2018-10-08 | End: 2018-10-08

## 2018-10-08 RX ORDER — KETOROLAC TROMETHAMINE 30 MG/ML
30 INJECTION, SOLUTION INTRAMUSCULAR; INTRAVENOUS
Status: DISCONTINUED | OUTPATIENT
Start: 2018-10-08 | End: 2018-10-08 | Stop reason: HOSPADM

## 2018-10-08 RX ORDER — FLUTICASONE PROPIONATE 50 MCG
1 SPRAY, SUSPENSION (ML) NASAL DAILY
Status: DISCONTINUED | OUTPATIENT
Start: 2018-10-09 | End: 2018-10-11 | Stop reason: HOSPADM

## 2018-10-08 RX ADMIN — ENOXAPARIN SODIUM 40 MG: 40 INJECTION SUBCUTANEOUS at 07:14

## 2018-10-08 RX ADMIN — Medication 0.3 MG: at 18:22

## 2018-10-08 RX ADMIN — ONDANSETRON 4 MG: 2 INJECTION INTRAMUSCULAR; INTRAVENOUS at 16:57

## 2018-10-08 RX ADMIN — PROPOFOL 150 MG: 10 INJECTION, EMULSION INTRAVENOUS at 07:58

## 2018-10-08 RX ADMIN — SODIUM CHLORIDE, POTASSIUM CHLORIDE, SODIUM LACTATE AND CALCIUM CHLORIDE: 600; 310; 30; 20 INJECTION, SOLUTION INTRAVENOUS at 19:00

## 2018-10-08 RX ADMIN — Medication 0.2 MG: at 22:11

## 2018-10-08 RX ADMIN — ROCURONIUM BROMIDE 10 MG: 10 INJECTION INTRAVENOUS at 11:24

## 2018-10-08 RX ADMIN — CALCIUM CHLORIDE 500 MG: 100 INJECTION, SOLUTION INTRAVENOUS at 09:50

## 2018-10-08 RX ADMIN — SCOPOLAMINE 1 PATCH: 1 PATCH, EXTENDED RELEASE TRANSDERMAL at 07:21

## 2018-10-08 RX ADMIN — Medication 5 MG: at 15:20

## 2018-10-08 RX ADMIN — Medication 10 MG: at 14:28

## 2018-10-08 RX ADMIN — MIDAZOLAM 1 MG: 1 INJECTION INTRAMUSCULAR; INTRAVENOUS at 07:58

## 2018-10-08 RX ADMIN — SODIUM CHLORIDE, POTASSIUM CHLORIDE, SODIUM LACTATE AND CALCIUM CHLORIDE: 600; 310; 30; 20 INJECTION, SOLUTION INTRAVENOUS at 09:46

## 2018-10-08 RX ADMIN — SODIUM CHLORIDE, POTASSIUM CHLORIDE, SODIUM LACTATE AND CALCIUM CHLORIDE: 600; 310; 30; 20 INJECTION, SOLUTION INTRAVENOUS at 10:03

## 2018-10-08 RX ADMIN — FENTANYL CITRATE 50 MCG: 50 INJECTION, SOLUTION INTRAMUSCULAR; INTRAVENOUS at 17:04

## 2018-10-08 RX ADMIN — ROCURONIUM BROMIDE 20 MG: 10 INJECTION INTRAVENOUS at 13:05

## 2018-10-08 RX ADMIN — Medication 5 MG: at 15:39

## 2018-10-08 RX ADMIN — BUPIVACAINE HYDROCHLORIDE AND EPINEPHRINE BITARTRATE 40 ML: 2.5; .005 INJECTION, SOLUTION INFILTRATION; PERINEURAL at 07:20

## 2018-10-08 RX ADMIN — Medication 5 MG: at 10:20

## 2018-10-08 RX ADMIN — ROCURONIUM BROMIDE 10 MG: 10 INJECTION INTRAVENOUS at 12:45

## 2018-10-08 RX ADMIN — Medication 5 MG: at 08:05

## 2018-10-08 RX ADMIN — CEFAZOLIN 1 G: 1 INJECTION, POWDER, FOR SOLUTION INTRAMUSCULAR; INTRAVENOUS at 14:13

## 2018-10-08 RX ADMIN — CEFAZOLIN SODIUM 2 G: 2 INJECTION, SOLUTION INTRAVENOUS at 08:15

## 2018-10-08 RX ADMIN — Medication 10 MG: at 13:27

## 2018-10-08 RX ADMIN — ACETAMINOPHEN 975 MG: 325 TABLET, FILM COATED ORAL at 07:21

## 2018-10-08 RX ADMIN — Medication 0.3 MG: at 18:37

## 2018-10-08 RX ADMIN — DEXAMETHASONE SODIUM PHOSPHATE 10 MG: 10 INJECTION, SOLUTION INTRAMUSCULAR; INTRAVENOUS at 10:13

## 2018-10-08 RX ADMIN — CALCIUM CHLORIDE 250 MG: 100 INJECTION, SOLUTION INTRAVENOUS at 10:36

## 2018-10-08 RX ADMIN — LIDOCAINE HYDROCHLORIDE 100 MG: 20 INJECTION, SOLUTION INFILTRATION; PERINEURAL at 07:58

## 2018-10-08 RX ADMIN — Medication 10 MG: at 09:40

## 2018-10-08 RX ADMIN — ROCURONIUM BROMIDE 10 MG: 10 INJECTION INTRAVENOUS at 14:28

## 2018-10-08 RX ADMIN — FENTANYL CITRATE 50 MCG: 50 INJECTION, SOLUTION INTRAMUSCULAR; INTRAVENOUS at 16:27

## 2018-10-08 RX ADMIN — SODIUM CHLORIDE, POTASSIUM CHLORIDE, SODIUM LACTATE AND CALCIUM CHLORIDE: 600; 310; 30; 20 INJECTION, SOLUTION INTRAVENOUS at 13:38

## 2018-10-08 RX ADMIN — ROCURONIUM BROMIDE 10 MG: 10 INJECTION INTRAVENOUS at 11:29

## 2018-10-08 RX ADMIN — CEFAZOLIN 1 G: 1 INJECTION, POWDER, FOR SOLUTION INTRAMUSCULAR; INTRAVENOUS at 12:13

## 2018-10-08 RX ADMIN — ROCURONIUM BROMIDE 30 MG: 10 INJECTION INTRAVENOUS at 16:07

## 2018-10-08 RX ADMIN — OXYCODONE HYDROCHLORIDE 5 MG: 5 TABLET ORAL at 20:32

## 2018-10-08 RX ADMIN — Medication 20 MG: at 08:11

## 2018-10-08 RX ADMIN — Medication 5 MG: at 10:46

## 2018-10-08 RX ADMIN — GABAPENTIN 100 MG: 100 CAPSULE ORAL at 07:21

## 2018-10-08 RX ADMIN — FENTANYL CITRATE 50 MCG: 50 INJECTION, SOLUTION INTRAMUSCULAR; INTRAVENOUS at 17:50

## 2018-10-08 RX ADMIN — ALBUMIN HUMAN: 0.05 INJECTION, SOLUTION INTRAVENOUS at 09:48

## 2018-10-08 RX ADMIN — ROCURONIUM BROMIDE 20 MG: 10 INJECTION INTRAVENOUS at 15:06

## 2018-10-08 RX ADMIN — ROCURONIUM BROMIDE 30 MG: 10 INJECTION INTRAVENOUS at 13:47

## 2018-10-08 RX ADMIN — ROCURONIUM BROMIDE 50 MG: 10 INJECTION INTRAVENOUS at 07:59

## 2018-10-08 RX ADMIN — ROCURONIUM BROMIDE 20 MG: 10 INJECTION INTRAVENOUS at 12:10

## 2018-10-08 RX ADMIN — ALBUMIN HUMAN: 0.05 INJECTION, SOLUTION INTRAVENOUS at 15:21

## 2018-10-08 RX ADMIN — ACETAMINOPHEN 975 MG: 325 TABLET, FILM COATED ORAL at 20:32

## 2018-10-08 RX ADMIN — ROCURONIUM BROMIDE 20 MG: 10 INJECTION INTRAVENOUS at 09:54

## 2018-10-08 RX ADMIN — CALCIUM CHLORIDE 250 MG: 100 INJECTION, SOLUTION INTRAVENOUS at 12:45

## 2018-10-08 RX ADMIN — SUGAMMADEX 200 MG: 100 INJECTION, SOLUTION INTRAVENOUS at 17:16

## 2018-10-08 RX ADMIN — BUPIVACAINE 20 ML: 13.3 INJECTION, SUSPENSION, LIPOSOMAL INFILTRATION at 07:20

## 2018-10-08 RX ADMIN — Medication 0.4 MG: at 18:51

## 2018-10-08 RX ADMIN — SODIUM CHLORIDE, POTASSIUM CHLORIDE, SODIUM LACTATE AND CALCIUM CHLORIDE: 600; 310; 30; 20 INJECTION, SOLUTION INTRAVENOUS at 07:39

## 2018-10-08 RX ADMIN — Medication 0.5 MG: at 19:28

## 2018-10-08 RX ADMIN — ROCURONIUM BROMIDE 20 MG: 10 INJECTION INTRAVENOUS at 08:52

## 2018-10-08 RX ADMIN — FENTANYL CITRATE 50 MCG: 50 INJECTION, SOLUTION INTRAMUSCULAR; INTRAVENOUS at 07:58

## 2018-10-08 RX ADMIN — MIDAZOLAM 1 MG: 1 INJECTION INTRAMUSCULAR; INTRAVENOUS at 07:39

## 2018-10-08 RX ADMIN — CEFAZOLIN 1 G: 1 INJECTION, POWDER, FOR SOLUTION INTRAMUSCULAR; INTRAVENOUS at 16:15

## 2018-10-08 RX ADMIN — Medication 0.2 MG: at 20:32

## 2018-10-08 RX ADMIN — FENTANYL CITRATE 50 MCG: 50 INJECTION INTRAMUSCULAR; INTRAVENOUS at 07:01

## 2018-10-08 RX ADMIN — DEXTROSE AND SODIUM CHLORIDE: 5; 900 INJECTION, SOLUTION INTRAVENOUS at 20:33

## 2018-10-08 RX ADMIN — CEFAZOLIN 1 G: 1 INJECTION, POWDER, FOR SOLUTION INTRAMUSCULAR; INTRAVENOUS at 10:15

## 2018-10-08 ASSESSMENT — PAIN DESCRIPTION - DESCRIPTORS
DESCRIPTORS: SHARP
DESCRIPTORS: SHARP
DESCRIPTORS: CONSTANT;SHARP
DESCRIPTORS: SHARP
DESCRIPTORS: CONSTANT;ACHING
DESCRIPTORS: SHARP

## 2018-10-08 ASSESSMENT — ACTIVITIES OF DAILY LIVING (ADL)
SWALLOWING: 0-->SWALLOWS FOODS/LIQUIDS WITHOUT DIFFICULTY
COGNITION: 0 - NO COGNITION ISSUES REPORTED
PRIOR_FUNCTIONAL_LEVEL_COMMENT: SELF CARE
BATHING: 0-->INDEPENDENT
RETIRED_EATING: 0-->INDEPENDENT
TOILETING: 0-->INDEPENDENT
DRESS: 0-->INDEPENDENT
TRANSFERRING: 0-->INDEPENDENT
FALL_HISTORY_WITHIN_LAST_SIX_MONTHS: NO
AMBULATION: 0-->INDEPENDENT
RETIRED_COMMUNICATION: 0-->UNDERSTANDS/COMMUNICATES WITHOUT DIFFICULTY

## 2018-10-08 NOTE — OR NURSING
Pt's flaps sites dopplered every 15 minutes in PACU with strong pulses found bilaterally. Ida sanders on per Dr. Amin's request.

## 2018-10-08 NOTE — OP NOTE
DATE OF OPERATION: October 8, 2018     PREOPERATIVE DIAGNOSIS: History of right breast cancer status post bilateral mastectomy and first stage tissue expander reconstruction.     POSTOPERATIVE DIAGNOSIS: History of right breast cancer status post bilateral mastectomy and first stage tissue expander reconstruction.     PROCEDURES PERFORMED:   1.  Removal of bilateral breast tissue expanders.  2.  Bilateral breast reconstruction with free deep inferior epigastric  flaps, using operative microscope and microsurgical technique.     SURGEON: Alvino Nicholson MD     CO SURGEON: Nedra Amin MD (Co surgeon case due to the complexity and long duration of bilateral free tissue transfer breast reconstruction with inadequate qualified resident assistance.)     ANESTHESIA: General.     ESTIMATED BLOOD LOSS: 150 mL     FINDINGS:   1.  End-to-end anastomosis of right deep inferior epigastric artery to left internal mammary artery and right deep inferior epigastric vein to left internal mammary vein using a size 2.5 mm .  Left breast flap weight 452 g.  Left breast flap ischemia time 50 min.  2.  End-to-end anastomosis of left deep inferior epigastric artery to right internal mammary artery and left deep inferior epigastric vein to right internal mammary vein using a size 3.5 mm .  Right breast flap weight 470 g.  Right breast flap ischemia time 54 min.    SPECIMENS: Right breast skin.     COMPLICATIONS: None.     DRAINS: 15 English drain per each breast and also in abdomen.     IMPLANTS: None.     DESCRIPTION OF PROCEDURE: Patient was greeted in the preoperative holding area.  Informed consent was reviewed and surgical sites marked.  It was confirmed that the patient had taken aspirin 81 mg night before surgery.  She received bilateral pectoralis blocks.  She then received a preoperative dose of Lovenox 40 mg subcutaneous.  She was then taken to the operating room and placed in a supine position with her arms  tucked to the side and all pressure points padded.  Preoperative antibiotics were given.  Bilateral lower leg SCDs were applied.  General anesthesia was obtained.  A Rodríguez was placed.  The patient's chest and abdomen were then prepped and draped in a sterile fashion.  A timeout was performed.    Deep inferior epigastric perforators were identified bilaterally using a pencil doppler and marked.  The superior abdominal incision was made first above the level of the umbilicus.  Dissection above the abdominal wall fascia was performed to the level of the xyphoid and laterally below the ribs.  The surgical bed was then reflexed to gauge our ability to close the abdomen and confirm the inferior abdominal incision.  The right inferior incision was then made and the superficial inferior epigastric vein was dissected circumferentially for a distance of 3 cm inferiorly before ligating.  The abdominal flap was then raised from lateral to medial in a suprafascial plane until the periumbilical  was visualized.  Another  inferior to this was also visualized.  The midline abdominal incision was made and medial to lateral dissection was performed to identify the medial edge of the rectus muscle.  Circumferential dissection was performed around these perforators at the level of the fascia and the fascia was opened longitudinally to incorporate these perforators.  This opening into the fascia was continued inferiorly towards the groin.  Retrograde dissection was performed around the periumbilical  and it was found that this was daphne-rectus.  Medial rectus abdominis muscle was divided to identify a connection between this  and the one inferior to it.  Further retrograde dissection was performed to the deep inferior epigastric vessels.  At an area where the veins were coming together, circumferential dissection was performed in preparation for ligation.  The flap was then stapled back into  position.  The skin paddle was pink with good cap refill.  Attention was then turned to the left abdomen.    Again, lateral to medial dissection of the left juanito-abdominal flap was performed suprafascial E.  We visualized a periumbilical .  Circumferential dissection was performed around this  at the level of the fascia.  The fascia was then entered and opened longitudinally with the inferior dissection performed towards the groin.  Retrograde dissection of this  was performed with bipolar cautery.  The  was found to be daphne-rectus.  We visualized the deep inferior epigastric vessels and circumferential dissection was performed at the level where the veins were coming together.  Once this was completed, the flap was stapled back into position.  It skin paddle was pink with good cap refill.  Attention was then turned to the chest.    Beginning with the left chest, the left breast pocket was entered through the previously placed horizontal incision.  Bovie cautery was used to dissect down through the subcutaneous tissue and the previously placed AlloDerm to enter the implant pocket.  Tissue expander was removed in total.  The pocket was then expanded superiorly with Bovie cautery.  After thorough saline irrigation, the pectoralis muscle was split along its fibers along the medial chest below the angle of David to reveal the third rib cartilage.  The perichondrium was entered and the rib cartilage was removed with a rondure.  The perichondrium was then dissected off the chest with bipolar cautery.  This revealed the internal mammary vessels which were dissected circumferentially.  Attention was then turned to the right breast in the exact same procedure was performed.    We then brought the right juanito-abdominal flap to the chest after ligating the flap vessels with 2 clips for the artery and vein each.  The flap was flushed with heparinized saline through the flap artery until  clear egress was seen through the flap vein.  The flap was weighed and placed along the left chest for vascular anastomosis.  Under the operative microscope and using microsurgical technique this was performed.  The internal mammary artery and vein were double clipped distally and cut with Supercut scissors.  The artery was dilated and pulsatile flow through it was confirmed while a single 3V clamp was applied proximally.  The vein was also dilated and it was confirmed to flushed easily with heparinized saline with a single 3-week clamp proximally.  The marked flap artery and vein pedicle were then placed near the internal mammary vessels with care taken not to produce a kink or twist.  The pedicle was shortened appropriately.  The flap artery and vein were then cleared of adventitia.  Using a double opposing 3V clamp, the flap artery was anastomosed to the internal mammary artery in an end-to-end fashion using 8-0 nylon suture in an interrupted fashion.  The flap vein was then anastomosed to the internal mammary vein in an end-to-end fashion using a size 2.5 mm venous .  All clamps were removed and there was pulsatile flow through the flap artery and flow through the flap vein confirmed an [place] testing.  Ischemia time was less than 1 hour.  A myotomy was performed along the pectoralis major muscle inferiorly to the area were the vascular anastomosis was performed.  Lateral defect of the pectoralis muscle was sewn together.  With care taken not to produce a kink or twist, the flap pedicle was laid down along the chest wall and the juanito-abdominal flap was placed into the breast pocket.  It was sewn down medially and inferiorly so that it would not move.  Tailor tacking was performed with the mastectomy skin flaps over the flap skin paddle.  A nipple areole or complex area was designed.  The appropriate mastectomy skin flap skin excision was performed using Bovie cautery.  Other than the nipple areolar  complex skin paddle, the remaining skin from the flap skin paddle was de-epithelialized.  A 15 Serbian ALESSANDRA drain was placed into the breast pocket through the trunk.  The incision was then closed with 2-0 Monocryl for the deep dermal layer and 3 oh strata fix for the superficial layer.  A dopplerable signal was obtainable at the skin paddle.    We then ligated and brought the left juanito-abdominal flap to the right chest.  The flap vessels were ligated with 2 clips for the artery and vein each.  The flap was flushed thoroughly with heparinized saline through the flap artery until clear egress was seen through the flap vein.  We weighed the flap and placed the flap over the right chest in preparation for vascular anastomoses.  This was performed under the operative microscope and using microsurgical technique.  The anastomoses were performed the same way as we did for the left chest.  Ischemia time was less than 1 hour.  The venous anastomosis was performed using a 3.5 mm venous .  When clamps were removed, there was pulsatile flow through the flap artery and flow through the flap vein confirmed an Acland testing.  Again, a myotomy was performed along the pectoralis major muscle inferior to the vascular anastomoses.  The lateral to the defect of the pectoralis muscle was closed with a 2-0 Monocryl figure-of-eight suture.  The flap pedicle was then laid along the chest wall without a kink or twist and the flap was then placed into the right breast pocket.  It was sewn down medially and inferiorly to prevent shifting.  Again, tailor tacking was performed using the mastectomy skin flaps of the flap skin paddle.  The nipple areolar complex was designed.  The flap skin paddle was de-epithelialized appropriately.  A 15 Serbian ALESSANDRA drain was placed in the incisions were closed in layers.  Again a Doppler signal was obtainable at the skin paddle.    The abdominal donor site fascial defects were closed primarily with 0 PDS  suture in a running fashion.  A 15 Togolese ALESSANDRA drain was placed into the abdomen.  With the bed reflux, the abdominal incision was closed in layers over a 15 Togolese ALESSANDRA drain.  A new opening was chosen for the umbilicus.  The umbilicus was brought through intact at the level of the dermis in interrupted fashion.  A chromic running suture was placed for the skin.  The umbilicus was pink and well-perfused at the end of the case.  An umbilicus dressing was placed.  An abdominal binder was applied.  Drain dressings were applied.  All counts were correct at the end of the case.  The patient was then awakened, extubated, and transferred to the postoperative area in a flexed position at the hip.  Flap skin paddles were pink, well-perfused, and with dopplerable signals at the end of the case.     DISPOSITION: ICU for frequent flap monitoring.

## 2018-10-08 NOTE — BRIEF OP NOTE
[facility], [place]    Brief Operative Note    Pre-operative diagnosis: Post Bilateral Breast Reconstruction   Post-operative diagnosis * No post-op diagnosis entered *  Procedure: Procedure(s):  Bilateral Deep Inferior Epigastric Artery  Free Flap Breast Reconstruction and Removal of Breast Explanders - Wound Class: I-Clean   - Wound Class: I-Clean  Surgeon: Surgeon(s) and Role:     * DENNISE Amin MD - Primary     * Alvino Nicholson MD - Assisting     * Romaine Concepcion MD - Resident - Assisting  Anesthesia: Combined General with Block   Estimated blood loss: 150 ml  LR 2900  Albumin 500    Drains:  ALESSANDRA right breast, left breast, abdomen  Specimens:   ID Type Source Tests Collected by Time Destination   A : RIGHT BREAST SCAR Tissue Breast, Right SURGICAL PATHOLOGY EXAM DENNISE Amin MD 10/8/2018  4:27 PM      Findings:   None.  Complications: None.  Implants: None.    Plan:  Admit to SICU, plastics primary  Bedrest tonight  Q15 minutes for first 2 hours, then q1 hr flap checks bilateral breasts (blue prolene) stitch, turgor, warmth, temp  Abdominal binder  Hip flexed 30 degrees (beach chair position)  Room 75 degrees, door closed  Rodríguez stays  NPO except meds, IVF  Strip and record drains qshift  No caffeine or smoking    Please call with any questions about flap    Romaine Concepcion PGY-4  Plastic Surgery  Please page on call

## 2018-10-08 NOTE — IP AVS SNAPSHOT
MRN:[id number]                      After Visit Summary   10/8/2018    Enid Lombardo    MRN: [id number]           Thank you!     Thank you for choosing [facility] for your care. Our goal is always to provide you with excellent care. Hearing back from our patients is one way we can continue to improve our services. Please take a few minutes to complete the written survey that you may receive in the mail after you visit with us. Thank you!        Patient Information     Date Of Birth          1960        Designated Caregiver       Most Recent Value    Caregiver    Will someone help with your care after discharge? yes    Name of designated caregiver Carson    Phone number of caregiver [phone]    Caregiver address [place], [place]      About your hospital stay     You were admitted on:  October 8, 2018 You last received care in the:  [address] [facility]    You were discharged on:  October 11, 2018        Reason for your hospital stay       Bilateral breast reconstruction                  Who to Call     For medical emergencies, please call 911.  For non-urgent questions about your medical care, please call your primary care provider or clinic, [phone]  For questions related to your surgery, please call your surgery clinic        Attending Provider     Provider Specialty    DENNISE Amin MD Plastic Surgery       Primary Care Provider Office Phone # Fax #    Shi Sasha Alonso [phone]      Your next 10 appointments already scheduled     Oct 16, 2018 10:45 AM CDT   (Arrive by 10:30 AM)   Post-Op with DENNISE Amin MD   [facility] [place] ([facility])    [address]  [address]  [facility] [address] [phone]            Jan 14, 2019  8:00 AM CST   DX HIP/PELVIS/SPINE with UCDX1   [facility] [place] [facility]a ([facility] [place])    [address]  1st Floor  [facility] [address]    [phone]           How do I prepare for my exam? (Food and drink instructions) No Food and Drink Restrictions.  How do I prepare for my exam? (Other instructions) Please do not take any of the following 24 hours prior to the day of your exam: vitamins, calcium tablets, antacids.  What should I wear: If possible, please wear clothes without metal (snaps, zippers). A sweat suit works well.  How long does the exam take: The exam takes about 20 minutes.  What should I bring: Bring a list of your current medicines to your exam (including vitamins, minerals and over-the-counter drugs).  Do I need a :  No  is needed.  What should I do after the exam: No restrictions, You may resume normal activities.  How do I prepare for my exam? (Food and drink instructions) A DEXA scan is a bone-density scan. It uses a low level of radiation to check the strength of your bones. As you lie on a padded table, a machine will take X-rays. We most often scan the hips and lower spine.  Who should I call with questions: If you have any questions, please call the Imaging Department where you will have your exam. Directions, parking instructions, and other information is available on our website, [email or web address].            Jan 14, 2019  9:00 AM CST   [facility] Draw with  [facility] DRAW   [facility] Lab Draw ([facility])    [address] [place] [address]  [address]  [facility] [address]   [phone]            Jan 14, 2019 11:30 AM CST   (Arrive by 11:15 AM)   Return Visit with Charity Brar MD   [facility] ([facility])    [address] [place] [address]  [address]  [facility] [address] [phone]            Jan 21, 2019 10:15 AM CST   RETURN RETINA with Paz Osborn MD   [facility] ([facility])    [address]  9[address]  [facility] [address]   [phone]            Sep 23,  2019  2:00 PM CDT   (Arrive by 1:45 PM)   Return Visit with Charity Brar MD   [facility] ([facility])    [address]  [address] [place] [address] [phone]              Further instructions from your care team       [facility] FLAP BREAST RECONSTRUCTION POST-OPERATIVE INSTRUCTIONS    Instructions  What are my post-operative instructions?     Have someone drive you home after surgery and help you at home for 1-2      days.     Get plenty of rest.     Follow balanced diet.     Decreased activity may promote constipation, so you may want to add      more raw fruit to your diet, and be sure to increase fluid intake.    Take pain medication as prescribed. Do not take aspirin or any products     containing aspirin for one week after surgery.     Do not drink alcohol when taking pain medications.     Even when not taking pain medications, no alcohol for 3 weeks as it      causes fluid retention.     If you are taking vitamins with iron, resume these as tolerated.     Do not smoke. Smoking delays healing and increases the risk of      complications.    Activities     Start walking as soon as possible, this helps to reduce swelling and     lowers the chance of blood clots.     You may use your arms for activities of daily living for the first three     weeks, but do not push over your head until 3 weeks post-op.     Do not drive until you are no longer taking any pain medications     (narcotics).     Unless stated on this form, discuss your time off work with your surgeon.     No driving for 4 weeks. When abdominal area will allow for sudden      braking, you may resume driving.     No heavy lifting for 6 to 8 weeks.    Incision Care     You may shower 72 hours after surgery with drains in place.     If you have implants, no showering until drains are removed.     No tub soaking, bathing,or swimming for 6 to 8 weeks.     Avoid exposing scars to sun for at  least 12 months.     Always use a strong sunblock, if sun exposure is unavoidable (SPF 50 or     greater).     Keep surgical tape on until it peels off.     Keep incisions clean and inspect daily for signs of infection.     Do not wear a bra.     Sleep with pillows under knee for 2 weeks (some women choose to sleep      in a recliner or lounge chair).    Drain Care  Strip and record drain output twice a day, bring this record with you to post op clinic visit    What to Expect     Maximum discomfort will occur the first few days following surgery; you      may experience incision discomfort and generalized discomfort in your      breasts and abdomen.     Oozing can be expected.     Appearance     A new breast(s) mound(s) will be constructed with sutures around the     outer edges.     The abdomen will be tight and much flatter in appearance.     The majority of swelling will subside in 3-4 weeks, but some swelling may      persist for up to 3 months.     You will walk bent over and will slowly rise over the first 1-2 weeks.    Follow-Up Care     Your 1st post-operative visit will be scheduled for 7 to 10 days after       surgery. Some drains may be taken out during this visit.     Your 2nd post-operative visit will be for removal of remaining drains.     Your 3rd post-operative visit will be scheduled somewhere between 4-6      weeks from the initial surgery date.    Please note my office will call you 1-2 business days after your procedure to check up on how you're doing and to schedule your post-operative appointment.     When to Call:     If you have increased swelling or bruising.     If swelling and redness persist after a few days.     If you have increased redness along the incision.     If you have severe or increased pain not relieved by medication.     If you have any side effects to medications; such as, rash, nausea,      headache, vomiting.     If you have an oral temperature over 100.4 degrees.     If  "you have any yellowish or greenish drainage from the incisions or      notice a foul odor.     If you have bleeding from the incisions that is difficult to control with      light pressure.     If you have loss of feeling or motion.    For Medical Questions, Please Call:     [phone], Monday - Friday, 8 a.m. - 4:30 p.m.     After hours and on weekends, call Hospital Paging at [phone], and ask for the       Plastic Surgeon on call.      Additional Information     If you use hormonal birth control (such as the pill, patch, ring or implants): You'll need a second form of birth control for 7 days (condoms, a diaphragm or contraceptive foam). While in the hospital, you received a medicine called Bridion. Your normal birth control will not work as well for a week after taking this medicine.          Pending Results     Date and Time Order Name Status Description    10/8/2018 1627 Surgical pathology exam In process             Statement of Approval     Ordered          10/11/18 1005  I have reviewed and agree with all the recommendations and orders detailed in this document.  EFFECTIVE NOW     Approved and electronically signed by:  Romaine Concepcion MD             Admission Information     Date & Time Provider Department Dept. Phone    10/8/2018 DENNISE Amin MD [address] [facility] [place] [phone]      Your Vitals Were     Blood Pressure Pulse Temperature Respirations Height Weight    95/70 (BP Location: Left arm) 85 98.5  F (36.9  C) (Oral) 18 1.651 m (5' 5\") 79.9 kg (176 lb 2.4 oz)    Last Period Pulse Oximetry BMI (Body Mass Index)             10/14/2001 94% 29.31 kg/m2         [facility] Information     [facility] gives you secure access to your electronic health record. If you see a primary care provider, you can also send messages to your care team and make appointments. If you have questions, please call your primary care clinic.  If you do not have a primary care provider, please call [phone] and " they will assist you.        Care EveryWhere ID     This is your Care EveryWhere ID. This could be used by other organizations to access your [facility] medical records  [id number]        Equal Access to Services     RAIZA TOLBERT : Lillie Morrison, brianda betts, sunshineta kabacilio oscarinocencioshruthi, michelle sandsgladiscricket tong. So [facility] [phone].    ATENCIÓN: Si habla español, tiene a rivera disposición servicios gratuitos de asistencia lingüística. Llame al [phone].    We comply with applicable federal civil rights laws and [place] laws. We do not discriminate on the basis of race, color, national origin, age, disability, sex, sexual orientation, or gender identity.               Review of your medicines      START taking        Dose / Directions    aspirin 81 MG chewable tablet   Used for:  History of right breast cancer        Dose:  81 mg   Start taking on:  10/12/2018   Take 1 tablet (81 mg) by mouth daily for 28 days   Quantity:  28 tablet   Refills:  0       diazepam 5 MG tablet   Commonly known as:  VALIUM   Used for:  History of right breast cancer        Dose:  5 mg   Take 1 tablet (5 mg) by mouth every 8 hours as needed for muscle spasms or pain   Quantity:  10 tablet   Refills:  0       enoxaparin 40 MG/0.4ML injection   Commonly known as:  LOVENOX   Used for:  History of right breast cancer        Dose:  40 mg   Start taking on:  10/12/2018   Inject 0.4 mLs (40 mg) Subcutaneous every 24 hours for 10 days   Quantity:  4 mL   Refills:  0       methocarbamol 500 MG tablet   Commonly known as:  ROBAXIN   Used for:  History of right breast cancer        Dose:  500 mg   Take 1 tablet (500 mg) by mouth every 6 hours as needed for muscle spasms   Quantity:  30 tablet   Refills:  0       oxyCODONE IR 5 MG tablet   Commonly known as:  ROXICODONE   Used for:  History of right breast cancer        Dose:  5-10 mg   Take 1-2 tablets (5-10 mg) by mouth every 4 hours as needed for moderate to  severe pain   Quantity:  30 tablet   Refills:  0       polyethylene glycol Packet   Commonly known as:  MIRALAX/GLYCOLAX   Used for:  History of right breast cancer        Dose:  17 g   Start taking on:  10/12/2018   Take 17 g by mouth daily for 14 days   Quantity:  14 packet   Refills:  0         CONTINUE these medicines which may have CHANGED, or have new prescriptions. If we are uncertain of the size of tablets/capsules you have at home, strength may be listed as something that might have changed.        Dose / Directions    sertraline 25 MG tablet   Commonly known as:  ZOLOFT   This may have changed:    - how much to take  - when to take this  - additional instructions   Used for:  Hot flushes, perimenopausal        Take 1 tablet daily.   Quantity:  30 tablet   Refills:  11         CONTINUE these medicines which have NOT CHANGED        Dose / Directions    ACETAMINOPHEN PO        Dose:  325 mg   Take 325 mg by mouth every 8 hours as needed for pain   Refills:  0       ARIMIDEX 1 MG tablet   Used for:  Recurrent malignant neoplasm of breast, unspecified laterality (H)   Generic drug:  anastrozole        Dose:  1 mg   Take 1 tablet (1 mg) by mouth daily   Quantity:  90 tablet   Refills:  3       Fish Oil 1000 MG Cpdr        Take  by mouth.   Refills:  0       fluticasone 50 MCG/ACT spray   Commonly known as:  FLONASE   Used for:  Chronic rhinitis        USE ONE OR TWO SPRAYS IN EACH NOSTRIL DAILY   Quantity:  16 mL   Refills:  10       loratadine 10 MG tablet   Commonly known as:  CLARITIN   Used for:  Chronic rhinitis, unspecified type        TAKE ONE TABLET BY MOUTH EVERY DAY AS NEEDED FOR ALLERGY SYMPTOMS   Quantity:  90 tablet   Refills:  1       omeprazole 20 MG CR capsule   Commonly known as:  priLOSEC   Used for:  Gastroesophageal reflux disease, esophagitis presence not specified        TAKE 1 CAPSULE (20 MG) BY MOUTH DAILY   Quantity:  90 capsule   Refills:  3       PRESERVISION AREDS 2 Caps   Used for:   "Drusen (degenerative) of retina, bilateral, Vitreous degeneration, bilateral        Dose:  1 tablet   Take 1 tablet by mouth 2 times daily   Quantity:  60 capsule   Refills:  11         STOP taking     ADVIL 200 MG capsule   Generic drug:  ibuprofen                Where to get your medicines      These medications were sent to [facility] - [place], [place] - [address]  [address] [facility] [address]     Phone:  [phone]     aspirin 81 MG chewable tablet    enoxaparin 40 MG/0.4ML injection    methocarbamol 500 MG tablet    polyethylene glycol Packet         Some of these will need a paper prescription and others can be bought over the counter. Ask your nurse if you have questions.     Bring a paper prescription for each of these medications     diazepam 5 MG tablet    oxyCODONE IR 5 MG tablet                Protect others around you: Learn how to safely use, store and throw away your medicines at www.disposemymeds.org.        Information about your nerve block     Today you received a block to numb the nerves near your surgery site.    This is a block using local anesthetic or \"numbing\" medication injected around the nerves to anesthetize or \"numb\" the area supplied by those nerves. This block is injected into the muscle layer near your surgical site. The type of anesthesia (Exparel) your anesthesia team used to numb your abdomen may give you relief for up to 72 hours.     Diet: There are no diet restrictions, but you should drink plenty of fluids, unless you are on a fluid-restricted diet.     Activity: If your surgical site is an arm or leg you should be careful with your affected limb, since it is possible to injure your limb without being aware of it due to the numbing. Until full feeling returns, you should guard against bumping or hitting your limb, and avoid extreme hot or cold temperatures on the skin.    Pain Medication: As the block wears off, the feeling will " return as a tingling or prickly sensation near your surgical site. You will experience more discomfort from your incisions as the feeling returns. You may want to take a pain pill (a narcotic or Tylenol if this was prescribed by your surgeon) when you start to experience mild pain, before the pain becomes more severe. If your pain medications do not control your pain, you should notify your surgeon. If you are taking narcotics for pain management, do not drink alcohol, drive a car, or perform hazardous activities.  If you have questions or concerns you may call your surgeon at the number provided with your discharge instructions.     Call your surgeon if you experience blurry vision, ringing in the ears or metallic taste in your mouth.         Information about OPIOIDS     PRESCRIPTION OPIOIDS: WHAT YOU NEED TO KNOW   We gave you an opioid (narcotic) pain medicine. It is important to manage your pain, but opioids are not always the best choice. You should first try all the other options your care team gave you. Take this medicine for as short a time (and as few doses) as possible.    Some activities can increase your pain, such as bandage changes or therapy sessions. It may help to take your pain medicine 30 to 60 minutes before these activities. Reduce your stress by getting enough sleep, working on hobbies you enjoy and practicing relaxation or meditation. Talk to your care team about ways to manage your pain beyond prescription opioids.    These medicines have risks:    DO NOT drive when on new or higher doses of pain medicine. These medicines can affect your alertness and reaction times, and you could be arrested for driving under the influence (DUI). If you need to use opioids long-term, talk to your care team about driving.    DO NOT operate heavy machinery    DO NOT do any other dangerous activities while taking these medicines.    DO NOT drink any alcohol while taking these medicines.     If the opioid  prescribed includes acetaminophen, DO NOT take with any other medicines that contain acetaminophen. Read all labels carefully. Look for the word  acetaminophen  or  Tylenol.  Ask your pharmacist if you have questions or are unsure.    You can get addicted to pain medicines, especially if you have a history of addiction (chemical, alcohol or substance dependence). Talk to your care team about ways to reduce this risk.    All opioids tend to cause constipation. Drink plenty of water and eat foods that have a lot of fiber, such as fruits, vegetables, prune juice, apple juice and high-fiber cereal. Take a laxative (Miralax, milk of magnesia, Colace, Senna) if you don t move your bowels at least every other day. Other side effects include upset stomach, sleepiness, dizziness, throwing up, tolerance (needing more of the medicine to have the same effect), physical dependence and slowed breathing.    Store your pills in a secure place, locked if possible. We will not replace any lost or stolen medicine. If you don t finish your medicine, please throw away (dispose) as directed by your pharmacist. The [place] Pollution Control Agency has more information about safe disposal: https://www.pca.[facility].mn.us/living-green/managing-unwanted-medications             Medication List: This is a list of all your medications and when to take them. Check marks below indicate your daily home schedule. Keep this list as a reference.      Medications           Morning Afternoon Evening Bedtime As Needed    ACETAMINOPHEN PO   Take 325 mg by mouth every 8 hours as needed for pain   Last time this was given:  975 mg on 10/11/2018 12:39 PM                                ARIMIDEX 1 MG tablet   Take 1 tablet (1 mg) by mouth daily   Last time this was given:  1 mg on 10/11/2018  8:57 AM   Generic drug:  anastrozole                                aspirin 81 MG chewable tablet   Take 1 tablet (81 mg) by mouth daily for 28 days   Start taking on:   10/12/2018   Last time this was given:  81 mg on 10/11/2018  8:57 AM                                diazepam 5 MG tablet   Commonly known as:  VALIUM   Take 1 tablet (5 mg) by mouth every 8 hours as needed for muscle spasms or pain   Last time this was given:  5 mg on 10/11/2018 10:47 AM                                enoxaparin 40 MG/0.4ML injection   Commonly known as:  LOVENOX   Inject 0.4 mLs (40 mg) Subcutaneous every 24 hours for 10 days   Start taking on:  10/12/2018   Last time this was given:  40 mg on 10/11/2018  8:57 AM                                Fish Oil 1000 MG Cpdr   Take  by mouth.                                fluticasone 50 MCG/ACT spray   Commonly known as:  FLONASE   USE ONE OR TWO SPRAYS IN EACH NOSTRIL DAILY   Last time this was given:  1 spray on 10/11/2018  8:58 AM                                loratadine 10 MG tablet   Commonly known as:  CLARITIN   TAKE ONE TABLET BY MOUTH EVERY DAY AS NEEDED FOR ALLERGY SYMPTOMS   Last time this was given:  10 mg on 10/11/2018  8:57 AM                                methocarbamol 500 MG tablet   Commonly known as:  ROBAXIN   Take 1 tablet (500 mg) by mouth every 6 hours as needed for muscle spasms   Last time this was given:  500 mg on 10/11/2018  3:55 AM                                omeprazole 20 MG CR capsule   Commonly known as:  priLOSEC   TAKE 1 CAPSULE (20 MG) BY MOUTH DAILY   Last time this was given:  20 mg on 10/9/2018  7:59 AM                                oxyCODONE IR 5 MG tablet   Commonly known as:  ROXICODONE   Take 1-2 tablets (5-10 mg) by mouth every 4 hours as needed for moderate to severe pain   Last time this was given:  10 mg on 10/11/2018 12:39 PM                                polyethylene glycol Packet   Commonly known as:  MIRALAX/GLYCOLAX   Take 17 g by mouth daily for 14 days   Start taking on:  10/12/2018                                PRESERVISION AREDS 2 Caps   Take 1 tablet by mouth 2 times daily   Last time this was  given:  1 capsule on 10/9/2018  7:52 PM                                sertraline 25 MG tablet   Commonly known as:  ZOLOFT   Take 1 tablet daily.   Last time this was given:  25 mg on 10/11/2018  8:57 AM

## 2018-10-08 NOTE — H&P
SURGICAL ICU ADMISSION NOTE  10/8/2018    PRIMARY TEAM: Plastic Surgery   PRIMARY PHYSICIAN: Dr. Amin    REASON FOR CRITICAL CARE ADMISSION: FLAP checks    ADMITTING PHYSICIAN: Dr. Douglas      ASSESSMENT: 58 year old female with a past medical history of recurrent carcinoma of the right breast s/p lumpectomy and radiation in 2005, s/p b/l mastectomy August 2018 who presents for abdominal free flap breast reconstruction.     PLAN:   Neuro/ pain/ sedation:  - Monitor neurological status. Notify the MD for any acute changes in exam.  - S. Tylenol, gabapentin. Oxy and IV dilaudid prn.  - Home sertraline     Pulmonary care:   -Supplemental oxygen to keep saturation above 92 %.     Cardiovascular:    #Hypertension  - Monitor hemodynamic status.   - anti-hypertensives prn     GI care:   - NPO except ice chips and medications.  - Senna-s prn      Fluids/ Electrolytes/ Nutrition:   - D5 NS at 125 for IV fluid hydration     Renal/ Fluid Balance:    - Will monitor intake and output.  - Rodríguez in place     Endocrine:    #h/o breast cancer  - PTA anastrozole     ID/ Antibiotics:  - No indication for antibiotics    Heme:     - No acute issues    GI:  #GERD   - omeprazole     Prophylaxis:    - Mechanical prophylaxis for DVT  - No chemical DVT prophylaxis due to high risk of bleeding.     MSK:    - PT consulted. Appreciate recs.     Lines/ tubes/ drains:  - ALESSANDRA drain x3     Disposition:  - Surgical ICU for flap checks    Patient seen, findings and plan discussed with surgical ICU staff.    Marlena Tian MD  General Surgery, PGY-3  Pg [phone]      - - - - - - - - - - - - - - - - - - - - - - - - - - - - - - - - - - - - - - - - - - - - - - - - - - - - - - - - - - - - - - - - - - - - - - - -     HISTORY PRESENTING ILLNESS: 57 yo F with HTN and GERD with history of of right breast cancer status post bilateral mastectomy and first stage tissue expander reconstruction. She presented today for abdominal free flap  reconstruction. Patient reports pain is well controlled. Patient complains of dry mouth. No nausea or vomiting.     REVIEW OF SYSTEMS: 10 point ROS neg other than the symptoms noted above in the HPI.    PAST MEDICAL HISTORY:   Past Medical History:   Diagnosis Date     Breast cancer (H) 2004    lumpectomy, radiation, tamoxifen     Cancer (H) 2004    Breast     Cataracts, bilateral      Complication of anesthesia     She prefers not to have versed until right before she leaves or can have after      Enthesopathy of hip region 6/06    right hip     Esophageal reflux 2/06     History of gestational diabetes      Hypertension 2012     Macular drusen      Shingles 01/23/2012    left T6-T7 dermatome       SURGICAL HISTORY:   Past Surgical History:   Procedure Laterality Date     BIOPSY  2004    Breast     BREAST SURGERY  2004     C VAGINAL HYSTERECTOMY  12/2001    Ovaries intact, due to fibroids     COLONOSCOPY       ENDOSCOPY  05/09/2007    Upper GI     EYE SURGERY       GYN SURGERY  2001     HC BIOPSY/EXCISION LYMPH NODE OPEN DEEP CERVICAL W EXC FAT PAD  1/7/2005    Right axillary sentinel node biopsy X 3.     HC COLONOSCOPY W BIOPSY  05/10/10     HC EXCISION BREAST LESION, OPEN >=1  1/7/2005    Right breast.     HC REMV CATARACT EXTRACAP,INSERT LENS  12/18/08    bilateral     HC REMV TARSAL/METATARSAL BENIGN BONE LESN  1982    Bone spur - right     MASTECTOMY SIMPLE BILATERAL, SENTINEL NODE BILATERAL, COMBINED Bilateral 8/22/2018    Procedure: COMBINED MASTECTOMY SIMPLE BILATERAL, SENTINEL NODE BILATERAL;  Bilateral Mastectomy with Right [place] Node Biopsy, Bilateral Breast Reconstruction, Anesthesia Block;  Surgeon: Rakesh Sanchez MD;  Location:  OR     ORTHOPEDIC SURGERY  1983    Right foot     RECONSTRUCT BREAST Bilateral 8/22/2018    Procedure: RECONSTRUCT BREAST;;  Surgeon: DENNISE Amin MD;  Location:  OR       SOCIAL HISTORY:   Social History     Social History     Marital status:       Spouse name: Carson     Number of children: 3     Years of education: 24     Occupational History     Teacher [employer]     Social History Main Topics     Smoking status: Never Smoker     Smokeless tobacco: Never Used     Alcohol use Yes      Comment: occasionally     Drug use: No     Sexual activity: Yes     Partners: Male     Birth control/ protection: Surgical      Comment: hysterectomy     Other Topics Concern      Service No     Blood Transfusions No     Caffeine Concern Yes     4 cups per day     Occupational Exposure No     Hobby Hazards No     Sleep Concern No     Stress Concern No     Weight Concern No     Special Diet No     Back Care No     Exercise No     Bike Helmet Yes     Seat Belt Yes     Self-Exams Yes     Parent/Sibling W/ Cabg, Mi Or Angioplasty Before 65f 55m? No     Unsure - adopted     Social History Narrative       FAMILY HISTORY: No bleeding/clotting disorders nor problems with anesthesia.    ALLERGIES:      Allergies   Allergen Reactions     Alphagan P Rash     Thrush and numbness in mouth       MEDICATIONS:    No current facility-administered medications on file prior to encounter.   Current Outpatient Prescriptions on File Prior to Encounter:  ACETAMINOPHEN PO Take 325 mg by mouth every 8 hours as needed for pain   fluticasone (FLONASE) 50 MCG/ACT spray USE ONE OR TWO SPRAYS IN EACH NOSTRIL DAILY   ibuprofen (ADVIL) 200 MG capsule Take 200 mg by mouth every 4 hours as needed for fever   loratadine (CLARITIN) 10 MG tablet TAKE ONE TABLET BY MOUTH EVERY DAY AS NEEDED FOR ALLERGY SYMPTOMS   Multiple Vitamins-Minerals (PRESERVISION AREDS 2) CAPS Take 1 tablet by mouth 2 times daily   Omega-3 Fatty Acids (FISH OIL) 1000 MG CPDR Take  by mouth.   omeprazole (PRILOSEC) 20 MG CR capsule TAKE 1 CAPSULE (20 MG) BY MOUTH DAILY   sertraline (ZOLOFT) 25 MG tablet Take 1 tablet daily. (Patient taking differently: 25 mg every morning Take 1 tablet daily.)       PHYSICAL  EXAMINATION:  Temp:  [98  F (36.7  C)-99.5  F (37.5  C)] 99.5  F (37.5  C)  Pulse:  [71] 71  Heart Rate:  [] 96  Resp:  [15-21] 16  BP: (108-139)/(62-88) 114/69  SpO2:  [96 %-100 %] 97 %    General: Awake, alert, NAD  Neuro: VELASCO  CV: RRR  Chest: incisions c/d/i, flaps wwp, drains s/s  Pulm: non-labored breathing on RA  Abd: soft, appropriately TTP, drain s/s  Extremities: wwp    LABS: Reviewed.   Complete Blood Count     Recent Labs  Lab 10/05/18  1038   WBC 5.7   HGB 13.7        Basic Metabolic Panel    Recent Labs  Lab 10/03/18  1457      POTASSIUM 4.1   CHLORIDE 105   CO2 29   BUN 8   CR 0.70   GLC 92       IMAGING:  Recent Results (from the past 24 hour(s))   POC US GUIDANCE NEEDLE PLACEMENT    Impression    Bilateral transversus abdominis plane block, bilateral PECS 1 block.

## 2018-10-08 NOTE — OR NURSING
Pt complaining of significant right arm pain. Pt had BP cuff on that arm all day. Cuff switched to other arm. Pt able to move arm albeit with difficulty. Pt also has sensation. Dr. Amin at bedside and aware. No new orders at this time.

## 2018-10-08 NOTE — ANESTHESIA PROCEDURE NOTES
Peripheral Nerve Block Procedure Note    Staff:     Anesthesiologist:  FAISAL SHELTON    Resident/CRNA:  YESSI BUSH    Block performed by resident/CRNA in the presence of a teaching physician    Location: Pre-op  Procedure Start/Stop TImes:      10/8/2018 7:00 AM     10/8/2018 7:20 AM    patient identified, IV checked, site marked, risks and benefits discussed, informed consent, monitors and equipment checked, pre-op evaluation, at physician/surgeon's request and post-op pain management      Correct Patient: Yes      Correct Position: Yes      Correct Site: Yes      Correct Procedure: Yes      Correct Laterality:  Yes    Site Marked:  Yes  Procedure details:     Procedure:  TAP    ASA:  3    Laterality:  Bilateral    Position:  Supine    Sterile Prep: chloraprep, patient draped, mask and sterile gloves      Local skin infiltration:  1% lidocaine    Needle:  Short bevel    Needle gauge:  21    Needle length (mm):  110    Ultrasound: Yes      Ultrasound used to identify targeted nerve, plexus, or vascular structure and placed a needle adjacent to it      Permanent Image entered into patiient's record      Abnormal pain on injection: No      Blood Aspirated: No      Paresthesias:  No    Bleeding at site: No      Test dose negative for signs of intravascular injection: Yes      Infusion Method:  Single Shot    Complications:  None

## 2018-10-08 NOTE — ANESTHESIA CARE TRANSFER NOTE
Patient: Enid Lombardo    Procedure(s):  Bilateral Deep Inferior Epigastric Artery  Free Flap Breast Reconstruction and Removal of Breast Explanders - Wound Class: I-Clean   - Wound Class: I-Clean    Diagnosis: Post Bilateral Breast Reconstruction   Diagnosis Additional Information: No value filed.    Anesthesia Type:   General, Peripheral Nerve Block     Note:  Airway :Nasal Cannula  Patient transferred to:PACU  Comments:   -on 4L O2 via NC, Pt Spont.  breathing, awake & alert, monitors placed, VSS, RN at bedside, no airway      Vitals: (Last set prior to Anesthesia Care Transfer)    CRNA VITALS  10/8/2018 1707 - 10/8/2018 1745      10/8/2018             Pulse: 129    SpO2: 100 %    Resp Rate (observed): 23                Electronically Signed By: HERMILO Perez CRNA  October 8, 2018  5:45 PM

## 2018-10-08 NOTE — ANESTHESIA PROCEDURE NOTES
Peripheral Nerve Block Procedure Note    Staff:     Anesthesiologist:  FAISAL SHELTON    Resident/CRNA:  YESSI BUSH    Block performed by resident/CRNA in the presence of a teaching physician    Location: Pre-op  Procedure Start/Stop TImes:      10/8/2018 7:00 AM     10/8/2018 7:20 AM    patient identified, IV checked, site marked, risks and benefits discussed, informed consent, monitors and equipment checked, pre-op evaluation, at physician/surgeon's request and post-op pain management      Correct Patient: Yes      Correct Position: Yes      Correct Site: Yes      Correct Procedure: Yes      Correct Laterality:  Yes    Site Marked:  Yes  Procedure details:     Procedure:  Pectoralis    ASA:  3    Laterality:  Bilateral    Position:  Supine (Supine beach chair position)    Sterile Prep: chloraprep, patient draped, mask and sterile gloves      Local skin infiltration:  1% lidocaine    Needle:  Short bevel    Needle gauge:  21    Needle length (mm):  110    Ultrasound: Yes      Ultrasound used to identify targeted nerve, plexus, or vascular structure and placed a needle adjacent to it      Permanent Image entered into patiient's record      Abnormal pain on injection: No      Blood Aspirated: No      Paresthesias:  No    Bleeding at site: No      Test dose negative for signs of intravascular injection: Yes      Bolus via:  Needle    Infusion Method:  Single Shot    Complications:  None

## 2018-10-08 NOTE — IP AVS SNAPSHOT
[address] [address]    Phone:  [phone]                                       After Visit Summary   10/8/2018    Enid Lombardo    MRN: [id number]           After Visit Summary Signature Page     I have received my discharge instructions, and my questions have been answered. I have discussed any challenges I see with this plan with the nurse or doctor.    ..........................................................................................................................................  Patient/Patient Representative Signature      ..........................................................................................................................................  Patient Representative Print Name and Relationship to Patient    ..................................................               ................................................  Date                                   Time    ..........................................................................................................................................  Reviewed by Signature/Title    ...................................................              ..............................................  Date                                               Time          22EPIC Rev 08/18

## 2018-10-09 ENCOUNTER — APPOINTMENT (OUTPATIENT)
Dept: PHYSICAL THERAPY | Facility: CLINIC | Age: 58
DRG: 582 | End: 2018-10-09
Attending: PLASTIC SURGERY
Payer: COMMERCIAL

## 2018-10-09 LAB
ANION GAP SERPL CALCULATED.3IONS-SCNC: 9 MMOL/L (ref 3–14)
BUN SERPL-MCNC: 8 MG/DL (ref 7–30)
CALCIUM SERPL-MCNC: 8.1 MG/DL (ref 8.5–10.1)
CHLORIDE SERPL-SCNC: 108 MMOL/L (ref 94–109)
CO2 SERPL-SCNC: 26 MMOL/L (ref 20–32)
CREAT SERPL-MCNC: 0.71 MG/DL (ref 0.52–1.04)
ERYTHROCYTE [DISTWIDTH] IN BLOOD BY AUTOMATED COUNT: 13.6 % (ref 10–15)
FIBRINOGEN PPP-MCNC: 428 MG/DL (ref 200–420)
GFR SERPL CREATININE-BSD FRML MDRD: 86 ML/MIN/1.7M2
GLUCOSE SERPL-MCNC: 130 MG/DL (ref 70–99)
HCT VFR BLD AUTO: 31.7 % (ref 35–47)
HGB BLD-MCNC: 10.2 G/DL (ref 11.7–15.7)
INR PPP: 1.15 (ref 0.86–1.14)
MAGNESIUM SERPL-MCNC: 1.9 MG/DL (ref 1.6–2.3)
MCH RBC QN AUTO: 30.6 PG (ref 26.5–33)
MCHC RBC AUTO-ENTMCNC: 32.2 G/DL (ref 31.5–36.5)
MCV RBC AUTO: 95 FL (ref 78–100)
MRSA DNA SPEC QL NAA+PROBE: NEGATIVE
PHOSPHATE SERPL-MCNC: 3.4 MG/DL (ref 2.5–4.5)
PLATELET # BLD AUTO: 162 10E9/L (ref 150–450)
POTASSIUM SERPL-SCNC: 3.8 MMOL/L (ref 3.4–5.3)
RBC # BLD AUTO: 3.33 10E12/L (ref 3.8–5.2)
SODIUM SERPL-SCNC: 143 MMOL/L (ref 133–144)
SPECIMEN SOURCE: NORMAL
WBC # BLD AUTO: 8.1 10E9/L (ref 4–11)

## 2018-10-09 PROCEDURE — 36415 COLL VENOUS BLD VENIPUNCTURE: CPT | Performed by: PLASTIC SURGERY

## 2018-10-09 PROCEDURE — 84100 ASSAY OF PHOSPHORUS: CPT | Performed by: PLASTIC SURGERY

## 2018-10-09 PROCEDURE — 25000132 ZZH RX MED GY IP 250 OP 250 PS 637: Performed by: STUDENT IN AN ORGANIZED HEALTH CARE EDUCATION/TRAINING PROGRAM

## 2018-10-09 PROCEDURE — 97530 THERAPEUTIC ACTIVITIES: CPT | Mod: GP

## 2018-10-09 PROCEDURE — 85027 COMPLETE CBC AUTOMATED: CPT | Performed by: PLASTIC SURGERY

## 2018-10-09 PROCEDURE — 25000132 ZZH RX MED GY IP 250 OP 250 PS 637: Performed by: PLASTIC SURGERY

## 2018-10-09 PROCEDURE — 20000004 ZZH R&B ICU UMMC

## 2018-10-09 PROCEDURE — 97161 PT EVAL LOW COMPLEX 20 MIN: CPT | Mod: GP

## 2018-10-09 PROCEDURE — 40000193 ZZH STATISTIC PT WARD VISIT

## 2018-10-09 PROCEDURE — 25000128 H RX IP 250 OP 636: Performed by: PLASTIC SURGERY

## 2018-10-09 PROCEDURE — 85384 FIBRINOGEN ACTIVITY: CPT | Performed by: PLASTIC SURGERY

## 2018-10-09 PROCEDURE — 83735 ASSAY OF MAGNESIUM: CPT | Performed by: PLASTIC SURGERY

## 2018-10-09 PROCEDURE — 99231 SBSQ HOSP IP/OBS SF/LOW 25: CPT | Performed by: NURSE PRACTITIONER

## 2018-10-09 PROCEDURE — 25000128 H RX IP 250 OP 636: Performed by: STUDENT IN AN ORGANIZED HEALTH CARE EDUCATION/TRAINING PROGRAM

## 2018-10-09 PROCEDURE — 80048 BASIC METABOLIC PNL TOTAL CA: CPT | Performed by: PLASTIC SURGERY

## 2018-10-09 PROCEDURE — 99233 SBSQ HOSP IP/OBS HIGH 50: CPT | Performed by: PHYSICIAN ASSISTANT

## 2018-10-09 PROCEDURE — 25000132 ZZH RX MED GY IP 250 OP 250 PS 637: Performed by: PHYSICIAN ASSISTANT

## 2018-10-09 PROCEDURE — 85610 PROTHROMBIN TIME: CPT | Performed by: PLASTIC SURGERY

## 2018-10-09 RX ORDER — ASPIRIN 81 MG/1
81 TABLET, CHEWABLE ORAL DAILY
Status: DISCONTINUED | OUTPATIENT
Start: 2018-10-09 | End: 2018-10-11 | Stop reason: HOSPADM

## 2018-10-09 RX ORDER — OXYCODONE HYDROCHLORIDE 5 MG/1
5-10 TABLET ORAL
Status: DISCONTINUED | OUTPATIENT
Start: 2018-10-09 | End: 2018-10-10

## 2018-10-09 RX ORDER — METHOCARBAMOL 500 MG/1
500 TABLET, FILM COATED ORAL EVERY 6 HOURS PRN
Status: DISCONTINUED | OUTPATIENT
Start: 2018-10-09 | End: 2018-10-11 | Stop reason: HOSPADM

## 2018-10-09 RX ORDER — HYDROMORPHONE HYDROCHLORIDE 1 MG/ML
.3-.5 INJECTION, SOLUTION INTRAMUSCULAR; INTRAVENOUS; SUBCUTANEOUS
Status: DISCONTINUED | OUTPATIENT
Start: 2018-10-09 | End: 2018-10-10

## 2018-10-09 RX ORDER — POLYETHYLENE GLYCOL 3350 17 G/17G
17 POWDER, FOR SOLUTION ORAL DAILY PRN
Status: DISCONTINUED | OUTPATIENT
Start: 2018-10-09 | End: 2018-10-10

## 2018-10-09 RX ORDER — AMOXICILLIN 250 MG
2 CAPSULE ORAL 2 TIMES DAILY
Status: DISCONTINUED | OUTPATIENT
Start: 2018-10-09 | End: 2018-10-11 | Stop reason: HOSPADM

## 2018-10-09 RX ORDER — DIAZEPAM 5 MG
5 TABLET ORAL EVERY 6 HOURS PRN
Status: DISCONTINUED | OUTPATIENT
Start: 2018-10-09 | End: 2018-10-11 | Stop reason: HOSPADM

## 2018-10-09 RX ADMIN — OXYCODONE HYDROCHLORIDE 10 MG: 5 TABLET ORAL at 17:29

## 2018-10-09 RX ADMIN — FLUTICASONE PROPIONATE 1 SPRAY: 50 SPRAY, METERED NASAL at 08:00

## 2018-10-09 RX ADMIN — Medication 1 CAPSULE: at 19:52

## 2018-10-09 RX ADMIN — DEXTROSE AND SODIUM CHLORIDE: 5; 900 INJECTION, SOLUTION INTRAVENOUS at 04:19

## 2018-10-09 RX ADMIN — OMEPRAZOLE 20 MG: 20 CAPSULE, DELAYED RELEASE ORAL at 07:59

## 2018-10-09 RX ADMIN — OXYCODONE HYDROCHLORIDE 10 MG: 5 TABLET ORAL at 19:52

## 2018-10-09 RX ADMIN — ACETAMINOPHEN 975 MG: 325 TABLET, FILM COATED ORAL at 04:08

## 2018-10-09 RX ADMIN — OXYCODONE HYDROCHLORIDE 10 MG: 5 TABLET ORAL at 00:07

## 2018-10-09 RX ADMIN — Medication 0.3 MG: at 10:08

## 2018-10-09 RX ADMIN — DIAZEPAM 5 MG: 5 TABLET ORAL at 18:03

## 2018-10-09 RX ADMIN — OXYCODONE HYDROCHLORIDE 10 MG: 5 TABLET ORAL at 04:08

## 2018-10-09 RX ADMIN — SERTRALINE 25 MG: 25 TABLET, FILM COATED ORAL at 07:59

## 2018-10-09 RX ADMIN — Medication 0.5 MG: at 02:05

## 2018-10-09 RX ADMIN — Medication 0.3 MG: at 18:22

## 2018-10-09 RX ADMIN — ACETAMINOPHEN 975 MG: 325 TABLET, FILM COATED ORAL at 11:52

## 2018-10-09 RX ADMIN — METHOCARBAMOL 500 MG: 500 TABLET ORAL at 08:13

## 2018-10-09 RX ADMIN — OXYCODONE HYDROCHLORIDE 10 MG: 5 TABLET ORAL at 23:18

## 2018-10-09 RX ADMIN — Medication 0.5 MG: at 06:27

## 2018-10-09 RX ADMIN — DEXTROSE AND SODIUM CHLORIDE: 5; 900 INJECTION, SOLUTION INTRAVENOUS at 21:03

## 2018-10-09 RX ADMIN — METHOCARBAMOL 500 MG: 500 TABLET ORAL at 14:22

## 2018-10-09 RX ADMIN — OXYCODONE HYDROCHLORIDE 10 MG: 5 TABLET ORAL at 14:22

## 2018-10-09 RX ADMIN — Medication 0.5 MG: at 13:21

## 2018-10-09 RX ADMIN — ONDANSETRON 4 MG: 2 INJECTION INTRAMUSCULAR; INTRAVENOUS at 04:18

## 2018-10-09 RX ADMIN — ASPIRIN 81 MG CHEWABLE TABLET 81 MG: 81 TABLET CHEWABLE at 11:02

## 2018-10-09 RX ADMIN — ENOXAPARIN SODIUM 40 MG: 40 INJECTION SUBCUTANEOUS at 11:02

## 2018-10-09 RX ADMIN — Medication 0.5 MG: at 19:52

## 2018-10-09 RX ADMIN — OXYCODONE HYDROCHLORIDE 10 MG: 5 TABLET ORAL at 11:02

## 2018-10-09 RX ADMIN — Medication 0.3 MG: at 16:51

## 2018-10-09 RX ADMIN — SENNOSIDES AND DOCUSATE SODIUM 2 TABLET: 8.6; 5 TABLET ORAL at 19:52

## 2018-10-09 RX ADMIN — LORATADINE 10 MG: 10 TABLET ORAL at 07:59

## 2018-10-09 RX ADMIN — DIAZEPAM 5 MG: 5 TABLET ORAL at 11:52

## 2018-10-09 RX ADMIN — ONDANSETRON 4 MG: 2 INJECTION INTRAMUSCULAR; INTRAVENOUS at 14:11

## 2018-10-09 RX ADMIN — Medication 1 CAPSULE: at 07:59

## 2018-10-09 RX ADMIN — Medication 0.3 MG: at 15:17

## 2018-10-09 RX ADMIN — Medication 0.5 MG: at 08:55

## 2018-10-09 RX ADMIN — Medication 0.2 MG: at 00:14

## 2018-10-09 RX ADMIN — ANASTROZOLE 1 MG: 1 TABLET ORAL at 08:00

## 2018-10-09 RX ADMIN — Medication 0.5 MG: at 04:08

## 2018-10-09 RX ADMIN — METHOCARBAMOL 500 MG: 500 TABLET ORAL at 19:52

## 2018-10-09 RX ADMIN — SENNOSIDES AND DOCUSATE SODIUM 1 TABLET: 8.6; 5 TABLET ORAL at 11:02

## 2018-10-09 RX ADMIN — OXYCODONE HYDROCHLORIDE 10 MG: 5 TABLET ORAL at 07:58

## 2018-10-09 RX ADMIN — Medication 0.5 MG: at 23:18

## 2018-10-09 RX ADMIN — Medication 0.3 MG: at 11:52

## 2018-10-09 RX ADMIN — ACETAMINOPHEN 975 MG: 325 TABLET, FILM COATED ORAL at 19:52

## 2018-10-09 ASSESSMENT — PAIN DESCRIPTION - DESCRIPTORS
DESCRIPTORS: ACHING
DESCRIPTORS: ACHING
DESCRIPTORS: CONSTANT
DESCRIPTORS: ACHING;DISCOMFORT;SHARP
DESCRIPTORS: CONSTANT;SHARP
DESCRIPTORS: ACHING
DESCRIPTORS: ACHING

## 2018-10-09 ASSESSMENT — ACTIVITIES OF DAILY LIVING (ADL)
ADLS_ACUITY_SCORE: 11
ADLS_ACUITY_SCORE: 9

## 2018-10-09 NOTE — PROGRESS NOTES
"No acute events, flaps doing well    BP 97/59  Pulse 71  Temp 98  F (36.7  C)  Resp 25  Ht 1.651 m (5' 5\")  Wt 79.3 kg (174 lb 13.2 oz)  LMP 10/14/2001  SpO2 98%  BMI 29.09 kg/m2  NAD  RRR  NLB   Abdomen incision cdi, ALESSANDRA serosanguinous  Bilateral breast flaps skin paddle warm,cap refill < 2 s, audible doppler, good turgor    A/P: 59 yo hx R breast ca s/p bilateral mastectomy now with bilateral free KISHOR (deep inferior epigastric artery ) flaps from abdomen to chest  --Continue q1hr flap checks until 8 pm, then switch to q2h flap checks at which point patient may transfer to 7C or 7B  --Patient may ambulate today, try to keep hips slightly flexed  --May remove aguilar once ambulating  --Continue to strip and record drains qshift  --CLD ADAT; may discontinue IVF once tolerating PO  --No caffeine or smoking, room 75 degrees and door closed    Romaine Concepcion PGY-4  Plastic Surgery  Please page on call      "

## 2018-10-09 NOTE — PLAN OF CARE
Problem: Patient Care Overview  Goal: Plan of Care/Patient Progress Review  Outcome: Improving  Neuro: Alert and Oriented x4, able to make needs known  CV: tmax 99.5 HR 80-90s, NSR, SBP   Resp: LS clear, RR 15-25, 2l NC  GI: Nausea x1, tolerating diet,  : Rodríguez removed at 1600 no void gisela  Skin: Abdomen incision derma bonded, cdi, BLE breast flap with dopplerable pulses, skin pale, warm  Drips: D5NS @ 125 ml/hr    Plan: q2h flap checks at 8pm, and possibly to floor tonight or tomorrow. Cont with poc

## 2018-10-09 NOTE — ANESTHESIA POSTPROCEDURE EVALUATION
Patient: Enid Lombardo    Procedure(s):  Bilateral Deep Inferior Epigastric Artery  Free Flap Breast Reconstruction and Removal of Breast Explanders - Wound Class: I-Clean   - Wound Class: I-Clean    Diagnosis:Post Bilateral Breast Reconstruction   Diagnosis Additional Information: No value filed.    Anesthesia Type:  General, Peripheral Nerve Block    Note:  Anesthesia Post Evaluation    Patient location during evaluation: PACU  Patient participation: Able to fully participate in evaluation  Level of consciousness: awake and alert  Pain management: adequate  Airway patency: patent  Cardiovascular status: acceptable  Respiratory status: acceptable  Hydration status: acceptable  PONV: none     Anesthetic complications: None          Last vitals:  Vitals:    10/08/18 1845 10/08/18 1900 10/08/18 1915   BP: 117/68 108/62 112/65   Pulse:      Resp: 16 16 16   Temp: 37.4  C (99.3  F) 37.5  C (99.5  F) 37.5  C (99.5  F)   SpO2: 97% 97% 96%         Electronically Signed By: Parrish Martin MD  October 8, 2018  7:16 PM

## 2018-10-09 NOTE — PROGRESS NOTES
10/09/18 1600   Quick Adds   Type of Visit Initial PT Evaluation   Living Environment   Lives With spouse   Living Arrangements house   Home Accessibility stairs to enter home;stairs within home;bath not on first floor   Number of Stairs to Enter Home 3   Number of Stairs Within Home 24   Stair Railings at Home inside, present on right side   Transportation Available car;family or friend will provide   Living Environment Comment Pt lives with supportive spouse and will also have support family present as well. Has a hx of mastectomy 6 weeks ago and aware of dressing techniques. No OT indicated at this time.   Self-Care   Usual Activity Tolerance good   Current Activity Tolerance fair   Regular Exercise yes   Activity/Exercise Type walking   Equipment Currently Used at Home none   Activity/Exercise/Self-Care Comment Retired . Enjoys family time, traveling and her dogs.   Functional Level Prior   Ambulation 0-->independent   Transferring 0-->independent   Fall history within last six months no   Which of the above functional risks had a recent onset or change? ambulation;transferring   Prior Functional Level Comment Pt indep with mobility prior to admission.   General Information   Onset of Illness/Injury or Date of Surgery - Date 10/08/18   Patient/Family Goals Statement Decrease pain, get OOB   Pertinent History of Current Problem (include personal factors and/or comorbidities that impact the POC) 58 year old female with a past medical history of recurrent carcinoma of the right breast s/p lumpectomy and radiation in 2005, s/p b/l mastectomy August 2018 who presents for abdominal free flap breast reconstruction on 10/9/18.    Precautions/Limitations abdominal precautions   General Observations Pt supine in bed; agreeable to session.   Cognitive Status Examination   Orientation orientation to person, place and time   Level of Consciousness alert   Follows Commands and Answers Questions 100% of the  "time   Personal Safety and Judgment intact   Memory intact   Pain Assessment   Patient Currently in Pain Yes, see Vital Sign flowsheet   Integumentary/Edema   Integumentary/Edema no deficits were identifed   Posture    Posture Forward head position;Protracted shoulders;Kyphosis   Range of Motion (ROM)   ROM Comment B UE/LE AROM grossly WFL. Mild limitation in AROM R UE that is new since surgery. Perhaps from malpositioning of UE during surgery   Strength   Strength Comments B LE strength at least 3+/5 as per functional mobility. Mild deconditioning noted.   Bed Mobility   Bed Mobility Comments Supine>sit: mod A via log roll technique   Transfer Skills   Transfer Comments Sit>stand: CGA up to FWW   Gait   Gait Comments Not formally assessed this date.   Balance   Balance Comments Indep sittin balance; CGA for standing static/dynamic balance   Sensory Examination   Sensory Perception no deficits were identified   General Therapy Interventions   Planned Therapy Interventions balance training;bed mobility training;gait training;neuromuscular re-education;strengthening;stretching;transfer training;home program guidelines;progressive activity/exercise   Clinical Impression   Criteria for Skilled Therapeutic Intervention yes, treatment indicated   PT Diagnosis Impaired functional mobility   Influenced by the following impairments Pain, deconditioning   Functional limitations due to impairments Bed mobility, transfers, gait, balance, endurance   Clinical Presentation Stable/Uncomplicated   Clinical Presentation Rationale Clinical judgement   Clinical Decision Making (Complexity) Low complexity   Therapy Frequency` 5 times/week   Predicted Duration of Therapy Intervention (days/wks) 1 week   Anticipated Discharge Disposition Home with Assist   Risk & Benefits of therapy have been explained Yes   Patient, Family & other staff in agreement with plan of care Yes   [facility] AM-PAC  \"6 Clicks\" V.2 Basic Mobility " Inpatient Short Form   1. Turning from your back to your side while in a flat bed without using bedrails? 3 - A Little   2. Moving from lying on your back to sitting on the side of a flat bed without using bedrails? 2 - A Lot   3. Moving to and from a bed to a chair (including a wheelchair)? 3 - A Little   4. Standing up from a chair using your arms (e.g., wheelchair, or bedside chair)? 3 - A Little   5. To walk in hospital room? 3 - A Little   6. Climbing 3-5 steps with a railing? 3 - A Little   Basic Mobility Raw Score (Score out of 24.Lower scores equate to lower levels of function) 17   Total Evaluation Time   Total Evaluation Time (Minutes) 12

## 2018-10-09 NOTE — PROGRESS NOTES
"[facility]  SUBJECTIVE  Interval history: Patient reports abdominal pain overnight, feels like better pain control today \"once she got on top of it\" with current analgesics (see below).  Currently reports minimal chest wall pain, still reluctant to move due to abdominal pain rating 3/10 at rest and 6/10 with activity.  Tolerating clear liquid diet,  denies nausea.  Denies any weakness, paresthesias, circumoral numbness, metallic taste or tinnitus.      Clinically Aligned Pain Assessment (CAPA):  Comfort (How is your pain?): Tolerable with discomfort  Change in Pain (Since your last medication/intervention?): Getting better  Pain Control (How are your pain treatments working?):  Partially effective pain control    Medications related to Pain Management (Future)    Start     Dose/Rate Route Frequency Ordered Stop    10/09/18 2000  senna-docusate (SENOKOT-S;PERICOLACE) 8.6-50 MG per tablet 2 tablet      2 tablet Oral 2 TIMES DAILY 10/09/18 1409      10/09/18 1349  polyethylene glycol (MIRALAX/GLYCOLAX) Packet 17 g      17 g Oral DAILY PRN 10/09/18 1409      10/09/18 0830  aspirin chewable tablet 81 mg      81 mg Oral DAILY 10/09/18 0815      10/09/18 0815  oxyCODONE IR (ROXICODONE) tablet 5-10 mg      5-10 mg Oral EVERY 3 HOURS PRN 10/09/18 0805      10/09/18 0812  diazepam (VALIUM) tablet 5 mg      5 mg Oral EVERY 6 HOURS PRN 10/09/18 0815      10/09/18 0805  methocarbamol (ROBAXIN) tablet 500 mg      500 mg Oral EVERY 6 HOURS PRN 10/09/18 0805      10/09/18 0026  HYDROmorphone (PF) (DILAUDID) injection 0.3-0.5 mg      0.3-0.5 mg Intravenous EVERY 1 HOUR PRN 10/09/18 0026      10/08/18 2012  acetaminophen (TYLENOL) tablet 975 mg      975 mg Oral EVERY 8 HOURS 10/08/18 2012      10/08/18 2012  senna-docusate (SENOKOT-S;PERICOLACE) 8.6-50 MG per tablet 1 tablet      1 tablet Oral 2 TIMES DAILY PRN 10/08/18 2012      10/08/18 2012  senna-docusate (SENOKOT-S;PERICOLACE) 8.6-50 MG per tablet 2 tablet  " "    2 tablet Oral 2 TIMES DAILY PRN 10/08/18 2012      10/08/18 1756  bupivacaine liposome (EXPAREL) LONG ACTING injection was administered into the infiltration site to produce postsurgical analgesia. Duration of action is up to 72 hours, and other \"zeinab\" medications should not be given for 96 hours with the exception of the lidocaine 5% patch (LIDODERM) and the lidocaine 10mg in potassium infusions. This entry is for INFORMATION ONLY.       Does not apply CONTINUOUS PRN 10/08/18 1756 10/12/18 1755          OBJECTIVE:    /62  Pulse 71  Temp 99  F (37.2  C)  Resp 20  Ht 1.651 m (5' 5\")  Wt 79.3 kg (174 lb 13.2 oz)  LMP 10/14/2001  SpO2 98%  BMI 29.09 kg/m2  Exam:  General: alert and mild distress  Neuro: Strength 5/5 BLE    ASSESSMENT/PLAN:    Enid Lombardo is a 58 year old female history of recurrent right breast carcinoma, s/p bilateral mastectomy August 2018, now POD #1 s/p  GRAFT FREE VASCULARIZED TRANSVERSE RECTUS ABDOMINIS MYOCUTANEOUS\"  GRAFT FREE VASCULARIZED (LOCATION) with single shot injection bilateral rectus sheath and transversus abdominis plane (TAP) nerve block.  Total bupivacaine 0.25% with epinephrine 1:200,000 30 mL and liposomal (long-acting) bupivacaine (Exparel) 1.3% 20 mL administered 10/8/18 for postop pain control.  No weakness or paresthesias.  No evidence of adverse side effects associated with nerve block injections.  Acheiving moderate pain control, with nerve block, oral and IV analgesics.  Anticipate 48-72 hours of pain control with long-acting local anesthetic. Additionally, patient will continue to require multimodal analgesia for visceral/muscle pain not controlled with long-acting local anesthetic.      - NO other local anesthetic use within 96 hours of liposomal bupivacaine (Exparel), unless approved by [facility]  - patient received verbal and written instructions about liposomal bupivacaine and counseling about pharmacologic and nonpharmacologic measures for acute " postoperative pain management  - please call [facility] if questions or concerns    HERMILO [facility]  [facility] ([facility])  10/9/2018 2:12 PM    [facility] Contact Info (for in-house use only):  Job code ID: [id number]   [place] [employer] [address]99  [facility] 0602  [employer] phone: dial [phone], enter jobcode ID, then enter call-back number.    Text: Use [employer] on the Intranet <Paging/Directory> tab and enter Jobcode ID.   If no call back at any time, contact the hospital  and ask for [facility] attending or backup

## 2018-10-09 NOTE — PROGRESS NOTES
SURGICAL ICU PROGRESS NOTE  October 9, 2018          Date of Service (when I saw the patient): 10/09/2018    ASSESSMENT: 58 year old female with a past medical history of recurrent carcinoma of the right breast s/p lumpectomy and radiation in 2005, s/p b/l mastectomy August 2018 who presents for abdominal free flap breast reconstruction on 10/9/18.     MAJOR THINGS TODAY:  - flap checks q 1 hours until 8pm then q 2 hours   - CLD  - OOB/activity per plastic team   - increase oxy dosing for pain control, add muscle relaxer.      PLAN:   Neuro/ pain/ sedation:  # acute post surgical pain   # depression   - Monitor neurological status. Notify the MD for any acute changes in exam.  - Scheduled Tylenol, make Oxycodone q3 hours and IV dilaudid prn.  - Home sertraline     Pulmonary care:   # Hx of allergic rhinitis   - resume home Claritin   - Supplemental oxygen to keep saturation above 92 %.      Cardiovascular:    # Hypertension  - Monitor hemodynamic status.   - anti-hypertensives prn  - hold Omega 3 fatty acids      GI care:   # GERD   - resume PPI   - add scheduled bowel medications   - CLD per plastic        Fluids/ Electrolytes/ Nutrition:   - D5 NS at 125 for IV fluid hydration--SL when taking PO well.      Renal/ Fluid Balance:    - Will monitor intake and output.  - Rodríguez in place      Endocrine:    # h/o breast cancer, s/p bilateral mastectomy 8/18, now s/p bilateral free flap from abdomen to chest (10/8/18)  - PTA anastrozole resumed   - OOB and repositioning per plastic   - Drain and dressing changes per plastics       ID/ Antibiotics:  - No indication for antibiotics     Heme:     # acute blood loss anemia due to OR   - Hgb 10.2, preop 13. Hold off transfusion.   - No acute issues      MSK:  # deconditioning of critical illness     - PT consulted. Appreciate recs.      Lines/ tubes/ drains:  - ALESSANDRA drain x3      Disposition:  - Surgical ICU for flap checks     General Cares/Prophylaxis:    DVT Prophylaxis:  Enoxaparin (Lovenox) SQ  GI Prophylaxis: PPI  Restraints: Restraints for medical healing needed: NO      Time spent on this Encounter   Billing:  I spent 35 minutes bedside and on the inpatient unit today managing the critical care of Enid Lombardo in relation to the issues listed in this note.      Artie Fraser PA-C   ====================================    SUBJECTIVE:  Course reviewed. No acute events. Reporting pain in lower abdomen, reporting abdominal cramps. Reports she fell behind on pain control. Some nausea this am, relieved with Zofran per patient. Passing flatus this am.     OBJECTIVE:   1. VITAL SIGNS:   Temp:  [98  F (36.7  C)-99.5  F (37.5  C)] 98  F (36.7  C)  Heart Rate:  [] 80  Resp:  [12-85] 18  BP: ()/(47-74) 89/58  SpO2:  [94 %-99 %] 98 %  Resp: 18    2. INTAKE/ OUTPUT:   I/O last 3 completed shifts:  In: 4936.25 [P.O.:200; I.V.:4236.25]  Out: 1881 [Urine:1610; Drains:121; Blood:150]    3. PHYSICAL EXAMINATION:   General: laying in bed, awake, alert and interactive   HEENT: Pupils 4mm. OP clear, tongue midline.   Neuro: A&Ox3, interactive   Pulm/Resp: Clear breath sounds bilaterally without rhonchi, crackles or wheeze, breathing non-labored.  CV: RRR, S1, S2.   CHEST: mastectomy sites without pain or swelling, no drainage noted.  Abdomen:  Abdominal binder in place. Lower abdominal incision well approximated, no redness/drainage on abd pad. TTP along site. ALESSANDRA drains with serosanguinous drainage.   : (+) aguilar catheter in place, urine yellow and clear  Extremities: no peripheral edema, moving all extremities, peripheral pulses intact, calves soft and compressible, extremities well perfused, brisk cap refill 2+.     4. INVESTIGATIONS:   Arterial Blood Gases   No lab results found in last 7 days.  Complete Blood Count     Recent Labs  Lab 10/09/18  0444 10/05/18  1038   WBC 8.1 5.7   HGB 10.2* 13.7    257     Basic Metabolic Panel    Recent Labs  Lab 10/09/18  7882  10/03/18  1457    141   POTASSIUM 3.8 4.1   CHLORIDE 108 105   CO2 26 29   BUN 8 8   CR 0.71 0.70   * 92     Liver Function Tests    Recent Labs  Lab 10/09/18  1142   INR 1.15*     Pancreatic Enzymes  No lab results found in last 7 days.  Coagulation Profile    Recent Labs  Lab 10/09/18  1142   INR 1.15*         5. RADIOLOGY:   No results found for this or any previous visit (from the past 24 hour(s)).    =========================================

## 2018-10-09 NOTE — PLAN OF CARE
Problem: Patient Care Overview  Goal: Plan of Care/Patient Progress Review  Discharge Planner PT   Patient plan for discharge: Home with family assist  Current status: Assisted OOB and to chair via multiple sit<>stands. Requires mod A for supine>sit but otherwise doesn't require physical assist. VSS with activity and mild lightheadedness (soft BP up in chair with mild nausea).  Barriers to return to prior living situation: Mild deconditioning/fall risk  Recommendations for discharge: Pt likely will be able to progress to discharge home with family assist.  Rationale for recommendations: Current level of function       Entered by: Sarabjit Velazquez 10/09/2018 4:19 PM

## 2018-10-09 NOTE — OP NOTE
Procedure Date: 10/08/2018      DATE OF PROCEDURE:  10/08/2018        PREOPERATIVE DIAGNOSIS:  Status post right breast cancer requiring bilateral nipple non-sparing mastectomy and immediate expander-based reconstruction, now ready for stage II reconstruction.      POSTOPERATIVE DIAGNOSIS:  Status post right breast cancer requiring bilateral nipple non-sparing mastectomy and immediate expander-based reconstruction, now ready for stage II reconstruction.      PROCEDURES:   1.  Left-sided breast reconstruction with a right abdominal KISOHR (deep inferior epigastric  based free flap) using a microscope anastomosed the deep inferior epigastric artery to the internal mammary artery, end-to-end fashion with 8-0 nylon suture and anastomosed the deep inferior epigastric vein with the internal mammary vein using a 2.5 mm venous , left breast weight was 452 g, and ischemia time for the left side was 50 minutes.   2.  Right breast reconstruction using a left abdominal KISHOR (deep inferior epigastric  free flap) using microscope deep inferior epigastric artery anastomosed to internal mammary artery using 8-0 nylon suture in interrupted fashion, deep inferior epigastric vein anastomosed to internal mammary vein using venous  3.5 mm, ischemia time 54  minutes.   3.  Reconstruction of the right rectus abdominis muscle using Vicryl mesh, primary repair.   4.  Removal of bilateral breast expanders.      CO-SURGEONS:  Nedra Amin MD; Alvino Nicholson MD (Dr. Amin and Dr. Nicholson were co-surgeons in this case with Dr. Amin doing the left breast reconstruction and Dr. Nicholson doing the right breast reconstruction, each helping each other in the alternate case, both present throughout the case.  Indication for co-surgeons was complexity of the case, length of the case, and inadequate appropriate resident level of help.        RESIDENT:  Romaine Concepcion MD      ANESTHESIA:  General anesthesia and intubation.       COMPLICATIONS:  Nil.      DRAINS:  A 15-Uzbek Mg drain in each breast reconstructed site and one in the abdominal wall site.       BLOOD LOSS:  150 mL.      SPECIMENS:  Right breast scar.      DESCRIPTION OF PROCEDURE:  After informed consent was taken from the patient, the proper site and procedure was ascertained with her and she was appropriately marked in the operating room.  She was placed in a supine position with her knees comfortably flexed, pillows underneath them, and was placed on the right prior to induction of anesthesia.  Preoperative antibiotics were given in the OR.  Preoperative Lovenox 40 mg subcutaneous was given in the holding room.  The night before the patient took aspirin.  Strict warming of the patient was carried out throughout the case.  She was prepped and draped in standard surgical fashion.  I began by first making the original incisions through her old mastectomy scars and dissected down into each of the pockets superiorly and medially.  Capsulotomies were carried out after removal of the expanders (Dr. Amin removed left and Dr. Nicholson right side).  At this point, starting on the left and going to the right the third rib cartilages were exposed appropriately.  The perichondrium was removed from each of the ribs and the rib cartilages in these interspaces were removed carefully using rongeurs and appropriate upper periosteal dissectors.  Careful dissection of the perichondrium was then carried out to reveal the underlying internal mammary vessels and these were appropriately cleared of all adventitia and scar tissue in preparation for anastomosis.  Once we were assured that the vessels were appropriate size as well as character, we then turned our attention to the abdomen.  We dopplered out the main  on each side which were periumbilical based on the CTA.  Our plan was to do a KISHOR flaps based on these perforators.  The superior incision was then made.  Dissection carried  out using electrocautery down towards the chest wall.  The superior flap was elevated up to the costal margin.  The patient was temporarily flexed to make sure that she could close primarily based on the amount of tissue that was being excised and we could.  We then flattened her again.  The inferior incision was made and then the deep inferior epigastric veins were small on each side.  They were sacrificed.  The flaps on each side were then elevated from lateral to medial.  Attention was first placed to the right side.  A midline incision was made.  The umbilicus was dissected out.  The flap was then elevated on the  that was dopplered out.  There was another  in line with the main  on the medial aspect and this was also included in the flap and it had a palpable pulse.  Therefore decision was made to use that, to take that  along with the main .  The main  was more extrarectus.  The second minor  was intramuscular.  Under loupe magnification, careful dissection was carried out of the perforators.  Part of the muscles medially had to be taken down to dissect out the second minor  which joined the major  vessel in its pathway.  Once these were dissected out, we then turned attention inferiorly and laterally.  The fascia was opened all the way towards the deep inferior epigastric vessels.  These vessels were identified and dissected towards the common femoral vessels.  Once we had good length, we then circumferentially cleared the vessels in preparation for harvest.  We then followed our vessels from distal towards the perforators in an extrarectus posterior fashion, taking down any branches that were going into the muscle such that the entire vessel was now free with the perforators going into the flap.  At this point, we turned our attention to the left flap.  Similar dissection was carried out, but on this side we just took  the main  which was extrarectus and did not take any minor  with it.  Both flaps were now ready for harvesting.  At this point, we then brought the microscope into the field.  We started on the left side.  The right-sided KISHOR flap was then harvested by double clipping the pedicle in the groin.  We then flushed out the flap with heparinized saline.  We then placed it in appropriate position and then under the microscope prepared both sets of vessels.  We double clipped the internal mammary vessels and marked them with a marker, and then cut them and then flushed them out with heparinized saline.  The deep inferior epigastric vessels were then also appropriately positioned with the marker giving us a guide and then prepared the artery and the vein that we were going to use.  The vein on this side had coalesced into 1 vein and therefore there was only 1 vein that needed to be anastomosed and no other one needed to be clipped.  Under the microscope we then carried out the end-to-end anastomosis of the artery with an 8-0 nylon suture in interrupted fashion and venous  of 2.5 mm was used for the vein.  We then let the temporary Ackland clamp down on both the vein and the artery, and there was good flow through the flap.  The flap was then inset appropriately, had good color, good Doppler signal, and then we inset the flap with staples by removing some of the mastectomy skin and making an areolar opening for the flap itself, and then temporarily stapled it shut.  We turned our attention to the right flap inset, carried out the same procedure on the right side with a similar end result of good flow, and stapled the breast closed and made an areolar opening as on the left side.  This too had a good Doppler signal within the skin island.  We then inset the flap by first de-epithializing the flap and then placing a 15-Occitan Mg drain, and then sewing the flap to the chest wall in appropriate  positions to keep the flap medialized and then closed the incisions with 2-0 Monocryl sutures in deep layer followed by 3-0 Stratafix suture in a running manner.  We turned our attention to the abdominal wall. The muscle on the right was reinforced with pledgets of Vicryl mesh on the ends of the cut portions of the muscle and then primarily sewn together with absorbable sutures. The fascia was primarily closed on each side with 0 PDS suture in a running manner.  A drain was placed and then the patient was flexed and the abdominal wall was closed using 0 PDS suture in the SFS layer followed by 2-0 Monocryl suture in a deep dermal layer followed by 3-0 Stratafix suture.  Appropriate umbilical opening was made.  The umbilicus was retrieved and closed using 2-0 Monocryl suture in a deep dermal layer followed by 4-0 chromic suture in a running manner.  Prineo was placed over all the incisions.  The patient's flaps remained healthy throughout the closure.  An abdominal binder was placed.  The chest was not wrapped.  The patient tolerated the procedure well.  All counts were correct at the end of the case.  The patient was stable throughout the case, warm throughout the case and was sent to recovery room in stable condition.         DENNISE HAIDER MD             D: 10/09/2018   T: 10/09/2018   MT: MARQUES      Name:     MICHEL LOMAX   MRN:      [id number]        Account:        [id number]   :      1960           Procedure Date: 10/08/2018      Document: [id number]

## 2018-10-09 NOTE — PLAN OF CARE
Problem: Patient Care Overview  Goal: Plan of Care/Patient Progress Review  Outcome: Therapy, progress toward functional goals as expected  Neuro: AxOx4, uses call light appropriately. C/o RUE pain from position in OR, hot packs applied. C/o pain to abdominal incision getting scheduled tylenol, PRN oxycodone and dilaudid.   CV: HR 80-100s, NSR. Afebrile. SBP .    Pulm: LS clear, on 2L NC. Capnography in use.   GI: NPO except sips with meds and ice chips. PRN zofran given x1 for nausea with good relief.   : Rodríguez in place, good UOP, see I/Os.   Skin: ALESSANDRA x3, minimal output. Bilateral breast flaps with Q1 hr checks. Skin pale pink, soft, warm, + pulses, no issues overnight.   Access: PIV x1 Lt hand.   Drips: D5NS @125 cc/hr.     Plan: Continue Q1hr flap checks. Possible transfer after 24 hrs post-op. Advance diet as tolerated. Increase activity level up to chair. Notify Plastics/SICU with any concerns.

## 2018-10-09 NOTE — PROGRESS NOTES
Admitted/transferred from: Admitted on 10/8/18 for removal of bilateral breast tissue expanders, and bilateral breast reconstruction with free deep inferior epigastric  flaps (KISHOR flaps)    Reason for admission/transfer: Transferred from PACU @ 2015 to  SICU for Q1hr flap monitoring.   Patient status upon admission/transfer: Pt AxOx4, uses call light appropriately.C/o Abdominal pain at incision site.  Interventions: PRN and scheduled pain medications given for pain.   Plan: Q1hr flap checks for bilateral breast sites, strict bedrest, NPO except ice chips/meds. Pain management.   2 RN skin assessment: completed by Saniya RN & Denisse RN   Result of skin assessment and interventions/actions: Surgical incisions remain CDI. Rt and Lt breast flap checks completed with PACU RN, both flaps are pale pink, warm, soft, and have + doppler. See flowsheet for detailed skin assessment.  Height, weight, drug calc weight: done.  Patient belongings: Sent up from PACU, all belongings in room with patient.

## 2018-10-10 ENCOUNTER — APPOINTMENT (OUTPATIENT)
Dept: PHYSICAL THERAPY | Facility: CLINIC | Age: 58
DRG: 582 | End: 2018-10-10
Attending: PLASTIC SURGERY
Payer: COMMERCIAL

## 2018-10-10 LAB
ANION GAP SERPL CALCULATED.3IONS-SCNC: 6 MMOL/L (ref 3–14)
BUN SERPL-MCNC: 5 MG/DL (ref 7–30)
CALCIUM SERPL-MCNC: 7.7 MG/DL (ref 8.5–10.1)
CHLORIDE SERPL-SCNC: 108 MMOL/L (ref 94–109)
CO2 SERPL-SCNC: 26 MMOL/L (ref 20–32)
CREAT SERPL-MCNC: 0.64 MG/DL (ref 0.52–1.04)
ERYTHROCYTE [DISTWIDTH] IN BLOOD BY AUTOMATED COUNT: 13.6 % (ref 10–15)
GFR SERPL CREATININE-BSD FRML MDRD: >90 ML/MIN/1.7M2
GLUCOSE SERPL-MCNC: 112 MG/DL (ref 70–99)
HCT VFR BLD AUTO: 31.2 % (ref 35–47)
HGB BLD-MCNC: 9.4 G/DL (ref 11.7–15.7)
MAGNESIUM SERPL-MCNC: 1.9 MG/DL (ref 1.6–2.3)
MCH RBC QN AUTO: 30.4 PG (ref 26.5–33)
MCHC RBC AUTO-ENTMCNC: 30.1 G/DL (ref 31.5–36.5)
MCV RBC AUTO: 101 FL (ref 78–100)
PHOSPHATE SERPL-MCNC: 2.6 MG/DL (ref 2.5–4.5)
PLATELET # BLD AUTO: 160 10E9/L (ref 150–450)
POTASSIUM SERPL-SCNC: 3.6 MMOL/L (ref 3.4–5.3)
RBC # BLD AUTO: 3.09 10E12/L (ref 3.8–5.2)
SODIUM SERPL-SCNC: 139 MMOL/L (ref 133–144)
WBC # BLD AUTO: 7.8 10E9/L (ref 4–11)

## 2018-10-10 PROCEDURE — 25000132 ZZH RX MED GY IP 250 OP 250 PS 637: Performed by: STUDENT IN AN ORGANIZED HEALTH CARE EDUCATION/TRAINING PROGRAM

## 2018-10-10 PROCEDURE — 25000128 H RX IP 250 OP 636: Performed by: PLASTIC SURGERY

## 2018-10-10 PROCEDURE — 99233 SBSQ HOSP IP/OBS HIGH 50: CPT | Performed by: PHYSICIAN ASSISTANT

## 2018-10-10 PROCEDURE — 97116 GAIT TRAINING THERAPY: CPT | Mod: GP | Performed by: PHYSICAL THERAPIST

## 2018-10-10 PROCEDURE — 85027 COMPLETE CBC AUTOMATED: CPT | Performed by: PLASTIC SURGERY

## 2018-10-10 PROCEDURE — 97530 THERAPEUTIC ACTIVITIES: CPT | Mod: GP | Performed by: PHYSICAL THERAPIST

## 2018-10-10 PROCEDURE — 36415 COLL VENOUS BLD VENIPUNCTURE: CPT | Performed by: PLASTIC SURGERY

## 2018-10-10 PROCEDURE — 80048 BASIC METABOLIC PNL TOTAL CA: CPT | Performed by: PLASTIC SURGERY

## 2018-10-10 PROCEDURE — 25000128 H RX IP 250 OP 636: Performed by: STUDENT IN AN ORGANIZED HEALTH CARE EDUCATION/TRAINING PROGRAM

## 2018-10-10 PROCEDURE — 25000132 ZZH RX MED GY IP 250 OP 250 PS 637: Performed by: PLASTIC SURGERY

## 2018-10-10 PROCEDURE — 83735 ASSAY OF MAGNESIUM: CPT | Performed by: PLASTIC SURGERY

## 2018-10-10 PROCEDURE — 12000001 ZZH R&B MED SURG/OB UMMC

## 2018-10-10 PROCEDURE — 25000132 ZZH RX MED GY IP 250 OP 250 PS 637: Performed by: PHYSICIAN ASSISTANT

## 2018-10-10 PROCEDURE — 84100 ASSAY OF PHOSPHORUS: CPT | Performed by: PLASTIC SURGERY

## 2018-10-10 PROCEDURE — 40000193 ZZH STATISTIC PT WARD VISIT: Performed by: PHYSICAL THERAPIST

## 2018-10-10 RX ORDER — OXYCODONE HYDROCHLORIDE 5 MG/1
5-10 TABLET ORAL EVERY 4 HOURS PRN
Status: DISCONTINUED | OUTPATIENT
Start: 2018-10-10 | End: 2018-10-11 | Stop reason: HOSPADM

## 2018-10-10 RX ORDER — POLYETHYLENE GLYCOL 3350 17 G/17G
17 POWDER, FOR SOLUTION ORAL DAILY
Status: DISCONTINUED | OUTPATIENT
Start: 2018-10-10 | End: 2018-10-11 | Stop reason: HOSPADM

## 2018-10-10 RX ORDER — HYDROMORPHONE HYDROCHLORIDE 1 MG/ML
0.3 INJECTION, SOLUTION INTRAMUSCULAR; INTRAVENOUS; SUBCUTANEOUS
Status: DISCONTINUED | OUTPATIENT
Start: 2018-10-10 | End: 2018-10-11 | Stop reason: HOSPADM

## 2018-10-10 RX ADMIN — DEXTROSE AND SODIUM CHLORIDE: 5; 900 INJECTION, SOLUTION INTRAVENOUS at 05:15

## 2018-10-10 RX ADMIN — Medication 0.5 MG: at 01:43

## 2018-10-10 RX ADMIN — Medication 0.3 MG: at 18:07

## 2018-10-10 RX ADMIN — Medication 0.5 MG: at 05:15

## 2018-10-10 RX ADMIN — Medication 0.5 MG: at 06:27

## 2018-10-10 RX ADMIN — DIAZEPAM 5 MG: 5 TABLET ORAL at 11:10

## 2018-10-10 RX ADMIN — OXYCODONE HYDROCHLORIDE 10 MG: 5 TABLET ORAL at 12:36

## 2018-10-10 RX ADMIN — OXYCODONE HYDROCHLORIDE 10 MG: 5 TABLET ORAL at 04:04

## 2018-10-10 RX ADMIN — ACETAMINOPHEN 975 MG: 325 TABLET, FILM COATED ORAL at 19:49

## 2018-10-10 RX ADMIN — ANASTROZOLE 1 MG: 1 TABLET ORAL at 08:20

## 2018-10-10 RX ADMIN — ACETAMINOPHEN 975 MG: 325 TABLET, FILM COATED ORAL at 12:36

## 2018-10-10 RX ADMIN — OXYCODONE HYDROCHLORIDE 10 MG: 5 TABLET ORAL at 21:29

## 2018-10-10 RX ADMIN — LORATADINE 10 MG: 10 TABLET ORAL at 08:21

## 2018-10-10 RX ADMIN — METHOCARBAMOL 500 MG: 500 TABLET ORAL at 20:14

## 2018-10-10 RX ADMIN — SERTRALINE 25 MG: 25 TABLET, FILM COATED ORAL at 08:20

## 2018-10-10 RX ADMIN — ASPIRIN 81 MG CHEWABLE TABLET 81 MG: 81 TABLET CHEWABLE at 08:20

## 2018-10-10 RX ADMIN — FLUTICASONE PROPIONATE 1 SPRAY: 50 SPRAY, METERED NASAL at 08:20

## 2018-10-10 RX ADMIN — Medication 0.3 MG: at 10:36

## 2018-10-10 RX ADMIN — ENOXAPARIN SODIUM 40 MG: 40 INJECTION SUBCUTANEOUS at 08:20

## 2018-10-10 RX ADMIN — OXYCODONE HYDROCHLORIDE 10 MG: 5 TABLET ORAL at 08:20

## 2018-10-10 RX ADMIN — METHOCARBAMOL 500 MG: 500 TABLET ORAL at 06:27

## 2018-10-10 RX ADMIN — SENNOSIDES AND DOCUSATE SODIUM 2 TABLET: 8.6; 5 TABLET ORAL at 08:21

## 2018-10-10 RX ADMIN — ONDANSETRON 4 MG: 2 INJECTION INTRAMUSCULAR; INTRAVENOUS at 04:14

## 2018-10-10 RX ADMIN — ACETAMINOPHEN 975 MG: 325 TABLET, FILM COATED ORAL at 04:04

## 2018-10-10 RX ADMIN — OXYCODONE HYDROCHLORIDE 10 MG: 5 TABLET ORAL at 16:42

## 2018-10-10 ASSESSMENT — ACTIVITIES OF DAILY LIVING (ADL)
ADLS_ACUITY_SCORE: 11

## 2018-10-10 ASSESSMENT — PAIN DESCRIPTION - DESCRIPTORS
DESCRIPTORS: ACHING
DESCRIPTORS: HEADACHE
DESCRIPTORS: ACHING
DESCRIPTORS: ACHING

## 2018-10-10 NOTE — PLAN OF CARE
Problem: Patient Care Overview  Goal: Plan of Care/Patient Progress Review  Outcome: Improving  D/I/A: Pt on 4A Neuro/Surgical ICU POD #2 s/p breast reconstruction with abdominal free flap.  PMH includes: breast ca s/p lymphectomy and bilateral mastectomy.    Neuro: Intact.  Pain controlled with PRN oxycodone q4h, scheduled tylenol, and PRN dilaudid for breakthrough pain.  Up with SBA and walker.    CV: BP WNL  Resp: LS clear/dim.  1 LPM via NC.  Utilizing IS with encouragement.    GI: Multiple loose BMs today, tolerating regular diet.    : Voiding spontaneously in adequate amounts.    Skin: Flap checks q2h until 2200 tonight.  Soft, warm, doppler +.  Abdominal incision CDI with abdominal binder and ABDs in place.  x3 ALESSANDRA, serosanguinous OP.  x1 PIV to LUE.    Lytes: WNL  Plan: Transfer to floor once bed available.  Continue to monitor and notify MD with changes.        Pt complaining of abdominal pain after meals for both lunch and dinner today, relieved with PRN medications.  Plastics notified at 1730.

## 2018-10-10 NOTE — PROGRESS NOTES
"No acute events, flaps doing well, was straight cath'd for bladder scan 300, discussed with nursing to wait until > 500, pain improving, mainly in abdomen donor site, got up to chair yesterday    /54  Pulse 71  Temp 99  F (37.2  C)  Resp 14  Ht 1.651 m (5' 5\")  Wt 79.3 kg (174 lb 13.2 oz)  LMP 10/14/2001  SpO2 95%  BMI 29.09 kg/m2  NAD  RRR  NLB   Abdomen incision cdi, ALESSANDRA serosanguinous  Bilateral breast flaps skin paddle warm,cap refill < 2 s, audible doppler, good turgor, pale relative to surrounding skin, but has appeared this way 2/2 patient complexion/abdominal skin    A/P: 57 yo hx R breast ca s/p bilateral mastectomy now with bilateral free KISHOR (deep inferior epigastric artery ) flaps from abdomen to chest  --Continue q2hr flap checks until 8 pm, then switch to q4h flap checks  --Ambulate minimum 4 times a day slightly flexed at waist, heavily emphasized importance of ambulation to patient  --Please straight cath only if bladder scan > 500, will allow time for patient/ mild bladder distension--order placed  --Continue to strip and record drains qshift  --Regular; may discontinue IVF once tolerating PO  --No caffeine or smoking, room 75 degrees and door closed  --Daily ASA, lovenox  --Dispo: Transfer to , plan for discharge home pending convalescence likely next 2 days    Romaine Concepcion PGY-4  Plastic Surgery  Please page on call      "

## 2018-10-10 NOTE — PLAN OF CARE
Neuro: Intact.     CV: Hemodynamically stable.      Resp: Sounds clear on 2 LPM NC.      GI: Positive bowel sounds. Regular diet no BM.    : Urinary retention. Bladder scanned 298 at 0200. Straight cathed 325.       Plan: Q2 hour flap checks, pain management, hemodynamic monitoring and notifying the MD with any concerns.

## 2018-10-10 NOTE — PLAN OF CARE
Problem: Patient Care Overview  Goal: Plan of Care/Patient Progress Review  Discharge Planner PT   Patient plan for discharge: to home with assist  Current status: PT 4A: Pt progressing with respect to activity tolerance. Bed mobility most painful for pt of all tasks this visit, pt required MODA x 1 supine>sit. Encouraged logroll and techniques to minimize abdominal strain. Pt CGA sit<>stand and ambulated slowly with FWW and CGA up to 80 ft. Sats 94% on 1L via NC and HR low 100s during gait.   Barriers to return to prior living situation: medical needs; gait distances yet < household, stairs  Recommendations for discharge: Pt likely will be able to progress to discharge home with family assist.  Rationale for recommendations: current progress in therapy, prior IND level of function pre-op       Entered by: Zabrina Norwood 10/10/2018 10:16 AM

## 2018-10-10 NOTE — PROGRESS NOTES
SURGICAL ICU PROGRESS NOTE  October 10, 2018          Date of Service (when I saw the patient): 10/10/2018    ASSESSMENT: 58 year old female with a past medical history of recurrent carcinoma of the right breast s/p lumpectomy and radiation in 2005, s/p b/l mastectomy August 2018 who presents for abdominal free flap breast reconstruction on 10/9/18.      MAJOR THINGS TODAY:  - TKO IVF   - encourage OOB, ambulate halls  - Transfer to   Under plastics later today       PLAN:   Neuro/ pain/ sedation:  # acute post surgical pain   # depression   - Monitor neurological status. Notify the MD for any acute changes in exam.  - Scheduled Tylenol, Oxycodone 5-10 mg q3 hours and IV dilaudid prn.  - Home sertraline resumed.      Pulmonary care:   # Hx of allergic rhinitis   - resume home Claritin   - Supplemental oxygen to keep saturation above 92 %. IS and  Acapella. IS >1500cc/        Cardiovascular:    # Hypertension  - Monitor hemodynamic status. No home medications listed on PTA med list.   - anti-hypertensives prn for SBP > 160  - hold Omega 3 fatty acids for now.      GI:   # GERD   - Home PPI   - add scheduled bowel medications        Fluids/ Electrolytes/ Nutrition:   - Lytes stable.   - SL IVF  - encourage oral intake and hydration      Renal/ Fluid Balance:    - Will monitor intake and output.  - Rodríguez in place, will remove today        Endocrine:    # h/o breast cancer, s/p bilateral mastectomy 8/18, now s/p bilateral free flap from abdomen to chest (10/8/18)  - PTA anastrozole resumed   - OOB and repositioning per plastics   - Drain and dressing changes per plastics   - flap check q2 hrs.  Flap checks per plastics.        ID/ Antibiotics:  - No indication for antibiotics.   - afebrile, no leukocytosis.       Heme:     # acute blood loss anemia due to OR   - Hgb drifting now  hgb 9.3. No outwards signs of bleeding noted.   - monitor and transfuse for signs/symptoms of hypoperfusion.        MSK:  # Deconditioning  of critical illness     - PT consulted  - OOB.amulbualte today       General Cares/Prophylaxis:    DVT Prophylaxis: Enoxaparin 40 (Lovenox) SQ  GI Prophylaxis: Omeprazole 20mg daily   Restraints: Restraints for medical healing needed: NO    Lines/ tubes/ drains:  - ALESSANDRA drains x3     Disposition:  - Likely to floor later today under plastics team.     Patient seen, findings and plan discussed with surgical ICU staff, Dr. Douglas     Time spent on this Encounter   Billing:  I spent 35 minutes bedside and on the inpatient unit today managing the  care of Enid Lombardo in relation to the issues listed in this note.      Artie Fraser PA-C     ====================================    SUBJECTIVE:  Course reviewed. No acute events overnight. Aguilar removed yesterday, pt reports straight cath x1,  Reports pain better controlled today. Taking fluids well, no nausea or vomiting. Denies chest pain, shortness of breath, fever or chills. Passing flatus.     OBJECTIVE:   1. VITAL SIGNS:   Temp:  [98  F (36.7  C)-99.9  F (37.7  C)] 99  F (37.2  C)  Heart Rate:  [] 85  Resp:  [11-40] 14  BP: ()/(47-65) 104/54  SpO2:  [94 %-99 %] 95 %  Resp: 14    2. INTAKE/ OUTPUT:   I/O last 3 completed shifts:  In: 4075 [P.O.:1200; I.V.:2875]  Out: 683 [Urine:550; Drains:133]    3. PHYSICAL EXAMINATION:   General: Sitting up in bed, awake, alert, interactive   HEENT: pupils 4mm and equal. OP clear.   Neuro: A&Ox3.   Pulm/Resp: Clear breath sounds bilaterally without rhonchi, crackles or wheeze,  breathing non-labored.   CV: RRR,S1, S2.   Abdomen: Soft, non-distended, non-tender, no rebound tenderness or guarding, no masses  : no aguilar, no urine to assess   Incisions/Skin: Chest: (+) doppler on flaps, left > right. Incision intact, no drainage or redness. Abdominal incision without redness, warmth or drainage, scant drainage on abd pad, TTP along abdominal incision. ALESSANDRA drains x3 to suction with serous fluid  Extremities: no  peripheral edema, moving all extremities, peripheral pulses intact, calves soft and compressible, extremities well perfused.     4. INVESTIGATIONS:   Arterial Blood Gases   No lab results found in last 7 days.  Complete Blood Count     Recent Labs  Lab 10/10/18  0357 10/09/18  0444 10/05/18  1038   WBC 7.8 8.1 5.7   HGB 9.4* 10.2* 13.7    162 257     Basic Metabolic Panel    Recent Labs  Lab 10/10/18  0357 10/09/18  0444 10/03/18  1457    143 141   POTASSIUM 3.6 3.8 4.1   CHLORIDE 108 108 105   CO2 26 26 29   BUN 5* 8 8   CR 0.64 0.71 0.70   * 130* 92     Liver Function Tests    Recent Labs  Lab 10/09/18  1142   INR 1.15*     Pancreatic Enzymes  No lab results found in last 7 days.  Coagulation Profile    Recent Labs  Lab 10/09/18  1142   INR 1.15*         5. RADIOLOGY:   No results found for this or any previous visit (from the past 24 hour(s)).    =========================================

## 2018-10-11 ENCOUNTER — APPOINTMENT (OUTPATIENT)
Dept: PHYSICAL THERAPY | Facility: CLINIC | Age: 58
DRG: 582 | End: 2018-10-11
Attending: PLASTIC SURGERY
Payer: COMMERCIAL

## 2018-10-11 VITALS
RESPIRATION RATE: 18 BRPM | HEIGHT: 65 IN | OXYGEN SATURATION: 94 % | WEIGHT: 176.15 LBS | BODY MASS INDEX: 29.35 KG/M2 | TEMPERATURE: 98.5 F | DIASTOLIC BLOOD PRESSURE: 70 MMHG | SYSTOLIC BLOOD PRESSURE: 95 MMHG | HEART RATE: 85 BPM

## 2018-10-11 LAB
COPATH REPORT: NORMAL
MAGNESIUM SERPL-MCNC: 2 MG/DL (ref 1.6–2.3)

## 2018-10-11 PROCEDURE — 90682 RIV4 VACC RECOMBINANT DNA IM: CPT | Performed by: PLASTIC SURGERY

## 2018-10-11 PROCEDURE — 83735 ASSAY OF MAGNESIUM: CPT | Performed by: STUDENT IN AN ORGANIZED HEALTH CARE EDUCATION/TRAINING PROGRAM

## 2018-10-11 PROCEDURE — 25000128 H RX IP 250 OP 636: Performed by: PLASTIC SURGERY

## 2018-10-11 PROCEDURE — 25000132 ZZH RX MED GY IP 250 OP 250 PS 637: Performed by: PLASTIC SURGERY

## 2018-10-11 PROCEDURE — 97530 THERAPEUTIC ACTIVITIES: CPT | Mod: GP

## 2018-10-11 PROCEDURE — 97116 GAIT TRAINING THERAPY: CPT | Mod: GP

## 2018-10-11 PROCEDURE — 36415 COLL VENOUS BLD VENIPUNCTURE: CPT | Performed by: STUDENT IN AN ORGANIZED HEALTH CARE EDUCATION/TRAINING PROGRAM

## 2018-10-11 PROCEDURE — 25000128 H RX IP 250 OP 636: Performed by: STUDENT IN AN ORGANIZED HEALTH CARE EDUCATION/TRAINING PROGRAM

## 2018-10-11 PROCEDURE — 25000131 ZZH RX MED GY IP 250 OP 636 PS 637: Performed by: PLASTIC SURGERY

## 2018-10-11 PROCEDURE — 40000193 ZZH STATISTIC PT WARD VISIT

## 2018-10-11 PROCEDURE — 25000132 ZZH RX MED GY IP 250 OP 250 PS 637: Performed by: STUDENT IN AN ORGANIZED HEALTH CARE EDUCATION/TRAINING PROGRAM

## 2018-10-11 PROCEDURE — 25000132 ZZH RX MED GY IP 250 OP 250 PS 637: Performed by: PHYSICIAN ASSISTANT

## 2018-10-11 RX ORDER — DIAZEPAM 5 MG
5 TABLET ORAL EVERY 8 HOURS PRN
Qty: 10 TABLET | Refills: 0 | Status: SHIPPED | OUTPATIENT
Start: 2018-10-11 | End: 2018-11-01

## 2018-10-11 RX ORDER — ASPIRIN 81 MG/1
81 TABLET, CHEWABLE ORAL DAILY
Qty: 28 TABLET | Refills: 0 | Status: SHIPPED | OUTPATIENT
Start: 2018-10-12 | End: 2018-11-09

## 2018-10-11 RX ORDER — OXYCODONE HYDROCHLORIDE 5 MG/1
5-10 TABLET ORAL EVERY 4 HOURS PRN
Qty: 30 TABLET | Refills: 0 | Status: SHIPPED | OUTPATIENT
Start: 2018-10-11 | End: 2018-11-01

## 2018-10-11 RX ORDER — METHOCARBAMOL 500 MG/1
500 TABLET, FILM COATED ORAL EVERY 6 HOURS PRN
Qty: 30 TABLET | Refills: 0 | Status: SHIPPED | OUTPATIENT
Start: 2018-10-11 | End: 2018-11-01

## 2018-10-11 RX ORDER — POLYETHYLENE GLYCOL 3350 17 G/17G
17 POWDER, FOR SOLUTION ORAL DAILY
Qty: 14 PACKET | Refills: 0 | Status: SHIPPED | OUTPATIENT
Start: 2018-10-12 | End: 2018-10-26

## 2018-10-11 RX ADMIN — ASPIRIN 81 MG CHEWABLE TABLET 81 MG: 81 TABLET CHEWABLE at 08:57

## 2018-10-11 RX ADMIN — METHOCARBAMOL 500 MG: 500 TABLET ORAL at 03:55

## 2018-10-11 RX ADMIN — LORATADINE 10 MG: 10 TABLET ORAL at 08:57

## 2018-10-11 RX ADMIN — DIAZEPAM 5 MG: 5 TABLET ORAL at 10:47

## 2018-10-11 RX ADMIN — OXYCODONE HYDROCHLORIDE 10 MG: 5 TABLET ORAL at 00:36

## 2018-10-11 RX ADMIN — DIAZEPAM 5 MG: 5 TABLET ORAL at 01:12

## 2018-10-11 RX ADMIN — SERTRALINE 25 MG: 25 TABLET, FILM COATED ORAL at 08:57

## 2018-10-11 RX ADMIN — OXYCODONE HYDROCHLORIDE 10 MG: 5 TABLET ORAL at 08:00

## 2018-10-11 RX ADMIN — ENOXAPARIN SODIUM 40 MG: 40 INJECTION SUBCUTANEOUS at 08:57

## 2018-10-11 RX ADMIN — ACETAMINOPHEN 975 MG: 325 TABLET, FILM COATED ORAL at 12:39

## 2018-10-11 RX ADMIN — ANASTROZOLE 1 MG: 1 TABLET ORAL at 08:57

## 2018-10-11 RX ADMIN — OXYCODONE HYDROCHLORIDE 10 MG: 5 TABLET ORAL at 12:39

## 2018-10-11 RX ADMIN — ONDANSETRON 4 MG: 4 TABLET, ORALLY DISINTEGRATING ORAL at 00:36

## 2018-10-11 RX ADMIN — INFLUENZA A VIRUS A/MICHIGAN/45/2015 (H1N1) RECOMBINANT HEMAGGLUTININ ANTIGEN, INFLUENZA A VIRUS A/SINGAPORE/INFIMH-16-0019/2016 (H3N2) RECOMBINANT HEMAGGLUTININ ANTIGEN, INFLUENZA B VIRUS B/MARYLAND/15/2016 RECOMBINANT HEMAGGLUTININ ANTIGEN, AND INFLUENZA B VIRUS B/PHUKET/3073/2013 RECOMBINANT HEMAGGLUTININ ANTIGEN 0.5 ML: 45; 45; 45; 45 INJECTION INTRAMUSCULAR at 12:39

## 2018-10-11 RX ADMIN — FLUTICASONE PROPIONATE 1 SPRAY: 50 SPRAY, METERED NASAL at 08:58

## 2018-10-11 RX ADMIN — ACETAMINOPHEN 975 MG: 325 TABLET, FILM COATED ORAL at 03:55

## 2018-10-11 ASSESSMENT — ACTIVITIES OF DAILY LIVING (ADL)
ADLS_ACUITY_SCORE: 11

## 2018-10-11 ASSESSMENT — PAIN DESCRIPTION - DESCRIPTORS
DESCRIPTORS: ACHING
DESCRIPTORS: HEADACHE

## 2018-10-11 NOTE — DISCHARGE SUMMARY
Discharge Summary    Enid Lombardo MRN# [id number]   YOB: 1960 Age: 58 year old     Date of Admission:  10/8/2018  Date of Discharge::  10/11/2018  Admitting Physician:  DENNISE Amin MD  Discharge Physician:  Romaine Concepcion MD  Primary Care Physician:        Shi Alonso          Admission Diagnoses:   Post Bilateral Breast Reconstruction   S/P bilateral KISHOR flap for bilateral breast reconstruction          Discharge Diagnosis:   Same         Procedures:   10/8: bilateral KISHOR flap for bilateral breast reconstruction        Non-operative procedures:   None performed          Consultations:   PHYSICAL THERAPY ADULT IP CONSULT  VASCULAR ACCESS CARE ADULT IP CONSULT           Imaging Studies:   None performed  Results for orders placed or performed during the hospital encounter of 10/08/18   POC US GUIDANCE NEEDLE PLACEMENT    Impression    Bilateral transversus abdominis plane block, bilateral PECS 1 block.             Medications Prior to Admission:     Prescriptions Prior to Admission   Medication Sig Dispense Refill Last Dose     ACETAMINOPHEN PO Take 325 mg by mouth every 8 hours as needed for pain   10/7/2018 at Unknown time     anastrozole (ARIMIDEX) 1 MG tablet Take 1 tablet (1 mg) by mouth daily 90 tablet 3 10/7/2018 at Unknown time     fluticasone (FLONASE) 50 MCG/ACT spray USE ONE OR TWO SPRAYS IN EACH NOSTRIL DAILY 16 mL 10 10/7/2018 at Unknown time     loratadine (CLARITIN) 10 MG tablet TAKE ONE TABLET BY MOUTH EVERY DAY AS NEEDED FOR ALLERGY SYMPTOMS 90 tablet 1 10/7/2018 at Unknown time     Multiple Vitamins-Minerals (PRESERVISION AREDS 2) CAPS Take 1 tablet by mouth 2 times daily 60 capsule 11 10/7/2018 at Unknown time     Omega-3 Fatty Acids (FISH OIL) 1000 MG CPDR Take  by mouth.   10/7/2018 at Unknown time     omeprazole (PRILOSEC) 20 MG CR capsule TAKE 1 CAPSULE (20 MG) BY MOUTH DAILY 90 capsule 3 10/7/2018 at Unknown time     sertraline (ZOLOFT) 25 MG tablet Take 1  tablet daily. (Patient taking differently: 25 mg every morning Take 1 tablet daily.) 30 tablet 11 10/7/2018 at Unknown time     [DISCONTINUED] ibuprofen (ADVIL) 200 MG capsule Take 200 mg by mouth every 4 hours as needed for fever   Past Month at Unknown time            Discharge Medications:     Current Discharge Medication List      START taking these medications    Details   aspirin 81 MG chewable tablet Take 1 tablet (81 mg) by mouth daily for 28 days  Qty: 28 tablet, Refills: 0    Associated Diagnoses: History of right breast cancer      enoxaparin (LOVENOX) 40 MG/0.4ML injection Inject 0.4 mLs (40 mg) Subcutaneous every 24 hours for 10 days  Qty: 4 mL, Refills: 0    Associated Diagnoses: History of right breast cancer      methocarbamol (ROBAXIN) 500 MG tablet Take 1 tablet (500 mg) by mouth every 6 hours as needed for muscle spasms  Qty: 30 tablet, Refills: 0    Associated Diagnoses: History of right breast cancer      oxyCODONE IR (ROXICODONE) 5 MG tablet Take 1-2 tablets (5-10 mg) by mouth every 4 hours as needed for moderate to severe pain  Qty: 30 tablet, Refills: 0    Associated Diagnoses: History of right breast cancer      polyethylene glycol (MIRALAX/GLYCOLAX) Packet Take 17 g by mouth daily for 14 days  Qty: 14 packet, Refills: 0    Associated Diagnoses: History of right breast cancer         CONTINUE these medications which have NOT CHANGED    Details   ACETAMINOPHEN PO Take 325 mg by mouth every 8 hours as needed for pain      anastrozole (ARIMIDEX) 1 MG tablet Take 1 tablet (1 mg) by mouth daily  Qty: 90 tablet, Refills: 3    Associated Diagnoses: Recurrent malignant neoplasm of breast, unspecified laterality (H)      fluticasone (FLONASE) 50 MCG/ACT spray USE ONE OR TWO SPRAYS IN EACH NOSTRIL DAILY  Qty: 16 mL, Refills: 10    Associated Diagnoses: Chronic rhinitis      loratadine (CLARITIN) 10 MG tablet TAKE ONE TABLET BY MOUTH EVERY DAY AS NEEDED FOR ALLERGY SYMPTOMS  Qty: 90 tablet, Refills: 1     Associated Diagnoses: Chronic rhinitis, unspecified type      Multiple Vitamins-Minerals (PRESERVISION AREDS 2) CAPS Take 1 tablet by mouth 2 times daily  Qty: 60 capsule, Refills: 11    Comments: OK to give 3 month supply if desired  Associated Diagnoses: Drusen (degenerative) of retina, bilateral; Vitreous degeneration, bilateral      Omega-3 Fatty Acids (FISH OIL) 1000 MG CPDR Take  by mouth.      omeprazole (PRILOSEC) 20 MG CR capsule TAKE 1 CAPSULE (20 MG) BY MOUTH DAILY  Qty: 90 capsule, Refills: 3    Associated Diagnoses: Gastroesophageal reflux disease, esophagitis presence not specified      sertraline (ZOLOFT) 25 MG tablet Take 1 tablet daily.  Qty: 30 tablet, Refills: 11    Associated Diagnoses: Hot flushes, perimenopausal         STOP taking these medications       ibuprofen (ADVIL) 200 MG capsule Comments:   Reason for Stopping:                     Medications Discontinued or Adjusted During This Hospitalization:   New narcotics initiated: oxycodone prn         Antibiotics Prescribed at Discharge:   None           Hospital Course:   Patient underwent above stated procedure and was transferred to SICU for close free flap monitoring.  There were no issues with the flaps or abdominal closure during the admission.  Patient eventually was weaned off IV meds, tolerating PO, PO pain control, ambulating, voiding by post op day 4.  Patient was discharged home with drain care teaching, and how to administer lovenox for 10 days for ppx.           Day of Discharge Physical Exam:   Gen: AAOx3, NAD  Pulm: Non-labored breathing  Abd: Soft, appropriately tender, no guarding   Incision C/D/I   Bilateral flaps warm, well perfused, audible doppler, good turgor   ALESSANDRA drain x3 ss  Ext:  Warm and well-perfused           Discharge Instructions and Follow-Up:     Reason for your hospital stay   Bilateral breast reconstruction     KISHOR FLAP BREAST RECONSTRUCTION POST-OPERATIVE INSTRUCTIONS    Instructions  What are my  post-operative instructions?     Have someone drive you home after surgery and help you at home for 1-2      days.     Get plenty of rest.     Follow balanced diet.     Decreased activity may promote constipation, so you may want to add      more raw fruit to your diet, and be sure to increase fluid intake.    Take pain medication as prescribed. Do not take aspirin or any products     containing aspirin for one week after surgery.     Do not drink alcohol when taking pain medications.     Even when not taking pain medications, no alcohol for 3 weeks as it      causes fluid retention.     If you are taking vitamins with iron, resume these as tolerated.     Do not smoke. Smoking delays healing and increases the risk of      complications.    Activities     Start walking as soon as possible, this helps to reduce swelling and     lowers the chance of blood clots.     You may use your arms for activities of daily living for the first three     weeks, but do not push over your head until 3 weeks post-op.     Do not drive until you are no longer taking any pain medications     (narcotics).     Unless stated on this form, discuss your time off work with your surgeon.     No driving for 4 weeks. When abdominal area will allow for sudden      braking, you may resume driving.     No heavy lifting for 6 to 8 weeks.    Incision Care     You may shower 72 hours after surgery with drains in place.     If you have implants, no showering until drains are removed.     No tub soaking, bathing,or swimming for 6 to 8 weeks.     Avoid exposing scars to sun for at least 12 months.     Always use a strong sunblock, if sun exposure is unavoidable (SPF 50 or     greater).     Keep surgical tape on until it peels off.     Keep incisions clean and inspect daily for signs of infection.     Do not wear a bra.     Sleep with pillows under knee for 2 weeks (some women choose to sleep      in a recliner or lounge chair).    Drain Care  Strip and  record drain output twice a day, bring this record with you to post op clinic visit    What to Expect     Maximum discomfort will occur the first few days following surgery; you      may experience incision discomfort and generalized discomfort in your      breasts and abdomen.     Oozing can be expected.     Appearance     A new breast(s) mound(s) will be constructed with sutures around the     outer edges.     The abdomen will be tight and much flatter in appearance.     The majority of swelling will subside in 3-4 weeks, but some swelling may      persist for up to 3 months.     You will walk bent over and will slowly rise over the first 1-2 weeks.    Follow-Up Care     Your 1st post-operative visit will be scheduled for 7 to 10 days after       surgery. Some drains may be taken out during this visit.     Your 2nd post-operative visit will be for removal of remaining drains.     Your 3rd post-operative visit will be scheduled somewhere between 4-6      weeks from the initial surgery date.    Please note my office will call you 1-2 business days after your procedure to check up on how you're doing and to schedule your post-operative appointment.     When to Call:     If you have increased swelling or bruising.     If swelling and redness persist after a few days.     If you have increased redness along the incision.     If you have severe or increased pain not relieved by medication.     If you have any side effects to medications; such as, rash, nausea,      headache, vomiting.     If you have an oral temperature over 100.4 degrees.     If you have any yellowish or greenish drainage from the incisions or      notice a foul odor.     If you have bleeding from the incisions that is difficult to control with      light pressure.     If you have loss of feeling or motion.    For Medical Questions, Please Call:     [phone], Monday - Friday, 8 a.m. - 4:30 p.m.     After hours and on weekends, call Hospital Paging  at [phone], and ask for the       Plastic Surgeon on call.         Home Health Care:   Not needed           Discharge Disposition:   Discharged to home      Condition at discharge: Good    Romaine Borad PGY-4  Plastic Surgery  Please page on call

## 2018-10-11 NOTE — PLAN OF CARE
Problem: Patient Care Overview  Goal: Plan of Care/Patient Progress Review  Discharge Planner PT   Patient plan for discharge: Home with  assist  Current status: Performed bed mobility with  assist/training. Sit<>stand without AD indep. Amb 450' without AD and SBA/indep without loss of balance. Negotiated up/down 15 steps with  providing assist as well. vSS with activity.  Barriers to return to prior living situation: None  Recommendations for discharge: Home with assist  Rationale for recommendations: current level of function       Entered by: Sarabjit Velazquez 10/11/2018 4:38 PM

## 2018-10-11 NOTE — PLAN OF CARE
Problem: Patient Care Overview  Goal: Plan of Care/Patient Progress Review  Outcome: Adequate for Discharge Date Met: 10/11/18  Pt discharged from 4A to home around 1445.  Lovenox teaching completed.  ALESSANDRA drain care teaching completed.  AVS reviewed with pt, questions encouraged and answered.  Left unit via w/c accompanied by , instructed to discharge pharmacy.  Declined transport assistance.

## 2018-10-11 NOTE — DISCHARGE INSTRUCTIONS
KISHOR FLAP BREAST RECONSTRUCTION POST-OPERATIVE INSTRUCTIONS    Instructions  What are my post-operative instructions?     Have someone drive you home after surgery and help you at home for 1-2      days.     Get plenty of rest.     Follow balanced diet.     Decreased activity may promote constipation, so you may want to add      more raw fruit to your diet, and be sure to increase fluid intake.    Take pain medication as prescribed. Do not take aspirin or any products     containing aspirin for one week after surgery.     Do not drink alcohol when taking pain medications.     Even when not taking pain medications, no alcohol for 3 weeks as it      causes fluid retention.     If you are taking vitamins with iron, resume these as tolerated.     Do not smoke. Smoking delays healing and increases the risk of      complications.    Activities     Start walking as soon as possible, this helps to reduce swelling and     lowers the chance of blood clots.     You may use your arms for activities of daily living for the first three     weeks, but do not push over your head until 3 weeks post-op.     Do not drive until you are no longer taking any pain medications     (narcotics).     Unless stated on this form, discuss your time off work with your surgeon.     No driving for 4 weeks. When abdominal area will allow for sudden      braking, you may resume driving.     No heavy lifting for 6 to 8 weeks.    Incision Care     You may shower 72 hours after surgery with drains in place.     If you have implants, no showering until drains are removed.     No tub soaking, bathing,or swimming for 6 to 8 weeks.     Avoid exposing scars to sun for at least 12 months.     Always use a strong sunblock, if sun exposure is unavoidable (SPF 50 or     greater).     Keep surgical tape on until it peels off.     Keep incisions clean and inspect daily for signs of infection.     Do not wear a bra.     Sleep with pillows under knee for 2 weeks  (some women choose to sleep      in a recliner or lounge chair).    Drain Care  Strip and record drain output twice a day, bring this record with you to post op clinic visit    What to Expect     Maximum discomfort will occur the first few days following surgery; you      may experience incision discomfort and generalized discomfort in your      breasts and abdomen.     Oozing can be expected.     Appearance     A new breast(s) mound(s) will be constructed with sutures around the     outer edges.     The abdomen will be tight and much flatter in appearance.     The majority of swelling will subside in 3-4 weeks, but some swelling may      persist for up to 3 months.     You will walk bent over and will slowly rise over the first 1-2 weeks.    Follow-Up Care     Your 1st post-operative visit will be scheduled for 7 to 10 days after       surgery. Some drains may be taken out during this visit.     Your 2nd post-operative visit will be for removal of remaining drains.     Your 3rd post-operative visit will be scheduled somewhere between 4-6      weeks from the initial surgery date.    Please note my office will call you 1-2 business days after your procedure to check up on how you're doing and to schedule your post-operative appointment.     When to Call:     If you have increased swelling or bruising.     If swelling and redness persist after a few days.     If you have increased redness along the incision.     If you have severe or increased pain not relieved by medication.     If you have any side effects to medications; such as, rash, nausea,      headache, vomiting.     If you have an oral temperature over 100.4 degrees.     If you have any yellowish or greenish drainage from the incisions or      notice a foul odor.     If you have bleeding from the incisions that is difficult to control with      light pressure.     If you have loss of feeling or motion.    For Medical Questions, Please Call:     [phone],  Monday - Friday, 8 a.m. - 4:30 p.m.     After hours and on weekends, call Hospital Paging at [phone], and ask for the       Plastic Surgeon on call.

## 2018-10-12 ENCOUNTER — TELEPHONE (OUTPATIENT)
Dept: FAMILY MEDICINE | Facility: OTHER | Age: 58
End: 2018-10-12

## 2018-10-12 ENCOUNTER — CARE COORDINATION (OUTPATIENT)
Dept: PLASTIC SURGERY | Facility: CLINIC | Age: 58
End: 2018-10-12

## 2018-10-12 DIAGNOSIS — Z98.890 S/P BREAST RECONSTRUCTION: Primary | ICD-10-CM

## 2018-10-12 RX ORDER — ONDANSETRON 4 MG/1
TABLET, ORALLY DISINTEGRATING ORAL
Qty: 20 TABLET | Refills: 1 | Status: SHIPPED | OUTPATIENT
Start: 2018-10-12 | End: 2018-12-18

## 2018-10-12 NOTE — TELEPHONE ENCOUNTER
RN to call for hospital follow up:    Reason for follow up: Enid Lombardo appeared on our list for being seen in an Emergency Room or a recent Hospital discharge.    Admitting date: 10/8/18  Discharge date: 10/11/18  Location: [place]  Reason for visit:   Chief Complaint   Patient presents with     Hospital F/U     Post Bilateral Breast Reconstruction     Vy Moore RN, BSN

## 2018-10-12 NOTE — TELEPHONE ENCOUNTER
I spoke with patient.   She is feeling well other than some moderate/severe nausea that started today.   Message being sent to Dr. Amin's team to address antiemetic.   Pharmacy is entered.   Patient's  spoke said he spoke with someone this morning who stated it would be sent over a nurse would be calling, but I do not see anything in the chart.     Vy Moore, RN, BSN

## 2018-10-12 NOTE — PROGRESS NOTES
"Received message from plastic surgery fellow that patient had nausea early this morning and was recommending Zofran.  I called pt's  who states pt is doing \"much better after taking Valium\".  I told him I would send Zofran rx to pharmacy per Dr. Concepcion's verbal order in case she needed for further nausea.  He verbalized understanding.  "

## 2018-10-16 ENCOUNTER — OFFICE VISIT (OUTPATIENT)
Dept: PLASTIC SURGERY | Facility: CLINIC | Age: 58
End: 2018-10-16
Attending: PLASTIC SURGERY
Payer: COMMERCIAL

## 2018-10-16 VITALS
DIASTOLIC BLOOD PRESSURE: 72 MMHG | OXYGEN SATURATION: 98 % | RESPIRATION RATE: 16 BRPM | SYSTOLIC BLOOD PRESSURE: 121 MMHG | TEMPERATURE: 97.2 F | HEART RATE: 82 BPM | WEIGHT: 173.19 LBS | BODY MASS INDEX: 28.86 KG/M2 | HEIGHT: 65 IN

## 2018-10-16 DIAGNOSIS — Z98.890 S/P BREAST RECONSTRUCTION: Primary | ICD-10-CM

## 2018-10-16 PROCEDURE — G0463 HOSPITAL OUTPT CLINIC VISIT: HCPCS | Mod: ZF

## 2018-10-16 RX ORDER — TRAMADOL HYDROCHLORIDE 50 MG/1
50 TABLET ORAL EVERY 6 HOURS PRN
Qty: 20 TABLET | Refills: 0 | Status: SHIPPED | OUTPATIENT
Start: 2018-10-16 | End: 2018-11-01

## 2018-10-16 RX ORDER — IBUPROFEN 200 MG
400 TABLET ORAL PRN
Status: ON HOLD | COMMUNITY
End: 2019-10-21

## 2018-10-16 RX ORDER — SENNOSIDES 8.6 MG
2 TABLET ORAL 2 TIMES DAILY
COMMUNITY
End: 2019-01-16

## 2018-10-16 ASSESSMENT — PAIN SCALES - GENERAL: PAINLEVEL: MILD PAIN (3)

## 2018-10-16 NOTE — LETTER
10/16/2018       RE: Michel Lombardo  [address] [facility] [address]     Dear Colleague,    Thank you for referring your patient, Michel Lombardo, to the [facility] at [facility]. Please see a copy of my visit note below.    Service Date: 10/16/2018      POSTOPERATIVE VISIT      POSTOPERATIVE VISIT      PRESENTING COMPLAINT:  Postoperative visit, status post bilateral breast reconstruction with free KISHOR flaps done on 10/09/2018.      HISTORY OF PRESENTING COMPLAINT:  Ms. Lombardo is 58 years old, a week out from surgery and has done extremely well, very happy with the results, no major issues.  ALESSANDRA drainage from the chest wall is less than 15 mL a day and from the abdomen about 30 mL a day.  She has been ambulating.  Pain has been an issue, but overall controlled.  She has not been taking much pain medicine.      PHYSICAL EXAMINATION:  On exam vital signs stable.  She is afebrile in no obvious distress.  Everything is healing in well.  No evidence for infection, seroma or hematoma.  Symmetry of the breasts is excellent.      ASSESSMENT AND PLAN:  Based on above findings, a diagnosis of bilateral breast reconstruction with free KISHOR flaps was made.  Took out the ALESSANDRA drain in the chest wall areas, left the ALESSANDRA drain in the abdomen.  Have asked her to start moisturizing everything.  Gave her a prescription for tramadol.  All questions were answered.  She was happy with the visit.  I will see her back in a week's time.         DENNISE HAIDER MD             D: 10/17/2018   T: 10/18/2018   MT: [id number]      Name:     MICHEL LOMBARDO   MRN:      [id number]        Account:      [id number]   :      1960           Service Date: 10/16/2018      Document: [id number]

## 2018-10-16 NOTE — NURSING NOTE
"Oncology Rooming Note    October 16, 2018 10:28 AM   Enid Lombardo is a 58 year old female who presents for:    Chief Complaint   Patient presents with     Oncology Clinic Visit     Return: Post Op     Initial Vitals: /72  Pulse 82  Temp 97.2  F (36.2  C) (Oral)  Resp 16  Ht 1.651 m (5' 5\")  Wt 78.6 kg (173 lb 3 oz)  LMP 10/14/2001  SpO2 98%  Breastfeeding? No  BMI 28.82 kg/m2 Estimated body mass index is 28.82 kg/(m^2) as calculated from the following:    Height as of this encounter: 1.651 m (5' 5\").    Weight as of this encounter: 78.6 kg (173 lb 3 oz). Body surface area is 1.9 meters squared.  Mild Pain (3) Comment: Data Unavailable   Patient's last menstrual period was 10/14/2001.  Allergies reviewed: Yes  Medications reviewed: Yes    Medications: Medication refills not needed today.  Pharmacy name entered into [employer] My [employer]:    [id number] [place] [facility] [id number] [address] [place] [address] PHARMACY    Clinical concerns: new concerns today are that she is very nauseated and also she is having pain in her abdomen  Dr. Amin was notified.    10 minutes for nursing intake (face to face time)     Jerome Izaguirre CMA              "

## 2018-10-18 NOTE — PROGRESS NOTES
Service Date: 10/16/2018      POSTOPERATIVE VISIT      POSTOPERATIVE VISIT      PRESENTING COMPLAINT:  Postoperative visit, status post bilateral breast reconstruction with free KISHOR flaps done on 10/09/2018.      HISTORY OF PRESENTING COMPLAINT:  Ms. Lombardo is 58 years old, a week out from surgery and has done extremely well, very happy with the results, no major issues.  ALESSANDRA drainage from the chest wall is less than 15 mL a day and from the abdomen about 30 mL a day.  She has been ambulating.  Pain has been an issue, but overall controlled.  She has not been taking much pain medicine.      PHYSICAL EXAMINATION:  On exam vital signs stable.  She is afebrile in no obvious distress.  Everything is healing in well.  No evidence for infection, seroma or hematoma.  Symmetry of the breasts is excellent.      ASSESSMENT AND PLAN:  Based on above findings, a diagnosis of bilateral breast reconstruction with free KISHOR flaps was made.  Took out the ALESSANDRA drain in the chest wall areas, left the ALESSANDRA drain in the abdomen.  Have asked her to start moisturizing everything.  Gave her a prescription for tramadol.  All questions were answered.  She was happy with the visit.  I will see her back in a week's time.         DENNISE HAIDER MD             D: 10/17/2018   T: 10/18/2018   MT: nh      Name:     MICHEL LOMBARDO   MRN:      [id number]        Account:      [id number]   :      1960           Service Date: 10/16/2018      Document: [id number]

## 2018-10-23 ENCOUNTER — OFFICE VISIT (OUTPATIENT)
Dept: PLASTIC SURGERY | Facility: CLINIC | Age: 58
End: 2018-10-23
Attending: PLASTIC SURGERY
Payer: COMMERCIAL

## 2018-10-23 VITALS
RESPIRATION RATE: 14 BRPM | OXYGEN SATURATION: 100 % | HEIGHT: 65 IN | DIASTOLIC BLOOD PRESSURE: 72 MMHG | TEMPERATURE: 98.6 F | BODY MASS INDEX: 28.51 KG/M2 | HEART RATE: 94 BPM | WEIGHT: 171.1 LBS | SYSTOLIC BLOOD PRESSURE: 113 MMHG

## 2018-10-23 DIAGNOSIS — Z98.890 S/P BREAST RECONSTRUCTION, BILATERAL: Primary | ICD-10-CM

## 2018-10-23 DIAGNOSIS — Z98.890 S/P TRAM (TRANSVERSE RECTUS ABDOMINIS MUSCLE) FLAP BREAST RECONSTRUCTION: Primary | ICD-10-CM

## 2018-10-23 PROCEDURE — G0463 HOSPITAL OUTPT CLINIC VISIT: HCPCS | Mod: ZF

## 2018-10-23 RX ORDER — CEFAZOLIN SODIUM 1 G/50ML
1 INJECTION, SOLUTION INTRAVENOUS SEE ADMIN INSTRUCTIONS
Status: CANCELLED | OUTPATIENT
Start: 2018-10-23

## 2018-10-23 RX ORDER — CEFAZOLIN SODIUM 2 G/50ML
2 SOLUTION INTRAVENOUS
Status: CANCELLED | OUTPATIENT
Start: 2018-10-23

## 2018-10-23 ASSESSMENT — PAIN SCALES - GENERAL: PAINLEVEL: NO PAIN (0)

## 2018-10-23 NOTE — PROGRESS NOTES
Pt. comes into clinic today at the request of Dr. Nedra Amin.    This service provided today was under the supervising provider of the day Dr. Amin, who was available if needed. Dr. Amin was called to OR during clinic for emergency.    Reason for visit: Post op visit.  Pt returns 2 week after   1.  Left-sided breast reconstruction with a right abdominal KISHOR (deep inferior epigastric  based free flap)   2.  Right breast reconstruction using a left abdominal KISHOR (deep inferior epigastric  free flap)   3.  Reconstruction of the right rectus abdominis muscle using Vicryl mesh, primary repair.   4.  Removal of bilateral breast expanders.     Wound Description: Flap wounds well approximated, no drainage  Incision/Drainage:  Abdominal wound well approximated no drainage  Pain:  Using Tramadol alternating with Tylenol with good relief  Drains: Abdominal drain removed.  Instructions:  Continue to moisturize all incisions.    Return to clinic:  For preop with Dr. Amin.  Would like to proceed with nipple reconstruction and fat grafting as soon as recovered from present surgery.    Ninfa Sanchez, RN, BSN  Care Coordinator

## 2018-10-23 NOTE — NURSING NOTE
"Oncology Rooming Note    October 23, 2018 11:09 AM   Enid Lombardo is a 58 year old female who presents for:    Chief Complaint   Patient presents with     Oncology Clinic Visit     [facility] POST OP     Initial Vitals: /72 (BP Location: Right arm, Patient Position: Chair, Cuff Size: Adult Large)  Pulse 94  Temp 98.6  F (37  C) (Oral)  Resp 14  Ht 1.651 m (5' 5\")  Wt 77.6 kg (171 lb 1.6 oz)  LMP 10/14/2001  SpO2 100%  BMI 28.47 kg/m2 Estimated body mass index is 28.47 kg/(m^2) as calculated from the following:    Height as of this encounter: 1.651 m (5' 5\").    Weight as of this encounter: 77.6 kg (171 lb 1.6 oz). Body surface area is 1.89 meters squared.  No Pain (0) Comment: Data Unavailable   Patient's last menstrual period was 10/14/2001.  Allergies reviewed: Yes  Medications reviewed: Yes    Medications: Medication refills not needed today.  Pharmacy name entered into [employer]:    [id number] [place] [facility] [id number] [address] [place] [address] PHARMACY    Clinical concerns: No new concerns. Eloisa was notified.    10 minutes for nursing intake (face to face time)     Lemuel Wolf LPN            "

## 2018-10-23 NOTE — LETTER
10/23/2018       RE: Enid Lombardo  [address] [facility] [address]     Dear Colleague,    Thank you for referring your patient, Enid Lombardo, to the [facility] at [facility]. Please see a copy of my visit note below.    Pt. comes into clinic today at the request of Dr. Nedra Amin.    This service provided today was under the supervising provider of the day Dr. Amin, who was available if needed. Dr. Amin was called to OR during clinic for emergency.    Reason for visit: Post op visit.  Pt returns 2 week after   1.  Left-sided breast reconstruction with a right abdominal KISHOR (deep inferior epigastric  based free flap)   2.  Right breast reconstruction using a left abdominal KISHOR (deep inferior epigastric  free flap)   3.  Reconstruction of the right rectus abdominis muscle using Vicryl mesh, primary repair.   4.  Removal of bilateral breast expanders.     Wound Description: Flap wounds well approximated, no drainage  Incision/Drainage:  Abdominal wound well approximated no drainage  Pain:  Using Tramadol alternating with Tylenol with good relief  Drains: Abdominal drain removed.  Instructions:  Continue to moisturize all incisions.    Return to clinic:  For preop with Dr. Amin.  Would like to proceed with nipple reconstruction and fat grafting as soon as recovered from present surgery.    Ninfa Sanchez RN, BSN  Care Coordinator            Again, thank you for allowing me to participate in the care of your patient.      Sincerely,    DENNISE Amin MD

## 2018-10-23 NOTE — MR AVS SNAPSHOT
After Visit Summary   10/23/2018    Enid Lombardo    MRN: [id number]           Patient Information     Date Of Birth          1960        Visit Information        Provider Department      10/23/2018 11:15 AM DENNISE Amin MD [facility]        Today's Diagnoses     S/P TRAM (transverse rectus abdominis muscle) flap breast reconstruction    -  1       Follow-ups after your visit        Your next 10 appointments already scheduled     Jan 14, 2019  8:00 AM CST   DX HIP/PELVIS/SPINE with UCDX1   [facility] ([facility])    [address] [place] [address] [phone]           How do I prepare for my exam? (Food and drink instructions) No Food and Drink Restrictions.  How do I prepare for my exam? (Other instructions) Please do not take any of the following 24 hours prior to the day of your exam: vitamins, calcium tablets, antacids.  What should I wear: If possible, please wear clothes without metal (snaps, zippers). A sweat suit works well.  How long does the exam take: The exam takes about 20 minutes.  What should I bring: Bring a list of your current medicines to your exam (including vitamins, minerals and over-the-counter drugs).  Do I need a :  No  is needed.  What should I do after the exam: No restrictions, You may resume normal activities.  How do I prepare for my exam? (Food and drink instructions) A DEXA scan is a bone-density scan. It uses a low level of radiation to check the strength of your bones. As you lie on a padded table, a machine will take X-rays. We most often scan the hips and lower spine.  Who should I call with questions: If you have any questions, please call the Imaging Department where you will have your exam. Directions, parking instructions, and other information is available on our website, [email or web address].            Jan 14, 2019  9:00 AM CST   [facility] Draw  with  License [facility] LAB DRAW   [facility] ([facility])    [address]  [address]  [facility] [address]   [phone]            Jan 14, 2019 11:30 AM CST   (Arrive by 11:15 AM)   Return Visit with Charity Brar MD   [facility] ([facility])    [address]  [address]  [facility] [address] [phone]            Jan 22, 2019 12:10 PM CST   RETURN RETINA with Paz Osborn MD   [facility] ([facility])    [address]  [address]  [facility] [address]   [phone]            Sep 23, 2019  2:00 PM CDT   (Arrive by 1:45 PM)   Return Visit with Charity Brar MD   [facility] ([facility])    [address]  [address]  [facility] [address] [phone]              Who to contact     If you have questions or need follow up information about today's clinic visit or your schedule please contact [facility] directly at [phone].  Normal or non-critical lab and imaging results will be communicated to you by [employer], letter or phone within 4 business days after the clinic has received the results. If you do not hear from us within 7 days, please contact the clinic through [employer] or phone. If you have a critical or abnormal lab result, we will notify you by phone as soon as possible.  Submit refill requests through [employer] or call your pharmacy and they will forward the refill request to us. Please allow 3 business days for your refill to be completed.          Additional Information About Your Visit        [employer] Information     [employer] gives you secure access to your electronic health record. If you see a primary care provider, you can also send messages to your care team and make appointments. If you have questions, please call your primary care clinic.  If you do not have  "a primary care provider, please call [phone] and they will assist you.        Care EveryWhere ID     This is your Care EveryWhere ID. This could be used by other organizations to access your [facility] medical records  [id number]        Your Vitals Were     Pulse Temperature Respirations Height Last Period Pulse Oximetry    94 98.6  F (37  C) (Oral) 14 5' 5\" 10/14/2001 100%    BMI (Body Mass Index)                   28.47 kg/m2            Blood Pressure from Last 3 Encounters:   10/23/18 113/72   10/16/18 121/72   10/11/18 95/70    Weight from Last 3 Encounters:   10/23/18 171 lb 1.6 oz   10/16/18 173 lb 3 oz   10/11/18 176 lb 2.4 oz              Today, you had the following     No orders found for display         Today's Medication Changes          These changes are accurate as of 10/23/18  6:16 PM.  If you have any questions, ask your nurse or doctor.               These medicines have changed or have updated prescriptions.        Dose/Directions    sertraline 25 MG tablet   Commonly known as:  ZOLOFT   This may have changed:    - how much to take  - when to take this  - additional instructions   Used for:  Hot flushes, perimenopausal        Take 1 tablet daily.   Quantity:  30 tablet   Refills:  11                Primary Care Provider Office Phone # Fax #    Shi Alonso [phone]       [address] [place] [id number]        Equal Access to Services     [facility]: Hadii prem ornelas Sojohanny, waaxda luenrique, qaybta kaalmada keira, michelle ford . So [facility] [phone].    ATENCIÓN: Si habla español, tiene a rivera disposición servicios gratuitos de asistencia lingüística. Llame al [phone].    We comply with applicable federal civil rights laws and [place] laws. We do not discriminate on the basis of race, color, national origin, age, disability, sex, sexual orientation, or gender identity.            Thank you!     Thank you for choosing [facility]  for your care. Our goal is always to provide you with excellent care. Hearing back from our patients is one way we can continue to improve our services. Please take a few minutes to complete the written survey that you may receive in the mail after your visit with us. Thank you!             Your Updated Medication List - Protect others around you: Learn how to safely use, store and throw away your medicines at www.disposemymeds.org.          This list is accurate as of 10/23/18  6:16 PM.  Always use your most recent med list.                   Brand Name Dispense Instructions for use Diagnosis    ACETAMINOPHEN PO      Take 325 mg by mouth every 8 hours as needed for pain        ADVIL 200 MG tablet   Generic drug:  ibuprofen      Take 400 mg by mouth as needed for mild pain        ARIMIDEX 1 MG tablet   Generic drug:  anastrozole     90 tablet    Take 1 tablet (1 mg) by mouth daily    Recurrent malignant neoplasm of breast, unspecified laterality (H)       aspirin 81 MG chewable tablet     28 tablet    Take 1 tablet (81 mg) by mouth daily for 28 days    History of right breast cancer       diazepam 5 MG tablet    VALIUM    10 tablet    Take 1 tablet (5 mg) by mouth every 8 hours as needed for muscle spasms or pain    History of right breast cancer       Fish Oil 1000 MG Cpdr      Take  by mouth.        fluticasone 50 MCG/ACT spray    FLONASE    16 mL    USE ONE OR TWO SPRAYS IN EACH NOSTRIL DAILY    Chronic rhinitis       loratadine 10 MG tablet    CLARITIN    90 tablet    TAKE ONE TABLET BY MOUTH EVERY DAY AS NEEDED FOR ALLERGY SYMPTOMS    Chronic rhinitis, unspecified type       methocarbamol 500 MG tablet    ROBAXIN    30 tablet    Take 1 tablet (500 mg) by mouth every 6 hours as needed for muscle spasms    History of right breast cancer       omeprazole 20 MG CR capsule    priLOSEC    90 capsule    TAKE 1 CAPSULE (20 MG) BY MOUTH DAILY    Gastroesophageal reflux disease, esophagitis presence  not specified       ondansetron 4 MG ODT tab    ZOFRAN ODT    20 tablet    Take one tablet every 6 hours for nausea.    S/P breast reconstruction       oxyCODONE IR 5 MG tablet    ROXICODONE    30 tablet    Take 1-2 tablets (5-10 mg) by mouth every 4 hours as needed for moderate to severe pain    History of right breast cancer       polyethylene glycol Packet    MIRALAX/GLYCOLAX    14 packet    Take 17 g by mouth daily for 14 days    History of right breast cancer       PRESERVISION AREDS 2 Caps     60 capsule    Take 1 tablet by mouth 2 times daily    Drusen (degenerative) of retina, bilateral, Vitreous degeneration, bilateral       sennosides 8.6 MG tablet    SENOKOT     Take 2 tablets by mouth 2 times daily        sertraline 25 MG tablet    ZOLOFT    30 tablet    Take 1 tablet daily.    Hot flushes, perimenopausal       traMADol 50 MG tablet    ULTRAM    20 tablet    Take 1 tablet (50 mg) by mouth every 6 hours as needed for severe pain    S/P breast reconstruction

## 2018-10-24 ENCOUNTER — TELEPHONE (OUTPATIENT)
Dept: PLASTIC SURGERY | Facility: CLINIC | Age: 58
End: 2018-10-24

## 2018-10-25 ENCOUNTER — TELEPHONE (OUTPATIENT)
Dept: PLASTIC SURGERY | Facility: CLINIC | Age: 58
End: 2018-10-25

## 2018-10-25 NOTE — TELEPHONE ENCOUNTER
Patient is scheduled for surgery with Dr. Amin      Spoke or left message with: spoke to patient    Date of Surgery: 11/08/18 @ 10:05 a.m.    Location: [facility]    Informed patient they will need an adult      Pre-op with surgeon (if applicable):     H&P: Scheduled with     Additional imaging/appointments:     Surgery packet:     Additional comments:

## 2018-10-29 NOTE — PROGRESS NOTES
[address]  [facility] [address]  [phone]  Dept: [phone]    PRE-OP EVALUATION:  Today's date: 2018    Enid Lombardo (: 1960) presents for pre-operative evaluation assessment as requested by Dr. Amin  She requires evaluation and anesthesia risk assessment prior to undergoing surgery/procedure for treatment of breast .    Fax number for surgical facility: [facility]  Primary Physician: Shi Alonso  Type of Anesthesia Anticipated: General    Patient has a Health Care Directive or Living Will:  YES     Preop Questions 10/29/2018   Who is doing your surgery? Dr. Amin   What are you having done? Breast Reconstruction   Date of Surgery/Procedure: 2018   Facility or Hospital where procedure/surgery will be performed: [facility]   1.  Do you have a history of Heart attack, stroke, stent, coronary bypass surgery, or other heart surgery? No   2.  Do you ever have any pain or discomfort in your chest? No   3.  Do you have a history of  Heart Failure? No   4.   Are you troubled by shortness of breath when:  walking on a level surface, or up a slight hill, or at night? No   5.  Do you currently have a cold, bronchitis or other respiratory infection? No   6.  Do you have a cough, shortness of breath, or wheezing? No   7.  Do you sometimes get pains in the calves of your legs when you walk? No   8. Do you or anyone in your family have previous history of blood clots? No   9.  Do you or does anyone in your family have a serious bleeding problem such as prolonged bleeding following surgeries or cuts? No   10. Have you ever had problems with anemia or been told to take iron pills? No   11. Have you had any abnormal blood loss such as black, tarry or bloody stools, or abnormal vaginal bleeding? No   12. Have you ever had a blood transfusion? No   13. Have you or any of your relatives ever had problems with anesthesia? No   14.  Do you have sleep apnea, excessive snoring or daytime drowsiness? No   15. Do you have any prosthetic heart valves? No   16. Do you have prosthetic joints? No   17. Is there any chance that you may be pregnant? No         HPI:     HPI related to upcoming procedure: Enid is having bilateral breast reconstruction following a mastectomy from breast cancer. She reports having small spots of blood from her breast after sleeping on her side.                                                                                                                                                        .    MEDICAL HISTORY:     Patient Active Problem List    Diagnosis Date Noted     S/P flap graft 10/08/2018     Priority: Medium     S/P breast reconstruction, bilateral 08/28/2018     Priority: Medium     Breast cancer in situ 08/22/2018     Priority: Medium     Recurrent carcinoma in situ of breast, right 07/27/2018     Priority: Medium     Osteopenia of both hands 04/10/2018     Priority: Medium     Skin abscess 05/03/2016     Priority: Medium     Benign neoplasm of colon 03/18/2015     Priority: Medium     Keratoconus 10/16/2014     Priority: Medium     Meibomian gland dysfunction - Both Eyes 10/16/2014     Priority: Medium     Tear film insufficiency 10/16/2014     Priority: Medium     Problem list name updated by automated process. Provider to review       Cervicalgia 06/11/2014     Priority: Medium     Headache 06/11/2014     Priority: Medium     Problem list name updated by automated process. Provider to review       Hyperlipidemia with target LDL less than 130 05/19/2014     Priority: Medium     Diagnosis updated by automated process. Provider to review and confirm.       Macular drusen      Priority: Medium     Low back pain 07/22/2013     Priority: Medium     Diagnosis updated by automated process. Provider to review and confirm.       IT band syndrome 07/22/2013     Priority: Medium     Right hip pain 04/08/2013      Priority: Medium     Pain, radicular, lumbar 03/21/2013     Priority: Medium     Shingles 01/27/2012     Priority: Medium     Cataract 12/11/2008     Priority: Medium     Utility update for deleted IMO code  Im[employer] Update utility       Disorder of synovium, tendon, and bursa 12/11/2007     Priority: Medium     Problem list name updated by automated process. Provider to review       Esophageal reflux 08/08/2006     Priority: Medium     History of right breast cancer 12/22/2004     Priority: Medium     s/p lumpectomy 1/05 and radiation  Problem list name updated by automated process. Provider to review        Past Medical History:   Diagnosis Date     Breast cancer (H) 2004    lumpectomy, radiation, tamoxifen     Cancer (H) 2004    Breast     Cataracts, bilateral      Complication of anesthesia     She prefers not to have versed until right before she leaves or can have after      Enthesopathy of hip region 6/06    right hip     Esophageal reflux 2/06     History of gestational diabetes      Hypertension 2012     Macular drusen      Shingles 01/23/2012    left T6-T7 dermatome     Past Surgical History:   Procedure Laterality Date     BIOPSY  2004    Breast     BREAST SURGERY  2004     C VAGINAL HYSTERECTOMY  12/2001    Ovaries intact, due to fibroids     COLONOSCOPY       ENDOSCOPY  05/09/2007    Upper GI     EYE SURGERY       GRAFT FREE VASCULARIZED TRANSVERSE RECTUS ABDOMINIS MYOCUTANEOUS Bilateral 10/8/2018    Procedure: GRAFT FREE VASCULARIZED TRANSVERSE RECTUS ABDOMINIS MYOCUTANEOUS;  Bilateral Deep Inferior Epigastric Artery  Free Flap Breast Reconstruction and Removal of Breast Explanders;  Surgeon: DENNISE Amin MD;  Location: [facility] OR     GYN SURGERY  2001     HC BIOPSY/EXCISION LYMPH NODE OPEN DEEP CERVICAL W EXC FAT PAD  1/7/2005    Right axillary sentinel node biopsy X 3.     HC COLONOSCOPY W BIOPSY  05/10/10     HC EXCISION BREAST LESION, OPEN >=1  1/7/2005    Right breast.     HC REMV  CATARACT EXTRACAP,INSERT LENS  12/18/08    bilateral     HC REMV TARSAL/METATARSAL BENIGN BONE LESN  1982    Bone spur - right     MASTECTOMY SIMPLE BILATERAL, SENTINEL NODE BILATERAL, COMBINED Bilateral 8/22/2018    Procedure: COMBINED MASTECTOMY SIMPLE BILATERAL, SENTINEL NODE BILATERAL;  Bilateral Mastectomy with Right Point Clear Node Biopsy, Bilateral Breast Reconstruction, Anesthesia Block;  Surgeon: Rakesh Sanchez MD;  Location:  OR     ORTHOPEDIC SURGERY  1983    Right foot     RECONSTRUCT BREAST Bilateral 8/22/2018    Procedure: RECONSTRUCT BREAST;;  Surgeon: DENNISE Amin MD;  Location: [facility] OR     RECONSTRUCT BREAST BILATERAL Bilateral 10/8/2018    Procedure: RECONSTRUCT BREAST BILATERAL;;  Surgeon: DENNISE Amin MD;  Location: [facility] OR     Current Outpatient Prescriptions   Medication Sig Dispense Refill     ACETAMINOPHEN PO Take 325 mg by mouth every 8 hours as needed for pain       anastrozole (ARIMIDEX) 1 MG tablet Take 1 tablet (1 mg) by mouth daily 90 tablet 3     aspirin 81 MG chewable tablet Take 1 tablet (81 mg) by mouth daily for 28 days 28 tablet 0     diazepam (VALIUM) 5 MG tablet Take 1 tablet (5 mg) by mouth every 8 hours as needed for muscle spasms or pain (Patient not taking: Reported on 10/16/2018) 10 tablet 0     fluticasone (FLONASE) 50 MCG/ACT spray USE ONE OR TWO SPRAYS IN EACH NOSTRIL DAILY 16 mL 10     ibuprofen (ADVIL) 200 MG tablet Take 400 mg by mouth as needed for mild pain       loratadine (CLARITIN) 10 MG tablet TAKE ONE TABLET BY MOUTH EVERY DAY AS NEEDED FOR ALLERGY SYMPTOMS 90 tablet 1     methocarbamol (ROBAXIN) 500 MG tablet Take 1 tablet (500 mg) by mouth every 6 hours as needed for muscle spasms 30 tablet 0     Multiple Vitamins-Minerals (PRESERVISION AREDS 2) CAPS Take 1 tablet by mouth 2 times daily (Patient not taking: Reported on 10/16/2018) 60 capsule 11     Omega-3 Fatty Acids (FISH OIL) 1000 MG CPDR Take  by mouth.       omeprazole (PRILOSEC) 20 MG  CR capsule TAKE 1 CAPSULE (20 MG) BY MOUTH DAILY (Patient not taking: Reported on 10/16/2018) 90 capsule 3     ondansetron (ZOFRAN ODT) 4 MG ODT tab Take one tablet every 6 hours for nausea. 20 tablet 1     oxyCODONE IR (ROXICODONE) 5 MG tablet Take 1-2 tablets (5-10 mg) by mouth every 4 hours as needed for moderate to severe pain 30 tablet 0     sennosides (SENOKOT) 8.6 MG tablet Take 2 tablets by mouth 2 times daily       sertraline (ZOLOFT) 25 MG tablet Take 1 tablet daily. (Patient taking differently: 25 mg every morning Take 1 tablet daily.) 30 tablet 11     traMADol (ULTRAM) 50 MG tablet Take 1 tablet (50 mg) by mouth every 6 hours as needed for severe pain 20 tablet 0     OTC products: Aspirin and NSAIDS    Allergies   Allergen Reactions     Alphagan P Rash     Thrush and numbness in mouth      Latex Allergy: NO    Social History   Substance Use Topics     Smoking status: Never Smoker     Smokeless tobacco: Never Used     Alcohol use Yes      Comment: occasionally     History   Drug Use No       REVIEW OF SYSTEMS:   Constitutional, HEENT, cardiovascular, pulmonary, GI, , musculoskeletal, neuro, skin, endocrine and psych systems are negative, except as in HPI or otherwise noted.     This document serves as a record of the services and decisions personally performed and made by Shi Alonso MD. It was created on her behalf by Dmitriy Caldwell, a trained medical scribe. The creation of this document is based the provider's statements to the medical scribe.  Dmitriy Caldwell, November 1, 2018 9:36 AM    EXAM:   LMP 10/14/2001    GENERAL APPEARANCE: healthy, alert and no distress     EYES: EOMI, PERRL     HENT: ear canals and TM's normal and nose and mouth without ulcers or lesions     NECK: no adenopathy, no asymmetry, masses, or scars and thyroid normal to palpation     RESP: lungs clear to auscultation - no rales, rhonchi or wheezes     CV: regular rates and rhythm, normal S1 S2, no S3 or S4 and no murmur, click or  rub     ABDOMEN:  soft, nontender, no HSM or masses and bowel sounds normal     MS: extremities normal- no gross deformities noted, no evidence of inflammation in joints, FROM in all extremities.     SKIN: no suspicious lesions or rashes to visible skin     NEURO: Normal strength and tone, sensory exam grossly normal, mentation intact and speech normal     PSYCH: mentation appears normal. and affect normal/bright     LYMPHATICS: No cervical adenopathy     BREAST: Drop of blood at surgical site on right breast-benign.     DIAGNOSTICS:   Required labs and EKG previously completed. Will obtain hemoglobin lab today.     Recent Labs   Lab Test  10/10/18   0357  10/09/18   1142  10/09/18   0444   HGB  9.4*   --   10.2*   PLT  160   --   162   INR   --   1.15*   --    NA  139   --   143   POTASSIUM  3.6   --   3.8   CR  0.64   --   0.71        IMPRESSION:   Reason for surgery/procedure: Breast reconstruction following mastectomy.    The proposed surgical procedure is considered LOW risk.    REVISED CARDIAC RISK INDEX  The patient has the following serious cardiovascular risks for perioperative complications such as (MI, PE, VFib and 3  AV Block):  No serious cardiac risks  INTERPRETATION: 0 risks: Class I (very low risk - 0.4% complication rate)    The patient has the following additional risks for perioperative complications:  No identified additional risks      ICD-10-CM    1. Preop general physical exam Z01.818    Reassured patient of benign nature of drop of blood from right breast surgical site.     RECOMMENDATIONS:     --Consult hospital rounder / IM to assist post-op medical management    --Patient is to take all scheduled medications on the day of surgery EXCEPT for modifications listed below.    Anticoagulant or Antiplatelet Medication Use  ASPIRIN: Discontinue ASA 7-10 days prior to procedure to reduce bleeding risk.  It should be resumed post-operatively.  NSAIDS: Ibuprofen (Motrin):         Stop one day prior  to surgery    APPROVAL GIVEN to proceed with proposed procedure, without further diagnostic evaluation       Signed Electronically by: Shi Alonso MD, MD    Copy of this evaluation report is provided to requesting physician.    [place] Preop Guidelines    Revised Cardiac Risk Index

## 2018-10-30 ENCOUNTER — OFFICE VISIT (OUTPATIENT)
Dept: PLASTIC SURGERY | Facility: CLINIC | Age: 58
End: 2018-10-30
Attending: PLASTIC SURGERY
Payer: COMMERCIAL

## 2018-10-30 VITALS
BODY MASS INDEX: 28.62 KG/M2 | HEART RATE: 82 BPM | DIASTOLIC BLOOD PRESSURE: 83 MMHG | SYSTOLIC BLOOD PRESSURE: 123 MMHG | TEMPERATURE: 97.1 F | HEIGHT: 65 IN | WEIGHT: 171.8 LBS | RESPIRATION RATE: 16 BRPM | OXYGEN SATURATION: 99 %

## 2018-10-30 DIAGNOSIS — Z98.890 S/P FLAP GRAFT: Primary | ICD-10-CM

## 2018-10-30 PROCEDURE — G0463 HOSPITAL OUTPT CLINIC VISIT: HCPCS | Mod: ZF

## 2018-10-30 ASSESSMENT — PAIN SCALES - GENERAL: PAINLEVEL: NO PAIN (0)

## 2018-10-30 NOTE — LETTER
10/30/2018       RE: Enid Lombardo  [address]  [facility] [address]     Dear Colleague,    Thank you for referring your patient, Enid Lombardo, to the [facility] at [facility]. Please see a copy of my visit note below.    PRESENTING COMPLAINT:  Postoperative visit, status post bilateral breast reconstruction with free KISHOR flaps done on 10/09/2018.       HISTORY OF PRESENTING COMPLAINT:  Ms. Lombardo is 58 years old, about 3  weeks out from surgery and has done extremely well, very happy with the results, no major issues.        PHYSICAL EXAMINATION:  On exam vital signs stable.  She is afebrile in no obvious distress.  Everything is healing in well.  No evidence for infection, seroma or hematoma.  Symmetry of the breasts is excellent. There is some upper pole indentation on the left.       ASSESSMENT AND PLAN:  Based on above findings, a diagnosis of bilateral breast reconstruction with free KISHOR flaps was made.   Plan is to proceed with fat grafting and nipple recon in Nov. All risks, benefits and alternatives were explained to the patient in detail, they were understood and agreed upon by the patient, they were acknowledged by the patient,   all the patient's questions were answered in detail to the patient's fullest understanding that they acknowledged, the team approach for treatment in the operating room was agreed upon by the patient, and proceeding with surgery was agreed upon by the patient.  All questions were answered.  She was happy with the visit.  I will see her back in a week's time.     Again, thank you for allowing me to participate in the care of your patient.      Sincerely,    DENNISE Amin MD

## 2018-10-30 NOTE — MR AVS SNAPSHOT
After Visit Summary   10/30/2018    Enid Lombardo    MRN: [id number]           Patient Information     Date Of Birth          1960        Visit Information        Provider Department      10/30/2018 10:30 AM DENNISE Amin MD [facility]        Today's Diagnoses     S/P flap graft    -  1       Follow-ups after your visit        Follow-up notes from your care team     Return if symptoms worsen or fail to improve.      Your next 10 appointments already scheduled     Nov 01, 2018  9:30 AM CDT   Pre-Op physical with Shi Alonso MD   [facility] ([facility]    [address]  [facility] [address]   [phone]            Nov 08, 2018   Procedure with DENNISE Amin MD   [facility] ([facility])    [address] [place] [address]  5th Floor  [facility] [id number] [phone]           Located in the Clinics and Surgery Center at [address] [place] [address].   parking is very convenient and highly recommended.  is a $6 flat rate fee.  Both  and self parkers should enter the main arrival plaza from [facility]; parking attendants will direct you based on your parking preference.            Nov 27, 2018 11:15 AM CST   (Arrive by 11:00 AM)   Post-Op with DENNISE Amin MD   [facility] ([facility])    [address] [place] [address]  [address]  [facility] [address] [phone]            Jan 14, 2019  8:00 AM CST   DX HIP/PELVIS/SPINE with UCDX1   [facility] ([facility])    [address] [place] [address]  1st Floor  [facility] [address] [phone]           How do I prepare for my exam? (Food and drink instructions) No Food and Drink Restrictions.  How do I prepare for my exam? (Other instructions) Please do not take any of the following 24 hours prior to the day of your  exam: vitamins, calcium tablets, antacids.  What should I wear: If possible, please wear clothes without metal (snaps, zippers). A sweat suit works well.  How long does the exam take: The exam takes about 20 minutes.  What should I bring: Bring a list of your current medicines to your exam (including vitamins, minerals and over-the-counter drugs).  Do I need a :  No  is needed.  What should I do after the exam: No restrictions, You may resume normal activities.  How do I prepare for my exam? (Food and drink instructions) A DEXA scan is a bone-density scan. It uses a low level of radiation to check the strength of your bones. As you lie on a padded table, a machine will take X-rays. We most often scan the hips and lower spine.  Who should I call with questions: If you have any questions, please call the Imaging Department where you will have your exam. Directions, parking instructions, and other information is available on our website, [email or web address].            Jan 14, 2019  9:00 AM CST   [facility] Lab Draw with  [employer] LAB DRAW   [facility] ([facility])    [address] [place] [address]  [address]  [facility] [address]   [phone]            Jan 14, 2019 11:30 AM CST   (Arrive by 11:15 AM)   Return Visit with Charity Brar MD   [facility] ([facility])    [address]  [address]  [facility] [address]   [phone]            Jan 22, 2019 12:10 PM CST   RETURN RETINA with Paz Osborn MD   [facility] ([facility])    [address]  [facility] [address]   [phone]            Sep 23, 2019  2:00 PM CDT   (Arrive by 1:45 PM)   Return Visit with Charity Brar MD   [facility] ([facility])    [address] [place] [address]  [address]  [facility] [address]   [phone]              Who  "to contact     If you have questions or need follow up information about today's clinic visit or your schedule please contact [facility] directly at [phone].  Normal or non-critical lab and imaging results will be communicated to you by [employer], letter or phone within 4 business days after the clinic has received the results. If you do not hear from us within 7 days, please contact the clinic through [employer] or phone. If you have a critical or abnormal lab result, we will notify you by phone as soon as possible.  Submit refill requests through [employer] or call your pharmacy and they will forward the refill request to us. Please allow 3 business days for your refill to be completed.          Additional Information About Your Visit        [employer] Information     [employer] gives you secure access to your electronic health record. If you see a primary care provider, you can also send messages to your care team and make appointments. If you have questions, please call your primary care clinic.  If you do not have a primary care provider, please call [phone] and they will assist you.        Care EveryWhere ID     This is your Care EveryWhere ID. This could be used by other organizations to access your [facility] medical records  [id number]        Your Vitals Were     Pulse Temperature Respirations Height Last Period Pulse Oximetry    82 97.1  F (36.2  C) (Oral) 16 5' 5\" 10/14/2001 99%    BMI (Body Mass Index)                   28.59 kg/m2            Blood Pressure from Last 3 Encounters:   10/30/18 123/83   10/23/18 113/72   10/16/18 121/72    Weight from Last 3 Encounters:   10/30/18 171 lb 12.8 oz   10/23/18 171 lb 1.6 oz   10/16/18 173 lb 3 oz              Today, you had the following     No orders found for display         Today's Medication Changes          These changes are accurate as of 10/30/18  5:55 PM.  If you have any questions, ask your nurse or doctor.               These medicines have " changed or have updated prescriptions.        Dose/Directions    sertraline 25 MG tablet   Commonly known as:  ZOLOFT   This may have changed:    - how much to take  - when to take this  - additional instructions   Used for:  Hot flushes, perimenopausal        Take 1 tablet daily.   Quantity:  30 tablet   Refills:  11                Primary Care Provider Office Phone # Fax #    Shi Sasha Alonso [phone]       [address] [place] [address]        Equal Access to Services     [facility] : Hadii prem ku hadasho Soomaali, waaxda luqadaha, qaybta kaalmada adeegyada, waxay brennain hayniralin jh delgadillo lagrisel . So [facility] [phone].    ATENCIÓN: Si habla español, tiene a rivera disposición servicios gratuitos de asistencia lingüística. [facility] [phone].    We comply with applicable federal civil rights laws and [place] laws. We do not discriminate on the basis of race, color, national origin, age, disability, sex, sexual orientation, or gender identity.            Thank you!     Thank you for choosing [facility]  for your care. Our goal is always to provide you with excellent care. Hearing back from our patients is one way we can continue to improve our services. Please take a few minutes to complete the written survey that you may receive in the mail after your visit with us. Thank you!             Your Updated Medication List - Protect others around you: Learn how to safely use, store and throw away your medicines at www.disposemymeds.org.          This list is accurate as of 10/30/18  5:55 PM.  Always use your most recent med list.                   Brand Name Dispense Instructions for use Diagnosis    ACETAMINOPHEN PO      Take 325 mg by mouth every 8 hours as needed for pain        ADVIL 200 MG tablet   Generic drug:  ibuprofen      Take 400 mg by mouth as needed for mild pain        ARIMIDEX 1 MG tablet   Generic drug:  anastrozole     90 tablet    Take 1 tablet (1 mg) by mouth  daily    Recurrent malignant neoplasm of breast, unspecified laterality (H)       aspirin 81 MG chewable tablet     28 tablet    Take 1 tablet (81 mg) by mouth daily for 28 days    History of right breast cancer       diazepam 5 MG tablet    VALIUM    10 tablet    Take 1 tablet (5 mg) by mouth every 8 hours as needed for muscle spasms or pain    History of right breast cancer       Fish Oil 1000 MG Cpdr      Take  by mouth.        fluticasone 50 MCG/ACT spray    FLONASE    16 mL    USE ONE OR TWO SPRAYS IN EACH NOSTRIL DAILY    Chronic rhinitis       loratadine 10 MG tablet    CLARITIN    90 tablet    TAKE ONE TABLET BY MOUTH EVERY DAY AS NEEDED FOR ALLERGY SYMPTOMS    Chronic rhinitis, unspecified type       methocarbamol 500 MG tablet    ROBAXIN    30 tablet    Take 1 tablet (500 mg) by mouth every 6 hours as needed for muscle spasms    History of right breast cancer       omeprazole 20 MG CR capsule    priLOSEC    90 capsule    TAKE 1 CAPSULE (20 MG) BY MOUTH DAILY    Gastroesophageal reflux disease, esophagitis presence not specified       ondansetron 4 MG ODT tab    ZOFRAN ODT    20 tablet    Take one tablet every 6 hours for nausea.    S/P breast reconstruction       oxyCODONE IR 5 MG tablet    ROXICODONE    30 tablet    Take 1-2 tablets (5-10 mg) by mouth every 4 hours as needed for moderate to severe pain    History of right breast cancer       PRESERVISION AREDS 2 Caps     60 capsule    Take 1 tablet by mouth 2 times daily    Drusen (degenerative) of retina, bilateral, Vitreous degeneration, bilateral       sennosides 8.6 MG tablet    SENOKOT     Take 2 tablets by mouth 2 times daily        sertraline 25 MG tablet    ZOLOFT    30 tablet    Take 1 tablet daily.    Hot flushes, perimenopausal       traMADol 50 MG tablet    ULTRAM    20 tablet    Take 1 tablet (50 mg) by mouth every 6 hours as needed for severe pain    S/P breast reconstruction

## 2018-10-30 NOTE — NURSING NOTE
"Oncology Rooming Note    October 30, 2018 10:12 AM   Enid Lombardo is a 58 year old female who presents for:    Chief Complaint   Patient presents with     RECHECK     pre op surgery      Initial Vitals: LMP 10/14/2001 Estimated body mass index is 28.47 kg/(m^2) as calculated from the following:    Height as of 10/23/18: 1.651 m (5' 5\").    Weight as of 10/23/18: 77.6 kg (171 lb 1.6 oz). There is no height or weight on file to calculate BSA.  Data Unavailable Comment: Data Unavailable   Patient's last menstrual period was 10/14/2001.  Allergies reviewed: Yes  Medications reviewed: Yes    Medications: Medication refills not needed today.  Pharmacy name entered into Rapportive:    [id number] [place] [facility] - [place], [place] - [address] [address] [place] [address] PHARMACY    Clinical concerns: none      8 minutes for nursing intake (face to face time)     Mechelle CHERYL Jacobson              "

## 2018-10-30 NOTE — PROGRESS NOTES
PRESENTING COMPLAINT:  Postoperative visit, status post bilateral breast reconstruction with free KISHOR flaps done on 10/09/2018.       HISTORY OF PRESENTING COMPLAINT:  Ms. Lombardo is 58 years old, about 3  weeks out from surgery and has done extremely well, very happy with the results, no major issues.        PHYSICAL EXAMINATION:  On exam vital signs stable.  She is afebrile in no obvious distress.  Everything is healing in well.  No evidence for infection, seroma or hematoma.  Symmetry of the breasts is excellent. There is some upper pole indentation on the left.       ASSESSMENT AND PLAN:  Based on above findings, a diagnosis of bilateral breast reconstruction with free KISHOR flaps was made.  Plan is to proceed with fat grafting and nipple recon in Nov. All risks, benefits and alternatives were explained to the patient in detail, they were understood and agreed upon by the patient, they were acknowledged by the patient,   all the patient's questions were answered in detail to the patient's fullest understanding that they acknowledged, the team approach for treatment in the operating room was agreed upon by the patient, and proceeding with surgery was agreed upon by the patient.  All questions were answered.  She was happy with the visit.  I will see her back in a week's time.

## 2018-11-01 ENCOUNTER — OFFICE VISIT (OUTPATIENT)
Dept: FAMILY MEDICINE | Facility: OTHER | Age: 58
End: 2018-11-01
Payer: COMMERCIAL

## 2018-11-01 VITALS
WEIGHT: 172 LBS | RESPIRATION RATE: 20 BRPM | TEMPERATURE: 98.9 F | BODY MASS INDEX: 28.62 KG/M2 | OXYGEN SATURATION: 98 % | DIASTOLIC BLOOD PRESSURE: 70 MMHG | HEART RATE: 88 BPM | SYSTOLIC BLOOD PRESSURE: 120 MMHG

## 2018-11-01 DIAGNOSIS — E78.5 HYPERLIPIDEMIA WITH TARGET LDL LESS THAN 130: ICD-10-CM

## 2018-11-01 DIAGNOSIS — Z98.890 S/P BREAST RECONSTRUCTION, BILATERAL: ICD-10-CM

## 2018-11-01 DIAGNOSIS — Z01.818 PREOP GENERAL PHYSICAL EXAM: Primary | ICD-10-CM

## 2018-11-01 DIAGNOSIS — Z98.890 S/P FLAP GRAFT: ICD-10-CM

## 2018-11-01 DIAGNOSIS — D64.9 POSTOPERATIVE ANEMIA: ICD-10-CM

## 2018-11-01 DIAGNOSIS — D05.91 RECURRENT CARCINOMA IN SITU OF BREAST, RIGHT: ICD-10-CM

## 2018-11-01 LAB
ERYTHROCYTE [DISTWIDTH] IN BLOOD BY AUTOMATED COUNT: 12.8 % (ref 10–15)
HCT VFR BLD AUTO: 37.6 % (ref 35–47)
HGB BLD-MCNC: 12.2 G/DL (ref 11.7–15.7)
MCH RBC QN AUTO: 30.3 PG (ref 26.5–33)
MCHC RBC AUTO-ENTMCNC: 32.4 G/DL (ref 31.5–36.5)
MCV RBC AUTO: 93 FL (ref 78–100)
PLATELET # BLD AUTO: 272 10E9/L (ref 150–450)
RBC # BLD AUTO: 4.03 10E12/L (ref 3.8–5.2)
WBC # BLD AUTO: 4.7 10E9/L (ref 4–11)

## 2018-11-01 PROCEDURE — 36415 COLL VENOUS BLD VENIPUNCTURE: CPT | Performed by: FAMILY MEDICINE

## 2018-11-01 PROCEDURE — 99214 OFFICE O/P EST MOD 30 MIN: CPT | Performed by: FAMILY MEDICINE

## 2018-11-01 PROCEDURE — 85027 COMPLETE CBC AUTOMATED: CPT | Performed by: FAMILY MEDICINE

## 2018-11-01 NOTE — MR AVS SNAPSHOT
After Visit Summary   11/1/2018    Enid Lombardo    MRN: [id number]           Patient Information     Date Of Birth          1960        Visit Information        Provider Department      11/1/2018 9:30 AM Shi Alonso MD [facility]        Today's Diagnoses     Preop general physical exam    -  1    S/P breast reconstruction, bilateral        S/P flap graft        Recurrent carcinoma in situ of breast, right        Hyperlipidemia with target LDL less than 130        Postoperative anemia          Care Instructions      Before Your Surgery      Call your surgeon if there is any change in your health. This includes signs of a cold or flu (such as a sore throat, runny nose, cough, rash or fever).    Do not smoke, drink alcohol or take over the counter medicine (unless your surgeon or primary care doctor tells you to) for the 24 hours before and after surgery.    If you take prescribed drugs: Follow your doctor s orders about which medicines to take and which to stop until after surgery.    Eating and drinking prior to surgery: follow the instructions from your surgeon    Take a shower or bath the night before surgery. Use the soap your surgeon gave you to gently clean your skin. If you do not have soap from your surgeon, use your regular soap. Do not shave or scrub the surgery site.  Wear clean pajamas and have clean sheets on your bed.           Follow-ups after your visit        Follow-up notes from your care team     Return in about 9 months (around 8/1/2019), or if symptoms worsen or fail to improve, for Lab Work.      Your next 10 appointments already scheduled     Nov 29, 2018   Procedure with DENNISE Amin MD   [facility] ([facility])    [address] [place] [address]  5th [facility] [address]   [phone]           Located in the Clinics and Surgery Center at [address] [place] [address].  Samantha  parking is very convenient and highly recommended.  is a $6 flat rate fee.  Both  and self parkers should enter the main arrival plaza from [facility]; parking attendants will direct you based on your parking preference.            Dec 04, 2018 10:15 AM CST   (Arrive by 10:00 AM)   Post-Op with DENNISE Amin MD   [facility] ([facility])    [address] [facility] [facility] [address]  [address]  [facility] [id number] [phone]            Jan 14, 2019  8:00 AM CST   DX HIP/PELVIS/SPINE with UCDX1   [facility] ([facility])    [address] [facility] [address]  1st Floor  [facility] [id number] [phone]           How do I prepare for my exam? (Food and drink instructions) No Food and Drink Restrictions.  How do I prepare for my exam? (Other instructions) Please do not take any of the following 24 hours prior to the day of your exam: vitamins, calcium tablets, antacids.  What should I wear: If possible, please wear clothes without metal (snaps, zippers). A sweat suit works well.  How long does the exam take: The exam takes about 20 minutes.  What should I bring: Bring a list of your current medicines to your exam (including vitamins, minerals and over-the-counter drugs).  Do I need a :  No  is needed.  What should I do after the exam: No restrictions, You may resume normal activities.  How do I prepare for my exam? (Food and drink instructions) A DEXA scan is a bone-density scan. It uses a low level of radiation to check the strength of your bones. As you lie on a padded table, a machine will take X-rays. We most often scan the hips and lower spine.  Who should I call with questions: If you have any questions, please call the Imaging Department where you will have your exam. Directions, parking instructions, and other information is available on our website, [email or web address].            Jan 14, 2019  9:00 AM JESSICA Hunt  Lab Draw with  VSS Monitoring LAB DRAW   [facility] Lab Draw ([facility])    [address]  [address]  [facility] [address]   [phone]            Jan 14, 2019 11:30 AM CST   (Arrive by 11:15 AM)   Return Visit with Charity Brar MD   [facility] ([facility])    [address]  [address]  [facility] [address]   [phone]            Jan 22, 2019 12:10 PM CST   RETURN RETINA with Pza Osborn MD   [facility] ([facility])    [address]  9th [address] Clin [address]a  [facility] [address]   [phone]            Sep 23, 2019  2:00 PM CDT   (Arrive by 1:45 PM)   Return Visit with Charity Brar MD   [facility] ([facility])    [address]  [address]  [facility] [address]   [phone]              Who to contact     If you have questions or need follow up information about today's clinic visit or your schedule please contact [facility] directly at [phone].  Normal or non-critical lab and imaging results will be communicated to you by MyChart, letter or phone within 4 business days after the clinic has received the results. If you do not hear from us within 7 days, please contact the clinic through avolutionhart or phone. If you have a critical or abnormal lab result, we will notify you by phone as soon as possible.  Submit refill requests through [facility] or call your pharmacy and they will forward the refill request to us. Please allow 3 business days for your refill to be completed.          Additional Information About Your Visit        [email or web address] Information     [facility] gives you secure access to your electronic health record. If you see a primary care provider, you can also send messages to your care team and make appointments. If you have questions, please call your primary care clinic.  If you  do not have a primary care provider, please call [phone] and they will assist you.        Care EveryWhere ID     This is your Care EveryWhere ID. This could be used by other organizations to access your [facility] medical records  [id number]        Your Vitals Were     Pulse Temperature Respirations Last Period Pulse Oximetry BMI (Body Mass Index)    88 98.9  F (37.2  C) (Temporal) 20 10/14/2001 98% 28.62 kg/m2       Blood Pressure from Last 3 Encounters:   11/01/18 120/70   10/30/18 123/83   10/23/18 113/72    Weight from Last 3 Encounters:   11/01/18 172 lb (78 kg)   10/30/18 171 lb 12.8 oz (77.9 kg)   10/23/18 171 lb 1.6 oz (77.6 kg)              We Performed the Following     CBC with platelets          Today's Medication Changes          These changes are accurate as of 11/1/18 11:57 AM.  If you have any questions, ask your nurse or doctor.               These medicines have changed or have updated prescriptions.        Dose/Directions    sertraline 25 MG tablet   Commonly known as:  ZOLOFT   This may have changed:    - how much to take  - when to take this  - additional instructions   Used for:  Hot flushes, perimenopausal        Take 1 tablet daily.   Quantity:  30 tablet   Refills:  11         Stop taking these medicines if you haven't already. Please contact your care team if you have questions.     diazepam 5 MG tablet   Commonly known as:  VALIUM   Stopped by:  Shi Alonso MD           methocarbamol 500 MG tablet   Commonly known as:  ROBAXIN   Stopped by:  Shi Alonso MD           omeprazole 20 MG CR capsule   Commonly known as:  priLOSEC   Stopped by:  Shi Alonso MD           oxyCODONE IR 5 MG tablet   Commonly known as:  ROXICODONE   Stopped by:  Shi Alonso MD           PRESERVISION AREDS 2 Caps   Stopped by:  Shi Alonso MD           traMADol 50 MG tablet   Commonly known as:  ULTRAM   Stopped by:  Shi Alonso MD                    Primary Care Provider  Office Phone # Fax #    Shi Sasha Alonso [phone]       [address] [place] [address]        Equal Access to Services     RAIZA TOLBERT : Lillie Morrison, watoi betts, sunshineta kadipeshshruthi mooreinocencioshruthi, waxtom brennain hayaatrevon sandsgladiscricket tong. So [facility] [phone].    ATENCIÓN: Si habla español, tiene a rivera disposición servicios gratuitos de asistencia lingüística. Llame al [phone].    We comply with applicable federal civil rights laws and [place] laws. We do not discriminate on the basis of race, color, national origin, age, disability, sex, sexual orientation, or gender identity.            Thank you!     Thank you for choosing [facility]  for your care. Our goal is always to provide you with excellent care. Hearing back from our patients is one way we can continue to improve our services. Please take a few minutes to complete the written survey that you may receive in the mail after your visit with us. Thank you!             Your Updated Medication List - Protect others around you: Learn how to safely use, store and throw away your medicines at www.disposemymeds.org.          This list is accurate as of 11/1/18 11:57 AM.  Always use your most recent med list.                   Brand Name Dispense Instructions for use Diagnosis    ACETAMINOPHEN PO      Take 325 mg by mouth every 8 hours as needed for pain        ADVIL 200 MG tablet   Generic drug:  ibuprofen      Take 400 mg by mouth as needed for mild pain        ARIMIDEX 1 MG tablet   Generic drug:  anastrozole     90 tablet    Take 1 tablet (1 mg) by mouth daily    Recurrent malignant neoplasm of breast, unspecified laterality (H)       aspirin 81 MG chewable tablet     28 tablet    Take 1 tablet (81 mg) by mouth daily for 28 days    History of right breast cancer       Fish Oil 1000 MG Cpdr      Take  by mouth.        fluticasone 50 MCG/ACT spray    FLONASE    16 mL    USE ONE OR TWO SPRAYS IN EACH  NOSTRIL DAILY    Chronic rhinitis       loratadine 10 MG tablet    CLARITIN    90 tablet    TAKE ONE TABLET BY MOUTH EVERY DAY AS NEEDED FOR ALLERGY SYMPTOMS    Chronic rhinitis, unspecified type       ondansetron 4 MG ODT tab    ZOFRAN ODT    20 tablet    Take one tablet every 6 hours for nausea.    S/P breast reconstruction       sennosides 8.6 MG tablet    SENOKOT     Take 2 tablets by mouth 2 times daily        sertraline 25 MG tablet    ZOLOFT    30 tablet    Take 1 tablet daily.    Hot flushes, perimenopausal

## 2018-11-03 ENCOUNTER — NURSE TRIAGE (OUTPATIENT)
Dept: NURSING | Facility: CLINIC | Age: 58
End: 2018-11-03

## 2018-11-03 NOTE — TELEPHONE ENCOUNTER
Pt reports having breast reconstruction 3 weeks ago.  Today she noticed her incision opening up, it is 3 times larger now when compared to this morning, gap is about 1cm in size.  Denies a fever.  Yellow drainage present with small drops of blood on bra.      Disposition:  Page on-call provider to call pt.   1:19PM: Smart Web used to page on-call Dr. Nedra Amin to call pt at [phone]. Advised pt to call FNA back if she does not hear from the provider within 20 minutes.  Pt verbalized understanding and had no further questions.     Margarita Ram RN/RADHA    Reason for Disposition    Caller has URGENT question and triager unable to answer question    Additional Information    Negative: [1] Major abdominal surgical incision AND [2] wound gaping open AND [3] visible internal organs    Negative: Sounds like a life-threatening emergency to the triager    Negative: [1] Bleeding from incision AND [2] won't stop after 10 minutes of direct pressure    Negative: [1] Widespread rash AND [2] bright red, sunburn-like    Negative: Severe pain in the incision    Negative: [1] Suture came out early AND [2] wound gaping AND [3] < 48 hours since sutures placed    Negative: [1] Incision gaping open AND [2] length of opening > 2 inches (5 cm)    Negative: Patient sounds very sick or weak to the triager    Negative: Sounds like a serious complication to the triager    Negative: Fever > 100.5 F (38.1 C)    Negative: [1] Incision looks infected (spreading redness, pain) AND [2] fever > 99.5 F (37.5 C)    Negative: [1] Incision looks infected (spreading redness, pain) AND [2] large red area (> 2 in. or 5 cm)    Negative: [1] Incision looks infected (spreading redness, pain) AND [2] face wound    Negative: [1] Red streak runs from the incision AND [2] longer than 1 inch (2.5 cm)    Negative: [1] Pus or bad-smelling fluid draining from incision AND [2] no fever    Negative: [1] Post-op pain AND [2] not controlled with pain  medications    Negative: Dressing soaked with blood or body fluid (e.g., drainage)    Protocols used: POST-OP INCISION SYMPTOMS-ADULT-AH

## 2018-11-06 ENCOUNTER — OFFICE VISIT (OUTPATIENT)
Dept: PLASTIC SURGERY | Facility: CLINIC | Age: 58
End: 2018-11-06
Attending: PLASTIC SURGERY
Payer: COMMERCIAL

## 2018-11-06 VITALS
SYSTOLIC BLOOD PRESSURE: 114 MMHG | RESPIRATION RATE: 14 BRPM | HEART RATE: 73 BPM | TEMPERATURE: 97.3 F | OXYGEN SATURATION: 99 % | DIASTOLIC BLOOD PRESSURE: 77 MMHG | HEIGHT: 65 IN | WEIGHT: 173.3 LBS | BODY MASS INDEX: 28.87 KG/M2

## 2018-11-06 DIAGNOSIS — Z98.890 S/P FLAP GRAFT: Primary | ICD-10-CM

## 2018-11-06 PROCEDURE — G0463 HOSPITAL OUTPT CLINIC VISIT: HCPCS | Mod: ZF

## 2018-11-06 ASSESSMENT — PAIN SCALES - GENERAL: PAINLEVEL: NO PAIN (0)

## 2018-11-06 NOTE — LETTER
11/6/2018       RE: Enid Lombardo  [address] [facility] [place] [address]     Dear Colleague,    Thank you for referring your patient, Enid Lombardo, to the [facility] Please see a copy of my visit note below.    PRESENTING COMPLAINT:  Postoperative visit, status post bilateral breast reconstruction with free KISHOR flaps for breast cancer done 10/09/2018.      HISTORY OF PRESENTING COMPLAINT:  Ms. Lombardo is 58 years old.  She is about 4 weeks out from surgery now.  I got a phone call from her over the weekend regarding a small superficial opening in her left mastectomy site area.  No fevers noted.  Minimal drainage and no pain.  Here to have it looked at.      PHYSICAL EXAMINATION:  Vital signs are stable.  She is afebrile, in no obvious distress.  Both breasts are healing in well, symmetric volumetrically.  There is a small stitch abscess in the periareolar area on the right side and a small 1 cm x 0.5 cm opening in the left transverse incision on the left breast.  No evidence for infection.      ASSESSMENT AND PLAN:  Based upon the above findings, a diagnosis of bilateral breast reconstruction with free KISHOR flaps was made.  I reassured the patient about the small suture abscess/openings.  I have advised Vaseline dressings and allow them to heal in secondarily.  She is on the schedule for surgery for fat grafting, nipple reconstruction and scar revisions at the end of November.  All questions were answered.  I will see her back at the time of surgery unless things change.  She was happy with the visit.         Again, thank you for allowing me to participate in the care of your patient.      Sincerely,    DENNISE Amin MD

## 2018-11-06 NOTE — MR AVS SNAPSHOT
After Visit Summary   11/6/2018    Enid Lombardo    MRN: [id number]           Patient Information     Date Of Birth          1960        Visit Information        Provider Department      11/6/2018 11:30 AM DENNISE Amin MD [facility]        Today's Diagnoses     S/P flap graft    -  1       Follow-ups after your visit        Follow-up notes from your care team     Return if symptoms worsen or fail to improve.      Your next 10 appointments already scheduled     Nov 29, 2018   Procedure with DENNISE Amin MD   [facility] ([facility])    [address] [place] [address]  5th Floor  [facility] [address] [phone]           Located in the Clinics and Surgery Center at [address] [place] [address].   parking is very convenient and highly recommended.  is a $6 flat rate fee.  Both  and self parkers should enter the main arrival plaza from [facility]; parking attendants will direct you based on your parking preference.            Dec 04, 2018 10:15 AM CST   (Arrive by 10:00 AM)   Post-Op with DENNISE Amin MD   [facility] ([facility])    [address] [place] [address]  [address]  [facility] [id number] [phone]            Jan 14, 2019  8:00 AM CST   DX HIP/PELVIS/SPINE with UCDX1   [facility] ([facility])    [address] [place] [address]  1st Floor  [facility] [address] [phone]           How do I prepare for my exam? (Food and drink instructions) No Food and Drink Restrictions.  How do I prepare for my exam? (Other instructions) Please do not take any of the following 24 hours prior to the day of your exam: vitamins, calcium tablets, antacids.  What should I wear: If possible, please wear clothes without metal (snaps, zippers). A sweat suit works well.  How long does the exam take: The exam takes about 20  minutes.  What should I bring: Bring a list of your current medicines to your exam (including vitamins, minerals and over-the-counter drugs).  Do I need a :  No  is needed.  What should I do after the exam: No restrictions, You may resume normal activities.  How do I prepare for my exam? (Food and drink instructions) A DEXA scan is a bone-density scan. It uses a low level of radiation to check the strength of your bones. As you lie on a padded table, a machine will take X-rays. We most often scan the hips and lower spine.  Who should I call with questions: If you have any questions, please call the Imaging Department where you will have your exam. Directions, parking instructions, and other information is available on our website, [email or web address].            Jan 14, 2019  9:00 AM CST   [facility] Draw with  [facility] DRAW   [facility] ([facility])    [address] [place] [address]  [address]  [facility] [address]   [phone]            Jan 14, 2019 11:30 AM CST   (Arrive by 11:15 AM)   Return Visit with Charity Brar MD   [facility] ([facility])    [address] [place] [address]  [address]  [facility] [address]   [phone]            Jan 22, 2019 12:10 PM CST   RETURN RETINA with Paz Osborn MD   [facility] ([facility])    [address]  [facility] [address]   [phone]            Jan 23, 2019 11:30 AM CST   (Arrive by 11:15 AM)   Post-Op with DENNISE Amin MD   [facility] ([facility])    [address] [place] [address]  4th Floor  [facility] [address]   [phone]            Sep 23, 2019  2:00 PM CDT   (Arrive by 1:45 PM)   Return Visit with Charity Brar MD   [facility] ([facility])    [address] [place] [address]  [address]  [facility] [address] [phone]              Who to contact     If you have questions or need follow up information about today's clinic visit or your schedule please contact [facility] directly at [phone].  Normal or non-critical lab and imaging results will be communicated to you by MyChart, letter or phone within 4 business days after the clinic has received the results. If you do not hear from us within 7 days, please contact the clinic through [employer] or phone. If you have a critical or abnormal lab result, we will notify you by phone as soon as possible.  Submit refill requests through [employer] or call your pharmacy and they will forward the refill request to us. Please allow 3 business days for your refill to be completed.          Additional Information About Your Visit        [employer] Information     [employer] gives you secure access to your electronic health record. If you see a primary care provider, you can also send messages to your care team and make appointments. If you have questions, please call your primary care clinic.  If you do not have a primary care provider, please call [phone] and they will assist you.        Care EveryWhere ID     This is your Care EveryWhere ID. This could be used by other organizations to access your [facility] medical records  [id number]        Your Vitals Were     Pulse Temperature Respirations Height Last Period Pulse Oximetry    73 97.3  F (36.3  C) (Oral) 14 5' 5\" 10/14/2001 99%    BMI (Body Mass Index)                   28.84 kg/m2            Blood Pressure from Last 3 Encounters:   11/06/18 114/77   11/01/18 120/70   10/30/18 123/83    Weight from Last 3 Encounters:   11/06/18 173 lb 4.8 oz   11/01/18 172 lb   10/30/18 171 lb 12.8 oz              Today, you had the following     No orders found for display         Today's Medication Changes          These changes are accurate as of 11/6/18 11:59 PM.  If you have any questions, ask " your nurse or doctor.               These medicines have changed or have updated prescriptions.        Dose/Directions    sertraline 25 MG tablet   Commonly known as:  ZOLOFT   This may have changed:    - how much to take  - when to take this  - additional instructions   Used for:  Hot flushes, perimenopausal        Take 1 tablet daily.   Quantity:  30 tablet   Refills:  11                Primary Care Provider Office Phone # Fax #    Shi Sasha Alonso [phone]       [address] [place] [id number]        Equal Access to Services     [facility] : Hadii prem osorio hadasho Soomaali, waaxda luqadaha, qaybta kaalmada adeegyada, michelle ford . So [facility] [phone].    ATENCIÓN: Si habla español, tiene a rivera disposición servicios gratuitos de asistencia lingüística. [facility] [phone].    We comply with applicable federal civil rights laws and [place] laws. We do not discriminate on the basis of race, color, national origin, age, disability, sex, sexual orientation, or gender identity.            Thank you!     Thank you for choosing [facility]  for your care. Our goal is always to provide you with excellent care. Hearing back from our patients is one way we can continue to improve our services. Please take a few minutes to complete the written survey that you may receive in the mail after your visit with us. Thank you!             Your Updated Medication List - Protect others around you: Learn how to safely use, store and throw away your medicines at www.disposemymeds.org.          This list is accurate as of 11/6/18 11:59 PM.  Always use your most recent med list.                   Brand Name Dispense Instructions for use Diagnosis    ACETAMINOPHEN PO      Take 325 mg by mouth every 8 hours as needed for pain        ADVIL 200 MG tablet   Generic drug:  ibuprofen      Take 400 mg by mouth as needed for mild pain        ARIMIDEX 1 MG tablet   Generic drug:   anastrozole     90 tablet    Take 1 tablet (1 mg) by mouth daily    Recurrent malignant neoplasm of breast, unspecified laterality (H)       aspirin 81 MG chewable tablet     28 tablet    Take 1 tablet (81 mg) by mouth daily for 28 days    History of right breast cancer       Fish Oil 1000 MG Cpdr      Take  by mouth.        fluticasone 50 MCG/ACT spray    FLONASE    16 mL    USE ONE OR TWO SPRAYS IN EACH NOSTRIL DAILY    Chronic rhinitis       loratadine 10 MG tablet    CLARITIN    90 tablet    TAKE ONE TABLET BY MOUTH EVERY DAY AS NEEDED FOR ALLERGY SYMPTOMS    Chronic rhinitis, unspecified type       ondansetron 4 MG ODT tab    ZOFRAN ODT    20 tablet    Take one tablet every 6 hours for nausea.    S/P breast reconstruction       sennosides 8.6 MG tablet    SENOKOT     Take 2 tablets by mouth 2 times daily        sertraline 25 MG tablet    ZOLOFT    30 tablet    Take 1 tablet daily.    Hot flushes, perimenopausal

## 2018-11-06 NOTE — PROGRESS NOTES
PRESENTING COMPLAINT:  Postoperative visit, status post bilateral breast reconstruction with free KISHOR flaps for breast cancer done 10/09/2018.      HISTORY OF PRESENTING COMPLAINT:  Ms. Lombardo is 58 years old.  She is about 4 weeks out from surgery now.  I got a phone call from her over the weekend regarding a small superficial opening in her left mastectomy site area.  No fevers noted.  Minimal drainage and no pain.  Here to have it looked at.      PHYSICAL EXAMINATION:  Vital signs are stable.  She is afebrile, in no obvious distress.  Both breasts are healing in well, symmetric volumetrically.  There is a small stitch abscess in the periareolar area on the right side and a small 1 cm x 0.5 cm opening in the left transverse incision on the left breast.  No evidence for infection.      ASSESSMENT AND PLAN:  Based upon the above findings, a diagnosis of bilateral breast reconstruction with free KISHOR flaps was made.  I reassured the patient about the small suture abscess/openings.  I have advised Vaseline dressings and allow them to heal in secondarily.  She is on the schedule for surgery for fat grafting, nipple reconstruction and scar revisions at the end of November.  All questions were answered.  I will see her back at the time of surgery unless things change.  She was happy with the visit.

## 2018-11-06 NOTE — NURSING NOTE
"Oncology Rooming Note    November 6, 2018 11:32 AM   Enid Lombardo is a 58 year old female who presents for:    Chief Complaint   Patient presents with     Oncology Clinic Visit     [facility] RETURN- BREAST CA     Initial Vitals: /77 (BP Location: Right arm, Patient Position: Chair, Cuff Size: Adult Large)  Pulse 73  Temp 97.3  F (36.3  C) (Oral)  Resp 14  Ht 1.651 m (5' 5\")  Wt 78.6 kg (173 lb 4.8 oz)  LMP 10/14/2001  SpO2 99%  BMI 28.84 kg/m2 Estimated body mass index is 28.84 kg/(m^2) as calculated from the following:    Height as of this encounter: 1.651 m (5' 5\").    Weight as of this encounter: 78.6 kg (173 lb 4.8 oz). Body surface area is 1.9 meters squared.  No Pain (0) Comment: Data Unavailable   Patient's last menstrual period was 10/14/2001.  Allergies reviewed: Yes  Medications reviewed: Yes    Medications: Medication refills not needed today.  Pharmacy name entered into [employer]:    [id number] [place] [facility] [address] [address] [place] [address] PHARMACY    Clinical concerns: No new concerns.  Eloisa was notified.    10 minutes for nursing intake (face to face time)     Lemuel Wolf LPN            "

## 2018-11-07 DIAGNOSIS — K21.9 GASTROESOPHAGEAL REFLUX DISEASE, ESOPHAGITIS PRESENCE NOT SPECIFIED: ICD-10-CM

## 2018-11-08 NOTE — TELEPHONE ENCOUNTER
Prilosec:  Routing refill request to provider for review/approval because:  Drug not active on patient's medication list - discontinued 11/1/18, no reason specified.     Vy Moore, RN, BSN

## 2018-11-28 ENCOUNTER — ANESTHESIA EVENT (OUTPATIENT)
Dept: SURGERY | Facility: AMBULATORY SURGERY CENTER | Age: 58
End: 2018-11-28

## 2018-11-29 ENCOUNTER — HOSPITAL ENCOUNTER (OUTPATIENT)
Facility: AMBULATORY SURGERY CENTER | Age: 58
End: 2018-11-29
Attending: PLASTIC SURGERY
Payer: COMMERCIAL

## 2018-11-29 ENCOUNTER — ANESTHESIA (OUTPATIENT)
Dept: SURGERY | Facility: AMBULATORY SURGERY CENTER | Age: 58
End: 2018-11-29

## 2018-11-29 ENCOUNTER — SURGERY (OUTPATIENT)
Age: 58
End: 2018-11-29

## 2018-11-29 VITALS
DIASTOLIC BLOOD PRESSURE: 90 MMHG | SYSTOLIC BLOOD PRESSURE: 137 MMHG | OXYGEN SATURATION: 95 % | WEIGHT: 165 LBS | TEMPERATURE: 99 F | HEART RATE: 81 BPM | BODY MASS INDEX: 27.49 KG/M2 | HEIGHT: 65 IN | RESPIRATION RATE: 20 BRPM

## 2018-11-29 DIAGNOSIS — Z98.890 S/P BREAST RECONSTRUCTION, BILATERAL: Primary | ICD-10-CM

## 2018-11-29 RX ORDER — FENTANYL CITRATE 50 UG/ML
25-50 INJECTION, SOLUTION INTRAMUSCULAR; INTRAVENOUS
Status: DISCONTINUED | OUTPATIENT
Start: 2018-11-29 | End: 2018-11-29 | Stop reason: HOSPADM

## 2018-11-29 RX ORDER — EPHEDRINE SULFATE 50 MG/ML
INJECTION, SOLUTION INTRAMUSCULAR; INTRAVENOUS; SUBCUTANEOUS PRN
Status: DISCONTINUED | OUTPATIENT
Start: 2018-11-29 | End: 2018-11-29

## 2018-11-29 RX ORDER — ONDANSETRON 4 MG/1
4 TABLET, ORALLY DISINTEGRATING ORAL EVERY 30 MIN PRN
Status: DISCONTINUED | OUTPATIENT
Start: 2018-11-29 | End: 2018-11-30 | Stop reason: HOSPADM

## 2018-11-29 RX ORDER — SODIUM CHLORIDE, SODIUM LACTATE, POTASSIUM CHLORIDE, CALCIUM CHLORIDE 600; 310; 30; 20 MG/100ML; MG/100ML; MG/100ML; MG/100ML
INJECTION, SOLUTION INTRAVENOUS CONTINUOUS
Status: DISCONTINUED | OUTPATIENT
Start: 2018-11-29 | End: 2018-11-29 | Stop reason: HOSPADM

## 2018-11-29 RX ORDER — AMOXICILLIN 250 MG
1-2 CAPSULE ORAL 2 TIMES DAILY
Qty: 30 TABLET | Refills: 0 | Status: SHIPPED | OUTPATIENT
Start: 2018-11-29 | End: 2019-01-16

## 2018-11-29 RX ORDER — ACETAMINOPHEN 325 MG/1
975 TABLET ORAL ONCE
Status: COMPLETED | OUTPATIENT
Start: 2018-11-29 | End: 2018-11-29

## 2018-11-29 RX ORDER — PROPOFOL 10 MG/ML
INJECTION, EMULSION INTRAVENOUS CONTINUOUS PRN
Status: DISCONTINUED | OUTPATIENT
Start: 2018-11-29 | End: 2018-11-29

## 2018-11-29 RX ORDER — KETOROLAC TROMETHAMINE 30 MG/ML
30 INJECTION, SOLUTION INTRAMUSCULAR; INTRAVENOUS EVERY 6 HOURS PRN
Status: DISCONTINUED | OUTPATIENT
Start: 2018-11-29 | End: 2018-11-30 | Stop reason: HOSPADM

## 2018-11-29 RX ORDER — FENTANYL CITRATE 50 UG/ML
INJECTION, SOLUTION INTRAMUSCULAR; INTRAVENOUS PRN
Status: DISCONTINUED | OUTPATIENT
Start: 2018-11-29 | End: 2018-11-29

## 2018-11-29 RX ORDER — CEFAZOLIN SODIUM 2 G/50ML
2 SOLUTION INTRAVENOUS
Status: COMPLETED | OUTPATIENT
Start: 2018-11-29 | End: 2018-11-29

## 2018-11-29 RX ORDER — HYDROMORPHONE HYDROCHLORIDE 1 MG/ML
.3-.5 INJECTION, SOLUTION INTRAMUSCULAR; INTRAVENOUS; SUBCUTANEOUS EVERY 10 MIN PRN
Status: DISCONTINUED | OUTPATIENT
Start: 2018-11-29 | End: 2018-11-30 | Stop reason: HOSPADM

## 2018-11-29 RX ORDER — NALOXONE HYDROCHLORIDE 0.4 MG/ML
.1-.4 INJECTION, SOLUTION INTRAMUSCULAR; INTRAVENOUS; SUBCUTANEOUS
Status: DISCONTINUED | OUTPATIENT
Start: 2018-11-29 | End: 2018-11-30 | Stop reason: HOSPADM

## 2018-11-29 RX ORDER — ONDANSETRON 2 MG/ML
4 INJECTION INTRAMUSCULAR; INTRAVENOUS EVERY 30 MIN PRN
Status: DISCONTINUED | OUTPATIENT
Start: 2018-11-29 | End: 2018-11-30 | Stop reason: HOSPADM

## 2018-11-29 RX ORDER — MEPERIDINE HYDROCHLORIDE 25 MG/ML
12.5 INJECTION INTRAMUSCULAR; INTRAVENOUS; SUBCUTANEOUS
Status: DISCONTINUED | OUTPATIENT
Start: 2018-11-29 | End: 2018-11-30 | Stop reason: HOSPADM

## 2018-11-29 RX ORDER — OXYCODONE HYDROCHLORIDE 5 MG/1
5-10 TABLET ORAL EVERY 4 HOURS PRN
Status: DISCONTINUED | OUTPATIENT
Start: 2018-11-29 | End: 2018-11-30 | Stop reason: HOSPADM

## 2018-11-29 RX ORDER — ONDANSETRON 4 MG/1
4-8 TABLET, ORALLY DISINTEGRATING ORAL EVERY 8 HOURS PRN
Qty: 4 TABLET | Refills: 0 | Status: SHIPPED | OUTPATIENT
Start: 2018-11-29 | End: 2018-12-18

## 2018-11-29 RX ORDER — FENTANYL CITRATE 50 UG/ML
25-50 INJECTION, SOLUTION INTRAMUSCULAR; INTRAVENOUS
Status: DISCONTINUED | OUTPATIENT
Start: 2018-11-29 | End: 2018-11-30 | Stop reason: HOSPADM

## 2018-11-29 RX ORDER — PROPOFOL 10 MG/ML
INJECTION, EMULSION INTRAVENOUS PRN
Status: DISCONTINUED | OUTPATIENT
Start: 2018-11-29 | End: 2018-11-29

## 2018-11-29 RX ORDER — SODIUM CHLORIDE, SODIUM LACTATE, POTASSIUM CHLORIDE, CALCIUM CHLORIDE 600; 310; 30; 20 MG/100ML; MG/100ML; MG/100ML; MG/100ML
INJECTION, SOLUTION INTRAVENOUS CONTINUOUS
Status: DISCONTINUED | OUTPATIENT
Start: 2018-11-29 | End: 2018-11-30 | Stop reason: HOSPADM

## 2018-11-29 RX ORDER — DEXAMETHASONE SODIUM PHOSPHATE 4 MG/ML
4 INJECTION, SOLUTION INTRA-ARTICULAR; INTRALESIONAL; INTRAMUSCULAR; INTRAVENOUS; SOFT TISSUE EVERY 10 MIN PRN
Status: DISCONTINUED | OUTPATIENT
Start: 2018-11-29 | End: 2018-11-30 | Stop reason: HOSPADM

## 2018-11-29 RX ORDER — ALBUTEROL SULFATE 0.83 MG/ML
2.5 SOLUTION RESPIRATORY (INHALATION) EVERY 4 HOURS PRN
Status: DISCONTINUED | OUTPATIENT
Start: 2018-11-29 | End: 2018-11-29 | Stop reason: HOSPADM

## 2018-11-29 RX ORDER — ONDANSETRON 2 MG/ML
INJECTION INTRAMUSCULAR; INTRAVENOUS PRN
Status: DISCONTINUED | OUTPATIENT
Start: 2018-11-29 | End: 2018-11-29

## 2018-11-29 RX ORDER — SCOLOPAMINE TRANSDERMAL SYSTEM 1 MG/1
1 PATCH, EXTENDED RELEASE TRANSDERMAL
Status: DISCONTINUED | OUTPATIENT
Start: 2018-11-29 | End: 2018-11-30 | Stop reason: HOSPADM

## 2018-11-29 RX ORDER — DEXAMETHASONE SODIUM PHOSPHATE 4 MG/ML
INJECTION, SOLUTION INTRA-ARTICULAR; INTRALESIONAL; INTRAMUSCULAR; INTRAVENOUS; SOFT TISSUE PRN
Status: DISCONTINUED | OUTPATIENT
Start: 2018-11-29 | End: 2018-11-29

## 2018-11-29 RX ORDER — LIDOCAINE HYDROCHLORIDE 20 MG/ML
INJECTION, SOLUTION INFILTRATION; PERINEURAL PRN
Status: DISCONTINUED | OUTPATIENT
Start: 2018-11-29 | End: 2018-11-29

## 2018-11-29 RX ORDER — OXYCODONE HYDROCHLORIDE 5 MG/1
5-10 TABLET ORAL EVERY 6 HOURS PRN
Qty: 6 TABLET | Refills: 0 | Status: SHIPPED | OUTPATIENT
Start: 2018-11-29 | End: 2018-12-18

## 2018-11-29 RX ORDER — GABAPENTIN 300 MG/1
300 CAPSULE ORAL ONCE
Status: COMPLETED | OUTPATIENT
Start: 2018-11-29 | End: 2018-11-29

## 2018-11-29 RX ORDER — CEFAZOLIN SODIUM 1 G/50ML
1 SOLUTION INTRAVENOUS SEE ADMIN INSTRUCTIONS
Status: DISCONTINUED | OUTPATIENT
Start: 2018-11-29 | End: 2018-11-29 | Stop reason: HOSPADM

## 2018-11-29 RX ADMIN — SCOLOPAMINE TRANSDERMAL SYSTEM 1 PATCH: 1 PATCH, EXTENDED RELEASE TRANSDERMAL at 13:44

## 2018-11-29 RX ADMIN — HYDROMORPHONE HYDROCHLORIDE 0.3 MG: 1 INJECTION, SOLUTION INTRAMUSCULAR; INTRAVENOUS; SUBCUTANEOUS at 16:38

## 2018-11-29 RX ADMIN — PROPOFOL 200 MCG/KG/MIN: 10 INJECTION, EMULSION INTRAVENOUS at 14:32

## 2018-11-29 RX ADMIN — CEFAZOLIN SODIUM 2 G: 2 SOLUTION INTRAVENOUS at 14:36

## 2018-11-29 RX ADMIN — SODIUM CHLORIDE, SODIUM LACTATE, POTASSIUM CHLORIDE, CALCIUM CHLORIDE: 600; 310; 30; 20 INJECTION, SOLUTION INTRAVENOUS at 15:54

## 2018-11-29 RX ADMIN — DEXAMETHASONE SODIUM PHOSPHATE 4 MG: 4 INJECTION, SOLUTION INTRA-ARTICULAR; INTRALESIONAL; INTRAMUSCULAR; INTRAVENOUS; SOFT TISSUE at 14:45

## 2018-11-29 RX ADMIN — EPHEDRINE SULFATE 10 MG: 50 INJECTION, SOLUTION INTRAMUSCULAR; INTRAVENOUS; SUBCUTANEOUS at 15:14

## 2018-11-29 RX ADMIN — LIDOCAINE HYDROCHLORIDE 100 MG: 20 INJECTION, SOLUTION INFILTRATION; PERINEURAL at 14:32

## 2018-11-29 RX ADMIN — HYDROMORPHONE HYDROCHLORIDE 0.3 MG: 1 INJECTION, SOLUTION INTRAMUSCULAR; INTRAVENOUS; SUBCUTANEOUS at 16:48

## 2018-11-29 RX ADMIN — OXYCODONE HYDROCHLORIDE 5 MG: 5 TABLET ORAL at 17:00

## 2018-11-29 RX ADMIN — EPHEDRINE SULFATE 10 MG: 50 INJECTION, SOLUTION INTRAMUSCULAR; INTRAVENOUS; SUBCUTANEOUS at 14:53

## 2018-11-29 RX ADMIN — PROPOFOL 30 MG: 10 INJECTION, EMULSION INTRAVENOUS at 15:28

## 2018-11-29 RX ADMIN — EPHEDRINE SULFATE 5 MG: 50 INJECTION, SOLUTION INTRAMUSCULAR; INTRAVENOUS; SUBCUTANEOUS at 14:59

## 2018-11-29 RX ADMIN — SODIUM CHLORIDE, SODIUM LACTATE, POTASSIUM CHLORIDE, CALCIUM CHLORIDE: 600; 310; 30; 20 INJECTION, SOLUTION INTRAVENOUS at 13:24

## 2018-11-29 RX ADMIN — FENTANYL CITRATE 50 MCG: 50 INJECTION, SOLUTION INTRAMUSCULAR; INTRAVENOUS at 14:48

## 2018-11-29 RX ADMIN — ACETAMINOPHEN 975 MG: 325 TABLET ORAL at 13:21

## 2018-11-29 RX ADMIN — KETOROLAC TROMETHAMINE 30 MG: 30 INJECTION, SOLUTION INTRAMUSCULAR; INTRAVENOUS at 16:51

## 2018-11-29 RX ADMIN — HYDROMORPHONE HYDROCHLORIDE 0.4 MG: 1 INJECTION, SOLUTION INTRAMUSCULAR; INTRAVENOUS; SUBCUTANEOUS at 16:58

## 2018-11-29 RX ADMIN — GABAPENTIN 300 MG: 300 CAPSULE ORAL at 13:22

## 2018-11-29 RX ADMIN — HYDROMORPHONE HYDROCHLORIDE 0.4 MG: 1 INJECTION, SOLUTION INTRAMUSCULAR; INTRAVENOUS; SUBCUTANEOUS at 17:11

## 2018-11-29 RX ADMIN — OXYCODONE HYDROCHLORIDE 5 MG: 5 TABLET ORAL at 17:07

## 2018-11-29 RX ADMIN — FENTANYL CITRATE 50 MCG: 50 INJECTION, SOLUTION INTRAMUSCULAR; INTRAVENOUS at 16:32

## 2018-11-29 RX ADMIN — PROPOFOL 200 MG: 10 INJECTION, EMULSION INTRAVENOUS at 14:32

## 2018-11-29 RX ADMIN — ONDANSETRON 4 MG: 2 INJECTION INTRAMUSCULAR; INTRAVENOUS at 14:45

## 2018-11-29 RX ADMIN — FENTANYL CITRATE 50 MCG: 50 INJECTION, SOLUTION INTRAMUSCULAR; INTRAVENOUS at 15:25

## 2018-11-29 RX ADMIN — PROPOFOL 30 MG: 10 INJECTION, EMULSION INTRAVENOUS at 15:48

## 2018-11-29 RX ADMIN — FENTANYL CITRATE 50 MCG: 50 INJECTION, SOLUTION INTRAMUSCULAR; INTRAVENOUS at 16:37

## 2018-11-29 ASSESSMENT — COPD QUESTIONNAIRES: COPD: 0

## 2018-11-29 ASSESSMENT — LIFESTYLE VARIABLES: TOBACCO_USE: 0

## 2018-11-29 NOTE — IP AVS SNAPSHOT
MRN:[id number]                      After Visit Summary   11/29/2018    Enid Lombardo    MRN: [id number]           Thank you!     Thank you for choosing [facility] for your care. Our goal is always to provide you with excellent care. Hearing back from our patients is one way we can continue to improve our services. Please take a few minutes to complete the written survey that you may receive in the mail after you visit with us. Thank you!        Patient Information     Date Of Birth          1960        About your hospital stay     You were admitted on:  November 29, 2018 You last received care in the:  [facility]    You were discharged on:  November 29, 2018       Who to Call     For medical emergencies, please call 911.  For non-urgent questions about your medical care, please call your primary care provider or clinic, [phone]  For questions related to your surgery, please call your surgery clinic        Attending Provider     Provider Specialty    DENNISE Amin MD Plastic Surgery       Primary Care Provider Office Phone # Fax #    Shi Sasha Alonso [phone]      Your next 10 appointments already scheduled     Dec 04, 2018 10:30 AM CST   (Arrive by 10:15 AM)   Post-Op with [facility] Plastic Surgery Rn   [facility] ([facility] [place])    [address]  [address]  [facility] [id number] [phone]            Jan 14, 2019  8:00 AM CST   DX HIP/PELVIS/SPINE with UCDX1   [facility] [place] [facility] ([facility] [place])    [address]  1st Floor  [facility] [id number] [phone]           How do I prepare for my exam? (Food and drink instructions) No Food and Drink Restrictions.  How do I prepare for my exam? (Other instructions) Please do not take any of the following 24 hours prior to the day of your exam: vitamins, calcium tablets, antacids.  What should I  wear: If possible, please wear clothes without metal (snaps, zippers). A sweat suit works well.  How long does the exam take: The exam takes about 20 minutes.  What should I bring: Bring a list of your current medicines to your exam (including vitamins, minerals and over-the-counter drugs).  Do I need a :  No  is needed.  What should I do after the exam: No restrictions, You may resume normal activities.  How do I prepare for my exam? (Food and drink instructions) A DEXA scan is a bone-density scan. It uses a low level of radiation to check the strength of your bones. As you lie on a padded table, a machine will take X-rays. We most often scan the hips and lower spine.  Who should I call with questions: If you have any questions, please call the Imaging Department where you will have your exam. Directions, parking instructions, and other information is available on our website, [email or web address].            Jan 14, 2019  9:00 AM CST   [facility] Draw with  [facility] LAB DRAW   [facility] ([facility])    [address]  [address]  [facility] [address]   [phone]            Jan 14, 2019 11:30 AM CST   (Arrive by 11:15 AM)   Return Visit with Charity Brar MD   [facility] ([facility] [place])    [address]  [address]  [place] [facility] [address]   [phone]            Jan 22, 2019 12:10 PM CST   RETURN RETINA with Paz Osborn MD   [facility] ([facility])    [address]  [facility] [address]   [phone]            Jan 23, 2019 11:30 AM CST   (Arrive by 11:15 AM)   Post-Op with DENNISE Amin MD   [facility] ([facility] [place])    [address] [place] [address]  4th Floor  [facility] [address]   [phone]            Sep 23, 2019  2:00 PM CDT   (Arrive by 1:45 PM)    Return Visit with Charity Brar MD   [facility] ([facility])    [address]  [address]  [facility] [address] [phone]              Further instructions from your care team       FAT GRAFTING POST-OPERATIVE INSTRUCTIONS    Instructions       Have someone drive you home after surgery and help you at home for 1-2      days.      Get plenty of rest.      Follow balanced diet.      Decreased activity may promote constipation, so you may want to add      more raw fruit to your diet, and be sure to increase fluid intake.      Take pain medication as prescribed. Do not take aspirin or any products      containing aspirin unless approved by your surgeon.      Do not drink alcohol when taking pain medications.      Even when not taking pain medications, no alcohol for 3 weeks as it      causes fluid retention.      If you are taking vitamins with iron, resume these as tolerated.      Do not smoke, as smoking delays healing and increases the risk of      complications.    Activities      Do not drive until you are no longer taking any pain medications      (narcotics).      Start walking as soon as possible, this helps to reduce swelling and       lowers the chance of blood clots.      Unless stated on this form, discuss your time off work with your surgeon.    Treated Area Care       You may shower after 24 hours. The ACE wrap (if used) may be rewrapped as needed (if too tight or loose). Use it for support and may subtitute with a sports bra if preferred.  Wear the compression garment (spandex type clothing or the provided sponge and binder) in the area where the liposuction was performed to harvest the fat for the fat injection for 2 weeks post opor per the surgeon's recommendation.      Avoid exposing scars to sun for at least 12 months.      Always use a strong sunblock, if sun exposure is unavoidable (SPF 50 or      greater).      Inspect daily for  signs of infection.      No tub soaking, bathing, or swimming while sutures or drains are in place.      You may wear makeup with sunblock protection shortly.      Stay out of the sun until redness and bruising subsides (usually 48      Hours).    What to Expect      Temporary stinging, throbbing, burning sensation, redness, swelling,       bruising, and excess fullness.      Some swelling, bruising or redness in the donor and recipient sites.      Swelling and puffiness may last several weeks.      Redness and bruising usually lasts about 48 hours.     Appearance      Improved skin texture.      Firmer and smoother skin.    Follow-Up Care      With regular follow-up treatments, you can easily maintain your new       look.      Repeated treatments may be necessary.    When to Call      If you have increased swelling or bruising.      If swelling and redness persist after a few days.      If you have increased redness along the incision.      If you have severe or increased pain not relieved by medication.      If you have any side effects to medications; such as, rash, nausea,      headache, vomiting.      If you have an oral temperature over 100.4 degrees.      If you have any yellowish or greenish drainage from the incisions or      notice a foul odor.      If you have bleeding from the incisions that is difficult to control with      light pressure.      If you have loss of feeling or motion.      If you have any sign of abscesses, open sores, skin peeling or lumpiness.    For Medical Questions, Please Call:      [phone], Monday - Friday, 8 a.m. - 4:30 p.m.      After hours and on weekends, call Hospital Paging at [phone] and      ask for the Plastic Surgeon on call.      SCAR REVISION OR EXCISION OF LESIONS POST-OPERATIVE INSTRUCTIONS    Instructions       Have someone drive you home after surgery and help you at home for 1-2      days.      Get plenty of rest.      Follow balanced diet.      Decreased  activity may promote constipation, so you may want to add      more raw fruit to your diet, and be sure to increase fluid intake.      Take pain medication as prescribed. Do not take aspirin or any products      containing aspirin.      Do not drink alcohol when taking pain medications.      Even when not taking pain medications, no alcohol for 3 weeks as it      causes fluid retention.      If you are taking vitamins with iron, resume these as tolerated.      Do not smoke, as smoking delays healing and increases the risk of      complications.    Activities      Do not drive until you are no longer taking any pain medications      (narcotics).      Start walking as soon as possible, this helps to reduce swelling and       lowers the chance of blood clots.      Resume normal activities gradually.      Return to work in 1-2 days, depending upon extent of surgery      No strenuous work or stress on wound for 2-3 weeks.    Incision Care      If steri strips have been used, keep steri-strips on; replace if they come off.      Avoid exposing scars to sun for at least 12 months.      Always use a strong sunblock, if sun exposure is unavoidable (SPF 50 or      greater).      Inspect daily for signs of infection.      No tub soaking, bathing, or swimming while sutures or drains are in place.      Keep area clean and dry for first 24 hours.     What to Expect      Some swelling and bruising.      May have slight bleeding from incision.  Apply 4 x 4 gauze with slight pressure to       control bleeding.      Skin grafts and flaps may take several weeks or months to heal; a support garment or      bandage may be necessary for up to a year.    Appearance      Final results of surgery may take a year or more.    Follow-Up Care      If external sutures have been used, they will be removed in 5-14 days.    Please note my office will call you 1-2 business days after your procedure to check up on how you're doing and to schedule your  post-operative appointment.        When to Call      If you have increased swelling or bruising.      If swelling and redness persist after a few days.      If you have increased redness along the incision.      If you have severe or increased pain not relieved by medication.      If you have any side effects to medications; such as, rash, nausea,      headache, vomiting.      If you have an oral temperature over 100.4 degrees.      If you have any yellowish or greenish drainage from the incisions or      notice a foul odor.      If you have bleeding from the incisions that is difficult to control with      light pressure.      If you have loss of feeling or motion.    For Medical Questions, Please Call:      [phone], Monday - Friday, 8 a.m. - 4:30 p.m.      After hours and on weekends, call Hospital Paging at [phone] and      ask for the Plastic Surgeon on call.    [facility]  Home Care Following Anesthesia  For 24 hours after surgery:  1. Get plenty of rest.  A responsible adult must stay with you for at least 24 hours after you leave the surgery center.  2. Do not drive or use heavy equipment.  If you have weakness or tingling, don't drive or use heavy equipment until this feeling goes away.   3. Do not drink alcohol.   4. Avoid strenuous or risky activities.  Ask for help when climbing stairs.  5. You may feel lightheaded.  IF so, sit for a few minutes before standing.  Have someone help you get up.   6. If you have nausea (feel sick to your stomach): Drink only clear liquids such as apple juice, ginger ale, broth or 7-Up.  Rest may also help.  Be sure to drink enough fluids.  Move to a regular diet as you feel able.   7. You may have a slight fever.  Call the doctor if your fever is over 100 F (37.7 C) (taken under the tongue) or lasts longer than 24 hours.  8. You may have a dry mouth, a sore throat, muscle aches or trouble sleeping. These should go away after 24  "hours.  9. Do not make important or legal decisions.        Today you received a Marcaine or bupivacaine block to numb the nerves near your surgery site.  This is a block using local anesthetic or \"numbing\" medication injected around the nerves to anesthetize or \"numb\" the area supplied by those nerves.  This block is injected into the muscle layer near your surgical site.  The medication may numb the location where you had surgery for 6-18 hours, but may last up to 24 hours.  If your surgical site is an arm or leg you should be careful with your affected limb, since it is possible to injure your limb without being aware of it due to the numbing.  Until full feeling returns, you should guard against bumping or hitting your limb, and avoid extreme hot or cold temperatures on the skin.  As the block wears off, the feeling will return as a tingling or prickly sensation near your surgical site.  You will experience more discomfort from your incision as the feeling returns.  You may want to take a pain pill (a narcotic or Tylenol if this was prescribed by your surgeon) when you start to experience mild pain before the pain beccomes more severe.  If your pain medications do not control your pain you should notifiy your surgeon.    Tips for taking pain medications  To get the best pain relief possible, remember these points:    Take pain medications as directed, before pain becomes severe.    Pain medication can upset your stomach: taking it with food may help.    Constipation is a common side effect of pain medication. Drink plenty of  fluids.    Eat foods high in fiber. Take a stool softener if recommended by your doctor or pharmacist.    Do not drink alcohol, drive or operate machinery while taking pain medications.    Ask about other ways to control pain, such as with heat, ice or relaxation.    Tylenol/Acetaminophen Consumption  To help encourage the safe use of acetaminophen, the makers of TYLENOL  have lowered the " "maximum daily dose for single-ingredient Extra Strength TYLENOL  (acetaminophen) products sold in the [place] from 8 pills per day (4,000 mg) to 6 pills per day (3,000 mg). The dosing interval has also changed from 2 pills every 4-6 hours to 2 pills every 6 hours.    If you feel your pain relief is insufficient, you may take Tylenol/Acetaminophen in addition to your narcotic pain medication.     Be careful not to exceed 3,000 mg of Tylenol/Acetaminophen in a 24 hour period from all sources.    If you are taking extra strength Tylenol/acetaminophen (500 mg), the maximum dose is 6 tablets in 24 hours.    If you are taking regular strength acetaminophen (325 mg), the maximum dose is 9 tablets in 24 hours.    Call a doctor for any of the followin. Signs of infection (fever, growing tenderness at the surgery site, a large amount of drainage or bleeding, severe pain, foul-smelling drainage, redness, swelling).  2. It has been over 8 to 10 hours since surgery and you are still not able to urinate (pass water).  3. Headache for over 24 hours.  4. Numbness, tingling or weakness the day after surgery (if you had spinal anesthesia).  Your doctor is:  Dr. Nedra Amin, Plastic Surgery: [phone]                    Or dial [phone] and ask for the resident on call for:  [facility]  For emergency care, call the:  [facility]:  [phone] (TTY for hearing impaired: [phone])                  Pending Results     No orders found from 2018 to 2018.            Admission Information     Date & Time Provider Department Dept. Phone    2018 DENNISE Amin MD [facility] [phone]      Your Vitals Were     Blood Pressure Pulse Temperature Respirations Height Weight    132/83 81 99  F (37.2  C) 21 1.651 m (5' 5\") 74.8 kg (165 lb)    Last Period Pulse Oximetry BMI (Body Mass Index)             10/14/2001 95% 27.46 kg/m2         MyChart Information     " [employer] gives you secure access to your electronic health record. If you see a primary care provider, you can also send messages to your care team and make appointments. If you have questions, please call your primary care clinic.  If you do not have a primary care provider, please call [phone] and they will assist you.      [employer] is an electronic gateway that provides easy, online access to your medical records. With [employer], you can request a clinic appointment, read your test results, renew a prescription or communicate with your care team.     To access your existing account, please contact your [facility] or call [phone] for assistance.        Care EveryWhere ID     This is your Care EveryWhere ID. This could be used by other organizations to access your [facility] medical records  [id number]        Equal Access to Services     RAIZA TOLBERT : Lillie Morrison, brianda betts, charline crockett, michelle tong. So [facility] [phone].    ATENCIÓN: Si habla español, tiene a rivera disposición servicios gratuitos de asistencia lingüística. Llame al [phone].    We comply with applicable federal civil rights laws and [place] laws. We do not discriminate on the basis of race, color, national origin, age, disability, sex, sexual orientation, or gender identity.               Review of your medicines      START taking        Dose / Directions    oxyCODONE 5 MG tablet   Commonly known as:  ROXICODONE   Used for:  S/P breast reconstruction, bilateral        Dose:  5-10 mg   Take 1-2 tablets (5-10 mg) by mouth every 6 hours as needed for moderate to severe pain   Quantity:  6 tablet   Refills:  0       senna-docusate 8.6-50 MG tablet   Commonly known as:  SENOKOT-S/PERICOLACE   Used for:  S/P breast reconstruction, bilateral        Dose:  1-2 tablet   Take 1-2 tablets by mouth 2 times daily   Quantity:  30 tablet   Refills:  0          CONTINUE these medicines which may have CHANGED, or have new prescriptions. If we are uncertain of the size of tablets/capsules you have at home, strength may be listed as something that might have changed.        Dose / Directions    * ondansetron 4 MG ODT tab   Commonly known as:  ZOFRAN ODT   This may have changed:  Another medication with the same name was added. Make sure you understand how and when to take each.   Used for:  S/P breast reconstruction        Take one tablet every 6 hours for nausea.   Quantity:  20 tablet   Refills:  1       * ondansetron 4 MG ODT tab   Commonly known as:  ZOFRAN-ODT   This may have changed:  You were already taking a medication with the same name, and this prescription was added. Make sure you understand how and when to take each.   Used for:  S/P breast reconstruction, bilateral        Dose:  4-8 mg   Take 1-2 tablets (4-8 mg) by mouth every 8 hours as needed for nausea   Quantity:  4 tablet   Refills:  0       sertraline 25 MG tablet   Commonly known as:  ZOLOFT   This may have changed:    - how much to take  - when to take this  - additional instructions   Used for:  Hot flushes, perimenopausal        Take 1 tablet daily.   Quantity:  30 tablet   Refills:  11       * Notice:  This list has 2 medication(s) that are the same as other medications prescribed for you. Read the directions carefully, and ask your doctor or other care provider to review them with you.      CONTINUE these medicines which have NOT CHANGED        Dose / Directions    ACETAMINOPHEN PO        Dose:  325 mg   Take 325 mg by mouth every 8 hours as needed for pain   Refills:  0       ADVIL 200 MG tablet   Generic drug:  ibuprofen        Dose:  400 mg   Take 400 mg by mouth as needed for mild pain   Refills:  0       ARIMIDEX 1 MG tablet   Used for:  Recurrent malignant neoplasm of breast, unspecified laterality (H)   Generic drug:  anastrozole        Dose:  1 mg   Take 1 tablet (1 mg) by mouth daily    Quantity:  90 tablet   Refills:  3       Fish Oil 1000 MG Cpdr        Take  by mouth.   Refills:  0       fluticasone 50 MCG/ACT nasal spray   Commonly known as:  FLONASE   Used for:  Chronic rhinitis        USE ONE OR TWO SPRAYS IN EACH NOSTRIL DAILY   Quantity:  16 mL   Refills:  10       loratadine 10 MG tablet   Commonly known as:  CLARITIN   Used for:  Chronic rhinitis, unspecified type        TAKE ONE TABLET BY MOUTH EVERY DAY AS NEEDED FOR ALLERGY SYMPTOMS   Quantity:  90 tablet   Refills:  1       omeprazole 20 MG DR capsule   Commonly known as:  priLOSEC   Used for:  Gastroesophageal reflux disease, esophagitis presence not specified        TAKE 1 CAPSULE (20 MG) BY MOUTH DAILY   Quantity:  90 capsule   Refills:  3       sennosides 8.6 MG tablet   Commonly known as:  SENOKOT        Dose:  2 tablet   Take 2 tablets by mouth 2 times daily   Refills:  0            Where to get your medicines      These medications were sent to [address] [address]    Hours:  TRANSPLANT PHONE NUMBER [phone] Phone:  [phone]     ondansetron 4 MG ODT tab    senna-docusate 8.6-50 MG tablet         Some of these will need a paper prescription and others can be bought over the counter. Ask your nurse if you have questions.     Bring a paper prescription for each of these medications     oxyCODONE 5 MG tablet                Protect others around you: Learn how to safely use, store and throw away your medicines at www.disposemymeds.org.        Information about OPIOIDS     PRESCRIPTION OPIOIDS: WHAT YOU NEED TO KNOW   We gave you an opioid (narcotic) pain medicine. It is important to manage your pain, but opioids are not always the best choice. You should first try all the other options your care team gave you. Take this medicine for as short a time (and as few doses) as possible.    Some activities can increase  your pain, such as bandage changes or therapy sessions. It may help to take your pain medicine 30 to 60 minutes before these activities. Reduce your stress by getting enough sleep, working on hobbies you enjoy and practicing relaxation or meditation. Talk to your care team about ways to manage your pain beyond prescription opioids.    These medicines have risks:    DO NOT drive when on new or higher doses of pain medicine. These medicines can affect your alertness and reaction times, and you could be arrested for driving under the influence (DUI). If you need to use opioids long-term, talk to your care team about driving.    DO NOT operate heavy machinery    DO NOT do any other dangerous activities while taking these medicines.    DO NOT drink any alcohol while taking these medicines.     If the opioid prescribed includes acetaminophen, DO NOT take with any other medicines that contain acetaminophen. Read all labels carefully. Look for the word  acetaminophen  or  Tylenol.  Ask your pharmacist if you have questions or are unsure.    You can get addicted to pain medicines, especially if you have a history of addiction (chemical, alcohol or substance dependence). Talk to your care team about ways to reduce this risk.    All opioids tend to cause constipation. Drink plenty of water and eat foods that have a lot of fiber, such as fruits, vegetables, prune juice, apple juice and high-fiber cereal. Take a laxative (Miralax, milk of magnesia, Colace, Senna) if you don t move your bowels at least every other day. Other side effects include upset stomach, sleepiness, dizziness, throwing up, tolerance (needing more of the medicine to have the same effect), physical dependence and slowed breathing.    Store your pills in a secure place, locked if possible. We will not replace any lost or stolen medicine. If you don t finish your medicine, please throw away (dispose) as directed by your pharmacist. The [place] Pollution  Control Agency has more information about safe disposal: https://www.pca.[facility].mn.us/living-green/managing-unwanted-medications             Medication List: This is a list of all your medications and when to take them. Check marks below indicate your daily home schedule. Keep this list as a reference.      Medications           Morning Afternoon Evening Bedtime As Needed    ACETAMINOPHEN PO   Take 325 mg by mouth every 8 hours as needed for pain   Last time this was given:  975 mg on 11/29/2018  1:21 PM                                ADVIL 200 MG tablet   Take 400 mg by mouth as needed for mild pain   Generic drug:  ibuprofen                                ARIMIDEX 1 MG tablet   Take 1 tablet (1 mg) by mouth daily   Generic drug:  anastrozole                                Fish Oil 1000 MG Cpdr   Take  by mouth.                                fluticasone 50 MCG/ACT nasal spray   Commonly known as:  FLONASE   USE ONE OR TWO SPRAYS IN EACH NOSTRIL DAILY                                loratadine 10 MG tablet   Commonly known as:  CLARITIN   TAKE ONE TABLET BY MOUTH EVERY DAY AS NEEDED FOR ALLERGY SYMPTOMS                                omeprazole 20 MG DR capsule   Commonly known as:  priLOSEC   TAKE 1 CAPSULE (20 MG) BY MOUTH DAILY                                * ondansetron 4 MG ODT tab   Commonly known as:  ZOFRAN ODT   Take one tablet every 6 hours for nausea.                                * ondansetron 4 MG ODT tab   Commonly known as:  ZOFRAN-ODT   Take 1-2 tablets (4-8 mg) by mouth every 8 hours as needed for nausea                                oxyCODONE 5 MG tablet   Commonly known as:  ROXICODONE   Take 1-2 tablets (5-10 mg) by mouth every 6 hours as needed for moderate to severe pain   Last time this was given:  5 mg on 11/29/2018  5:07 PM                                senna-docusate 8.6-50 MG tablet   Commonly known as:  SENOKOT-S/PERICOLACE   Take 1-2 tablets by mouth 2 times daily                                 sennosides 8.6 MG tablet   Commonly known as:  SENOKOT   Take 2 tablets by mouth 2 times daily                                sertraline 25 MG tablet   Commonly known as:  ZOLOFT   Take 1 tablet daily.                                * Notice:  This list has 2 medication(s) that are the same as other medications prescribed for you. Read the directions carefully, and ask your doctor or other care provider to review them with you.

## 2018-11-29 NOTE — ANESTHESIA PREPROCEDURE EVALUATION
Anesthesia Pre-Procedure Evaluation    Patient: Enid Lombardo   MRN:     [id number] Gender:   female   Age:    58 year old :      1960        Preoperative Diagnosis: Breast Cancer   Procedure(s):  Bilateral Breast Fat Grafting from Abdomen and Thighs  Nipple Reconstruction     Past Medical History:   Diagnosis Date     Breast cancer (H)     lumpectomy, radiation, tamoxifen     Cancer (H)     Breast     Cataracts, bilateral      Complication of anesthesia     She prefers not to have versed until right before she leaves or can have after      Enthesopathy of hip region     right hip     Esophageal reflux      History of gestational diabetes      Hypertension      Macular drusen      PONV (postoperative nausea and vomiting)      Shingles 2012    left T6-T7 dermatome      Past Surgical History:   Procedure Laterality Date     BIOPSY      Breast     BREAST SURGERY       C VAGINAL HYSTERECTOMY  2001    Ovaries intact, due to fibroids     COLONOSCOPY       ENDOSCOPY  2007    Upper GI     EYE SURGERY       GRAFT FREE VASCULARIZED TRANSVERSE RECTUS ABDOMINIS MYOCUTANEOUS Bilateral 10/8/2018    Procedure: GRAFT FREE VASCULARIZED TRANSVERSE RECTUS ABDOMINIS MYOCUTANEOUS;  Bilateral Deep Inferior Epigastric Artery  Free Flap Breast Reconstruction and Removal of Breast Explanders;  Surgeon: DENNISE Amin MD;  Location: [facility] OR     GYN SURGERY       HC BIOPSY/EXCISION LYMPH NODE OPEN DEEP CERVICAL W EXC FAT PAD  2005    Right axillary sentinel node biopsy X 3.     HC COLONOSCOPY W BIOPSY  05/10/10     HC EXCISION BREAST LESION, OPEN >=1  2005    Right breast.     HC REMV CATARACT EXTRACAP,INSERT LENS  08    bilateral     HC REMV TARSAL/METATARSAL BENIGN BONE LESN  1982    Bone spur - right     MASTECTOMY SIMPLE BILATERAL, SENTINEL NODE BILATERAL, COMBINED Bilateral 2018    Procedure: COMBINED MASTECTOMY SIMPLE BILATERAL, SENTINEL NODE  BILATERAL;  Bilateral Mastectomy with Right [place] Node Biopsy, Bilateral Breast Reconstruction, Anesthesia Block;  Surgeon: Rakesh Sanchez MD;  Location: [facility] OR     ORTHOPEDIC SURGERY  1983    Right foot     RECONSTRUCT BREAST Bilateral 8/22/2018    Procedure: RECONSTRUCT BREAST;;  Surgeon: DENNISE Amin MD;  Location: [facility] OR     RECONSTRUCT BREAST BILATERAL Bilateral 10/8/2018    Procedure: RECONSTRUCT BREAST BILATERAL;;  Surgeon: DENNISE Amin MD;  Location: [facility] OR          Anesthesia Evaluation     . Pt has had prior anesthetic. Type: General    History of anesthetic complications   - PONV        ROS/MED HX    ENT/Pulmonary:      (-) tobacco use, asthma and COPD   Neurologic:      (-) CVA, TIA and Neuropathy   Cardiovascular:     (+) Dyslipidemia, hypertension----. : . . . :. .      (-) CAD, irregular heartbeat/palpitations and stent   METS/Exercise Tolerance:     Hematologic:        (-) anemia   Musculoskeletal:         GI/Hepatic:     (+) GERD Asymptomatic on medication,      (-) liver disease   Renal/Genitourinary:      (-) renal disease   Endo:      (-) Type I DM, Type II DM and thyroid disease   Psychiatric:         Infectious Disease:  - neg infectious disease ROS       Malignancy:   (+) Malignancy History of Breast  Breast CA status post Surgery.         Other:                         PHYSICAL EXAM:   Mental Status/Neuro: A/A/O   Airway: Facies: Feasible  Mallampati: II  Mouth/Opening: Full  TM distance: > 6 cm  Neck ROM: Full   Respiratory: Auscultation: CTAB     Resp. Rate: Normal     Resp. Effort: Normal      CV: Rhythm: Regular  Rate: Age appropriate  Heart: Normal Sounds   Comments:      Dental: Normal                  Lab Results   Component Value Date    WBC 4.7 11/01/2018    HGB 12.2 11/01/2018    HCT 37.6 11/01/2018     11/01/2018    CRP <3.0 12/29/2006    SED 5 12/19/2005     10/10/2018    POTASSIUM 3.6 10/10/2018    CHLORIDE 108 10/10/2018    CO2 26 10/10/2018     "BUN 5 (L) 10/10/2018    CR 0.64 10/10/2018     (H) 10/10/2018    ROSENDO 7.7 (L) 10/10/2018    PHOS 2.6 10/10/2018    MAG 2.0 10/11/2018    ALBUMIN 3.5 11/23/2014    PROTTOTAL 6.4 (L) 11/23/2014    ALT 25 11/23/2014    AST 15 11/23/2014    ALKPHOS 48 11/23/2014    BILITOTAL 0.2 11/23/2014    LIPASE 190 10/04/2010    AMYLASE 82 10/04/2010    INR 1.15 (H) 10/09/2018    FIBR 428 (H) 10/09/2018    TSH 1.23 12/10/2014       Preop Vitals  BP Readings from Last 3 Encounters:   11/29/18 118/86   11/06/18 114/77   11/01/18 120/70    Pulse Readings from Last 3 Encounters:   11/29/18 81   11/06/18 73   11/01/18 88      Resp Readings from Last 3 Encounters:   11/29/18 15   11/06/18 14   11/01/18 20    SpO2 Readings from Last 3 Encounters:   11/29/18 98%   11/06/18 99%   11/01/18 98%      Temp Readings from Last 1 Encounters:   11/29/18 36.6  C (97.8  F) (Oral)    Ht Readings from Last 1 Encounters:   11/29/18 1.651 m (5' 5\")      Wt Readings from Last 1 Encounters:   11/29/18 74.8 kg (165 lb)    Estimated body mass index is 27.46 kg/(m^2) as calculated from the following:    Height as of this encounter: 1.651 m (5' 5\").    Weight as of this encounter: 74.8 kg (165 lb).     LDA:  Peripheral IV 10/08/18 Left Hand (Active)   Number of days:52       Peripheral IV 11/29/18 Right Hand (Active)   Site Assessment WDL 11/29/2018  1:00 PM   Line Status Infusing 11/29/2018  1:00 PM   Phlebitis Scale 0-->no symptoms 11/29/2018  1:00 PM   Infiltration Scale 0 11/29/2018  1:00 PM   Number of days:0       Airway - Adult/Peds laryngeal mask airway (Active)   Number of days:0       Closed/Suction Drain Left Breast Bulb 15 Turkish (Active)   Number of days:99       Closed/Suction Drain Right Breast Bulb 15 Turkish (Active)   Number of days:99       Closed/Suction Drain 1 Left Breast Bulb 15 Turkish (Active)   Number of days:52       Closed/Suction Drain 3 Left LLQ Bulb 15 Turkish (Active)   Number of days:52       Closed/Suction Drain 2 Right " Breast 15 Malagasy (Active)   Number of days:52            Assessment:   ASA SCORE: 2    NPO Status: > 2 hours since completed Clear Liquids; > 6 hours since completed Solid Foods   Documentation: H&P complete   Proceeding: Proceed without further delay  Tobacco Use:  NO Active use of Tobacco/UNKNOWN Tobacco use status     Plan:   Anes. Type:  General   Pre-Induction: Midazolam IV; Acetaminophen PO   Induction:  IV (Standard)   Airway: LMA   Access/Monitoring: PIV   Maintenance: Balanced; Propofol   Emergence: Procedure Site   Logistics: Same Day Surgery     Postop Pain/Sedation Strategy:  Standard-Options: Opioids PRN     PONV Management:  Adult Risk Factors: Female, H/o PONV or Motion Sickness, Non-Smoker, Postop Opioids  Prevention: Propofol Infusion; Ondansetron; Dexamethasone; Scop. Patch     CONSENT: Direct conversation   Plan and risks discussed with: Patient                            Jonathon Barajas MD

## 2018-11-29 NOTE — ANESTHESIA POSTPROCEDURE EVALUATION
Anesthesia POST Procedure Evaluation    Patient: Enid Lombardo   MRN:     [id number] Gender:   female   Age:    58 year old :      1960        Preoperative Diagnosis: Breast Cancer   Procedure(s):  Bilateral Breast Fat Grafting from Abdomen and Thighs  Nipple Reconstruction   Postop Comments: No value filed.       Anesthesia Type:  General    Reportable Event: NO     PAIN: Uncomplicated   Sign Out status: Comfortable, Well controlled pain     PONV: No PONV   Sign Out status:  No Nausea or Vomiting     Neuro/Psych: Uneventful perioperative course   Sign Out Status: Preoperative baseline; Age appropriate mentation     Airway/Resp.: Uneventful perioperative course   Sign Out Status: Non labored breathing, age appropriate RR; Resp. Status within EXPECTED Parameters     CV: Uneventful perioperative course   Sign Out status: Appropriate BP and perfusion indices; Appropriate HR/Rhythm     Disposition:   Sign Out in:  PACU  Disposition:  Phase II; Home  Recovery Course: Uneventful  Follow-Up: Not required           Last Anesthesia Record Vitals:  CRNA VITALS  2018 1552 - 2018 1634      2018             Resp Rate (set): 10          Last PACU/Preop Vitals:  Vitals:    18 1302 18 1630   BP: 118/86 (P) 122/80   Pulse: 81    Resp: 15 (P) 16   Temp: 36.6  C (97.8  F) (P) 36.3  C (97.4  F)   SpO2: 98%          Electronically Signed By: Brandan Gibbs MD, 2018, 4:34 PM

## 2018-11-29 NOTE — IP AVS SNAPSHOT
[facility]    [address] [place] [id number]    Phone:  [phone]    Fax:  [phone]                                       After Visit Summary   11/29/2018    Enid Lombardo    MRN: [id number]           After Visit Summary Signature Page     I have received my discharge instructions, and my questions have been answered. I have discussed any challenges I see with this plan with the nurse or doctor.    ..........................................................................................................................................  Patient/Patient Representative Signature      ..........................................................................................................................................  Patient Representative Print Name and Relationship to Patient    ..................................................               ................................................  Date                                   Time    ..........................................................................................................................................  Reviewed by Signature/Title    ...................................................              ..............................................  Date                                               Time          22EPIC Rev 08/18

## 2018-11-29 NOTE — DISCHARGE INSTRUCTIONS
FAT GRAFTING POST-OPERATIVE INSTRUCTIONS    Instructions       Have someone drive you home after surgery and help you at home for 1-2      days.      Get plenty of rest.      Follow balanced diet.      Decreased activity may promote constipation, so you may want to add      more raw fruit to your diet, and be sure to increase fluid intake.      Take pain medication as prescribed. Do not take aspirin or any products      containing aspirin unless approved by your surgeon.      Do not drink alcohol when taking pain medications.      Even when not taking pain medications, no alcohol for 3 weeks as it      causes fluid retention.      If you are taking vitamins with iron, resume these as tolerated.      Do not smoke, as smoking delays healing and increases the risk of      complications.    Activities      Do not drive until you are no longer taking any pain medications      (narcotics).      Start walking as soon as possible, this helps to reduce swelling and       lowers the chance of blood clots.      Unless stated on this form, discuss your time off work with your surgeon.    Treated Area Care       You may shower after 24 hours. The ACE wrap (if used) may be rewrapped as needed (if too tight or loose). Use it for support and may subtitute with a sports bra if preferred.  Wear the compression garment (spandex type clothing or the provided sponge and binder) in the area where the liposuction was performed to harvest the fat for the fat injection for 2 weeks post opor per the surgeon's recommendation.      Avoid exposing scars to sun for at least 12 months.      Always use a strong sunblock, if sun exposure is unavoidable (SPF 50 or      greater).      Inspect daily for signs of infection.      No tub soaking, bathing, or swimming while sutures or drains are in place.      You may wear makeup with sunblock protection shortly.      Stay out of the sun until redness and bruising subsides (usually 48      Hours).    What  to Expect      Temporary stinging, throbbing, burning sensation, redness, swelling,       bruising, and excess fullness.      Some swelling, bruising or redness in the donor and recipient sites.      Swelling and puffiness may last several weeks.      Redness and bruising usually lasts about 48 hours.     Appearance      Improved skin texture.      Firmer and smoother skin.    Follow-Up Care      With regular follow-up treatments, you can easily maintain your new       look.      Repeated treatments may be necessary.    When to Call      If you have increased swelling or bruising.      If swelling and redness persist after a few days.      If you have increased redness along the incision.      If you have severe or increased pain not relieved by medication.      If you have any side effects to medications; such as, rash, nausea,      headache, vomiting.      If you have an oral temperature over 100.4 degrees.      If you have any yellowish or greenish drainage from the incisions or      notice a foul odor.      If you have bleeding from the incisions that is difficult to control with      light pressure.      If you have loss of feeling or motion.      If you have any sign of abscesses, open sores, skin peeling or lumpiness.    For Medical Questions, Please Call:      [phone], Monday - Friday, 8 a.m. - 4:30 p.m.      After hours and on weekends, call Hospital Paging at [phone] and      ask for the Plastic Surgeon on call.      SCAR REVISION OR EXCISION OF LESIONS POST-OPERATIVE INSTRUCTIONS    Instructions       Have someone drive you home after surgery and help you at home for 1-2      days.      Get plenty of rest.      Follow balanced diet.      Decreased activity may promote constipation, so you may want to add      more raw fruit to your diet, and be sure to increase fluid intake.      Take pain medication as prescribed. Do not take aspirin or any products      containing aspirin.      Do not drink  alcohol when taking pain medications.      Even when not taking pain medications, no alcohol for 3 weeks as it      causes fluid retention.      If you are taking vitamins with iron, resume these as tolerated.      Do not smoke, as smoking delays healing and increases the risk of      complications.    Activities      Do not drive until you are no longer taking any pain medications      (narcotics).      Start walking as soon as possible, this helps to reduce swelling and       lowers the chance of blood clots.      Resume normal activities gradually.      Return to work in 1-2 days, depending upon extent of surgery      No strenuous work or stress on wound for 2-3 weeks.    Incision Care      If steri strips have been used, keep steri-strips on; replace if they come off.      Avoid exposing scars to sun for at least 12 months.      Always use a strong sunblock, if sun exposure is unavoidable (SPF 50 or      greater).      Inspect daily for signs of infection.      No tub soaking, bathing, or swimming while sutures or drains are in place.      Keep area clean and dry for first 24 hours.     What to Expect      Some swelling and bruising.      May have slight bleeding from incision.  Apply 4 x 4 gauze with slight pressure to       control bleeding.      Skin grafts and flaps may take several weeks or months to heal; a support garment or      bandage may be necessary for up to a year.    Appearance      Final results of surgery may take a year or more.    Follow-Up Care      If external sutures have been used, they will be removed in 5-14 days.    Please note my office will call you 1-2 business days after your procedure to check up on how you're doing and to schedule your post-operative appointment.        When to Call      If you have increased swelling or bruising.      If swelling and redness persist after a few days.      If you have increased redness along the incision.      If you have severe or increased pain  "not relieved by medication.      If you have any side effects to medications; such as, rash, nausea,      headache, vomiting.      If you have an oral temperature over 100.4 degrees.      If you have any yellowish or greenish drainage from the incisions or      notice a foul odor.      If you have bleeding from the incisions that is difficult to control with      light pressure.      If you have loss of feeling or motion.    For Medical Questions, Please Call:      [phone], Monday - Friday, 8 a.m. - 4:30 p.m.      After hours and on weekends, call Hospital Paging at [phone] and      ask for the Plastic Surgeon on call.    [facility]  Home Care Following Anesthesia  For 24 hours after surgery:  1. Get plenty of rest.  A responsible adult must stay with you for at least 24 hours after you leave the surgery center.  2. Do not drive or use heavy equipment.  If you have weakness or tingling, don't drive or use heavy equipment until this feeling goes away.   3. Do not drink alcohol.   4. Avoid strenuous or risky activities.  Ask for help when climbing stairs.  5. You may feel lightheaded.  IF so, sit for a few minutes before standing.  Have someone help you get up.   6. If you have nausea (feel sick to your stomach): Drink only clear liquids such as apple juice, ginger ale, broth or 7-Up.  Rest may also help.  Be sure to drink enough fluids.  Move to a regular diet as you feel able.   7. You may have a slight fever.  Call the doctor if your fever is over 100 F (37.7 C) (taken under the tongue) or lasts longer than 24 hours.  8. You may have a dry mouth, a sore throat, muscle aches or trouble sleeping. These should go away after 24 hours.  9. Do not make important or legal decisions.        Today you received a Marcaine or bupivacaine block to numb the nerves near your surgery site.  This is a block using local anesthetic or \"numbing\" medication injected around the nerves to " "anesthetize or \"numb\" the area supplied by those nerves.  This block is injected into the muscle layer near your surgical site.  The medication may numb the location where you had surgery for 6-18 hours, but may last up to 24 hours.  If your surgical site is an arm or leg you should be careful with your affected limb, since it is possible to injure your limb without being aware of it due to the numbing.  Until full feeling returns, you should guard against bumping or hitting your limb, and avoid extreme hot or cold temperatures on the skin.  As the block wears off, the feeling will return as a tingling or prickly sensation near your surgical site.  You will experience more discomfort from your incision as the feeling returns.  You may want to take a pain pill (a narcotic or Tylenol if this was prescribed by your surgeon) when you start to experience mild pain before the pain beccomes more severe.  If your pain medications do not control your pain you should notifiy your surgeon.    Tips for taking pain medications  To get the best pain relief possible, remember these points:    Take pain medications as directed, before pain becomes severe.    Pain medication can upset your stomach: taking it with food may help.    Constipation is a common side effect of pain medication. Drink plenty of  fluids.    Eat foods high in fiber. Take a stool softener if recommended by your doctor or pharmacist.    Do not drink alcohol, drive or operate machinery while taking pain medications.    Ask about other ways to control pain, such as with heat, ice or relaxation.    Tylenol/Acetaminophen Consumption  To help encourage the safe use of acetaminophen, the makers of TYLENOL  have lowered the maximum daily dose for single-ingredient Extra Strength TYLENOL  (acetaminophen) products sold in the [place] from 8 pills per day (4,000 mg) to 6 pills per day (3,000 mg). The dosing interval has also changed from 2 pills every 4-6 hours to 2 pills " every 6 hours.    If you feel your pain relief is insufficient, you may take Tylenol/Acetaminophen in addition to your narcotic pain medication.     Be careful not to exceed 3,000 mg of Tylenol/Acetaminophen in a 24 hour period from all sources.    If you are taking extra strength Tylenol/acetaminophen (500 mg), the maximum dose is 6 tablets in 24 hours.    If you are taking regular strength acetaminophen (325 mg), the maximum dose is 9 tablets in 24 hours.    Call a doctor for any of the followin. Signs of infection (fever, growing tenderness at the surgery site, a large amount of drainage or bleeding, severe pain, foul-smelling drainage, redness, swelling).  2. It has been over 8 to 10 hours since surgery and you are still not able to urinate (pass water).  3. Headache for over 24 hours.  4. Numbness, tingling or weakness the day after surgery (if you had spinal anesthesia).  Your doctor is:  Dr. Nedra Amin, Plastic Surgery: [phone]                    Or dial [phone] and ask for the resident on call for:  [facility]  For emergency care, call the:  [facility]:  [phone] (TTY for hearing impaired: [phone])

## 2018-11-29 NOTE — BRIEF OP NOTE
[facility]    Brief Operative Note    Pre-operative diagnosis: Breast Cancer  Post-operative diagnosis * No post-op diagnosis entered *  Procedure: Procedure(s):  Bilateral Breast Fat Grafting from Abdomen and Thighs  Nipple Reconstruction  Surgeon: Surgeon(s) and Role:     * DENNISE Amin MD - Primary  Anesthesia: General   Estimated blood loss: Less than 10 ml  Drains: None  Specimens: * No specimens in log *  Findings:   None.  Complications: None.  Implants: None.

## 2018-11-30 NOTE — OP NOTE
Procedure Date: 11/29/2018      PREOPERATIVE DIAGNOSIS:  Status post bilateral breast reconstruction for breast cancer with free KISHOR flaps, now ready for nipple reconstruction, revisional surgeries and fat grafting for upper pole indentations and size asymmetry.      POSTOPERATIVE DIAGNOSIS:     Status post bilateral breast reconstruction for breast cancer with free KISHOR flaps, now ready for nipple reconstruction, revisional surgeries and fat grafting for upper pole indentations and size asymmetry.      PROCEDURES:   1.  Fat grafting from the flanks and medial and lateral thighs bilaterally to bilateral breasts.  A total of 270 mL grafted using the Patel technique.   2.  Bilateral modified skate flap nipple reconstructions.   3.  Left breast standing cutaneous cone excision (skin and subcutaneous tissue) with layered closure, total length 2 cm.   4.  Right lateral abdominal scar, standing cutaneous cone excision (skin and subcutaneous tissue) with layered closure.   Total length 5 cm.   5.  Left lateral abdominal standing cutaneous cone excision (skin and subcutaneous tissue) with layered closure, total length 5 cm.   6.  Mid abdominal wound.  Slow healing open wound excision including skin and subcutaneous tissue with layered closure.  Total length 5 cm.      SURGEON:  Nedra Amin MD      RESIDENT FELLOW:  Debbie Colmenares MD      ANESTHESIA:  General anesthesia intubation.      COMPLICATIONS:  Nil.      DRAINS:  Nil.      SPECIMENS:  Nil.      BLOOD LOSS:  25  mL.      DESCRIPTION OF PROCEDURE:  After informed consent was taken from the patient and she was appropriately marked, she was taken to the OR.  She was placed in supine position with the knees comfortably flexed, pillows underneath and pneumoboots were placed and running prior to induction of anesthesia.  Preoperative antibiotics were given in the OR.  All pressure points appropriately padded.  General anesthesia was induced without any complications.   She was prepped and draped in standard surgical fashion.  Her mid abdominal open wound was covered with Tegaderm.  We began by first instilling tumescent solution in the bilateral flanks, medial thighs, as well as lateral thighs.  The tumescent solution included a liter of Ringer's lactate mixed with 30 mL of lidocaine and 1 mL of 1:1000 epinephrine. A total of 2 liters was injected in all these areas.  While this medication took effect, we turned our attention to the nipple reconstructions, modified skate flaps were designed over the appropriate position on each breast based superiorly.  They were cut according to the markings elevated in the subcutaneous plane.  Hemostasis ensured, and then the donor site closed with 2-0 Monocryl suture in the deep dermal layer.  The skin flaps were interdigitated with 4-0 Monocryl suture in an interrupted fashion, and then the donor site skin was approximated in a similar fashion.  This was done on both sides.  Once this was completed, the standing cutaneous cone in the left medial breast was then excised, including skin and subcutaneous tissues and closed in a layered fashion using 2-0 Monocryl suture to the deep dermal layer and #3 Monocryl suture in a running intracuticular manner.  Total length was 2 cm.  Attention was then placed to the standing cutaneous cones in the lateral abdominal incisions on each side.  They were each 5 cm long and excised, including skin and subcutaneous tissues.  Hemostasis ensured and the wounds were closed in a layered fashion using #2 Monocryl suture in a deep dermal layer, 3-0 Monocryl suture in a running intracuticular manner.  At this point, we harvested fat from the flanks and the medial and lateral thighs using a 4 mm cannula in the [employer] system. The aspirate was about 350 mL in the 1st run.  It was  washed 3 different times with Ringer's lactate, and then collected in 10 mL syringes.  Another run was done in which about 200 mL further of  aspirate was lysed, liposuction cleaned in similar manner and collected in 10 mL syringes.  We then injected the fat through multiple stab incisions in both breasts in the upper pole to fill indentations and in both breasts themselves to give more volume and more symmetry with the right breast being smaller than the left side, hence more fat was placed in the right side compared to the left side.  In total, 270 mL of fat was injected using fanning technique and a Patel technique.  Once the fat grafting was completed, all the incisions were closed with 5-0 fast absorbing suture.  Prineo was placed over all of the incisions.  At this point, attention was placed to the mid abdominal open slow healing wound which was about 4 cm long.  It was excised sharply such that the entire wound was excised, including skin and subcutaneous tissues.  Hemostasis ensured, and then the wound was closed in layers using 0 PDS suture in the deep layer, 2-0 Monocryl suture in the deep dermal layer, and 3-0 nylon suture in interrupted horizontal mattress fashion.  Prineo was placed on top of this as well.        The patient tolerated the procedure well.  All counts were correct at the end of the case.  Ace wraps were placed on the thighs, abdominal binder on the abdomen, and an Ace wrap on the chest.  The patient was extubated and sent to the recovery room in stable condition.         DENNISE HAIDER MD             D: 2018   T: 2018   MT: NEHA      Name:     MICHEL LOMAX   MRN:      [id number]        Account:        [id number]   :      1960           Procedure Date: 2018      Document: [id number]

## 2018-12-04 ENCOUNTER — OFFICE VISIT (OUTPATIENT)
Dept: PLASTIC SURGERY | Facility: CLINIC | Age: 58
End: 2018-12-04
Attending: PLASTIC SURGERY
Payer: COMMERCIAL

## 2018-12-04 DIAGNOSIS — Z98.890 S/P BREAST RECONSTRUCTION: Primary | ICD-10-CM

## 2018-12-04 NOTE — MR AVS SNAPSHOT
After Visit Summary   12/4/2018    Enid Lombardo    MRN: [id number]           Patient Information     Date Of Birth          1960        Visit Information        Provider Department      12/4/2018 10:30 AM Rn, [facility]        Today's Diagnoses     S/P breast reconstruction    -  1       Follow-ups after your visit        Your next 10 appointments already scheduled     Dec 18, 2018 10:00 AM CST   (Arrive by 9:45 AM)   Post-Op with [facility] Surgery Rn   [facility] ([facility])    [address]  [address]  [place] [address] [phone]            Jan 14, 2019  8:00 AM CST   DX HIP/PELVIS/SPINE with UCDX1   [facility] ([facility])    [address]  1st Floor  [place] [address] [phone]           How do I prepare for my exam? (Food and drink instructions) No Food and Drink Restrictions.  How do I prepare for my exam? (Other instructions) Please do not take any of the following 24 hours prior to the day of your exam: vitamins, calcium tablets, antacids.  What should I wear: If possible, please wear clothes without metal (snaps, zippers). A sweat suit works well.  How long does the exam take: The exam takes about 20 minutes.  What should I bring: Bring a list of your current medicines to your exam (including vitamins, minerals and over-the-counter drugs).  Do I need a :  No  is needed.  What should I do after the exam: No restrictions, You may resume normal activities.  How do I prepare for my exam? (Food and drink instructions) A DEXA scan is a bone-density scan. It uses a low level of radiation to check the strength of your bones. As you lie on a padded table, a machine will take X-rays. We most often scan the hips and lower spine.  Who should I call with questions: If you have any questions, please call the Imaging Department  where you will have your exam. Directions, parking instructions, and other information is available on our website, [email or web address].            Jan 14, 2019  9:00 AM CST   [facility] Draw with  [facility] DRAW   [facility] Lab Draw ([facility])    [address]  [address]  [place] [facility] [address]   [phone]            Jan 14, 2019 11:30 AM CST   (Arrive by 11:15 AM)   Return Visit with Charity Brar MD   [facility] ([facility])    [address]  [address]  [place] [facility] [address]   [phone]            Jan 22, 2019 12:10 PM CST   RETURN RETINA with Paz Osborn MD   [facility] ([address]  [facility] [address]   [phone]            Jan 23, 2019 11:30 AM CST   (Arrive by 11:15 AM)   [facility]   [facility] ([facility])    [address]  4th Floor  [place] [facility] [address]   [phone]            Sep 23, 2019  2:00 PM CDT   (Arrive by 1:45 PM)   Return Visit with Charity Brar MD   [facility] ([facility])    [address] [place] [address]  [address]  [facility] [address] [phone]              Who to contact     If you have questions or need follow up information about today's clinic visit or your schedule please contact [facility] directly at [phone].  Normal or non-critical lab and imaging results will be communicated to you by MyChart, letter or phone within 4 business days after the clinic has received the results. If you do not hear from us within 7 days, please contact the clinic through MyChart or phone. If you have a critical or abnormal lab result, we will notify you by phone as soon as possible.  Submit refill requests through [employer] or call  your pharmacy and they will forward the refill request to us. Please allow 3 business days for your refill to be completed.          Additional Information About Your Visit        MyChart Information     [employer] gives you secure access to your electronic health record. If you see a primary care provider, you can also send messages to your care team and make appointments. If you have questions, please call your primary care clinic.  If you do not have a primary care provider, please call [phone] and they will assist you.        Care EveryWhere ID     This is your Care EveryWhere ID. This could be used by other organizations to access your [facility] medical records  [id number]        Your Vitals Were     Last Period                   10/14/2001            Blood Pressure from Last 3 Encounters:   11/29/18 137/90   11/06/18 114/77   11/01/18 120/70    Weight from Last 3 Encounters:   11/29/18 165 lb   11/06/18 173 lb 4.8 oz   11/01/18 172 lb              Today, you had the following     No orders found for display         Today's Medication Changes          These changes are accurate as of 12/4/18  1:22 PM.  If you have any questions, ask your nurse or doctor.               These medicines have changed or have updated prescriptions.        Dose/Directions    sertraline 25 MG tablet   Commonly known as:  ZOLOFT   This may have changed:    - how much to take  - when to take this  - additional instructions   Used for:  Hot flushes, perimenopausal        Take 1 tablet daily.   Quantity:  30 tablet   Refills:  11                Primary Care Provider Office Phone # Fax #    Shi Sasha Alonso [phone]       [address] [place] [address]        Equal Access to Services     [facility]: Lillie Morrison, brianda betts, qamichelle france. So [facility] [phone].    ATENCIÓN: Si habla español, tiene a rivera disposición servicios gratuitos  de asistencia lingüística. Wei doss [phone].    We comply with applicable federal civil rights laws and [place] laws. We do not discriminate on the basis of race, color, national origin, age, disability, sex, sexual orientation, or gender identity.            Thank you!     Thank you for choosing [facility]  for your care. Our goal is always to provide you with excellent care. Hearing back from our patients is one way we can continue to improve our services. Please take a few minutes to complete the written survey that you may receive in the mail after your visit with us. Thank you!             Your Updated Medication List - Protect others around you: Learn how to safely use, store and throw away your medicines at www.disposemymeds.org.          This list is accurate as of 12/4/18  1:22 PM.  Always use your most recent med list.                   Brand Name Dispense Instructions for use Diagnosis    ACETAMINOPHEN PO      Take 325 mg by mouth every 8 hours as needed for pain        ADVIL 200 MG tablet   Generic drug:  ibuprofen      Take 400 mg by mouth as needed for mild pain        ARIMIDEX 1 MG tablet   Generic drug:  anastrozole     90 tablet    Take 1 tablet (1 mg) by mouth daily    Recurrent malignant neoplasm of breast, unspecified laterality (H)       Fish Oil 1000 MG Cpdr      Take  by mouth.        fluticasone 50 MCG/ACT nasal spray    FLONASE    16 mL    USE ONE OR TWO SPRAYS IN EACH NOSTRIL DAILY    Chronic rhinitis       loratadine 10 MG tablet    CLARITIN    90 tablet    TAKE ONE TABLET BY MOUTH EVERY DAY AS NEEDED FOR ALLERGY SYMPTOMS    Chronic rhinitis, unspecified type       omeprazole 20 MG DR capsule    priLOSEC    90 capsule    TAKE 1 CAPSULE (20 MG) BY MOUTH DAILY    Gastroesophageal reflux disease, esophagitis presence not specified       * ondansetron 4 MG ODT tab    ZOFRAN ODT    20 tablet    Take one tablet every 6 hours for nausea.    S/P breast reconstruction       *  ondansetron 4 MG ODT tab    ZOFRAN-ODT    4 tablet    Take 1-2 tablets (4-8 mg) by mouth every 8 hours as needed for nausea    S/P breast reconstruction, bilateral       oxyCODONE 5 MG tablet    ROXICODONE    6 tablet    Take 1-2 tablets (5-10 mg) by mouth every 6 hours as needed for moderate to severe pain    S/P breast reconstruction, bilateral       senna-docusate 8.6-50 MG tablet    SENOKOT-S/PERICOLACE    30 tablet    Take 1-2 tablets by mouth 2 times daily    S/P breast reconstruction, bilateral       sennosides 8.6 MG tablet    SENOKOT     Take 2 tablets by mouth 2 times daily        sertraline 25 MG tablet    ZOLOFT    30 tablet    Take 1 tablet daily.    Hot flushes, perimenopausal       * Notice:  This list has 2 medication(s) that are the same as other medications prescribed for you. Read the directions carefully, and ask your doctor or other care provider to review them with you.

## 2018-12-04 NOTE — LETTER
12/4/2018       RE: Enid Lombardo  [address] [place] [facility] [address]     Dear Colleague,    Thank you for referring your patient, Enid Lobmardo, to the [facility] at [facility]. Please see a copy of my visit note below.    Pt. comes into clinic today at the request of Dr. Nedra Amin.    This service provided today was under the supervising provider of the day Dr. Amin, who was available if needed.    Reason for visit: Post op visit.  Pt returns days after bilateral fat grafting to breast and bilateral nipple reconstruction.  All incisions healing well without evidence of infection.  Bruising on thighs where fat was removed.  Sutures on left breast well approximated.  Abdominal wound openings also sutured and well approximated.    Pt to return in 2 weeks for nurse visit for suture removal and return to see Dr. Amin in 6 weeks.      Ninfa Sanchez, RN, BSN  Care Coordinator    Again, thank you for allowing me to participate in the care of your patient.      Sincerely,    [facility] Plastic Surgery RN

## 2018-12-04 NOTE — PROGRESS NOTES
Pt. comes into clinic today at the request of Dr. Nedra Amin.    This service provided today was under the supervising provider of the day Dr. Amin, who was available if needed.    Reason for visit: Post op visit.  Pt returns days after bilateral fat grafting to breast and bilateral nipple reconstruction.  All incisions healing well without evidence of infection.  Bruising on thighs where fat was removed.  Sutures on left breast well approximated.  Abdominal wound openings also sutured and well approximated.    Pt to return in 2 weeks for nurse visit for suture removal and return to see Dr. Amin in 6 weeks.      Ninfa Sanchez, RN, BSN  Care Coordinator

## 2018-12-08 DIAGNOSIS — J31.0 CHRONIC RHINITIS: ICD-10-CM

## 2018-12-10 RX ORDER — LORATADINE 10 MG/1
TABLET ORAL
Qty: 90 TABLET | Refills: 2 | Status: SHIPPED | OUTPATIENT
Start: 2018-12-10 | End: 2019-08-31

## 2018-12-10 NOTE — TELEPHONE ENCOUNTER
Pending Prescriptions:                       Disp   Refills    loratadine (CLARITIN) 10 MG tablet [Pharm*90 tab*1            Sig: TAKE 1 TABLET BY MOUTH EVERY DAY AS NEEDED FOR           ALLERGY SYMPTOMS    9 months approved per RN refill protocol    Frank Stroud RN, BSN

## 2018-12-11 ENCOUNTER — HOSPITAL ENCOUNTER (EMERGENCY)
Facility: CLINIC | Age: 58
Discharge: HOME | End: 2018-12-11
Attending: EMERGENCY MEDICINE | Admitting: EMERGENCY MEDICINE
Payer: COMMERCIAL

## 2018-12-11 VITALS
SYSTOLIC BLOOD PRESSURE: 126 MMHG | HEIGHT: 65 IN | HEART RATE: 72 BPM | TEMPERATURE: 97.5 F | RESPIRATION RATE: 12 BRPM | DIASTOLIC BLOOD PRESSURE: 79 MMHG | WEIGHT: 165 LBS | OXYGEN SATURATION: 98 % | BODY MASS INDEX: 27.49 KG/M2

## 2018-12-11 DIAGNOSIS — Z98.890 S/P BREAST RECONSTRUCTION: Primary | ICD-10-CM

## 2018-12-11 DIAGNOSIS — G89.18 ACUTE POST-OPERATIVE PAIN: ICD-10-CM

## 2018-12-11 LAB
ALBUMIN SERPL-MCNC: 3.5 G/DL (ref 3.4–5)
ALP SERPL-CCNC: 61 U/L (ref 40–150)
ALT SERPL W P-5'-P-CCNC: 27 U/L (ref 0–50)
ANION GAP SERPL CALCULATED.3IONS-SCNC: 6 MMOL/L (ref 3–14)
AST SERPL W P-5'-P-CCNC: 14 U/L (ref 0–45)
BASOPHILS # BLD AUTO: 0 10E9/L (ref 0–0.2)
BASOPHILS NFR BLD AUTO: 0.2 %
BILIRUB SERPL-MCNC: 0.5 MG/DL (ref 0.2–1.3)
BUN SERPL-MCNC: 13 MG/DL (ref 7–30)
CALCIUM SERPL-MCNC: 8.5 MG/DL (ref 8.5–10.1)
CHLORIDE SERPL-SCNC: 105 MMOL/L (ref 94–109)
CO2 SERPL-SCNC: 26 MMOL/L (ref 20–32)
CREAT SERPL-MCNC: 0.72 MG/DL (ref 0.52–1.04)
DIFFERENTIAL METHOD BLD: ABNORMAL
EOSINOPHIL NFR BLD AUTO: 0.3 %
ERYTHROCYTE [DISTWIDTH] IN BLOOD BY AUTOMATED COUNT: 12.8 % (ref 10–15)
GFR SERPL CREATININE-BSD FRML MDRD: 83 ML/MIN/1.7M2
GLUCOSE SERPL-MCNC: 110 MG/DL (ref 70–99)
HCT VFR BLD AUTO: 40.2 % (ref 35–47)
HGB BLD-MCNC: 13.2 G/DL (ref 11.7–15.7)
IMM GRANULOCYTES # BLD: 0 10E9/L (ref 0–0.4)
IMM GRANULOCYTES NFR BLD: 0.2 %
LIPASE SERPL-CCNC: 290 U/L (ref 73–393)
LYMPHOCYTES # BLD AUTO: 0.6 10E9/L (ref 0.8–5.3)
LYMPHOCYTES NFR BLD AUTO: 6.4 %
MCH RBC QN AUTO: 29.9 PG (ref 26.5–33)
MCHC RBC AUTO-ENTMCNC: 32.8 G/DL (ref 31.5–36.5)
MCV RBC AUTO: 91 FL (ref 78–100)
MONOCYTES # BLD AUTO: 0.4 10E9/L (ref 0–1.3)
MONOCYTES NFR BLD AUTO: 4.1 %
NEUTROPHILS # BLD AUTO: 8.5 10E9/L (ref 1.6–8.3)
NEUTROPHILS NFR BLD AUTO: 88.8 %
NRBC # BLD AUTO: 0 10*3/UL
NRBC BLD AUTO-RTO: 0 /100
PLATELET # BLD AUTO: 241 10E9/L (ref 150–450)
POTASSIUM SERPL-SCNC: 4.2 MMOL/L (ref 3.4–5.3)
PROT SERPL-MCNC: 6.8 G/DL (ref 6.8–8.8)
RBC # BLD AUTO: 4.42 10E12/L (ref 3.8–5.2)
SODIUM SERPL-SCNC: 137 MMOL/L (ref 133–144)
WBC # BLD AUTO: 9.6 10E9/L (ref 4–11)

## 2018-12-11 PROCEDURE — 99285 EMERGENCY DEPT VISIT HI MDM: CPT | Mod: 25 | Performed by: EMERGENCY MEDICINE

## 2018-12-11 PROCEDURE — 99285 EMERGENCY DEPT VISIT HI MDM: CPT | Mod: Z6 | Performed by: EMERGENCY MEDICINE

## 2018-12-11 PROCEDURE — 83690 ASSAY OF LIPASE: CPT | Performed by: EMERGENCY MEDICINE

## 2018-12-11 PROCEDURE — 25000128 H RX IP 250 OP 636: Performed by: EMERGENCY MEDICINE

## 2018-12-11 PROCEDURE — 80053 COMPREHEN METABOLIC PANEL: CPT | Performed by: EMERGENCY MEDICINE

## 2018-12-11 PROCEDURE — 96375 TX/PRO/DX INJ NEW DRUG ADDON: CPT | Performed by: EMERGENCY MEDICINE

## 2018-12-11 PROCEDURE — 96361 HYDRATE IV INFUSION ADD-ON: CPT | Performed by: EMERGENCY MEDICINE

## 2018-12-11 PROCEDURE — 96374 THER/PROPH/DIAG INJ IV PUSH: CPT | Performed by: EMERGENCY MEDICINE

## 2018-12-11 PROCEDURE — 85025 COMPLETE CBC W/AUTO DIFF WBC: CPT | Performed by: EMERGENCY MEDICINE

## 2018-12-11 RX ORDER — KETOROLAC TROMETHAMINE 30 MG/ML
30 INJECTION, SOLUTION INTRAMUSCULAR; INTRAVENOUS ONCE
Status: COMPLETED | OUTPATIENT
Start: 2018-12-11 | End: 2018-12-11

## 2018-12-11 RX ORDER — OXYCODONE HYDROCHLORIDE 5 MG/1
5 TABLET ORAL EVERY 6 HOURS PRN
Qty: 10 TABLET | Refills: 0 | Status: SHIPPED | OUTPATIENT
Start: 2018-12-11 | End: 2019-06-03

## 2018-12-11 RX ORDER — CYCLOBENZAPRINE HCL 5 MG
5 TABLET ORAL 3 TIMES DAILY PRN
Qty: 30 TABLET | Refills: 0 | Status: SHIPPED | OUTPATIENT
Start: 2018-12-11 | End: 2019-06-03

## 2018-12-11 RX ORDER — ONDANSETRON 2 MG/ML
4 INJECTION INTRAMUSCULAR; INTRAVENOUS ONCE
Status: COMPLETED | OUTPATIENT
Start: 2018-12-11 | End: 2018-12-11

## 2018-12-11 RX ORDER — HYDROMORPHONE HYDROCHLORIDE 1 MG/ML
0.5 INJECTION, SOLUTION INTRAMUSCULAR; INTRAVENOUS; SUBCUTANEOUS
Status: DISCONTINUED | OUTPATIENT
Start: 2018-12-11 | End: 2018-12-11 | Stop reason: HOSPADM

## 2018-12-11 RX ORDER — SODIUM CHLORIDE 9 MG/ML
1000 INJECTION, SOLUTION INTRAVENOUS CONTINUOUS
Status: DISCONTINUED | OUTPATIENT
Start: 2018-12-11 | End: 2018-12-11 | Stop reason: HOSPADM

## 2018-12-11 RX ADMIN — SODIUM CHLORIDE 1000 ML: 9 INJECTION, SOLUTION INTRAVENOUS at 09:57

## 2018-12-11 RX ADMIN — Medication 0.5 MG: at 09:59

## 2018-12-11 RX ADMIN — ONDANSETRON 4 MG: 2 INJECTION INTRAMUSCULAR; INTRAVENOUS at 10:09

## 2018-12-11 RX ADMIN — KETOROLAC TROMETHAMINE 30 MG: 30 INJECTION, SOLUTION INTRAMUSCULAR at 09:58

## 2018-12-11 ASSESSMENT — MIFFLIN-ST. JEOR: SCORE: 1339.32

## 2018-12-11 NOTE — ED PROVIDER NOTES
History     Chief Complaint   Patient presents with     Abdominal Pain     HPI  Enid Lombardo is a 58 year old female who presents with abdominal pain.  This began at 1 AM, 8 hours ago.  Pain is above the suprapubic incision.  She has had a complicated 2 months.  She had breast reconstruction on October 8.  They took a deep flap from her abdomen for this.  Then on November 29 they did some fat grafting from her abdomen and thighs to the breast area.  Her pain was pretty well under control until last night.  She did states she walked more yesterday and wonders if she overdid it.  She has had no fever, no vomiting and no diarrhea.  She does endorse some nausea.  The pain she is sensing is just superior to the suprapubic incision below the umbilicus.    Problem List:    Patient Active Problem List    Diagnosis Date Noted     S/P flap graft 10/08/2018     Priority: Medium     S/P breast reconstruction, bilateral 08/28/2018     Priority: Medium     Breast cancer in situ 08/22/2018     Priority: Medium     Recurrent carcinoma in situ of breast, right 07/27/2018     Priority: Medium     Osteopenia of both hands 04/10/2018     Priority: Medium     Skin abscess 05/03/2016     Priority: Medium     Benign neoplasm of colon 03/18/2015     Priority: Medium     Keratoconus 10/16/2014     Priority: Medium     Meibomian gland dysfunction - Both Eyes 10/16/2014     Priority: Medium     Tear film insufficiency 10/16/2014     Priority: Medium     Problem list name updated by automated process. Provider to review       Cervicalgia 06/11/2014     Priority: Medium     Headache 06/11/2014     Priority: Medium     Problem list name updated by automated process. Provider to review       Hyperlipidemia with target LDL less than 130 05/19/2014     Priority: Medium     Diagnosis updated by automated process. Provider to review and confirm.       Macular drusen      Priority: Medium     Low back pain 07/22/2013     Priority: Medium      Diagnosis updated by automated process. Provider to review and confirm.       IT band syndrome 07/22/2013     Priority: Medium     Right hip pain 04/08/2013     Priority: Medium     Pain, radicular, lumbar 03/21/2013     Priority: Medium     Shingles 01/27/2012     Priority: Medium     Cataract 12/11/2008     Priority: Medium     Utility update for deleted IMO code  Imo Update utility       Disorder of synovium, tendon, and bursa 12/11/2007     Priority: Medium     Problem list name updated by automated process. Provider to review       Esophageal reflux 08/08/2006     Priority: Medium     History of right breast cancer 12/22/2004     Priority: Medium     s/p lumpectomy 1/05 and radiation  Problem list name updated by automated process. Provider to review          Past Medical History:    Past Medical History:   Diagnosis Date     Breast cancer (H) 2004     Cancer (H) 2004     Cataracts, bilateral      Complication of anesthesia      Enthesopathy of hip region 6/06     Esophageal reflux 2/06     History of gestational diabetes      Hypertension 2012     Macular drusen      PONV (postoperative nausea and vomiting)      Shingles 01/23/2012       Past Surgical History:    Past Surgical History:   Procedure Laterality Date     BIOPSY  2004    Breast     BREAST SURGERY  2004     C VAGINAL HYSTERECTOMY  12/2001    Ovaries intact, due to fibroids     COLONOSCOPY       ENDOSCOPY  05/09/2007    Upper GI     EYE SURGERY       GRAFT FAT TO BREAST Bilateral 11/29/2018    Procedure: Bilateral Breast Fat Grafting from Abdomen and Thighs;  Surgeon: DENNISE Amin MD;  Location:  OR     GRAFT FREE VASCULARIZED TRANSVERSE RECTUS ABDOMINIS MYOCUTANEOUS Bilateral 10/8/2018    Procedure: GRAFT FREE VASCULARIZED TRANSVERSE RECTUS ABDOMINIS MYOCUTANEOUS;  Bilateral Deep Inferior Epigastric Artery  Free Flap Breast Reconstruction and Removal of Breast Explanders;  Surgeon: DENNISE Amin MD;  Location:  OR      GYN SURGERY       HC BIOPSY/EXCISION LYMPH NODE OPEN DEEP CERVICAL W EXC FAT PAD  2005    Right axillary sentinel node biopsy X 3.     HC COLONOSCOPY W BIOPSY  05/10/10     HC EXCISION BREAST LESION, OPEN >=1  2005    Right breast.     HC REMV CATARACT EXTRACAP,INSERT LENS  08    bilateral     HC REMV TARSAL/METATARSAL BENIGN BONE LESN  1982    Bone spur - right     MASTECTOMY SIMPLE BILATERAL, SENTINEL NODE BILATERAL, COMBINED Bilateral 2018    Procedure: COMBINED MASTECTOMY SIMPLE BILATERAL, SENTINEL NODE BILATERAL;  Bilateral Mastectomy with Right [place] Node Biopsy, Bilateral Breast Reconstruction, Anesthesia Block;  Surgeon: Rakesh Sanchez MD;  Location:  OR     ORTHOPEDIC SURGERY  1983    Right foot     RECONSTRUCT BREAST Bilateral 2018    Procedure: RECONSTRUCT BREAST;;  Surgeon: DENNISE Amin MD;  Location:  OR     RECONSTRUCT BREAST BILATERAL Bilateral 10/8/2018    Procedure: RECONSTRUCT BREAST BILATERAL;;  Surgeon: DENNISE Amin MD;  Location:  OR     RECONSTRUCT NIPPLE BILATERAL Bilateral 2018    Procedure: Nipple Reconstruction;  Surgeon: DENNISE Amin MD;  Location:  OR       Family History:    Family History   Adopted: Yes   Problem Relation Age of Onset     Cancer Mother         lung cancer  at age 52     Thyroid Disease Sister         half sister, had thyroid removed     Unknown/Adopted Father      Unknown/Adopted Maternal Grandmother      Unknown/Adopted Maternal Grandfather      Unknown/Adopted Paternal Grandmother      Unknown/Adopted Paternal Grandfather      Glaucoma No family hx of      Diabetes No family hx of        Social History:  Marital Status:   [2]  Social History     Tobacco Use     Smoking status: Never Smoker     Smokeless tobacco: Never Used   Substance Use Topics     Alcohol use: Yes     Comment: occasionally     Drug use: No        Medications:      oxyCODONE (ROXICODONE) 5 MG tablet  "  ACETAMINOPHEN PO   anastrozole (ARIMIDEX) 1 MG tablet   cyclobenzaprine (FLEXERIL) 5 MG tablet   fluticasone (FLONASE) 50 MCG/ACT spray   ibuprofen (ADVIL) 200 MG tablet   loratadine (CLARITIN) 10 MG tablet   Omega-3 Fatty Acids (FISH OIL) 1000 MG CPDR   omeprazole (PRILOSEC) 20 MG CR capsule   ondansetron (ZOFRAN ODT) 4 MG ODT tab   ondansetron (ZOFRAN-ODT) 4 MG ODT tab   oxyCODONE (ROXICODONE) 5 MG tablet   senna-docusate (SENOKOT-S/PERICOLACE) 8.6-50 MG tablet   sennosides (SENOKOT) 8.6 MG tablet   sertraline (ZOLOFT) 25 MG tablet         Review of Systems  All other systems are reviewed and are negative    Physical Exam   BP: (!) 147/92  Pulse: 73  Temp: 97.5  F (36.4  C)  Resp: 12  Height: 165.1 cm (5' 5\")  Weight: 74.8 kg (165 lb)  SpO2: 98 %      Physical Exam   Constitutional: She appears well-developed and well-nourished. No distress.   HENT:   Head: Normocephalic and atraumatic.   Mouth/Throat: Oropharynx is clear and moist.   Eyes: Pupils are equal, round, and reactive to light. No scleral icterus.   Neck: Normal range of motion. Neck supple.   Cardiovascular: Normal rate, regular rhythm, normal heart sounds and intact distal pulses.   No murmur heard.  Pulmonary/Chest: No stridor. No respiratory distress. She has no wheezes. She has no rales.   Abdominal: There is no tenderness.   Suprapubic incision, clean dry and without signs of infection.  Abdomen is somewhat taut.  However, there is no abdominal tenderness to palpation.   Musculoskeletal: She exhibits no edema or tenderness.   Neurological: She is alert.   Skin: Skin is warm and dry. No rash noted. She is not diaphoretic. No erythema. No pallor.   Psychiatric: She has a normal mood and affect.   Nursing note and vitals reviewed.      ED Course        Procedures               Critical Care time:  none               Results for orders placed or performed during the hospital encounter of 12/11/18 (from the past 24 hour(s))   CBC with platelets " differential   Result Value Ref Range    WBC 9.6 4.0 - 11.0 10e9/L    RBC Count 4.42 3.8 - 5.2 10e12/L    Hemoglobin 13.2 11.7 - 15.7 g/dL    Hematocrit 40.2 35.0 - 47.0 %    MCV 91 78 - 100 fl    MCH 29.9 26.5 - 33.0 pg    MCHC 32.8 31.5 - 36.5 g/dL    RDW 12.8 10.0 - 15.0 %    Platelet Count 241 150 - 450 10e9/L    Diff Method Automated Method     % Neutrophils 88.8 %    % Lymphocytes 6.4 %    % Monocytes 4.1 %    % Eosinophils 0.3 %    % Basophils 0.2 %    % Immature Granulocytes 0.2 %    Nucleated RBCs 0 0 /100    Absolute Neutrophil 8.5 (H) 1.6 - 8.3 10e9/L    Absolute Lymphocytes 0.6 (L) 0.8 - 5.3 10e9/L    Absolute Monocytes 0.4 0.0 - 1.3 10e9/L    Absolute Basophils 0.0 0.0 - 0.2 10e9/L    Abs Immature Granulocytes 0.0 0 - 0.4 10e9/L    Absolute Nucleated RBC 0.0    Comprehensive metabolic panel   Result Value Ref Range    Sodium 137 133 - 144 mmol/L    Potassium 4.2 3.4 - 5.3 mmol/L    Chloride 105 94 - 109 mmol/L    Carbon Dioxide 26 20 - 32 mmol/L    Anion Gap 6 3 - 14 mmol/L    Glucose 110 (H) 70 - 99 mg/dL    Urea Nitrogen 13 7 - 30 mg/dL    Creatinine 0.72 0.52 - 1.04 mg/dL    GFR Estimate 83 >60 mL/min/1.7m2    GFR Estimate If Black >90 >60 mL/min/1.7m2    Calcium 8.5 8.5 - 10.1 mg/dL    Bilirubin Total 0.5 0.2 - 1.3 mg/dL    Albumin 3.5 3.4 - 5.0 g/dL    Protein Total 6.8 6.8 - 8.8 g/dL    Alkaline Phosphatase 61 40 - 150 U/L    ALT 27 0 - 50 U/L    AST 14 0 - 45 U/L   Lipase   Result Value Ref Range    Lipase 290 73 - 393 U/L       Medications   0.9% sodium chloride BOLUS (0 mLs Intravenous Stopped 12/11/18 1104)   ketorolac (TORADOL) injection 30 mg (30 mg Intravenous Given 12/11/18 2301)   ondansetron (ZOFRAN) injection 4 mg (4 mg Intravenous Given 12/11/18 1330)       Assessments & Plan (with Medical Decision Making)  58-year-old female who is 12 days postoperative from fat grafting from her abdomen for breast reconstruction.  She presents with some abdominal pain superior to incision.  Her  incision is clean, dry without signs of infection or dehiscence.  She is afebrile with a normal white count.  Abdominal exam is benign without tenderness actually.  I discussed her case with her surgeon, Dr. Amin.  He felt comfortable with her discharge home and they would be available all week.  Follow-up if any signs of infection or worsening symptoms.  Is prescribed Norco if needed for pain.  Return anytime sooner to the  emergency department if condition worsens or other concern.     I have reviewed the nursing notes.    I have reviewed the findings, diagnosis, plan and need for follow up with the patient.       Current Discharge Medication List          Final diagnoses:   Acute post-operative pain       12/11/2018   [facility] EMERGENCY DEPARTMENT     Ahmet Toribio MD  12/11/18 1956

## 2018-12-11 NOTE — ED TRIAGE NOTES
Pt had breast reconstructive surgery where they took tissue from your abdomen/ and thighs on October 8th. Then they did some stitching of open abdominal areas on Nov 29th. Now last night during the night started having intense abdominal pain above stitching area. The surgeons office ordered Flexeril but She was in to much pain to wait for pharmacy to open.  She did try Ibuprofen and Tylenol without relief.

## 2018-12-11 NOTE — ED AVS SNAPSHOT
[facility] Emergency Department  [address] [place] [address]  Phone:  [phone]  Fax:  [phone]                                    Enid Lombardo   MRN: [id number]    Department:  [facility] Emergency Department   Date of Visit:  12/11/2018           After Visit Summary Signature Page    I have received my discharge instructions, and my questions have been answered. I have discussed any challenges I see with this plan with the nurse or doctor.    ..........................................................................................................................................  Patient/Patient Representative Signature      ..........................................................................................................................................  Patient Representative Print Name and Relationship to Patient    ..................................................               ................................................  Date                                   Time    ..........................................................................................................................................  Reviewed by Signature/Title    ...................................................              ..............................................  Date                                               Time          22EPIC Rev 08/18

## 2018-12-18 ENCOUNTER — OFFICE VISIT (OUTPATIENT)
Dept: PLASTIC SURGERY | Facility: CLINIC | Age: 58
End: 2018-12-18
Attending: PLASTIC SURGERY
Payer: COMMERCIAL

## 2018-12-18 DIAGNOSIS — Z98.890 S/P BREAST RECONSTRUCTION: Primary | ICD-10-CM

## 2018-12-18 NOTE — PROGRESS NOTES
Pt. comes into clinic today at the request of Dr. Nedra Amin.    This service provided today was under the supervising provider of the day Dr. Amin, who was available if needed.    Reason for visit: Post op visit.  Pt returns 3 week(s) after   1.  Fat grafting from the flanks and medial and lateral thighs bilaterally to bilateral breasts. 2.  Bilateral modified skate flap nipple reconstructions.   3.  Left breast standing cutaneous cone excision (skin and subcutaneous tissue) with layered closure, total length 2 cm.   4.  Right lateral abdominal scar, standing cutaneous cone excision (skin and subcutaneous tissue) with layered closure.   5.  Left lateral abdominal standing cutaneous cone excision (skin and subcutaneous tissue) with layered closure,   6.  Mid abdominal wound.  Slow healing open wound excision including skin and subcutaneous tissue with layered closure.    Wound Description: well approximated incisions  Incision/Drainage:  none  Pain:  Denies, see notes from previous ER visit  Instructions:  Continue moisturizing all incisions daily.  Return to clinic:  After nipple tattoos completed.    Ninfa Sanchez, RN, BSN  Care Coordinator

## 2018-12-18 NOTE — LETTER
12/18/2018       RE: Enid Lombardo  [address] [facility] [address]     Dear Colleague,    Thank you for referring your patient, Enid Lombardo, to the [facility] at [facility]. Please see a copy of my visit note below.    Pt. comes into clinic today at the request of Dr. Nedra Amin.    This service provided today was under the supervising provider of the day Dr. Amin, who was available if needed.    Reason for visit: Post op visit.  Pt returns 3 week(s) after   1.  Fat grafting from the flanks and medial and lateral thighs bilaterally to bilateral breasts. 2.  Bilateral modified skate flap nipple reconstructions.   3.  Left breast standing cutaneous cone excision (skin and subcutaneous tissue) with layered closure, total length 2 cm.   4.  Right lateral abdominal scar, standing cutaneous cone excision (skin and subcutaneous tissue) with layered closure.   5.  Left lateral abdominal standing cutaneous cone excision (skin and subcutaneous tissue) with layered closure,   6.  Mid abdominal wound.  Slow healing open wound excision including skin and subcutaneous tissue with layered closure.    Wound Description: well approximated incisions  Incision/Drainage:  none  Pain:  Denies, see notes from previous ER visit  Instructions:  Continue moisturizing all incisions daily.  Return to clinic:  After nipple tattoos completed.    Ninfa Sanchez, RN, BSN  Care Coordinator            Again, thank you for allowing me to participate in the care of your patient.      Sincerely,    [facility] Plastic Surgery RN

## 2018-12-19 ENCOUNTER — MYC MEDICAL ADVICE (OUTPATIENT)
Dept: PLASTIC SURGERY | Facility: CLINIC | Age: 58
End: 2018-12-19

## 2019-01-14 ENCOUNTER — ANCILLARY PROCEDURE (OUTPATIENT)
Dept: BONE DENSITY | Facility: CLINIC | Age: 59
End: 2019-01-14
Attending: INTERNAL MEDICINE
Payer: COMMERCIAL

## 2019-01-14 ENCOUNTER — ONCOLOGY VISIT (OUTPATIENT)
Dept: ONCOLOGY | Facility: CLINIC | Age: 59
End: 2019-01-14
Attending: INTERNAL MEDICINE
Payer: COMMERCIAL

## 2019-01-14 VITALS
HEIGHT: 65 IN | BODY MASS INDEX: 28.79 KG/M2 | DIASTOLIC BLOOD PRESSURE: 74 MMHG | SYSTOLIC BLOOD PRESSURE: 117 MMHG | RESPIRATION RATE: 16 BRPM | HEART RATE: 79 BPM | WEIGHT: 172.8 LBS | OXYGEN SATURATION: 97 % | TEMPERATURE: 97.8 F

## 2019-01-14 DIAGNOSIS — C50.911 RECURRENT BREAST CANCER, RIGHT (H): ICD-10-CM

## 2019-01-14 DIAGNOSIS — D05.91 RECURRENT CARCINOMA IN SITU OF BREAST, RIGHT: ICD-10-CM

## 2019-01-14 DIAGNOSIS — C50.911 RECURRENT BREAST CANCER, RIGHT (H): Primary | ICD-10-CM

## 2019-01-14 DIAGNOSIS — M81.8 OTHER OSTEOPOROSIS WITHOUT CURRENT PATHOLOGICAL FRACTURE: ICD-10-CM

## 2019-01-14 PROCEDURE — 99214 OFFICE O/P EST MOD 30 MIN: CPT | Mod: ZP | Performed by: INTERNAL MEDICINE

## 2019-01-14 PROCEDURE — G0463 HOSPITAL OUTPT CLINIC VISIT: HCPCS | Mod: ZF

## 2019-01-14 PROCEDURE — 36415 COLL VENOUS BLD VENIPUNCTURE: CPT

## 2019-01-14 RX ORDER — ALENDRONATE SODIUM 70 MG/1
70 TABLET ORAL
Qty: 12 TABLET | Refills: 3 | Status: SHIPPED | OUTPATIENT
Start: 2019-01-14 | End: 2019-06-03

## 2019-01-14 ASSESSMENT — PAIN SCALES - GENERAL: PAINLEVEL: NO PAIN (0)

## 2019-01-14 ASSESSMENT — MIFFLIN-ST. JEOR: SCORE: 1374.7

## 2019-01-14 NOTE — PROGRESS NOTES
"  SUBJECTIVE:   Enid Lombardo is a 58 year old female who presents to clinic today for the following health issues:    HPI     Concern - scrape on chin from dog  Onset: June    Description:   Red, dry, does not heal in center, \"looks like there is hole there\"    Intensity: mild    Progression of Symptoms:  Worsening from washing, clear soap.    Accompanying Signs & Symptoms:  none    Previous history of similar problem:   none    Precipitating factors:   Worsened by: clearasil, washing too much    Alleviating factors:  Improved by: none  Therapies Tried and outcome: aquaphor, clearsil, clear soap    Patient would like to just have this go away. This area is close to where she had the biopsy and actinic keratosis removed last February.      Problem list and histories reviewed & adjusted, as indicated.  Additional history: as documented    Current Outpatient Medications   Medication Sig Dispense Refill     ACETAMINOPHEN PO Take 325 mg by mouth every 8 hours as needed for pain       anastrozole (ARIMIDEX) 1 MG tablet Take 1 tablet (1 mg) by mouth daily 90 tablet 3     fluticasone (FLONASE) 50 MCG/ACT spray USE ONE OR TWO SPRAYS IN EACH NOSTRIL DAILY 16 mL 10     ibuprofen (ADVIL) 200 MG tablet Take 400 mg by mouth as needed for mild pain       loratadine (CLARITIN) 10 MG tablet TAKE 1 TABLET BY MOUTH EVERY DAY AS NEEDED FOR ALLERGY SYMPTOMS 90 tablet 2     Omega-3 Fatty Acids (FISH OIL) 1000 MG CPDR Take  by mouth.       sertraline (ZOLOFT) 25 MG tablet Take 1 tablet daily. (Patient taking differently: 25 mg every morning Take 1 tablet daily.) 30 tablet 11     alendronate (FOSAMAX) 70 MG tablet Take 1 tablet (70 mg) by mouth every 7 days (Patient not taking: Reported on 1/16/2019) 12 tablet 3     omeprazole (PRILOSEC) 20 MG CR capsule TAKE 1 CAPSULE (20 MG) BY MOUTH DAILY (Patient not taking: Reported on 1/16/2019) 90 capsule 3     Allergies   Allergen Reactions     Alphagan P Rash     Thrush and numbness in mouth "       ROS:  Constitutional, HEENT, cardiovascular, pulmonary, GI, , musculoskeletal, neuro, skin, endocrine and psych systems are negative, except as in HPI or otherwise noted.     This document serves as a record of the services and decisions personally performed and made by Shi Alonso MD. It was created on her behalf by Shahida Briseno, a trained medical scribe. The creation of this document is based the provider's statements to the medical scribe.  Shahida Briseno, January 16, 2019 11:01 AM     OBJECTIVE:                                                    /86   Pulse 78   Temp 98.9  F (37.2  C) (Temporal)   Wt 78.6 kg (173 lb 3.2 oz)   LMP 10/14/2001   SpO2 95%   BMI 28.82 kg/m    Body mass index is 28.82 kg/m .   GENERAL: healthy, alert, well nourished, well hydrated, no distress  SKIN: [place] area of most impact with irritation surrounding and flaking of skin, on right upper lip   PSYCH: Alert and oriented times 3; speech- coherent , normal rate and volume; able to articulate logical thoughts, able to abstract reason, no tangential thoughts, no hallucinations or delusions, affect- normal    Diagnostic test results:  No results found for this or any previous visit (from the past 24 hour(s)).     ASSESSMENT/PLAN:                                                        ICD-10-CM    1. Skin lesion of face L98.9 DERMATOLOGY REFERRAL   2. History of right breast cancer Z85.3      Non-healing Lesion of Face: Possible skin cancer or precancerous lesion. Recommended removal with dermatology (requested location that could consider MOHS as she has had area previous frozen as AK and now is progressing to non-healing ulcer.  Will try to schedule before she leaves due to anxiety with her recent breast cancer. But aware that this may take time.     Health Maintenance: Advised 5 years for next Pap smear is coming up in May.     Patient Instructions   Recommended follow-up with dermatology for the excision of this lesion.  The wait for a dermatologist within the [facility] is much longer than at private dermatology groups. If the financial is not a concern for you, you can get in faster with a private dermatology group.     The information in this document, created by the medical scribe for me, accurately reflects the services I personally performed and the decisions made by me. I have reviewed and approved this document for accuracy.   MD Shi Medel MD, MD  [facility] [place]

## 2019-01-14 NOTE — NURSING NOTE
"Oncology Rooming Note    January 14, 2019 11:33 AM   Enid Lombardo is a 58 year old female who presents for:    Chief Complaint   Patient presents with     Blood Draw     [facility] labs drawn, vitals taken, and patient checked into next appt     RECHECK     Breast CA RTN 3 mos      Initial Vitals: /74 (BP Location: Right arm, Patient Position: Sitting, Cuff Size: Adult Regular)   Pulse 79   Temp 97.8  F (36.6  C) (Oral)   Resp 16   Ht 1.651 m (5' 5\")   Wt 78.4 kg (172 lb 12.8 oz)   LMP 10/14/2001   SpO2 97%   BMI 28.76 kg/m   Estimated body mass index is 28.76 kg/m  as calculated from the following:    Height as of this encounter: 1.651 m (5' 5\").    Weight as of this encounter: 78.4 kg (172 lb 12.8 oz). Body surface area is 1.9 meters squared.  No Pain (0) Comment: Data Unavailable   Patient's last menstrual period was 10/14/2001.  Allergies reviewed: Yes  Medications reviewed: Yes    Medications: Medication refills not needed today.  Pharmacy name entered into [facility]:    [id number] [place] [facility] [place], [place] - [address] [address] [place] [address] PHARMACY    Clinical concerns: none      6 minutes for nursing intake (face to face time)     Mechelle CHERYL Jacobson              "

## 2019-01-14 NOTE — PROGRESS NOTES
Oncology Follow-up Visit:  January 14, 2019    Diagnosis: recurrent breast cancer, stage 1    History Of Present Illness:  Ms. Lombardo is a 58 year old female is here for follow-up of new recurrent breast cancer.    She was originally diagnosed in December 2004 with a stage I infiltrating ductal carcinoma of the right breast.  This was found on abnormal screening mammogram.  Biopsy was obtained which showed a grade 1 infiltrating ductal carcinoma, ER positive, MT negative, HER-2 negative.  In January 2005 she underwent a right-sided lumpectomy with sentinel lymph node dissection.  Tumor size was 0.6 cm and she had 0 of 3 sentinel lymph nodes positive for malignancy.  She underwent further excision for close margins and no further malignancy was seen on pathology.  She then completed right breast radiation and was started on tamoxifen in March 2005 and remained on that until November 2008.  She stopped tamoxifen therapy early due to the diagnosis of cataracts.      She had been doing well until screening mammogram in July 2018 showed calcifications and she was then underwent further imaging which showed approximately 3 cm size area of calcifications in an area separate from her previous lumpectomy.  She then underwent a needle biopsy which showed a grade 2 ductal carcinoma in situ which was ER, MT positive.  She then underwent a bilateral mastectomy with reconstruction with Dr. Sanchez.  She also saw a genetic counselor and further BCRA testing was negative.  Final pathology revealed 8 mm invasive cancer, total 4 cm dcis.  Final staging T1cN0, stage 1 breast cancer.  ER + MT + her 2 negative.    Oncotype 0      In returning today, she is feeling well.  She has complaints related to her fat grafting.  She is sore, and is now recovering.      She started her AI, and has noticed difficulty with muscle cramps in her LEs in the evenings when trying to sleep.  This will disrupt her sleep cycle; she has complaints about  "this.      No hot flashes.  No other arthralgias or myalgias.  No depression.       ROS: 10 point ROS neg other than the symptoms noted above in the HPI.        Past medical, social, surgical, and family histories reviewed.    Allergies:  Allergies as of 01/14/2019 - Reviewed 01/14/2019   Allergen Reaction Noted     Alphagan p Rash 05/11/2009       Current Medications:  Current Outpatient Medications   Medication Sig Dispense Refill     ACETAMINOPHEN PO Take 325 mg by mouth every 8 hours as needed for pain       alendronate (FOSAMAX) 70 MG tablet Take 1 tablet (70 mg) by mouth every 7 days 12 tablet 3     anastrozole (ARIMIDEX) 1 MG tablet Take 1 tablet (1 mg) by mouth daily 90 tablet 3     fluticasone (FLONASE) 50 MCG/ACT spray USE ONE OR TWO SPRAYS IN EACH NOSTRIL DAILY 16 mL 10     ibuprofen (ADVIL) 200 MG tablet Take 400 mg by mouth as needed for mild pain       loratadine (CLARITIN) 10 MG tablet TAKE 1 TABLET BY MOUTH EVERY DAY AS NEEDED FOR ALLERGY SYMPTOMS 90 tablet 2     omeprazole (PRILOSEC) 20 MG CR capsule TAKE 1 CAPSULE (20 MG) BY MOUTH DAILY 90 capsule 3     sertraline (ZOLOFT) 25 MG tablet Take 1 tablet daily. (Patient taking differently: 25 mg every morning Take 1 tablet daily.) 30 tablet 11     Omega-3 Fatty Acids (FISH OIL) 1000 MG CPDR Take  by mouth.       senna-docusate (SENOKOT-S/PERICOLACE) 8.6-50 MG tablet Take 1-2 tablets by mouth 2 times daily (Patient not taking: Reported on 1/14/2019) 30 tablet 0     sennosides (SENOKOT) 8.6 MG tablet Take 2 tablets by mouth 2 times daily          Physical Exam:  /74 (BP Location: Right arm, Patient Position: Sitting, Cuff Size: Adult Regular)   Pulse 79   Temp 97.8  F (36.6  C) (Oral)   Resp 16   Ht 1.651 m (5' 5\")   Wt 78.4 kg (172 lb 12.8 oz)   LMP 10/14/2001   SpO2 97%   BMI 28.76 kg/m      Well appearing NAD  HEENT: no icterus  CV: regular  Lungs: clear  Abd: soft, nt, nd + bs  Ext : no edema  Breast: s/p bilateral mastectomy with " reconstruction, no nodules or masses  No rashes on skin  No lymphadenopathy    Laboratory/Imaging Studies   Oncotype came back with score of 0.  No chemotherapy.     ASSESSMENT/PLAN:    The patient is a 58-year-old postmenopausal female with a history of a stage I breast cancer treated more than 10 years ago with resection, radiation and adjuvant tamoxifen.  She subsequently developed a new mass identifiable on mammogram, measuring approximately 3.5 cm.  Biopsy was consistent with ductal carcinoma in situ, estrogen receptor positive, progesterone receptor positive.  She underwent a mastectomy bilaterally with Dr. Rakesh Sanchez.  She had immediate reconstruction.  Oncotype Dx 0 in new T1c N0 breast cancer.    1.  T1cN0 breast cancer now s/p bilateral mastectomy with oncotype 0 now on AI.       We reviewed endocrine therapy.  She has previously been on tamoxifen and developed cataracts.  We reviewed the use of AIs for preventing recurrent breast cancers as has been outlined in multiple trials including the ATAC study.  The side effects were discussed.  She is tolerating this well.  We discussed her evening muscle cramps could be related to her AI.  I'd like her to try fish oil as per SWOG clinical trials.  If no improvement, will consider switching to a different AI.     2. Osteopenia - reviewed her dexa.  Will start fosamax as prevention . Discussed side effects as well as weight mgmt techniques.      Encouraged exercise.    She is coping well.    Charity Brar

## 2019-01-14 NOTE — NURSING NOTE
Chief Complaint   Patient presents with     Blood Draw     JIC labs drawn, vitals taken, and patient checked into next appt   Analy Richards LPN

## 2019-01-14 NOTE — LETTER
1/14/2019       RE: Enid Lombardo  [address] [facility] [address]     Dear Colleague,    Thank you for referring your patient, Enid Lombardo, to the [facility]. Please see a copy of my visit note below.    Oncology Follow-up Visit:  January 14, 2019    Diagnosis: recurrent breast cancer, stage 1    History Of Present Illness:  Ms. Lombardo is a 58 year old female is here for follow-up of new recurrent breast cancer.    She was originally diagnosed in December 2004 with a stage I infiltrating ductal carcinoma of the right breast.  This was found on abnormal screening mammogram.  Biopsy was obtained which showed a grade 1 infiltrating ductal carcinoma, ER positive, IL negative, HER-2 negative.  In January 2005 she underwent a right-sided lumpectomy with sentinel lymph node dissection.  Tumor size was 0.6 cm and she had 0 of 3 sentinel lymph nodes positive for malignancy.  She underwent further excision for close margins and no further malignancy was seen on pathology.  She then completed right breast radiation and was started on tamoxifen in March 2005 and remained on that until November 2008.  She stopped tamoxifen therapy early due to the diagnosis of cataracts.      She had been doing well until screening mammogram in July 2018 showed calcifications and she was then underwent further imaging which showed approximately 3 cm size area of calcifications in an area separate from her previous lumpectomy.  She then underwent a needle biopsy which showed a grade 2 ductal carcinoma in situ which was ER, IL positive.  She then underwent a bilateral mastectomy with reconstruction with Dr. Sanchez.  She also saw a genetic counselor and further BCRA testing was negative.  Final pathology revealed 8 mm invasive cancer, total 4 cm dcis.  Final staging T1cN0, stage 1 breast cancer.  ER + IL + her 2 negative.    Oncotype 0      In returning today, she is feeling well.  She has complaints  related to her fat grafting.  She is sore, and is now recovering.      She started her AI, and has noticed difficulty with muscle cramps in her LEs in the evenings when trying to sleep.  This will disrupt her sleep cycle; she has complaints about this.      No hot flashes.  No other arthralgias or myalgias.  No depression.       ROS: 10 point ROS neg other than the symptoms noted above in the HPI.        Past medical, social, surgical, and family histories reviewed.    Allergies:  Allergies as of 01/14/2019 - Reviewed 01/14/2019   Allergen Reaction Noted     Alphagan p Rash 05/11/2009       Current Medications:  Current Outpatient Medications   Medication Sig Dispense Refill     ACETAMINOPHEN PO Take 325 mg by mouth every 8 hours as needed for pain       alendronate (FOSAMAX) 70 MG tablet Take 1 tablet (70 mg) by mouth every 7 days 12 tablet 3     anastrozole (ARIMIDEX) 1 MG tablet Take 1 tablet (1 mg) by mouth daily 90 tablet 3     fluticasone (FLONASE) 50 MCG/ACT spray USE ONE OR TWO SPRAYS IN EACH NOSTRIL DAILY 16 mL 10     ibuprofen (ADVIL) 200 MG tablet Take 400 mg by mouth as needed for mild pain       loratadine (CLARITIN) 10 MG tablet TAKE 1 TABLET BY MOUTH EVERY DAY AS NEEDED FOR ALLERGY SYMPTOMS 90 tablet 2     omeprazole (PRILOSEC) 20 MG CR capsule TAKE 1 CAPSULE (20 MG) BY MOUTH DAILY 90 capsule 3     sertraline (ZOLOFT) 25 MG tablet Take 1 tablet daily. (Patient taking differently: 25 mg every morning Take 1 tablet daily.) 30 tablet 11     Omega-3 Fatty Acids (FISH OIL) 1000 MG CPDR Take  by mouth.       senna-docusate (SENOKOT-S/PERICOLACE) 8.6-50 MG tablet Take 1-2 tablets by mouth 2 times daily (Patient not taking: Reported on 1/14/2019) 30 tablet 0     sennosides (SENOKOT) 8.6 MG tablet Take 2 tablets by mouth 2 times daily          Physical Exam:  /74 (BP Location: Right arm, Patient Position: Sitting, Cuff Size: Adult Regular)   Pulse 79   Temp 97.8  F (36.6  C) (Oral)   Resp 16   Ht  "1.651 m (5' 5\")   Wt 78.4 kg (172 lb 12.8 oz)   LMP 10/14/2001   SpO2 97%   BMI 28.76 kg/m       Well appearing NAD  HEENT: no icterus  CV: regular  Lungs: clear  Abd: soft, nt, nd + bs  Ext : no edema  Breast: s/p bilateral mastectomy with reconstruction, no nodules or masses  No rashes on skin  No lymphadenopathy    Laboratory/Imaging Studies   Oncotype came back with score of 0.  No chemotherapy.     ASSESSMENT/PLAN:    The patient is a 58-year-old postmenopausal female with a history of a stage I breast cancer treated more than 10 years ago with resection, radiation and adjuvant tamoxifen.  She subsequently developed a new mass identifiable on mammogram, measuring approximately 3.5 cm.  Biopsy was consistent with ductal carcinoma in situ, estrogen receptor positive, progesterone receptor positive.  She underwent a mastectomy bilaterally with Dr. Rakesh Sanchez.  She had immediate reconstruction.  Oncotype Dx 0 in new T1c N0 breast cancer.    1.  T1cN0 breast cancer now s/p bilateral mastectomy with oncotype 0 now on AI.       We reviewed endocrine therapy.  She has previously been on tamoxifen and developed cataracts.  We reviewed the use of AIs for preventing recurrent breast cancers as has been outlined in multiple trials including the ATAC study.  The side effects were discussed.  She is tolerating this well.  We discussed her evening muscle cramps could be related to her AI.  I'd like her to try fish oil as per SWOG clinical trials.  If no improvement, will consider switching to a different AI.     2. Osteopenia - reviewed her dexa.  Will start fosamax as prevention . Discussed side effects as well as weight mgmt techniques.      Encouraged exercise.    She is coping well.    Charity Brar      "

## 2019-01-16 ENCOUNTER — OFFICE VISIT (OUTPATIENT)
Dept: FAMILY MEDICINE | Facility: OTHER | Age: 59
End: 2019-01-16
Payer: COMMERCIAL

## 2019-01-16 ENCOUNTER — OFFICE VISIT (OUTPATIENT)
Dept: DERMATOLOGY | Facility: CLINIC | Age: 59
End: 2019-01-16
Payer: COMMERCIAL

## 2019-01-16 VITALS
OXYGEN SATURATION: 95 % | WEIGHT: 173.2 LBS | TEMPERATURE: 98.9 F | HEART RATE: 78 BPM | DIASTOLIC BLOOD PRESSURE: 86 MMHG | BODY MASS INDEX: 28.82 KG/M2 | SYSTOLIC BLOOD PRESSURE: 118 MMHG

## 2019-01-16 DIAGNOSIS — Z85.3 HISTORY OF RIGHT BREAST CANCER: ICD-10-CM

## 2019-01-16 DIAGNOSIS — D48.9 NEOPLASM OF UNCERTAIN BEHAVIOR: Primary | ICD-10-CM

## 2019-01-16 DIAGNOSIS — L98.9 SKIN LESION OF FACE: Primary | ICD-10-CM

## 2019-01-16 PROCEDURE — 88305 TISSUE EXAM BY PATHOLOGIST: CPT | Mod: TC | Performed by: DERMATOLOGY

## 2019-01-16 PROCEDURE — 99213 OFFICE O/P EST LOW 20 MIN: CPT | Performed by: FAMILY MEDICINE

## 2019-01-16 PROCEDURE — 11102 TANGNTL BX SKIN SINGLE LES: CPT | Performed by: DERMATOLOGY

## 2019-01-16 ASSESSMENT — PAIN SCALES - GENERAL: PAINLEVEL: NO PAIN (0)

## 2019-01-16 NOTE — NURSING NOTE
Enid Lombardo's goals for this visit include:   Chief Complaint   Patient presents with     Lesion     left upper lip       She requests these members of her care team be copied on today's visit information: NO    PCP: Shi Alonso    Referring Provider:  Shi Alonso MD  [address] [place] [id number]    [facility] 10/14/2001     Do you need any medication refills at today's visit? NO    Kate Hernández [facility]

## 2019-01-16 NOTE — PATIENT INSTRUCTIONS
Recommended follow-up with dermatology for the excision of this lesion. The wait for a dermatologist within the [facility] network is much longer than at private dermatology groups. If the financial is not a concern for you, you can get in faster with a private dermatology group.

## 2019-01-16 NOTE — PROGRESS NOTES
[facility] Dermatology Note      Dermatology Problem List:  1. Left upper lip, s/p biopsy 2/5/2018 by PCP, read by surg path as parakeratosis and hyperorthokeratosis with   focal ulceration with reactive squamous atypia. Treated with LN2 but failed to resolve.  -Rebiopsy left upper lip, 1/16/2019  2. Relevant medical history: breast cancer    Encounter Date: Jan 16, 2019    CC:  Chief Complaint   Patient presents with     Lesion     left upper lip         History of Present Illness:  Ms. Enid Lombardo is a 58 year old female who presents as a referral from Dr. Alonso. She had a biopsy done on the edge of a spot on her left upper lip in Feb 2018, and she was told it came back with no malignancy. It was treated with liquid nitrogen. It has failed to heal over the last year. At first, she thought this was not healing after the biopsy because the area was scratched by a dog. She was it multiple times a day with Cetaphil. She has tried acne cream and Aquaphor, but neither were helpful. No personal history of skin cancer. Patient notes she would have came in sooner, but she has been dealing with breast cancer. She had double mastectomy in Aug and then reconstruction after. She now notes that she is feeling well. No other concerns addressed today.      Past Medical History:   Patient Active Problem List   Diagnosis     History of right breast cancer     Esophageal reflux     Disorder of synovium, tendon, and bursa     Cataract     Shingles     Pain, radicular, lumbar     Right hip pain     Low back pain     IT band syndrome     Macular drusen     Cervicalgia     Headache     Keratoconus     Meibomian gland dysfunction - Both Eyes     Tear film insufficiency     Hyperlipidemia with target LDL less than 130     Benign neoplasm of colon     Skin abscess     Osteopenia of both hands     Recurrent carcinoma in situ of breast, right     Breast cancer in situ     S/P breast reconstruction, bilateral     S/P  flap graft     Past Medical History:   Diagnosis Date     Breast cancer (H) 2004    lumpectomy, radiation, tamoxifen     Cancer (H) 2004    Breast     Cataracts, bilateral      Complication of anesthesia     She prefers not to have versed until right before she leaves or can have after      Enthesopathy of hip region 6/06    right hip     Esophageal reflux 2/06     History of gestational diabetes      Hypertension 2012     Macular drusen      PONV (postoperative nausea and vomiting)      Shingles 01/23/2012    left T6-T7 dermatome     Past Surgical History:   Procedure Laterality Date     BIOPSY  2004    Breast     BREAST SURGERY  2004     C VAGINAL HYSTERECTOMY  12/2001    Ovaries intact, due to fibroids     COLONOSCOPY       ENDOSCOPY  05/09/2007    Upper GI     EYE SURGERY       GRAFT FAT TO BREAST Bilateral 11/29/2018    Procedure: Bilateral Breast Fat Grafting from Abdomen and Thighs;  Surgeon: DENNISE Amin MD;  Location: [facility] OR     GRAFT FREE VASCULARIZED TRANSVERSE RECTUS ABDOMINIS MYOCUTANEOUS Bilateral 10/8/2018    Procedure: GRAFT FREE VASCULARIZED TRANSVERSE RECTUS ABDOMINIS MYOCUTANEOUS;  Bilateral Deep Inferior Epigastric Artery  Free Flap Breast Reconstruction and Removal of Breast Explanders;  Surgeon: DENNISE Amin MD;  Location: [facility] OR     GYN SURGERY  2001     HC BIOPSY/EXCISION LYMPH NODE OPEN DEEP CERVICAL W EXC FAT PAD  1/7/2005    Right axillary sentinel node biopsy X 3.     HC COLONOSCOPY W BIOPSY  05/10/10     HC EXCISION BREAST LESION, OPEN >=1  1/7/2005    Right breast.     HC REMV CATARACT EXTRACAP,INSERT LENS  12/18/08    bilateral     HC REMV TARSAL/METATARSAL BENIGN BONE LESN  1982    Bone spur - right     MASTECTOMY SIMPLE BILATERAL, SENTINEL NODE BILATERAL, COMBINED Bilateral 8/22/2018    Procedure: COMBINED MASTECTOMY SIMPLE BILATERAL, SENTINEL NODE BILATERAL;  Bilateral Mastectomy with Right [place] Node Biopsy, Bilateral Breast Reconstruction, Anesthesia  Block;  Surgeon: Rakesh Sanchez MD;  Location:  OR     ORTHOPEDIC SURGERY  1983    Right foot     RECONSTRUCT BREAST Bilateral 2018    Procedure: RECONSTRUCT BREAST;;  Surgeon: DENNISE Amin MD;  Location:  OR     RECONSTRUCT BREAST BILATERAL Bilateral 10/8/2018    Procedure: RECONSTRUCT BREAST BILATERAL;;  Surgeon: DENNISE Amin MD;  Location:  OR     RECONSTRUCT NIPPLE BILATERAL Bilateral 2018    Procedure: Nipple Reconstruction;  Surgeon: DENNISE Amin MD;  Location:  OR       Social History:  Patient reports that  has never smoked. she has never used smokeless tobacco. She reports that she drinks alcohol. She reports that she does not use drugs. Patient is a retired teacher.     Family History:  Family History   Adopted: Yes   Problem Relation Age of Onset     Cancer Mother         lung cancer  at age 52     Thyroid Disease Sister         half sister, had thyroid removed     Unknown/Adopted Father      Unknown/Adopted Maternal Grandmother      Unknown/Adopted Maternal Grandfather      Unknown/Adopted Paternal Grandmother      Unknown/Adopted Paternal Grandfather      Glaucoma No family hx of      Diabetes No family hx of        Medications:  Current Outpatient Medications   Medication Sig Dispense Refill     ACETAMINOPHEN PO Take 325 mg by mouth every 8 hours as needed for pain       alendronate (FOSAMAX) 70 MG tablet Take 1 tablet (70 mg) by mouth every 7 days 12 tablet 3     anastrozole (ARIMIDEX) 1 MG tablet Take 1 tablet (1 mg) by mouth daily 90 tablet 3     fluticasone (FLONASE) 50 MCG/ACT spray USE ONE OR TWO SPRAYS IN EACH NOSTRIL DAILY 16 mL 10     ibuprofen (ADVIL) 200 MG tablet Take 400 mg by mouth as needed for mild pain       loratadine (CLARITIN) 10 MG tablet TAKE 1 TABLET BY MOUTH EVERY DAY AS NEEDED FOR ALLERGY SYMPTOMS 90 tablet 2     Omega-3 Fatty Acids (FISH OIL) 1000 MG CPDR Take  by mouth.       omeprazole (PRILOSEC) 20 MG CR capsule TAKE 1  CAPSULE (20 MG) BY MOUTH DAILY 90 capsule 3     sertraline (ZOLOFT) 25 MG tablet Take 1 tablet daily. (Patient taking differently: 25 mg every morning Take 1 tablet daily.) 30 tablet 11       Allergies   Allergen Reactions     Alphagan P Rash     Thrush and numbness in mouth       Review of Systems:  -Constitutional: Patient is otherwise feeling well, in usual state of health.   -Skin: As above in HPI. No additional skin concerns.    Physical exam:  Vitals: LMP 10/14/2001   GEN: This is a well developed, well-nourished female in no acute distress, in a pleasant mood.    SKIN: Sun-exposed skin, which includes the head/face, neck, both arms, digits, and/or nails was examined.   - Left upper lip: umbilicated shiny papule there are white lobules throughout it, no central keratin debris  - No other lesions of concern on areas examined.             Impression/Plan:  1. NUB, left upper lip. Ddx: SCC vs desmoplastic trichoep vs BCC. Previous biopsy done by PCP may not have been an adequate sample. Read by surg path as reactive squamous atypia. Since lesion has failed to resolve, repeat biopsy is indicated. Patient noted understanding and is agreeable to this plan.     Shave biopsy:  After discussion of benefits and risks including but not limited to bleeding/bruising, pain/swelling, infection, scar, incomplete removal, nerve damage/numbness, recurrence, and non-diagnostic biopsy, written consent, verbal consent and photographs were obtained. Time-out was performed. The area was cleaned with isopropyl alcohol. 0.5ml of 1% lidocaine with 1:100,000 epinephrine was injected to obtain adequate anesthesia. A shave biopsy was performed. Hemostasis was achieved with aluminium chloride. Vaseline and a sterile dressing were applied. The patient tolerated the procedure and no complications were noted. The patient was provided with verbal and written post care instructions.    CC Shi Alonso MD on close of this  encounter.  Follow-up pending biopsy results.       Staff Involved:  Scribe/Staff    Scribe Disclosure  I, Kaylyn Linares, am serving as a scribe to document services personally performed by Dr. Lupis Gibbs MD, based on data collection and the provider's statements to me.     Provider Disclosure:   The documentation recorded by the scribe accurately reflects the services I personally performed and the decisions made by me.    Lupis Gibbs MD    Department of Dermatology  [facility]: Phone: [phone], Fax:[phone]  [facility]: Phone: [phone], Fax: [phone]

## 2019-01-16 NOTE — PATIENT INSTRUCTIONS

## 2019-01-22 ENCOUNTER — OFFICE VISIT (OUTPATIENT)
Dept: OPHTHALMOLOGY | Facility: CLINIC | Age: 59
End: 2019-01-22
Attending: OPHTHALMOLOGY
Payer: COMMERCIAL

## 2019-01-22 DIAGNOSIS — H35.363 DRUSEN (DEGENERATIVE) OF RETINA, BILATERAL: ICD-10-CM

## 2019-01-22 DIAGNOSIS — H43.813 VITREOUS DEGENERATION, BILATERAL: ICD-10-CM

## 2019-01-22 DIAGNOSIS — K21.9 GASTROESOPHAGEAL REFLUX DISEASE, ESOPHAGITIS PRESENCE NOT SPECIFIED: ICD-10-CM

## 2019-01-22 PROCEDURE — G0463 HOSPITAL OUTPT CLINIC VISIT: HCPCS | Mod: ZF

## 2019-01-22 PROCEDURE — 92015 DETERMINE REFRACTIVE STATE: CPT | Mod: ZF

## 2019-01-22 PROCEDURE — 92134 CPTRZ OPH DX IMG PST SGM RTA: CPT | Mod: ZF | Performed by: OPHTHALMOLOGY

## 2019-01-22 RX ORDER — VIT A/VIT C/VIT E/ZINC/COPPER 4296-226
CAPSULE ORAL
COMMUNITY
Start: 2017-12-01 | End: 2019-01-24

## 2019-01-22 ASSESSMENT — REFRACTION_WEARINGRX
OD_AXIS: 050
OD_SPHERE: +0.75
OS_AXIS: 167
OS_ADD: +2.75
OD_CYLINDER: +1.25
OS_SPHERE: +0.50
SPECS_TYPE: PAL
OS_CYLINDER: +0.50
OD_ADD: +2.75

## 2019-01-22 ASSESSMENT — EXTERNAL EXAM - RIGHT EYE: OD_EXAM: NORMAL

## 2019-01-22 ASSESSMENT — REFRACTION_MANIFEST
OD_ADD: +2.75
OD_SPHERE: +1.00
OS_ADD: +2.75
OS_AXIS: 160
OS_CYLINDER: +1.25
OS_SPHERE: +0.25
OD_CYLINDER: +1.00
OD_AXIS: 040

## 2019-01-22 ASSESSMENT — TONOMETRY
OS_IOP_MMHG: 17
IOP_METHOD: TONOPEN
OD_IOP_MMHG: 15

## 2019-01-22 ASSESSMENT — CONF VISUAL FIELD
METHOD: COUNTING FINGERS
OD_NORMAL: 1
OS_NORMAL: 1

## 2019-01-22 ASSESSMENT — VISUAL ACUITY
CORRECTION_TYPE: GLASSES
METHOD: SNELLEN - LINEAR
OS_CC+: -1
OD_CC: 20/15
OS_CC: 20/20
OS_CC: J1
OD_CC+: -1
OD_CC: J1

## 2019-01-22 ASSESSMENT — SLIT LAMP EXAM - LIDS
COMMENTS: NORMAL
COMMENTS: NORMAL

## 2019-01-22 ASSESSMENT — CUP TO DISC RATIO
OS_RATIO: 0.2
OD_RATIO: 0.2

## 2019-01-22 ASSESSMENT — EXTERNAL EXAM - LEFT EYE: OS_EXAM: NORMAL

## 2019-01-22 NOTE — NURSING NOTE
Chief Complaint(s) and History of Present Illness(es)     Follow Up     Laterality: both eyes    Associated symptoms: Negative for eye pain, dryness, redness and tearing              Comments     Pt here for a yearly f/u on Drusen each eye. Pt states vision have been   stable x 1 year. Pt denies any eye pain, or use of any drops. Pt was   diagnosed with breast cancer in July 2018 and had a double mastectomy. Pt   concerned that she is now Anastrozole and fosamax and is concerned about   the medications and how they would affect pt's eyes.     Arleen Castro, COMT 11:44 AM January 22, 2019

## 2019-01-22 NOTE — PROGRESS NOTES
CC - drusen    INTERVAL HISTORY -    VA stable, new recurrence breast CA 2018.  Now on anastrozole & fosamax    PRAVEEN -  Enid Lombardo is a  58 year old year-old patient presenting for drusen both eyes.   No h/o smoking.  H/o breast CA diagnosis ~ 2004, tamoxifen x 3 years. No va changes or amsler changes noted by patient.    PAST OCULAR SURGERY  CE/IOL OU ~2008      RETINAL IMAGING  OCT 1-22-19  OD - drusen vs pseudodrusen, no fluid  OS -  drusen vs pseudodrusen, no fluid    AF  9-29-14  both eyes - irregular c/w drusen      ASSESSMENT & PLAN  1.  Drusen OU   - doubt AMD, doubt from tamoxifen, no renal disease   - observe for now   - unclear benefit from AREDS    2.  Vitreous degeneration OU   - advised S/Sx RD    3.  H/o breast CA s/p tamoxifen   - no ocular involvement    4.  Pseudophakia OU        return to clinic: 1 year, OCT OU    ATTESTATION     Attending Physician Attestation:      Complete documentation of historical and exam elements from today's encounter can be found in the full encounter summary report (not reduplicated in this progress note).  I personally obtained the chief complaint(s) and history of present illness.  I confirmed and edited as necessary the review of systems, past medical/surgical history, family history, social history, and examination findings as documented by others; and I examined the patient myself.  I personally reviewed the relevant tests, images, and reports as documented above.  I formulated and edited as necessary the assessment and plan and discussed the findings and management plan with the patient and family    Paz Osborn MD, PhD  , Vitreoretinal Surgery  Department of Ophthalmology  [facility]

## 2019-01-23 LAB — COPATH REPORT: NORMAL

## 2019-01-24 ENCOUNTER — TELEPHONE (OUTPATIENT)
Dept: DERMATOLOGY | Facility: CLINIC | Age: 59
End: 2019-01-24

## 2019-01-24 DIAGNOSIS — H35.363 DEGENERATIVE DRUSEN OF BOTH EYES: Primary | ICD-10-CM

## 2019-01-24 RX ORDER — VIT A/VIT C/VIT E/ZINC/COPPER 4296-226
1 CAPSULE ORAL 2 TIMES DAILY
Qty: 180 CAPSULE | Refills: 3 | Status: SHIPPED | OUTPATIENT
Start: 2019-01-24

## 2019-01-24 NOTE — TELEPHONE ENCOUNTER
Multiple Vitamins-Minerals (PRESERVISION AREDS) CAPS      Last Written Prescription Date:  Listed as historical    Last Office Visit : 1-22-19  Future Office visit:  1-24-20  Attending: Irena  Last Clinic Note;   - unclear benefit from AREDS  Routing refill request to provider for review/approval because:  Medication is reported/historical

## 2019-01-24 NOTE — TELEPHONE ENCOUNTER
Notes recorded by Tiffanie Marley LPN on 1/24/2019 at 8:15 AM CST  Called pt and made aware of results. Pt had no questions or concerns. Appt scheduled for a 1 month follow up.     Tiffanie Marley LPN    ------    Notes recorded by Lupis Lake MD on 1/23/2019 at 8:17 PM CST  Please call patient and let her know biopsy results:  Most consistent with a benign growth called a verrucous keratosis, but the base of the lesion was not reached with the biopsy, so it is possible that biopsy might not be representative of the entire lesion. I would like to see her in a month to recheck site and determine if repeat biopsy is needed.    Lupis Lake MD    Department of Dermatology  [facility]: Phone: [phone], Fax:[phone]  [facility]: Phone: [phone], Fax: [phone]     Dermatological path order and indications   Order: [id number]   Status:  Final result   Visible to patient:  Yes (MyChart) Dx:  Neoplasm of uncertain behavior   Component 8d ago   Copath Report Patient Name: MICHEL LOMAX   MR#: [id number]   Specimen #:    Collected: 1/16/2019   Received: 1/17/2019   Reported: 1/23/2019 17:42   Ordering Phy(s): LUPIS LAKE     For improved result formatting, select 'View Enhanced Report Format' under    Linked Documents section.     SPECIMEN(S):   Shave, left upper lip     FINAL DIAGNOSIS:   Shave, left upper lip:   - Features most consistent with a traumatized verrucous keratosis - (see   comment)

## 2019-02-25 ENCOUNTER — OFFICE VISIT (OUTPATIENT)
Dept: DERMATOLOGY | Facility: CLINIC | Age: 59
End: 2019-02-25
Payer: COMMERCIAL

## 2019-02-25 DIAGNOSIS — L82.1 VERRUCOUS KERATOSIS: Primary | ICD-10-CM

## 2019-02-25 PROCEDURE — 17110 DESTRUCTION B9 LES UP TO 14: CPT | Performed by: DERMATOLOGY

## 2019-02-25 ASSESSMENT — PAIN SCALES - GENERAL: PAINLEVEL: NO PAIN (0)

## 2019-02-25 NOTE — NURSING NOTE
Enid Lombardo's goals for this visit include:   Chief Complaint   Patient presents with     RECHECK     Enid is returning for a recheck of the lip; small improvment noted since her last visit.       She requests these members of her care team be copied on today's visit information:     PCP: Shi Alonso    Referring Provider:  No referring provider defined for this encounter.    LMP 10/14/2001     Do you need any medication refills at today's visit? No  Nuvia Herr LPN

## 2019-02-25 NOTE — LETTER
2/25/2019         RE: Enid Lombardo  [address]  [facility] [address]        Dear Colleague,    Thank you for referring your patient, Enid Lombardo, to the [facility]. Please see a copy of my visit note below.    [facility] Dermatology Note      Dermatology Problem List:  1. Left upper lip lesion  - s/p biopsy 2/5/2018 by PCP, read by surg path as parakeratosis and hyperorthokeratosis with focal ulceration with reactive squamous atypia. Treated with LN2 but failed to resolve.  - s/p rebiopsied left upper lip 1/16/2019 results consistent with a traumatized verrucous keratosis  - s/p cryotherapy 2/25/2019  2. Relevant medical history: breast cancer    Encounter Date: Feb 25, 2019    CC:  Chief Complaint   Patient presents with     RECHECK     Enid is returning for a recheck of the lip; small improvment noted since her last visit.         History of Present Illness:  Ms. Enid Lombardo is a 59 year old female who presents as a 1 month follow up for a lesion on her left upper lip. She was last seen by Dr. Gibbs 1/16/2019 when the spot was rebiopsied and came back as consistent with traumatized verrucous keratosis, but the base was not seen. She reports that today the area is mostly healed. At night she uses a Band-aid with Aquaphor on it, as she thinks there is one scaly spot that will not go away. It appears, she peels it off, then it comes back in cycles. No other concerns addressed today.      Past Medical History:   Patient Active Problem List   Diagnosis     History of right breast cancer     Esophageal reflux     Disorder of synovium, tendon, and bursa     Cataract     Shingles     Pain, radicular, lumbar     Right hip pain     Low back pain     IT band syndrome     Macular drusen     Cervicalgia     Headache     Keratoconus     Meibomian gland dysfunction - Both Eyes     Tear film insufficiency     Hyperlipidemia with target LDL less than 130      Benign neoplasm of colon     Skin abscess     Osteopenia of both hands     Recurrent carcinoma in situ of breast, right     Breast cancer in situ     S/P breast reconstruction, bilateral     S/P flap graft     Past Medical History:   Diagnosis Date     Breast cancer (H) 2004    lumpectomy, radiation, tamoxifen     Cancer (H) 2004    Breast     Cataracts, bilateral      Complication of anesthesia     She prefers not to have versed until right before she leaves or can have after      Enthesopathy of hip region 6/06    right hip     Esophageal reflux 2/06     History of gestational diabetes      Hypertension 2012     Macular drusen      PONV (postoperative nausea and vomiting)      Shingles 01/23/2012    left T6-T7 dermatome     Past Surgical History:   Procedure Laterality Date     BIOPSY  2004    Breast     BREAST SURGERY  2004     C VAGINAL HYSTERECTOMY  12/2001    Ovaries intact, due to fibroids     COLONOSCOPY       ENDOSCOPY  05/09/2007    Upper GI     EYE SURGERY       GRAFT FAT TO BREAST Bilateral 11/29/2018    Procedure: Bilateral Breast Fat Grafting from Abdomen and Thighs;  Surgeon: DENNISE Amin MD;  [facility]     GRAFT FREE VASCULARIZED TRANSVERSE RECTUS ABDOMINIS MYOCUTANEOUS Bilateral 10/8/2018    Procedure: GRAFT FREE VASCULARIZED TRANSVERSE RECTUS ABDOMINIS MYOCUTANEOUS;  Bilateral Deep Inferior Epigastric Artery  Free Flap Breast Reconstruction and Removal of Breast Explanders;  Surgeon: DENNISE Amin MD;  Location: [facility] OR     GYN SURGERY  2001     HC BIOPSY/EXCISION LYMPH NODE OPEN DEEP CERVICAL W EXC FAT PAD  1/7/2005    Right axillary sentinel node biopsy X 3.     HC COLONOSCOPY W BIOPSY  05/10/10     HC EXCISION BREAST LESION, OPEN >=1  1/7/2005    Right breast.     HC REMV CATARACT EXTRACAP,INSERT LENS  12/18/08    bilateral     HC REMV TARSAL/METATARSAL BENIGN BONE LESN  1982    Bone spur - right     MASTECTOMY SIMPLE BILATERAL, SENTINEL NODE BILATERAL,  COMBINED Bilateral 2018    Procedure: COMBINED MASTECTOMY SIMPLE BILATERAL, SENTINEL NODE BILATERAL;  Bilateral Mastectomy with Right [place] Node Biopsy, Bilateral Breast Reconstruction, Anesthesia Block;  Surgeon: Rakesh Sanchez MD;  Location:  OR     ORTHOPEDIC SURGERY  1983    Right foot     RECONSTRUCT BREAST Bilateral 2018    Procedure: RECONSTRUCT BREAST;;  Surgeon: DENNISE Amin MD;  Location:  OR     RECONSTRUCT BREAST BILATERAL Bilateral 10/8/2018    Procedure: RECONSTRUCT BREAST BILATERAL;;  Surgeon: DENNISE Amin MD;  Location:  OR     RECONSTRUCT NIPPLE BILATERAL Bilateral 2018    Procedure: Nipple Reconstruction;  Surgeon: DENNISE Amin MD;  Location:  OR       Social History:  Patient reports that  has never smoked. she has never used smokeless tobacco. She reports that she drinks alcohol. She reports that she does not use drugs. Patient is a retired teacher.     Family History:  Family History   Adopted: Yes   Problem Relation Age of Onset     Cancer Mother         lung cancer  at age 52     Thyroid Disease Sister         half sister, had thyroid removed     Unknown/Adopted Father      Unknown/Adopted Maternal Grandmother      Unknown/Adopted Maternal Grandfather      Unknown/Adopted Paternal Grandmother      Unknown/Adopted Paternal Grandfather      Glaucoma No family hx of      Diabetes No family hx of      Melanoma No family hx of      Skin Cancer No family hx of        Medications:  Current Outpatient Medications   Medication Sig Dispense Refill     ACETAMINOPHEN PO Take 325 mg by mouth every 8 hours as needed for pain       alendronate (FOSAMAX) 70 MG tablet Take 1 tablet (70 mg) by mouth every 7 days 12 tablet 3     anastrozole (ARIMIDEX) 1 MG tablet Take 1 tablet (1 mg) by mouth daily 90 tablet 3     fluticasone (FLONASE) 50 MCG/ACT spray USE ONE OR TWO SPRAYS IN EACH NOSTRIL DAILY 16 mL 10     ibuprofen (ADVIL) 200 MG tablet Take 400 mg  by mouth as needed for mild pain       loratadine (CLARITIN) 10 MG tablet TAKE 1 TABLET BY MOUTH EVERY DAY AS NEEDED FOR ALLERGY SYMPTOMS 90 tablet 2     Multiple Vitamins-Minerals (PRESERVISION AREDS) CAPS Take 1 capsule by mouth 2 times daily 180 capsule 3     Omega-3 Fatty Acids (FISH OIL) 1000 MG CPDR Take  by mouth.       omeprazole (PRILOSEC) 20 MG CR capsule TAKE 1 CAPSULE (20 MG) BY MOUTH DAILY 90 capsule 3     sertraline (ZOLOFT) 25 MG tablet Take 1 tablet daily. (Patient taking differently: 25 mg every morning Take 1 tablet daily.) 30 tablet 11       Allergies   Allergen Reactions     Alphagan P Rash     Thrush and numbness in mouth       Review of Systems:  -Constitutional: Patient is otherwise feeling well, in usual state of health.   -Skin: As above in HPI. No additional skin concerns.    Physical exam:  Vitals: LMP 10/14/2001   GEN: This is a well developed, well-nourished female in no acute distress, in a pleasant mood.    SKIN: Sun-exposed skin, which includes the head/face, neck, both arms, digits, and/or nails was examined.   - Left upper cutaneous lip, subtle remaining waxy scale in circular collection   - No other lesions of concern on areas examined.       Impression/Plan:  1. Verrucous keratosis, biopsy proven. Biopsy did not show base of lesion. There is remaining scale today to suggest full lesion has not resolved. No features concerning for SCC on exam. Will attempt to freeze to see if this removes this lesions completely. Advised patient to call in if it has not resolved after a month. Would consider doing another biopsy to rule out SCC as the base was not seen on second biopsy.  Cryotherapy procedure note: After verbal consent and discussion of risks and benefits including but no limited to dyspigmentation/scar, blister, and pain, 1 was(were) treated with 1-2mm freeze border for 2 cycles with liquid nitrogen. Post cryotherapy instructions were provided.       Follow-up prn, sooner for  lesions of concern.       Staff Involved:  Scribe/Staff    Scribe Disclosure  I, Kaylyn Linares, am serving as a scribe to document services personally performed by Dr. Lupis Gibbs MD, based on data collection and the provider's statements to me.     Provider Disclosure:   The documentation recorded by the scribe accurately reflects the services I personally performed and the decisions made by me.    Lupis Gibbs MD    Department of Dermatology  [facility]: Phone: [phone], Fax:[phone]  [facility]: Phone: [phone], Fax: [phone]                  Again, thank you for allowing me to participate in the care of your patient.        Sincerely,        Lupis Gibbs MD

## 2019-02-25 NOTE — PATIENT INSTRUCTIONS
Cryotherapy    What is it?    Use of a very cold liquid, such as liquid nitrogen, to freeze and destroy abnormal skin cells that need to be removed    What should I expect?    Tenderness and redness    A small blister that might grow and fill with dark purple blood. There may be crusting.    More than one treatment may be needed if the lesions do not go away.    How do I care for the treated area?    Gently wash the area with your hands when bathing.    Use a thin layer of Vaseline to help with healing. You may use a Band-Aid.     The area should heal within 7-10 days and may leave behind a pink or lighter color.     Do not use an antibiotic or Neosporin ointment.     You may take acetaminophen (Tylenol) for pain.     Call your Doctor if you have:    Severe pain    Signs of infection (warmth, redness, cloudy yellow drainage, and or a bad smell)    Questions or concerns    Who should I call with questions?       [facility]: [phone]       [facility]: [phone]       For urgent needs outside of business hours call the [facility] at [phone]        and ask for the dermatology resident on call

## 2019-02-25 NOTE — PROGRESS NOTES
[facility] Dermatology Note      Dermatology Problem List:  1. Left upper lip lesion  - s/p biopsy 2/5/2018 by PCP, read by surg path as parakeratosis and hyperorthokeratosis with focal ulceration with reactive squamous atypia. Treated with LN2 but failed to resolve.  - s/p rebiopsied left upper lip 1/16/2019 results consistent with a traumatized verrucous keratosis  - s/p cryotherapy 2/25/2019  2. Relevant medical history: breast cancer    Encounter Date: Feb 25, 2019    CC:  Chief Complaint   Patient presents with     RECHECK     Enid is returning for a recheck of the lip; small improvment noted since her last visit.         History of Present Illness:  Ms. Enid Lombardo is a 59 year old female who presents as a 1 month follow up for a lesion on her left upper lip. She was last seen by Dr. Gibbs 1/16/2019 when the spot was rebiopsied and came back as consistent with traumatized verrucous keratosis, but the base was not seen. She reports that today the area is mostly healed. At night she uses a Band-aid with Aquaphor on it, as she thinks there is one scaly spot that will not go away. It appears, she peels it off, then it comes back in cycles. No other concerns addressed today.      Past Medical History:   Patient Active Problem List   Diagnosis     History of right breast cancer     Esophageal reflux     Disorder of synovium, tendon, and bursa     Cataract     Shingles     Pain, radicular, lumbar     Right hip pain     Low back pain     IT band syndrome     Macular drusen     Cervicalgia     Headache     Keratoconus     Meibomian gland dysfunction - Both Eyes     Tear film insufficiency     Hyperlipidemia with target LDL less than 130     Benign neoplasm of colon     Skin abscess     Osteopenia of both hands     Recurrent carcinoma in situ of breast, right     Breast cancer in situ     S/P breast reconstruction, bilateral     S/P flap graft     Past Medical History:   Diagnosis Date      Breast cancer (H) 2004    lumpectomy, radiation, tamoxifen     Cancer (H) 2004    Breast     Cataracts, bilateral      Complication of anesthesia     She prefers not to have versed until right before she leaves or can have after      Enthesopathy of hip region 6/06    right hip     Esophageal reflux 2/06     History of gestational diabetes      Hypertension 2012     Macular drusen      PONV (postoperative nausea and vomiting)      Shingles 01/23/2012    left T6-T7 dermatome     Past Surgical History:   Procedure Laterality Date     BIOPSY  2004    Breast     BREAST SURGERY  2004     C VAGINAL HYSTERECTOMY  12/2001    Ovaries intact, due to fibroids     COLONOSCOPY       ENDOSCOPY  05/09/2007    Upper GI     EYE SURGERY       GRAFT FAT TO BREAST Bilateral 11/29/2018    Procedure: Bilateral Breast Fat Grafting from Abdomen and Thighs;  Surgeon: DENNISE Amin MD;  [facility]     GRAFT FREE VASCULARIZED TRANSVERSE RECTUS ABDOMINIS MYOCUTANEOUS Bilateral 10/8/2018    Procedure: GRAFT FREE VASCULARIZED TRANSVERSE RECTUS ABDOMINIS MYOCUTANEOUS;  Bilateral Deep Inferior Epigastric Artery  Free Flap Breast Reconstruction and Removal of Breast Explanders;  Surgeon: DENNISE Amin MD;  [facility]     GYN SURGERY  2001     HC BIOPSY/EXCISION LYMPH NODE OPEN DEEP CERVICAL W EXC FAT PAD  1/7/2005    Right axillary sentinel node biopsy X 3.     HC COLONOSCOPY W BIOPSY  05/10/10     HC EXCISION BREAST LESION, OPEN >=1  1/7/2005    Right breast.     HC REMV CATARACT EXTRACAP,INSERT LENS  12/18/08    bilateral     HC REMV TARSAL/METATARSAL BENIGN BONE LESN  1982    Bone spur - right     MASTECTOMY SIMPLE BILATERAL, SENTINEL NODE BILATERAL, COMBINED Bilateral 8/22/2018    Procedure: COMBINED MASTECTOMY SIMPLE BILATERAL, SENTINEL NODE BILATERAL;  Bilateral Mastectomy with Right [place] Node Biopsy, Bilateral Breast Reconstruction, Anesthesia Block;  Surgeon: Rakesh Sanchez MD;  [facility]      ORTHOPEDIC SURGERY  1983    Right foot     RECONSTRUCT BREAST Bilateral 2018    Procedure: RECONSTRUCT BREAST;;  Surgeon: DENNISE Amin MD;  [facility]: [facility] OR     RECONSTRUCT BREAST BILATERAL Bilateral 10/8/2018    Procedure: RECONSTRUCT BREAST BILATERAL;;  Surgeon: DENNISE Amin MD;  [facility]: [facility] OR     RECONSTRUCT NIPPLE BILATERAL Bilateral 2018    Procedure: Nipple Reconstruction;  Surgeon: DENNISE Amin MD;  Location:  OR       Social History:  Patient reports that  has never smoked. she has never used smokeless tobacco. She reports that she drinks alcohol. She reports that she does not use drugs. Patient is a retired teacher.     Family History:  Family History   Adopted: Yes   Problem Relation Age of Onset     Cancer Mother         lung cancer  at age 52     Thyroid Disease Sister         half sister, had thyroid removed     Unknown/Adopted Father      Unknown/Adopted Maternal Grandmother      Unknown/Adopted Maternal Grandfather      Unknown/Adopted Paternal Grandmother      Unknown/Adopted Paternal Grandfather      Glaucoma No family hx of      Diabetes No family hx of      Melanoma No family hx of      Skin Cancer No family hx of        Medications:  Current Outpatient Medications   Medication Sig Dispense Refill     ACETAMINOPHEN PO Take 325 mg by mouth every 8 hours as needed for pain       alendronate (FOSAMAX) 70 MG tablet Take 1 tablet (70 mg) by mouth every 7 days 12 tablet 3     anastrozole (ARIMIDEX) 1 MG tablet Take 1 tablet (1 mg) by mouth daily 90 tablet 3     fluticasone (FLONASE) 50 MCG/ACT spray USE ONE OR TWO SPRAYS IN EACH NOSTRIL DAILY 16 mL 10     ibuprofen (ADVIL) 200 MG tablet Take 400 mg by mouth as needed for mild pain       loratadine (CLARITIN) 10 MG tablet TAKE 1 TABLET BY MOUTH EVERY DAY AS NEEDED FOR ALLERGY SYMPTOMS 90 tablet 2     Multiple Vitamins-Minerals (PRESERVISION AREDS) CAPS Take 1 capsule by mouth 2 times daily 180 capsule 3      Omega-3 Fatty Acids (FISH OIL) 1000 MG CPDR Take  by mouth.       omeprazole (PRILOSEC) 20 MG CR capsule TAKE 1 CAPSULE (20 MG) BY MOUTH DAILY 90 capsule 3     sertraline (ZOLOFT) 25 MG tablet Take 1 tablet daily. (Patient taking differently: 25 mg every morning Take 1 tablet daily.) 30 tablet 11       Allergies   Allergen Reactions     Alphagan P Rash     Thrush and numbness in mouth       Review of Systems:  -Constitutional: Patient is otherwise feeling well, in usual state of health.   -Skin: As above in HPI. No additional skin concerns.    Physical exam:  Vitals: LMP 10/14/2001   GEN: This is a well developed, well-nourished female in no acute distress, in a pleasant mood.    SKIN: Sun-exposed skin, which includes the head/face, neck, both arms, digits, and/or nails was examined.   - Left upper cutaneous lip, subtle remaining waxy scale in circular collection   - No other lesions of concern on areas examined.       Impression/Plan:  1. Verrucous keratosis, biopsy proven. Biopsy did not show base of lesion. There is remaining scale today to suggest full lesion has not resolved. No features concerning for SCC on exam. Will attempt to freeze to see if this removes this lesions completely. Advised patient to call in if it has not resolved after a month. Would consider doing another biopsy to rule out SCC as the base was not seen on second biopsy.  Cryotherapy procedure note: After verbal consent and discussion of risks and benefits including but no limited to dyspigmentation/scar, blister, and pain, 1 was(were) treated with 1-2mm freeze border for 2 cycles with liquid nitrogen. Post cryotherapy instructions were provided.       Follow-up prn, sooner for lesions of concern.       Staff Involved:  Scribe/Staff    Scribe Disclosure  I, Kaylyn Linares, am serving as a scribe to document services personally performed by Dr. Lupis Gibbs MD, based on data collection and the provider's statements to me.      Provider Disclosure:   The documentation recorded by the scribe accurately reflects the services I personally performed and the decisions made by me.    Lupis Gibbs MD    Department of Dermatology  [facility]: Phone: [phone], Fax:[phone]  [facility]: Phone: [phone], Fax: [phone]

## 2019-04-05 ENCOUNTER — ANCILLARY PROCEDURE (OUTPATIENT)
Dept: GENERAL RADIOLOGY | Facility: CLINIC | Age: 59
End: 2019-04-05
Attending: PHYSICIAN ASSISTANT
Payer: COMMERCIAL

## 2019-04-05 ENCOUNTER — ONCOLOGY VISIT (OUTPATIENT)
Dept: ONCOLOGY | Facility: CLINIC | Age: 59
End: 2019-04-05
Attending: PHYSICIAN ASSISTANT
Payer: COMMERCIAL

## 2019-04-05 VITALS
OXYGEN SATURATION: 100 % | RESPIRATION RATE: 18 BRPM | WEIGHT: 179.1 LBS | HEIGHT: 65 IN | BODY MASS INDEX: 29.84 KG/M2 | DIASTOLIC BLOOD PRESSURE: 79 MMHG | TEMPERATURE: 97.2 F | SYSTOLIC BLOOD PRESSURE: 119 MMHG | HEART RATE: 70 BPM

## 2019-04-05 DIAGNOSIS — M79.621 PAIN OF RIGHT UPPER ARM: Primary | ICD-10-CM

## 2019-04-05 DIAGNOSIS — M85.89 OSTEOPENIA OF MULTIPLE SITES: ICD-10-CM

## 2019-04-05 DIAGNOSIS — M79.621 PAIN OF RIGHT UPPER ARM: ICD-10-CM

## 2019-04-05 DIAGNOSIS — Z79.811 LONG TERM (CURRENT) USE OF AROMATASE INHIBITORS: ICD-10-CM

## 2019-04-05 PROCEDURE — 99214 OFFICE O/P EST MOD 30 MIN: CPT | Mod: ZP | Performed by: PHYSICIAN ASSISTANT

## 2019-04-05 PROCEDURE — G0463 HOSPITAL OUTPT CLINIC VISIT: HCPCS | Mod: ZF

## 2019-04-05 ASSESSMENT — PAIN SCALES - GENERAL: PAINLEVEL: MODERATE PAIN (4)

## 2019-04-05 ASSESSMENT — MIFFLIN-ST. JEOR: SCORE: 1398.27

## 2019-04-05 NOTE — LETTER
"4/5/2019      RE: Enid Lombardo  [address] [facility] [address]       HEMATOLOGY/ONCOLOGY PROGRESS NOTE  Apr 5, 2019    REASON FOR VISIT: follow-up of history of breast cancer    DIAGNOSIS:   Enid Lombardo is a 59-year-old female with history of recurrent breast cancer.    She was initially diagnosed in December 2004 with a stage I infiltrating ductal carcinoma of the right breast, ER positive, MS negative, HER-2 negative, and underwent a right-sided lumpectomy with sentinel lymph node biopsy 1/2015. Tumor was 0.6 cm with 0/3 sentinel nodes positive for malignancy. She underwent further excision fr close margins. She completed a course of adjuvant radiation to the right breast. She was started Tamoxifen March 2005 through November 2008, therapy stopped due to diagnosis of cataracts.    Screening mammogram July 2018 showed calcifications, approximately 3 cm in size in an area separate from previous lumpectomy. She underwent needle biopsy which showed a grade 2 ductal carcinoma in situ, ER/MS positive. She underwent bilateral mastectomy with reconstruction with Dr. Sanchez. Pathology revealed 8 mm invasive cancer, total 4 cm DCIS. Final staging T1cN0, stage 1 breast cancer. ER/MS positive, HER-2 negative. She met with genetics and testing was negative. Oncotype DX score of 0. She started Arimidex 10/2018.    INTERVAL HISTORY:   Enid is here with her  for routine 3 month follow-up.    She has a few concerns.    1. She developed R arm pain in February, out of the blue. It is \"deep\" and \"achey\". She has no radiating pains, shooting pains, numbness, tingling. Sometimes she has dropped things using her R arm, from her report not due to weakness, but due to pain. No other sites of pain - no neck pain, back pain, or shoulder pain. No history of trauma. No prior symptoms. Arm without swelling, clothing not feeling tight compared to L arm.    2. She doesn't tolerate Fosamax. She takes it Mondays and " "dreads mondays. She overall feels unwell after taking it, and it takes a day or two to improve.    3. Right breast incision has an \"opening\". She reports it looked like a blister at some point, then healed, and then now is opening again. She reports at one time there was some purulence but now is covered over, no redness, pain, or fevers.    Otherwise, she is tolerating AI well without any side effects. No other concerns. Had a transient headache in the last month but otherwise no headaches. No breathing changes, chest pain, n/v, bowel changes.        Current Outpatient Medications   Medication Sig Dispense Refill     ACETAMINOPHEN PO Take 325 mg by mouth every 8 hours as needed for pain       alendronate (FOSAMAX) 70 MG tablet Take 1 tablet (70 mg) by mouth every 7 days 12 tablet 3     anastrozole (ARIMIDEX) 1 MG tablet Take 1 tablet (1 mg) by mouth daily 90 tablet 3     fluticasone (FLONASE) 50 MCG/ACT spray USE ONE OR TWO SPRAYS IN EACH NOSTRIL DAILY 16 mL 10     ibuprofen (ADVIL) 200 MG tablet Take 400 mg by mouth as needed for mild pain       loratadine (CLARITIN) 10 MG tablet TAKE 1 TABLET BY MOUTH EVERY DAY AS NEEDED FOR ALLERGY SYMPTOMS 90 tablet 2     Multiple Vitamins-Minerals (PRESERVISION AREDS) CAPS Take 1 capsule by mouth 2 times daily 180 capsule 3     Omega-3 Fatty Acids (FISH OIL) 1000 MG CPDR Take  by mouth.       omeprazole (PRILOSEC) 20 MG CR capsule TAKE 1 CAPSULE (20 MG) BY MOUTH DAILY 90 capsule 3     sertraline (ZOLOFT) 25 MG tablet Take 1 tablet daily. (Patient taking differently: 25 mg every morning Take 1 tablet daily.) 30 tablet 11          Allergies   Allergen Reactions     Alphagan P Rash     Thrush and numbness in mouth       PHYSICAL EXAMINATION  /79 (BP Location: Right arm, Patient Position: Sitting, Cuff Size: Adult Regular)   Pulse 70   Temp 97.2  F (36.2  C) (Oral)   Resp 18   Ht 1.651 m (5' 5\")   Wt 81.2 kg (179 lb 1.6 oz)   LMP 10/14/2001   SpO2 100%   BMI 29.80 " kg/m     Constitutional: Alert, oriented female in no visible distress.  Eyes: PERRL. Anicteric sclerae.  ENT/Mouth: OM moist and pink without lesions or thrush.  CV: RRR, no murmurs appreciated.  Resp: CTAB throughout  Abdomen: Soft, non-tender, non-distended. Bowel sounds present.   Breast: s/p bilateral mastectomy with reconstruction.  On the R breast medial to the nipple,There is a 1 cm and adjcant subcentimeter area of granulation tissue without erythema or purulence.    Extremities: No lower extremity edema appreciated.  Skin: Warm, dry. No bruising or petechiae noted.  Lymph: No cervical or supraclavicular lymphadenopathy appreciated.   Neuro: CN II-XII grossly intact.  MSK: unable to palpate any areas of pain or deformity involving the R arm. No swelling. ROM,stregnth intact 5/5.    IMPRESSION/PLAN:  Enid Lombardo is a 59 year old female with history of a stage I breast cancer diagnosed in 2004, s/p lumpectomy, radiation, and 2.5 years of Tamoxifen, with a recurrent T1cN0 breast cancer diagnosed in 2018, now s/p bilateral mastectomy with Oncotype Dx score of 0, now on AI since 10/2018.    History of breast cancer: Continues on Arimidex, tolerating overall well. She has no s/s to suggest recurrent disease. Continue follow-up every 3 months for the first 2-3 years.    Osteopenia: Started Fosamax 1/2-19. She does not tolerate this. Will plan to get dental eval as scheduled and then start Zometa when she sees Dr. Brar in July. Orders placed. She may stop Foasmax in the interim.    Surgical concerns: there is slight dehiscence in there scar at medial R breast; she has messaged Dr. Amin about this. To me appears that there is granulation tissue and no signs of infection - will message Dr. Amin to let him know there is a photo in place and for any recommendations beyond monitoring.    R arm pain: Mid-humerus extending to R forearm without any radicular symptoms, weakness, or signs of lymphedema. Suspect  rotatr cuff etiology given distribution of pain, nocturnal symptoms; however given cancer history will start with plain films to ensure no obvious bone etiologies. If negative, plan to proceed with shoulder MRI and then refer to Ortho.      Debbie Oconnell PA-C  Hematology, Oncology, and Transplant  [facility]  [address] [place] [id number]  [phone]      Debbie Oconnell PA-C

## 2019-04-05 NOTE — PROGRESS NOTES
"HEMATOLOGY/ONCOLOGY PROGRESS NOTE  Apr 5, 2019    REASON FOR VISIT: follow-up of history of breast cancer    DIAGNOSIS:   Enid Lombardo is a 59-year-old female with history of recurrent breast cancer.    She was initially diagnosed in December 2004 with a stage I infiltrating ductal carcinoma of the right breast, ER positive, WY negative, HER-2 negative, and underwent a right-sided lumpectomy with sentinel lymph node biopsy 1/2015. Tumor was 0.6 cm with 0/3 sentinel nodes positive for malignancy. She underwent further excision fr close margins. She completed a course of adjuvant radiation to the right breast. She was started Tamoxifen March 2005 through November 2008, therapy stopped due to diagnosis of cataracts.    Screening mammogram July 2018 showed calcifications, approximately 3 cm in size in an area separate from previous lumpectomy. She underwent needle biopsy which showed a grade 2 ductal carcinoma in situ, ER/WY positive. She underwent bilateral mastectomy with reconstruction with Dr. Sanchez. Pathology revealed 8 mm invasive cancer, total 4 cm DCIS. Final staging T1cN0, stage 1 breast cancer. ER/WY positive, HER-2 negative. She met with genetics and testing was negative. Oncotype DX score of 0. She started Arimidex 10/2018.    INTERVAL HISTORY:   Enid is here with her  for routine 3 month follow-up.    She has a few concerns.    1. She developed R arm pain in February, out of the blue. It is \"deep\" and \"achey\". She has no radiating pains, shooting pains, numbness, tingling. Sometimes she has dropped things using her R arm, from her report not due to weakness, but due to pain. No other sites of pain - no neck pain, back pain, or shoulder pain. No history of trauma. No prior symptoms. Arm without swelling, clothing not feeling tight compared to L arm.    2. She doesn't tolerate Fosamax. She takes it Mondays and dreads mondays. She overall feels unwell after taking it, and it takes a day or two to " "improve.    3. Right breast incision has an \"opening\". She reports it looked like a blister at some point, then healed, and then now is opening again. She reports at one time there was some purulence but now is covered over, no redness, pain, or fevers.    Otherwise, she is tolerating AI well without any side effects. No other concerns. Had a transient headache in the last month but otherwise no headaches. No breathing changes, chest pain, n/v, bowel changes.        Current Outpatient Medications   Medication Sig Dispense Refill     ACETAMINOPHEN PO Take 325 mg by mouth every 8 hours as needed for pain       alendronate (FOSAMAX) 70 MG tablet Take 1 tablet (70 mg) by mouth every 7 days 12 tablet 3     anastrozole (ARIMIDEX) 1 MG tablet Take 1 tablet (1 mg) by mouth daily 90 tablet 3     fluticasone (FLONASE) 50 MCG/ACT spray USE ONE OR TWO SPRAYS IN EACH NOSTRIL DAILY 16 mL 10     ibuprofen (ADVIL) 200 MG tablet Take 400 mg by mouth as needed for mild pain       loratadine (CLARITIN) 10 MG tablet TAKE 1 TABLET BY MOUTH EVERY DAY AS NEEDED FOR ALLERGY SYMPTOMS 90 tablet 2     Multiple Vitamins-Minerals (PRESERVISION AREDS) CAPS Take 1 capsule by mouth 2 times daily 180 capsule 3     Omega-3 Fatty Acids (FISH OIL) 1000 MG CPDR Take  by mouth.       omeprazole (PRILOSEC) 20 MG CR capsule TAKE 1 CAPSULE (20 MG) BY MOUTH DAILY 90 capsule 3     sertraline (ZOLOFT) 25 MG tablet Take 1 tablet daily. (Patient taking differently: 25 mg every morning Take 1 tablet daily.) 30 tablet 11          Allergies   Allergen Reactions     Alphagan P Rash     Thrush and numbness in mouth       PHYSICAL EXAMINATION  /79 (BP Location: Right arm, Patient Position: Sitting, Cuff Size: Adult Regular)   Pulse 70   Temp 97.2  F (36.2  C) (Oral)   Resp 18   Ht 1.651 m (5' 5\")   Wt 81.2 kg (179 lb 1.6 oz)   LMP 10/14/2001   SpO2 100%   BMI 29.80 kg/m    Constitutional: Alert, oriented female in no visible distress.  Eyes: PERRL. " Anicteric sclerae.  ENT/Mouth: OM moist and pink without lesions or thrush.  CV: RRR, no murmurs appreciated.  Resp: CTAB throughout  Abdomen: Soft, non-tender, non-distended. Bowel sounds present.   Breast: s/p bilateral mastectomy with reconstruction.  On the R breast medial to the nipple,There is a 1 cm and adjcant subcentimeter area of granulation tissue without erythema or purulence.    Extremities: No lower extremity edema appreciated.  Skin: Warm, dry. No bruising or petechiae noted.  Lymph: No cervical or supraclavicular lymphadenopathy appreciated.   Neuro: CN II-XII grossly intact.  MSK: unable to palpate any areas of pain or deformity involving the R arm. No swelling. ROM,stregnth intact 5/5.    IMPRESSION/PLAN:  Enid Lombardo is a 59 year old female with history of a stage I breast cancer diagnosed in 2004, s/p lumpectomy, radiation, and 2.5 years of Tamoxifen, with a recurrent T1cN0 breast cancer diagnosed in 2018, now s/p bilateral mastectomy with Oncotype Dx score of 0, now on AI since 10/2018.    History of breast cancer: Continues on Arimidex, tolerating overall well. She has no s/s to suggest recurrent disease. Continue follow-up every 3 months for the first 2-3 years.    Osteopenia: Started Fosamax 1/2-19. She does not tolerate this. Will plan to get dental eval as scheduled and then start Zometa when she sees Dr. Brar in July. Orders placed. She may stop Foasmax in the interim.    Surgical concerns: there is slight dehiscence in there scar at medial R breast; she has messaged Dr. Amin about this. To me appears that there is granulation tissue and no signs of infection - will message Dr. Amin to let him know there is a photo in place and for any recommendations beyond monitoring.    R arm pain: Mid-humerus extending to R forearm without any radicular symptoms, weakness, or signs of lymphedema. Suspect rotatr cuff etiology given distribution of pain, nocturnal symptoms; however given  cancer history will start with plain films to ensure no obvious bone etiologies. If negative, plan to proceed with shoulder MRI and then refer to Ortho.      Debbie Oconnell PA-C  Hematology, Oncology, and Transplant  [facility]  [address] [place] [id number] [phone]

## 2019-04-05 NOTE — NURSING NOTE
"Oncology Rooming Note    April 5, 2019 10:13 AM   Enid Lombardo is a 59 year old female who presents for:    Chief Complaint   Patient presents with     Oncology Clinic Visit     Return visit related to Breast Cancer     Initial Vitals: /79 (BP Location: Right arm, Patient Position: Sitting, Cuff Size: Adult Regular)   Pulse 70   Temp 97.2  F (36.2  C) (Oral)   Resp 18   Ht 1.651 m (5' 5\")   Wt 81.2 kg (179 lb 1.6 oz)   LMP 10/14/2001   SpO2 100%   BMI 29.80 kg/m   Estimated body mass index is 29.8 kg/m  as calculated from the following:    Height as of this encounter: 1.651 m (5' 5\").    Weight as of this encounter: 81.2 kg (179 lb 1.6 oz). Body surface area is 1.93 meters squared.  Moderate Pain (4) Comment: Data Unavailable   Patient's last menstrual period was 10/14/2001.  Allergies reviewed: Yes  Medications reviewed: Yes    Medications: MEDICATION REFILLS NEEDED TODAY. Provider was notified.  Pharmacy name entered into [employer]: [id number] [place] [facility] [id number]    Clinical concerns: Refills needed today. Provider was notified.      Charity Damon LPN            "

## 2019-04-18 ENCOUNTER — ANCILLARY PROCEDURE (OUTPATIENT)
Dept: MRI IMAGING | Facility: CLINIC | Age: 59
End: 2019-04-18
Attending: PHYSICIAN ASSISTANT
Payer: COMMERCIAL

## 2019-04-18 DIAGNOSIS — M79.621 PAIN OF RIGHT UPPER ARM: Primary | ICD-10-CM

## 2019-04-18 DIAGNOSIS — M79.621 PAIN OF RIGHT UPPER ARM: ICD-10-CM

## 2019-04-18 PROCEDURE — A9585 GADOBUTROL INJECTION: HCPCS | Performed by: PHYSICIAN ASSISTANT

## 2019-04-18 PROCEDURE — 73223 MRI JOINT UPR EXTR W/O&W/DYE: CPT | Mod: RT | Performed by: RADIOLOGY

## 2019-04-18 RX ORDER — GADOBUTROL 604.72 MG/ML
7.5 INJECTION INTRAVENOUS ONCE
Status: COMPLETED | OUTPATIENT
Start: 2019-04-18 | End: 2019-04-18

## 2019-04-18 RX ADMIN — GADOBUTROL 7.5 ML: 604.72 INJECTION INTRAVENOUS at 08:56

## 2019-04-24 ENCOUNTER — OFFICE VISIT (OUTPATIENT)
Dept: ORTHOPEDICS | Facility: CLINIC | Age: 59
End: 2019-04-24
Attending: PHYSICIAN ASSISTANT
Payer: COMMERCIAL

## 2019-04-24 ENCOUNTER — ANCILLARY PROCEDURE (OUTPATIENT)
Dept: GENERAL RADIOLOGY | Facility: CLINIC | Age: 59
End: 2019-04-24
Attending: PREVENTIVE MEDICINE
Payer: COMMERCIAL

## 2019-04-24 VITALS
WEIGHT: 179 LBS | HEART RATE: 71 BPM | SYSTOLIC BLOOD PRESSURE: 139 MMHG | BODY MASS INDEX: 29.82 KG/M2 | HEIGHT: 65 IN | DIASTOLIC BLOOD PRESSURE: 93 MMHG

## 2019-04-24 DIAGNOSIS — M54.12 CERVICAL RADICULAR PAIN: Primary | ICD-10-CM

## 2019-04-24 DIAGNOSIS — M75.81 TENDINITIS OF RIGHT ROTATOR CUFF: ICD-10-CM

## 2019-04-24 DIAGNOSIS — M54.12 CERVICAL RADICULAR PAIN: ICD-10-CM

## 2019-04-24 PROCEDURE — 99214 OFFICE O/P EST MOD 30 MIN: CPT | Mod: 25 | Performed by: PREVENTIVE MEDICINE

## 2019-04-24 PROCEDURE — 72040 X-RAY EXAM NECK SPINE 2-3 VW: CPT | Performed by: RADIOLOGY

## 2019-04-24 PROCEDURE — 20610 DRAIN/INJ JOINT/BURSA W/O US: CPT | Mod: RT | Performed by: PREVENTIVE MEDICINE

## 2019-04-24 RX ORDER — TRIAMCINOLONE ACETONIDE 40 MG/ML
40 INJECTION, SUSPENSION INTRA-ARTICULAR; INTRAMUSCULAR ONCE
Status: COMPLETED | OUTPATIENT
Start: 2019-04-24 | End: 2019-04-24

## 2019-04-24 RX ADMIN — TRIAMCINOLONE ACETONIDE 40 MG: 40 INJECTION, SUSPENSION INTRA-ARTICULAR; INTRAMUSCULAR at 18:28

## 2019-04-24 ASSESSMENT — MIFFLIN-ST. JEOR: SCORE: 1397.82

## 2019-04-24 NOTE — PROGRESS NOTES
HISTORY OF PRESENT ILLNESS  Ms. Lombardo is a pleasant 59 year old year old female who presents to clinic today for right shoulder pain and radiating pain down arm, as well as posterior neck pain and headaches  She states taht this has occurred for a few months  No specific injury  Has started lifting more weights in the past few months. Had bilateral mastectomy over 6 months ago and has developed shoulder pain after she started working out more  Not really tried much to treat with medications or therapy  Never had a shoulder problem before        MEDICAL HISTORY  Patient Active Problem List   Diagnosis     History of right breast cancer     Esophageal reflux     Disorder of synovium, tendon, and bursa     Cataract     Shingles     Pain, radicular, lumbar     Right hip pain     Low back pain     IT band syndrome     Macular drusen     Cervicalgia     Headache     Keratoconus     Meibomian gland dysfunction - Both Eyes     Tear film insufficiency     Hyperlipidemia with target LDL less than 130     Benign neoplasm of colon     Skin abscess     Osteopenia of both hands     Recurrent carcinoma in situ of breast, right     Breast cancer in situ     S/P breast reconstruction, bilateral     S/P flap graft     Osteopenia of multiple sites     Long term (current) use of aromatase inhibitors       Current Outpatient Medications   Medication Sig Dispense Refill     ACETAMINOPHEN PO Take 325 mg by mouth every 8 hours as needed for pain       alendronate (FOSAMAX) 70 MG tablet Take 1 tablet (70 mg) by mouth every 7 days 12 tablet 3     anastrozole (ARIMIDEX) 1 MG tablet Take 1 tablet (1 mg) by mouth daily 90 tablet 3     fluticasone (FLONASE) 50 MCG/ACT spray USE ONE OR TWO SPRAYS IN EACH NOSTRIL DAILY 16 mL 10     ibuprofen (ADVIL) 200 MG tablet Take 400 mg by mouth as needed for mild pain       loratadine (CLARITIN) 10 MG tablet TAKE 1 TABLET BY MOUTH EVERY DAY AS NEEDED FOR ALLERGY SYMPTOMS 90 tablet 2     melatonin 5 MG  "tablet        Multiple Vitamins-Minerals (PRESERVISION AREDS) CAPS Take 1 capsule by mouth 2 times daily 180 capsule 3     Omega-3 Fatty Acids (FISH OIL) 1000 MG CPDR Take  by mouth.       omeprazole (PRILOSEC) 20 MG CR capsule TAKE 1 CAPSULE (20 MG) BY MOUTH DAILY 90 capsule 3     sertraline (ZOLOFT) 25 MG tablet Take 1 tablet daily. (Patient taking differently: 25 mg every morning Take 1 tablet daily.) 30 tablet 11       Allergies   Allergen Reactions     Alphagan P Rash     Thrush and numbness in mouth     Diphenhydramine Other (See Comments)     Brimonidine Rash       Family History   Adopted: Yes   Problem Relation Age of Onset     Cancer Mother         lung cancer  at age 52     Thyroid Disease Sister         half sister, had thyroid removed     Unknown/Adopted Father      Unknown/Adopted Maternal Grandmother      Unknown/Adopted Maternal Grandfather      Unknown/Adopted Paternal Grandmother      Unknown/Adopted Paternal Grandfather      Glaucoma No family hx of      Diabetes No family hx of      Melanoma No family hx of      Skin Cancer No family hx of        Additional medical/Social/Surgical histories reviewed in [facility] and updated as appropriate.     REVIEW OF SYSTEMS (2019)  10 point ROS of systems including Constitutional, Eyes, Respiratory, Cardiovascular, Gastroenterology, Genitourinary, Integumentary, Musculoskeletal, Psychiatric were all negative except for pertinent positives noted in my HPI.     PHYSICAL EXAM  Vitals:    19 1445   BP: (!) 139/93   Pulse: 71   Weight: 81.2 kg (179 lb)   Height: 1.651 m (5' 5\")     Vital Signs: BP (!) 139/93   Pulse 71   Ht 1.651 m (5' 5\")   Wt 81.2 kg (179 lb)   LMP 10/14/2001   BMI 29.79 kg/m   Patient declined being weighed. Body mass index is 29.79 kg/m .    General  - normal appearance, in no obvious distress  CV  - normal radial pulse  Pulm  - normal respiratory pattern, non-labored  Musculoskeletal - right shoulder  - inspection: normal bone " and joint alignment, no obvious deformity, no scapular winging, no AC step-off  - palpation: mildly tender RC insertion, normal clavicle, non-tender AC  - ROM:  painful and limited flexion and ER at end range, limited IR  - strength: 5/5  strength, 5/5 in all shoulder planes  - special tests:  (-) Speed's  (+) Neer  (+) Hawkin's  (+) Nelson's  (-) [place]field's  (-) apprehension  (-) subscap lift-off  Neuro  - no sensory or motor deficit, grossly normal coordination, normal muscle tone  Skin  - no ecchymosis, erythema, warmth, or induration, no obvious rash  Psych  - interactive, appropriate, normal mood and affect  Neck: has some pain with extension, positive spurlings on right  ASSESSMENT & PLAN  58 yo female with right shoulder rotator cuff tendinopathy, cervical radicular pain  Reviewed MRI shoulder: shows some tendinopathy, partial chronic tears RC tendons  Reviewed xray cervical: shows some disc space narrowing  Given injection in shoulder today  Consider cervical MRI If not improving  Given HEP  F/u in 2-3 weeks    Castro Mcginnis MD, CAQSM    PROCEDURE:right SHOULDER joint injection      The patient was apprised of the risks and the benefits of the procedure and consented and a written consent was signed by the patient.   The patient s shoulder was sterilely prepped with Betadine.   40 mg of triamcinolone suspension was drawn up into a 5 mL syringe with 2 mL of 1% lidocaine   The patient was injected with a 1.5-inch 25-gauge needle at posterior gleno-humeral joint in the subacromial space  There were no complications. The patient tolerated the procedure well. There was minimal bleeding.   The patient was instructed to ice the shoulder upon leaving clinic and refrain from overuse over the next 3 days.   The patient was instructed to go to the emergency room with any usual pain, swelling, or redness occurred in the injected area.   The patient was given a followup appointment to evaluate response to the injection  to their increased range of motion and reduction of pain.    followup PRN  Dr Castro Mcginnis

## 2019-04-24 NOTE — LETTER
4/24/2019         RE: Enid Lombardo  [address] [facility] [address]        Dear Colleague,    Thank you for referring your patient, Enid Lombardo, to the [facility]. Please see a copy of my visit note below.    HISTORY OF PRESENT ILLNESS  Ms. Lombardo is a pleasant 59 year old year old female who presents to clinic today for right shoulder pain and radiating pain down arm, as well as posterior neck pain and headaches  She states taht this has occurred for a few months  No specific injury  Has started lifting more weights in the past few months. Had bilateral mastectomy over 6 months ago and has developed shoulder pain after she started working out more  Not really tried much to treat with medications or therapy  Never had a shoulder problem before        MEDICAL HISTORY  Patient Active Problem List   Diagnosis     History of right breast cancer     Esophageal reflux     Disorder of synovium, tendon, and bursa     Cataract     Shingles     Pain, radicular, lumbar     Right hip pain     Low back pain     IT band syndrome     Macular drusen     Cervicalgia     Headache     Keratoconus     Meibomian gland dysfunction - Both Eyes     Tear film insufficiency     Hyperlipidemia with target LDL less than 130     Benign neoplasm of colon     Skin abscess     Osteopenia of both hands     Recurrent carcinoma in situ of breast, right     Breast cancer in situ     S/P breast reconstruction, bilateral     S/P flap graft     Osteopenia of multiple sites     Long term (current) use of aromatase inhibitors       Current Outpatient Medications   Medication Sig Dispense Refill     ACETAMINOPHEN PO Take 325 mg by mouth every 8 hours as needed for pain       alendronate (FOSAMAX) 70 MG tablet Take 1 tablet (70 mg) by mouth every 7 days 12 tablet 3     anastrozole (ARIMIDEX) 1 MG tablet Take 1 tablet (1 mg) by mouth daily 90 tablet 3     fluticasone (FLONASE) 50 MCG/ACT spray USE ONE OR TWO  "SPRAYS IN EACH NOSTRIL DAILY 16 mL 10     ibuprofen (ADVIL) 200 MG tablet Take 400 mg by mouth as needed for mild pain       loratadine (CLARITIN) 10 MG tablet TAKE 1 TABLET BY MOUTH EVERY DAY AS NEEDED FOR ALLERGY SYMPTOMS 90 tablet 2     melatonin 5 MG tablet        Multiple Vitamins-Minerals (PRESERVISION AREDS) CAPS Take 1 capsule by mouth 2 times daily 180 capsule 3     Omega-3 Fatty Acids (FISH OIL) 1000 MG CPDR Take  by mouth.       omeprazole (PRILOSEC) 20 MG CR capsule TAKE 1 CAPSULE (20 MG) BY MOUTH DAILY 90 capsule 3     sertraline (ZOLOFT) 25 MG tablet Take 1 tablet daily. (Patient taking differently: 25 mg every morning Take 1 tablet daily.) 30 tablet 11       Allergies   Allergen Reactions     Alphagan P Rash     Thrush and numbness in mouth     Diphenhydramine Other (See Comments)     Brimonidine Rash       Family History   Adopted: Yes   Problem Relation Age of Onset     Cancer Mother         lung cancer  at age 52     Thyroid Disease Sister         half sister, had thyroid removed     Unknown/Adopted Father      Unknown/Adopted Maternal Grandmother      Unknown/Adopted Maternal Grandfather      Unknown/Adopted Paternal Grandmother      Unknown/Adopted Paternal Grandfather      Glaucoma No family hx of      Diabetes No family hx of      Melanoma No family hx of      Skin Cancer No family hx of        Additional medical/Social/Surgical histories reviewed in [facility] and updated as appropriate.     REVIEW OF SYSTEMS (2019)  10 point ROS of systems including Constitutional, Eyes, Respiratory, Cardiovascular, Gastroenterology, Genitourinary, Integumentary, Musculoskeletal, Psychiatric were all negative except for pertinent positives noted in my HPI.     PHYSICAL EXAM  Vitals:    19 1445   BP: (!) 139/93   Pulse: 71   Weight: 81.2 kg (179 lb)   Height: 1.651 m (5' 5\")     Vital Signs: BP (!) 139/93   Pulse 71   Ht 1.651 m (5' 5\")   Wt 81.2 kg (179 lb)   LMP 10/14/2001   BMI 29.79 kg/m    " Patient declined being weighed. Body mass index is 29.79 kg/m .    General  - normal appearance, in no obvious distress  CV  - normal radial pulse  Pulm  - normal respiratory pattern, non-labored  Musculoskeletal - right shoulder  - inspection: normal bone and joint alignment, no obvious deformity, no scapular winging, no AC step-off  - palpation: mildly tender RC insertion, normal clavicle, non-tender AC  - ROM:  painful and limited flexion and ER at end range, limited IR  - strength: 5/5  strength, 5/5 in all shoulder planes  - special tests:  (-) Speed's  (+) Neer  (+) Hawkin's  (+) Nelson's  (-) Kingfisher's  (-) apprehension  (-) subscap lift-off  Neuro  - no sensory or motor deficit, grossly normal coordination, normal muscle tone  Skin  - no ecchymosis, erythema, warmth, or induration, no obvious rash  Psych  - interactive, appropriate, normal mood and affect  Neck: has some pain with extension, positive spurlings on right  ASSESSMENT & PLAN  60 yo female with right shoulder rotator cuff tendinopathy, cervical radicular pain  Reviewed MRI shoulder: shows some tendinopathy, partial chronic tears RC tendons  Reviewed xray cervical: shows some disc space narrowing  Given injection in shoulder today  Consider cervical MRI If not improving  Given HEP  F/u in 2-3 weeks    Castro Mcginnis MD, CAQSM    PROCEDURE:right SHOULDER joint injection      The patient was apprised of the risks and the benefits of the procedure and consented and a written consent was signed by the patient.   The patient s shoulder was sterilely prepped with Betadine.   40 mg of triamcinolone suspension was drawn up into a 5 mL syringe with 2 mL of 1% lidocaine   The patient was injected with a 1.5-inch 25-gauge needle at posterior gleno-humeral joint in the subacromial space  There were no complications. The patient tolerated the procedure well. There was minimal bleeding.   The patient was instructed to ice the shoulder upon leaving clinic and  refrain from overuse over the next 3 days.   The patient was instructed to go to the emergency room with any usual pain, swelling, or redness occurred in the injected area.   The patient was given a followup appointment to evaluate response to the injection to their increased range of motion and reduction of pain.    followup PRN  Dr Castro Mcginnis    Again, thank you for allowing me to participate in the care of your patient.        Sincerely,        Castro Mcginnis MD

## 2019-04-24 NOTE — NURSING NOTE
"NEW PATIENT INTAKE QUESTIONNAIRE  [facility] 4/24/2019      Enid Lombardo's chief complaint for this visit includes:  Chief Complaint   Patient presents with     Arm Pain     Right arm pain, follow up to shoulder imaging     PCP: Shi Alonso    Referring Provider:  Debbie Oconnell PA-C  [address] [place] [address]    BP (!) 139/93   Pulse 71   Ht 1.651 m (5' 5\")   Wt 81.2 kg (179 lb)   LMP 10/14/2001   BMI 29.79 kg/m    Data Unavailable         Reason for Visit:    What part of your body is injured / painful?  right Arm    What caused the injury /pain? No inciting event     How long ago did your injury occur or pain begin? about a month ago    What are your most bothersome symptoms? Pain    How would you characterize your symptom? aching    What makes your symptoms better? Rest    What makes your symptoms worse? Movement    Have you been previously seen for this problem? Yes, Shoulder eval MRI and Xray      Review of Systems:    Have you recently had a a fever, chills, weight loss? No    Do you have any vision problems? No    Do you have any chest pain or edema? No    Do you have any shortness of breath or wheezing?  No    Do you have stomach problems? No    Do you have any numbness or focal weakness? No    Do you have diabetes? No    Do you have problems with bleeding or clotting? No    Do you have an rashes or other skin lesions? No        "

## 2019-04-24 NOTE — PATIENT INSTRUCTIONS
Thanks for coming today.  [facility]  [address] [place] [address]    To schedule future appointments in Ortho Clinic, you may call [phone].    To schedule ordered imaging by your provider:   Call Central Imaging [phone]    To schedule an injection ordered by your provider:  Call Central Imaging Injection scheduling line: [phone]  [facility] available online at:  [email or web address]    Please call if any further questions or concerns ([phone]).  Clinic hours 8 am to 5 pm.    Return to clinic (call) if symptoms worsen or fail to improve.

## 2019-05-20 ENCOUNTER — OFFICE VISIT (OUTPATIENT)
Dept: ORTHOPEDICS | Facility: CLINIC | Age: 59
End: 2019-05-20
Payer: COMMERCIAL

## 2019-05-20 VITALS
HEART RATE: 68 BPM | DIASTOLIC BLOOD PRESSURE: 84 MMHG | HEIGHT: 65 IN | SYSTOLIC BLOOD PRESSURE: 131 MMHG | BODY MASS INDEX: 29.82 KG/M2 | WEIGHT: 179 LBS

## 2019-05-20 DIAGNOSIS — M50.30 DDD (DEGENERATIVE DISC DISEASE), CERVICAL: ICD-10-CM

## 2019-05-20 DIAGNOSIS — M54.12 CERVICAL RADICULAR PAIN: Primary | ICD-10-CM

## 2019-05-20 DIAGNOSIS — M75.81 TENDINITIS OF RIGHT ROTATOR CUFF: ICD-10-CM

## 2019-05-20 PROCEDURE — 99213 OFFICE O/P EST LOW 20 MIN: CPT | Performed by: PREVENTIVE MEDICINE

## 2019-05-20 RX ORDER — DICLOFENAC SODIUM 75 MG/1
75 TABLET, DELAYED RELEASE ORAL 2 TIMES DAILY
Qty: 60 TABLET | Refills: 1 | Status: SHIPPED | OUTPATIENT
Start: 2019-05-20 | End: 2019-07-12

## 2019-05-20 ASSESSMENT — MIFFLIN-ST. JEOR: SCORE: 1397.82

## 2019-05-20 ASSESSMENT — PAIN SCALES - GENERAL: PAINLEVEL: NO PAIN (0)

## 2019-05-20 NOTE — PROGRESS NOTES
"Enid Lombardo's chief complaint for this visit includes:  No chief complaint on file.    PCP: Shi Alonso    Referring Provider:  No referring provider defined for this encounter.    /84   Pulse 68   Ht 1.651 m (5' 5\")   Wt 81.2 kg (179 lb)   LMP 10/14/2001   BMI 29.79 kg/m    No Pain (0)           "

## 2019-05-20 NOTE — PATIENT INSTRUCTIONS
Thanks for coming today.  [facility]  [address] [place] [address]    To schedule future appointments in Ortho Clinic, you may call [phone].    To schedule ordered imaging by your provider:   Call Central Imaging [phone]    To schedule an injection ordered by your provider:  Call [facility] Injection scheduling line: [phone]  [facility] available online at:  [email or web address]    Please call if any further questions or concerns ([phone]).  Clinic hours 8 am to 5 pm.    Return to clinic (call) if symptoms worsen or fail to improve.

## 2019-05-20 NOTE — LETTER
"    5/20/2019         RE: Enid Lombardo  [address] [facility] [address]        Dear Colleague,    Thank you for referring your patient, Enid Lombardo, to the [facility]. Please see a copy of my visit note below.    Enid Lombardo's chief complaint for this visit includes:  No chief complaint on file.    PCP: Shi Alonso    Referring Provider:  No referring provider defined for this encounter.    /84   Pulse 68   Ht 1.651 m (5' 5\")   Wt 81.2 kg (179 lb)   LMP 10/14/2001   BMI 29.79 kg/m     No Pain (0)             HISTORY OF PRESENT ILLNESS  Ms. Lombardo is a pleasant 59 year old year old female who presents to clinic today for followup for right shoulder RC tendinitis, had injection in shoulder last visit and feels 70% better  Still has posterior neck discomfort, but improved  No radiation of pain down arm    MEDICAL HISTORY  Patient Active Problem List   Diagnosis     History of right breast cancer     Esophageal reflux     Disorder of synovium, tendon, and bursa     Cataract     Shingles     Pain, radicular, lumbar     Right hip pain     Low back pain     IT band syndrome     Macular drusen     Cervicalgia     Headache     Keratoconus     Meibomian gland dysfunction - Both Eyes     Tear film insufficiency     Hyperlipidemia with target LDL less than 130     Benign neoplasm of colon     Skin abscess     Osteopenia of both hands     Recurrent carcinoma in situ of breast, right     Breast cancer in situ     S/P breast reconstruction, bilateral     S/P flap graft     Osteopenia of multiple sites     Long term (current) use of aromatase inhibitors       Current Outpatient Medications   Medication Sig Dispense Refill     ACETAMINOPHEN PO Take 325 mg by mouth every 8 hours as needed for pain       alendronate (FOSAMAX) 70 MG tablet Take 1 tablet (70 mg) by mouth every 7 days 12 tablet 3     anastrozole (ARIMIDEX) 1 MG tablet Take 1 tablet (1 mg) by mouth daily 90 " "tablet 3     fluticasone (FLONASE) 50 MCG/ACT spray USE ONE OR TWO SPRAYS IN EACH NOSTRIL DAILY 16 mL 10     ibuprofen (ADVIL) 200 MG tablet Take 400 mg by mouth as needed for mild pain       loratadine (CLARITIN) 10 MG tablet TAKE 1 TABLET BY MOUTH EVERY DAY AS NEEDED FOR ALLERGY SYMPTOMS 90 tablet 2     melatonin 5 MG tablet        Multiple Vitamins-Minerals (PRESERVISION AREDS) CAPS Take 1 capsule by mouth 2 times daily 180 capsule 3     Omega-3 Fatty Acids (FISH OIL) 1000 MG CPDR Take  by mouth.       omeprazole (PRILOSEC) 20 MG CR capsule TAKE 1 CAPSULE (20 MG) BY MOUTH DAILY 90 capsule 3     sertraline (ZOLOFT) 25 MG tablet Take 1 tablet daily. (Patient taking differently: 25 mg every morning Take 1 tablet daily.) 30 tablet 11       Allergies   Allergen Reactions     Alphagan P Rash     Thrush and numbness in mouth     Diphenhydramine Other (See Comments)     Brimonidine Rash       Family History   Adopted: Yes   Problem Relation Age of Onset     Cancer Mother         lung cancer  at age 52     Thyroid Disease Sister         half sister, had thyroid removed     Unknown/Adopted Father      Unknown/Adopted Maternal Grandmother      Unknown/Adopted Maternal Grandfather      Unknown/Adopted Paternal Grandmother      Unknown/Adopted Paternal Grandfather      Glaucoma No family hx of      Diabetes No family hx of      Melanoma No family hx of      Skin Cancer No family hx of        Additional medical/Social/Surgical histories reviewed in [facility] and updated as appropriate.     REVIEW OF SYSTEMS (2019)  10 point ROS of systems including Constitutional, Eyes, Respiratory, Cardiovascular, Gastroenterology, Genitourinary, Integumentary, Musculoskeletal, Psychiatric were all negative except for pertinent positives noted in my HPI.     PHYSICAL EXAM  Vitals:    19 0930   BP: 131/84   Pulse: 68   Weight: 81.2 kg (179 lb)   Height: 1.651 m (5' 5\")     Vital Signs: /84   Pulse 68   Ht 1.651 m (5' 5\")   " Wt 81.2 kg (179 lb)   LMP 10/14/2001   BMI 29.79 kg/m    Patient declined being weighed. Body mass index is 29.79 kg/m .    General  - normal appearance, in no obvious distress  CV  - normal radial pulse  Pulm  - normal respiratory pattern, non-labored  Musculoskeletal - neck and right shoulder  - inspection: normal bone and joint alignment, no obvious deformity, no scapular winging, no AC step-off  - palpation: mildly tender RC insertion, normal clavicle, non-tender AC  - ROM:  Non painful and limited flexion and ER at end range, with shoulder, and neck is painful with ROM testing all directions with some radiation into both shoulders  - strength: 5/5  strength, 5/5 in all shoulder planes  - special tests:  Positive spurlings bialterally  Neuro  - no sensory or motor deficit, grossly normal coordination, normal muscle tone  Skin  - no ecchymosis, erythema, warmth, or induration, no obvious rash  Psych  - interactive, appropriate, normal mood and affect  ASSESSMENT & PLAN  60 yo female with right shoulder RC tendinopathy, improved, and cervical radicular pain stable  Consider cervical MRI if neck discomfort doesn't improve  Consider repeat shoulder injection, consider PRP  Given diclofenac and tizanadine PRN  F/u as needed for above    Castro Mcginnis MD, CAM    Again, thank you for allowing me to participate in the care of your patient.        Sincerely,        Castro Mcginnis MD

## 2019-05-20 NOTE — PROGRESS NOTES
HISTORY OF PRESENT ILLNESS  Ms. Lombardo is a pleasant 59 year old year old female who presents to clinic today for followup for right shoulder RC tendinitis, had injection in shoulder last visit and feels 70% better  Still has posterior neck discomfort, but improved  No radiation of pain down arm    MEDICAL HISTORY  Patient Active Problem List   Diagnosis     History of right breast cancer     Esophageal reflux     Disorder of synovium, tendon, and bursa     Cataract     Shingles     Pain, radicular, lumbar     Right hip pain     Low back pain     IT band syndrome     Macular drusen     Cervicalgia     Headache     Keratoconus     Meibomian gland dysfunction - Both Eyes     Tear film insufficiency     Hyperlipidemia with target LDL less than 130     Benign neoplasm of colon     Skin abscess     Osteopenia of both hands     Recurrent carcinoma in situ of breast, right     Breast cancer in situ     S/P breast reconstruction, bilateral     S/P flap graft     Osteopenia of multiple sites     Long term (current) use of aromatase inhibitors       Current Outpatient Medications   Medication Sig Dispense Refill     ACETAMINOPHEN PO Take 325 mg by mouth every 8 hours as needed for pain       alendronate (FOSAMAX) 70 MG tablet Take 1 tablet (70 mg) by mouth every 7 days 12 tablet 3     anastrozole (ARIMIDEX) 1 MG tablet Take 1 tablet (1 mg) by mouth daily 90 tablet 3     fluticasone (FLONASE) 50 MCG/ACT spray USE ONE OR TWO SPRAYS IN EACH NOSTRIL DAILY 16 mL 10     ibuprofen (ADVIL) 200 MG tablet Take 400 mg by mouth as needed for mild pain       loratadine (CLARITIN) 10 MG tablet TAKE 1 TABLET BY MOUTH EVERY DAY AS NEEDED FOR ALLERGY SYMPTOMS 90 tablet 2     melatonin 5 MG tablet        Multiple Vitamins-Minerals (PRESERVISION AREDS) CAPS Take 1 capsule by mouth 2 times daily 180 capsule 3     Omega-3 Fatty Acids (FISH OIL) 1000 MG CPDR Take  by mouth.       omeprazole (PRILOSEC) 20 MG CR capsule TAKE 1 CAPSULE (20 MG) BY  "MOUTH DAILY 90 capsule 3     sertraline (ZOLOFT) 25 MG tablet Take 1 tablet daily. (Patient taking differently: 25 mg every morning Take 1 tablet daily.) 30 tablet 11       Allergies   Allergen Reactions     Alphagan P Rash     Thrush and numbness in mouth     Diphenhydramine Other (See Comments)     Brimonidine Rash       Family History   Adopted: Yes   Problem Relation Age of Onset     Cancer Mother         lung cancer  at age 52     Thyroid Disease Sister         half sister, had thyroid removed     Unknown/Adopted Father      Unknown/Adopted Maternal Grandmother      Unknown/Adopted Maternal Grandfather      Unknown/Adopted Paternal Grandmother      Unknown/Adopted Paternal Grandfather      Glaucoma No family hx of      Diabetes No family hx of      Melanoma No family hx of      Skin Cancer No family hx of        Additional medical/Social/Surgical histories reviewed in [facility] and updated as appropriate.     REVIEW OF SYSTEMS (2019)  10 point ROS of systems including Constitutional, Eyes, Respiratory, Cardiovascular, Gastroenterology, Genitourinary, Integumentary, Musculoskeletal, Psychiatric were all negative except for pertinent positives noted in my HPI.     PHYSICAL EXAM  Vitals:    19 0930   BP: 131/84   Pulse: 68   Weight: 81.2 kg (179 lb)   Height: 1.651 m (5' 5\")     Vital Signs: /84   Pulse 68   Ht 1.651 m (5' 5\")   Wt 81.2 kg (179 lb)   LMP 10/14/2001   BMI 29.79 kg/m   Patient declined being weighed. Body mass index is 29.79 kg/m .    General  - normal appearance, in no obvious distress  CV  - normal radial pulse  Pulm  - normal respiratory pattern, non-labored  Musculoskeletal - neck and right shoulder  - inspection: normal bone and joint alignment, no obvious deformity, no scapular winging, no AC step-off  - palpation: mildly tender RC insertion, normal clavicle, non-tender AC  - ROM:  Non painful and limited flexion and ER at end range, with shoulder, and neck is painful with " ROM testing all directions with some radiation into both shoulders  - strength: 5/5  strength, 5/5 in all shoulder planes  - special tests:  Positive spurlings bialterally  Neuro  - no sensory or motor deficit, grossly normal coordination, normal muscle tone  Skin  - no ecchymosis, erythema, warmth, or induration, no obvious rash  Psych  - interactive, appropriate, normal mood and affect  ASSESSMENT & PLAN  58 yo female with right shoulder RC tendinopathy, improved, and cervical radicular pain stable  Consider cervical MRI if neck discomfort doesn't improve  Consider repeat shoulder injection, consider PRP  Given diclofenac and tizanadine PRN  F/u as needed for above    Castro Mcginnis MD, CAQSM

## 2019-06-03 ENCOUNTER — OFFICE VISIT (OUTPATIENT)
Dept: FAMILY MEDICINE | Facility: OTHER | Age: 59
End: 2019-06-03
Payer: COMMERCIAL

## 2019-06-03 VITALS
WEIGHT: 180.5 LBS | DIASTOLIC BLOOD PRESSURE: 82 MMHG | SYSTOLIC BLOOD PRESSURE: 130 MMHG | TEMPERATURE: 98.9 F | BODY MASS INDEX: 29.01 KG/M2 | RESPIRATION RATE: 16 BRPM | HEART RATE: 78 BPM | OXYGEN SATURATION: 97 % | HEIGHT: 66 IN

## 2019-06-03 DIAGNOSIS — Z12.4 SCREENING FOR MALIGNANT NEOPLASM OF CERVIX: ICD-10-CM

## 2019-06-03 DIAGNOSIS — K43.9 VENTRAL HERNIA WITHOUT OBSTRUCTION OR GANGRENE: Primary | ICD-10-CM

## 2019-06-03 DIAGNOSIS — Z23 NEED FOR VACCINATION FOR MEASLES: ICD-10-CM

## 2019-06-03 PROCEDURE — 86765 RUBEOLA ANTIBODY: CPT | Performed by: FAMILY MEDICINE

## 2019-06-03 PROCEDURE — 99214 OFFICE O/P EST MOD 30 MIN: CPT | Performed by: FAMILY MEDICINE

## 2019-06-03 PROCEDURE — 36415 COLL VENOUS BLD VENIPUNCTURE: CPT | Performed by: FAMILY MEDICINE

## 2019-06-03 ASSESSMENT — PATIENT HEALTH QUESTIONNAIRE - PHQ9
SUM OF ALL RESPONSES TO PHQ QUESTIONS 1-9: 0
10. IF YOU CHECKED OFF ANY PROBLEMS, HOW DIFFICULT HAVE THESE PROBLEMS MADE IT FOR YOU TO DO YOUR WORK, TAKE CARE OF THINGS AT HOME, OR GET ALONG WITH OTHER PEOPLE: NOT DIFFICULT AT ALL
SUM OF ALL RESPONSES TO PHQ QUESTIONS 1-9: 0

## 2019-06-03 ASSESSMENT — ANXIETY QUESTIONNAIRES
GAD7 TOTAL SCORE: 0
2. NOT BEING ABLE TO STOP OR CONTROL WORRYING: NOT AT ALL
6. BECOMING EASILY ANNOYED OR IRRITABLE: NOT AT ALL
7. FEELING AFRAID AS IF SOMETHING AWFUL MIGHT HAPPEN: NOT AT ALL
3. WORRYING TOO MUCH ABOUT DIFFERENT THINGS: NOT AT ALL
GAD7 TOTAL SCORE: 0
4. TROUBLE RELAXING: NOT AT ALL
7. FEELING AFRAID AS IF SOMETHING AWFUL MIGHT HAPPEN: NOT AT ALL
1. FEELING NERVOUS, ANXIOUS, OR ON EDGE: NOT AT ALL
GAD7 TOTAL SCORE: 0
5. BEING SO RESTLESS THAT IT IS HARD TO SIT STILL: NOT AT ALL

## 2019-06-03 ASSESSMENT — MIFFLIN-ST. JEOR: SCORE: 1412.55

## 2019-06-03 NOTE — PROGRESS NOTES
Subjective     Enid Lombardo is a 59 year old female who presents to clinic today for the following health issues:    History of Present Illness     She eats 2-3 servings of fruits and vegetables daily.She consumes 1 sweetened beverage(s) daily.  She is taking medications regularly.     Answers for HPI/ROS submitted by the patient on 6/3/2019   Chronic problems general questions HPI Form  If you checked off any problems, how difficult have these problems made it for you to do your work, take care of things at home, or get along with other people?: Not difficult at all  PHQ9 TOTAL SCORE: 0  BISHOP 7 TOTAL SCORE: 0    Concern - Mass on abdomen    Onset: 3 weeks     Description:   Round protrudes from abdomen, sensitive to the touch, and occasional burning feeling      Intensity: moderate, severe    Progression of Symptoms:  worsening    Accompanying Signs & Symptoms:  NA    Previous history of similar problem:   NA    Precipitating factors:   Worsened by: If patient is bloated     Alleviating factors:  Improved by: Laying down.   Therapies Tried and outcome: NA     Patient Active Problem List   Diagnosis     History of right breast cancer     Esophageal reflux     Disorder of synovium, tendon, and bursa     Cataract     Shingles     Pain, radicular, lumbar     Right hip pain     Low back pain     IT band syndrome     Macular drusen     Cervicalgia     Headache     Keratoconus     Meibomian gland dysfunction - Both Eyes     Tear film insufficiency     Hyperlipidemia with target LDL less than 130     Benign neoplasm of colon     Skin abscess     Osteopenia of both hands     Recurrent carcinoma in situ of breast, right     Breast cancer in situ     S/P breast reconstruction, bilateral     S/P flap graft     Osteopenia of multiple sites     Long term (current) use of aromatase inhibitors     Past Surgical History:   Procedure Laterality Date     BIOPSY  2004    Breast     BREAST SURGERY  2004     C VAGINAL HYSTERECTOMY   12/2001    Ovaries intact, due to fibroids     COLONOSCOPY       ENDOSCOPY  05/09/2007    Upper GI     EYE SURGERY       GRAFT FAT TO BREAST Bilateral 11/29/2018    Procedure: Bilateral Breast Fat Grafting from Abdomen and Thighs;  Surgeon: DENNISE Amin MD;  [facility]: [facility]     GRAFT FREE VASCULARIZED TRANSVERSE RECTUS ABDOMINIS MYOCUTANEOUS Bilateral 10/8/2018    Procedure: GRAFT FREE VASCULARIZED TRANSVERSE RECTUS ABDOMINIS MYOCUTANEOUS;  Bilateral Deep Inferior Epigastric Artery  Free Flap Breast Reconstruction and Removal of Breast Explanders;  Surgeon: DENNISE Amin MD;  Location: [facility] OR     GYN SURGERY  2001     HC BIOPSY/EXCISION LYMPH NODE OPEN DEEP CERVICAL W EXC FAT PAD  1/7/2005    Right axillary sentinel node biopsy X 3.     HC COLONOSCOPY W BIOPSY  05/10/10     HC EXCISION BREAST LESION, OPEN >=1  1/7/2005    Right breast.     HC REMV CATARACT EXTRACAP,INSERT LENS  12/18/08    bilateral     HC REMV TARSAL/METATARSAL BENIGN BONE LESN  1982    Bone spur - right     MASTECTOMY SIMPLE BILATERAL, SENTINEL NODE BILATERAL, COMBINED Bilateral 8/22/2018    Procedure: COMBINED MASTECTOMY SIMPLE BILATERAL, SENTINEL NODE BILATERAL;  Bilateral Mastectomy with Right [place] Node Biopsy, Bilateral Breast Reconstruction, Anesthesia Block;  Surgeon: Rakesh Sanchez MD;  Location:  OR     ORTHOPEDIC SURGERY  1983    Right foot     RECONSTRUCT BREAST Bilateral 8/22/2018    Procedure: RECONSTRUCT BREAST;;  Surgeon: DENNISE Amin MD;  Location:  OR     RECONSTRUCT BREAST BILATERAL Bilateral 10/8/2018    Procedure: RECONSTRUCT BREAST BILATERAL;;  Surgeon: DENNISE Amin MD;  Location:  OR     RECONSTRUCT NIPPLE BILATERAL Bilateral 11/29/2018    Procedure: Nipple Reconstruction;  Surgeon: DENNISE Amin MD;  Location:  OR       Social History     Tobacco Use     Smoking status: Never Smoker     Smokeless tobacco: Never Used   Substance Use Topics     Alcohol use:  "Yes     Comment: occasionally     Family History   Adopted: Yes   Problem Relation Age of Onset     Cancer Mother         lung cancer  at age 52     Thyroid Disease Sister         half sister, had thyroid removed     Unknown/Adopted Father      Unknown/Adopted Maternal Grandmother      Unknown/Adopted Maternal Grandfather      Unknown/Adopted Paternal Grandmother      Unknown/Adopted Paternal Grandfather      Glaucoma No family hx of      Diabetes No family hx of      Melanoma No family hx of      Skin Cancer No family hx of            Reviewed and updated as needed this visit by Provider         Review of Systems   ROS COMP: Constitutional, HEENT, cardiovascular, pulmonary, GI, , musculoskeletal, neuro, skin, endocrine and psych systems are negative, except as otherwise noted.      Objective    /82 (BP Location: Right arm, Patient Position: Chair, Cuff Size: Adult Regular)   Pulse 78   Temp 98.9  F (37.2  C) (Temporal)   Resp 16   Ht 1.664 m (5' 5.5\")   Wt 81.9 kg (180 lb 8 oz)   LMP 10/14/2001   SpO2 97%   Breastfeeding? No   BMI 29.58 kg/m    Body mass index is 29.58 kg/m .  Physical Exam   GENERAL: healthy, alert and no distress, overweight  EYES: Eyes grossly normal to inspection, conjunctivae and sclerae normal  ABDOMEN: ventral hernia just to the right of midline  MS: no gross musculoskeletal defects noted, no edema  SKIN: no suspicious lesions or rashes to visible skin  NEURO: Normal strength and tone, mentation intact and speech normal  PSYCH: mentation appears normal, affect normal/bright    Diagnostic Test Results:  Labs reviewed in Epic  No results found for this or any previous visit (from the past 24 hour(s)).        Assessment & Plan       ICD-10-CM    1. Ventral hernia without obstruction or gangrene K43.9 PHYSICAL THERAPY REFERRAL   2. Screening for malignant neoplasm of cervix Z12.4    3. Need for vaccination for measles Z23 [place] Antibody IgG     Ventral Hernia:  Recommended " "gentle long-muscle exercises such as planks or leg raises to strengthen abdominal muscles. Referral given to PT for ventral hernia, as she is already seeing PT tomorrow for her shoulder so they may be able to address both concerns.      Measles:  Discussed risk for exposure and immunities based on age group and reported vaccination history. She would like to get her titer checked, lab ordered today.    Planning pap in 2 months with physical.     BMI:   Estimated body mass index is 29.58 kg/m  as calculated from the following:    Height as of this encounter: 1.664 m (5' 5.5\").    Weight as of this encounter: 81.9 kg (180 lb 8 oz).   Weight management plan: Discussed healthy diet and exercise guidelines    Return in about 2 months (around 8/3/2019) for Physical Exam.    This document serves as a record of the services and decisions personally performed and made by Shi Alonso MD. It was created on her behalf by Dmitriy Caldwell, a trained medical scribe. The creation of this document is based the provider's statements to the medical scribe.  Dmitriy Caldwell, Stephania 3, 2019 3:16 PM    The information in this document, created by the medical scribe for me, accurately reflects the services I personally performed and the decisions made by me. I have reviewed and approved this document for accuracy.    Shi Alonso MD, MD  [facility]    "

## 2019-06-04 ENCOUNTER — HOSPITAL ENCOUNTER (OUTPATIENT)
Dept: PHYSICAL THERAPY | Facility: OTHER | Age: 59
Setting detail: THERAPIES SERIES
End: 2019-06-04
Attending: PREVENTIVE MEDICINE
Payer: COMMERCIAL

## 2019-06-04 DIAGNOSIS — M75.81 TENDINITIS OF RIGHT ROTATOR CUFF: ICD-10-CM

## 2019-06-04 DIAGNOSIS — M54.12 CERVICAL RADICULAR PAIN: ICD-10-CM

## 2019-06-04 LAB — MEV IGG SER QL IA: 0.7 AI (ref 0–0.8)

## 2019-06-04 PROCEDURE — 97162 PT EVAL MOD COMPLEX 30 MIN: CPT | Mod: GP | Performed by: PHYSICAL THERAPIST

## 2019-06-04 PROCEDURE — 97110 THERAPEUTIC EXERCISES: CPT | Mod: GP | Performed by: PHYSICAL THERAPIST

## 2019-06-04 PROCEDURE — 97140 MANUAL THERAPY 1/> REGIONS: CPT | Mod: GP | Performed by: PHYSICAL THERAPIST

## 2019-06-04 ASSESSMENT — ANXIETY QUESTIONNAIRES: GAD7 TOTAL SCORE: 0

## 2019-06-04 ASSESSMENT — PATIENT HEALTH QUESTIONNAIRE - PHQ9: SUM OF ALL RESPONSES TO PHQ QUESTIONS 1-9: 0

## 2019-06-04 NOTE — PROGRESS NOTES
06/04/19 1400   General Information   Type of Visit Initial OP Ortho PT Evaluation   Start of Care Date 06/04/19   Referring Physician  and    Patient/Family Goals Statement To no longer have HA's, neck pain, shoulder issues, and hernia to not have to have surgery for this   Orders Evaluate and Treat   Date of Order 05/20/19  ( for the hernia is 6/3/19)   Certification Required? No   Medical Diagnosis cervical radicular pain, tendinitis of hte right rotator cuff, ventral hernia without obstruction or gangrene   Surgical/Medical history reviewed Yes   Precautions/Limitations no known precautions/limitations   Body Part(s)   Body Part(s) Cervical Spine   Presentation and Etiology   Pertinent history of current problem (include personal factors and/or comorbidities that impact the POC) Patient had a double masectomy 7 mo ago, 8/22/18. She had cancer in the past at which time had radiation and a lumpectomy 14 years ago on the right and because of the radiation it is not rec to have a artificial implants for the reconstructions. therefore pt underwent the reconstruction 10/8/18 using her own tissue harvested from the abdomen. Today pt has a ntoed ventral superior hernia in the abdomen since 3 wks ago and is probable to be the cause from the sin harvesting. Patient also has neck pain #0, but the neck pain and the HA's come on every day and variable when it comes on and am is most typical and wakes up with it. Right shoulder pain and couldnt lift anything with the right arm and this started in April. Had injection in the shoulder and no arm pain now. Biggest concern is the HA's coming on with the neck pain. HA and neck pain came on in March. 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 Up until Jan had to sleep sitting up because of pain from the surgery. Started sleeping flat in Jan. Has a fit  TM and started using this in Feb, no pain during. light 3lb wts for rotator cuff IR  and ER. sleeps on her sides and back. Breast feel now heavier.    Impairments A. Pain;E. Decreased flexibility;D. Decreased ROM;F. Decreased strength and endurance;I. Impaired skin integrity;N. Headaches   Functional Limitations perform activities of daily living;perform desired leisure / sports activities   Onset date of current episode/exacerbation 08/22/18   Chronicity New   Prior Level of Function   Prior Level of Function-Mobility independent   Current Level of Function   Patient role/employment history F. Retired   Living environment [facility]   Fall Risk Screen   Fall screen completed by PT   Have you fallen 2 or more times in the past year? No   Have you fallen and had an injury in the past year? No   Is patient a fall risk? No   Cervical Spine   Observation patient sits and stands in a position that looks like she is being pulled forward   Integumentary  healed very large abdominal incision and B breast incisions   Posture rounded and forward head   Cervical Flexion ROM WNL but hard to put chin on the chest   Cervical Extension ROM WNL but painful and compensates   Cervical Right Side Bending ROM 70%   Cervical Left Side Bending ROM 70%   Cervical Right Rotation ROM 70%   Cervical Left Rotation ROM 70%   Thoracic Extension ROM difficult   Shoulder AROM Screen WNL   Cervical/Thoracic/Shoulder ROM Comments very tight in the anterior entire trunk and neck, pt feels like she is being pulled foreward   Shoulder/Wrist/Hand Strength Comments WNL   Upper Trapezius Flexibility tightness   Levator Scapula Flexibility tightness   Scalene Flexibility tightness   Pectoralis Minor Flexibility tightness   Cervical Rotation/Lateral Flexion Test pain and tightness   Cervical/Shoulder Special Tests Comments after MFR of the abdomen scar tissue pt posture improved and less pulling    Palpation Very tight in the abdominal scar and inf left lower abdomen, and diastisis above the umbilicus of 2.5 fingers width, and bulging  of the abdomen at that area   Planned Therapy Interventions   Planned Therapy Interventions joint mobilization;manual therapy;neuromuscular re-education;ROM;strengthening;stretching   Clinical Impression   Criteria for Skilled Therapeutic Interventions Met yes, treatment indicated   PT Diagnosis neck pain, HA's, abdominal tightness, forward head, shoulder pain off and on. Hernia superior abdomen, diastisis 2.5 fingers width   Influenced by the following impairments scar tissue, lack of activity the past few months   Functional limitations due to impairments waking with HA's, unable to stand tall, pulling in the abomen   Clinical Presentation Evolving/Changing   Clinical Presentation Rationale several functions a issue, long HX   Clinical Decision Making (Complexity) Moderate complexity   Therapy Frequency 1 time/week   Predicted Duration of Therapy Intervention (days/wks) 90 days   Risk & Benefits of therapy have been explained Yes   Patient, Family & other staff in agreement with plan of care Yes   Clinical Impression Comments Patient has a significant forward head and tightness anteriorly. With the forward head pt is getting tightness in the neck and at the base of the head thus causing the HA's. Also the rounded shoulders adding to shoulder impingement. Patient is very tight in the abdomen and because of the pulling of the fascia this has created a diastisis and a ventral hernia.    Education Assessment   Preferred Learning Style Listening;Reading;Demonstration   Barriers to Learning No barriers   ORTHO GOALS   PT Ortho Eval Goals 1;2;3   Ortho Goal 1   Goal Identifier 1   Goal Description Patient no longer has HA's or neck pain and is able to stand comfortably with proper posture   Target Date 09/01/19   Ortho Goal 2   Goal Identifier 2   Goal Description Patient no longer has a diastisis and no longer has a ventral hernia   Target Date 09/01/19   Ortho Goal 3   Goal Identifier 3   Goal Description Patient is  able to return to a workout regimen with no pain or complaints   Target Date 09/01/19   Total Evaluation Time   PT Tammyal, Moderate Complexity Minutes (13113) 30   Thank you for the referral,            Supriya Brown PT

## 2019-06-04 NOTE — RESULT ENCOUNTER NOTE
Enid, you will need an update of the MMR vaccine as no notable antibodies were detected.  Please let me know if you have any questions.    Shi Alonso MD

## 2019-06-17 DIAGNOSIS — M75.81 TENDINITIS OF RIGHT ROTATOR CUFF: ICD-10-CM

## 2019-06-17 DIAGNOSIS — M54.12 CERVICAL RADICULAR PAIN: ICD-10-CM

## 2019-06-21 ENCOUNTER — HOSPITAL ENCOUNTER (OUTPATIENT)
Dept: PHYSICAL THERAPY | Facility: OTHER | Age: 59
Setting detail: THERAPIES SERIES
End: 2019-06-21
Attending: PREVENTIVE MEDICINE
Payer: COMMERCIAL

## 2019-06-21 PROCEDURE — 97140 MANUAL THERAPY 1/> REGIONS: CPT | Mod: GP | Performed by: PHYSICAL THERAPIST

## 2019-06-26 ENCOUNTER — HOSPITAL ENCOUNTER (OUTPATIENT)
Dept: PHYSICAL THERAPY | Facility: OTHER | Age: 59
Setting detail: THERAPIES SERIES
End: 2019-06-26
Attending: PREVENTIVE MEDICINE
Payer: COMMERCIAL

## 2019-06-26 PROCEDURE — 97140 MANUAL THERAPY 1/> REGIONS: CPT | Mod: GP | Performed by: PHYSICAL THERAPIST

## 2019-06-26 PROCEDURE — 97110 THERAPEUTIC EXERCISES: CPT | Mod: GP | Performed by: PHYSICAL THERAPIST

## 2019-07-01 ENCOUNTER — HOSPITAL ENCOUNTER (OUTPATIENT)
Dept: PHYSICAL THERAPY | Facility: OTHER | Age: 59
Setting detail: THERAPIES SERIES
End: 2019-07-01
Attending: PREVENTIVE MEDICINE
Payer: COMMERCIAL

## 2019-07-01 PROCEDURE — 97110 THERAPEUTIC EXERCISES: CPT | Mod: GP | Performed by: PHYSICAL THERAPIST

## 2019-07-01 PROCEDURE — 97140 MANUAL THERAPY 1/> REGIONS: CPT | Mod: GP | Performed by: PHYSICAL THERAPIST

## 2019-07-02 ENCOUNTER — OFFICE VISIT (OUTPATIENT)
Dept: DERMATOLOGY | Facility: CLINIC | Age: 59
End: 2019-07-02
Payer: COMMERCIAL

## 2019-07-02 DIAGNOSIS — L98.9 SKIN LESION: Primary | ICD-10-CM

## 2019-07-02 PROCEDURE — 99213 OFFICE O/P EST LOW 20 MIN: CPT | Performed by: DERMATOLOGY

## 2019-07-02 NOTE — LETTER
7/2/2019         RE: Enid Lombardo  [address] [facility] [address]        Dear Colleague,    Thank you for referring your patient, Enid Lombardo, to the [facility]. Please see a copy of my visit note below.    [facility] Dermatology Note      Dermatology Problem List:  1. Left upper lip lesion  - s/p biopsy 2/5/2018 by PCP, read by surg path as parakeratosis and hyperorthokeratosis with focal ulceration with reactive squamous atypia. Treated with LN2 but failed to resolve.  - s/p rebiopsied left upper lip 1/16/2019 results consistent with a traumatized verrucous keratosis   - s/p cryotherapy 2/25/2019 and failed  - scheduled with Dr. Trevino for another opinion  2. Relevant medical history: breast cancer    Encounter Date: Jul 2, 2019    CC:  Chief Complaint   Patient presents with     RECHECK     spot on upper lip not going away          History of Present Illness:  Ms. Enid Lombardo is a 59 year old female who presents for a recheck of the lesion on the left upper lip. The lesion has been biopsied twice, second biopsy proven consistent with traumatized verrucous keratosis, but the base was not seen. She was last seen on 2/25/19 by Dr. Gibbs when the lesion was treated with cryo. Today the patient reports that the spot has still not gone away. It has been the way it is now for a few weeks. Gets red bumps that crust over and flake off, recurring. Only applies Vaseline and Aquaphor, sometimes makeup. Thinks the cryo helped.      Past Medical History:   Patient Active Problem List   Diagnosis     History of right breast cancer     Esophageal reflux     Disorder of synovium, tendon, and bursa     Cataract     Shingles     Pain, radicular, lumbar     Right hip pain     Low back pain     IT band syndrome     Macular drusen     Cervicalgia     Headache     Keratoconus     Meibomian gland dysfunction - Both Eyes     Tear film insufficiency      Hyperlipidemia with target LDL less than 130     Benign neoplasm of colon     Skin abscess     Osteopenia of both hands     Recurrent carcinoma in situ of breast, right     Breast cancer in situ     S/P breast reconstruction, bilateral     S/P flap graft     Osteopenia of multiple sites     Long term (current) use of aromatase inhibitors     Past Medical History:   Diagnosis Date     Breast cancer (H) 2004    lumpectomy, radiation, tamoxifen     Cancer (H) 2004    Breast     Cataracts, bilateral      Complication of anesthesia     She prefers not to have versed until right before she leaves or can have after      Enthesopathy of hip region 6/06    right hip     Esophageal reflux 2/06     History of gestational diabetes      Hypertension 2012     Macular drusen      PONV (postoperative nausea and vomiting)      Shingles 01/23/2012    left T6-T7 dermatome     Past Surgical History:   Procedure Laterality Date     BIOPSY  2004    Breast     BREAST SURGERY  2004     C VAGINAL HYSTERECTOMY  12/2001    Ovaries intact, due to fibroids     COLONOSCOPY       ENDOSCOPY  05/09/2007    Upper GI     EYE SURGERY       GRAFT FAT TO BREAST Bilateral 11/29/2018    Procedure: Bilateral Breast Fat Grafting from Abdomen and Thighs;  Surgeon: DENNISE Amin MD;  [facility]: [facility] OR     GRAFT FREE VASCULARIZED TRANSVERSE RECTUS ABDOMINIS MYOCUTANEOUS Bilateral 10/8/2018    Procedure: GRAFT FREE VASCULARIZED TRANSVERSE RECTUS ABDOMINIS MYOCUTANEOUS;  Bilateral Deep Inferior Epigastric Artery  Free Flap Breast Reconstruction and Removal of Breast Explanders;  Surgeon: DENNISE Amin MD;  Location: [facility] OR     GYN SURGERY  2001     HC BIOPSY/EXCISION LYMPH NODE OPEN DEEP CERVICAL W EXC FAT PAD  1/7/2005    Right axillary sentinel node biopsy X 3.     HC COLONOSCOPY W BIOPSY  05/10/10     HC EXCISION BREAST LESION, OPEN >=1  1/7/2005    Right breast.     HC REMV CATARACT EXTRACAP,INSERT LENS  12/18/08    bilateral     HC  REMV TARSAL/METATARSAL BENIGN BONE LESN  1982    Bone spur - right     MASTECTOMY SIMPLE BILATERAL, SENTINEL NODE BILATERAL, COMBINED Bilateral 2018    Procedure: COMBINED MASTECTOMY SIMPLE BILATERAL, SENTINEL NODE BILATERAL;  Bilateral Mastectomy with Right [place] Node Biopsy, Bilateral Breast Reconstruction, Anesthesia Block;  Surgeon: Rakesh Sanchez MD;  Location:  OR     ORTHOPEDIC SURGERY  1983    Right foot     RECONSTRUCT BREAST Bilateral 2018    Procedure: RECONSTRUCT BREAST;;  Surgeon: DENNISE Amin MD;  Location:  OR     RECONSTRUCT BREAST BILATERAL Bilateral 10/8/2018    Procedure: RECONSTRUCT BREAST BILATERAL;;  Surgeon: DENNISE Amin MD;  Location:  OR     RECONSTRUCT NIPPLE BILATERAL Bilateral 2018    Procedure: Nipple Reconstruction;  Surgeon: DENNISE Amin MD;  Location:  OR       Social History:  Patient reports that she has never smoked. She has never used smokeless tobacco. She reports that she drinks alcohol. She reports that she does not use drugs. Patient is a retired teacher.     Family History:  Family History   Adopted: Yes   Problem Relation Age of Onset     Cancer Mother         lung cancer  at age 52     Thyroid Disease Sister         half sister, had thyroid removed     Unknown/Adopted Father      Unknown/Adopted Maternal Grandmother      Unknown/Adopted Maternal Grandfather      Unknown/Adopted Paternal Grandmother      Unknown/Adopted Paternal Grandfather      Glaucoma No family hx of      Diabetes No family hx of      Melanoma No family hx of      Skin Cancer No family hx of        Medications:  Current Outpatient Medications   Medication Sig Dispense Refill     anastrozole (ARIMIDEX) 1 MG tablet Take 1 tablet (1 mg) by mouth daily 90 tablet 3     diclofenac (VOLTAREN) 75 MG EC tablet Take 1 tablet (75 mg) by mouth 2 times daily 60 tablet 1     fluticasone (FLONASE) 50 MCG/ACT spray USE ONE OR TWO SPRAYS IN EACH NOSTRIL DAILY  16 mL 10     ibuprofen (ADVIL) 200 MG tablet Take 400 mg by mouth as needed for mild pain       loratadine (CLARITIN) 10 MG tablet TAKE 1 TABLET BY MOUTH EVERY DAY AS NEEDED FOR ALLERGY SYMPTOMS 90 tablet 2     melatonin 5 MG tablet        Multiple Vitamins-Minerals (PRESERVISION AREDS) CAPS Take 1 capsule by mouth 2 times daily 180 capsule 3     Omega-3 Fatty Acids (FISH OIL) 1000 MG CPDR Take  by mouth.       omeprazole (PRILOSEC) 20 MG CR capsule TAKE 1 CAPSULE (20 MG) BY MOUTH DAILY 90 capsule 3     sertraline (ZOLOFT) 25 MG tablet Take 1 tablet daily. (Patient taking differently: 25 mg every morning Take 1 tablet daily.) 30 tablet 11     tiZANidine (ZANAFLEX) 4 MG tablet TAKE 1-2 TABLETS (4-8 MG) BY MOUTH NIGHTLY AS NEEDED 30 tablet 1       Allergies   Allergen Reactions     Alphagan P Rash     Thrush and numbness in mouth     Diphenhydramine Other (See Comments)     Brimonidine Rash       Review of Systems:  -Constitutional: Patient is otherwise feeling well, in usual state of health.   -Skin: As above in HPI. No additional skin concerns.    Physical exam:  Vitals: LMP 10/14/2001   GEN: This is a well developed, well-nourished female in no acute distress, in a pleasant mood.    SKIN: Focused exam of the face was performed.    - 8 mm pink plaque violet the left upper cutaneous lip with central scar   - No other lesions of concern on areas examined.       Impression/Plan:  1. Verrucous keratosis, biopsy proven. Biopsy did not show base of lesion. Still not resolved. Failed cryo. Would consider doing another biopsy to rule out SCC as the base was not seen on second biopsy.I agree with Dr. Kaur. Patient wants the entire lesion removed however Therefore recommend derm surg consult. Consider 2mm punch and shave removal combination  Discussed punch biopsy vs consult with Dr. Trevino for another opinion   If cancerous, will schedule for removal. If not cancerous, consider another round of cryo or Efudex    Messaged  and scheduled with Dr. Trevino, pt would like the entire lesion removed   Photo taken      Follow-up with Dr. Trevino for consult of lesion on upper cutaneous lip, earlier for new or changing lesions.       Staff Involved:  Scribe/Staff    Scribe Disclosure  I, Isabel Della, am serving as a scribe to document services personally performed by Dr. Herlinda Bliss MD, based on data collection and the provider's statements to me.     Provider Disclosure:   The documentation recorded by the scribe accurately reflects the services I personally performed and the decisions made by me.    Herlinda Bliss MD    Department of Dermatology  [facility]: Phone: [phone], Fax:[phone]  [facility] [place]: Phone: [phone], Fax: [phone]                            Again, thank you for allowing me to participate in the care of your patient.        Sincerely,        Herlinda Bliss MD

## 2019-07-02 NOTE — NURSING NOTE
Enid Lombardo's goals for this visit include:   Chief Complaint   Patient presents with     RECHECK     spot on upper lip not going away        She requests these members of her care team be copied on today's visit information: yes    PCP: Shi Alonso    Referring Provider:  No referring provider defined for this encounter.    LMP 10/14/2001     Do you need any medication refills at today's visit? No     Amorrbrandon Reed CMA

## 2019-07-02 NOTE — PROGRESS NOTES
[facility] Dermatology Note      Dermatology Problem List:  1. Left upper lip lesion  - s/p biopsy 2/5/2018 by PCP, read by surg path as parakeratosis and hyperorthokeratosis with focal ulceration with reactive squamous atypia. Treated with LN2 but failed to resolve.  - s/p rebiopsied left upper lip 1/16/2019 results consistent with a traumatized verrucous keratosis   - s/p cryotherapy 2/25/2019 and failed  - scheduled with Dr. Trevino for another opinion  2. Relevant medical history: breast cancer    Encounter Date: Jul 2, 2019    CC:  Chief Complaint   Patient presents with     RECHECK     spot on upper lip not going away          History of Present Illness:  Ms. Enid Lombardo is a 59 year old female who presents for a recheck of the lesion on the left upper lip. The lesion has been biopsied twice, second biopsy proven consistent with traumatized verrucous keratosis, but the base was not seen. She was last seen on 2/25/19 by Dr. Gibbs when the lesion was treated with cryo. Today the patient reports that the spot has still not gone away. It has been the way it is now for a few weeks. Gets red bumps that crust over and flake off, recurring. Only applies Vaseline and Aquaphor, sometimes makeup. Thinks the cryo helped.      Past Medical History:   Patient Active Problem List   Diagnosis     History of right breast cancer     Esophageal reflux     Disorder of synovium, tendon, and bursa     Cataract     Shingles     Pain, radicular, lumbar     Right hip pain     Low back pain     IT band syndrome     Macular drusen     Cervicalgia     Headache     Keratoconus     Meibomian gland dysfunction - Both Eyes     Tear film insufficiency     Hyperlipidemia with target LDL less than 130     Benign neoplasm of colon     Skin abscess     Osteopenia of both hands     Recurrent carcinoma in situ of breast, right     Breast cancer in situ     S/P breast reconstruction, bilateral     S/P flap graft      Osteopenia of multiple sites     Long term (current) use of aromatase inhibitors     Past Medical History:   Diagnosis Date     Breast cancer (H) 2004    lumpectomy, radiation, tamoxifen     Cancer (H) 2004    Breast     Cataracts, bilateral      Complication of anesthesia     She prefers not to have versed until right before she leaves or can have after      Enthesopathy of hip region 6/06    right hip     Esophageal reflux 2/06     History of gestational diabetes      Hypertension 2012     Macular drusen      PONV (postoperative nausea and vomiting)      Shingles 01/23/2012    left T6-T7 dermatome     Past Surgical History:   Procedure Laterality Date     BIOPSY  2004    Breast     BREAST SURGERY  2004     C VAGINAL HYSTERECTOMY  12/2001    Ovaries intact, due to fibroids     COLONOSCOPY       ENDOSCOPY  05/09/2007    Upper GI     EYE SURGERY       GRAFT FAT TO BREAST Bilateral 11/29/2018    Procedure: Bilateral Breast Fat Grafting from Abdomen and Thighs;  Surgeon: DENNISE Amin MD;  Location: [facility] OR     GRAFT FREE VASCULARIZED TRANSVERSE RECTUS ABDOMINIS MYOCUTANEOUS Bilateral 10/8/2018    Procedure: GRAFT FREE VASCULARIZED TRANSVERSE RECTUS ABDOMINIS MYOCUTANEOUS;  Bilateral Deep Inferior Epigastric Artery  Free Flap Breast Reconstruction and Removal of Breast Explanders;  Surgeon: DENNISE Amin MD;  Location: [facility] OR     GYN SURGERY  2001     HC BIOPSY/EXCISION LYMPH NODE OPEN DEEP CERVICAL W EXC FAT PAD  1/7/2005    Right axillary sentinel node biopsy X 3.     HC COLONOSCOPY W BIOPSY  05/10/10     HC EXCISION BREAST LESION, OPEN >=1  1/7/2005    Right breast.     HC REMV CATARACT EXTRACAP,INSERT LENS  12/18/08    bilateral     HC REMV TARSAL/METATARSAL BENIGN BONE LESN  1982    Bone spur - right     MASTECTOMY SIMPLE BILATERAL, SENTINEL NODE BILATERAL, COMBINED Bilateral 8/22/2018    Procedure: COMBINED MASTECTOMY SIMPLE BILATERAL, SENTINEL NODE BILATERAL;  Bilateral Mastectomy with  Right [place] Node Biopsy, Bilateral Breast Reconstruction, Anesthesia Block;  Surgeon: Rakesh Sanchez MD;  Location:  OR     ORTHOPEDIC SURGERY  1983    Right foot     RECONSTRUCT BREAST Bilateral 2018    Procedure: RECONSTRUCT BREAST;;  Surgeon: DENNISE Amin MD;  Location: [facility] OR     RECONSTRUCT BREAST BILATERAL Bilateral 10/8/2018    Procedure: RECONSTRUCT BREAST BILATERAL;;  Surgeon: DENNISE Amin MD;  Location: [facility] OR     RECONSTRUCT NIPPLE BILATERAL Bilateral 2018    Procedure: Nipple Reconstruction;  Surgeon: DENNISE Amin MD;  Location:  OR       Social History:  Patient reports that she has never smoked. She has never used smokeless tobacco. She reports that she drinks alcohol. She reports that she does not use drugs. Patient is a retired teacher.     Family History:  Family History   Adopted: Yes   Problem Relation Age of Onset     Cancer Mother         lung cancer  at age 52     Thyroid Disease Sister         half sister, had thyroid removed     Unknown/Adopted Father      Unknown/Adopted Maternal Grandmother      Unknown/Adopted Maternal Grandfather      Unknown/Adopted Paternal Grandmother      Unknown/Adopted Paternal Grandfather      Glaucoma No family hx of      Diabetes No family hx of      Melanoma No family hx of      Skin Cancer No family hx of        Medications:  Current Outpatient Medications   Medication Sig Dispense Refill     anastrozole (ARIMIDEX) 1 MG tablet Take 1 tablet (1 mg) by mouth daily 90 tablet 3     diclofenac (VOLTAREN) 75 MG EC tablet Take 1 tablet (75 mg) by mouth 2 times daily 60 tablet 1     fluticasone (FLONASE) 50 MCG/ACT spray USE ONE OR TWO SPRAYS IN EACH NOSTRIL DAILY 16 mL 10     ibuprofen (ADVIL) 200 MG tablet Take 400 mg by mouth as needed for mild pain       loratadine (CLARITIN) 10 MG tablet TAKE 1 TABLET BY MOUTH EVERY DAY AS NEEDED FOR ALLERGY SYMPTOMS 90 tablet 2     melatonin 5 MG tablet        Multiple  Vitamins-Minerals (PRESERVISION AREDS) CAPS Take 1 capsule by mouth 2 times daily 180 capsule 3     Omega-3 Fatty Acids (FISH OIL) 1000 MG CPDR Take  by mouth.       omeprazole (PRILOSEC) 20 MG CR capsule TAKE 1 CAPSULE (20 MG) BY MOUTH DAILY 90 capsule 3     sertraline (ZOLOFT) 25 MG tablet Take 1 tablet daily. (Patient taking differently: 25 mg every morning Take 1 tablet daily.) 30 tablet 11     tiZANidine (ZANAFLEX) 4 MG tablet TAKE 1-2 TABLETS (4-8 MG) BY MOUTH NIGHTLY AS NEEDED 30 tablet 1       Allergies   Allergen Reactions     Alphagan P Rash     Thrush and numbness in mouth     Diphenhydramine Other (See Comments)     Brimonidine Rash       Review of Systems:  -Constitutional: Patient is otherwise feeling well, in usual state of health.   -Skin: As above in HPI. No additional skin concerns.    Physical exam:  Vitals: LMP 10/14/2001   GEN: This is a well developed, well-nourished female in no acute distress, in a pleasant mood.    SKIN: Focused exam of the face was performed.    - 8 mm pink plaque violet the left upper cutaneous lip with central scar   - No other lesions of concern on areas examined.       Impression/Plan:  1. Verrucous keratosis, biopsy proven. Biopsy did not show base of lesion. Still not resolved. Failed cryo. Would consider doing another biopsy to rule out SCC as the base was not seen on second biopsy.I agree with Dr. Kaur. Patient wants the entire lesion removed however Therefore recommend derm surg consult. Consider 2mm punch and shave removal combination  Discussed punch biopsy vs consult with Dr. Trevino for another opinion   If cancerous, will schedule for removal. If not cancerous, consider another round of cryo or Efudex    Messaged and scheduled with Dr. Trevino, pt would like the entire lesion removed   Photo taken      Follow-up with Dr. Trevino for consult of lesion on upper cutaneous lip, earlier for new or changing lesions.       Staff Involved:  Scribe/Staff    Scribe  Disclosure  I, Isabel Della, am serving as a scribe to document services personally performed by Dr. Herlinda Bliss MD, based on data collection and the provider's statements to me.     Provider Disclosure:   The documentation recorded by the scribe accurately reflects the services I personally performed and the decisions made by me.    Herlinda Bliss MD    Department of Dermatology  [facility]: Phone: [phone], Fax:[phone]  [facility]: Phone: [phone], Fax: [phone]

## 2019-07-09 ENCOUNTER — HOSPITAL ENCOUNTER (OUTPATIENT)
Dept: PHYSICAL THERAPY | Facility: OTHER | Age: 59
Setting detail: THERAPIES SERIES
End: 2019-07-09
Attending: PREVENTIVE MEDICINE
Payer: COMMERCIAL

## 2019-07-09 DIAGNOSIS — J31.0 CHRONIC RHINITIS: ICD-10-CM

## 2019-07-09 PROCEDURE — 97140 MANUAL THERAPY 1/> REGIONS: CPT | Mod: GP | Performed by: PHYSICAL THERAPIST

## 2019-07-09 PROCEDURE — 97110 THERAPEUTIC EXERCISES: CPT | Mod: GP | Performed by: PHYSICAL THERAPIST

## 2019-07-09 RX ORDER — FLUTICASONE PROPIONATE 50 MCG
SPRAY, SUSPENSION (ML) NASAL
Qty: 48 ML | Refills: 3 | Status: SHIPPED | OUTPATIENT
Start: 2019-07-09 | End: 2020-06-10

## 2019-07-09 NOTE — TELEPHONE ENCOUNTER
Flonase  Prescription approved per [facility] Refill Protocol.    Vy Moore, RN, BSN

## 2019-07-10 ENCOUNTER — OFFICE VISIT (OUTPATIENT)
Dept: DERMATOLOGY | Facility: CLINIC | Age: 59
End: 2019-07-10
Payer: COMMERCIAL

## 2019-07-10 DIAGNOSIS — D48.5 NEOPLASM OF UNCERTAIN BEHAVIOR OF SKIN: Primary | ICD-10-CM

## 2019-07-10 PROCEDURE — 11104 PUNCH BX SKIN SINGLE LESION: CPT | Performed by: DERMATOLOGY

## 2019-07-10 PROCEDURE — 88305 TISSUE EXAM BY PATHOLOGIST: CPT | Mod: TC | Performed by: DERMATOLOGY

## 2019-07-10 ASSESSMENT — PAIN SCALES - GENERAL: PAINLEVEL: NO PAIN (0)

## 2019-07-10 NOTE — NURSING NOTE
Enid Lombardo's goals for this visit include:   Chief Complaint   Patient presents with     Consult For     spot on lip RP Dr. Bliss       She requests these members of her care team be copied on today's visit information:     PCP: Shi Alonso    Referring Provider:  No referring provider defined for this encounter.    Ana Dickson LPN

## 2019-07-10 NOTE — PATIENT INSTRUCTIONS

## 2019-07-10 NOTE — LETTER
7/10/2019         RE: Enid Lombardo  [address] [facility] [address]        Dear Colleague,    Thank you for referring your patient, Enid Lombardo, to the [facility]. Please see a copy of my visit note below.    [facility] Dermatology Note      Dermatology Problem List:  1.  NUB, left upper lip   - s/p biopsy 2/5/18 by PCP, read by surg path as parakeratosis and hyperorthokeratosis with focal ulceration with reactive squamous atypia. Treated with LN2 but failed to resolve.  - s/p rebiopsied 1/16/19, results c/w traumatized verrucous keratosis   - s/p cryotherapy 2/25/19 and failed  - s/p punch biopsy 7/10/19   2. Relevant medical history: breast cancer    Encounter Date: Jul 10, 2019    CC:  Chief Complaint   Patient presents with     Consult For     spot on lip RP Dr. Bliss         History of Present Illness:  Ms. Enid Lombardo is a 59 year old female who presents today for a focused examination. The patient was last seen by Dr. Bliss on 7/2/19 when treatment options were discussed for a recurrent lesion on the left upper lip. This lesion has been biopsied twice before, the latest shave was consistent with verrucous keratosis, however the base was not seen. After several failed attempts at treating with cryotherapy, the patient would like to discuss other options. The patient is otherwise feeling well. There are no other skin concerns at this time.      Past Medical History:   Patient Active Problem List   Diagnosis     History of right breast cancer     Esophageal reflux     Disorder of synovium, tendon, and bursa     Cataract     Shingles     Pain, radicular, lumbar     Right hip pain     Low back pain     IT band syndrome     Macular drusen     Cervicalgia     Headache     Keratoconus     Meibomian gland dysfunction - Both Eyes     Tear film insufficiency     Hyperlipidemia with target LDL less than 130     Benign neoplasm of colon     Skin abscess      Osteopenia of both hands     Recurrent carcinoma in situ of breast, right     Breast cancer in situ     S/P breast reconstruction, bilateral     S/P flap graft     Osteopenia of multiple sites     Long term (current) use of aromatase inhibitors     Past Medical History:   Diagnosis Date     Breast cancer (H) 2004    lumpectomy, radiation, tamoxifen     Cancer (H) 2004    Breast     Cataracts, bilateral      Complication of anesthesia     She prefers not to have versed until right before she leaves or can have after      Enthesopathy of hip region 6/06    right hip     Esophageal reflux 2/06     History of gestational diabetes      Hypertension 2012     Macular drusen      PONV (postoperative nausea and vomiting)      Shingles 01/23/2012    left T6-T7 dermatome     Past Surgical History:   Procedure Laterality Date     BIOPSY  2004    Breast     BREAST SURGERY  2004     C VAGINAL HYSTERECTOMY  12/2001    Ovaries intact, due to fibroids     COLONOSCOPY       ENDOSCOPY  05/09/2007    Upper GI     EYE SURGERY       GRAFT FAT TO BREAST Bilateral 11/29/2018    Procedure: Bilateral Breast Fat Grafting from Abdomen and Thighs;  Surgeon: DENNISE Amin MD;  Location: [facility] OR     GRAFT FREE VASCULARIZED TRANSVERSE RECTUS ABDOMINIS MYOCUTANEOUS Bilateral 10/8/2018    Procedure: GRAFT FREE VASCULARIZED TRANSVERSE RECTUS ABDOMINIS MYOCUTANEOUS;  Bilateral Deep Inferior Epigastric Artery  Free Flap Breast Reconstruction and Removal of Breast Explanders;  Surgeon: DENNISE Amin MD;  Location: [facility] OR     GYN SURGERY  2001     HC BIOPSY/EXCISION LYMPH NODE OPEN DEEP CERVICAL W EXC FAT PAD  1/7/2005    Right axillary sentinel node biopsy X 3.     HC COLONOSCOPY W BIOPSY  05/10/10     HC EXCISION BREAST LESION, OPEN >=1  1/7/2005    Right breast.     HC REMV CATARACT EXTRACAP,INSERT LENS  12/18/08    bilateral     HC REMV TARSAL/METATARSAL BENIGN BONE LESN  1982    Bone spur - right     MASTECTOMY SIMPLE  BILATERAL, SENTINEL NODE BILATERAL, COMBINED Bilateral 2018    Procedure: COMBINED MASTECTOMY SIMPLE BILATERAL, SENTINEL NODE BILATERAL;  Bilateral Mastectomy with Right [place] Node Biopsy, Bilateral Breast Reconstruction, Anesthesia Block;  Surgeon: Rakesh Sanchez MD;  Location:  OR     ORTHOPEDIC SURGERY  1983    Right foot     RECONSTRUCT BREAST Bilateral 2018    Procedure: RECONSTRUCT BREAST;;  Surgeon: DENNISE Amin MD;  Location:  OR     RECONSTRUCT BREAST BILATERAL Bilateral 10/8/2018    Procedure: RECONSTRUCT BREAST BILATERAL;;  Surgeon: DENNISE Amin MD;  Location:  OR     RECONSTRUCT NIPPLE BILATERAL Bilateral 2018    Procedure: Nipple Reconstruction;  Surgeon: DENNISE Amin MD;  Location:  OR       Social History:  Patient reports that she has never smoked. She has never used smokeless tobacco. She reports that she drinks alcohol. She reports that she does not use drugs.    Family History:  Family History   Adopted: Yes   Problem Relation Age of Onset     Cancer Mother         lung cancer  at age 52     Thyroid Disease Sister         half sister, had thyroid removed     Unknown/Adopted Father      Unknown/Adopted Maternal Grandmother      Unknown/Adopted Maternal Grandfather      Unknown/Adopted Paternal Grandmother      Unknown/Adopted Paternal Grandfather      Glaucoma No family hx of      Diabetes No family hx of      Melanoma No family hx of      Skin Cancer No family hx of        Medications:  Current Outpatient Medications   Medication Sig Dispense Refill     anastrozole (ARIMIDEX) 1 MG tablet Take 1 tablet (1 mg) by mouth daily 90 tablet 3     diclofenac (VOLTAREN) 75 MG EC tablet Take 1 tablet (75 mg) by mouth 2 times daily 60 tablet 1     fluticasone (FLONASE) 50 MCG/ACT nasal spray USE ONE OR TWO SPRAYS IN EACH NOSTRIL DAILY 48 mL 3     ibuprofen (ADVIL) 200 MG tablet Take 400 mg by mouth as needed for mild pain       loratadine  (CLARITIN) 10 MG tablet TAKE 1 TABLET BY MOUTH EVERY DAY AS NEEDED FOR ALLERGY SYMPTOMS 90 tablet 2     melatonin 5 MG tablet        Multiple Vitamins-Minerals (PRESERVISION AREDS) CAPS Take 1 capsule by mouth 2 times daily 180 capsule 3     Omega-3 Fatty Acids (FISH OIL) 1000 MG CPDR Take  by mouth.       omeprazole (PRILOSEC) 20 MG CR capsule TAKE 1 CAPSULE (20 MG) BY MOUTH DAILY 90 capsule 3     sertraline (ZOLOFT) 25 MG tablet Take 1 tablet daily. (Patient taking differently: 25 mg every morning Take 1 tablet daily.) 30 tablet 11     tiZANidine (ZANAFLEX) 4 MG tablet TAKE 1-2 TABLETS (4-8 MG) BY MOUTH NIGHTLY AS NEEDED 30 tablet 1       Allergies   Allergen Reactions     Alphagan P Rash     Thrush and numbness in mouth     Diphenhydramine Other (See Comments)     Brimonidine Rash       Review of Systems:  -Skin Establ Pt: The patient denies any new rash, pruritus, or lesions that are symptomatic, changing or bleeding, except as per HPI.  -Constitutional: The patient is feeling generally well.    Physical exam:  Vitals: LMP 10/14/2001   GEN: This is a well developed, well-nourished female in no acute distress, in a pleasant mood.   SKIN: Focused examination of the lesion of concern was performed.  - 3 mm poorly defined pink macule with superficial gritty scale on the left upper lip.   - No other lesions of concern on areas examined.       Impression/Plan:  1. Neoplasm of uncertain behavior - left upper lip. DDx includes SCCIS vs AK vs verrucous keratosis.     Lesion appears largely scarred from prior biopsies, but there is a focal area of gritty scale.     Plan for punch excision of scaly focus and additional treatment as indicated.     No additional treatment if biopsy confirms verrucous keratosis unless inflamed.     After discussion of benefits and risks including but not limited to bleeding, infection, scar, incomplete removal, recurrence, and non-diagnostic pathology, written consent and photographs were  obtained. The area was cleaned with isopropyl alcohol. 0.75 mL of 1% lidocaine with epinephrine was injected to obtain adequate anesthesia of the lesion on the left upper lip. A 3 mm punch was performed.  4-0 Nylon sutures were utilized to approximate the epidermal edges.  White petroleum jelly/VaselineTM and a bandage was applied to the wound.  Explicit verbal and written wound care instructions were provided.  The patient left the Dermatology Clinic in good condition.    Follow-up pending biopsy results.        Staff Involved:    Scribe Disclosure  I, Gabriel Lloyd, am serving as a scribe to document services personally performed by Dr. Rome Trevino DO, based on data collection and the provider's statements to me.     Provider Disclosure:   The documentation recorded by the scribe accurately reflects the services I personally performed and the decisions made by me.  I personally performed the procedures today.    Rome Trevino DO    Department of Dermatology  [facility]: Phone: [phone], Fax:[phone]  [facility]: Phone: [phone], Fax: [phone]        Again, thank you for allowing me to participate in the care of your patient.        Sincerely,        Rome Trevino MD

## 2019-07-10 NOTE — PROGRESS NOTES
[facility] Dermatology Note      Dermatology Problem List:  1. NUB, left upper lip   - s/p biopsy 2/5/18 by PCP, read by surg path as parakeratosis and hyperorthokeratosis with focal ulceration with reactive squamous atypia. Treated with LN2 but failed to resolve.  - s/p rebiopsied 1/16/19, results c/w traumatized verrucous keratosis   - s/p cryotherapy 2/25/19 and failed  - s/p punch biopsy 7/10/19   2. Relevant medical history: breast cancer    Encounter Date: Jul 10, 2019    CC:  Chief Complaint   Patient presents with     Consult For     spot on lip RP Dr. Bliss         History of Present Illness:  Ms. Enid Lombardo is a 59 year old female who presents today for a focused examination. The patient was last seen by Dr. Bliss on 7/2/19 when treatment options were discussed for a recurrent lesion on the left upper lip. This lesion has been biopsied twice before, the latest shave was consistent with verrucous keratosis, however the base was not seen. After several failed attempts at treating with cryotherapy, the patient would like to discuss other options. The patient is otherwise feeling well. There are no other skin concerns at this time.      Past Medical History:   Patient Active Problem List   Diagnosis     History of right breast cancer     Esophageal reflux     Disorder of synovium, tendon, and bursa     Cataract     Shingles     Pain, radicular, lumbar     Right hip pain     Low back pain     IT band syndrome     Macular drusen     Cervicalgia     Headache     Keratoconus     Meibomian gland dysfunction - Both Eyes     Tear film insufficiency     Hyperlipidemia with target LDL less than 130     Benign neoplasm of colon     Skin abscess     Osteopenia of both hands     Recurrent carcinoma in situ of breast, right     Breast cancer in situ     S/P breast reconstruction, bilateral     S/P flap graft     Osteopenia of multiple sites     Long term (current) use of aromatase inhibitors      Past Medical History:   Diagnosis Date     Breast cancer (H) 2004    lumpectomy, radiation, tamoxifen     Cancer (H) 2004    Breast     Cataracts, bilateral      Complication of anesthesia     She prefers not to have versed until right before she leaves or can have after      Enthesopathy of hip region 6/06    right hip     Esophageal reflux 2/06     History of gestational diabetes      Hypertension 2012     Macular drusen      PONV (postoperative nausea and vomiting)      Shingles 01/23/2012    left T6-T7 dermatome     Past Surgical History:   Procedure Laterality Date     BIOPSY  2004    Breast     BREAST SURGERY  2004     C VAGINAL HYSTERECTOMY  12/2001    Ovaries intact, due to fibroids     COLONOSCOPY       ENDOSCOPY  05/09/2007    Upper GI     EYE SURGERY       GRAFT FAT TO BREAST Bilateral 11/29/2018    Procedure: Bilateral Breast Fat Grafting from Abdomen and Thighs;  Surgeon: DENNISE Amin MD;  Location: [facility] OR     GRAFT FREE VASCULARIZED TRANSVERSE RECTUS ABDOMINIS MYOCUTANEOUS Bilateral 10/8/2018    Procedure: GRAFT FREE VASCULARIZED TRANSVERSE RECTUS ABDOMINIS MYOCUTANEOUS;  Bilateral Deep Inferior Epigastric Artery  Free Flap Breast Reconstruction and Removal of Breast Explanders;  Surgeon: DENNISE Amin MD;  Location: [facility] OR     GYN SURGERY  2001     HC BIOPSY/EXCISION LYMPH NODE OPEN DEEP CERVICAL W EXC FAT PAD  1/7/2005    Right axillary sentinel node biopsy X 3.     HC COLONOSCOPY W BIOPSY  05/10/10     HC EXCISION BREAST LESION, OPEN >=1  1/7/2005    Right breast.     HC REMV CATARACT EXTRACAP,INSERT LENS  12/18/08    bilateral     HC REMV TARSAL/METATARSAL BENIGN BONE LESN  1982    Bone spur - right     MASTECTOMY SIMPLE BILATERAL, SENTINEL NODE BILATERAL, COMBINED Bilateral 8/22/2018    Procedure: COMBINED MASTECTOMY SIMPLE BILATERAL, SENTINEL NODE BILATERAL;  Bilateral Mastectomy with Right Hendricks Node Biopsy, Bilateral Breast Reconstruction, Anesthesia Block;   Surgeon: Rakesh Sanchez MD;  Location:  OR     ORTHOPEDIC SURGERY  1983    Right foot     RECONSTRUCT BREAST Bilateral 2018    Procedure: RECONSTRUCT BREAST;;  Surgeon: DENNISE Amin MD;  Location:  OR     RECONSTRUCT BREAST BILATERAL Bilateral 10/8/2018    Procedure: RECONSTRUCT BREAST BILATERAL;;  Surgeon: DENNISE Amin MD;  Location:  OR     RECONSTRUCT NIPPLE BILATERAL Bilateral 2018    Procedure: Nipple Reconstruction;  Surgeon: DENNISE Amin MD;  Location:  OR       Social History:  Patient reports that she has never smoked. She has never used smokeless tobacco. She reports that she drinks alcohol. She reports that she does not use drugs.    Family History:  Family History   Adopted: Yes   Problem Relation Age of Onset     Cancer Mother         lung cancer  at age 52     Thyroid Disease Sister         half sister, had thyroid removed     Unknown/Adopted Father      Unknown/Adopted Maternal Grandmother      Unknown/Adopted Maternal Grandfather      Unknown/Adopted Paternal Grandmother      Unknown/Adopted Paternal Grandfather      Glaucoma No family hx of      Diabetes No family hx of      Melanoma No family hx of      Skin Cancer No family hx of        Medications:  Current Outpatient Medications   Medication Sig Dispense Refill     anastrozole (ARIMIDEX) 1 MG tablet Take 1 tablet (1 mg) by mouth daily 90 tablet 3     diclofenac (VOLTAREN) 75 MG EC tablet Take 1 tablet (75 mg) by mouth 2 times daily 60 tablet 1     fluticasone (FLONASE) 50 MCG/ACT nasal spray USE ONE OR TWO SPRAYS IN EACH NOSTRIL DAILY 48 mL 3     ibuprofen (ADVIL) 200 MG tablet Take 400 mg by mouth as needed for mild pain       loratadine (CLARITIN) 10 MG tablet TAKE 1 TABLET BY MOUTH EVERY DAY AS NEEDED FOR ALLERGY SYMPTOMS 90 tablet 2     melatonin 5 MG tablet        Multiple Vitamins-Minerals (PRESERVISION AREDS) CAPS Take 1 capsule by mouth 2 times daily 180 capsule 3     Omega-3 Fatty  Acids (FISH OIL) 1000 MG CPDR Take  by mouth.       omeprazole (PRILOSEC) 20 MG CR capsule TAKE 1 CAPSULE (20 MG) BY MOUTH DAILY 90 capsule 3     sertraline (ZOLOFT) 25 MG tablet Take 1 tablet daily. (Patient taking differently: 25 mg every morning Take 1 tablet daily.) 30 tablet 11     tiZANidine (ZANAFLEX) 4 MG tablet TAKE 1-2 TABLETS (4-8 MG) BY MOUTH NIGHTLY AS NEEDED 30 tablet 1       Allergies   Allergen Reactions     Alphagan P Rash     Thrush and numbness in mouth     Diphenhydramine Other (See Comments)     Brimonidine Rash       Review of Systems:  -Skin Establ Pt: The patient denies any new rash, pruritus, or lesions that are symptomatic, changing or bleeding, except as per HPI.  -Constitutional: The patient is feeling generally well.    Physical exam:  Vitals: LMP 10/14/2001   GEN: This is a well developed, well-nourished female in no acute distress, in a pleasant mood.   SKIN: Focused examination of the lesion of concern was performed.  - 3 mm poorly defined pink macule with superficial gritty scale on the left upper lip.   - No other lesions of concern on areas examined.       Impression/Plan:  1. Neoplasm of uncertain behavior - left upper lip. DDx includes SCCIS vs AK vs verrucous keratosis.     Lesion appears largely scarred from prior biopsies, but there is a focal area of gritty scale.     Plan for punch excision of scaly focus and additional treatment as indicated.     No additional treatment if biopsy confirms verrucous keratosis unless inflamed.     After discussion of benefits and risks including but not limited to bleeding, infection, scar, incomplete removal, recurrence, and non-diagnostic pathology, written consent and photographs were obtained. The area was cleaned with isopropyl alcohol. 0.75 mL of 1% lidocaine with epinephrine was injected to obtain adequate anesthesia of the lesion on the left upper lip. A 3 mm punch was performed.  4-0 Nylon sutures were utilized to approximate the  epidermal edges.  White petroleum jelly/VaselineTM and a bandage was applied to the wound.  Explicit verbal and written wound care instructions were provided.  The patient left the Dermatology Clinic in good condition.    Follow-up pending biopsy results.        Staff Involved:    Scribe Disclosure  I, Gabriel Lloyd, am serving as a scribe to document services personally performed by Dr. Rome Trevino DO, based on data collection and the provider's statements to me.     Provider Disclosure:   The documentation recorded by the scribe accurately reflects the services I personally performed and the decisions made by me.  I personally performed the procedures today.    Rome Trevino DO    Department of Dermatology  [facility]: Phone: [phone], Fax:[phone]  [facility]: Phone: [phone], Fax: [phone]

## 2019-07-12 DIAGNOSIS — M54.12 CERVICAL RADICULAR PAIN: ICD-10-CM

## 2019-07-12 DIAGNOSIS — M75.81 TENDINITIS OF RIGHT ROTATOR CUFF: ICD-10-CM

## 2019-07-15 ENCOUNTER — APPOINTMENT (OUTPATIENT)
Dept: LAB | Facility: CLINIC | Age: 59
End: 2019-07-15
Attending: INTERNAL MEDICINE
Payer: COMMERCIAL

## 2019-07-15 ENCOUNTER — ONCOLOGY VISIT (OUTPATIENT)
Dept: ONCOLOGY | Facility: CLINIC | Age: 59
End: 2019-07-15
Attending: INTERNAL MEDICINE
Payer: COMMERCIAL

## 2019-07-15 VITALS
SYSTOLIC BLOOD PRESSURE: 130 MMHG | DIASTOLIC BLOOD PRESSURE: 87 MMHG | OXYGEN SATURATION: 97 % | RESPIRATION RATE: 16 BRPM | HEART RATE: 77 BPM | TEMPERATURE: 98.6 F | WEIGHT: 181.9 LBS | BODY MASS INDEX: 29.23 KG/M2 | HEIGHT: 66 IN

## 2019-07-15 DIAGNOSIS — M85.89 OSTEOPENIA OF MULTIPLE SITES: Primary | ICD-10-CM

## 2019-07-15 DIAGNOSIS — Z79.811 LONG TERM (CURRENT) USE OF AROMATASE INHIBITORS: ICD-10-CM

## 2019-07-15 DIAGNOSIS — Z85.3 HISTORY OF RIGHT BREAST CANCER: ICD-10-CM

## 2019-07-15 DIAGNOSIS — M85.89 OSTEOPENIA OF MULTIPLE SITES: ICD-10-CM

## 2019-07-15 LAB
ALBUMIN SERPL-MCNC: 3.5 G/DL (ref 3.4–5)
CALCIUM SERPL-MCNC: 9.1 MG/DL (ref 8.5–10.1)
COPATH REPORT: NORMAL
CREAT SERPL-MCNC: 0.7 MG/DL (ref 0.52–1.04)
GFR SERPL CREATININE-BSD FRML MDRD: >90 ML/MIN/{1.73_M2}

## 2019-07-15 PROCEDURE — 96374 THER/PROPH/DIAG INJ IV PUSH: CPT

## 2019-07-15 PROCEDURE — 82040 ASSAY OF SERUM ALBUMIN: CPT | Performed by: INTERNAL MEDICINE

## 2019-07-15 PROCEDURE — 36415 COLL VENOUS BLD VENIPUNCTURE: CPT

## 2019-07-15 PROCEDURE — G0463 HOSPITAL OUTPT CLINIC VISIT: HCPCS | Mod: ZF

## 2019-07-15 PROCEDURE — 82565 ASSAY OF CREATININE: CPT | Performed by: INTERNAL MEDICINE

## 2019-07-15 PROCEDURE — 99214 OFFICE O/P EST MOD 30 MIN: CPT | Mod: ZP | Performed by: INTERNAL MEDICINE

## 2019-07-15 PROCEDURE — 82310 ASSAY OF CALCIUM: CPT | Performed by: INTERNAL MEDICINE

## 2019-07-15 PROCEDURE — 25000128 H RX IP 250 OP 636: Mod: ZF | Performed by: INTERNAL MEDICINE

## 2019-07-15 RX ORDER — ZOLEDRONIC ACID 0.04 MG/ML
4 INJECTION, SOLUTION INTRAVENOUS ONCE
Status: COMPLETED | OUTPATIENT
Start: 2019-07-15 | End: 2019-07-15

## 2019-07-15 RX ORDER — DICLOFENAC SODIUM 75 MG/1
75 TABLET, DELAYED RELEASE ORAL 2 TIMES DAILY
Qty: 60 TABLET | Refills: 1 | Status: SHIPPED | OUTPATIENT
Start: 2019-07-15 | End: 2019-09-09

## 2019-07-15 RX ORDER — ZOLEDRONIC ACID 0.04 MG/ML
4 INJECTION, SOLUTION INTRAVENOUS ONCE
Status: CANCELLED | OUTPATIENT
Start: 2019-07-15

## 2019-07-15 RX ADMIN — SODIUM CHLORIDE 250 ML: 9 INJECTION, SOLUTION INTRAVENOUS at 12:08

## 2019-07-15 RX ADMIN — ZOLEDRONIC ACID 4 MG: 0.04 INJECTION, SOLUTION INTRAVENOUS at 12:11

## 2019-07-15 ASSESSMENT — MIFFLIN-ST. JEOR: SCORE: 1419.09

## 2019-07-15 ASSESSMENT — PAIN SCALES - GENERAL: PAINLEVEL: NO PAIN (0)

## 2019-07-15 NOTE — PROGRESS NOTES
Infusion Nursing Note:  Enid Lombardo presents today for Cycle 1 Day 1 Zometa.    Patient seen by provider today: Yes: Dr. Brar    Note: Patient oriented to infusion area including bathrooms, nutrition station, call bell. Written handout given for Zometa and reviewed with patient.     Patient states that she had a routine dental cleaning last week. Educated to continue getting Q6 months dental cleanings and to let her dentist and oncology team know if she needs dental work below the gum line. Patient educated to take calcium/vitamin D supplements while on Zometa: 1 tablet PO BID.  Each tablet should have 500-600mg elemental calcium and 400 units of vitamin D. Patient verbalized understanding of above information. No other concerns at this time.     Intravenous Access:  Peripheral IV placed.    Treatment Conditions:       Lab Results   Component Value Date    CR 0.70 07/15/2019                   Lab Results   Component Value Date    ROSENDO 9.1 07/15/2019                  Lab Results   Component Value Date    ALBUMIN 3.5 07/15/2019     Corrected Calcium: 9.5mg/dL  Results reviewed, labs MET treatment parameters, ok to proceed with treatment.      Post Infusion Assessment:  Patient tolerated infusion without incident.  Blood return noted pre and post infusion.  Access discontinued per protocol.       Discharge Plan:   Patient declined prescription refills.  Discharge instructions reviewed with: Patient.  Patient verbalized understanding of discharge instructions and all questions answered.  Copy of AVS reviewed with patient. Patient will return 10/15/19 for next RTC appointment and  1/20/20 for RTC and infusion appointment.  Patient discharged in stable condition accompanied by: self.  Face to Face time: 2.    WAGNER SANCHES RN

## 2019-07-15 NOTE — NURSING NOTE
Chief Complaint   Patient presents with     Blood Draw     Labs drawn via VPT by RN in lab. VS taken. Pt checked in for next appt     Labs collected from venipuncture by RN. Vitals taken. Checked in for appointment(s).    Arelis LEMUS RN PHN BSN  BMT/Oncology Lab

## 2019-07-15 NOTE — LETTER
7/15/2019       RE: Enid Lombardo  [address] [facility] [address]     Dear Colleague,    Thank you for referring your patient, Enid Lombardo, to the [facility]. Please see a copy of my visit note below.    Oncology Follow-up Visit:  July 15, 2019    Diagnosis: recurrent breast cancer, stage 1    History Of Present Illness:  Ms. Lombardo is a 59 year old female is here for follow-up of recurrent breast cancer.    She was originally diagnosed in December 2004 with a stage I infiltrating ductal carcinoma of the right breast.  This was found on abnormal screening mammogram.  Biopsy was obtained which showed a grade 1 infiltrating ductal carcinoma, ER positive, ID negative, HER-2 negative.  In January 2005 she underwent a right-sided lumpectomy with sentinel lymph node dissection.  Tumor size was 0.6 cm and she had 0 of 3 sentinel lymph nodes positive for malignancy.  She underwent further excision for close margins and no further malignancy was seen on pathology.  She then completed right breast radiation and was started on tamoxifen in March 2005 and remained on that until November 2008.  She stopped tamoxifen therapy early due to the diagnosis of cataracts.      She had been doing well until screening mammogram in July 2018 showed calcifications and she was then underwent further imaging which showed approximately 3 cm size area of calcifications in an area separate from her previous lumpectomy.  She then underwent a needle biopsy which showed a grade 2 ductal carcinoma in situ which was ER, ID positive.  She then underwent a bilateral mastectomy with reconstruction with Dr. Sanchez.  She also saw a genetic counselor and further BCRA testing was negative.  Final pathology revealed 8 mm invasive cancer, total 4 cm dcis.  Final staging T1cN0, stage 1 breast cancer.  ER + ID + her 2 negative.    Oncotype 0      In returning today, she is feeling well.       Overall she is doing  "reasonably well.  She has no new medical issues.  No new family history.    No hot flashes.  No other arthralgias or myalgias.  No depression.       ROS: 10 point ROS neg other than the symptoms noted above in the HPI.        Past medical, social, surgical, and family histories reviewed.    Allergies:  Allergies as of 07/15/2019 - Reviewed 07/15/2019   Allergen Reaction Noted     Alphagan p Rash 05/11/2009     Diphenhydramine Other (See Comments) 05/13/2009     Brimonidine Rash 05/13/2009       Current Medications:  Current Outpatient Medications   Medication Sig Dispense Refill     anastrozole (ARIMIDEX) 1 MG tablet Take 1 tablet (1 mg) by mouth daily 90 tablet 3     diclofenac (VOLTAREN) 75 MG EC tablet Take 1 tablet (75 mg) by mouth 2 times daily 60 tablet 1     fluticasone (FLONASE) 50 MCG/ACT nasal spray USE ONE OR TWO SPRAYS IN EACH NOSTRIL DAILY 48 mL 3     ibuprofen (ADVIL) 200 MG tablet Take 400 mg by mouth as needed for mild pain       loratadine (CLARITIN) 10 MG tablet TAKE 1 TABLET BY MOUTH EVERY DAY AS NEEDED FOR ALLERGY SYMPTOMS 90 tablet 2     melatonin 5 MG tablet        Multiple Vitamins-Minerals (PRESERVISION AREDS) CAPS Take 1 capsule by mouth 2 times daily 180 capsule 3     Omega-3 Fatty Acids (FISH OIL) 1000 MG CPDR Take  by mouth.       omeprazole (PRILOSEC) 20 MG CR capsule TAKE 1 CAPSULE (20 MG) BY MOUTH DAILY 90 capsule 3     sertraline (ZOLOFT) 25 MG tablet Take 1 tablet daily. (Patient taking differently: 25 mg every morning Take 1 tablet daily.) 30 tablet 11     tiZANidine (ZANAFLEX) 4 MG tablet TAKE 1-2 TABLETS (4-8 MG) BY MOUTH NIGHTLY AS NEEDED 30 tablet 1        Physical Exam:  /87 (BP Location: Right arm, Patient Position: Sitting, Cuff Size: Adult Regular)   Pulse 77   Temp 98.6  F (37  C) (Oral)   Resp 16   Ht 1.664 m (5' 5.51\")   Wt 82.5 kg (181 lb 14.4 oz)   LMP 10/14/2001   SpO2 97%   BMI 29.80 kg/m       Well appearing NAD  HEENT: no icterus  CV: regular  Lungs: " clear  Abd: soft, nt, nd + bs  Ext : no edema  Breast: s/p bilateral mastectomy with reconstruction, no nodules or masses  No rashes on skin  No lymphadenopathy    Laboratory/Imaging Studies   Oncotype came back with score of 0.  No chemotherapy.     ASSESSMENT/PLAN:    The patient is a 58-year-old postmenopausal female with a history of a stage I breast cancer treated more than 10 years ago with resection, radiation and adjuvant tamoxifen.  She subsequently developed a new mass identifiable on mammogram, measuring approximately 3.5 cm.  Biopsy was consistent with ductal carcinoma in situ, estrogen receptor positive, progesterone receptor positive.  She underwent a mastectomy bilaterally with Dr. Rakesh Sanchez.  She had immediate reconstruction.  Oncotype Dx 0 in new T1c N0 breast cancer.    1.  T1cN0 breast cancer now s/p bilateral mastectomy with oncotype 0 now on AI.       We reviewed endocrine therapy.  She has previously been on tamoxifen and developed cataracts.  She is tolerating her AI.  Will follow.    2. Osteopenia - reviewed her dexa.  She did not tolerate fosamax.  We rediscussed possibly using zometa.  Reviewed side effects.  Will proceed today.    3. Wound dehiscence - now resolved.    4. Encouraged exercise.    5. She is coping well.    6. Neck pain - PT.  If no improvement, will consider imaging.       Again, thank you for allowing me to participate in the care of your patient.      Sincerely,    Charity Brar MD

## 2019-07-15 NOTE — PROGRESS NOTES
Oncology Follow-up Visit:  July 15, 2019    Diagnosis: recurrent breast cancer, stage 1    History Of Present Illness:  Ms. Lombardo is a 59 year old female is here for follow-up of recurrent breast cancer.    She was originally diagnosed in December 2004 with a stage I infiltrating ductal carcinoma of the right breast.  This was found on abnormal screening mammogram.  Biopsy was obtained which showed a grade 1 infiltrating ductal carcinoma, ER positive, TX negative, HER-2 negative.  In January 2005 she underwent a right-sided lumpectomy with sentinel lymph node dissection.  Tumor size was 0.6 cm and she had 0 of 3 sentinel lymph nodes positive for malignancy.  She underwent further excision for close margins and no further malignancy was seen on pathology.  She then completed right breast radiation and was started on tamoxifen in March 2005 and remained on that until November 2008.  She stopped tamoxifen therapy early due to the diagnosis of cataracts.      She had been doing well until screening mammogram in July 2018 showed calcifications and she was then underwent further imaging which showed approximately 3 cm size area of calcifications in an area separate from her previous lumpectomy.  She then underwent a needle biopsy which showed a grade 2 ductal carcinoma in situ which was ER, TX positive.  She then underwent a bilateral mastectomy with reconstruction with Dr. Sanchez.  She also saw a genetic counselor and further BCRA testing was negative.  Final pathology revealed 8 mm invasive cancer, total 4 cm dcis.  Final staging T1cN0, stage 1 breast cancer.  ER + TX + her 2 negative.    Oncotype 0      In returning today, she is feeling well.       Overall she is doing reasonably well.  She has no new medical issues.  No new family history.    No hot flashes.  No other arthralgias or myalgias.  No depression.       ROS: 10 point ROS neg other than the symptoms noted above in the HPI.        Past medical, social,  "surgical, and family histories reviewed.    Allergies:  Allergies as of 07/15/2019 - Reviewed 07/15/2019   Allergen Reaction Noted     Alphagan p Rash 05/11/2009     Diphenhydramine Other (See Comments) 05/13/2009     Brimonidine Rash 05/13/2009       Current Medications:  Current Outpatient Medications   Medication Sig Dispense Refill     anastrozole (ARIMIDEX) 1 MG tablet Take 1 tablet (1 mg) by mouth daily 90 tablet 3     diclofenac (VOLTAREN) 75 MG EC tablet Take 1 tablet (75 mg) by mouth 2 times daily 60 tablet 1     fluticasone (FLONASE) 50 MCG/ACT nasal spray USE ONE OR TWO SPRAYS IN EACH NOSTRIL DAILY 48 mL 3     ibuprofen (ADVIL) 200 MG tablet Take 400 mg by mouth as needed for mild pain       loratadine (CLARITIN) 10 MG tablet TAKE 1 TABLET BY MOUTH EVERY DAY AS NEEDED FOR ALLERGY SYMPTOMS 90 tablet 2     melatonin 5 MG tablet        Multiple Vitamins-Minerals (PRESERVISION AREDS) CAPS Take 1 capsule by mouth 2 times daily 180 capsule 3     Omega-3 Fatty Acids (FISH OIL) 1000 MG CPDR Take  by mouth.       omeprazole (PRILOSEC) 20 MG CR capsule TAKE 1 CAPSULE (20 MG) BY MOUTH DAILY 90 capsule 3     sertraline (ZOLOFT) 25 MG tablet Take 1 tablet daily. (Patient taking differently: 25 mg every morning Take 1 tablet daily.) 30 tablet 11     tiZANidine (ZANAFLEX) 4 MG tablet TAKE 1-2 TABLETS (4-8 MG) BY MOUTH NIGHTLY AS NEEDED 30 tablet 1        Physical Exam:  /87 (BP Location: Right arm, Patient Position: Sitting, Cuff Size: Adult Regular)   Pulse 77   Temp 98.6  F (37  C) (Oral)   Resp 16   Ht 1.664 m (5' 5.51\")   Wt 82.5 kg (181 lb 14.4 oz)   LMP 10/14/2001   SpO2 97%   BMI 29.80 kg/m      Well appearing NAD  HEENT: no icterus  CV: regular  Lungs: clear  Abd: soft, nt, nd + bs  Ext : no edema  Breast: s/p bilateral mastectomy with reconstruction, no nodules or masses  No rashes on skin  No lymphadenopathy    Laboratory/Imaging Studies   Oncotype came back with score of 0.  No chemotherapy.   "   ASSESSMENT/PLAN:    The patient is a 58-year-old postmenopausal female with a history of a stage I breast cancer treated more than 10 years ago with resection, radiation and adjuvant tamoxifen.  She subsequently developed a new mass identifiable on mammogram, measuring approximately 3.5 cm.  Biopsy was consistent with ductal carcinoma in situ, estrogen receptor positive, progesterone receptor positive.  She underwent a mastectomy bilaterally with Dr. Rakesh Sanchez.  She had immediate reconstruction.  Oncotype Dx 0 in new T1c N0 breast cancer.    1.  T1cN0 breast cancer now s/p bilateral mastectomy with oncotype 0 now on AI.       We reviewed endocrine therapy.  She has previously been on tamoxifen and developed cataracts.  She is tolerating her AI.  Will follow.    2. Osteopenia - reviewed her dexa.  She did not tolerate fosamax.  We rediscussed possibly using zometa.  Reviewed side effects.  Will proceed today.    3. Wound dehiscence - now resolved.    4. Encouraged exercise.    5. She is coping well.    6. Neck pain - PT.  If no improvement, will consider imaging.      Charity Brar

## 2019-07-15 NOTE — NURSING NOTE
"Oncology Rooming Note    July 15, 2019 11:04 AM   Enid Lombardo is a 59 year old female who presents for:    Chief Complaint   Patient presents with     Blood Draw     Labs drawn via VPT by RN in lab. VS taken. Pt checked in for next appt     RECHECK     ONc Breast CA      Initial Vitals: /87 (BP Location: Right arm, Patient Position: Sitting, Cuff Size: Adult Regular)   Pulse 77   Temp 98.6  F (37  C) (Oral)   Resp 16   Ht 1.664 m (5' 5.51\")   Wt 82.5 kg (181 lb 14.4 oz)   LMP 10/14/2001   SpO2 97%   BMI 29.80 kg/m   Estimated body mass index is 29.8 kg/m  as calculated from the following:    Height as of this encounter: 1.664 m (5' 5.51\").    Weight as of this encounter: 82.5 kg (181 lb 14.4 oz). Body surface area is 1.95 meters squared.  No Pain (0) Comment: Data Unavailable   Patient's last menstrual period was 10/14/2001.  Allergies reviewed: Yes  Medications reviewed: Yes    Medications: Medication refills not needed today.  Pharmacy name entered into [employer]: [id number] [place], [place] - [address]    Clinical concerns: none        Mechelle Kavon, CMA              "

## 2019-07-15 NOTE — PATIENT INSTRUCTIONS
Take calcium/vitamin D supplements while on Zometa: 1 tablet PO BID.  Each tablet should have 500-600mg elemental calcium and 400 units of vitamin D.      Contact Numbers    [facility]: [phone]  [facility] [place] and after hours / weekends / holidays:  [phone]      Please call the triage or after hours line if you experience a temperature greater than or equal to 100.5, shaking chills, have uncontrolled nausea, vomiting and/or diarrhea, dizziness, shortness of breath, chest pain, bleeding, unexplained bruising, or if you have any other new/concerning symptoms, questions or concerns.      If you are having any concerning symptoms or wish to speak to a provider before your next infusion visit, please call your care coordinator or triage to notify them so we can adequately serve you.     If you need a refill on a narcotic prescription or other medication, please call before your infusion appointment.

## 2019-07-16 ENCOUNTER — TELEPHONE (OUTPATIENT)
Dept: DERMATOLOGY | Facility: CLINIC | Age: 59
End: 2019-07-16

## 2019-07-16 ENCOUNTER — ALLIED HEALTH/NURSE VISIT (OUTPATIENT)
Dept: FAMILY MEDICINE | Facility: OTHER | Age: 59
End: 2019-07-16
Payer: COMMERCIAL

## 2019-07-16 ENCOUNTER — HOSPITAL ENCOUNTER (OUTPATIENT)
Dept: PHYSICAL THERAPY | Facility: OTHER | Age: 59
Setting detail: THERAPIES SERIES
End: 2019-07-16
Attending: PREVENTIVE MEDICINE
Payer: COMMERCIAL

## 2019-07-16 DIAGNOSIS — Z48.02 ENCOUNTER FOR REMOVAL OF SUTURES: Primary | ICD-10-CM

## 2019-07-16 PROCEDURE — 99207 ZZC NO CHARGE NURSE ONLY: CPT

## 2019-07-16 PROCEDURE — 97140 MANUAL THERAPY 1/> REGIONS: CPT | Mod: GP | Performed by: PHYSICAL THERAPIST

## 2019-07-16 PROCEDURE — 97110 THERAPEUTIC EXERCISES: CPT | Mod: GP | Performed by: PHYSICAL THERAPIST

## 2019-07-16 NOTE — TELEPHONE ENCOUNTER
Associated Results     Result Notes for Dermatological path order and indications     Notes recorded by Kajal Mcallister LPN on 7/16/2019 at 9:18 AM CDT  Writer called to relay pathology results. Patients  stated that Michel was at an appointment. Writer attempted to leave message from the dermatology office. Patients  stated that patient already read her results in My Chart. Writer confirmed in chart that results were reviewed by patient.    Kajal Mcallister LPN    ------    Notes recorded by Rome Garcia MD on 7/15/2019 at 4:27 PM CDT  Please call the patient with pathology results.  The biopsy again was a benign verrucous keratosis, no malignancy was seen.   As long as the wound is healing well, no additional surgery is necessary.  If there is persistent rough scale, please make an appointment to return in 6-8 weeks once our biopsy has had time to heal. Thank you.   Dermatological path order and indications   Order: [id number]   Status:  Final result   Visible to patient:  Yes (MyChart) Next appt:  07/29/2019 at 10:15 AM in Physical Therapy (Supriya Brown, PT) Dx:  Neoplasm of uncertain behavior of skin   Component 7/10/19  7:55 AM   Copath Report Patient Name: MICHEL LOMAX   MR#: [id number]   Specimen #: [id number]   Collected: 7/10/2019   Received: 7/10/2019   Reported: 7/15/2019 12:09   Ordering Phy(s): ROME GARCIA     For improved result formatting, select 'View Enhanced Report Format' under    Linked Documents section.     SPECIMEN(S):   Punch biopsy, left upper lip     FINAL DIAGNOSIS:   Punch biopsy, left upper lip:   - Consistent with verrucous keratosis - (see description)            Kajal Mcallister LPN

## 2019-07-18 NOTE — PROGRESS NOTES
Enid Lombardo presents to the clinic today for suture/staple removal.    The patient has had the sutures/staples placed on upper lip  There has been no history of infection or drainage.  Tetanus status is up to date.    OBJECTIVE:   1 sutures/staples are seen located on the upper lip.    The wound is healing well with no signs of infection.      ASSESSMENT:   Suture/Staple Removal.    PLAN:    All sutures were easily removed today. . Routine wound care discussed.  The patient will follow up as needed.    Anselmo Browne, RN, BSN

## 2019-07-22 DIAGNOSIS — M54.12 CERVICAL RADICULAR PAIN: ICD-10-CM

## 2019-07-22 DIAGNOSIS — M75.81 TENDINITIS OF RIGHT ROTATOR CUFF: ICD-10-CM

## 2019-07-29 ENCOUNTER — HOSPITAL ENCOUNTER (OUTPATIENT)
Dept: PHYSICAL THERAPY | Facility: OTHER | Age: 59
Setting detail: THERAPIES SERIES
End: 2019-07-29
Attending: PREVENTIVE MEDICINE
Payer: COMMERCIAL

## 2019-07-29 PROCEDURE — 97110 THERAPEUTIC EXERCISES: CPT | Mod: GP | Performed by: PHYSICAL THERAPIST

## 2019-08-06 ENCOUNTER — HOSPITAL ENCOUNTER (OUTPATIENT)
Dept: PHYSICAL THERAPY | Facility: OTHER | Age: 59
Setting detail: THERAPIES SERIES
End: 2019-08-06
Attending: PREVENTIVE MEDICINE
Payer: COMMERCIAL

## 2019-08-06 PROCEDURE — 97110 THERAPEUTIC EXERCISES: CPT | Mod: GP | Performed by: PHYSICAL THERAPIST

## 2019-08-12 ENCOUNTER — HOSPITAL ENCOUNTER (OUTPATIENT)
Dept: PHYSICAL THERAPY | Facility: OTHER | Age: 59
Setting detail: THERAPIES SERIES
End: 2019-08-12
Attending: PREVENTIVE MEDICINE
Payer: COMMERCIAL

## 2019-08-12 PROCEDURE — 97110 THERAPEUTIC EXERCISES: CPT | Mod: GP | Performed by: PHYSICAL THERAPIST

## 2019-08-12 PROCEDURE — 97140 MANUAL THERAPY 1/> REGIONS: CPT | Mod: GP | Performed by: PHYSICAL THERAPIST

## 2019-08-20 ENCOUNTER — HOSPITAL ENCOUNTER (OUTPATIENT)
Dept: PHYSICAL THERAPY | Facility: OTHER | Age: 59
Setting detail: THERAPIES SERIES
End: 2019-08-20
Attending: PREVENTIVE MEDICINE
Payer: COMMERCIAL

## 2019-08-20 PROCEDURE — 97140 MANUAL THERAPY 1/> REGIONS: CPT | Mod: GP | Performed by: PHYSICAL THERAPIST

## 2019-08-20 PROCEDURE — 97110 THERAPEUTIC EXERCISES: CPT | Mod: GP | Performed by: PHYSICAL THERAPIST

## 2019-08-21 DIAGNOSIS — M75.81 TENDINITIS OF RIGHT ROTATOR CUFF: ICD-10-CM

## 2019-08-21 DIAGNOSIS — M54.12 CERVICAL RADICULAR PAIN: ICD-10-CM

## 2019-08-24 ENCOUNTER — MYC MEDICAL ADVICE (OUTPATIENT)
Dept: ONCOLOGY | Facility: CLINIC | Age: 59
End: 2019-08-24

## 2019-08-24 DIAGNOSIS — R51.9 INTRACTABLE HEADACHE, UNSPECIFIED CHRONICITY PATTERN, UNSPECIFIED HEADACHE TYPE: ICD-10-CM

## 2019-08-24 DIAGNOSIS — Z85.3 HISTORY OF RIGHT BREAST CANCER: Primary | ICD-10-CM

## 2019-08-26 ENCOUNTER — HOSPITAL ENCOUNTER (OUTPATIENT)
Dept: PHYSICAL THERAPY | Facility: OTHER | Age: 59
Setting detail: THERAPIES SERIES
End: 2019-08-26
Attending: PREVENTIVE MEDICINE
Payer: COMMERCIAL

## 2019-08-26 ENCOUNTER — MYC MEDICAL ADVICE (OUTPATIENT)
Dept: FAMILY MEDICINE | Facility: OTHER | Age: 59
End: 2019-08-26

## 2019-08-26 PROCEDURE — 97140 MANUAL THERAPY 1/> REGIONS: CPT | Mod: GP | Performed by: PHYSICAL THERAPIST

## 2019-08-26 PROCEDURE — 97530 THERAPEUTIC ACTIVITIES: CPT | Mod: GP | Performed by: PHYSICAL THERAPIST

## 2019-08-28 NOTE — PROGRESS NOTES
"Subjective     Enid Lombardo is a 59 year old female who presents to clinic today for the following health issues:    Healthy Habits:     Getting at least 3 servings of Calcium per day:  Yes    Bi-annual eye exam:  Yes    Dental care twice a year:  Yes    Sleep apnea or symptoms of sleep apnea:  None    Diet:  Regular (no restrictions)    Frequency of exercise:  2-3 days/week    Duration of exercise:  Less than 15 minutes    Medication side effects:  Lightheadedness    PHQ-2 Total Score: 0    Additional concerns today:  Yes     Concern - abdominal hernia  Onset: 3.5 months    Description:   Bulge in center of abdomen to the slight right     Intensity: mild, moderate    Progression of Symptoms:  Was improving with physical therapy but it recently \"popped\" out again    Accompanying Signs & Symptoms:  Uncomfortable but not overly painful, pressure    Previous history of similar problem:   none    Precipitating factors:   Worsened by: unknown    Alleviating factors:  Improved by: physical therapy    Therapies Tried and outcome: physical therapy - was helping until recently   -------------------------------------    Patient Active Problem List   Diagnosis     History of right breast cancer     Esophageal reflux     Disorder of synovium, tendon, and bursa     Cataract     Shingles     Pain, radicular, lumbar     Right hip pain     Low back pain     IT band syndrome     Macular drusen     Cervicalgia     Headache     Keratoconus     Meibomian gland dysfunction - Both Eyes     Tear film insufficiency     Hyperlipidemia with target LDL less than 130     Benign neoplasm of colon     Skin abscess     Osteopenia of both hands     Recurrent carcinoma in situ of breast, right     Breast cancer in situ     S/P breast reconstruction, bilateral     S/P flap graft     Osteopenia of multiple sites     Long term (current) use of aromatase inhibitors     Past Surgical History:   Procedure Laterality Date     BIOPSY  2004    Breast "     BREAST SURGERY  2004     C VAGINAL HYSTERECTOMY  12/2001    Ovaries intact, due to fibroids     COLONOSCOPY       ENDOSCOPY  05/09/2007    Upper GI     EYE SURGERY       GRAFT FAT TO BREAST Bilateral 11/29/2018    Procedure: Bilateral Breast Fat Grafting from Abdomen and Thighs;  Surgeon: DENNISE Amin MD;  Location: [facility] OR     GRAFT FREE VASCULARIZED TRANSVERSE RECTUS ABDOMINIS MYOCUTANEOUS Bilateral 10/8/2018    Procedure: GRAFT FREE VASCULARIZED TRANSVERSE RECTUS ABDOMINIS MYOCUTANEOUS;  Bilateral Deep Inferior Epigastric Artery  Free Flap Breast Reconstruction and Removal of Breast Explanders;  Surgeon: DENNISE Amin MD;  Location: [facility] OR     GYN SURGERY  2001     HC BIOPSY/EXCISION LYMPH NODE OPEN DEEP CERVICAL W EXC FAT PAD  1/7/2005    Right axillary sentinel node biopsy X 3.     HC COLONOSCOPY W BIOPSY  05/10/10     HC EXCISION BREAST LESION, OPEN >=1  1/7/2005    Right breast.     HC REMV CATARACT EXTRACAP,INSERT LENS  12/18/08    bilateral     HC REMV TARSAL/METATARSAL BENIGN BONE LESN  1982    Bone spur - right     MASTECTOMY SIMPLE BILATERAL, SENTINEL NODE BILATERAL, COMBINED Bilateral 8/22/2018    Procedure: COMBINED MASTECTOMY SIMPLE BILATERAL, SENTINEL NODE BILATERAL;  Bilateral Mastectomy with Right Tad Node Biopsy, Bilateral Breast Reconstruction, Anesthesia Block;  Surgeon: Rakesh Sanchez MD;  Location:  OR     ORTHOPEDIC SURGERY  1983    Right foot     RECONSTRUCT BREAST Bilateral 8/22/2018    Procedure: RECONSTRUCT BREAST;;  Surgeon: DENNISE Amin MD;  Location:  OR     RECONSTRUCT BREAST BILATERAL Bilateral 10/8/2018    Procedure: RECONSTRUCT BREAST BILATERAL;;  Surgeon: DENNISE Amin MD;  Location:  OR     RECONSTRUCT NIPPLE BILATERAL Bilateral 11/29/2018    Procedure: Nipple Reconstruction;  Surgeon: DENNISE Aimn MD;  Location:  OR       Social History     Tobacco Use     Smoking status: Never Smoker     Smokeless tobacco:  Never Used   Substance Use Topics     Alcohol use: Yes     Comment: occasionally     Family History   Adopted: Yes   Problem Relation Age of Onset     Cancer Mother         lung cancer  at age 52     Thyroid Disease Sister         half sister, had thyroid removed     Unknown/Adopted Father      Unknown/Adopted Maternal Grandmother      Unknown/Adopted Maternal Grandfather      Unknown/Adopted Paternal Grandmother      Unknown/Adopted Paternal Grandfather      Glaucoma No family hx of      Diabetes No family hx of      Melanoma No family hx of      Skin Cancer No family hx of            Reviewed and updated as needed this visit by Provider  Tobacco  Allergies  Meds  Problems  Med Hx  Surg Hx  Fam Hx         Review of Systems   Constitutional: Negative for chills and fever.   HENT: Negative for congestion, ear pain, hearing loss and sore throat.    Eyes: Negative for pain.   Respiratory: Negative for cough and shortness of breath.    Cardiovascular: Negative for chest pain, palpitations and peripheral edema.   Gastrointestinal: Positive for heartburn. Negative for abdominal pain, constipation, diarrhea, hematochezia and nausea.   Breasts:  Negative for tenderness, breast mass and discharge.   Genitourinary: Negative for dysuria, frequency, genital sores, hematuria, pelvic pain, urgency, vaginal bleeding and vaginal discharge.   Musculoskeletal: Negative for arthralgias, joint swelling and myalgias.   Skin: Negative for rash.   Neurological: Positive for headaches. Negative for dizziness and paresthesias.   Psychiatric/Behavioral: Negative for mood changes. The patient is not nervous/anxious.       Constitutional, HEENT, cardiovascular, pulmonary, GI, , musculoskeletal, neuro, skin, endocrine and psych systems are negative, except as in HPI or otherwise noted         Objective    /62 (BP Location: Right arm, Patient Position: Chair, Cuff Size: Adult Regular)   Pulse 83   Temp 98.4  F (36.9  C)  "(Temporal)   Resp 16   Ht 1.645 m (5' 4.76\")   Wt 81.2 kg (179 lb)   LMP 10/14/2001   SpO2 97%   BMI 30.00 kg/m    Body mass index is 30 kg/m .  Physical Exam   GENERAL: healthy, alert and no distress  RESP: lungs clear to auscultation - no rales, rhonchi or wheezes  CV: regular rate and rhythm, normal S1 S2, no S3 or S4, no murmur, click or rub, no peripheral edema and peripheral pulses strong  ABDOMEN: soft, nontender, with ab muscle contraction note ventral hernia occurring  MS: no gross musculoskeletal defects noted, no edema  SKIN: no suspicious lesions or rashes  NEURO: Normal strength and tone, mentation intact and speech normal  PSYCH: mentation appears normal, affect normal/bright            Assessment & Plan       ICD-10-CM    1. Ventral hernia without obstruction or gangrene K43.9 GENERAL SURG ADULT REFERRAL     No urgency for her hernia, but has worsened with the PT plan and should be offered surgery. Discussed how her weight gain may have contributed and that we need to continue to work toward weight loss whichever her decision is as the PT will not harm her healing. Continue PT for now, plan to do what she can to add cardiovascular activities for weight control and visit with surgeon about possible options.   Declined physical today, will schedule another time.      BMI:   Estimated body mass index is 30 kg/m  as calculated from the following:    Height as of this encounter: 1.645 m (5' 4.76\").    Weight as of this encounter: 81.2 kg (179 lb).   Weight management plan: Discussed healthy diet and exercise guidelines        Return in about 1 week (around 9/5/2019) for Physical Exam.    Shi Alonso MD, MD  [facility]"

## 2019-08-29 ENCOUNTER — MYC MEDICAL ADVICE (OUTPATIENT)
Dept: FAMILY MEDICINE | Facility: OTHER | Age: 59
End: 2019-08-29

## 2019-08-29 ENCOUNTER — OFFICE VISIT (OUTPATIENT)
Dept: FAMILY MEDICINE | Facility: OTHER | Age: 59
End: 2019-08-29
Payer: COMMERCIAL

## 2019-08-29 VITALS
HEIGHT: 65 IN | SYSTOLIC BLOOD PRESSURE: 122 MMHG | BODY MASS INDEX: 29.82 KG/M2 | HEART RATE: 83 BPM | TEMPERATURE: 98.4 F | WEIGHT: 179 LBS | DIASTOLIC BLOOD PRESSURE: 62 MMHG | RESPIRATION RATE: 16 BRPM | OXYGEN SATURATION: 97 %

## 2019-08-29 DIAGNOSIS — K43.9 VENTRAL HERNIA WITHOUT OBSTRUCTION OR GANGRENE: Primary | ICD-10-CM

## 2019-08-29 PROCEDURE — 99214 OFFICE O/P EST MOD 30 MIN: CPT | Performed by: FAMILY MEDICINE

## 2019-08-29 ASSESSMENT — ENCOUNTER SYMPTOMS
HEMATOCHEZIA: 0
BREAST MASS: 0
CONSTIPATION: 0
EYE PAIN: 0
DIARRHEA: 0
SHORTNESS OF BREATH: 0
FEVER: 0
PALPITATIONS: 0
NERVOUS/ANXIOUS: 0
DYSURIA: 0
HEARTBURN: 1
JOINT SWELLING: 0
DIZZINESS: 0
FREQUENCY: 0
PARESTHESIAS: 0
HEMATURIA: 0
SORE THROAT: 0
ABDOMINAL PAIN: 0
CHILLS: 0
ARTHRALGIAS: 0
HEADACHES: 1
MYALGIAS: 0
NAUSEA: 0
COUGH: 0

## 2019-08-29 ASSESSMENT — MIFFLIN-ST. JEOR: SCORE: 1394.07

## 2019-08-30 NOTE — TELEPHONE ENCOUNTER
Placed referral with number and sent her a my chart message informing her of this along with number to schedule.

## 2019-08-31 DIAGNOSIS — J31.0 CHRONIC RHINITIS: ICD-10-CM

## 2019-09-03 ENCOUNTER — HOSPITAL ENCOUNTER (OUTPATIENT)
Dept: PHYSICAL THERAPY | Facility: OTHER | Age: 59
Setting detail: THERAPIES SERIES
End: 2019-09-03
Attending: PREVENTIVE MEDICINE
Payer: COMMERCIAL

## 2019-09-03 PROCEDURE — 97530 THERAPEUTIC ACTIVITIES: CPT | Mod: GP | Performed by: PHYSICAL THERAPIST

## 2019-09-03 RX ORDER — LORATADINE 10 MG/1
TABLET ORAL
Qty: 90 TABLET | Refills: 3 | Status: SHIPPED | OUTPATIENT
Start: 2019-09-03 | End: 2020-07-27

## 2019-09-03 NOTE — TELEPHONE ENCOUNTER
Claritin  Prescription approved per [facility] Refill Protocol.    Vy Moore, RN, BSN

## 2019-09-03 NOTE — PROGRESS NOTES
Outpatient Physical Therapy Progress Note     Patient: Enid Lombardo  : 1960    Beginning/End Dates of Reporting Period:  19 to 9/3/2019    Referring Provider:  and     Therapy Diagnosis: neck pain, HA, abdominal scar tissue and pulling.      Client Self Report: HA's every day, brain MRI this Fri, neck stiffness is about the same. Seen regular Dr about the hernia and sending pt for consult for this. Did not do a lot of looking down and really did not notice a difference in the HA's. After the last session felt very good no HA, but only lasted 1 day and the HA was back. Still waking with the HA and lasts the day,  Typically if no HA  when awakening it will be a better day. Sleeping on her sides and back.     Objective Measurements:  Objective Measure: HA  Details: #5 back of the head    Objective Measure: neck stiffness   Details: same, a little stiff    Objective Measure: posture  Details: 1 finger width behind the head when standing against the wall    Objective Measure: hernia  Details: about 60% better over all       Goals:  Goal Identifier 1   Goal Description Patient no longer has HA's or neck pain and is able to stand comfortably with proper posture   Target Date 19   Date Met      Progress:                                        Not met, they seem better for a while and now almost every day.      Goal Identifier 2   Goal Description Patient no longer has a diastisis and no longer has a ventral hernia   Target Date 19   Date Met      Progress:                                           60% better     Goal Identifier 3   Goal Description Patient is able to return to a workout regimen with no pain or complaints   Target Date 19   Date Met      Progress:                                          Has returned to about 50% of her work out       Progress Toward Goals:   Progress this reporting period: Patient's hernia is better, posture is improved with the head  alignment, able to get back to some of her workout. But the HA's are still coming on and pt is waking with them. Presently assessing pt pillows and sleeping habits. Also pt has brain MRI in 3 days.           Plan:  Continue therapy per current plan of care.    Discharge:  No    Thank you for the referral,            Supriya Brown PT

## 2019-09-04 ENCOUNTER — OFFICE VISIT (OUTPATIENT)
Dept: SURGERY | Facility: CLINIC | Age: 59
End: 2019-09-04
Payer: COMMERCIAL

## 2019-09-04 VITALS
DIASTOLIC BLOOD PRESSURE: 88 MMHG | HEART RATE: 62 BPM | OXYGEN SATURATION: 99 % | WEIGHT: 178.7 LBS | TEMPERATURE: 97.5 F | HEIGHT: 65 IN | BODY MASS INDEX: 29.77 KG/M2 | SYSTOLIC BLOOD PRESSURE: 140 MMHG

## 2019-09-04 DIAGNOSIS — K43.9 VENTRAL HERNIA WITHOUT OBSTRUCTION OR GANGRENE: Primary | ICD-10-CM

## 2019-09-04 ASSESSMENT — MIFFLIN-ST. JEOR: SCORE: 1392.49

## 2019-09-04 ASSESSMENT — PAIN SCALES - GENERAL: PAINLEVEL: NO PAIN (0)

## 2019-09-04 NOTE — PATIENT INSTRUCTIONS
You saw Dr Brown today.     Instructions per today's visit:   -CT ordered     Please call during clinic hours Monday through Friday 8:00a - 4:00p if you have questions or you can contact us via [employer] at anytime.      General Surgery Call Center: [phone]  Fax: [phone]  Surgery Scheduler: [phone]    Please call the hospital at [phone] to speak with our on call MDs if you have urgent needs after hours, during weekends, or holidays.  It was a pleasure meeting with you today.     Thank you for allowing us the privilege of caring for you. We hope we provided you with the excellent service you deserve.     Please let us know if there is anything else we can do for you so that we can be sure you are leaving completely satisfied with your care experience.

## 2019-09-04 NOTE — LETTER
9/4/2019       RE: Enid Lombardo  [address]   [place] [address]     Dear Colleague,    Thank you for referring your patient, Enid Lombardo, to the [facility] GENERAL SURGERY at [facility]. Please see a copy of my visit note below.    New Hernia Consultation Note      Enid Lombardo  [id number]  1960    Requesting Provider: Shi Alonso    Dear Gavin, Shi Baez,    I was asked by Shi Alonso to see this patient for the following problem:    CHIEF COMPLAINT:  New hernia    HISTORY OF PRESENT ILLNESS:  Location: Right upper quadrant, about 1 cm off midline and 3 cm subcostal  Severity: mild to moderate    Enid Lombardo is a 59 year old female with recurrent breast carcinoma with bilateral mastectomy and bilateral KISHOR breast reconstruction in 2018 with chronic anastrozole therapy who presents to clinic with a new hernia. She noticed the hernia in 5/2019 and saw her PCP on 6/03/2019, when she was told she had an upper abdominal hernia and was given orders for abdominal wall exercises with PT. She felt her hernia appeared smaller since beginning PT, but about two weeks ago (late 8/2019), she felt a pop and has noticed her hernia protruding more since then. She does not endorse pain with the hernia, nor does she experience nausea, vomiting, or changes in bowel habits. However, she experiences occasional discomfort with her hernia. Discomfort is worse at night, as her hernia tends to protrude more at the end of the day and is associated with more of a pressure sensation in the evening.    Other than her bilateral KISHOR breast reconstruction by Dr. Amin in 2018, she had a vaginal hysterectomy about 18 years ago and otherwise has had no other abdominal surgeries.    She is currently being worked up regarding headaches, for which she has a brain MRI this Friday and seeing PT. She is also continuing Zometa infusions and daily anastrozole therapy per  oncology. Otherwise, she has no additional medical concerns or complaints at this time.  No flowsheet data found.    No flowsheet data found.    No flowsheet data found.    Patient Supplied Answers To HerQLes Assessment Questionnaire  No flowsheet data found.  _______________________________________________________________________    NUTRITIONAL STATUS:  Lab Results   Component Value Date    ALBUMIN 3.5 07/15/2019        Body mass index is 29.97 kg/m .    Patient is not immunosuppressed.    Patient is not a current smoker.    Past Medical History:   Diagnosis Date     Breast cancer (H) 2004    lumpectomy, radiation, tamoxifen     Cancer (H) 2004    Breast     Cataracts, bilateral      Complication of anesthesia     She prefers not to have versed until right before she leaves or can have after      Enthesopathy of hip region 6/06    right hip     Esophageal reflux 2/06     History of gestational diabetes      Hypertension 2012     Macular drusen      PONV (postoperative nausea and vomiting)      Shingles 01/23/2012    left T6-T7 dermatome       Patient Active Problem List   Diagnosis     History of right breast cancer     Esophageal reflux     Disorder of synovium, tendon, and bursa     Cataract     Shingles     Pain, radicular, lumbar     Right hip pain     Low back pain     IT band syndrome     Macular drusen     Cervicalgia     Headache     Keratoconus     Meibomian gland dysfunction - Both Eyes     Tear film insufficiency     Hyperlipidemia with target LDL less than 130     Benign neoplasm of colon     Skin abscess     Osteopenia of both hands     Recurrent carcinoma in situ of breast, right     Breast cancer in situ     S/P breast reconstruction, bilateral     S/P flap graft     Osteopenia of multiple sites     Long term (current) use of aromatase inhibitors       Past Surgical History:   Procedure Laterality Date     BIOPSY  2004    Breast     BREAST SURGERY  2004     C VAGINAL HYSTERECTOMY  12/2001    Ovaries  intact, due to fibroids     COLONOSCOPY       ENDOSCOPY  05/09/2007    Upper GI     EYE SURGERY       GRAFT FAT TO BREAST Bilateral 11/29/2018    Procedure: Bilateral Breast Fat Grafting from Abdomen and Thighs;  Surgeon: DENNISE Aimn MD;  Location: [facility] OR     GRAFT FREE VASCULARIZED TRANSVERSE RECTUS ABDOMINIS MYOCUTANEOUS Bilateral 10/8/2018    Procedure: GRAFT FREE VASCULARIZED TRANSVERSE RECTUS ABDOMINIS MYOCUTANEOUS;  Bilateral Deep Inferior Epigastric Artery  Free Flap Breast Reconstruction and Removal of Breast Explanders;  Surgeon: DENNISE Amin MD;  Location: [facility] OR     GYN SURGERY  2001     HC BIOPSY/EXCISION LYMPH NODE OPEN DEEP CERVICAL W EXC FAT PAD  1/7/2005    Right axillary sentinel node biopsy X 3.     HC COLONOSCOPY W BIOPSY  05/10/10     HC EXCISION BREAST LESION, OPEN >=1  1/7/2005    Right breast.     HC REMV CATARACT EXTRACAP,INSERT LENS  12/18/08    bilateral     HC REMV TARSAL/METATARSAL BENIGN BONE LESN  1982    Bone spur - right     MASTECTOMY SIMPLE BILATERAL, SENTINEL NODE BILATERAL, COMBINED Bilateral 8/22/2018    Procedure: COMBINED MASTECTOMY SIMPLE BILATERAL, SENTINEL NODE BILATERAL;  Bilateral Mastectomy with Right [place] Node Biopsy, Bilateral Breast Reconstruction, Anesthesia Block;  Surgeon: Rakesh Sanchez MD;  Location:  OR     ORTHOPEDIC SURGERY  1983    Right foot     RECONSTRUCT BREAST Bilateral 8/22/2018    Procedure: RECONSTRUCT BREAST;;  Surgeon: DENNISE Amin MD;  Location:  OR     RECONSTRUCT BREAST BILATERAL Bilateral 10/8/2018    Procedure: RECONSTRUCT BREAST BILATERAL;;  Surgeon: DENNISE Amin MD;  Location:  OR     RECONSTRUCT NIPPLE BILATERAL Bilateral 11/29/2018    Procedure: Nipple Reconstruction;  Surgeon: DENNISE Amin MD;  Location:  OR       MEDICATIONS:  Current Outpatient Medications   Medication     anastrozole (ARIMIDEX) 1 MG tablet     Calcium-Vitamin D-Vitamin K (VIACTIV CALCIUM PLUS D PO)      fluticasone (FLONASE) 50 MCG/ACT nasal spray     ibuprofen (ADVIL) 200 MG tablet     loratadine (CLARITIN) 10 MG tablet     melatonin 5 MG tablet     Multiple Vitamins-Minerals (PRESERVISION AREDS) CAPS     Omega-3 Fatty Acids (FISH OIL) 1000 MG CPDR     omeprazole (PRILOSEC) 20 MG CR capsule     sertraline (ZOLOFT) 25 MG tablet     tiZANidine (ZANAFLEX) 4 MG tablet     diclofenac (VOLTAREN) 75 MG EC tablet     No current facility-administered medications for this visit.        ALLERGIES:  Allergies   Allergen Reactions     Alphagan P Rash     Thrush and numbness in mouth     Diphenhydramine Other (See Comments)     Brimonidine Rash       Social History     Socioeconomic History     Marital status:      Spouse name: Carson     Number of children: 3     Years of education: 24     Highest education level: None   Occupational History     Occupation: Teacher     Employer: [employer]   Social Needs     Financial resource strain: None     Food insecurity:     Worry: None     Inability: None     Transportation needs:     Medical: None     Non-medical: None   Tobacco Use     Smoking status: Never Smoker     Smokeless tobacco: Never Used   Substance and Sexual Activity     Alcohol use: Yes     Comment: occasionally     Drug use: No     Sexual activity: Yes     Partners: Male     Birth control/protection: Surgical     Comment: hysterectomy   Lifestyle     Physical activity:     Days per week: None     Minutes per session: None     Stress: None   Relationships     Social connections:     Talks on phone: None     Gets together: None     Attends Hinduism service: None     Active member of club or organization: None     Attends meetings of clubs or organizations: None     Relationship status: None     Intimate partner violence:     Fear of current or ex partner: None     Emotionally abused: None     Physically abused: None     Forced sexual activity: None   Other Topics Concern      Service No      "Blood Transfusions No     Caffeine Concern Yes     Comment: 4 cups per day     Occupational Exposure No     Hobby Hazards No     Sleep Concern No     Stress Concern No     Weight Concern No     Special Diet No     Back Care No     Exercise No     Bike Helmet Yes     Seat Belt Yes     Self-Exams Yes     Parent/sibling w/ CABG, MI or angioplasty before 65F 55M? No     Comment: Unsure - adopted   Social History Narrative     None       Family History   Adopted: Yes   Problem Relation Age of Onset     Cancer Mother         lung cancer  at age 52     Thyroid Disease Sister         half sister, had thyroid removed     Unknown/Adopted Father      Unknown/Adopted Maternal Grandmother      Unknown/Adopted Maternal Grandfather      Unknown/Adopted Paternal Grandmother      Unknown/Adopted Paternal Grandfather      Glaucoma No family hx of      Diabetes No family hx of      Melanoma No family hx of      Skin Cancer No family hx of        ROS   HEENT: headaches; no hearing or vision changes; no tinnitus  Respiratory: no coughing, no wheezing, no shortness of breath  Cardiovascular: no chest pain, no palpitations  Gastrointestinal: hernia with some discomfort per HPI; no pain, no nausea, no vomiting, no diarrhea, no constipation  Genitourinary: no dysuria, no changes in urinary frequency  Musculoskeletal: no current joint or muscle pain  Neurological: no tingling, no numbness, no loss of sensation  Psychological: no mood changes  Skin: no rashes  Orders Placed This Encounter   Procedures     CT Abdomen Pelvis w Contrast       PHYSICAL EXAMINATION:  Vital Signs: BP (!) 140/88 (BP Location: Left arm, Patient Position: Sitting, Cuff Size: Adult Regular)   Pulse 62   Temp 97.5  F (36.4  C) (Oral)   Ht 1.645 m (5' 4.75\")   Wt 81.1 kg (178 lb 11.2 oz)   LMP 10/14/2001   SpO2 99%   BMI 29.97 kg/m      General: Patient is a pleasant woman who is alert, attentive, and interactive. She is well dressed, appears healthy and is in " no acute distress.  HEENT: NCAT; MMM;   Lungs: Breathing unlabored  Abdomen: Abdomen is soft, nontender, and nondistended. Hernia exam described below.    PHYSICAL EXAM AREA OF INTEREST:  Hernia:  Hernia is appreciated about 1 cm off midline in the right upper quadrant and about 4 cm subcostally. Hernia is reducible with patient supine on exam table. Some mild discomfort is reported with deep palpation over the hernia. Surgical scar is present from approximately in the right anterior inferior iliac spine to the left anterior inferior iliac spine, consistent with location of surgical graft flaps.    ASSESSMENT:  Right upper abdominal hernia, about 1 cm off midline and 4 cm subcostal  Hernia size is 5cm in size.    DISCUSSION OF RISKS:  I discussed the alternatives, benefits, risks and possible complications of hernia repair with the patient. The risks of hernia surgery with and without mesh are described below.     Based on FDA s analysis of medical device adverse event reports and of peer-reviewed, scientific literature, the most common adverse events for all surgical repair of hernias--with or without mesh--are pain, infection, hernia recurrence, scar-like tissue that sticks tissues together (adhesion), blockage of the large or small intestine (obstruction), bleeding, abnormal connection between organs, vessels, or intestines (fistula), fluid build-up at the surgical site (seroma), and a hole in neighboring tissues or organs (perforation).  Some other potential adverse events that can occur following hernia repair with mesh are mesh migration and mesh shrinkage (contraction).    http://www.fda.gov/MedicalDevices/ProductsandMedicalProcedures/ImplantsandProsthetics/HerniaSurgicalMesh/default.htm    PLAN:  1) New right upper abdominal hernia  CT scan with oral contrast recommended to determine extent of hernia and assist in determination of surgical management. Patient to schedule as convenient. Follow up recommended  in two weeks to review imaging results and discuss operative plan.    2) S/p bilateral KISHOR breast reconstruction in 2018  Operative note was reviewed and discussed with Dr. Amin. Hernia unlikely due to surgical manipulation of tissue for KISHOR procedure due to location of KISHOR incisions and lack of rectus involvement.    Sincerely,    Yuliana White     The patient was seen and examined by me I reviewed the history.  In short she is a patient with recurrent breast cancer with a history of free flaps who presents with what appears to be a hernia.  It is unclear what the source of the hernia whether it is related to the previous procedure or spontaneously occurring.  We will obtain a CAT scan.  The patient would like to go on a vacation beforehand but given no obstructive symptoms I think it is reasonable we would consider a laparoscopic versus an open repair.  We will discuss that that the next visit in some detail otherwise did give some of the details and risks of these different approaches as well as watchful waiting.  She understands all of this she did see Dr. Howell today who was able to examine her incisions and weigh in on the fact that the either laparoscopic or open procedure would be of little risk to the work they have done previously.    Again, thank you for allowing me to participate in the care of your patient.      Sincerely,    Emil Brown MD

## 2019-09-04 NOTE — NURSING NOTE
"Chief Complaint   Patient presents with     Consult     New consult for hernia surgery.       Vitals:    09/04/19 0845   BP: (!) 140/88   BP Location: Left arm   Patient Position: Sitting   Cuff Size: Adult Regular   Pulse: 62   Temp: 97.5  F (36.4  C)   TempSrc: Oral   SpO2: 99%   Weight: 81.1 kg (178 lb 11.2 oz)   Height: 1.645 m (5' 4.75\")       Body mass index is 29.97 kg/m .                   Emy Felton CMA    "

## 2019-09-04 NOTE — PROGRESS NOTES
New Hernia Consultation Note      Enid Lombardo  [id number]  1960    Requesting Provider: Shi Alonso    Dear Gavin, Shi Baez,    I was asked by Shi Alonso to see this patient for the following problem:    CHIEF COMPLAINT:  New hernia    HISTORY OF PRESENT ILLNESS:  Location: Right upper quadrant, about 1 cm off midline and 3 cm subcostal  Severity: mild to moderate    Enid Lombardo is a 59 year old female with recurrent breast carcinoma with bilateral mastectomy and bilateral KISHOR breast reconstruction in 2018 with chronic anastrozole therapy who presents to clinic with a new hernia. She noticed the hernia in 5/2019 and saw her PCP on 6/03/2019, when she was told she had an upper abdominal hernia and was given orders for abdominal wall exercises with PT. She felt her hernia appeared smaller since beginning PT, but about two weeks ago (late 8/2019), she felt a pop and has noticed her hernia protruding more since then. She does not endorse pain with the hernia, nor does she experience nausea, vomiting, or changes in bowel habits. However, she experiences occasional discomfort with her hernia. Discomfort is worse at night, as her hernia tends to protrude more at the end of the day and is associated with more of a pressure sensation in the evening.    Other than her bilateral KISHOR breast reconstruction by Dr. Amin in 2018, she had a vaginal hysterectomy about 18 years ago and otherwise has had no other abdominal surgeries.    She is currently being worked up regarding headaches, for which she has a brain MRI this Friday and seeing PT. She is also continuing Zometa infusions and daily anastrozole therapy per oncology. Otherwise, she has no additional medical concerns or complaints at this time.  No flowsheet data found.    No flowsheet data found.    No flowsheet data found.              Patient Supplied Answers To HerQLes Assessment Questionnaire  No flowsheet data  found.  _______________________________________________________________________            NUTRITIONAL STATUS:  Lab Results   Component Value Date    ALBUMIN 3.5 07/15/2019        Body mass index is 29.97 kg/m .    Patient is not immunosuppressed.    Patient is not a current smoker.    Past Medical History:   Diagnosis Date     Breast cancer (H) 2004    lumpectomy, radiation, tamoxifen     Cancer (H) 2004    Breast     Cataracts, bilateral      Complication of anesthesia     She prefers not to have versed until right before she leaves or can have after      Enthesopathy of hip region 6/06    right hip     Esophageal reflux 2/06     History of gestational diabetes      Hypertension 2012     Macular drusen      PONV (postoperative nausea and vomiting)      Shingles 01/23/2012    left T6-T7 dermatome       Patient Active Problem List   Diagnosis     History of right breast cancer     Esophageal reflux     Disorder of synovium, tendon, and bursa     Cataract     Shingles     Pain, radicular, lumbar     Right hip pain     Low back pain     IT band syndrome     Macular drusen     Cervicalgia     Headache     Keratoconus     Meibomian gland dysfunction - Both Eyes     Tear film insufficiency     Hyperlipidemia with target LDL less than 130     Benign neoplasm of colon     Skin abscess     Osteopenia of both hands     Recurrent carcinoma in situ of breast, right     Breast cancer in situ     S/P breast reconstruction, bilateral     S/P flap graft     Osteopenia of multiple sites     Long term (current) use of aromatase inhibitors       Past Surgical History:   Procedure Laterality Date     BIOPSY  2004    Breast     BREAST SURGERY  2004     C VAGINAL HYSTERECTOMY  12/2001    Ovaries intact, due to fibroids     COLONOSCOPY       ENDOSCOPY  05/09/2007    Upper GI     EYE SURGERY       GRAFT FAT TO BREAST Bilateral 11/29/2018    Procedure: Bilateral Breast Fat Grafting from Abdomen and Thighs;  Surgeon: DENNISE Amin,  MD;  Location: [facility]     GRAFT FREE VASCULARIZED TRANSVERSE RECTUS ABDOMINIS MYOCUTANEOUS Bilateral 10/8/2018    Procedure: GRAFT FREE VASCULARIZED TRANSVERSE RECTUS ABDOMINIS MYOCUTANEOUS;  Bilateral Deep Inferior Epigastric Artery  Free Flap Breast Reconstruction and Removal of Breast Explanders;  Surgeon: DENNISE Amin MD;  Location: [facility] OR     GYN SURGERY  2001     HC BIOPSY/EXCISION LYMPH NODE OPEN DEEP CERVICAL W EXC FAT PAD  1/7/2005    Right axillary sentinel node biopsy X 3.     HC COLONOSCOPY W BIOPSY  05/10/10     HC EXCISION BREAST LESION, OPEN >=1  1/7/2005    Right breast.     HC REMV CATARACT EXTRACAP,INSERT LENS  12/18/08    bilateral     HC REMV TARSAL/METATARSAL BENIGN BONE LESN  1982    Bone spur - right     MASTECTOMY SIMPLE BILATERAL, SENTINEL NODE BILATERAL, COMBINED Bilateral 8/22/2018    Procedure: COMBINED MASTECTOMY SIMPLE BILATERAL, SENTINEL NODE BILATERAL;  Bilateral Mastectomy with Right George Node Biopsy, Bilateral Breast Reconstruction, Anesthesia Block;  Surgeon: Rakesh Sanchez MD;  Location:  OR     ORTHOPEDIC SURGERY  1983    Right foot     RECONSTRUCT BREAST Bilateral 8/22/2018    Procedure: RECONSTRUCT BREAST;;  Surgeon: DENNISE Amin MD;  Location:  OR     RECONSTRUCT BREAST BILATERAL Bilateral 10/8/2018    Procedure: RECONSTRUCT BREAST BILATERAL;;  Surgeon: DENNISE Amin MD;  Location:  OR     RECONSTRUCT NIPPLE BILATERAL Bilateral 11/29/2018    Procedure: Nipple Reconstruction;  Surgeon: DENNISE Amin MD;  Location:  OR       MEDICATIONS:  Current Outpatient Medications   Medication     anastrozole (ARIMIDEX) 1 MG tablet     Calcium-Vitamin D-Vitamin K (VIACTIV CALCIUM PLUS D PO)     fluticasone (FLONASE) 50 MCG/ACT nasal spray     ibuprofen (ADVIL) 200 MG tablet     loratadine (CLARITIN) 10 MG tablet     melatonin 5 MG tablet     Multiple Vitamins-Minerals (PRESERVISION AREDS) CAPS     Omega-3 Fatty Acids (FISH OIL) 1000  MG CPDR     omeprazole (PRILOSEC) 20 MG CR capsule     sertraline (ZOLOFT) 25 MG tablet     tiZANidine (ZANAFLEX) 4 MG tablet     diclofenac (VOLTAREN) 75 MG EC tablet     No current facility-administered medications for this visit.        ALLERGIES:  Allergies   Allergen Reactions     Alphagan P Rash     Thrush and numbness in mouth     Diphenhydramine Other (See Comments)     Brimonidine Rash       Social History     Socioeconomic History     Marital status:      Spouse name: Carson     Number of children: 3     Years of education: 24     Highest education level: None   Occupational History     Occupation: Teacher     Employer: [employer]   Social Needs     Financial resource strain: None     Food insecurity:     Worry: None     Inability: None     Transportation needs:     Medical: None     Non-medical: None   Tobacco Use     Smoking status: Never Smoker     Smokeless tobacco: Never Used   Substance and Sexual Activity     Alcohol use: Yes     Comment: occasionally     Drug use: No     Sexual activity: Yes     Partners: Male     Birth control/protection: Surgical     Comment: hysterectomy   Lifestyle     Physical activity:     Days per week: None     Minutes per session: None     Stress: None   Relationships     Social connections:     Talks on phone: None     Gets together: None     Attends Jain service: None     Active member of club or organization: None     Attends meetings of clubs or organizations: None     Relationship status: None     Intimate partner violence:     Fear of current or ex partner: None     Emotionally abused: None     Physically abused: None     Forced sexual activity: None   Other Topics Concern      Service No     Blood Transfusions No     Caffeine Concern Yes     Comment: 4 cups per day     Occupational Exposure No     Hobby Hazards No     Sleep Concern No     Stress Concern No     Weight Concern No     Special Diet No     Back Care No     Exercise No      "Bike Helmet Yes     Seat Belt Yes     Self-Exams Yes     Parent/sibling w/ CABG, MI or angioplasty before 65F 55M? No     Comment: Unsure - adopted   Social History Narrative     None       Family History   Adopted: Yes   Problem Relation Age of Onset     Cancer Mother         lung cancer  at age 52     Thyroid Disease Sister         half sister, had thyroid removed     Unknown/Adopted Father      Unknown/Adopted Maternal Grandmother      Unknown/Adopted Maternal Grandfather      Unknown/Adopted Paternal Grandmother      Unknown/Adopted Paternal Grandfather      Glaucoma No family hx of      Diabetes No family hx of      Melanoma No family hx of      Skin Cancer No family hx of        ROS   HEENT: headaches; no hearing or vision changes; no tinnitus  Respiratory: no coughing, no wheezing, no shortness of breath  Cardiovascular: no chest pain, no palpitations  Gastrointestinal: hernia with some discomfort per HPI; no pain, no nausea, no vomiting, no diarrhea, no constipation  Genitourinary: no dysuria, no changes in urinary frequency  Musculoskeletal: no current joint or muscle pain  Neurological: no tingling, no numbness, no loss of sensation  Psychological: no mood changes  Skin: no rashes  Orders Placed This Encounter   Procedures     CT Abdomen Pelvis w Contrast       PHYSICAL EXAMINATION:  Vital Signs: BP (!) 140/88 (BP Location: Left arm, Patient Position: Sitting, Cuff Size: Adult Regular)   Pulse 62   Temp 97.5  F (36.4  C) (Oral)   Ht 1.645 m (5' 4.75\")   Wt 81.1 kg (178 lb 11.2 oz)   LMP 10/14/2001   SpO2 99%   BMI 29.97 kg/m     General: Patient is a pleasant woman who is alert, attentive, and interactive. She is well dressed, appears healthy and is in no acute distress.  HEENT: NCAT; MMM;   Lungs: Breathing unlabored  Abdomen: Abdomen is soft, nontender, and nondistended. Hernia exam described below.    PHYSICAL EXAM AREA OF INTEREST:  Hernia:  Hernia is appreciated about 1 cm off midline in " the right upper quadrant and about 4 cm subcostally. Hernia is reducible with patient supine on exam table. Some mild discomfort is reported with deep palpation over the hernia. Surgical scar is present from approximately in the right anterior inferior iliac spine to the left anterior inferior iliac spine, consistent with location of surgical graft flaps.      ASSESSMENT:  Right upper abdominal hernia, about 1 cm off midline and 4 cm subcostal  Hernia size is 5cm in size.    DISCUSSION OF RISKS:  I discussed the alternatives, benefits, risks and possible complications of hernia repair with the patient. The risks of hernia surgery with and without mesh are described below.     Based on FDA s analysis of medical device adverse event reports and of peer-reviewed, scientific literature, the most common adverse events for all surgical repair of hernias--with or without mesh--are pain, infection, hernia recurrence, scar-like tissue that sticks tissues together (adhesion), blockage of the large or small intestine (obstruction), bleeding, abnormal connection between organs, vessels, or intestines (fistula), fluid build-up at the surgical site (seroma), and a hole in neighboring tissues or organs (perforation).  Some other potential adverse events that can occur following hernia repair with mesh are mesh migration and mesh shrinkage (contraction).    http://www.fda.gov/MedicalDevices/ProductsandMedicalProcedures/ImplantsandProsthetics/HerniaSurgicalMesh/default.htm    PLAN:  1) New right upper abdominal hernia  CT scan with oral contrast recommended to determine extent of hernia and assist in determination of surgical management. Patient to schedule as convenient. Follow up recommended in two weeks to review imaging results and discuss operative plan.    2) S/p bilateral KISHOR breast reconstruction in 2018  Operative note was reviewed and discussed with Dr. Amin. Hernia unlikely due to surgical manipulation of tissue for  KISHOR procedure due to location of KISHOR incisions and lack of rectus involvement.    Sincerely,    Yuliana White     The patient was seen and examined by me I reviewed the history.  In short she is a patient with recurrent breast cancer with a history of free flaps who presents with what appears to be a hernia.  It is unclear what the source of the hernia whether it is related to the previous procedure or spontaneously occurring.  We will obtain a CAT scan.  The patient would like to go on a vacation beforehand but given no obstructive symptoms I think it is reasonable we would consider a laparoscopic versus an open repair.  We will discuss that that the next visit in some detail otherwise did give some of the details and risks of these different approaches as well as watchful waiting.  She understands all of this she did see Dr. Howell today who was able to examine her incisions and weigh in on the fact that the either laparoscopic or open procedure would be of little risk to the work they have done previously.

## 2019-09-05 ENCOUNTER — ANCILLARY PROCEDURE (OUTPATIENT)
Dept: CT IMAGING | Facility: CLINIC | Age: 59
End: 2019-09-05
Attending: NURSE PRACTITIONER
Payer: COMMERCIAL

## 2019-09-05 DIAGNOSIS — K43.9 VENTRAL HERNIA WITHOUT OBSTRUCTION OR GANGRENE: ICD-10-CM

## 2019-09-05 PROCEDURE — 74177 CT ABD & PELVIS W/CONTRAST: CPT | Performed by: RADIOLOGY

## 2019-09-05 RX ORDER — IOPAMIDOL 755 MG/ML
103 INJECTION, SOLUTION INTRAVASCULAR ONCE
Status: COMPLETED | OUTPATIENT
Start: 2019-09-05 | End: 2019-09-05

## 2019-09-05 RX ADMIN — IOPAMIDOL 103 ML: 755 INJECTION, SOLUTION INTRAVASCULAR at 15:49

## 2019-09-06 ENCOUNTER — ANCILLARY PROCEDURE (OUTPATIENT)
Dept: MRI IMAGING | Facility: CLINIC | Age: 59
End: 2019-09-06
Attending: INTERNAL MEDICINE
Payer: COMMERCIAL

## 2019-09-06 DIAGNOSIS — Z85.3 HISTORY OF RIGHT BREAST CANCER: ICD-10-CM

## 2019-09-06 DIAGNOSIS — R51.9 INTRACTABLE HEADACHE, UNSPECIFIED CHRONICITY PATTERN, UNSPECIFIED HEADACHE TYPE: ICD-10-CM

## 2019-09-06 PROCEDURE — 70553 MRI BRAIN STEM W/O & W/DYE: CPT | Performed by: RADIOLOGY

## 2019-09-06 PROCEDURE — A9585 GADOBUTROL INJECTION: HCPCS | Performed by: INTERNAL MEDICINE

## 2019-09-06 RX ORDER — GADOBUTROL 604.72 MG/ML
10 INJECTION INTRAVENOUS ONCE
Status: COMPLETED | OUTPATIENT
Start: 2019-09-06 | End: 2019-09-06

## 2019-09-06 RX ADMIN — GADOBUTROL 8 ML: 604.72 INJECTION INTRAVENOUS at 10:55

## 2019-09-09 DIAGNOSIS — C50.919 RECURRENT MALIGNANT NEOPLASM OF BREAST, UNSPECIFIED LATERALITY (H): ICD-10-CM

## 2019-09-09 DIAGNOSIS — M54.12 CERVICAL RADICULAR PAIN: ICD-10-CM

## 2019-09-09 DIAGNOSIS — M75.81 TENDINITIS OF RIGHT ROTATOR CUFF: ICD-10-CM

## 2019-09-09 RX ORDER — ANASTROZOLE 1 MG/1
TABLET ORAL
Qty: 90 TABLET | Refills: 3 | Status: SHIPPED | OUTPATIENT
Start: 2019-09-09 | End: 2020-07-03

## 2019-09-10 RX ORDER — DICLOFENAC SODIUM 75 MG/1
75 TABLET, DELAYED RELEASE ORAL 2 TIMES DAILY
Qty: 60 TABLET | Refills: 1 | Status: SHIPPED | OUTPATIENT
Start: 2019-09-10 | End: 2019-10-30

## 2019-09-11 ENCOUNTER — OFFICE VISIT (OUTPATIENT)
Dept: SURGERY | Facility: CLINIC | Age: 59
End: 2019-09-11
Payer: COMMERCIAL

## 2019-09-11 ENCOUNTER — TELEPHONE (OUTPATIENT)
Dept: SURGERY | Facility: CLINIC | Age: 59
End: 2019-09-11

## 2019-09-11 VITALS
HEART RATE: 69 BPM | OXYGEN SATURATION: 96 % | BODY MASS INDEX: 29.91 KG/M2 | TEMPERATURE: 97.7 F | HEIGHT: 65 IN | DIASTOLIC BLOOD PRESSURE: 89 MMHG | SYSTOLIC BLOOD PRESSURE: 139 MMHG | WEIGHT: 179.5 LBS

## 2019-09-11 DIAGNOSIS — K43.9 VENTRAL HERNIA WITHOUT OBSTRUCTION OR GANGRENE: Primary | ICD-10-CM

## 2019-09-11 ASSESSMENT — MIFFLIN-ST. JEOR: SCORE: 1396.12

## 2019-09-11 ASSESSMENT — PAIN SCALES - GENERAL: PAINLEVEL: MILD PAIN (2)

## 2019-09-11 NOTE — PROGRESS NOTES
Patient is here with her  today for follow-up status post CAT scan.  CAT scan was ordered to determine the extent of the hernias.  We did note to clear hernias of her abdominal wall.  Both small.  However given the nature of this we suggested a laparoscopic ventral hernia repair may be the better approach.  We discussed the procedure the risks potential complications and alternatives patient discussed the fact that she has a history of neck pain for which she is considering a steroid injection.  She talked about her prolonged need for recovery after her hysterectomy with a significant postoperative swelling.  She will need to see the preanesthesia clinic once her neck injections are done she will be considering a laparoscopic ventral hernia repair with mesh she understands procedure its risk potential complications and alternatives at this point we plan to do this over at the [facility] hospital.

## 2019-09-11 NOTE — NURSING NOTE
"Chief Complaint   Patient presents with     RECHECK     New hernia.       Vitals:    09/11/19 0720   BP: 139/89   BP Location: Left arm   Patient Position: Sitting   Cuff Size: Adult Large   Pulse: 69   Temp: 97.7  F (36.5  C)   TempSrc: Oral   SpO2: 96%   Weight: 81.4 kg (179 lb 8 oz)   Height: 1.645 m (5' 4.75\")       Body mass index is 30.1 kg/m .                    Emy Felton CMA    "

## 2019-09-11 NOTE — TELEPHONE ENCOUNTER
Patient called to schedule ventral hernia repair with Dr. Brown.Scheduled 10/21/19 [place]. [facility] telephone number given for her to schedule at a time which is convenient for her, no longer than 30 days out.

## 2019-09-11 NOTE — LETTER
9/11/2019       RE: Enid Lombardo  [address] [facility] [address]     Dear Colleague,    Thank you for referring your patient, Enid Lombardo, to the [facility] GENERAL SURGERY at [facility]. Please see a copy of my visit note below.    Patient is here with her  today for follow-up status post CAT scan.  CAT scan was ordered to determine the extent of the hernias.  We did note to clear hernias of her abdominal wall.  Both small.  However given the nature of this we suggested a laparoscopic ventral hernia repair may be the better approach.  We discussed the procedure the risks potential complications and alternatives patient discussed the fact that she has a history of neck pain for which she is considering a steroid injection.  She talked about her prolonged need for recovery after her hysterectomy with a significant postoperative swelling.  She will need to see the preanesthesia clinic once her neck injections are done she will be considering a laparoscopic ventral hernia repair with mesh she understands procedure its risk potential complications and alternatives at this point we plan to do this over at the [facility] hospital.    Again, thank you for allowing me to participate in the care of your patient.      Sincerely,    Emil Brown MD

## 2019-09-15 DIAGNOSIS — M54.12 CERVICAL RADICULAR PAIN: ICD-10-CM

## 2019-09-15 DIAGNOSIS — M75.81 TENDINITIS OF RIGHT ROTATOR CUFF: ICD-10-CM

## 2019-09-16 ENCOUNTER — ANCILLARY PROCEDURE (OUTPATIENT)
Dept: MRI IMAGING | Facility: CLINIC | Age: 59
End: 2019-09-16
Attending: PREVENTIVE MEDICINE
Payer: COMMERCIAL

## 2019-09-16 DIAGNOSIS — M54.12 CERVICAL RADICULAR PAIN: ICD-10-CM

## 2019-09-16 PROCEDURE — 72141 MRI NECK SPINE W/O DYE: CPT | Performed by: RADIOLOGY

## 2019-09-17 ENCOUNTER — PRE VISIT (OUTPATIENT)
Dept: SURGERY | Facility: CLINIC | Age: 59
End: 2019-09-17

## 2019-09-17 ENCOUNTER — HOSPITAL ENCOUNTER (OUTPATIENT)
Dept: PHYSICAL THERAPY | Facility: OTHER | Age: 59
Setting detail: THERAPIES SERIES
End: 2019-09-17
Attending: PREVENTIVE MEDICINE
Payer: COMMERCIAL

## 2019-09-17 PROCEDURE — 97140 MANUAL THERAPY 1/> REGIONS: CPT | Mod: GP | Performed by: PHYSICAL THERAPIST

## 2019-09-17 NOTE — TELEPHONE ENCOUNTER
FUTURE VISIT INFORMATION      SURGERY INFORMATION:    Date: 10/21/19    Location: [facility] OR    Surgeon:  Emil Brown    Anesthesia Type:  General    RECORDS REQUESTED FROM:       Primary Care Provider: Shi Encarnacion    Most recent EKG+ Tracing: 10/3/18    Most recent ECHO: 11/23/14    Most recent Cardiac Stress Test: 11/23/14    Most recent PFT's: 1/9/2006

## 2019-09-19 ENCOUNTER — OFFICE VISIT (OUTPATIENT)
Dept: ORTHOPEDICS | Facility: CLINIC | Age: 59
End: 2019-09-19
Payer: COMMERCIAL

## 2019-09-19 VITALS
SYSTOLIC BLOOD PRESSURE: 134 MMHG | HEIGHT: 65 IN | BODY MASS INDEX: 29.82 KG/M2 | HEART RATE: 61 BPM | DIASTOLIC BLOOD PRESSURE: 81 MMHG | WEIGHT: 179 LBS

## 2019-09-19 DIAGNOSIS — M54.12 CERVICAL RADICULAR PAIN: Primary | ICD-10-CM

## 2019-09-19 DIAGNOSIS — M50.20 HERNIATED CERVICAL INTERVERTEBRAL DISC: ICD-10-CM

## 2019-09-19 PROCEDURE — 99214 OFFICE O/P EST MOD 30 MIN: CPT | Performed by: PREVENTIVE MEDICINE

## 2019-09-19 ASSESSMENT — PAIN SCALES - GENERAL: PAINLEVEL: MILD PAIN (3)

## 2019-09-19 ASSESSMENT — MIFFLIN-ST. JEOR: SCORE: 1393.85

## 2019-09-19 NOTE — PATIENT INSTRUCTIONS
Thanks for coming today.  [facility]  [address] [place] [address]    To schedule future appointments in Ortho Clinic, you may call [phone].    To schedule ordered imaging by your provider:   Call Central Imaging [phone]    To schedule an injection ordered by your provider:  Call Central Imaging Injection scheduling line: [phone]  Suede Lanehart available online at:  [email or web address]    Please call if any further questions or concerns ([phone]).  Clinic hours 8 am to 5 pm.    Return to clinic (call) if symptoms worsen or fail to improve.

## 2019-09-19 NOTE — PROGRESS NOTES
HISTORY OF PRESENT ILLNESS  Ms. Lombardo is a pleasant 59 year old year old female who presents to clinic today for followup for cervical MRI  Continues to have neck pain and posterior headaches  Has pain in neck as soon as she gets out of bed each morning      MEDICAL HISTORY  Patient Active Problem List   Diagnosis     History of right breast cancer     Esophageal reflux     Disorder of synovium, tendon, and bursa     Cataract     Shingles     Pain, radicular, lumbar     Right hip pain     Low back pain     IT band syndrome     Macular drusen     Cervicalgia     Headache     Keratoconus     Meibomian gland dysfunction - Both Eyes     Tear film insufficiency     Hyperlipidemia with target LDL less than 130     Benign neoplasm of colon     Skin abscess     Osteopenia of both hands     Recurrent carcinoma in situ of breast, right     Breast cancer in situ     S/P breast reconstruction, bilateral     S/P flap graft     Osteopenia of multiple sites     Long term (current) use of aromatase inhibitors       Current Outpatient Medications   Medication Sig Dispense Refill     anastrozole (ARIMIDEX) 1 MG tablet TAKE 1 TABLET BY MOUTH EVERY DAY 90 tablet 3     anastrozole (ARIMIDEX) 1 MG tablet Take 1 tablet (1 mg) by mouth daily 90 tablet 3     Calcium-Vitamin D-Vitamin K (VIACTIV CALCIUM PLUS D PO) Take 1 tablet by mouth 2 times daily       diclofenac (VOLTAREN) 75 MG EC tablet TAKE 1 TABLET (75 MG) BY MOUTH 2 TIMES DAILY 60 tablet 1     fluticasone (FLONASE) 50 MCG/ACT nasal spray USE ONE OR TWO SPRAYS IN EACH NOSTRIL DAILY 48 mL 3     ibuprofen (ADVIL) 200 MG tablet Take 400 mg by mouth as needed for mild pain       loratadine (CLARITIN) 10 MG tablet TAKE 1 TABLET BY MOUTH EVERY DAY AS NEEDED FOR ALLERGY SYMPTOMS 90 tablet 3     melatonin 5 MG tablet        Multiple Vitamins-Minerals (PRESERVISION AREDS) CAPS Take 1 capsule by mouth 2 times daily 180 capsule 3     Omega-3 Fatty Acids (FISH OIL) 1000 MG CPDR Take  by  "mouth.       omeprazole (PRILOSEC) 20 MG CR capsule TAKE 1 CAPSULE (20 MG) BY MOUTH DAILY 90 capsule 3     sertraline (ZOLOFT) 25 MG tablet Take 1 tablet daily. (Patient taking differently: 25 mg every morning Take 1 tablet daily.) 30 tablet 11     tiZANidine (ZANAFLEX) 4 MG tablet TAKE 1-2 TABLETS (4-8 MG) BY MOUTH NIGHTLY AS NEEDED 30 tablet 1       Allergies   Allergen Reactions     Alphagan P Rash     Thrush and numbness in mouth     Diphenhydramine Other (See Comments)     Brimonidine Rash       Family History   Adopted: Yes   Problem Relation Age of Onset     Cancer Mother         lung cancer  at age 52     Thyroid Disease Sister         half sister, had thyroid removed     Unknown/Adopted Father      Unknown/Adopted Maternal Grandmother      Unknown/Adopted Maternal Grandfather      Unknown/Adopted Paternal Grandmother      Unknown/Adopted Paternal Grandfather      Glaucoma No family hx of      Diabetes No family hx of      Melanoma No family hx of      Skin Cancer No family hx of        Additional medical/Social/Surgical histories reviewed in [facility] and updated as appropriate.     REVIEW OF SYSTEMS (2019)  10 point ROS of systems including Constitutional, Eyes, Respiratory, Cardiovascular, Gastroenterology, Genitourinary, Integumentary, Musculoskeletal, Psychiatric were all negative except for pertinent positives noted in my HPI.     PHYSICAL EXAM  Vitals:    19 0901   BP: 134/81   Pulse: 61   Weight: 81.2 kg (179 lb)   Height: 1.645 m (5' 4.75\")     Vital Signs: /81   Pulse 61   Ht 1.645 m (5' 4.75\")   Wt 81.2 kg (179 lb)   LMP 10/14/2001   BMI 30.02 kg/m   Patient declined being weighed. Body mass index is 30.02 kg/m .    General  - normal appearance, in no obvious distress  CV  - normal radial pulse  Pulm  - normal respiratory pattern, non-labored  Musculoskeletal - neck  - inspection: normal bone and joint alignment, no obvious deformity, no scapular winging, no AC step-off  - " palpation: ttp over cervical paraspinal muscles, normal clavicle, non-tender AC  - ROM:  Painful ROM with flexion and extension in base of neck and paracervical muscles  - strength: 5/5  strength, 5/5 in all shoulder planes  - special tests:  Positive spurlings causing neck pain  Neuro  - no sensory or motor deficit, grossly normal coordination, normal muscle tone  Skin  - no ecchymosis, erythema, warmth, or induration, no obvious rash  Psych  - interactive, appropriate, normal mood and affect  ASSESSMENT & PLAN  60 yo female with cervical herniated discs  Reviewed cervical MRI: shows herniated discs  Ordered ELIEZER  Cont. HEP  Lido patches   F/u after ELIEZER    Castro Mcginnis MD, CAQSM

## 2019-09-19 NOTE — LETTER
"    9/19/2019         RE: Enid Lombardo  [address] [facility] [address]        Dear Colleague,    Thank you for referring your patient, Enid Lombardo, to the [facility]. Please see a copy of my visit note below.    Enid Lombardo's chief complaint for this visit includes:  Chief Complaint   Patient presents with     Follow Up     Follow up after MRI     PCP: Shi Alonso    Referring Provider:  No referring provider defined for this encounter.    /81   Pulse 61   Ht 1.645 m (5' 4.75\")   Wt 81.2 kg (179 lb)   LMP 10/14/2001   BMI 30.02 kg/m     Mild Pain (3)         HISTORY OF PRESENT ILLNESS  Ms. Lombardo is a pleasant 59 year old year old female who presents to clinic today for followup for cervical MRI  Continues to have neck pain and posterior headaches  Has pain in neck as soon as she gets out of bed each morning      MEDICAL HISTORY  Patient Active Problem List   Diagnosis     History of right breast cancer     Esophageal reflux     Disorder of synovium, tendon, and bursa     Cataract     Shingles     Pain, radicular, lumbar     Right hip pain     Low back pain     IT band syndrome     Macular drusen     Cervicalgia     Headache     Keratoconus     Meibomian gland dysfunction - Both Eyes     Tear film insufficiency     Hyperlipidemia with target LDL less than 130     Benign neoplasm of colon     Skin abscess     Osteopenia of both hands     Recurrent carcinoma in situ of breast, right     Breast cancer in situ     S/P breast reconstruction, bilateral     S/P flap graft     Osteopenia of multiple sites     Long term (current) use of aromatase inhibitors       Current Outpatient Medications   Medication Sig Dispense Refill     anastrozole (ARIMIDEX) 1 MG tablet TAKE 1 TABLET BY MOUTH EVERY DAY 90 tablet 3     anastrozole (ARIMIDEX) 1 MG tablet Take 1 tablet (1 mg) by mouth daily 90 tablet 3     Calcium-Vitamin D-Vitamin K (VIACTIV CALCIUM PLUS D PO) Take 1 " tablet by mouth 2 times daily       diclofenac (VOLTAREN) 75 MG EC tablet TAKE 1 TABLET (75 MG) BY MOUTH 2 TIMES DAILY 60 tablet 1     fluticasone (FLONASE) 50 MCG/ACT nasal spray USE ONE OR TWO SPRAYS IN EACH NOSTRIL DAILY 48 mL 3     ibuprofen (ADVIL) 200 MG tablet Take 400 mg by mouth as needed for mild pain       loratadine (CLARITIN) 10 MG tablet TAKE 1 TABLET BY MOUTH EVERY DAY AS NEEDED FOR ALLERGY SYMPTOMS 90 tablet 3     melatonin 5 MG tablet        Multiple Vitamins-Minerals (PRESERVISION AREDS) CAPS Take 1 capsule by mouth 2 times daily 180 capsule 3     Omega-3 Fatty Acids (FISH OIL) 1000 MG CPDR Take  by mouth.       omeprazole (PRILOSEC) 20 MG CR capsule TAKE 1 CAPSULE (20 MG) BY MOUTH DAILY 90 capsule 3     sertraline (ZOLOFT) 25 MG tablet Take 1 tablet daily. (Patient taking differently: 25 mg every morning Take 1 tablet daily.) 30 tablet 11     tiZANidine (ZANAFLEX) 4 MG tablet TAKE 1-2 TABLETS (4-8 MG) BY MOUTH NIGHTLY AS NEEDED 30 tablet 1       Allergies   Allergen Reactions     Alphagan P Rash     Thrush and numbness in mouth     Diphenhydramine Other (See Comments)     Brimonidine Rash       Family History   Adopted: Yes   Problem Relation Age of Onset     Cancer Mother         lung cancer  at age 52     Thyroid Disease Sister         half sister, had thyroid removed     Unknown/Adopted Father      Unknown/Adopted Maternal Grandmother      Unknown/Adopted Maternal Grandfather      Unknown/Adopted Paternal Grandmother      Unknown/Adopted Paternal Grandfather      Glaucoma No family hx of      Diabetes No family hx of      Melanoma No family hx of      Skin Cancer No family hx of        Additional medical/Social/Surgical histories reviewed in [facility] and updated as appropriate.     REVIEW OF SYSTEMS (2019)  10 point ROS of systems including Constitutional, Eyes, Respiratory, Cardiovascular, Gastroenterology, Genitourinary, Integumentary, Musculoskeletal, Psychiatric were all negative  "except for pertinent positives noted in my HPI.     PHYSICAL EXAM  Vitals:    09/19/19 0901   BP: 134/81   Pulse: 61   Weight: 81.2 kg (179 lb)   Height: 1.645 m (5' 4.75\")     Vital Signs: /81   Pulse 61   Ht 1.645 m (5' 4.75\")   Wt 81.2 kg (179 lb)   LMP 10/14/2001   BMI 30.02 kg/m    Patient declined being weighed. Body mass index is 30.02 kg/m .    General  - normal appearance, in no obvious distress  CV  - normal radial pulse  Pulm  - normal respiratory pattern, non-labored  Musculoskeletal - neck  - inspection: normal bone and joint alignment, no obvious deformity, no scapular winging, no AC step-off  - palpation: ttp over cervical paraspinal muscles, normal clavicle, non-tender AC  - ROM:  Painful ROM with flexion and extension in base of neck and paracervical muscles  - strength: 5/5  strength, 5/5 in all shoulder planes  - special tests:  Positive spurlings causing neck pain  Neuro  - no sensory or motor deficit, grossly normal coordination, normal muscle tone  Skin  - no ecchymosis, erythema, warmth, or induration, no obvious rash  Psych  - interactive, appropriate, normal mood and affect  ASSESSMENT & PLAN  58 yo female with cervical herniated discs  Reviewed cervical MRI: shows herniated discs  Ordered ELIEZER  Cont. HEP  Lido patches   F/u after ELIZEER    Castro Mcginnis MD, CAQSM    Again, thank you for allowing me to participate in the care of your patient.        Sincerely,        Castro Mcginnis MD    "

## 2019-09-28 ENCOUNTER — HEALTH MAINTENANCE LETTER (OUTPATIENT)
Age: 59
End: 2019-09-28

## 2019-09-30 NOTE — OR NURSING
Dr. Martin called for sign out. Okay for patient to transfer to .   PROGRESS  REPORT    Progress reporting period is from 8/15/19 to 9/30/19.       SUBJECTIVE  Subjective changes noted by patient:   Subjective: The neck has been sore and thinks this is her underlying arthritis.  Not getting much tingling.  Seems to change with weather.  Been using traction at home with improvements.     Current pain level is 0/10  .     Previous pain level was  5/10 Initial Pain level: 5/10.   Changes in function:  Yes (See Goal flowsheet attached for changes in current functional level)  Adverse reaction to treatment or activity: None    OBJECTIVE  Changes noted in objective findings:  Yes, Cervical range of motion WNL.  Myotomes WNL.  Minimal subocciptial tension.        ASSESSMENT/PLAN  Updated problem list and treatment plan: Diagnosis 1:  Left neck pain    STG/LTGs have been met or progress has been made towards goals:  Yes (See Goal flow sheet completed today.)  Assessment of Progress: The patient's condition is improving.  The patient's condition has potential to improve.  The patient has met all of their long term goals.  Self Management Plans:  Patient has been instructed in a home treatment program.  Patient is independent in a home treatment program.  Patient  has been instructed in self management of symptoms.  I have re-evaluated this patient and find that the nature, scope, duration and intensity of the therapy is appropriate for the medical condition of the patient.  Alma continues to require the following intervention to meet STG and LTG's:  PT intervention is no longer required to meet STG/LTG.    Recommendations:  This patient is ready to be discharged from therapy and continue their home treatment program.    Please refer to the daily flowsheet for treatment today, total treatment time and time spent performing 1:1 timed codes.

## 2019-10-02 ENCOUNTER — HOSPITAL ENCOUNTER (OUTPATIENT)
Dept: PHYSICAL THERAPY | Facility: OTHER | Age: 59
Setting detail: THERAPIES SERIES
End: 2019-10-02
Attending: PREVENTIVE MEDICINE
Payer: COMMERCIAL

## 2019-10-02 PROCEDURE — 97110 THERAPEUTIC EXERCISES: CPT | Mod: GP | Performed by: PHYSICAL THERAPIST

## 2019-10-03 ENCOUNTER — ANCILLARY PROCEDURE (OUTPATIENT)
Dept: GENERAL RADIOLOGY | Facility: CLINIC | Age: 59
End: 2019-10-03
Attending: PREVENTIVE MEDICINE
Payer: COMMERCIAL

## 2019-10-03 VITALS — DIASTOLIC BLOOD PRESSURE: 90 MMHG | OXYGEN SATURATION: 98 % | SYSTOLIC BLOOD PRESSURE: 148 MMHG | HEART RATE: 65 BPM

## 2019-10-03 DIAGNOSIS — M50.20 HERNIATED CERVICAL INTERVERTEBRAL DISC: ICD-10-CM

## 2019-10-03 PROCEDURE — 62321 NJX INTERLAMINAR CRV/THRC: CPT | Performed by: RADIOLOGY

## 2019-10-03 RX ORDER — IOPAMIDOL 408 MG/ML
10 INJECTION, SOLUTION INTRATHECAL ONCE
Status: COMPLETED | OUTPATIENT
Start: 2019-10-03 | End: 2019-10-03

## 2019-10-03 RX ORDER — BETAMETHASONE SODIUM PHOSPHATE AND BETAMETHASONE ACETATE 3; 3 MG/ML; MG/ML
3 INJECTION, SUSPENSION INTRA-ARTICULAR; INTRALESIONAL; INTRAMUSCULAR; SOFT TISSUE ONCE
Status: COMPLETED | OUTPATIENT
Start: 2019-10-03 | End: 2019-10-03

## 2019-10-03 RX ADMIN — IOPAMIDOL 2 ML: 408 INJECTION, SOLUTION INTRATHECAL at 08:45

## 2019-10-03 RX ADMIN — BETAMETHASONE SODIUM PHOSPHATE AND BETAMETHASONE ACETATE 3 ML: 3; 3 INJECTION, SUSPENSION INTRA-ARTICULAR; INTRALESIONAL; INTRAMUSCULAR; SOFT TISSUE at 08:45

## 2019-10-03 NOTE — PROGRESS NOTES
: Enid was seen in X-ray today for a cervical injection. Patient rated pain before procedure 3/10. After procedure patient rated pain 3/10.  Patient discharged home with her .

## 2019-10-03 NOTE — DISCHARGE SUMMARY
AFTER YOU GO HOME   ü DO relax; minimize your activity for 24 hours   ü You may resume normal activity tomorrow   ü You may remove the bandage in the evening or next morning   ü You may resume bathing the next day   ü Drink at least 4 extra glasses of fluid today if not on fluid restrictions   ü DO NOT drive or operate machinery at home or at work for at least 24 hours   VISIT THE EMERGENCY ROOM OR URGENT CARE IF:   ü There is redness or swelling at the injection site   ü There is discharge from the injection site   ü You develop a temperature of 101  F or greater   ADDITIONAL INSTRUCTIONS:   ü You may resume your Coumadin or other blood thinner at your regular dose today. Follow up with your physician to have your INR rechecked if indicated.   ü If you gain no relief from the injection after two (2) weeks, follow-up with your provider for your options.   Contacts:   During business hours from 8 to 5 pm, you may call [phone] to reach a nurse advisor at [facility].   After hours, call [facility]  [phone]. Ask for the Radiologist on-call. Someone is on-call 24 hrs/day.   [facility] Toll Free Number   [phone]

## 2019-10-11 NOTE — PROGRESS NOTES
HEMATOLOGY/ONCOLOGY PROGRESS NOTE  Oct 15, 2019    REASON FOR VISIT: follow-up of history of breast cancer    DIAGNOSIS:   Enid Lombardo is a 59-year-old female with history of recurrent breast cancer.    She was initially diagnosed in December 2004 with a stage I infiltrating ductal carcinoma of the right breast, ER positive, ND negative, HER-2 negative, and underwent a right-sided lumpectomy with sentinel lymph node biopsy 1/2015. Tumor was 0.6 cm with 0/3 sentinel nodes positive for malignancy. She underwent further excision fr close margins. She completed a course of adjuvant radiation to the right breast. She was started Tamoxifen March 2005 through November 2008, therapy stopped due to diagnosis of cataracts.    Screening mammogram July 2018 showed calcifications, approximately 3 cm in size in an area separate from previous lumpectomy. She underwent needle biopsy which showed a grade 2 ductal carcinoma in situ, ER/ND positive. She underwent bilateral mastectomy with reconstruction with Dr. Sanchez. Pathology revealed 8 mm invasive cancer, total 4 cm DCIS. Final staging T1cN0, stage 1 breast cancer. ER/ND positive, HER-2 negative. She met with genetics and testing was negative. Oncotype DX score of 0. She started Arimidex 10/2018.    INTERVAL HISTORY:   Enid is here with her  for routine 3 month follow-up.    She is doing well. She had an injection to her shoulder and her her arm pain resolved. Now, working with ortho on her cervicogenic headaches. She received a steroid injection about 12 days ago and the headache does seem better. Otherwise, she has no other concerns. No new pains.     Otherwise, she is tolerating AI well without any side effects. No other concerns. H No breathing changes, chest pain, n/v, bowel changes. Not really doing exercise right now because of the headaches but is hopeful she can return to that.      Of note she did feel really achy after the Zometa.    Current Outpatient  "Medications   Medication Sig Dispense Refill     anastrozole (ARIMIDEX) 1 MG tablet TAKE 1 TABLET BY MOUTH EVERY DAY 90 tablet 3     Calcium-Vitamin D-Vitamin K (VIACTIV CALCIUM PLUS D PO) Take 1 tablet by mouth 2 times daily       diclofenac (VOLTAREN) 75 MG EC tablet TAKE 1 TABLET (75 MG) BY MOUTH 2 TIMES DAILY 60 tablet 1     fluticasone (FLONASE) 50 MCG/ACT nasal spray USE ONE OR TWO SPRAYS IN EACH NOSTRIL DAILY 48 mL 3     ibuprofen (ADVIL) 200 MG tablet Take 400 mg by mouth as needed for mild pain       loratadine (CLARITIN) 10 MG tablet TAKE 1 TABLET BY MOUTH EVERY DAY AS NEEDED FOR ALLERGY SYMPTOMS 90 tablet 3     melatonin 5 MG tablet        Multiple Vitamins-Minerals (PRESERVISION AREDS) CAPS Take 1 capsule by mouth 2 times daily 180 capsule 3     Omega-3 Fatty Acids (FISH OIL) 1000 MG CPDR Take  by mouth.       omeprazole (PRILOSEC) 20 MG CR capsule TAKE 1 CAPSULE (20 MG) BY MOUTH DAILY 90 capsule 3     sertraline (ZOLOFT) 25 MG tablet Take 1 tablet daily. (Patient taking differently: 25 mg every morning Take 1 tablet daily.) 30 tablet 11     tiZANidine (ZANAFLEX) 4 MG tablet TAKE 1-2 TABLETS (4-8 MG) BY MOUTH NIGHTLY AS NEEDED 30 tablet 1          Allergies   Allergen Reactions     Alphagan P Rash     Thrush and numbness in mouth     Diphenhydramine Other (See Comments)     Brimonidine Rash       PHYSICAL EXAMINATION  /82   Pulse 73   Temp 98.2  F (36.8  C) (Oral)   Resp 14   Ht 1.645 m (5' 4.76\")   Wt 81.2 kg (179 lb)   LMP 10/14/2001   SpO2 99%   BMI 30.00 kg/m    Constitutional: Alert, oriented female in no visible distress.  Eyes: PERRL. Anicteric sclerae.  ENT/Mouth: OM moist and pink without lesions or thrush.  CV: RRR, no murmurs appreciated.  Resp: CTAB throughout  Abdomen: Soft, non-tender, non-distended. Bowel sounds present.   Breast: s/p bilateral mastectomy with reconstruction.  On the R breast medial to the nipple,There is a 1 cm and adjcant subcentimeter area of granulation " tissue without erythema or purulence.  Extremities: No lower extremity edema appreciated.  Skin: Warm, dry. No bruising or petechiae noted.  Lymph: No cervical or supraclavicular lymphadenopathy appreciated.   Neuro: CN II-XII grossly intact.  MSK: unable to palpate any areas of pain or deformity involving the R arm. No swelling. ROM,stregnth intact 5/5.    IMPRESSION/PLAN:  Enid Lombardo is a 59 year old female with history of a stage I breast cancer diagnosed in 2004, s/p lumpectomy, radiation, and 2.5 years of Tamoxifen, with a recurrent T1cN0 breast cancer diagnosed in 2018, now s/p bilateral mastectomy with Oncotype Dx score of 0, now on AI since 10/2018.    History of breast cancer: Continues on Arimidex, tolerating overall well. She has no s/s to suggest recurrent disease. Continue follow-up every 3 months for the first 2-3 years.    Osteopenia: Started Fosamax 1/2-19. She does not tolerate this. Started Zometa 7/15/19. She had achiness, malaise after this. Recommended Ibuprofen AM of Zometa.        Debbie Oconnell PA-C  Hematology, Oncology, and Transplant  [facility]  [address] [place] [id number] [phone]

## 2019-10-15 ENCOUNTER — ONCOLOGY VISIT (OUTPATIENT)
Dept: ONCOLOGY | Facility: CLINIC | Age: 59
End: 2019-10-15
Attending: INTERNAL MEDICINE
Payer: COMMERCIAL

## 2019-10-15 ENCOUNTER — OFFICE VISIT (OUTPATIENT)
Dept: SURGERY | Facility: CLINIC | Age: 59
End: 2019-10-15
Payer: COMMERCIAL

## 2019-10-15 ENCOUNTER — ANESTHESIA EVENT (OUTPATIENT)
Dept: SURGERY | Facility: CLINIC | Age: 59
End: 2019-10-15

## 2019-10-15 VITALS
SYSTOLIC BLOOD PRESSURE: 139 MMHG | RESPIRATION RATE: 14 BRPM | BODY MASS INDEX: 29.82 KG/M2 | OXYGEN SATURATION: 99 % | TEMPERATURE: 98.2 F | DIASTOLIC BLOOD PRESSURE: 82 MMHG | HEIGHT: 65 IN | HEART RATE: 73 BPM | WEIGHT: 179 LBS

## 2019-10-15 VITALS
DIASTOLIC BLOOD PRESSURE: 88 MMHG | HEART RATE: 70 BPM | WEIGHT: 179 LBS | TEMPERATURE: 98.1 F | HEIGHT: 65 IN | SYSTOLIC BLOOD PRESSURE: 125 MMHG | RESPIRATION RATE: 19 BRPM | BODY MASS INDEX: 29.82 KG/M2 | OXYGEN SATURATION: 97 %

## 2019-10-15 DIAGNOSIS — Z01.818 PREOP EXAMINATION: Primary | ICD-10-CM

## 2019-10-15 DIAGNOSIS — C50.919 RECURRENT MALIGNANT NEOPLASM OF BREAST, UNSPECIFIED LATERALITY (H): Primary | ICD-10-CM

## 2019-10-15 DIAGNOSIS — Z01.818 PREOP EXAMINATION: ICD-10-CM

## 2019-10-15 LAB
ANION GAP SERPL CALCULATED.3IONS-SCNC: 6 MMOL/L (ref 3–14)
BUN SERPL-MCNC: 11 MG/DL (ref 7–30)
CALCIUM SERPL-MCNC: 9.2 MG/DL (ref 8.5–10.1)
CHLORIDE SERPL-SCNC: 105 MMOL/L (ref 94–109)
CO2 SERPL-SCNC: 27 MMOL/L (ref 20–32)
CREAT SERPL-MCNC: 0.71 MG/DL (ref 0.52–1.04)
ERYTHROCYTE [DISTWIDTH] IN BLOOD BY AUTOMATED COUNT: 12.7 % (ref 10–15)
GFR SERPL CREATININE-BSD FRML MDRD: >90 ML/MIN/{1.73_M2}
GLUCOSE SERPL-MCNC: 100 MG/DL (ref 70–99)
HCT VFR BLD AUTO: 44.8 % (ref 35–47)
HGB BLD-MCNC: 14.6 G/DL (ref 11.7–15.7)
MCH RBC QN AUTO: 30.5 PG (ref 26.5–33)
MCHC RBC AUTO-ENTMCNC: 32.6 G/DL (ref 31.5–36.5)
MCV RBC AUTO: 94 FL (ref 78–100)
PLATELET # BLD AUTO: 234 10E9/L (ref 150–450)
POTASSIUM SERPL-SCNC: 4.1 MMOL/L (ref 3.4–5.3)
RBC # BLD AUTO: 4.79 10E12/L (ref 3.8–5.2)
SODIUM SERPL-SCNC: 138 MMOL/L (ref 133–144)
WBC # BLD AUTO: 7.1 10E9/L (ref 4–11)

## 2019-10-15 PROCEDURE — 99213 OFFICE O/P EST LOW 20 MIN: CPT | Mod: ZP | Performed by: PHYSICIAN ASSISTANT

## 2019-10-15 PROCEDURE — G0463 HOSPITAL OUTPT CLINIC VISIT: HCPCS | Mod: ZF

## 2019-10-15 RX ORDER — ACETAMINOPHEN 500 MG
1000 TABLET ORAL EVERY 6 HOURS PRN
Status: ON HOLD | COMMUNITY
End: 2019-10-21

## 2019-10-15 ASSESSMENT — ENCOUNTER SYMPTOMS: SEIZURES: 0

## 2019-10-15 ASSESSMENT — PAIN SCALES - GENERAL
PAINLEVEL: NO PAIN (0)
PAINLEVEL: NO PAIN (1)

## 2019-10-15 ASSESSMENT — MIFFLIN-ST. JEOR
SCORE: 1394.07
SCORE: 1397.82

## 2019-10-15 NOTE — PATIENT INSTRUCTIONS
Preparing for Your Surgery      Name:  Enid Lombardo   MRN:  [id number]   :  1960   Today's Date:  10/15/2019     Arriving for surgery:  Surgery date:  10/21/2019  Arrival time:  7:45 am  Please come to:       [facility] [address]  [address] [place]  [address]    - ? parking is available in front of the hospital      -    Please proceed to [address] on the 3rd floor. [phone]?     - ?If you are in need of directions, wheelchair or escort please stop at the Information Desk in the lobby.  Inform the information person that you are here for surgery; a wheelchair and escort to [address] will be provided.?     What can I eat or drink?  -  You may have solid food or milk products until 8 hours prior to your surgery.(midnight)  -  You may have water, apple juice or 7up/Sprite until 2 hours prior to your surgery.(until 7:45 am arrival time)    Which medicines can I take?        Stop Aspirin, vitamins and supplements (fish oil) one week prior to surgery.        Hold Ibuprofen for 24 hours and/or Naproxen for 48 hours prior to surgery.         Hold diclofenac (voltaren) for 24 hours before surgery    -  Please take these medications the day of surgery:  Anastrozole   Flucasone (flonase)  Loratadine if needed   Omeprazole (Prilosec)  Sertraline (Zoloft)      How do I prepare myself?  -  Take two showers: one the night before surgery; and one the morning of surgery.         Use Scrubcare or Hibiclens to wash from neck down, leave soap on your skin for up to one minute.  Do not get soap in your eyes or ears.  You may use your own shampoo and conditioner; no other hair products.   -  Do NOT use lotion, powder, deodorant, or antiperspirant the day of your surgery.  -  Do NOT wear any makeup, fingernail polish or jewelry.  - Do not bring your own medications to the hospital, except for inhalers and eye   drops.  -  Bring your ID and insurance card.       Questions or  Concerns:    - Please call the Pre Admission Nursing Office at [phone].         -For questions after surgery please call your surgeons office.       AFTER YOUR SURGERY  Breathing exercises   Breathing exercises help you recover faster. Take deep breaths and let the air out slowly. This will:     Help you wake up after surgery.    Help prevent complications like pneumonia.  Preventing complications will help you go home sooner.   We may give you a breathing device (incentive spirometer) to encourage you to breathe deeply.   Nausea and vomiting   You may feel sick to your stomach after surgery; if so, let your nurse know.    Pain control:  After surgery, you may have pain. Our goal is to help you manage your pain. Pain medicine will help you feel comfortable enough to do activities that will help you heal.  These activities may include breathing exercises, walking and physical therapy.   To help your health care team treat your pain we will ask: 1) If you have pain  2) where it is located 3) describe your pain in your words  Methods of pain control include medications given by mouth, vein or by nerve block for some surgeries.  Sequential Compression Device (SCD) or Pneumo Boots:  You may need to wear SCD S on your legs or feet. These are wraps connected to a machine that pumps in air and releases it. The repeated pumping helps prevent blood clots from forming.

## 2019-10-15 NOTE — LETTER
10/15/2019      RE: Enid Lombardo  [address] [facility] [address]       HEMATOLOGY/ONCOLOGY PROGRESS NOTE  Oct 15, 2019    REASON FOR VISIT: follow-up of history of breast cancer    DIAGNOSIS:   Enid Lombardo is a 59-year-old female with history of recurrent breast cancer.    She was initially diagnosed in December 2004 with a stage I infiltrating ductal carcinoma of the right breast, ER positive, FL negative, HER-2 negative, and underwent a right-sided lumpectomy with sentinel lymph node biopsy 1/2015. Tumor was 0.6 cm with 0/3 sentinel nodes positive for malignancy. She underwent further excision fr close margins. She completed a course of adjuvant radiation to the right breast. She was started Tamoxifen March 2005 through November 2008, therapy stopped due to diagnosis of cataracts.    Screening mammogram July 2018 showed calcifications, approximately 3 cm in size in an area separate from previous lumpectomy. She underwent needle biopsy which showed a grade 2 ductal carcinoma in situ, ER/FL positive. She underwent bilateral mastectomy with reconstruction with Dr. Sanchez. Pathology revealed 8 mm invasive cancer, total 4 cm DCIS. Final staging T1cN0, stage 1 breast cancer. ER/FL positive, HER-2 negative. She met with genetics and testing was negative. Oncotype DX score of 0. She started Arimidex 10/2018.    INTERVAL HISTORY:   Enid is here with her  for routine 3 month follow-up.    She is doing well. She had an injection to her shoulder and her her arm pain resolved. Now, working with ortho on her cervicogenic headaches. She received a steroid injection about 12 days ago and the headache does seem better. Otherwise, she has no other concerns. No new pains.     Otherwise, she is tolerating AI well without any side effects. No other concerns. H No breathing changes, chest pain, n/v, bowel changes. Not really doing exercise right now because of the headaches but is hopeful she can return to  "that.      Of note she did feel really achy after the Zometa.    Current Outpatient Medications   Medication Sig Dispense Refill     anastrozole (ARIMIDEX) 1 MG tablet TAKE 1 TABLET BY MOUTH EVERY DAY 90 tablet 3     Calcium-Vitamin D-Vitamin K (VIACTIV CALCIUM PLUS D PO) Take 1 tablet by mouth 2 times daily       diclofenac (VOLTAREN) 75 MG EC tablet TAKE 1 TABLET (75 MG) BY MOUTH 2 TIMES DAILY 60 tablet 1     fluticasone (FLONASE) 50 MCG/ACT nasal spray USE ONE OR TWO SPRAYS IN EACH NOSTRIL DAILY 48 mL 3     ibuprofen (ADVIL) 200 MG tablet Take 400 mg by mouth as needed for mild pain       loratadine (CLARITIN) 10 MG tablet TAKE 1 TABLET BY MOUTH EVERY DAY AS NEEDED FOR ALLERGY SYMPTOMS 90 tablet 3     melatonin 5 MG tablet        Multiple Vitamins-Minerals (PRESERVISION AREDS) CAPS Take 1 capsule by mouth 2 times daily 180 capsule 3     Omega-3 Fatty Acids (FISH OIL) 1000 MG CPDR Take  by mouth.       omeprazole (PRILOSEC) 20 MG CR capsule TAKE 1 CAPSULE (20 MG) BY MOUTH DAILY 90 capsule 3     sertraline (ZOLOFT) 25 MG tablet Take 1 tablet daily. (Patient taking differently: 25 mg every morning Take 1 tablet daily.) 30 tablet 11     tiZANidine (ZANAFLEX) 4 MG tablet TAKE 1-2 TABLETS (4-8 MG) BY MOUTH NIGHTLY AS NEEDED 30 tablet 1          Allergies   Allergen Reactions     Alphagan P Rash     Thrush and numbness in mouth     Diphenhydramine Other (See Comments)     Brimonidine Rash       PHYSICAL EXAMINATION  /82   Pulse 73   Temp 98.2  F (36.8  C) (Oral)   Resp 14   Ht 1.645 m (5' 4.76\")   Wt 81.2 kg (179 lb)   LMP 10/14/2001   SpO2 99%   BMI 30.00 kg/m     Constitutional: Alert, oriented female in no visible distress.  Eyes: PERRL. Anicteric sclerae.  ENT/Mouth: OM moist and pink without lesions or thrush.  CV: RRR, no murmurs appreciated.  Resp: CTAB throughout  Abdomen: Soft, non-tender, non-distended. Bowel sounds present.   Breast: s/p bilateral mastectomy with reconstruction.  On the R breast " medial to the nipple,There is a 1 cm and adjcant subcentimeter area of granulation tissue without erythema or purulence.  Extremities: No lower extremity edema appreciated.  Skin: Warm, dry. No bruising or petechiae noted.  Lymph: No cervical or supraclavicular lymphadenopathy appreciated.   Neuro: CN II-XII grossly intact.  MSK: unable to palpate any areas of pain or deformity involving the R arm. No swelling. ROM,stregnth intact 5/5.    IMPRESSION/PLAN:  Enid Lombardo is a 59 year old female with history of a stage I breast cancer diagnosed in 2004, s/p lumpectomy, radiation, and 2.5 years of Tamoxifen, with a recurrent T1cN0 breast cancer diagnosed in 2018, now s/p bilateral mastectomy with Oncotype Dx score of 0, now on AI since 10/2018.    History of breast cancer: Continues on Arimidex, tolerating overall well. She has no s/s to suggest recurrent disease. Continue follow-up every 3 months for the first 2-3 years.    Osteopenia: Started Fosamax 1/2-19. She does not tolerate this. Started Zometa 7/15/19. She had achiness, malaise after this. Recommended Ibuprofen AM of Zometa.        Debbie Oconnell PA-C  Hematology, Oncology, and Transplant  [facility]  [address] [place] [id number] [phone]

## 2019-10-15 NOTE — NURSING NOTE
"Oncology Rooming Note    October 15, 2019 10:26 AM   Enid Lombardo is a 59 year old female who presents for:    Chief Complaint   Patient presents with     Oncology Clinic Visit     Return; Breast Ca     Initial Vitals: /82   Pulse 73   Temp 98.2  F (36.8  C) (Oral)   Resp 14   Ht 1.645 m (5' 4.76\")   Wt 81.2 kg (179 lb)   LMP 10/14/2001   SpO2 99%   BMI 30.00 kg/m   Estimated body mass index is 30 kg/m  as calculated from the following:    Height as of this encounter: 1.645 m (5' 4.76\").    Weight as of this encounter: 81.2 kg (179 lb). Body surface area is 1.93 meters squared.  No Pain (0) Comment: Data Unavailable   Patient's last menstrual period was 10/14/2001.  Allergies reviewed: Yes  Medications reviewed: Yes    Medications: Medication refills not needed today.  Pharmacy name entered into [employer]: [id number] [place] [facility] [id number] [address] [place] [address]    Clinical concerns: Having hernia surgery on Monday. Has been having headaches, had cervical spine injections and those helped.       Lalita Elizondo CMA              "

## 2019-10-15 NOTE — H&P
Pre-Operative H & P     CC:  Preoperative exam to assess for increased cardiopulmonary risk while undergoing surgery and anesthesia.    Date of Encounter: 10/15/2019  Primary Care Physician:  Shi Alonso  Associated Diagnosis: ventral hernia    PRAVEEN Lombardo is a 59 year old female who presents for pre-operative H & P in preparation for laparoscopic ventral hernia repair with mesh with Dr. Brown on 10/21/2019 at [facility] under general anesthesia.    This is a 59 year old female with a history of Breast cancer s/p bilateral mastectomy, bilateral breast reconstruction in 2018 with chronic anastrozole therapy.  She also has GERD and neck pain.  She began to notice her new hernia in May of this year.  She was initially evaluated by her PCP who recommended abdominal wall physical therapy.  She thought it perhaps seemed smaller at first but then she felt a pop and the hernia is more pronounced since that time. She reports that her hernia is uncomfortable at times but not painful.  This is worse at night.  She has been counseled on the above procedure and would like to proceed.      History is obtained from the patient.     Past Medical History  Past Medical History:   Diagnosis Date     Breast cancer (H) 2004    lumpectomy, radiation, tamoxifen     Cancer (H) 2004    Breast     Cataracts, bilateral      Complication of anesthesia     She prefers not to have versed until right before she leaves or can have after      Enthesopathy of hip region 6/06    right hip     Esophageal reflux 2/06     History of gestational diabetes      Hypertension 2012     Macular drusen      PONV (postoperative nausea and vomiting)      Shingles 01/23/2012    left T6-T7 dermatome       Past Surgical History  Past Surgical History:   Procedure Laterality Date     BIOPSY  2004    Breast     BREAST SURGERY  2004     C VAGINAL HYSTERECTOMY  12/2001    Ovaries intact, due to fibroids      COLONOSCOPY       ENDOSCOPY  05/09/2007    Upper GI     EYE SURGERY       GRAFT FAT TO BREAST Bilateral 11/29/2018    Procedure: Bilateral Breast Fat Grafting from Abdomen and Thighs;  Surgeon: DENNISE Amin MD;  [facility]     GRAFT FREE VASCULARIZED TRANSVERSE RECTUS ABDOMINIS MYOCUTANEOUS Bilateral 10/8/2018    Procedure: GRAFT FREE VASCULARIZED TRANSVERSE RECTUS ABDOMINIS MYOCUTANEOUS;  Bilateral Deep Inferior Epigastric Artery  Free Flap Breast Reconstruction and Removal of Breast Explanders;  Surgeon: DENNISE Amin MD;  Location: [facility] OR     GYN SURGERY  2001     HC BIOPSY/EXCISION LYMPH NODE OPEN DEEP CERVICAL W EXC FAT PAD  1/7/2005    Right axillary sentinel node biopsy X 3.     HC COLONOSCOPY W BIOPSY  05/10/10     HC EXCISION BREAST LESION, OPEN >=1  1/7/2005    Right breast.     HC REMV CATARACT EXTRACAP,INSERT LENS  12/18/08    bilateral     HC REMV TARSAL/METATARSAL BENIGN BONE LESN  1982    Bone spur - right     MASTECTOMY SIMPLE BILATERAL, SENTINEL NODE BILATERAL, COMBINED Bilateral 8/22/2018    Procedure: COMBINED MASTECTOMY SIMPLE BILATERAL, SENTINEL NODE BILATERAL;  Bilateral Mastectomy with Right [place] Node Biopsy, Bilateral Breast Reconstruction, Anesthesia Block;  Surgeon: Rakesh Sanchez MD;  Location:  OR     ORTHOPEDIC SURGERY  1983    Right foot     RECONSTRUCT BREAST Bilateral 8/22/2018    Procedure: RECONSTRUCT BREAST;;  Surgeon: DENNISE Amin MD;  Location:  OR     RECONSTRUCT BREAST BILATERAL Bilateral 10/8/2018    Procedure: RECONSTRUCT BREAST BILATERAL;;  Surgeon: DENNISE Amin MD;  Location:  OR     RECONSTRUCT NIPPLE BILATERAL Bilateral 11/29/2018    Procedure: Nipple Reconstruction;  Surgeon: DENNISE Amin MD;  [facility]       Hx of Blood transfusions/reactions: none     Hx of abnormal bleeding or anti-platelet use: fish oil, hold 7 days prior to DOS    Menstrual history: Patient's last menstrual period  was 10/14/2001.:     Steroid use in the last year: none    Personal or FH with difficulty with Anesthesia:  PONV noted in chart.  Pt does not recall this.    Prior to Admission Medications  Current Outpatient Medications   Medication Sig Dispense Refill     acetaminophen (TYLENOL) 500 MG tablet Take 1,000 mg by mouth every 6 hours as needed for mild pain (Pt. last took 10/14/2019)       anastrozole (ARIMIDEX) 1 MG tablet TAKE 1 TABLET BY MOUTH EVERY DAY (Patient taking differently: Take 1 mg by mouth every morning ) 90 tablet 3     Calcium-Vitamin D-Vitamin K (VIACTIV CALCIUM PLUS D PO) Take 1 tablet by mouth 2 times daily       fluticasone (FLONASE) 50 MCG/ACT nasal spray USE ONE OR TWO SPRAYS IN EACH NOSTRIL DAILY (Patient taking differently: Spray 1 spray into both nostrils 2 times daily ) 48 mL 3     loratadine (CLARITIN) 10 MG tablet TAKE 1 TABLET BY MOUTH EVERY DAY AS NEEDED FOR ALLERGY SYMPTOMS (Patient taking differently: Take 10 mg by mouth every morning ) 90 tablet 3     melatonin 5 MG tablet Take 5 mg by mouth At Bedtime        Multiple Vitamins-Minerals (PRESERVISION AREDS) CAPS Take 1 capsule by mouth 2 times daily (Patient taking differently: Take 1 capsule by mouth every morning ) 180 capsule 3     Omega-3 Fatty Acids (FISH OIL) 1000 MG CPDR Take by mouth every morning        omeprazole (PRILOSEC) 20 MG CR capsule TAKE 1 CAPSULE (20 MG) BY MOUTH DAILY (Patient taking differently: Take 20 mg by mouth as needed (Pt. takes based on foods that day at bedtime. 10/15/2019) ) 90 capsule 3     sertraline (ZOLOFT) 25 MG tablet Take 1 tablet daily. (Patient taking differently: Take 25 mg by mouth every morning ) 30 tablet 11     diclofenac (VOLTAREN) 75 MG EC tablet TAKE 1 TABLET (75 MG) BY MOUTH 2 TIMES DAILY (Patient not taking: Reported on 10/15/2019) 60 tablet 1     ibuprofen (ADVIL) 200 MG tablet Take 400 mg by mouth as needed for mild pain (Pt last took 10/10/2019)          Allergies  Allergies   Allergen  Reactions     Alphagan P Rash     Thrush and numbness in mouth       Social History  Social History     Socioeconomic History     Marital status:      Spouse name: Carson     Number of children: 3     Years of education: 24     Highest education level: Not on file   Occupational History     Occupation: Teacher     Employer: [employer]   Social Needs     Financial resource strain: Not on file     Food insecurity:     Worry: Not on file     Inability: Not on file     Transportation needs:     Medical: Not on file     Non-medical: Not on file   Tobacco Use     Smoking status: Never Smoker     Smokeless tobacco: Never Used   Substance and Sexual Activity     Alcohol use: Yes     Comment: occasionally     Drug use: No     Sexual activity: Yes     Partners: Male     Birth control/protection: Surgical     Comment: hysterectomy   Lifestyle     Physical activity:     Days per week: Not on file     Minutes per session: Not on file     Stress: Not on file   Relationships     Social connections:     Talks on phone: Not on file     Gets together: Not on file     Attends Anglican service: Not on file     Active member of club or organization: Not on file     Attends meetings of clubs or organizations: Not on file     Relationship status: Not on file     Intimate partner violence:     Fear of current or ex partner: Not on file     Emotionally abused: Not on file     Physically abused: Not on file     Forced sexual activity: Not on file   Other Topics Concern      Service No     Blood Transfusions No     Caffeine Concern Yes     Comment: 4 cups per day     Occupational Exposure No     Hobby Hazards No     Sleep Concern No     Stress Concern No     Weight Concern No     Special Diet No     Back Care No     Exercise No     Bike Helmet Yes     Seat Belt Yes     Self-Exams Yes     Parent/sibling w/ CABG, MI or angioplasty before 65F 55M? No     Comment: Unsure - adopted   Social History Narrative     Not on  file       Family History  Family History   Adopted: Yes   Problem Relation Age of Onset     Cancer Mother         lung cancer  at age 52     Thyroid Disease Sister         half sister, had thyroid removed     Unknown/Adopted Father      Unknown/Adopted Maternal Grandmother      Unknown/Adopted Maternal Grandfather      Unknown/Adopted Paternal Grandmother      Unknown/Adopted Paternal Grandfather      Glaucoma No family hx of      Diabetes No family hx of      Melanoma No family hx of      Skin Cancer No family hx of        Anesthesia Evaluation     . Pt has had prior anesthetic. Type: General and MAC    History of anesthetic complications   - PONV      ROS/MED HX    The complete review of systems is negative other than noted in the HPI or here.   ENT/Pulmonary:     (+)JAYJAY risk factors hypertension, allergic rhinitis, , . .   (-) asthma and recent URI   Neurologic: Comment: Pt is having headaches, due to cervical issue     (-) seizures and TIA   Cardiovascular:     (+) ----. : . . . :. . Previous cardiac testing date:results:Stress Testdate: results: date: results: date: results:          METS/Exercise Tolerance:  >4 METS   Hematologic:        (-) history of blood clots and History of Transfusion   Musculoskeletal: Comment: Cervical pain  (+) arthritis,  -       GI/Hepatic: Comment: Omeprazole prn    (+) GERD Asymptomatic on medication, hiatal hernia,       Renal/Genitourinary:  - ROS Renal section negative       Endo:  - neg endo ROS       Psychiatric:  - neg psychiatric ROS       Infectious Disease:  - neg infectious disease ROS       Malignancy:   (+) Malignancy History of Breast  Breast CA status post Radiation and Surgery.         Other:    (+) no H/O Chronic Pain,           PHYSICAL EXAM:   Mental Status/Neuro: A/A/O; Age Appropriate   Airway: Facies: Feasible  Mallampati: I  Mouth/Opening: Full  TM distance: > 6 cm  Neck ROM: Full   Respiratory: Auscultation: CTAB     Resp. Rate: Normal     Resp.  "Effort: Normal      CV: Rhythm: Regular  Rate: Age appropriate  Heart: Normal Sounds  Edema: None   Comments:      Dental: Normal Dentition              Temp: 98.1  F (36.7  C) Temp src: Oral BP: 125/88 Pulse: 70   Resp: 19 SpO2: 97 %         179 lbs 0 oz  5' 5\"   Body mass index is 29.79 kg/m .       Physical Exam  Constitutional: Awake, alert, cooperative, no apparent distress, and appears stated age.  Eyes: Pupils equal, round and reactive to light, extra ocular muscles intact, sclera clear, conjunctiva normal.  HENT: Normocephalic, oral pharynx with moist mucus membranes, good dentition. No goiter appreciated.   Respiratory: Clear to auscultation bilaterally, no crackles or wheezing.  Cardiovascular: Regular rate and rhythm, normal S1 and S2, and no murmur noted.  Carotids +2, no bruits. No edema. Palpable pulses to radial  DP and PT arteries.   GI: Normal bowel sounds, soft, non-distended, non-tender, ventral hernia noted.  Lymph/Hematologic: No cervical lymphadenopathy and no supraclavicular lymphadenopathy.  Genitourinary:  deferred  Skin: Warm and dry.  No rashes at anticipated surgical site.   Musculoskeletal: Full ROM of neck. There is no redness, warmth, or swelling of the joints. Gross motor strength is normal.    Neurologic: Awake, alert, oriented to name, place and time. Cranial nerves II-XII are grossly intact. Gait is normal.   Neuropsychiatric: Calm, cooperative. Normal affect.     Labs: (personally reviewed)  Lab Results   Component Value Date    WBC 7.1 10/15/2019     Lab Results   Component Value Date    RBC 4.79 10/15/2019     Lab Results   Component Value Date    HGB 14.6 10/15/2019     Lab Results   Component Value Date    HCT 44.8 10/15/2019     Lab Results   Component Value Date    MCV 94 10/15/2019     Lab Results   Component Value Date    MCH 30.5 10/15/2019     Lab Results   Component Value Date    MCHC 32.6 10/15/2019     Lab Results   Component Value Date    RDW 12.7 10/15/2019     Lab " Results   Component Value Date     10/15/2019     Last Comprehensive Metabolic Panel:  Sodium   Date Value Ref Range Status   10/15/2019 138 133 - 144 mmol/L Final     Potassium   Date Value Ref Range Status   10/15/2019 4.1 3.4 - 5.3 mmol/L Final     Chloride   Date Value Ref Range Status   10/15/2019 105 94 - 109 mmol/L Final     Carbon Dioxide   Date Value Ref Range Status   10/15/2019 27 20 - 32 mmol/L Final     Anion Gap   Date Value Ref Range Status   10/15/2019 6 3 - 14 mmol/L Final     Glucose   Date Value Ref Range Status   10/15/2019 100 (H) 70 - 99 mg/dL Final     Urea Nitrogen   Date Value Ref Range Status   10/15/2019 11 7 - 30 mg/dL Final     Creatinine   Date Value Ref Range Status   10/15/2019 0.71 0.52 - 1.04 mg/dL Final     GFR Estimate   Date Value Ref Range Status   10/15/2019 >90 >60 mL/min/[1.73_m2] Final     Comment:     Non  GFR Calc  Starting 12/18/2018, serum creatinine based estimated GFR (eGFR) will be   calculated using the Chronic Kidney Disease Epidemiology Collaboration   (CKD-EPI) equation.       Calcium   Date Value Ref Range Status   10/15/2019 9.2 8.5 - 10.1 mg/dL Final       EKG: not indicated    Stress test: 11/2014  Stress  There was a maximum 0.5mm ST segment depression in the inferior lead(s).  There was a maximum 0.5mm ST segment depression in the lateral lead(s).  This was a normal stress echocardiogram.  The visual ejection fraction is estimated at >70%.  Left ventricular cavity size decreases with exercise.  Global LV systolic function augments with exercise.  Normal resting wall motion and no stress-induced wall motion abnormality.        Outside records reviewed from: care everywhere    ASSESSMENT and PLAN  Enid Lombardo is a 59 year old female scheduled for laparoscopic ventral hernia repair with mesh on 10/21/2019 by Dr. Brown in treatment of ventral hernia.  PAC referral for risk assessment and optimization for anesthesia with comorbid  conditions of PONV, GERD, h/o breast cancer s/p mastectomy and reconstruction with radiation/tamoxifen:    Pre-operative considerations:  1.  Cardiac:  Functional status- METS >4.  Low risk surgery with 0.4% (RCRI 0) risk of major adverse cardiac event.   2.  Pulm:  Airway feasible.  JAYJAY risk: low  3.  GI:  Risk of PONV score = Pt with known PONV (indicated in chart although patient does not recall this.)  4.  Pt does not want to be given versed or any other amnestic/sedation until she is in the OR.     VTE risk: 0.26%    #cardiac  -denies CP, SOB, HERNANDEZ, palpitations  -denies cardiac history  -stress echo 11/2014  --Stress  There was a maximum 0.5mm ST segment depression in the inferior lead(s).  There was a maximum 0.5mm ST segment depression in the lateral lead(s).  This was a normal stress echocardiogram.  The visual ejection fraction is estimated at >70%.  Left ventricular cavity size decreases with exercise.  Global LV systolic function augments with exercise.  Normal resting wall motion and no stress-induced wall motion abnormality.    #pulmonary  -never smoker  -denies pulmonary history/complaints    #GI  -GERD asymptomatic on omeprazole    #other  -h/o breast cancer s/p mastectomy and reconstruction.  Radiation and tamoxifen.  Now on anastrozole.          Jacqueline Anglin PA-C  Preoperative Assessment Center  [facility]  Phone: [phone]  Fax: [phone]

## 2019-10-15 NOTE — ANESTHESIA PREPROCEDURE EVALUATION
Anesthesia Pre-Procedure Evaluation    Patient: Enid Lombardo   MRN:     [id number] Gender:   female   Age:    59 year old :      1960        Preoperative Diagnosis: * No surgery found *        Past Medical History:   Diagnosis Date     Breast cancer (H)     lumpectomy, radiation, tamoxifen     Cancer (H)     Breast     Cataracts, bilateral      Complication of anesthesia     She prefers not to have versed until right before she leaves or can have after      Enthesopathy of hip region     right hip     Esophageal reflux      History of gestational diabetes      Hypertension      Macular drusen      PONV (postoperative nausea and vomiting)      Shingles 2012    left T6-T7 dermatome      Past Surgical History:   Procedure Laterality Date     BIOPSY      Breast     BREAST SURGERY       C VAGINAL HYSTERECTOMY  2001    Ovaries intact, due to fibroids     COLONOSCOPY       ENDOSCOPY  2007    Upper GI     EYE SURGERY       GRAFT FAT TO BREAST Bilateral 2018    Procedure: Bilateral Breast Fat Grafting from Abdomen and Thighs;  Surgeon: DENNISE Amin MD;  [facility]     GRAFT FREE VASCULARIZED TRANSVERSE RECTUS ABDOMINIS MYOCUTANEOUS Bilateral 10/8/2018    Procedure: GRAFT FREE VASCULARIZED TRANSVERSE RECTUS ABDOMINIS MYOCUTANEOUS;  Bilateral Deep Inferior Epigastric Artery  Free Flap Breast Reconstruction and Removal of Breast Explanders;  Surgeon: DENNISE Amin MD;  Location: [facility] OR     GYN SURGERY       HC BIOPSY/EXCISION LYMPH NODE OPEN DEEP CERVICAL W EXC FAT PAD  2005    Right axillary sentinel node biopsy X 3.     HC COLONOSCOPY W BIOPSY  05/10/10     HC EXCISION BREAST LESION, OPEN >=1  2005    Right breast.     HC REMV CATARACT EXTRACAP,INSERT LENS  08    bilateral     HC REMV TARSAL/METATARSAL BENIGN BONE LESN  1982    Bone spur - right     MASTECTOMY SIMPLE BILATERAL, SENTINEL NODE BILATERAL, COMBINED  Bilateral 8/22/2018    Procedure: COMBINED MASTECTOMY SIMPLE BILATERAL, SENTINEL NODE BILATERAL;  Bilateral Mastectomy with Right [place] Node Biopsy, Bilateral Breast Reconstruction, Anesthesia Block;  Surgeon: Rakesh Sanchez MD;  Location:  OR     ORTHOPEDIC SURGERY  1983    Right foot     RECONSTRUCT BREAST Bilateral 8/22/2018    Procedure: RECONSTRUCT BREAST;;  Surgeon: DENNISE Amin MD;  Location:  OR     RECONSTRUCT BREAST BILATERAL Bilateral 10/8/2018    Procedure: RECONSTRUCT BREAST BILATERAL;;  Surgeon: DENNISE Amin MD;  Location:  OR     RECONSTRUCT NIPPLE BILATERAL Bilateral 11/29/2018    Procedure: Nipple Reconstruction;  Surgeon: DENNISE Amin MD;  Location:  OR          Anesthesia Evaluation     . Pt has had prior anesthetic. Type: General and MAC    History of anesthetic complications   - PONV        ROS/MED HX    ENT/Pulmonary:     (+)JAYJAY risk factors hypertension, allergic rhinitis, , . .   (-) asthma and recent URI   Neurologic: Comment: Pt is having headaches, due to cervical issue     (-) seizures and TIA   Cardiovascular:     (+) ----. : . . . :. . Previous cardiac testing date:results:Stress Testdate:2014 results: date: results: date: results:          METS/Exercise Tolerance:  >4 METS   Hematologic:        (-) history of blood clots and History of Transfusion   Musculoskeletal: Comment: Cervical pain  (+) arthritis,  -       GI/Hepatic: Comment: Omeprazole prn    (+) GERD Asymptomatic on medication, hiatal hernia,       Renal/Genitourinary:  - ROS Renal section negative       Endo:  - neg endo ROS       Psychiatric:  - neg psychiatric ROS       Infectious Disease:  - neg infectious disease ROS       Malignancy:   (+) Malignancy History of Breast  Breast CA status post Radiation and Surgery.         Other:    (+) no H/O Chronic Pain,                       PHYSICAL EXAM:   Mental Status/Neuro: A/A/O; Age Appropriate   Airway: Facies: Feasible  Mallampati:  I  Mouth/Opening: Full  TM distance: > 6 cm  Neck ROM: Full   Respiratory: Auscultation: CTAB     Resp. Rate: Normal     Resp. Effort: Normal      CV: Rhythm: Regular  Rate: Age appropriate  Heart: Normal Sounds  Edema: None   Comments:      Dental: Normal Dentition                LABS:  CBC:   Lab Results   Component Value Date    WBC 9.6 12/11/2018    WBC 4.7 11/01/2018    HGB 13.2 12/11/2018    HGB 12.2 11/01/2018    HCT 40.2 12/11/2018    HCT 37.6 11/01/2018     12/11/2018     11/01/2018     BMP:   Lab Results   Component Value Date     01/14/2019     12/11/2018    POTASSIUM 3.9 01/14/2019    POTASSIUM 4.2 12/11/2018    CHLORIDE 105 01/14/2019    CHLORIDE 105 12/11/2018    CO2 26 01/14/2019    CO2 26 12/11/2018    BUN 13 01/14/2019    BUN 13 12/11/2018    CR 0.70 07/15/2019    CR 0.65 01/14/2019    GLC 89 01/14/2019     (H) 12/11/2018     COAGS:   Lab Results   Component Value Date    INR 1.15 (H) 10/09/2018    FIBR 428 (H) 10/09/2018     POC:   Lab Results   Component Value Date    BGM 91 10/08/2018     OTHER:   Lab Results   Component Value Date    ROSENDO 9.1 07/15/2019    PHOS 2.6 10/10/2018    MAG 2.0 10/11/2018    ALBUMIN 3.5 07/15/2019    PROTTOTAL 7.2 01/14/2019    ALT 24 01/14/2019    AST 16 01/14/2019    ALKPHOS 65 01/14/2019    BILITOTAL 0.4 01/14/2019    LIPASE 290 12/11/2018    AMYLASE 82 10/04/2010    TSH 1.23 12/10/2014    CRP <3.0 12/29/2006    SED 5 12/19/2005        Preop Vitals    BP Readings from Last 3 Encounters:   10/15/19 139/82   10/03/19 (!) 148/90   09/19/19 134/81    Pulse Readings from Last 3 Encounters:   10/15/19 73   10/03/19 65   09/19/19 61      Resp Readings from Last 3 Encounters:   10/15/19 14   08/29/19 16   07/15/19 16    SpO2 Readings from Last 3 Encounters:   10/15/19 99%   10/03/19 98%   09/11/19 96%      Temp Readings from Last 1 Encounters:   10/15/19 98.2  F (36.8  C) (Oral)    Ht Readings from Last 1 Encounters:   10/15/19 1.645 m (5'  "4.76\")      Wt Readings from Last 1 Encounters:   10/15/19 81.2 kg (179 lb)    Estimated body mass index is 30 kg/m  as calculated from the following:    Height as of an earlier encounter on 10/15/19: 1.645 m (5' 4.76\").    Weight as of an earlier encounter on 10/15/19: 81.2 kg (179 lb).     LDA:        JZG FV AN PLAN NO PONV RULE       PAC Discussion and Assessment    ASA Classification: 2  Case is suitable for: [place]  Anesthetic techniques and relevant risks discussed: GA  Invasive monitoring and risk discussed: No  Types:   Possibility and Risk of blood transfusion discussed: No  NPO instructions given:   Additional anesthetic preparation and risks discussed:   Needs early admission to pre-op area:   Other:     PAC Resident/NP Anesthesia Assessment:  Enid Lombardo is a 59 year old female scheduled for laparoscopic ventral hernia repair with mesh on 10/21/2019 by Dr. Brown in treatment of ventral hernia.  PAC referral for risk assessment and optimization for anesthesia with comorbid conditions of PONV, GERD, h/o breast cancer s/p mastectomy and reconstruction with radiation/tamoxifen:    Pre-operative considerations:  1.  Cardiac:  Functional status- METS >4.  Low risk surgery with 0.4% (RCRI 0) risk of major adverse cardiac event.   2.  Pulm:  Airway feasible.  JAYJAY risk: low  3.  GI:  Risk of PONV score = Pt with known PONV (indicated in chart although patient does not recall this.)  4.  Pt does not want to be given versed or any other amnestic/sedation until she is in the OR.     VTE risk: 0.26%    #cardiac  -denies CP, SOB, HERNANDEZ, palpitations  -denies cardiac history  -stress echo 11/2014  --Stress  There was a maximum 0.5mm ST segment depression in the inferior lead(s).  There was a maximum 0.5mm ST segment depression in the lateral lead(s).  This was a normal stress echocardiogram.  The visual ejection fraction is estimated at >70%.  Left ventricular cavity size decreases with exercise.  Global LV " systolic function augments with exercise.  Normal resting wall motion and no stress-induced wall motion abnormality.    #pulmonary  -never smoker  -denies pulmonary history/complaints    #GI  -GERD asymptomatic on omeprazole    #other  -h/o breast cancer s/p mastectomy and reconstruction.  Radiation and tamoxifen.  Now on anastrozole.        **For further details of assessment, testing, and physical exam please see H and P completed on same date.          Jacqueline Anglin PA-C, [facility]      Reviewed and Signed by PAC Mid-Level Provider/Resident  Mid-Level Provider/Resident: Jacqueline Anglin  Date: 10/15/2019  Time:     Attending Anesthesiologist Anesthesia Assessment:  59 year old for lap ventral hernia repair. Patient has no significant cardiac or pulmonary disease. Stress echo 2017 without wall motion abnormalities, although had non specific ST segment abnormalities - considered a negative stress test.    Patient/case discussed with MILKA/resident; agree with above assessment. No need to see patient. Patient is appropriate for the planned procedure without further work-up or medical management.    Sharron Jewell MD        Anesthesiologist:   Date:   Time:   Pass/Fail:   Disposition:     PAC Pharmacist Assessment:        Pharmacist:   Date:   Time:    Jacqueline Anglin PA-C

## 2019-10-16 ENCOUNTER — HOSPITAL ENCOUNTER (OUTPATIENT)
Dept: PHYSICAL THERAPY | Facility: OTHER | Age: 59
Setting detail: THERAPIES SERIES
End: 2019-10-16
Attending: PREVENTIVE MEDICINE
Payer: COMMERCIAL

## 2019-10-16 PROCEDURE — 97110 THERAPEUTIC EXERCISES: CPT | Mod: GP | Performed by: PHYSICAL THERAPIST

## 2019-10-16 RX ORDER — CEFAZOLIN SODIUM 1 G/50ML
1 INJECTION, SOLUTION INTRAVENOUS SEE ADMIN INSTRUCTIONS
Status: CANCELLED | OUTPATIENT
Start: 2019-10-16

## 2019-10-16 RX ORDER — CEFAZOLIN SODIUM 2 G/50ML
2 SOLUTION INTRAVENOUS
Status: CANCELLED | OUTPATIENT
Start: 2019-10-16

## 2019-10-17 ENCOUNTER — OFFICE VISIT (OUTPATIENT)
Dept: ORTHOPEDICS | Facility: CLINIC | Age: 59
End: 2019-10-17
Payer: COMMERCIAL

## 2019-10-17 VITALS
BODY MASS INDEX: 29.82 KG/M2 | HEIGHT: 65 IN | WEIGHT: 179 LBS | HEART RATE: 68 BPM | SYSTOLIC BLOOD PRESSURE: 129 MMHG | DIASTOLIC BLOOD PRESSURE: 76 MMHG

## 2019-10-17 DIAGNOSIS — M50.20 HERNIATED CERVICAL INTERVERTEBRAL DISC: ICD-10-CM

## 2019-10-17 DIAGNOSIS — M54.12 CERVICAL RADICULAR PAIN: Primary | ICD-10-CM

## 2019-10-17 PROCEDURE — 99213 OFFICE O/P EST LOW 20 MIN: CPT | Performed by: PREVENTIVE MEDICINE

## 2019-10-17 ASSESSMENT — PAIN SCALES - GENERAL: PAINLEVEL: NO PAIN (1)

## 2019-10-17 ASSESSMENT — MIFFLIN-ST. JEOR: SCORE: 1397.82

## 2019-10-17 NOTE — LETTER
"    10/17/2019         RE: Enid Lombardo  [address] [facility] [address]        Dear Colleague,    Thank you for referring your patient, Enid Lombardo, to the [facility]. Please see a copy of my visit note below.    Enid Lombardo's chief complaint for this visit includes:  Chief Complaint   Patient presents with     Follow Up     Follow up after cervical ELIEZER on 9-24-19     PCP: Shi Alonso    Referring Provider:  No referring provider defined for this encounter.    /76   Pulse 68   Ht 1.651 m (5' 5\")   Wt 81.2 kg (179 lb)   LMP 10/14/2001   BMI 29.79 kg/m     No Pain (1)         See other note      HISTORY OF PRESENT ILLNESS  Ms. Lombardo is a pleasant 59 year old year old female who presents to clinic today for followup after cervical injection  Had 2 weeks prior and feels much better  She has one area of muscle pain at base of occiput on right paracervical muscles, to palpation, but improved greatly  Has been doing PT exercises  Has not really been using any medications    MEDICAL HISTORY  Patient Active Problem List   Diagnosis     History of right breast cancer     Esophageal reflux     Disorder of synovium, tendon, and bursa     Cataract     Shingles     Pain, radicular, lumbar     Right hip pain     Low back pain     IT band syndrome     Macular drusen     Cervicalgia     Headache     Keratoconus     Meibomian gland dysfunction - Both Eyes     Tear film insufficiency     Hyperlipidemia with target LDL less than 130     Benign neoplasm of colon     Skin abscess     Osteopenia of both hands     Recurrent carcinoma in situ of breast, right     Breast cancer in situ     S/P breast reconstruction, bilateral     S/P flap graft     Osteopenia of multiple sites     Long term (current) use of aromatase inhibitors       Current Outpatient Medications   Medication Sig Dispense Refill     acetaminophen (TYLENOL) 500 MG tablet Take 1,000 mg by mouth every 6 hours " as needed for mild pain (Pt. last took 10/14/2019)       anastrozole (ARIMIDEX) 1 MG tablet TAKE 1 TABLET BY MOUTH EVERY DAY (Patient taking differently: Take 1 mg by mouth every morning ) 90 tablet 3     Calcium-Vitamin D-Vitamin K (VIACTIV CALCIUM PLUS D PO) Take 1 tablet by mouth 2 times daily       diclofenac (VOLTAREN) 75 MG EC tablet TAKE 1 TABLET (75 MG) BY MOUTH 2 TIMES DAILY 60 tablet 1     fluticasone (FLONASE) 50 MCG/ACT nasal spray USE ONE OR TWO SPRAYS IN EACH NOSTRIL DAILY (Patient taking differently: Spray 1 spray into both nostrils 2 times daily ) 48 mL 3     ibuprofen (ADVIL) 200 MG tablet Take 400 mg by mouth as needed for mild pain (Pt last took 10/10/2019)        loratadine (CLARITIN) 10 MG tablet TAKE 1 TABLET BY MOUTH EVERY DAY AS NEEDED FOR ALLERGY SYMPTOMS (Patient taking differently: Take 10 mg by mouth every morning ) 90 tablet 3     melatonin 5 MG tablet Take 5 mg by mouth At Bedtime        Multiple Vitamins-Minerals (PRESERVISION AREDS) CAPS Take 1 capsule by mouth 2 times daily (Patient taking differently: Take 1 capsule by mouth every morning ) 180 capsule 3     Omega-3 Fatty Acids (FISH OIL) 1000 MG CPDR Take by mouth every morning        omeprazole (PRILOSEC) 20 MG CR capsule TAKE 1 CAPSULE (20 MG) BY MOUTH DAILY (Patient taking differently: Take 20 mg by mouth as needed (Pt. takes based on foods that day at bedtime. 10/15/2019) ) 90 capsule 3     sertraline (ZOLOFT) 25 MG tablet Take 1 tablet daily. (Patient taking differently: Take 25 mg by mouth every morning ) 30 tablet 11       Allergies   Allergen Reactions     Alphagan P Rash     Thrush and numbness in mouth       Family History   Adopted: Yes   Problem Relation Age of Onset     Cancer Mother         lung cancer  at age 52     Thyroid Disease Sister         half sister, had thyroid removed     Unknown/Adopted Father      Unknown/Adopted Maternal Grandmother      Unknown/Adopted Maternal Grandfather      Unknown/Adopted  "Paternal Grandmother      Unknown/Adopted Paternal Grandfather      Glaucoma No family hx of      Diabetes No family hx of      Melanoma No family hx of      Skin Cancer No family hx of        Additional medical/Social/Surgical histories reviewed in [facility] and updated as appropriate.     REVIEW OF SYSTEMS (10/17/2019)  10 point ROS of systems including Constitutional, Eyes, Respiratory, Cardiovascular, Gastroenterology, Genitourinary, Integumentary, Musculoskeletal, Psychiatric were all negative except for pertinent positives noted in my HPI.     PHYSICAL EXAM  Vitals:    10/17/19 0855   BP: 129/76   Pulse: 68   Weight: 81.2 kg (179 lb)   Height: 1.651 m (5' 5\")     Vital Signs: /76   Pulse 68   Ht 1.651 m (5' 5\")   Wt 81.2 kg (179 lb)   LMP 10/14/2001   BMI 29.79 kg/m    Patient declined being weighed. Body mass index is 29.79 kg/m .    General  - normal appearance, in no obvious distress  CV  - normal radial pulse  Pulm  - normal respiratory pattern, non-labored  Musculoskeletal - neck  - inspection: normal bone and joint alignment, no obvious deformity, no scapular winging, no AC step-off  - palpation: no bony or soft tissue tenderness, except at base of occiput on right side at paraspinal muscles, normal clavicle, non-tender AC  - ROM:  Full ROM neck without discomfort  - strength: 5/5  strength, 5/5 in all shoulder planes  - special tests:  Negative spurlings  Neuro  - no sensory or motor deficit, grossly normal coordination, normal muscle tone  Skin  - no ecchymosis, erythema, warmth, or induration, no obvious rash  Psych  - interactive, appropriate, normal mood and affect        ASSESSMENT & PLAN    60 yo female with cervical disc hernaition, ddd, imrpoved, neck muscle pain  Reviewed response to ELIEZER and can consider antother in 1 months, will consider TF injection as well  And consider pain clinic for nerve ablation  Cont. HEP    Castro Mcginnis MD, CAQSM    Again, thank you for allowing me to " participate in the care of your patient.        Sincerely,        Castro Mcginnis MD

## 2019-10-17 NOTE — PROGRESS NOTES
HISTORY OF PRESENT ILLNESS  Ms. Lombardo is a pleasant 59 year old year old female who presents to clinic today for followup after cervical injection  Had 2 weeks prior and feels much better  She has one area of muscle pain at base of occiput on right paracervical muscles, to palpation, but improved greatly  Has been doing PT exercises  Has not really been using any medications    MEDICAL HISTORY  Patient Active Problem List   Diagnosis     History of right breast cancer     Esophageal reflux     Disorder of synovium, tendon, and bursa     Cataract     Shingles     Pain, radicular, lumbar     Right hip pain     Low back pain     IT band syndrome     Macular drusen     Cervicalgia     Headache     Keratoconus     Meibomian gland dysfunction - Both Eyes     Tear film insufficiency     Hyperlipidemia with target LDL less than 130     Benign neoplasm of colon     Skin abscess     Osteopenia of both hands     Recurrent carcinoma in situ of breast, right     Breast cancer in situ     S/P breast reconstruction, bilateral     S/P flap graft     Osteopenia of multiple sites     Long term (current) use of aromatase inhibitors       Current Outpatient Medications   Medication Sig Dispense Refill     acetaminophen (TYLENOL) 500 MG tablet Take 1,000 mg by mouth every 6 hours as needed for mild pain (Pt. last took 10/14/2019)       anastrozole (ARIMIDEX) 1 MG tablet TAKE 1 TABLET BY MOUTH EVERY DAY (Patient taking differently: Take 1 mg by mouth every morning ) 90 tablet 3     Calcium-Vitamin D-Vitamin K (VIACTIV CALCIUM PLUS D PO) Take 1 tablet by mouth 2 times daily       diclofenac (VOLTAREN) 75 MG EC tablet TAKE 1 TABLET (75 MG) BY MOUTH 2 TIMES DAILY 60 tablet 1     fluticasone (FLONASE) 50 MCG/ACT nasal spray USE ONE OR TWO SPRAYS IN EACH NOSTRIL DAILY (Patient taking differently: Spray 1 spray into both nostrils 2 times daily ) 48 mL 3     ibuprofen (ADVIL) 200 MG tablet Take 400 mg by mouth as needed for mild pain (Pt  last took 10/10/2019)        loratadine (CLARITIN) 10 MG tablet TAKE 1 TABLET BY MOUTH EVERY DAY AS NEEDED FOR ALLERGY SYMPTOMS (Patient taking differently: Take 10 mg by mouth every morning ) 90 tablet 3     melatonin 5 MG tablet Take 5 mg by mouth At Bedtime        Multiple Vitamins-Minerals (PRESERVISION AREDS) CAPS Take 1 capsule by mouth 2 times daily (Patient taking differently: Take 1 capsule by mouth every morning ) 180 capsule 3     Omega-3 Fatty Acids (FISH OIL) 1000 MG CPDR Take by mouth every morning        omeprazole (PRILOSEC) 20 MG CR capsule TAKE 1 CAPSULE (20 MG) BY MOUTH DAILY (Patient taking differently: Take 20 mg by mouth as needed (Pt. takes based on foods that day at bedtime. 10/15/2019) ) 90 capsule 3     sertraline (ZOLOFT) 25 MG tablet Take 1 tablet daily. (Patient taking differently: Take 25 mg by mouth every morning ) 30 tablet 11       Allergies   Allergen Reactions     Alphagan P Rash     Thrush and numbness in mouth       Family History   Adopted: Yes   Problem Relation Age of Onset     Cancer Mother         lung cancer  at age 52     Thyroid Disease Sister         half sister, had thyroid removed     Unknown/Adopted Father      Unknown/Adopted Maternal Grandmother      Unknown/Adopted Maternal Grandfather      Unknown/Adopted Paternal Grandmother      Unknown/Adopted Paternal Grandfather      Glaucoma No family hx of      Diabetes No family hx of      Melanoma No family hx of      Skin Cancer No family hx of        Additional medical/Social/Surgical histories reviewed in [facility] and updated as appropriate.     REVIEW OF SYSTEMS (10/17/2019)  10 point ROS of systems including Constitutional, Eyes, Respiratory, Cardiovascular, Gastroenterology, Genitourinary, Integumentary, Musculoskeletal, Psychiatric were all negative except for pertinent positives noted in my HPI.     PHYSICAL EXAM  Vitals:    10/17/19 0855   BP: 129/76   Pulse: 68   Weight: 81.2 kg (179 lb)   Height: 1.651 m (5'  "5\")     Vital Signs: /76   Pulse 68   Ht 1.651 m (5' 5\")   Wt 81.2 kg (179 lb)   LMP 10/14/2001   BMI 29.79 kg/m   Patient declined being weighed. Body mass index is 29.79 kg/m .    General  - normal appearance, in no obvious distress  CV  - normal radial pulse  Pulm  - normal respiratory pattern, non-labored  Musculoskeletal - neck  - inspection: normal bone and joint alignment, no obvious deformity, no scapular winging, no AC step-off  - palpation: no bony or soft tissue tenderness, except at base of occiput on right side at paraspinal muscles, normal clavicle, non-tender AC  - ROM:  Full ROM neck without discomfort  - strength: 5/5  strength, 5/5 in all shoulder planes  - special tests:  Negative spurlings  Neuro  - no sensory or motor deficit, grossly normal coordination, normal muscle tone  Skin  - no ecchymosis, erythema, warmth, or induration, no obvious rash  Psych  - interactive, appropriate, normal mood and affect        ASSESSMENT & PLAN    60 yo female with cervical disc hernaition, ddd, imrpoved, neck muscle pain  Reviewed response to ELIEZER and can consider antother in 1 months, will consider TF injection as well  And consider pain clinic for nerve ablation  Cont. HEP    Castro Mcginnis MD, CAQSM  "

## 2019-10-17 NOTE — PROGRESS NOTES
"Enid Lombardo's chief complaint for this visit includes:  Chief Complaint   Patient presents with     Follow Up     Follow up after cervical ELIEZER on 9-24-19     PCP: Shi Alonso    Referring Provider:  No referring provider defined for this encounter.    /76   Pulse 68   Ht 1.651 m (5' 5\")   Wt 81.2 kg (179 lb)   LMP 10/14/2001   BMI 29.79 kg/m    No Pain (1)       "

## 2019-10-17 NOTE — PATIENT INSTRUCTIONS
Thanks for coming today.  [facility]  [address] [place] [address]    To schedule future appointments in Ortho Clinic, you may call [phone].    To schedule ordered imaging by your provider:   Call [facility] [phone]    To schedule an injection ordered by your provider:  Call [facility] Injection scheduling line: [phone]  [employer] available online at:  [email or web address]    Please call if any further questions or concerns ([phone]).  Clinic hours 8 am to 5 pm.    Return to clinic (call) if symptoms worsen or fail to improve.

## 2019-10-20 ENCOUNTER — ANESTHESIA EVENT (OUTPATIENT)
Dept: SURGERY | Facility: CLINIC | Age: 59
End: 2019-10-20
Payer: COMMERCIAL

## 2019-10-20 ASSESSMENT — ENCOUNTER SYMPTOMS: SEIZURES: 0

## 2019-10-20 NOTE — ANESTHESIA PREPROCEDURE EVALUATION
Anesthesia Pre-Procedure Evaluation    Patient: Enid Lombardo   MRN:     [id number] Gender:   female   Age:    59 year old :      1960        Preoperative Diagnosis: Ventral Hernia Without Obstruction or Gangrene   Procedure(s):  Laparoscopic Ventral Hernia Repair With Mesh     Past Medical History:   Diagnosis Date     Breast cancer (H)     lumpectomy, radiation, tamoxifen     Cancer (H)     Breast     Cataracts, bilateral      Complication of anesthesia     She prefers not to have versed until right before she leaves or can have after      Enthesopathy of hip region     right hip     Esophageal reflux      History of gestational diabetes      Hypertension      Macular drusen      PONV (postoperative nausea and vomiting)      Shingles 2012    left T6-T7 dermatome      Past Surgical History:   Procedure Laterality Date     BIOPSY      Breast     BREAST SURGERY       C VAGINAL HYSTERECTOMY  2001    Ovaries intact, due to fibroids     COLONOSCOPY       ENDOSCOPY  2007    Upper GI     EYE SURGERY       GRAFT FAT TO BREAST Bilateral 2018    Procedure: Bilateral Breast Fat Grafting from Abdomen and Thighs;  Surgeon: DENNISE Amin MD;  Location: [facility] OR     GRAFT FREE VASCULARIZED TRANSVERSE RECTUS ABDOMINIS MYOCUTANEOUS Bilateral 10/8/2018    Procedure: GRAFT FREE VASCULARIZED TRANSVERSE RECTUS ABDOMINIS MYOCUTANEOUS;  Bilateral Deep Inferior Epigastric Artery  Free Flap Breast Reconstruction and Removal of Breast Explanders;  Surgeon: DENNISE Amin MD;  Location: [facility] OR     GYN SURGERY       HC BIOPSY/EXCISION LYMPH NODE OPEN DEEP CERVICAL W EXC FAT PAD  2005    Right axillary sentinel node biopsy X 3.     HC COLONOSCOPY W BIOPSY  05/10/10     HC EXCISION BREAST LESION, OPEN >=1  2005    Right breast.     HC REMV CATARACT EXTRACAP,INSERT LENS  08    bilateral     HC REMV TARSAL/METATARSAL BENIGN BONE LESN  1982    Bone  spur - right     MASTECTOMY SIMPLE BILATERAL, SENTINEL NODE BILATERAL, COMBINED Bilateral 8/22/2018    Procedure: COMBINED MASTECTOMY SIMPLE BILATERAL, SENTINEL NODE BILATERAL;  Bilateral Mastectomy with Right [place] Node Biopsy, Bilateral Breast Reconstruction, Anesthesia Block;  Surgeon: Rakesh Sanchez MD;  Location:  OR     ORTHOPEDIC SURGERY  1983    Right foot     RECONSTRUCT BREAST Bilateral 8/22/2018    Procedure: RECONSTRUCT BREAST;;  Surgeon: DENNISE Amin MD;  Location:  OR     RECONSTRUCT BREAST BILATERAL Bilateral 10/8/2018    Procedure: RECONSTRUCT BREAST BILATERAL;;  Surgeon: DENNISE Amin MD;  Location:  OR     RECONSTRUCT NIPPLE BILATERAL Bilateral 11/29/2018    Procedure: Nipple Reconstruction;  Surgeon: DENNISE Amin MD;  Location:  OR          Anesthesia Evaluation     . Pt has had prior anesthetic. Type: General and MAC    History of anesthetic complications   - PONV        ROS/MED HX    ENT/Pulmonary:     (+)JAYJAY risk factors hypertension, allergic rhinitis, , . .   (-) asthma and recent URI   Neurologic: Comment: Pt is having headaches, due to cervical issue     (-) seizures and TIA   Cardiovascular:     (+) ----. : . . . :. . Previous cardiac testing date:results:Stress Testdate:2014 results: date: results: date: results:          METS/Exercise Tolerance:  >4 METS   Hematologic:        (-) history of blood clots and History of Transfusion   Musculoskeletal: Comment: Cervical pain  (+) arthritis,  -       GI/Hepatic: Comment: Omeprazole prn    (+) GERD Asymptomatic on medication, hiatal hernia,       Renal/Genitourinary:  - ROS Renal section negative       Endo:  - neg endo ROS       Psychiatric:  - neg psychiatric ROS       Infectious Disease:  - neg infectious disease ROS       Malignancy:   (+) Malignancy History of Breast  Breast CA status post Radiation and Surgery.         Other:    (+) no H/O Chronic Pain,                       PHYSICAL EXAM:   Mental  Status/Neuro: A/A/O; Age Appropriate   Airway: Facies: Feasible  Mallampati: I  Mouth/Opening: Full  TM distance: > 6 cm  Neck ROM: Full   Respiratory: Auscultation: CTAB     Resp. Rate: Normal     Resp. Effort: Normal      CV: Rhythm: Regular  Rate: Age appropriate  Heart: Normal Sounds  Edema: None   Comments:      Dental: Normal Dentition                LABS:  CBC:   Lab Results   Component Value Date    WBC 7.1 10/15/2019    WBC 9.6 12/11/2018    HGB 14.6 10/15/2019    HGB 13.2 12/11/2018    HCT 44.8 10/15/2019    HCT 40.2 12/11/2018     10/15/2019     12/11/2018     BMP:   Lab Results   Component Value Date     10/15/2019     01/14/2019    POTASSIUM 4.1 10/15/2019    POTASSIUM 3.9 01/14/2019    CHLORIDE 105 10/15/2019    CHLORIDE 105 01/14/2019    CO2 27 10/15/2019    CO2 26 01/14/2019    BUN 11 10/15/2019    BUN 13 01/14/2019    CR 0.71 10/15/2019    CR 0.70 07/15/2019     (H) 10/15/2019    GLC 89 01/14/2019     COAGS:   Lab Results   Component Value Date    INR 1.15 (H) 10/09/2018    FIBR 428 (H) 10/09/2018     POC:   Lab Results   Component Value Date    BGM 91 10/08/2018     OTHER:   Lab Results   Component Value Date    ROSENDO 9.2 10/15/2019    PHOS 2.6 10/10/2018    MAG 2.0 10/11/2018    ALBUMIN 3.5 07/15/2019    PROTTOTAL 7.2 01/14/2019    ALT 24 01/14/2019    AST 16 01/14/2019    ALKPHOS 65 01/14/2019    BILITOTAL 0.4 01/14/2019    LIPASE 290 12/11/2018    AMYLASE 82 10/04/2010    TSH 1.23 12/10/2014    CRP <3.0 12/29/2006    SED 5 12/19/2005        Preop Vitals    BP Readings from Last 3 Encounters:   10/17/19 129/76   10/15/19 125/88   10/15/19 139/82    Pulse Readings from Last 3 Encounters:   10/17/19 68   10/15/19 70   10/15/19 73      Resp Readings from Last 3 Encounters:   10/15/19 19   10/15/19 14   08/29/19 16    SpO2 Readings from Last 3 Encounters:   10/15/19 97%   10/15/19 99%   10/03/19 98%      Temp Readings from Last 1 Encounters:   10/15/19 36.7  C (98.1  F)  "(Oral)    Ht Readings from Last 1 Encounters:   10/17/19 1.651 m (5' 5\")      Wt Readings from Last 1 Encounters:   10/17/19 81.2 kg (179 lb)    Estimated body mass index is 29.79 kg/m  as calculated from the following:    Height as of 10/17/19: 1.651 m (5' 5\").    Weight as of 10/17/19: 81.2 kg (179 lb).     LDA:        Assessment:   ASA SCORE: 2      Smoking Status:  Non-Smoker/Unknown   NPO Status: NPO Appropriate     Plan:   Anes. Type:  General   Pre-Medication: Midazolam; Acetaminophen   Induction:  IV (Standard)   Airway: ETT; Oral   Access/Monitoring: PIV   Maintenance: Balanced     Postop Plan:   Postop Pain: Opioids  Postop Sedation/Airway: Not planned  Disposition: Inpatient/Admit     PONV Management:   Adult Risk Factors: Female, H/o PONV or Motion Sickness, Non-Smoker, Postop Opioids   Prevention: Ondansetron, Dexamethasone     CONSENT: Direct conversation   Plan and risks discussed with: Patient; Spouse                   PAC Discussion and Assessment    ASA Classification: 2  Case is suitable for: [place]  Anesthetic techniques and relevant risks discussed: GA  Invasive monitoring and risk discussed: No  Types:   Possibility and Risk of blood transfusion discussed: No  NPO instructions given:   Additional anesthetic preparation and risks discussed:   Needs early admission to pre-op area:   Other:     PAC Resident/NP Anesthesia Assessment:  Enid Lombardo is a 59 year old female scheduled for laparoscopic ventral hernia repair with mesh on 10/21/2019 by Dr. Brown in treatment of ventral hernia.  PAC referral for risk assessment and optimization for anesthesia with comorbid conditions of PONV, GERD, h/o breast cancer s/p mastectomy and reconstruction with radiation/tamoxifen:    Pre-operative considerations:  1.  Cardiac:  Functional status- METS >4.  Low risk surgery with 0.4% (RCRI 0) risk of major adverse cardiac event.   2.  Pulm:  Airway feasible.  JAYJAY risk: low  3.  GI:  Risk of PONV score " = Pt with known PONV (indicated in chart although patient does not recall this.)  4.  Pt does not want to be given versed or any other amnestic/sedation until she is in the OR.     VTE risk: 0.26%    #cardiac  -denies CP, SOB, HERNANDEZ, palpitations  -denies cardiac history  -stress echo 11/2014  --Stress  There was a maximum 0.5mm ST segment depression in the inferior lead(s).  There was a maximum 0.5mm ST segment depression in the lateral lead(s).  This was a normal stress echocardiogram.  The visual ejection fraction is estimated at >70%.  Left ventricular cavity size decreases with exercise.  Global LV systolic function augments with exercise.  Normal resting wall motion and no stress-induced wall motion abnormality.    #pulmonary  -never smoker  -denies pulmonary history/complaints    #GI  -GERD asymptomatic on omeprazole    #other  -h/o breast cancer s/p mastectomy and reconstruction.  Radiation and tamoxifen.  Now on anastrozole.        **For further details of assessment, testing, and physical exam please see H and P completed on same date.          Jacqueline Anglin PA-C, [facility]      Reviewed and Signed by PAC Mid-Level Provider/Resident  Mid-Level Provider/Resident: Jacqueline Anglin  Date: 10/15/2019  Time:     Attending Anesthesiologist Anesthesia Assessment:  59 year old for lap ventral hernia repair. Patient has no significant cardiac or pulmonary disease. Stress echo 2017 without wall motion abnormalities, although had non specific ST segment abnormalities - considered a negative stress test.    Patient/case discussed with MILKA/resident; agree with above assessment. No need to see patient. Patient is appropriate for the planned procedure without further work-up or medical management.    Sharron Jewell MD        Anesthesiologist:   Date:   Time:   Pass/Fail:   Disposition:     PAC Pharmacist Assessment:        Pharmacist:   Date:   Time:    Odilia Hurtado MD

## 2019-10-21 ENCOUNTER — HOSPITAL ENCOUNTER (OUTPATIENT)
Facility: CLINIC | Age: 59
Discharge: HOME | End: 2019-10-21
Attending: SURGERY | Admitting: SURGERY
Payer: COMMERCIAL

## 2019-10-21 ENCOUNTER — ANESTHESIA (OUTPATIENT)
Dept: SURGERY | Facility: CLINIC | Age: 59
End: 2019-10-21
Payer: COMMERCIAL

## 2019-10-21 VITALS
RESPIRATION RATE: 16 BRPM | DIASTOLIC BLOOD PRESSURE: 80 MMHG | WEIGHT: 177.25 LBS | OXYGEN SATURATION: 95 % | BODY MASS INDEX: 29.53 KG/M2 | HEART RATE: 90 BPM | SYSTOLIC BLOOD PRESSURE: 127 MMHG | TEMPERATURE: 97.8 F | HEIGHT: 65 IN

## 2019-10-21 DIAGNOSIS — K43.9 VENTRAL HERNIA WITHOUT OBSTRUCTION OR GANGRENE: Primary | ICD-10-CM

## 2019-10-21 LAB — GLUCOSE BLDC GLUCOMTR-MCNC: 88 MG/DL (ref 70–99)

## 2019-10-21 PROCEDURE — 25000132 ZZH RX MED GY IP 250 OP 250 PS 637: Performed by: STUDENT IN AN ORGANIZED HEALTH CARE EDUCATION/TRAINING PROGRAM

## 2019-10-21 PROCEDURE — 37000009 ZZH ANESTHESIA TECHNICAL FEE, EACH ADDTL 15 MIN: Performed by: SURGERY

## 2019-10-21 PROCEDURE — 71000015 ZZH RECOVERY PHASE 1 LEVEL 2 EA ADDTL HR: Performed by: SURGERY

## 2019-10-21 PROCEDURE — 25000125 ZZHC RX 250: Performed by: STUDENT IN AN ORGANIZED HEALTH CARE EDUCATION/TRAINING PROGRAM

## 2019-10-21 PROCEDURE — 00000146 ZZHCL STATISTIC GLUCOSE BY METER IP

## 2019-10-21 PROCEDURE — 25800030 ZZH RX IP 258 OP 636: Performed by: STUDENT IN AN ORGANIZED HEALTH CARE EDUCATION/TRAINING PROGRAM

## 2019-10-21 PROCEDURE — 40000170 ZZH STATISTIC PRE-PROCEDURE ASSESSMENT II: Performed by: SURGERY

## 2019-10-21 PROCEDURE — 71000027 ZZH RECOVERY PHASE 2 EACH 15 MINS: Performed by: SURGERY

## 2019-10-21 PROCEDURE — 36000057 ZZH SURGERY LEVEL 3 1ST 30 MIN - UMMC: Performed by: SURGERY

## 2019-10-21 PROCEDURE — 25000128 H RX IP 250 OP 636: Performed by: STUDENT IN AN ORGANIZED HEALTH CARE EDUCATION/TRAINING PROGRAM

## 2019-10-21 PROCEDURE — 71000014 ZZH RECOVERY PHASE 1 LEVEL 2 FIRST HR: Performed by: SURGERY

## 2019-10-21 PROCEDURE — 25000128 H RX IP 250 OP 636: Performed by: NURSE PRACTITIONER

## 2019-10-21 PROCEDURE — 36000059 ZZH SURGERY LEVEL 3 EA 15 ADDTL MIN UMMC: Performed by: SURGERY

## 2019-10-21 PROCEDURE — 27210794 ZZH OR GENERAL SUPPLY STERILE: Performed by: SURGERY

## 2019-10-21 PROCEDURE — C1781 MESH (IMPLANTABLE): HCPCS | Performed by: SURGERY

## 2019-10-21 PROCEDURE — 25000566 ZZH SEVOFLURANE, EA 15 MIN: Performed by: SURGERY

## 2019-10-21 PROCEDURE — 25000132 ZZH RX MED GY IP 250 OP 250 PS 637: Performed by: NEUROLOGICAL SURGERY

## 2019-10-21 PROCEDURE — 37000008 ZZH ANESTHESIA TECHNICAL FEE, 1ST 30 MIN: Performed by: SURGERY

## 2019-10-21 DEVICE — MESH VENTRALIGHT ST W/ECHO POS SYSTEM 4.5" CIRCLE 5955450: Type: IMPLANTABLE DEVICE | Site: ABDOMEN | Status: FUNCTIONAL

## 2019-10-21 RX ORDER — FENTANYL CITRATE 50 UG/ML
25-50 INJECTION, SOLUTION INTRAMUSCULAR; INTRAVENOUS EVERY 5 MIN PRN
Status: DISCONTINUED | OUTPATIENT
Start: 2019-10-21 | End: 2019-10-21 | Stop reason: HOSPADM

## 2019-10-21 RX ORDER — ONDANSETRON 4 MG/1
4 TABLET, ORALLY DISINTEGRATING ORAL EVERY 30 MIN PRN
Status: DISCONTINUED | OUTPATIENT
Start: 2019-10-21 | End: 2019-10-21 | Stop reason: HOSPADM

## 2019-10-21 RX ORDER — NALOXONE HYDROCHLORIDE 0.4 MG/ML
.1-.4 INJECTION, SOLUTION INTRAMUSCULAR; INTRAVENOUS; SUBCUTANEOUS
Status: DISCONTINUED | OUTPATIENT
Start: 2019-10-21 | End: 2019-10-21 | Stop reason: HOSPADM

## 2019-10-21 RX ORDER — ACETAMINOPHEN 325 MG/1
650 TABLET ORAL
Status: DISCONTINUED | OUTPATIENT
Start: 2019-10-21 | End: 2019-10-21 | Stop reason: HOSPADM

## 2019-10-21 RX ORDER — ACETAMINOPHEN 325 MG/1
975 TABLET ORAL ONCE
Status: COMPLETED | OUTPATIENT
Start: 2019-10-21 | End: 2019-10-21

## 2019-10-21 RX ORDER — LIDOCAINE HYDROCHLORIDE 20 MG/ML
INJECTION, SOLUTION INFILTRATION; PERINEURAL PRN
Status: DISCONTINUED | OUTPATIENT
Start: 2019-10-21 | End: 2019-10-21

## 2019-10-21 RX ORDER — CEFAZOLIN SODIUM 1 G/3ML
1 INJECTION, POWDER, FOR SOLUTION INTRAMUSCULAR; INTRAVENOUS SEE ADMIN INSTRUCTIONS
Status: DISCONTINUED | OUTPATIENT
Start: 2019-10-21 | End: 2019-10-21 | Stop reason: HOSPADM

## 2019-10-21 RX ORDER — ONDANSETRON 2 MG/ML
4 INJECTION INTRAMUSCULAR; INTRAVENOUS EVERY 30 MIN PRN
Status: DISCONTINUED | OUTPATIENT
Start: 2019-10-21 | End: 2019-10-21 | Stop reason: HOSPADM

## 2019-10-21 RX ORDER — FENTANYL CITRATE 50 UG/ML
INJECTION, SOLUTION INTRAMUSCULAR; INTRAVENOUS PRN
Status: DISCONTINUED | OUTPATIENT
Start: 2019-10-21 | End: 2019-10-21

## 2019-10-21 RX ORDER — SODIUM CHLORIDE, SODIUM LACTATE, POTASSIUM CHLORIDE, CALCIUM CHLORIDE 600; 310; 30; 20 MG/100ML; MG/100ML; MG/100ML; MG/100ML
INJECTION, SOLUTION INTRAVENOUS CONTINUOUS
Status: DISCONTINUED | OUTPATIENT
Start: 2019-10-21 | End: 2019-10-21 | Stop reason: HOSPADM

## 2019-10-21 RX ORDER — HYDROMORPHONE HYDROCHLORIDE 1 MG/ML
0.3 INJECTION, SOLUTION INTRAMUSCULAR; INTRAVENOUS; SUBCUTANEOUS EVERY 5 MIN PRN
Status: DISCONTINUED | OUTPATIENT
Start: 2019-10-21 | End: 2019-10-21 | Stop reason: HOSPADM

## 2019-10-21 RX ORDER — OXYCODONE HYDROCHLORIDE 5 MG/1
5-10 TABLET ORAL EVERY 4 HOURS PRN
Qty: 20 TABLET | Refills: 0 | Status: SHIPPED | OUTPATIENT
Start: 2019-10-21 | End: 2019-10-31

## 2019-10-21 RX ORDER — ONDANSETRON 2 MG/ML
INJECTION INTRAMUSCULAR; INTRAVENOUS PRN
Status: DISCONTINUED | OUTPATIENT
Start: 2019-10-21 | End: 2019-10-21

## 2019-10-21 RX ORDER — EPHEDRINE SULFATE 50 MG/ML
INJECTION, SOLUTION INTRAMUSCULAR; INTRAVENOUS; SUBCUTANEOUS PRN
Status: DISCONTINUED | OUTPATIENT
Start: 2019-10-21 | End: 2019-10-21

## 2019-10-21 RX ORDER — ONDANSETRON 4 MG/1
4 TABLET, ORALLY DISINTEGRATING ORAL EVERY 8 HOURS PRN
Qty: 10 TABLET | Refills: 0 | Status: SHIPPED | OUTPATIENT
Start: 2019-10-21 | End: 2019-10-31

## 2019-10-21 RX ORDER — ACETAMINOPHEN 325 MG/1
650 TABLET ORAL EVERY 4 HOURS PRN
Qty: 50 TABLET | Refills: 0 | Status: SHIPPED | OUTPATIENT
Start: 2019-10-21 | End: 2019-11-12

## 2019-10-21 RX ORDER — PROPOFOL 10 MG/ML
INJECTION, EMULSION INTRAVENOUS PRN
Status: DISCONTINUED | OUTPATIENT
Start: 2019-10-21 | End: 2019-10-21

## 2019-10-21 RX ORDER — SODIUM CHLORIDE, SODIUM LACTATE, POTASSIUM CHLORIDE, CALCIUM CHLORIDE 600; 310; 30; 20 MG/100ML; MG/100ML; MG/100ML; MG/100ML
INJECTION, SOLUTION INTRAVENOUS CONTINUOUS PRN
Status: DISCONTINUED | OUTPATIENT
Start: 2019-10-21 | End: 2019-10-21

## 2019-10-21 RX ORDER — OXYCODONE HYDROCHLORIDE 5 MG/1
5 TABLET ORAL
Status: COMPLETED | OUTPATIENT
Start: 2019-10-21 | End: 2019-10-21

## 2019-10-21 RX ORDER — DEXAMETHASONE SODIUM PHOSPHATE 4 MG/ML
INJECTION, SOLUTION INTRA-ARTICULAR; INTRALESIONAL; INTRAMUSCULAR; INTRAVENOUS; SOFT TISSUE PRN
Status: DISCONTINUED | OUTPATIENT
Start: 2019-10-21 | End: 2019-10-21

## 2019-10-21 RX ORDER — CEFAZOLIN SODIUM 2 G/100ML
2 INJECTION, SOLUTION INTRAVENOUS
Status: COMPLETED | OUTPATIENT
Start: 2019-10-21 | End: 2019-10-21

## 2019-10-21 RX ADMIN — FENTANYL CITRATE 50 MCG: 50 INJECTION, SOLUTION INTRAMUSCULAR; INTRAVENOUS at 12:20

## 2019-10-21 RX ADMIN — FENTANYL CITRATE 50 MCG: 50 INJECTION, SOLUTION INTRAMUSCULAR; INTRAVENOUS at 11:54

## 2019-10-21 RX ADMIN — HYDROMORPHONE HYDROCHLORIDE 0.5 MG: 1 INJECTION, SOLUTION INTRAMUSCULAR; INTRAVENOUS; SUBCUTANEOUS at 11:55

## 2019-10-21 RX ADMIN — HYDROMORPHONE HYDROCHLORIDE 0.3 MG: 1 INJECTION, SOLUTION INTRAMUSCULAR; INTRAVENOUS; SUBCUTANEOUS at 13:00

## 2019-10-21 RX ADMIN — HYDROMORPHONE HYDROCHLORIDE 0.5 MG: 1 INJECTION, SOLUTION INTRAMUSCULAR; INTRAVENOUS; SUBCUTANEOUS at 12:01

## 2019-10-21 RX ADMIN — ONDANSETRON 4 MG: 2 INJECTION INTRAMUSCULAR; INTRAVENOUS at 11:39

## 2019-10-21 RX ADMIN — FENTANYL CITRATE 50 MCG: 50 INJECTION, SOLUTION INTRAMUSCULAR; INTRAVENOUS at 11:00

## 2019-10-21 RX ADMIN — ROCURONIUM BROMIDE 20 MG: 10 INJECTION INTRAVENOUS at 10:39

## 2019-10-21 RX ADMIN — ENOXAPARIN SODIUM 40 MG: 40 INJECTION SUBCUTANEOUS at 08:12

## 2019-10-21 RX ADMIN — CEFAZOLIN SODIUM 2 G: 2 INJECTION, SOLUTION INTRAVENOUS at 10:30

## 2019-10-21 RX ADMIN — DEXAMETHASONE SODIUM PHOSPHATE 6 MG: 4 INJECTION, SOLUTION INTRA-ARTICULAR; INTRALESIONAL; INTRAMUSCULAR; INTRAVENOUS; SOFT TISSUE at 10:37

## 2019-10-21 RX ADMIN — FENTANYL CITRATE 100 MCG: 50 INJECTION, SOLUTION INTRAMUSCULAR; INTRAVENOUS at 10:19

## 2019-10-21 RX ADMIN — PROPOFOL 160 MG: 10 INJECTION, EMULSION INTRAVENOUS at 10:19

## 2019-10-21 RX ADMIN — Medication 5 MG: at 10:23

## 2019-10-21 RX ADMIN — PHENYLEPHRINE HYDROCHLORIDE 100 MCG: 10 INJECTION INTRAVENOUS at 10:20

## 2019-10-21 RX ADMIN — Medication 5 MG: at 10:47

## 2019-10-21 RX ADMIN — HYDROMORPHONE HYDROCHLORIDE 0.5 MG: 1 INJECTION, SOLUTION INTRAMUSCULAR; INTRAVENOUS; SUBCUTANEOUS at 11:37

## 2019-10-21 RX ADMIN — SODIUM CHLORIDE, POTASSIUM CHLORIDE, SODIUM LACTATE AND CALCIUM CHLORIDE: 600; 310; 30; 20 INJECTION, SOLUTION INTRAVENOUS at 10:12

## 2019-10-21 RX ADMIN — FENTANYL CITRATE 50 MCG: 50 INJECTION, SOLUTION INTRAMUSCULAR; INTRAVENOUS at 10:39

## 2019-10-21 RX ADMIN — ACETAMINOPHEN 975 MG: 325 TABLET, FILM COATED ORAL at 08:11

## 2019-10-21 RX ADMIN — ROCURONIUM BROMIDE 60 MG: 10 INJECTION INTRAVENOUS at 10:19

## 2019-10-21 RX ADMIN — LIDOCAINE HYDROCHLORIDE 100 MG: 20 INJECTION, SOLUTION INFILTRATION; PERINEURAL at 10:19

## 2019-10-21 RX ADMIN — SUGAMMADEX 200 MG: 100 INJECTION, SOLUTION INTRAVENOUS at 11:51

## 2019-10-21 RX ADMIN — Medication 5 MG: at 11:14

## 2019-10-21 RX ADMIN — FENTANYL CITRATE 50 MCG: 50 INJECTION, SOLUTION INTRAMUSCULAR; INTRAVENOUS at 12:47

## 2019-10-21 RX ADMIN — ACETAMINOPHEN 650 MG: 325 TABLET, FILM COATED ORAL at 14:20

## 2019-10-21 RX ADMIN — OXYCODONE HYDROCHLORIDE 5 MG: 5 TABLET ORAL at 14:20

## 2019-10-21 RX ADMIN — ONDANSETRON 4 MG: 2 INJECTION INTRAMUSCULAR; INTRAVENOUS at 12:50

## 2019-10-21 ASSESSMENT — PAIN DESCRIPTION - DESCRIPTORS
DESCRIPTORS: PRESSURE
DESCRIPTORS: ACHING

## 2019-10-21 ASSESSMENT — MIFFLIN-ST. JEOR: SCORE: 1389.88

## 2019-10-21 NOTE — ANESTHESIA POSTPROCEDURE EVALUATION
Anesthesia POST Procedure Evaluation    Patient: Enid Lombardo   MRN:     [id number] Gender:   female   Age:    59 year old :      1960        Preoperative Diagnosis: Ventral Hernia Without Obstruction or Gangrene   Procedure(s):  Laparoscopic Ventral Hernia Repair With Mesh   Postop Comments: No value filed.       Anesthesia Type:  Not documented  General    Reportable Event: NO     PAIN: Uncomplicated   Sign Out status: Comfortable, Well controlled pain     PONV: No PONV   Sign Out status:  No Nausea or Vomiting     Neuro/Psych: Uneventful perioperative course   Sign Out Status: Preoperative baseline; Age appropriate mentation     Airway/Resp.: Uneventful perioperative course   Sign Out Status: Non labored breathing, age appropriate RR; Resp. Status within EXPECTED Parameters     CV: Uneventful perioperative course   Sign Out status: Appropriate BP and perfusion indices; Appropriate HR/Rhythm     Disposition:   Sign Out in:  PACU  Disposition:  Phase II; Home  Recovery Course: Uneventful  Follow-Up: Not required           Last Anesthesia Record Vitals:  CRNA VITALS  10/21/2019 1128 - 10/21/2019 1228      10/21/2019             EKG:  Sinus rhythm          Last PACU Vitals:  Vitals Value Taken Time   /84 10/21/2019 12:15 PM   Temp     Pulse 85 10/21/2019 12:15 PM   Resp     SpO2 93 % 10/21/2019 12:27 PM   Temp src     NIBP     Pulse     SpO2     Resp     Temp     Ht Rate     Temp 2     Vitals shown include unvalidated device data.      Electronically Signed By: Esteban Vazquez MD, 2019, 12:28 PM

## 2019-10-21 NOTE — BRIEF OP NOTE
[facility], [place]    Brief Operative Note    Pre-operative diagnosis: Ventral Hernia Without Obstruction or Gangrene  Post-operative diagnosis Same as pre-operative diagnosis    Procedure: Procedure(s):  Laparoscopic Ventral Hernia Repair With Mesh  Surgeon: Surgeon(s) and Role:     * Emil Brown MD - Primary     * Jennifer Whitley MD - Resident - Assisting     * Chilango Martins - Resident - Assisting  Anesthesia: General   Estimated blood loss: 10cc  Drains: None  Specimens: * No specimens in log *  Findings:   None.  Complications: None.  Implants:   Implant Name Type Inv. Item Serial No.  Lot No. LRB No. Used   MESH VENTRALIGHT ST W/ECHO POS SYSTEM 4.5&quot; [employer] [id number] Mesh MESH VENTRALIGHT ST W/ECHO POS SYSTEM 4.5&quot; [employer]aver [id number]  CR [employer] [facility] [id number] N/A 1

## 2019-10-21 NOTE — ANESTHESIA CARE TRANSFER NOTE
Patient: Enid V Grupo    Procedure(s):  Laparoscopic Ventral Hernia Repair With Mesh    Diagnosis: Ventral Hernia Without Obstruction or Gangrene  Diagnosis Additional Information: No value filed.    Anesthesia Type:   General     Note:  Airway :Face Mask  Patient transferred to:PACU  Comments: VSS. Breathing spontaneously at a regular rate with adequate tidal volumes and maintaining O2 sats on 6L. Complaining of some nausea that is improving and pain. Given additional 0.5 dilaudid in PACU. No apparent complications from anesthesia.     Odilia Hurtado MD on 10/21/2019 at 12:09 PM    Handoff Report: Identifed the Patient, Identified the Reponsible Provider, Reviewed the pertinent medical history, Discussed the surgical course, Reviewed Intra-OP anesthesia mangement and issues during anesthesia, Set expectations for post-procedure period and Allowed opportunity for questions and acknowledgement of understanding      Vitals: (Last set prior to Anesthesia Care Transfer)    CRNA VITALS  10/21/2019 1128 - 10/21/2019 1208      10/21/2019             EKG:  Sinus rhythm                Electronically Signed By: Odilia Hurtado MD  October 21, 2019  12:08 PM

## 2019-10-21 NOTE — DISCHARGE INSTRUCTIONS
REMEMBER: SAME DAY SURGERY IS NOT SAME DAY RECOVERY. TAKE IT EASY, GET PLENTY OF REST, AND HAVE SOMEONE WITH YOU FOR 24 HOURS. FOLLOW THESE INSTRUCTIONS AND PLEASE DO NOT HESITATE TO CALL WITH ANY QUESTIONS.       [facility], [place]  Same-Day Surgery   Adult Discharge Orders & Instructions     For 24 hours after surgery    1. Get plenty of rest.  A responsible adult must stay with you for at least 24 hours after you leave the hospital.   2. Do not drive or use heavy equipment.  If you have weakness or tingling, don't drive or use heavy equipment until this feeling goes away.  3. Do not drink alcohol.  4. Avoid strenuous or risky activities.  Ask for help when climbing stairs.   5. You may feel lightheaded.  IF so, sit for a few minutes before standing.  Have someone help you get up.   6. If you have nausea (feel sick to your stomach): Drink only clear liquids such as apple juice, ginger ale, broth or 7-Up.  Rest may also help.  Be sure to drink enough fluids.  Move to a regular diet as you feel able.  7. You may have a slight fever. Call the doctor if your fever is over 100 F (37.7 C) (taken under the tongue) or lasts longer than 24 hours.  8. You may have a dry mouth, a sore throat, muscle aches or trouble sleeping.  These should go away after 24 hours.  9. Do not make important or legal decisions.   Call your doctor for any of the followin.  Signs of infection (fever, growing tenderness at the surgery site, a large amount of drainage or bleeding, severe pain, foul-smelling drainage, redness, swelling).    2. It has been over 8 to 10 hours since surgery and you are still not able to urinate (pass water).    3.  Headache for over 24 hours.    To contact a doctor, call Dr. Brown's office at [phone] or:        [phone] and ask for the resident on call for  (answered 24 hours a day)      Emergency Department:    [facility]: [phone]       (TTY for hearing  impaired: [phone])

## 2019-10-22 ENCOUNTER — TELEPHONE (OUTPATIENT)
Dept: FAMILY MEDICINE | Facility: OTHER | Age: 59
End: 2019-10-22

## 2019-10-22 ENCOUNTER — TELEPHONE (OUTPATIENT)
Dept: SURGERY | Facility: CLINIC | Age: 59
End: 2019-10-22

## 2019-10-22 ENCOUNTER — MYC MEDICAL ADVICE (OUTPATIENT)
Dept: FAMILY MEDICINE | Facility: OTHER | Age: 59
End: 2019-10-22

## 2019-10-22 DIAGNOSIS — N95.1 HOT FLUSHES, PERIMENOPAUSAL: ICD-10-CM

## 2019-10-22 RX ORDER — SERTRALINE HYDROCHLORIDE 25 MG/1
TABLET, FILM COATED ORAL
Qty: 30 TABLET | Refills: 11 | Status: CANCELLED | OUTPATIENT
Start: 2019-10-22

## 2019-10-22 NOTE — TELEPHONE ENCOUNTER
Reason for follow up call: Enidbrandon Lombardo appeared on our list for being seen in and recenlty discharge from the Hospital.    Chief Complaint   Patient presents with     Hospital F/U     Ventral Hernia Without Obstruction Or Gangrene       Encounter routed for Clinic RN to call for follow up      Ivet Dunbar, RN, BSN

## 2019-10-22 NOTE — TELEPHONE ENCOUNTER
Called patient to schedule postop clinic appointment with Dr. Brown. Appointment made 10/30 at 10:20 a.m.

## 2019-10-22 NOTE — TELEPHONE ENCOUNTER
"      START taking:  ondansetron (ZOFRAN-ODT)  oxyCODONE (ROXICODONE)  CHANGE how you take:  acetaminophen (TYLENOL)  STOP taking:  ADVIL 200 MG tablet    Follow-up Appointments (continued)  Follow up with Dr. Brown , at (location with clinic name or city)  [facility], within 2 weeks to evaluate after surgery. No follow up labs or  test are needed.   Appointments on [place] and/or [facility] (with [facility] or  [facility] provider or service). Call [phone] if you haven't heard  regarding these appointments within 7 days of discharge.    ED/Discharge Protocol    \"Hi, my name is Jesenia Noble RN, a registered nurse, and I am calling on behalf of Dr. Alonso's office at [place].  I am calling to follow up and see how things are going for you after your recent visit.\"    \"I see that you were in the (ER/UC/IP) on 10/21/19.    How are you doing now that you are home?\" fine, pain was more like muscle spasms yesterday after getting home from the hospital, but she has been taking pain medications as directed and they are much better today.    Is patient experiencing symptoms that may require a hospital visit?  no    Discharge Instructions    \"Let's review your discharge instructions.  What is/are the follow-up recommendations?  Pt. Response:     \"Were you instructed to make a follow-up appointment?\"  Pt. Response: Yes.  Has appointment been made?   No.  \"Can I help you schedule that appointment?\" Surgeon's office is calling her to schedule   - reviewed the surgeon's phone number that is on her AVS, instructed her to call them to schedule if she doesn't hear from them in the next day or 2     Copied from AVS:  'Follow-up Appointments (continued)  Follow up with Dr. Brown , at (location with clinic name or city)  [facility], within 2 weeks to evaluate after surgery. No follow up labs or  test are needed.   Appointments on [place] and/or [facility] (with [facility] or  [facility] provider or service). Call [phone] if you " "haven't heard  regarding these appointments within 7 days of discharge.'    \"When you see the provider, I would recommend that you bring your discharge instructions with you.    Medications    \"How many new medications are you on since your hospitalization/ED visit?\"    2 or more - Epic MTM referral needed  \"How many of your current medicines changed (dose, timing, name, etc.) while you were in the hospital/ED visit?\"   0-1  \"Do you have questions about your medications?\"   No  \"Were you newly diagnosed with heart failure, COPD, diabetes or did you have a heart attack?\"   No  For patients on insulin: \"Did you start on insulin in the hospital or did you have your insulin dose changed?\"   No  Post Discharge Medication Reconciliation Status: discharge medications reconciled and changed, per note/orders (see AVS).    Was MTM referral placed (*Make sure to put transitions as reason for referral)?   No    Call Summary    \"Do you have any questions or concerns about your condition or care plan at the moment?\"    Yes - see [employer] message for refill request from 10/21/19   Assured pt request was received and is in process    Triage nurse advice given: call surgeon's office with any post-op pain or other recovery/follow up questions, or call Dr. Alonso's office    Patient was in ER 1 in the past year (assess appropriateness of ER visits.)      \"If you have questions or things don't continue to improve, we encourage you contact us through the main clinic number,  [phone].  Even if the clinic is not open, triage nurses are available 24/7 to help you.     We would like you to know that our clinic has extended hours (provide information).  We also have urgent care (provide details on closest location and hours/contact info)\"      \"Thank you for your time and take care!\"      Jesenia Noble, RN, BSN      "

## 2019-10-22 NOTE — TELEPHONE ENCOUNTER
Needs an appointment scheduled (declined physical at last appt), then navneet/return to me for refill until the visit.  Shi Alonso MD

## 2019-10-22 NOTE — TELEPHONE ENCOUNTER
Received refill request via [place] from patient.   Spoke to patient, she is currently out of this medication and wondering if her PCP AE can refill going forward, as she no longer sees the previous provider who was prescribing.  Informed her that this request was received, is in process.    LOV: 8/29/19 with AE      Pending Prescriptions:                       Disp   Refills    sertraline (ZOLOFT) 25 MG tablet          30 tab*11           Sig: Take 1 tablet daily.    Routing refill request to provider for review/approval because:  Medication is reported/historical    Jesenia Noble, RN, BSN

## 2019-10-28 ENCOUNTER — TELEPHONE (OUTPATIENT)
Dept: FAMILY MEDICINE | Facility: OTHER | Age: 59
End: 2019-10-28

## 2019-10-28 NOTE — PROGRESS NOTES
"Fernando Lombardo is a 59 year old female who presents to clinic today for the following health issues:    HPI   {SUPERLIST (Optional):364621}  {additonal problems for provider to add (Optional):656715}    {HIST REVIEW/ LINKS 2 (Optional):540806}    Reviewed and updated as needed this visit by Provider         Review of Systems   {ROS COMP (Optional):591045}      Objective    LMP 10/14/2001   There is no height or weight on file to calculate BMI.  Physical Exam   {Exam List (Optional):742449}    {Diagnostic Test Results (Optional):782939::\"Diagnostic Test Results:\",\"Labs reviewed in Epic\"}        {PROVIDER CHARTING PREFERENCE:027100}    "

## 2019-10-28 NOTE — TELEPHONE ENCOUNTER
Left message for patient to return call. She has an appointment on 10/31/19 at 10:15 for 15 minutes. This is not a long enough appointment. Please ask if she would be able to come at 1pm instead for a 30 minute appointment. The 1pm slot is currently held for her  Mahi Montes CMA

## 2019-10-30 ENCOUNTER — OFFICE VISIT (OUTPATIENT)
Dept: SURGERY | Facility: CLINIC | Age: 59
End: 2019-10-30
Payer: COMMERCIAL

## 2019-10-30 VITALS
WEIGHT: 179.5 LBS | HEIGHT: 65 IN | OXYGEN SATURATION: 97 % | TEMPERATURE: 98.1 F | BODY MASS INDEX: 29.91 KG/M2 | DIASTOLIC BLOOD PRESSURE: 75 MMHG | HEART RATE: 86 BPM | SYSTOLIC BLOOD PRESSURE: 128 MMHG

## 2019-10-30 DIAGNOSIS — M75.81 TENDINITIS OF RIGHT ROTATOR CUFF: ICD-10-CM

## 2019-10-30 DIAGNOSIS — K43.9 VENTRAL HERNIA WITHOUT OBSTRUCTION OR GANGRENE: Primary | ICD-10-CM

## 2019-10-30 DIAGNOSIS — M54.12 CERVICAL RADICULAR PAIN: ICD-10-CM

## 2019-10-30 RX ORDER — DICLOFENAC SODIUM 75 MG/1
75 TABLET, DELAYED RELEASE ORAL 2 TIMES DAILY
Qty: 60 TABLET | Refills: 1 | Status: SHIPPED | OUTPATIENT
Start: 2019-10-30 | End: 2019-12-11

## 2019-10-30 ASSESSMENT — PAIN SCALES - GENERAL: PAINLEVEL: NO PAIN (0)

## 2019-10-30 ASSESSMENT — MIFFLIN-ST. JEOR: SCORE: 1400.09

## 2019-10-30 NOTE — PROGRESS NOTES
"Surgery Post Op Visit    S: Ms Lombardo is a 59 year old female POD9 from laparoscopic ventral hernia repair with mesh by Dr. Brown (DOS: 10/21/19). She reports she has been doing very well. She had some pretty significant abdominal pain and spasms for the first two days postop, but this has since resolved. She now describes some aching along the left abdomen, but mostly at night with positioning. She will take Tylenol as needed but states she does not do much. She is otherwise doing well, tolerating a full diet without nausea/vomiting, voiding, having regular bowel movements, no longer on Senna. No fevers, chills or other concerns.    O:  /75 (BP Location: Left arm, Patient Position: Sitting, Cuff Size: Adult Regular)   Pulse 86   Temp 98.1  F (36.7  C) (Oral)   Ht 1.651 m (5' 5\")   Wt 81.4 kg (179 lb 8 oz)   LMP 10/14/2001   SpO2 97%   BMI 29.87 kg/m    Awake, alert, NAD, conversant  Acyanotic  Nonlabored breathing on room air  Abdomen soft, nondistended, nontender, incisions c/d/i with surgical glue, healing ecchymosis around left lateral port site, no surrounding erythema.     A/P:   POD9 from lap ventral hernia repair, doing very well clinically. She may take tylenol or ibuprofen as needed. Additionally, we discussed that she does have bilateral inguinal hernias, though these are completely asymptomatic at this time, so she will contact us if this changes. She can follow up as needed if there are any concerns.     Patient seen and examined with Dr. Brown.    Jennifer Whitley MD  PGY-1 Surgery    I saw and evaluated the patient. I agree with the findings and the plan of care as documented in the resident s note.    Emilfranco Brown MD    "

## 2019-10-30 NOTE — NURSING NOTE
"Chief Complaint   Patient presents with     Surgical Followup     Hernia surgery follow up.       Vitals:    10/30/19 1020   BP: 128/75   BP Location: Left arm   Patient Position: Sitting   Cuff Size: Adult Regular   Pulse: 86   Temp: 98.1  F (36.7  C)   TempSrc: Oral   SpO2: 97%   Weight: 81.4 kg (179 lb 8 oz)   Height: 1.651 m (5' 5\")       Body mass index is 29.87 kg/m .            Emy Felton CMA    "

## 2019-10-30 NOTE — LETTER
"10/30/2019       RE: Enid Lombardo  [address]  [place] [address]     Dear Colleague,    Thank you for referring your patient, Enid Lombardo, to the [facility] GENERAL SURGERY at [facility]. Please see a copy of my visit note below.    Surgery Post Op Visit    S: Ms Lombardo is a 59 year old female POD9 from laparoscopic ventral hernia repair with mesh by Dr. Brown (DOS: 10/21/19). She reports she has been doing very well. She had some pretty significant abdominal pain and spasms for the first two days postop, but this has since resolved. She now describes some aching along the left abdomen, but mostly at night with positioning. She will take Tylenol as needed but states she does not do much. She is otherwise doing well, tolerating a full diet without nausea/vomiting, voiding, having regular bowel movements, no longer on Senna. No fevers, chills or other concerns.    O:  /75 (BP Location: Left arm, Patient Position: Sitting, Cuff Size: Adult Regular)   Pulse 86   Temp 98.1  F (36.7  C) (Oral)   Ht 1.651 m (5' 5\")   Wt 81.4 kg (179 lb 8 oz)   LMP 10/14/2001   SpO2 97%   BMI 29.87 kg/m     Awake, alert, NAD, conversant  Acyanotic  Nonlabored breathing on room air  Abdomen soft, nondistended, nontender, incisions c/d/i with surgical glue, healing ecchymosis around left lateral port site, no surrounding erythema.     A/P:   POD9 from lap ventral hernia repair, doing very well clinically. She may take tylenol or ibuprofen as needed. Additionally, we discussed that she does have bilateral inguinal hernias, though these are completely asymptomatic at this time, so she will contact us if this changes. She can follow up as needed if there are any concerns.     Patient seen and examined with Dr. Brown.    Jennifer Whitley MD  PGY-1 Surgery    I saw and evaluated the patient. I agree with the findings and the plan of care as documented in the " resident s note.    Emil Brown MD

## 2019-10-31 ENCOUNTER — OFFICE VISIT (OUTPATIENT)
Dept: FAMILY MEDICINE | Facility: OTHER | Age: 59
End: 2019-10-31
Payer: COMMERCIAL

## 2019-10-31 VITALS
RESPIRATION RATE: 16 BRPM | TEMPERATURE: 98.9 F | HEIGHT: 66 IN | HEART RATE: 86 BPM | DIASTOLIC BLOOD PRESSURE: 70 MMHG | WEIGHT: 179.5 LBS | SYSTOLIC BLOOD PRESSURE: 104 MMHG | OXYGEN SATURATION: 98 % | BODY MASS INDEX: 28.85 KG/M2

## 2019-10-31 DIAGNOSIS — Z12.4 SCREENING FOR MALIGNANT NEOPLASM OF CERVIX: ICD-10-CM

## 2019-10-31 DIAGNOSIS — K21.9 GASTROESOPHAGEAL REFLUX DISEASE WITHOUT ESOPHAGITIS: ICD-10-CM

## 2019-10-31 DIAGNOSIS — D05.91 RECURRENT CARCINOMA IN SITU OF BREAST, RIGHT: ICD-10-CM

## 2019-10-31 DIAGNOSIS — E78.5 HYPERLIPIDEMIA WITH TARGET LDL LESS THAN 130: ICD-10-CM

## 2019-10-31 DIAGNOSIS — Z23 NEED FOR PROPHYLACTIC VACCINATION AND INOCULATION AGAINST INFLUENZA: ICD-10-CM

## 2019-10-31 DIAGNOSIS — K43.9 VENTRAL HERNIA WITHOUT OBSTRUCTION OR GANGRENE: ICD-10-CM

## 2019-10-31 DIAGNOSIS — N95.1 HOT FLUSHES, PERIMENOPAUSAL: ICD-10-CM

## 2019-10-31 DIAGNOSIS — K21.9 GASTROESOPHAGEAL REFLUX DISEASE, ESOPHAGITIS PRESENCE NOT SPECIFIED: ICD-10-CM

## 2019-10-31 DIAGNOSIS — D05.90 CARCINOMA IN SITU OF BREAST, UNSPECIFIED LATERALITY, UNSPECIFIED TYPE: ICD-10-CM

## 2019-10-31 DIAGNOSIS — Z00.00 ENCOUNTER FOR ROUTINE ADULT HEALTH EXAMINATION WITHOUT ABNORMAL FINDINGS: Primary | ICD-10-CM

## 2019-10-31 LAB
CHOLEST SERPL-MCNC: 292 MG/DL
HDLC SERPL-MCNC: 60 MG/DL
LDLC SERPL CALC-MCNC: 182 MG/DL
NONHDLC SERPL-MCNC: 232 MG/DL
TRIGL SERPL-MCNC: 250 MG/DL

## 2019-10-31 PROCEDURE — 90682 RIV4 VACC RECOMBINANT DNA IM: CPT | Performed by: FAMILY MEDICINE

## 2019-10-31 PROCEDURE — 90471 IMMUNIZATION ADMIN: CPT | Performed by: FAMILY MEDICINE

## 2019-10-31 PROCEDURE — 80061 LIPID PANEL: CPT | Performed by: FAMILY MEDICINE

## 2019-10-31 PROCEDURE — 99396 PREV VISIT EST AGE 40-64: CPT | Mod: 25 | Performed by: FAMILY MEDICINE

## 2019-10-31 PROCEDURE — 36415 COLL VENOUS BLD VENIPUNCTURE: CPT | Performed by: FAMILY MEDICINE

## 2019-10-31 PROCEDURE — 90715 TDAP VACCINE 7 YRS/> IM: CPT | Performed by: FAMILY MEDICINE

## 2019-10-31 PROCEDURE — 90472 IMMUNIZATION ADMIN EACH ADD: CPT | Performed by: FAMILY MEDICINE

## 2019-10-31 RX ORDER — SERTRALINE HYDROCHLORIDE 25 MG/1
TABLET, FILM COATED ORAL
Qty: 30 TABLET | Refills: 11 | Status: SHIPPED | OUTPATIENT
Start: 2019-10-31 | End: 2020-09-25

## 2019-10-31 ASSESSMENT — ENCOUNTER SYMPTOMS
SHORTNESS OF BREATH: 0
HEMATURIA: 0
ABDOMINAL PAIN: 1
EYE PAIN: 0
DIZZINESS: 0
ARTHRALGIAS: 0
WEAKNESS: 0
DYSURIA: 0
PALPITATIONS: 0
DIARRHEA: 0
HEARTBURN: 1
HEMATOCHEZIA: 0
CHILLS: 0
COUGH: 0
FEVER: 0
NERVOUS/ANXIOUS: 0
MYALGIAS: 0
FREQUENCY: 0
BREAST MASS: 0
HEADACHES: 1
PARESTHESIAS: 0
SORE THROAT: 0
NAUSEA: 0
CONSTIPATION: 0
JOINT SWELLING: 0

## 2019-10-31 ASSESSMENT — MIFFLIN-ST. JEOR: SCORE: 1415.96

## 2019-10-31 NOTE — PROGRESS NOTES
SUBJECTIVE:   CC: Enid Lombardo is an 59 year old woman who presents for preventive health visit.     Healthy Habits:     Getting at least 3 servings of Calcium per day:  Yes    Bi-annual eye exam:  Yes    Dental care twice a year:  Yes    Sleep apnea or symptoms of sleep apnea:  None    Diet:  Regular (no restrictions)    Frequency of exercise:  None    Taking medications regularly:  Yes    Medication side effects:  No muscle aches    PHQ-2 Total Score: 0    Additional concerns today:  No          Medication Followup of sertraline     Taking Medication as prescribed: yes    Side Effects:  None    Medication Helping Symptoms:  yes      Concern - Follow up Hernia Surgery   Onset: Had surgery 10 days ago    Description:   Patient states that she is having trouble sleeping due to the pain     Intensity: moderate at night     Progression of Symptoms:  improving    Accompanying Signs & Symptoms:  Patient does well during the day with pain but has some trouble at night     Previous history of similar problem:   NA    Precipitating factors:   Worsened by: Sleeping and turning     Alleviating factors:  Improved by:     Therapies Tried and outcome: Ibuprofen and tylenol are not helping.     Today's PHQ-2 Score:   PHQ-2 ( 1999 Pfizer) 10/31/2019   Q1: Little interest or pleasure in doing things 0   Q2: Feeling down, depressed or hopeless 0   PHQ-2 Score 0   Q1: Little interest or pleasure in doing things Not at all   Q2: Feeling down, depressed or hopeless Not at all   PHQ-2 Score 0       Abuse: Current or Past(Physical, Sexual or Emotional)- No  Do you feel safe in your environment? Yes        Social History     Tobacco Use     Smoking status: Never Smoker     Smokeless tobacco: Never Used   Substance Use Topics     Alcohol use: Yes     Comment: occasionally         Alcohol Use 10/31/2019   Prescreen: >3 drinks/day or >7 drinks/week? No   Prescreen: >3 drinks/day or >7 drinks/week? -       Reviewed orders with  "patient.  Reviewed health maintenance and updated orders accordingly - Yes      Alternate mammogram schedule due to breast cancer history     Pertinent mammograms are reviewed under the imaging tab.  History of abnormal Pap smear: Status post benign hysterectomy. Health Maintenance and Surgical History updated.  PAP / HPV 5/19/2014 8/14/2009 8/8/2006   PAP NIL NIL NIL     Reviewed and updated as needed this visit by clinical staff  Tobacco  Allergies  Meds  Problems  Med Hx  Surg Hx  Fam Hx         Reviewed and updated as needed this visit by Provider  Tobacco  Allergies  Meds  Problems  Med Hx  Surg Hx  Fam Hx            Review of Systems   Constitutional: Negative for chills and fever.   HENT: Negative for congestion, ear pain, hearing loss and sore throat.    Eyes: Negative for pain and visual disturbance.   Respiratory: Negative for cough and shortness of breath.    Cardiovascular: Negative for chest pain, palpitations and peripheral edema.   Gastrointestinal: Positive for abdominal pain and heartburn. Negative for constipation, diarrhea, hematochezia and nausea.   Breasts:  Negative for tenderness, breast mass and discharge.   Genitourinary: Negative for dysuria, frequency, genital sores, hematuria, pelvic pain, urgency, vaginal bleeding and vaginal discharge.   Musculoskeletal: Negative for arthralgias, joint swelling and myalgias.   Skin: Negative for rash.   Neurological: Positive for headaches. Negative for dizziness, weakness and paresthesias.   Psychiatric/Behavioral: Negative for mood changes. The patient is not nervous/anxious.             OBJECTIVE:   /70 (BP Location: Left arm, Patient Position: Chair, Cuff Size: Adult Regular)   Pulse 86   Temp 98.9  F (37.2  C) (Temporal)   Resp 16   Ht 1.676 m (5' 6\")   Wt 81.4 kg (179 lb 8 oz)   LMP 10/14/2001   SpO2 98%   Breastfeeding? No   BMI 28.97 kg/m    Physical Exam  GENERAL: healthy, alert and no distress  EYES: Eyes grossly " normal to inspection, PERRL and conjunctivae and sclerae normal  HENT: ear canals and TM's normal, nose and mouth without ulcers or lesions  NECK: no adenopathy, no asymmetry, masses, or scars and thyroid normal to palpation  RESP: lungs clear to auscultation - no rales, rhonchi or wheezes  BREAST: normal without masses, tenderness or nipple discharge and no palpable axillary masses or adenopathy  CV: regular rate and rhythm, normal S1 S2, no S3 or S4, no murmur, click or rub, no peripheral edema and peripheral pulses strong  ABDOMEN: soft, nontender, no hepatosplenomegaly, no masses and bowel sounds normal   (female): normal female external genitalia, normal urethral meatus , vaginal mucosa pink, moist, well rugated and normal post-hysterectomy exam without masses.   MS: no gross musculoskeletal defects noted, no edema  SKIN: no suspicious lesions or rashes  NEURO: Normal strength and tone, mentation intact and speech normal  PSYCH: mentation appears normal, affect normal/bright        ASSESSMENT/PLAN:       ICD-10-CM    1. Encounter for routine adult health examination without abnormal findings Z00.00    2. Hot flushes, perimenopausal N95.1 sertraline (ZOLOFT) 25 MG tablet   3. Ventral hernia without obstruction or gangrene K43.9    4. Carcinoma in situ of breast, unspecified laterality, unspecified type D05.90    5. Recurrent carcinoma in situ of breast, right D05.91    6. Gastroesophageal reflux disease without esophagitis K21.9    7. Hyperlipidemia with target LDL less than 130 E78.5 Lipid panel reflex to direct LDL Non-fasting   8. Need for prophylactic vaccination and inoculation against influenza Z23 INFLUENZA QUAD, RECOMBINANT, P-FREE (RIV4) (FLUBLOCK) [10800]     Vaccine Administration, Initial [46544]   9. Screening for malignant neoplasm of cervix Z12.4 CANCELED: Pap imaged thin layer screen with HPV - recommended age 30 - 65 years (select HPV order below)     CANCELED: HPV High Risk Types DNA Cervical  "  10. Gastroesophageal reflux disease, esophagitis presence not specified K21.9 omeprazole (PRILOSEC) 20 MG DR capsule     Has been having GERD worsening since her weight gain. Started with change in activity with the surgeries. Recent hernia surgery completed and recovering well.  Has questions on activity and directed back to surgeon with no lifting greater than 5 lbs mentioned from notes at this time, but some clarification needs to happen there.   Will update labs with the change in weight as well.  Bilateral mastectomy and hysterectomy, so no further paps needed. Doing well with the zoloft to counteract the arimedex side effects of hot flushes and she feels stabilizes her mood through all going on as well. Renewed. F/u 1 year    COUNSELING:  Reviewed preventive health counseling, as reflected in patient instructions       Regular exercise       Healthy diet/nutrition    Estimated body mass index is 28.97 kg/m  as calculated from the following:    Height as of this encounter: 1.676 m (5' 6\").    Weight as of this encounter: 81.4 kg (179 lb 8 oz).    Weight management plan: Discussed healthy diet and exercise guidelines     reports that she has never smoked. She has never used smokeless tobacco.      Counseling Resources:  ATP IV Guidelines  Pooled Cohorts Equation Calculator  Breast Cancer Risk Calculator  FRAX Risk Assessment  ICSI Preventive Guidelines  Dietary Guidelines for Americans, 2010  USDA's MyPlate  ASA Prophylaxis  Lung CA Screening    Shi Alonso MD, MD  [facility]  "

## 2019-11-01 RX ORDER — ATORVASTATIN CALCIUM 10 MG/1
10 TABLET, FILM COATED ORAL DAILY
Qty: 90 TABLET | Refills: 0 | Status: SHIPPED | OUTPATIENT
Start: 2019-11-01 | End: 2020-01-24

## 2019-11-01 NOTE — RESULT ENCOUNTER NOTE
Enid, your cholesterol has really increased and is now in a high risk zone. Statin therapy is strongly advised at this level. Plan to start low dose and recheck in 2-3 months to find change. In meantime, increase in physical activity is needed,though will take longer to correct and med reduction can occur if you do really well at the lifestyle changes.  Please let me know if you have any questions.  Shi Alonso MD

## 2019-11-12 ENCOUNTER — OFFICE VISIT (OUTPATIENT)
Dept: ORTHOPEDICS | Facility: CLINIC | Age: 59
End: 2019-11-12
Payer: COMMERCIAL

## 2019-11-12 VITALS
BODY MASS INDEX: 28.77 KG/M2 | WEIGHT: 179 LBS | DIASTOLIC BLOOD PRESSURE: 79 MMHG | HEART RATE: 68 BPM | HEIGHT: 66 IN | SYSTOLIC BLOOD PRESSURE: 126 MMHG

## 2019-11-12 DIAGNOSIS — M54.12 CERVICAL RADICULAR PAIN: Primary | ICD-10-CM

## 2019-11-12 DIAGNOSIS — R51.9 ACHING HEADACHE: ICD-10-CM

## 2019-11-12 DIAGNOSIS — M50.20 HERNIATED CERVICAL DISC: ICD-10-CM

## 2019-11-12 PROCEDURE — 99214 OFFICE O/P EST MOD 30 MIN: CPT | Performed by: PREVENTIVE MEDICINE

## 2019-11-12 RX ORDER — METHYLPREDNISOLONE 4 MG
TABLET, DOSE PACK ORAL
Qty: 21 TABLET | Refills: 0 | Status: SHIPPED | OUTPATIENT
Start: 2019-11-12 | End: 2020-01-13

## 2019-11-12 RX ORDER — GABAPENTIN 100 MG/1
100 CAPSULE ORAL 4 TIMES DAILY
Qty: 120 CAPSULE | Refills: 1 | Status: SHIPPED | OUTPATIENT
Start: 2019-11-12 | End: 2020-01-09

## 2019-11-12 ASSESSMENT — MIFFLIN-ST. JEOR: SCORE: 1413.69

## 2019-11-12 ASSESSMENT — PAIN SCALES - GENERAL: PAINLEVEL: MILD PAIN (3)

## 2019-11-12 NOTE — PATIENT INSTRUCTIONS
Thanks for coming today.  [facility]  [address] [place] [address]    To schedule future appointments in Ortho Clinic, you may call [phone].    To schedule ordered imaging by your provider:   Call Central Imaging [phone]    To schedule an injection ordered by your provider:  Call Central Imaging Injection scheduling line: [phone]  [employer] available online at:  [email or web address]    Please call if any further questions or concerns ([phone]).  Clinic hours 8 am to 5 pm.    Return to clinic (call) if symptoms worsen or fail to improve.

## 2019-11-12 NOTE — LETTER
"    11/12/2019         RE: Enid Lombardo  [address] [facility] [address]        Dear Colleague,    Thank you for referring your patient, Enid Lombardo, to the [facility]. Please see a copy of my visit note below.    Enid Lombardo's chief complaint for this visit includes:  Chief Complaint   Patient presents with     Follow Up     Follow up for neck pain     PCP: Shi Alonso    Referring Provider:  No referring provider defined for this encounter.    /79   Pulse 68   Ht 1.676 m (5' 6\")   Wt 81.2 kg (179 lb)   LMP 10/14/2001   BMI 28.89 kg/m     Mild Pain (3)           HISTORY OF PRESENT ILLNESS  Ms. Lombardo is a pleasant 59 year old year old female who presents to clinic today for followup for neck pain and headaches  Had some improvement in overall symptoms of this after ELIEZER  Now has started to bother her again  Will consider another ELIEZER  Wants to talk about other options as well    MEDICAL HISTORY  Patient Active Problem List   Diagnosis     History of right breast cancer     Esophageal reflux     Disorder of synovium, tendon, and bursa     Cataract     Shingles     Pain, radicular, lumbar     Right hip pain     Low back pain     IT band syndrome     Macular drusen     Cervicalgia     Headache     Keratoconus     Meibomian gland dysfunction - Both Eyes     Tear film insufficiency     Hyperlipidemia with target LDL less than 130     Benign neoplasm of colon     Skin abscess     Osteopenia of both hands     Recurrent carcinoma in situ of breast, right     Breast cancer in situ     S/P breast reconstruction, bilateral     S/P flap graft     Osteopenia of multiple sites     Long term (current) use of aromatase inhibitors       Current Outpatient Medications   Medication Sig Dispense Refill     anastrozole (ARIMIDEX) 1 MG tablet TAKE 1 TABLET BY MOUTH EVERY DAY (Patient taking differently: Take 1 mg by mouth every morning ) 90 tablet 3     atorvastatin " (LIPITOR) 10 MG tablet Take 1 tablet (10 mg) by mouth daily 90 tablet 0     Calcium-Vitamin D-Vitamin K (VIACTIV CALCIUM PLUS D PO) Take 1 tablet by mouth 2 times daily       diclofenac (VOLTAREN) 75 MG EC tablet TAKE 1 TABLET (75 MG) BY MOUTH 2 TIMES DAILY 60 tablet 1     fluticasone (FLONASE) 50 MCG/ACT nasal spray USE ONE OR TWO SPRAYS IN EACH NOSTRIL DAILY (Patient taking differently: Spray 1 spray into both nostrils 2 times daily ) 48 mL 3     gabapentin (NEURONTIN) 100 MG capsule Take 1 capsule (100 mg) by mouth 4 times daily 120 capsule 1     loratadine (CLARITIN) 10 MG tablet TAKE 1 TABLET BY MOUTH EVERY DAY AS NEEDED FOR ALLERGY SYMPTOMS (Patient taking differently: Take 10 mg by mouth every morning ) 90 tablet 3     melatonin 5 MG tablet Take 5 mg by mouth At Bedtime        methylPREDNISolone (MEDROL DOSEPAK) 4 MG tablet therapy pack Follow Package Directions 21 tablet 0     Multiple Vitamins-Minerals (PRESERVISION AREDS) CAPS Take 1 capsule by mouth 2 times daily (Patient taking differently: Take 1 capsule by mouth every morning ) 180 capsule 3     Omega-3 Fatty Acids (FISH OIL) 1000 MG CPDR Take by mouth every morning        omeprazole (PRILOSEC) 20 MG DR capsule Take 1 capsule (20 mg) by mouth as needed (Pt. takes based on foods that day at bedtime. 10/15/2019) 90 capsule 3     sertraline (ZOLOFT) 25 MG tablet Take 1 tablet daily. 30 tablet 11       Allergies   Allergen Reactions     Alphagan P Rash     Thrush and numbness in mouth       Family History   Adopted: Yes   Problem Relation Age of Onset     Cancer Mother         lung cancer  at age 52     Thyroid Disease Sister         half sister, had thyroid removed     Unknown/Adopted Father      Unknown/Adopted Maternal Grandmother      Unknown/Adopted Maternal Grandfather      Unknown/Adopted Paternal Grandmother      Unknown/Adopted Paternal Grandfather      Glaucoma No family hx of      Diabetes No family hx of      Melanoma No family hx of       "Skin Cancer No family hx of        Additional medical/Social/Surgical histories reviewed in [facility] and updated as appropriate.     REVIEW OF SYSTEMS (11/12/2019)  10 point ROS of systems including Constitutional, Eyes, Respiratory, Cardiovascular, Gastroenterology, Genitourinary, Integumentary, Musculoskeletal, Psychiatric were all negative except for pertinent positives noted in my HPI.     PHYSICAL EXAM  Vitals:    11/12/19 0741   BP: 126/79   Pulse: 68   Weight: 81.2 kg (179 lb)   Height: 1.676 m (5' 6\")     Vital Signs: /79   Pulse 68   Ht 1.676 m (5' 6\")   Wt 81.2 kg (179 lb)   LMP 10/14/2001   BMI 28.89 kg/m    Patient declined being weighed. Body mass index is 28.89 kg/m .    General  - normal appearance, in no obvious distress  CV  - normal radial pulse  Pulm  - normal respiratory pattern, non-labored  Musculoskeletal - neck  - inspection: normal bone and joint alignment, no obvious deformity, no scapular winging, no AC step-off  - palpation: no bony or soft tissue tenderness, except along right neck, normal clavicle, non-tender AC  - ROM:  Has full rom of neck, with pain  - strength: 5/5  strength, 5/5 in all shoulder planes  - special tests:  Positive neck pain with spurlings  Neuro  - no sensory or motor deficit, grossly normal coordination, normal muscle tone  Skin  - no ecchymosis, erythema, warmth, or induration, no obvious rash  Psych  - interactive, appropriate, normal mood and affect        ASSESSMENT & PLAN  60 yo female with cervical herniated discs, neck pain and headaches  rec'd she see her PCP to discuss possible migraine etiology as well  Reviewed her cervical MRI: shows herniated discs  Ordered another ELIEZER- TF this time on right  Cont. PT exercises  meds given see orders  Consider pain clinic and/or surgical referral    Castro Mcginnis MD, CAQSM    Again, thank you for allowing me to participate in the care of your patient.        Sincerely,        Castro Mcginnis MD    "

## 2019-11-12 NOTE — PROGRESS NOTES
HISTORY OF PRESENT ILLNESS  Ms. Lombardo is a pleasant 59 year old year old female who presents to clinic today for followup for neck pain and headaches  Had some improvement in overall symptoms of this after ELIEZER  Now has started to bother her again  Will consider another ELIEZER  Wants to talk about other options as well    MEDICAL HISTORY  Patient Active Problem List   Diagnosis     History of right breast cancer     Esophageal reflux     Disorder of synovium, tendon, and bursa     Cataract     Shingles     Pain, radicular, lumbar     Right hip pain     Low back pain     IT band syndrome     Macular drusen     Cervicalgia     Headache     Keratoconus     Meibomian gland dysfunction - Both Eyes     Tear film insufficiency     Hyperlipidemia with target LDL less than 130     Benign neoplasm of colon     Skin abscess     Osteopenia of both hands     Recurrent carcinoma in situ of breast, right     Breast cancer in situ     S/P breast reconstruction, bilateral     S/P flap graft     Osteopenia of multiple sites     Long term (current) use of aromatase inhibitors       Current Outpatient Medications   Medication Sig Dispense Refill     anastrozole (ARIMIDEX) 1 MG tablet TAKE 1 TABLET BY MOUTH EVERY DAY (Patient taking differently: Take 1 mg by mouth every morning ) 90 tablet 3     atorvastatin (LIPITOR) 10 MG tablet Take 1 tablet (10 mg) by mouth daily 90 tablet 0     Calcium-Vitamin D-Vitamin K (VIACTIV CALCIUM PLUS D PO) Take 1 tablet by mouth 2 times daily       diclofenac (VOLTAREN) 75 MG EC tablet TAKE 1 TABLET (75 MG) BY MOUTH 2 TIMES DAILY 60 tablet 1     fluticasone (FLONASE) 50 MCG/ACT nasal spray USE ONE OR TWO SPRAYS IN EACH NOSTRIL DAILY (Patient taking differently: Spray 1 spray into both nostrils 2 times daily ) 48 mL 3     gabapentin (NEURONTIN) 100 MG capsule Take 1 capsule (100 mg) by mouth 4 times daily 120 capsule 1     loratadine (CLARITIN) 10 MG tablet TAKE 1 TABLET BY MOUTH EVERY DAY AS NEEDED FOR  "ALLERGY SYMPTOMS (Patient taking differently: Take 10 mg by mouth every morning ) 90 tablet 3     melatonin 5 MG tablet Take 5 mg by mouth At Bedtime        methylPREDNISolone (MEDROL DOSEPAK) 4 MG tablet therapy pack Follow Package Directions 21 tablet 0     Multiple Vitamins-Minerals (PRESERVISION AREDS) CAPS Take 1 capsule by mouth 2 times daily (Patient taking differently: Take 1 capsule by mouth every morning ) 180 capsule 3     Omega-3 Fatty Acids (FISH OIL) 1000 MG CPDR Take by mouth every morning        omeprazole (PRILOSEC) 20 MG DR capsule Take 1 capsule (20 mg) by mouth as needed (Pt. takes based on foods that day at bedtime. 10/15/2019) 90 capsule 3     sertraline (ZOLOFT) 25 MG tablet Take 1 tablet daily. 30 tablet 11       Allergies   Allergen Reactions     Alphagan P Rash     Thrush and numbness in mouth       Family History   Adopted: Yes   Problem Relation Age of Onset     Cancer Mother         lung cancer  at age 52     Thyroid Disease Sister         half sister, had thyroid removed     Unknown/Adopted Father      Unknown/Adopted Maternal Grandmother      Unknown/Adopted Maternal Grandfather      Unknown/Adopted Paternal Grandmother      Unknown/Adopted Paternal Grandfather      Glaucoma No family hx of      Diabetes No family hx of      Melanoma No family hx of      Skin Cancer No family hx of        Additional medical/Social/Surgical histories reviewed in [facility] and updated as appropriate.     REVIEW OF SYSTEMS (2019)  10 point ROS of systems including Constitutional, Eyes, Respiratory, Cardiovascular, Gastroenterology, Genitourinary, Integumentary, Musculoskeletal, Psychiatric were all negative except for pertinent positives noted in my HPI.     PHYSICAL EXAM  Vitals:    19 0741   BP: 126/79   Pulse: 68   Weight: 81.2 kg (179 lb)   Height: 1.676 m (5' 6\")     Vital Signs: /79   Pulse 68   Ht 1.676 m (5' 6\")   Wt 81.2 kg (179 lb)   LMP 10/14/2001   BMI 28.89 kg/m   " Patient declined being weighed. Body mass index is 28.89 kg/m .    General  - normal appearance, in no obvious distress  CV  - normal radial pulse  Pulm  - normal respiratory pattern, non-labored  Musculoskeletal - neck  - inspection: normal bone and joint alignment, no obvious deformity, no scapular winging, no AC step-off  - palpation: no bony or soft tissue tenderness, except along right neck, normal clavicle, non-tender AC  - ROM:  Has full rom of neck, with pain  - strength: 5/5  strength, 5/5 in all shoulder planes  - special tests:  Positive neck pain with spurlings  Neuro  - no sensory or motor deficit, grossly normal coordination, normal muscle tone  Skin  - no ecchymosis, erythema, warmth, or induration, no obvious rash  Psych  - interactive, appropriate, normal mood and affect        ASSESSMENT & PLAN  58 yo female with cervical herniated discs, neck pain and headaches  rec'd she see her PCP to discuss possible migraine etiology as well  Reviewed her cervical MRI: shows herniated discs  Ordered another ELIEZER- TF this time on right  Cont. PT exercises  meds given see orders  Consider pain clinic and/or surgical referral    Castro Mcginnis MD, CAQSM

## 2019-11-12 NOTE — PROGRESS NOTES
"Enid Lombardo's chief complaint for this visit includes:  Chief Complaint   Patient presents with     Follow Up     Follow up for neck pain     PCP: Shi Alonso    Referring Provider:  No referring provider defined for this encounter.    /79   Pulse 68   Ht 1.676 m (5' 6\")   Wt 81.2 kg (179 lb)   LMP 10/14/2001   BMI 28.89 kg/m    Mild Pain (3)         "

## 2019-11-14 ENCOUNTER — ANCILLARY PROCEDURE (OUTPATIENT)
Dept: GENERAL RADIOLOGY | Facility: CLINIC | Age: 59
End: 2019-11-14
Attending: PREVENTIVE MEDICINE
Payer: COMMERCIAL

## 2019-11-14 VITALS — SYSTOLIC BLOOD PRESSURE: 133 MMHG | HEART RATE: 73 BPM | DIASTOLIC BLOOD PRESSURE: 86 MMHG | OXYGEN SATURATION: 97 %

## 2019-11-14 DIAGNOSIS — M54.12 CERVICAL RADICULAR PAIN: ICD-10-CM

## 2019-11-14 DIAGNOSIS — M50.20 HERNIATED CERVICAL DISC: ICD-10-CM

## 2019-11-14 PROCEDURE — 62321 NJX INTERLAMINAR CRV/THRC: CPT | Performed by: RADIOLOGY

## 2019-11-14 RX ORDER — DEXAMETHASONE SODIUM PHOSPHATE 10 MG/ML
10 INJECTION, SOLUTION INTRAMUSCULAR; INTRAVENOUS ONCE
Status: COMPLETED | OUTPATIENT
Start: 2019-11-14 | End: 2019-11-14

## 2019-11-14 RX ORDER — LIDOCAINE HYDROCHLORIDE 10 MG/ML
30 INJECTION, SOLUTION EPIDURAL; INFILTRATION; INTRACAUDAL; PERINEURAL ONCE
Status: COMPLETED | OUTPATIENT
Start: 2019-11-14 | End: 2019-11-14

## 2019-11-14 RX ORDER — IOPAMIDOL 408 MG/ML
10 INJECTION, SOLUTION INTRATHECAL ONCE
Status: COMPLETED | OUTPATIENT
Start: 2019-11-14 | End: 2019-11-14

## 2019-11-14 RX ADMIN — IOPAMIDOL 2 ML: 408 INJECTION, SOLUTION INTRATHECAL at 14:55

## 2019-11-14 RX ADMIN — DEXAMETHASONE SODIUM PHOSPHATE 10 MG: 10 INJECTION, SOLUTION INTRAMUSCULAR; INTRAVENOUS at 14:55

## 2019-11-14 RX ADMIN — LIDOCAINE HYDROCHLORIDE 5 ML: 10 INJECTION, SOLUTION EPIDURAL; INFILTRATION; INTRACAUDAL; PERINEURAL at 14:55

## 2019-11-14 NOTE — PROGRESS NOTES
: romina was seen in X-ray today for a cervical nerve root injection. Patient rated pain before procedure 3/10. After procedure patient rated pain 3/10.  Patient discharged home with her .

## 2019-11-14 NOTE — DISCHARGE SUMMARY
AFTER YOU GO HOME   ü DO relax; minimize your activity for 24 hours   ü You may resume normal activity tomorrow   ü You may remove the bandage in the evening or next morning   ü You may resume bathing the next day   ü Drink at least 4 extra glasses of fluid today if not on fluid restrictions   ü DO NOT drive or operate machinery at home or at work for at least 24 hours   VISIT THE EMERGENCY ROOM OR URGENT CARE IF:   ü There is redness or swelling at the injection site   ü There is discharge from the injection site   ü You develop a temperature of 101  F or greater   ADDITIONAL INSTRUCTIONS:   ü You may resume your Coumadin or other blood thinner at your regular dose today. Follow up with your physician to have your INR rechecked if indicated.   ü If you gain no relief from the injection after two (2) weeks, follow-up with your provider for your options.   Contacts:   During business hours from 8 to 5 pm, you may call [phone] to reach a nurse advisor at [facility].   After hours, call [facility]  [phone]. Ask for the Radiologist on-call. Someone is on-call 24 hrs/day.   [facility] Toll Free Number   [phone]

## 2019-12-02 ENCOUNTER — OFFICE VISIT (OUTPATIENT)
Dept: ORTHOPEDICS | Facility: CLINIC | Age: 59
End: 2019-12-02
Payer: COMMERCIAL

## 2019-12-02 VITALS
HEIGHT: 66 IN | SYSTOLIC BLOOD PRESSURE: 122 MMHG | BODY MASS INDEX: 28.77 KG/M2 | WEIGHT: 179 LBS | HEART RATE: 67 BPM | DIASTOLIC BLOOD PRESSURE: 88 MMHG

## 2019-12-02 DIAGNOSIS — M50.20 CERVICAL DISC HERNIATION: Primary | ICD-10-CM

## 2019-12-02 PROCEDURE — 99213 OFFICE O/P EST LOW 20 MIN: CPT | Performed by: PREVENTIVE MEDICINE

## 2019-12-02 ASSESSMENT — MIFFLIN-ST. JEOR: SCORE: 1413.69

## 2019-12-02 ASSESSMENT — PAIN SCALES - GENERAL: PAINLEVEL: NO PAIN (0)

## 2019-12-02 NOTE — LETTER
12/2/2019         RE: Enid Lombardo  [address] [facility] [address]        Dear Colleague,    Thank you for referring your patient, Enid Lombardo, to the [facility]. Please see a copy of my visit note below.    HISTORY OF PRESENT ILLNESS  Ms. Lombardo is a pleasant 59 year old year old female who presents to clinic today for followup for cervical inejction  Doing well  Had great improvement   Doing well      MEDICAL HISTORY  Patient Active Problem List   Diagnosis     History of right breast cancer     Esophageal reflux     Disorder of synovium, tendon, and bursa     Cataract     Shingles     Pain, radicular, lumbar     Right hip pain     Low back pain     IT band syndrome     Macular drusen     Cervicalgia     Headache     Keratoconus     Meibomian gland dysfunction - Both Eyes     Tear film insufficiency     Hyperlipidemia with target LDL less than 130     Benign neoplasm of colon     Skin abscess     Osteopenia of both hands     Recurrent carcinoma in situ of breast, right     Breast cancer in situ     S/P breast reconstruction, bilateral     S/P flap graft     Osteopenia of multiple sites     Long term (current) use of aromatase inhibitors       Current Outpatient Medications   Medication Sig Dispense Refill     anastrozole (ARIMIDEX) 1 MG tablet TAKE 1 TABLET BY MOUTH EVERY DAY (Patient taking differently: Take 1 mg by mouth every morning ) 90 tablet 3     atorvastatin (LIPITOR) 10 MG tablet Take 1 tablet (10 mg) by mouth daily 90 tablet 0     Calcium-Vitamin D-Vitamin K (VIACTIV CALCIUM PLUS D PO) Take 1 tablet by mouth 2 times daily       diclofenac (VOLTAREN) 75 MG EC tablet TAKE 1 TABLET (75 MG) BY MOUTH 2 TIMES DAILY 60 tablet 1     fluticasone (FLONASE) 50 MCG/ACT nasal spray USE ONE OR TWO SPRAYS IN EACH NOSTRIL DAILY (Patient taking differently: Spray 1 spray into both nostrils 2 times daily ) 48 mL 3     gabapentin (NEURONTIN) 100 MG capsule Take 1 capsule  (100 mg) by mouth 4 times daily 120 capsule 1     loratadine (CLARITIN) 10 MG tablet TAKE 1 TABLET BY MOUTH EVERY DAY AS NEEDED FOR ALLERGY SYMPTOMS (Patient taking differently: Take 10 mg by mouth every morning ) 90 tablet 3     melatonin 5 MG tablet Take 5 mg by mouth At Bedtime        methylPREDNISolone (MEDROL DOSEPAK) 4 MG tablet therapy pack Follow Package Directions 21 tablet 0     Multiple Vitamins-Minerals (PRESERVISION AREDS) CAPS Take 1 capsule by mouth 2 times daily (Patient taking differently: Take 1 capsule by mouth every morning ) 180 capsule 3     Omega-3 Fatty Acids (FISH OIL) 1000 MG CPDR Take by mouth every morning        omeprazole (PRILOSEC) 20 MG DR capsule Take 1 capsule (20 mg) by mouth as needed (Pt. takes based on foods that day at bedtime. 10/15/2019) 90 capsule 3     sertraline (ZOLOFT) 25 MG tablet Take 1 tablet daily. 30 tablet 11       Allergies   Allergen Reactions     Alphagan P Rash     Thrush and numbness in mouth       Family History   Adopted: Yes   Problem Relation Age of Onset     Cancer Mother         lung cancer  at age 52     Thyroid Disease Sister         half sister, had thyroid removed     Unknown/Adopted Father      Unknown/Adopted Maternal Grandmother      Unknown/Adopted Maternal Grandfather      Unknown/Adopted Paternal Grandmother      Unknown/Adopted Paternal Grandfather      Glaucoma No family hx of      Diabetes No family hx of      Melanoma No family hx of      Skin Cancer No family hx of        Additional medical/Social/Surgical histories reviewed in [facility] and updated as appropriate.     REVIEW OF SYSTEMS (2019)  10 point ROS of systems including Constitutional, Eyes, Respiratory, Cardiovascular, Gastroenterology, Genitourinary, Integumentary, Musculoskeletal, Psychiatric were all negative except for pertinent positives noted in my HPI.     PHYSICAL EXAM  Vitals:    19 0705   BP: 122/88   Pulse: 67   Weight: 81.2 kg (179 lb)   Height: 1.676 m (5'  "6\")     Vital Signs: /88   Pulse 67   Ht 1.676 m (5' 6\")   Wt 81.2 kg (179 lb)   LMP 10/14/2001   BMI 28.89 kg/m    Patient declined being weighed. Body mass index is 28.89 kg/m .    General  - normal appearance, in no obvious distress  CV  - normal radial pulse  Pulm  - normal respiratory pattern, non-labored  Musculoskeletal - neck  - inspection: normal bone and joint alignment, no obvious deformity, no scapular winging, no AC step-off  - palpation: nontender, normal clavicle, non-tender AC  - ROM:  Full rom no pain  - strength: 5/5  strength, 5/5 in all shoulder planes    Neuro  - no sensory or motor deficit, grossly normal coordination, normal muscle tone  Skin  - no ecchymosis, erythema, warmth, or induration, no obvious rash  Psych  - interactive, appropriate, normal mood and affect  ASSESSMENT & PLAN  58 yo female with cervical ddd, radicular pain, improved  Reviewed resposne to nerve root injection  Can consider pain clinic or repeat ELIEZER in 3 months  Cont. HEP    Castro Mcginnis MD, CAQSM    Again, thank you for allowing me to participate in the care of your patient.        Sincerely,        Castro Mcginnis MD    "

## 2019-12-02 NOTE — PATIENT INSTRUCTIONS
Thanks for coming today.  Ortho[facility] Medicine Clinic  [address] [place] [address]    To schedule future appointments in Ortho Clinic, you may call [phone].    To schedule ordered imaging by your provider:   Call Central [facility] [phone]    To schedule an injection ordered by your provider:  Call [facility] Injection scheduling line: [phone]  [employer] available online at:  [email or web address]    Please call if any further questions or concerns ([phone]).  Clinic hours 8 am to 5 pm.    Return to clinic (call) if symptoms worsen or fail to improve.

## 2019-12-02 NOTE — PROGRESS NOTES
HISTORY OF PRESENT ILLNESS  Ms. Lombardo is a pleasant 59 year old year old female who presents to clinic today for followup for cervical inejction  Doing well  Had great improvement   Doing well      MEDICAL HISTORY  Patient Active Problem List   Diagnosis     History of right breast cancer     Esophageal reflux     Disorder of synovium, tendon, and bursa     Cataract     Shingles     Pain, radicular, lumbar     Right hip pain     Low back pain     IT band syndrome     Macular drusen     Cervicalgia     Headache     Keratoconus     Meibomian gland dysfunction - Both Eyes     Tear film insufficiency     Hyperlipidemia with target LDL less than 130     Benign neoplasm of colon     Skin abscess     Osteopenia of both hands     Recurrent carcinoma in situ of breast, right     Breast cancer in situ     S/P breast reconstruction, bilateral     S/P flap graft     Osteopenia of multiple sites     Long term (current) use of aromatase inhibitors       Current Outpatient Medications   Medication Sig Dispense Refill     anastrozole (ARIMIDEX) 1 MG tablet TAKE 1 TABLET BY MOUTH EVERY DAY (Patient taking differently: Take 1 mg by mouth every morning ) 90 tablet 3     atorvastatin (LIPITOR) 10 MG tablet Take 1 tablet (10 mg) by mouth daily 90 tablet 0     Calcium-Vitamin D-Vitamin K (VIACTIV CALCIUM PLUS D PO) Take 1 tablet by mouth 2 times daily       diclofenac (VOLTAREN) 75 MG EC tablet TAKE 1 TABLET (75 MG) BY MOUTH 2 TIMES DAILY 60 tablet 1     fluticasone (FLONASE) 50 MCG/ACT nasal spray USE ONE OR TWO SPRAYS IN EACH NOSTRIL DAILY (Patient taking differently: Spray 1 spray into both nostrils 2 times daily ) 48 mL 3     gabapentin (NEURONTIN) 100 MG capsule Take 1 capsule (100 mg) by mouth 4 times daily 120 capsule 1     loratadine (CLARITIN) 10 MG tablet TAKE 1 TABLET BY MOUTH EVERY DAY AS NEEDED FOR ALLERGY SYMPTOMS (Patient taking differently: Take 10 mg by mouth every morning ) 90 tablet 3     melatonin 5 MG tablet Take  "5 mg by mouth At Bedtime        methylPREDNISolone (MEDROL DOSEPAK) 4 MG tablet therapy pack Follow Package Directions 21 tablet 0     Multiple Vitamins-Minerals (PRESERVISION AREDS) CAPS Take 1 capsule by mouth 2 times daily (Patient taking differently: Take 1 capsule by mouth every morning ) 180 capsule 3     Omega-3 Fatty Acids (FISH OIL) 1000 MG CPDR Take by mouth every morning        omeprazole (PRILOSEC) 20 MG DR capsule Take 1 capsule (20 mg) by mouth as needed (Pt. takes based on foods that day at bedtime. 10/15/2019) 90 capsule 3     sertraline (ZOLOFT) 25 MG tablet Take 1 tablet daily. 30 tablet 11       Allergies   Allergen Reactions     Alphagan P Rash     Thrush and numbness in mouth       Family History   Adopted: Yes   Problem Relation Age of Onset     Cancer Mother         lung cancer  at age 52     Thyroid Disease Sister         half sister, had thyroid removed     Unknown/Adopted Father      Unknown/Adopted Maternal Grandmother      Unknown/Adopted Maternal Grandfather      Unknown/Adopted Paternal Grandmother      Unknown/Adopted Paternal Grandfather      Glaucoma No family hx of      Diabetes No family hx of      Melanoma No family hx of      Skin Cancer No family hx of        Additional medical/Social/Surgical histories reviewed in [facility] and updated as appropriate.     REVIEW OF SYSTEMS (2019)  10 point ROS of systems including Constitutional, Eyes, Respiratory, Cardiovascular, Gastroenterology, Genitourinary, Integumentary, Musculoskeletal, Psychiatric were all negative except for pertinent positives noted in my HPI.     PHYSICAL EXAM  Vitals:    19 0705   BP: 122/88   Pulse: 67   Weight: 81.2 kg (179 lb)   Height: 1.676 m (5' 6\")     Vital Signs: /88   Pulse 67   Ht 1.676 m (5' 6\")   Wt 81.2 kg (179 lb)   LMP 10/14/2001   BMI 28.89 kg/m   Patient declined being weighed. Body mass index is 28.89 kg/m .    General  - normal appearance, in no obvious distress  CV  - " normal radial pulse  Pulm  - normal respiratory pattern, non-labored  Musculoskeletal - neck  - inspection: normal bone and joint alignment, no obvious deformity, no scapular winging, no AC step-off  - palpation: nontender, normal clavicle, non-tender AC  - ROM:  Full rom no pain  - strength: 5/5  strength, 5/5 in all shoulder planes    Neuro  - no sensory or motor deficit, grossly normal coordination, normal muscle tone  Skin  - no ecchymosis, erythema, warmth, or induration, no obvious rash  Psych  - interactive, appropriate, normal mood and affect  ASSESSMENT & PLAN  60 yo female with cervical ddd, radicular pain, improved  Reviewed resposne to nerve root injection  Can consider pain clinic or repeat ELIEZER in 3 months  Cont. HEP    Castro Mcginnis MD, CAQSM

## 2019-12-11 DIAGNOSIS — M54.12 CERVICAL RADICULAR PAIN: ICD-10-CM

## 2019-12-11 DIAGNOSIS — M75.81 TENDINITIS OF RIGHT ROTATOR CUFF: ICD-10-CM

## 2019-12-16 RX ORDER — DICLOFENAC SODIUM 75 MG/1
75 TABLET, DELAYED RELEASE ORAL 2 TIMES DAILY
Qty: 60 TABLET | Refills: 1 | Status: SHIPPED | OUTPATIENT
Start: 2019-12-16 | End: 2020-02-17

## 2020-01-08 DIAGNOSIS — M54.12 CERVICAL RADICULAR PAIN: ICD-10-CM

## 2020-01-09 RX ORDER — GABAPENTIN 100 MG/1
100 CAPSULE ORAL 4 TIMES DAILY
Qty: 120 CAPSULE | Refills: 1 | Status: SHIPPED | OUTPATIENT
Start: 2020-01-09 | End: 2020-02-19

## 2020-01-13 ENCOUNTER — OFFICE VISIT (OUTPATIENT)
Dept: ORTHOPEDICS | Facility: CLINIC | Age: 60
End: 2020-01-13
Payer: COMMERCIAL

## 2020-01-13 VITALS
HEART RATE: 77 BPM | BODY MASS INDEX: 28.77 KG/M2 | HEIGHT: 66 IN | SYSTOLIC BLOOD PRESSURE: 121 MMHG | WEIGHT: 179 LBS | DIASTOLIC BLOOD PRESSURE: 76 MMHG

## 2020-01-13 DIAGNOSIS — M54.12 CERVICAL RADICULAR PAIN: ICD-10-CM

## 2020-01-13 DIAGNOSIS — M50.20 CERVICAL DISC HERNIATION: Primary | ICD-10-CM

## 2020-01-13 PROCEDURE — 99213 OFFICE O/P EST LOW 20 MIN: CPT | Performed by: PREVENTIVE MEDICINE

## 2020-01-13 ASSESSMENT — MIFFLIN-ST. JEOR: SCORE: 1413.69

## 2020-01-13 NOTE — PATIENT INSTRUCTIONS
Thanks for coming today.  Ortho[facility] Medicine Clinic  [address] [place] [address]    To schedule future appointments in Ortho Clinic, you may call [phone].    To schedule ordered imaging by your provider:   Call Central Imaging [phone]    To schedule an injection ordered by your provider:  Call Central Imaging Injection scheduling line: [phone]  [employer] Inchart available online at:  [email or web address]    Please call if any further questions or concerns ([phone]).  Clinic hours 8 am to 5 pm.    Return to clinic (call) if symptoms worsen or fail to improve.

## 2020-01-13 NOTE — LETTER
1/13/2020         RE: Endi Lombardo  [address] [facility] [address]        Dear Colleague,    Thank you for referring your patient, Enid Lombardo, to the [facility]. Please see a copy of my visit note below.    HISTORY OF PRESENT ILLNESS  Ms. Lombardo is a pleasant 59 year old year old female who presents to clinic today for followup for cervical ddd, radicular pain  Doing better  Still has some pain with certain movements   Has used gabaepentin daily with some help  With reducing her headaches posteriorly    MEDICAL HISTORY  Patient Active Problem List   Diagnosis     History of right breast cancer     Esophageal reflux     Disorder of synovium, tendon, and bursa     Cataract     Shingles     Pain, radicular, lumbar     Right hip pain     Low back pain     IT band syndrome     Macular drusen     Cervicalgia     Headache     Keratoconus     Meibomian gland dysfunction - Both Eyes     Tear film insufficiency     Hyperlipidemia with target LDL less than 130     Benign neoplasm of colon     Skin abscess     Osteopenia of both hands     Recurrent carcinoma in situ of breast, right     Breast cancer in situ     S/P breast reconstruction, bilateral     S/P flap graft     Osteopenia of multiple sites     Long term (current) use of aromatase inhibitors       Current Outpatient Medications   Medication Sig Dispense Refill     anastrozole (ARIMIDEX) 1 MG tablet TAKE 1 TABLET BY MOUTH EVERY DAY (Patient taking differently: Take 1 mg by mouth every morning ) 90 tablet 3     atorvastatin (LIPITOR) 10 MG tablet Take 1 tablet (10 mg) by mouth daily 90 tablet 0     Calcium-Vitamin D-Vitamin K (VIACTIV CALCIUM PLUS D PO) Take 1 tablet by mouth 2 times daily       diclofenac (VOLTAREN) 75 MG EC tablet TAKE 1 TABLET (75 MG) BY MOUTH 2 TIMES DAILY 60 tablet 1     fluticasone (FLONASE) 50 MCG/ACT nasal spray USE ONE OR TWO SPRAYS IN EACH NOSTRIL DAILY (Patient taking differently: Spray 1 spray  "into both nostrils 2 times daily ) 48 mL 3     gabapentin (NEURONTIN) 100 MG capsule TAKE 1 CAPSULE (100 MG) BY MOUTH 4 TIMES DAILY 120 capsule 1     loratadine (CLARITIN) 10 MG tablet TAKE 1 TABLET BY MOUTH EVERY DAY AS NEEDED FOR ALLERGY SYMPTOMS (Patient taking differently: Take 10 mg by mouth every morning ) 90 tablet 3     melatonin 5 MG tablet Take 5 mg by mouth At Bedtime        Multiple Vitamins-Minerals (PRESERVISION AREDS) CAPS Take 1 capsule by mouth 2 times daily (Patient taking differently: Take 1 capsule by mouth every morning ) 180 capsule 3     Omega-3 Fatty Acids (FISH OIL) 1000 MG CPDR Take by mouth every morning        omeprazole (PRILOSEC) 20 MG DR capsule Take 1 capsule (20 mg) by mouth as needed (Pt. takes based on foods that day at bedtime. 10/15/2019) 90 capsule 3     sertraline (ZOLOFT) 25 MG tablet Take 1 tablet daily. 30 tablet 11       Allergies   Allergen Reactions     Alphagan P Rash     Thrush and numbness in mouth       Family History   Adopted: Yes   Problem Relation Age of Onset     Cancer Mother         lung cancer  at age 52     Thyroid Disease Sister         half sister, had thyroid removed     Unknown/Adopted Father      Unknown/Adopted Maternal Grandmother      Unknown/Adopted Maternal Grandfather      Unknown/Adopted Paternal Grandmother      Unknown/Adopted Paternal Grandfather      Glaucoma No family hx of      Diabetes No family hx of      Melanoma No family hx of      Skin Cancer No family hx of        Additional medical/Social/Surgical histories reviewed in [facility] and updated as appropriate.     REVIEW OF SYSTEMS (2020)  10 point ROS of systems including Constitutional, Eyes, Respiratory, Cardiovascular, Gastroenterology, Genitourinary, Integumentary, Musculoskeletal, Psychiatric were all negative except for pertinent positives noted in my HPI.     PHYSICAL EXAM  Vitals:    20 0704   BP: 121/76   Pulse: 77   Weight: 81.2 kg (179 lb)   Height: 1.676 m (5' 6\") " "    Vital Signs: /76   Pulse 77   Ht 1.676 m (5' 6\")   Wt 81.2 kg (179 lb)   LMP 10/14/2001   BMI 28.89 kg/m    Patient declined being weighed. Body mass index is 28.89 kg/m .    General  - normal appearance, in no obvious distress  CV  - normal radial pulse  Pulm  - normal respiratory pattern, non-labored  Musculoskeletal - shoulder  - inspection: normal bone and joint alignment, no obvious deformity, no scapular winging, no AC step-off  - palpation: no bony or soft tissue tenderness, normal clavicle, non-tender AC  - ROM:  Has full ROM of neck , with minimal discomfort  - strength: 5/5  strength, 5/5 in all shoulder planes  - special tests:  Negative spurlings  Neuro  - no sensory or motor deficit, grossly normal coordination, normal muscle tone  Skin  - no ecchymosis, erythema, warmth, or induration, no obvious rash  Psych  - interactive, appropriate, normal mood and affect      ASSESSMENT & PLAN  58 yo female with cervical radicular pain, ddd  Reviewed her previous MRI: shows ddd  Consider another ELIEZER v. Surgical referral v. Pain clinic referral  Ordered another PT eval with [facility] therapists  Castro Mcginnis MD, CAQSM    Again, thank you for allowing me to participate in the care of your patient.        Sincerely,        Castro Mcginnis MD    "

## 2020-01-13 NOTE — PROGRESS NOTES
HISTORY OF PRESENT ILLNESS  Ms. Lombardo is a pleasant 59 year old year old female who presents to clinic today for followup for cervical ddd, radicular pain  Doing better  Still has some pain with certain movements   Has used gabaepentin daily with some help  With reducing her headaches posteriorly    MEDICAL HISTORY  Patient Active Problem List   Diagnosis     History of right breast cancer     Esophageal reflux     Disorder of synovium, tendon, and bursa     Cataract     Shingles     Pain, radicular, lumbar     Right hip pain     Low back pain     IT band syndrome     Macular drusen     Cervicalgia     Headache     Keratoconus     Meibomian gland dysfunction - Both Eyes     Tear film insufficiency     Hyperlipidemia with target LDL less than 130     Benign neoplasm of colon     Skin abscess     Osteopenia of both hands     Recurrent carcinoma in situ of breast, right     Breast cancer in situ     S/P breast reconstruction, bilateral     S/P flap graft     Osteopenia of multiple sites     Long term (current) use of aromatase inhibitors       Current Outpatient Medications   Medication Sig Dispense Refill     anastrozole (ARIMIDEX) 1 MG tablet TAKE 1 TABLET BY MOUTH EVERY DAY (Patient taking differently: Take 1 mg by mouth every morning ) 90 tablet 3     atorvastatin (LIPITOR) 10 MG tablet Take 1 tablet (10 mg) by mouth daily 90 tablet 0     Calcium-Vitamin D-Vitamin K (VIACTIV CALCIUM PLUS D PO) Take 1 tablet by mouth 2 times daily       diclofenac (VOLTAREN) 75 MG EC tablet TAKE 1 TABLET (75 MG) BY MOUTH 2 TIMES DAILY 60 tablet 1     fluticasone (FLONASE) 50 MCG/ACT nasal spray USE ONE OR TWO SPRAYS IN EACH NOSTRIL DAILY (Patient taking differently: Spray 1 spray into both nostrils 2 times daily ) 48 mL 3     gabapentin (NEURONTIN) 100 MG capsule TAKE 1 CAPSULE (100 MG) BY MOUTH 4 TIMES DAILY 120 capsule 1     loratadine (CLARITIN) 10 MG tablet TAKE 1 TABLET BY MOUTH EVERY DAY AS NEEDED FOR ALLERGY SYMPTOMS  "(Patient taking differently: Take 10 mg by mouth every morning ) 90 tablet 3     melatonin 5 MG tablet Take 5 mg by mouth At Bedtime        Multiple Vitamins-Minerals (PRESERVISION AREDS) CAPS Take 1 capsule by mouth 2 times daily (Patient taking differently: Take 1 capsule by mouth every morning ) 180 capsule 3     Omega-3 Fatty Acids (FISH OIL) 1000 MG CPDR Take by mouth every morning        omeprazole (PRILOSEC) 20 MG DR capsule Take 1 capsule (20 mg) by mouth as needed (Pt. takes based on foods that day at bedtime. 10/15/2019) 90 capsule 3     sertraline (ZOLOFT) 25 MG tablet Take 1 tablet daily. 30 tablet 11       Allergies   Allergen Reactions     Alphagan P Rash     Thrush and numbness in mouth       Family History   Adopted: Yes   Problem Relation Age of Onset     Cancer Mother         lung cancer  at age 52     Thyroid Disease Sister         half sister, had thyroid removed     Unknown/Adopted Father      Unknown/Adopted Maternal Grandmother      Unknown/Adopted Maternal Grandfather      Unknown/Adopted Paternal Grandmother      Unknown/Adopted Paternal Grandfather      Glaucoma No family hx of      Diabetes No family hx of      Melanoma No family hx of      Skin Cancer No family hx of        Additional medical/Social/Surgical histories reviewed in [facility] and updated as appropriate.     REVIEW OF SYSTEMS (2020)  10 point ROS of systems including Constitutional, Eyes, Respiratory, Cardiovascular, Gastroenterology, Genitourinary, Integumentary, Musculoskeletal, Psychiatric were all negative except for pertinent positives noted in my HPI.     PHYSICAL EXAM  Vitals:    20 0704   BP: 121/76   Pulse: 77   Weight: 81.2 kg (179 lb)   Height: 1.676 m (5' 6\")     Vital Signs: /76   Pulse 77   Ht 1.676 m (5' 6\")   Wt 81.2 kg (179 lb)   LMP 10/14/2001   BMI 28.89 kg/m   Patient declined being weighed. Body mass index is 28.89 kg/m .    General  - normal appearance, in no obvious distress  CV  - " normal radial pulse  Pulm  - normal respiratory pattern, non-labored  Musculoskeletal - shoulder  - inspection: normal bone and joint alignment, no obvious deformity, no scapular winging, no AC step-off  - palpation: no bony or soft tissue tenderness, normal clavicle, non-tender AC  - ROM:  Has full ROM of neck , with minimal discomfort  - strength: 5/5  strength, 5/5 in all shoulder planes  - special tests:  Negative spurlings  Neuro  - no sensory or motor deficit, grossly normal coordination, normal muscle tone  Skin  - no ecchymosis, erythema, warmth, or induration, no obvious rash  Psych  - interactive, appropriate, normal mood and affect      ASSESSMENT & PLAN  60 yo female with cervical radicular pain, ddd  Reviewed her previous MRI: shows ddd  Consider another ELIEZER v. Surgical referral v. Pain clinic referral  Ordered another PT eval with [facility] therapists  Castro Mcginnis MD, CAM

## 2020-01-18 NOTE — PROGRESS NOTES
Oncology Follow-up Visit:  Date of visit: 1/20/20    Oncologic history summary:  Diagnosis: recurrent breast cancer, stage 1, ER/TN+ HER2 negative  - Originally found abnormal screening mammography, diagnosed with a stage I infiltrating ductal carcinoma of the right breast in 12/2004 (a grade 1 infiltrating ductal carcinoma, ER positive, TN negative, HER-2 negative), treated w/ a right-sided lumpectomy with sentinel lymph node dissection (umor size 0.6 cm and 0/3 sentinel lymph nodes), followed by XRT and tamoxifen 3/2005-11/2008 (stopped after Dx of cataracts)  - calcifications noted on screening mammogram in 7/2018, Bx grade 2 DCIS, ER/TN positive; a bilateral mastectomy with reconstruction, final pathology revealed 8 mm invasive cancer, total 4 cm DCIS; stage I (T1cN0),  ER+ TN+ HER2 negative.  - BRCA negative, Oncotype Dx score 0    Treatments:  - tamoxifen 3/2005-11/2008 (stopped after Dx of cataracts) for the first episode of Rt breast cancer  - currently on anastrozole from 10/2018    Bone health: osteopenia at L spine, worsening T score, last DEXA 1/2019 (T score -2.3 at L spine)  - poorly tolerated fosamax, started zometa from 7/2019 q 6 months    Interval history:  Ms. Rossi returns to the clinic for 6 months follow-up.    She had intractable HA for which brain MRI was negative but found C5 radiculopathy. Received steroid injection in 11/2019.      In returning today, she is feeling well. Overall she is doing reasonably well.  She has no new medical issues.  No new family history.  No hot flashes.  No other arthralgias or myalgias.  No depression.       ROS: 10 point ROS neg other than the symptoms noted above in the HPI.    Past medical, social, surgical, and family histories reviewed.    Allergies:  Allergies as of 01/20/2020 - Reviewed 01/13/2020   Allergen Reaction Noted     Alphagan p Rash 05/11/2009       Current Medications:  Current Outpatient Medications   Medication Sig Dispense Refill      anastrozole (ARIMIDEX) 1 MG tablet TAKE 1 TABLET BY MOUTH EVERY DAY (Patient taking differently: Take 1 mg by mouth every morning ) 90 tablet 3     atorvastatin (LIPITOR) 10 MG tablet Take 1 tablet (10 mg) by mouth daily 90 tablet 0     Calcium-Vitamin D-Vitamin K (VIACTIV CALCIUM PLUS D PO) Take 1 tablet by mouth 2 times daily       diclofenac (VOLTAREN) 75 MG EC tablet TAKE 1 TABLET (75 MG) BY MOUTH 2 TIMES DAILY 60 tablet 1     fluticasone (FLONASE) 50 MCG/ACT nasal spray USE ONE OR TWO SPRAYS IN EACH NOSTRIL DAILY (Patient taking differently: Spray 1 spray into both nostrils 2 times daily ) 48 mL 3     gabapentin (NEURONTIN) 100 MG capsule TAKE 1 CAPSULE (100 MG) BY MOUTH 4 TIMES DAILY 120 capsule 1     loratadine (CLARITIN) 10 MG tablet TAKE 1 TABLET BY MOUTH EVERY DAY AS NEEDED FOR ALLERGY SYMPTOMS (Patient taking differently: Take 10 mg by mouth every morning ) 90 tablet 3     melatonin 5 MG tablet Take 5 mg by mouth At Bedtime        Multiple Vitamins-Minerals (PRESERVISION AREDS) CAPS Take 1 capsule by mouth 2 times daily (Patient taking differently: Take 1 capsule by mouth every morning ) 180 capsule 3     Omega-3 Fatty Acids (FISH OIL) 1000 MG CPDR Take by mouth every morning        omeprazole (PRILOSEC) 20 MG DR capsule Take 1 capsule (20 mg) by mouth as needed (Pt. takes based on foods that day at bedtime. 10/15/2019) 90 capsule 3     sertraline (ZOLOFT) 25 MG tablet Take 1 tablet daily. 30 tablet 11        Physical Exam:  LMP 10/14/2001     Well appearing NAD  HEENT: no icterus  CV: regular  Lungs: clear  Abd: soft, nt, nd + bs  Ext : no edema  Breast: s/p bilateral mastectomy with reconstruction, no nodules or masses  No rashes on skin  No lymphadenopathy    Laboratory/Imaging Studies  No images repeated    ASSESSMENT/PLAN:    The patient is a 59-year-old postmenopausal female with a history of a stage I breast cancer treated more than 10 years ago with resection, radiation and adjuvant tamoxifen.   She subsequently developed a new mass identifiable on mammogram, measuring approximately 3.5 cm.  Biopsy was consistent with ductal carcinoma in situ, estrogen receptor positive, progesterone receptor positive.  She underwent a mastectomy bilaterally with Dr. Rakesh Sanchez.  She had immediate reconstruction.  Oncotype Dx 0 in new T1c N0 breast cancer.    1.  T1cN0 breast cancer now s/p bilateral mastectomy with oncotype 0 now on anastrozole.     We reviewed endocrine therapy.  She has previously been on tamoxifen and developed cataracts.  She is tolerating her AI.  Will follow.    2. Osteopenia - reviewed her dexa.  She did not tolerate fosamax.  We rediscussed possibly using zometa.  Reviewed side effects.  Started Zometa q6 months from 7/2019. Due for 2nd dose today.    Plans:  - Continue anastrozole  - Proceed w/ zometa q 6 months  - RTC in 6 months  - Encourage exercise     Charity Brar MD

## 2020-01-20 ENCOUNTER — INFUSION THERAPY VISIT (OUTPATIENT)
Dept: ONCOLOGY | Facility: CLINIC | Age: 60
End: 2020-01-20
Attending: INTERNAL MEDICINE
Payer: COMMERCIAL

## 2020-01-20 ENCOUNTER — APPOINTMENT (OUTPATIENT)
Dept: LAB | Facility: CLINIC | Age: 60
End: 2020-01-20
Attending: INTERNAL MEDICINE
Payer: COMMERCIAL

## 2020-01-20 VITALS
TEMPERATURE: 98 F | SYSTOLIC BLOOD PRESSURE: 125 MMHG | OXYGEN SATURATION: 99 % | DIASTOLIC BLOOD PRESSURE: 82 MMHG | BODY MASS INDEX: 29.67 KG/M2 | WEIGHT: 184.6 LBS | HEIGHT: 66 IN | RESPIRATION RATE: 18 BRPM | HEART RATE: 71 BPM

## 2020-01-20 DIAGNOSIS — Z79.811 LONG TERM (CURRENT) USE OF AROMATASE INHIBITORS: ICD-10-CM

## 2020-01-20 DIAGNOSIS — Z85.3 HISTORY OF RIGHT BREAST CANCER: ICD-10-CM

## 2020-01-20 DIAGNOSIS — M85.89 OSTEOPENIA OF MULTIPLE SITES: Primary | ICD-10-CM

## 2020-01-20 DIAGNOSIS — M85.89 OSTEOPENIA OF MULTIPLE SITES: ICD-10-CM

## 2020-01-20 LAB
ALBUMIN SERPL-MCNC: 3.6 G/DL (ref 3.4–5)
CALCIUM SERPL-MCNC: 9.1 MG/DL (ref 8.5–10.1)
CREAT SERPL-MCNC: 0.63 MG/DL (ref 0.52–1.04)
GFR SERPL CREATININE-BSD FRML MDRD: >90 ML/MIN/{1.73_M2}

## 2020-01-20 PROCEDURE — 82040 ASSAY OF SERUM ALBUMIN: CPT | Performed by: INTERNAL MEDICINE

## 2020-01-20 PROCEDURE — G0463 HOSPITAL OUTPT CLINIC VISIT: HCPCS | Mod: ZF

## 2020-01-20 PROCEDURE — 25000128 H RX IP 250 OP 636: Mod: ZF | Performed by: INTERNAL MEDICINE

## 2020-01-20 PROCEDURE — 82310 ASSAY OF CALCIUM: CPT | Performed by: INTERNAL MEDICINE

## 2020-01-20 PROCEDURE — 99214 OFFICE O/P EST MOD 30 MIN: CPT | Mod: ZP | Performed by: INTERNAL MEDICINE

## 2020-01-20 PROCEDURE — 96367 TX/PROPH/DG ADDL SEQ IV INF: CPT

## 2020-01-20 PROCEDURE — 25800030 ZZH RX IP 258 OP 636: Mod: ZF | Performed by: INTERNAL MEDICINE

## 2020-01-20 PROCEDURE — 96365 THER/PROPH/DIAG IV INF INIT: CPT

## 2020-01-20 PROCEDURE — 82565 ASSAY OF CREATININE: CPT | Performed by: INTERNAL MEDICINE

## 2020-01-20 PROCEDURE — G0463 HOSPITAL OUTPT CLINIC VISIT: HCPCS | Mod: 25

## 2020-01-20 RX ORDER — ZOLEDRONIC ACID 0.04 MG/ML
4 INJECTION, SOLUTION INTRAVENOUS ONCE
Status: COMPLETED | OUTPATIENT
Start: 2020-01-20 | End: 2020-01-20

## 2020-01-20 RX ORDER — ZOLEDRONIC ACID 0.04 MG/ML
4 INJECTION, SOLUTION INTRAVENOUS ONCE
Status: CANCELLED | OUTPATIENT
Start: 2020-01-20

## 2020-01-20 RX ORDER — DEXAMETHASONE SODIUM PHOSPHATE 4 MG/ML
8 INJECTION, SOLUTION INTRA-ARTICULAR; INTRALESIONAL; INTRAMUSCULAR; INTRAVENOUS; SOFT TISSUE ONCE
Status: DISCONTINUED | OUTPATIENT
Start: 2020-01-20 | End: 2020-01-20

## 2020-01-20 RX ADMIN — SODIUM CHLORIDE 250 ML: 9 INJECTION, SOLUTION INTRAVENOUS at 12:08

## 2020-01-20 RX ADMIN — ZOLEDRONIC ACID 4 MG: 0.04 INJECTION, SOLUTION INTRAVENOUS at 12:28

## 2020-01-20 RX ADMIN — DEXAMETHASONE SODIUM PHOSPHATE 8 MG: 10 INJECTION, SOLUTION INTRAMUSCULAR; INTRAVENOUS at 12:10

## 2020-01-20 ASSESSMENT — MIFFLIN-ST. JEOR: SCORE: 1438.84

## 2020-01-20 ASSESSMENT — PAIN SCALES - GENERAL: PAINLEVEL: NO PAIN (0)

## 2020-01-20 NOTE — LETTER
1/20/2020       RE: Enid Lombardo  [address] [facility] [address]     Dear Colleague,    Thank you for referring your patient, Enid Lombardo, to the [facility]. Please see a copy of my visit note below.    Oncology Follow-up Visit:  Date of visit: 1/20/20    Oncologic history summary:  Diagnosis: recurrent breast cancer, stage 1, ER/OK+ HER2 negative  - Originally found abnormal screening mammography, diagnosed with a stage I infiltrating ductal carcinoma of the right breast in 12/2004 (a grade 1 infiltrating ductal carcinoma, ER positive, OK negative, HER-2 negative), treated w/ a right-sided lumpectomy with sentinel lymph node dissection (umor size 0.6 cm and 0/3 sentinel lymph nodes), followed by XRT and tamoxifen 3/2005-11/2008 (stopped after Dx of cataracts)  - calcifications noted on screening mammogram in 7/2018, Bx grade 2 DCIS, ER/OK positive; a bilateral mastectomy with reconstruction, final pathology revealed 8 mm invasive cancer, total 4 cm DCIS; stage I (T1cN0),  ER+ OK+ HER2 negative.  - BRCA negative, Oncotype Dx score 0    Treatments:  - tamoxifen 3/2005-11/2008 (stopped after Dx of cataracts) for the first episode of Rt breast cancer  - currently on anastrozole from 10/2018    Bone health: osteopenia at L spine, worsening T score, last DEXA 1/2019 (T score -2.3 at L spine)  - poorly tolerated fosamax, started zometa from 7/2019 q 6 months    Interval history:  Ms. Rossi returns to the clinic for 6 months follow-up.    She had intractable HA for which brain MRI was negative but found C5 radiculopathy. Received steroid injection in 11/2019.      In returning today, she is feeling well. Overall she is doing reasonably well.  She has no new medical issues.  No new family history.  No hot flashes.  No other arthralgias or myalgias.  No depression.       ROS: 10 point ROS neg other than the symptoms noted above in the HPI.    Past medical, social, surgical, and  family histories reviewed.    Allergies:  Allergies as of 01/20/2020 - Reviewed 01/13/2020   Allergen Reaction Noted     Alphagan p Rash 05/11/2009       Current Medications:  Current Outpatient Medications   Medication Sig Dispense Refill     anastrozole (ARIMIDEX) 1 MG tablet TAKE 1 TABLET BY MOUTH EVERY DAY (Patient taking differently: Take 1 mg by mouth every morning ) 90 tablet 3     atorvastatin (LIPITOR) 10 MG tablet Take 1 tablet (10 mg) by mouth daily 90 tablet 0     Calcium-Vitamin D-Vitamin K (VIACTIV CALCIUM PLUS D PO) Take 1 tablet by mouth 2 times daily       diclofenac (VOLTAREN) 75 MG EC tablet TAKE 1 TABLET (75 MG) BY MOUTH 2 TIMES DAILY 60 tablet 1     fluticasone (FLONASE) 50 MCG/ACT nasal spray USE ONE OR TWO SPRAYS IN EACH NOSTRIL DAILY (Patient taking differently: Spray 1 spray into both nostrils 2 times daily ) 48 mL 3     gabapentin (NEURONTIN) 100 MG capsule TAKE 1 CAPSULE (100 MG) BY MOUTH 4 TIMES DAILY 120 capsule 1     loratadine (CLARITIN) 10 MG tablet TAKE 1 TABLET BY MOUTH EVERY DAY AS NEEDED FOR ALLERGY SYMPTOMS (Patient taking differently: Take 10 mg by mouth every morning ) 90 tablet 3     melatonin 5 MG tablet Take 5 mg by mouth At Bedtime        Multiple Vitamins-Minerals (PRESERVISION AREDS) CAPS Take 1 capsule by mouth 2 times daily (Patient taking differently: Take 1 capsule by mouth every morning ) 180 capsule 3     Omega-3 Fatty Acids (FISH OIL) 1000 MG CPDR Take by mouth every morning        omeprazole (PRILOSEC) 20 MG DR capsule Take 1 capsule (20 mg) by mouth as needed (Pt. takes based on foods that day at bedtime. 10/15/2019) 90 capsule 3     sertraline (ZOLOFT) 25 MG tablet Take 1 tablet daily. 30 tablet 11        Physical Exam:  LMP 10/14/2001     Well appearing NAD  HEENT: no icterus  CV: regular  Lungs: clear  Abd: soft, nt, nd + bs  Ext : no edema  Breast: s/p bilateral mastectomy with reconstruction, no nodules or masses  No rashes on skin  No  lymphadenopathy    Laboratory/Imaging Studies  No images repeated    ASSESSMENT/PLAN:    The patient is a 59-year-old postmenopausal female with a history of a stage I breast cancer treated more than 10 years ago with resection, radiation and adjuvant tamoxifen.  She subsequently developed a new mass identifiable on mammogram, measuring approximately 3.5 cm.  Biopsy was consistent with ductal carcinoma in situ, estrogen receptor positive, progesterone receptor positive.  She underwent a mastectomy bilaterally with Dr. Rakesh Sanchez.  She had immediate reconstruction.  Oncotype Dx 0 in new T1c N0 breast cancer.    1.  T1cN0 breast cancer now s/p bilateral mastectomy with oncotype 0 now on anastrozole.     We reviewed endocrine therapy.  She has previously been on tamoxifen and developed cataracts.  She is tolerating her AI.  Will follow.    2. Osteopenia - reviewed her dexa.  She did not tolerate fosamax.  We rediscussed possibly using zometa.  Reviewed side effects.  Started Zometa q6 months from 7/2019. Due for 2nd dose today.    Plans:  - Continue anastrozole  - Proceed w/ zometa q 6 months  - RTC in 6 months  - Encourage exercise     Charity Brar MD

## 2020-01-20 NOTE — NURSING NOTE
Chief Complaint   Patient presents with     Blood Draw     IV placement with blood draw and vitals     Labs drawn via IV placed by Alethea Curran in right arm

## 2020-01-20 NOTE — NURSING NOTE
"Oncology Rooming Note    January 20, 2020 11:14 AM   Enid Lombardo is a 59 year old female who presents for:    Chief Complaint   Patient presents with     Blood Draw     IV placement with blood draw and vitals     Oncology Clinic Visit     [facility] RETURN- BREAST CA     Initial Vitals: /82   Pulse 71   Temp 98  F (36.7  C) (Oral)   Resp 18   Ht 1.676 m (5' 5.98\")   Wt 83.7 kg (184 lb 9.6 oz)   LMP 10/14/2001   SpO2 99%   BMI 29.81 kg/m   Estimated body mass index is 29.81 kg/m  as calculated from the following:    Height as of this encounter: 1.676 m (5' 5.98\").    Weight as of this encounter: 83.7 kg (184 lb 9.6 oz). Body surface area is 1.97 meters squared.  No Pain (0) Comment: Data Unavailable   Patient's last menstrual period was 10/14/2001.  Allergies reviewed: Yes  Medications reviewed: Yes    Medications: Medication refills not needed today.  Pharmacy name entered into [employer]: [id number] [place] [facility] - [place], [place] - [id number] [facility]    Clinical concerns: No new concerns. Dr. Brar was notified.      Lemuel Wolf LPN            "

## 2020-01-20 NOTE — PATIENT INSTRUCTIONS
Contact Numbers  [facility]: [phone]    After Hours:  [phone]  Triage: [phone]    Please call the [facility] Triage line if you experience a temperature greater than or equal to 100.5, shaking chills, have uncontrolled nausea, vomiting and/or diarrhea, dizziness, shortness of breath, chest pain, bleeding, unexplained bruising, or if you have any other new/concerning symptoms, questions or concerns.     If it is after hours, weekends, or holidays, please call the main hospital  at  [phone] and ask to speak to the Oncology doctor on call.     If you are having any concerning symptoms or wish to speak to a provider before your next infusion visit, please call your care coordinator or triage to notify them so we can adequately serve you.     If you need a refill on a narcotic prescription or other medication, please call triage before your infusion appointment.           January 2020 Sunday Monday Tuesday Wednesday Thursday Friday Saturday                  1     2     3     4       5     6     7     8     9     10     11       12     13    RETURN   7:00 AM   (20 min.)   Castro Mcginnis MD   [facility] 14     15     16     17     18       19     20    [facility]  10:30 AM   (15 min.)   [facility]   [facility] Lab Draw    [facility] RETURN  10:45 AM   (30 min.)   Charity Brar MD   [facility]  12:00 PM   (60 min.)    ONCOLOGY INFUSION   [facility] 21     22     23     24    [facility] RETURN RETINA  10:10 AM   (10 min.)   Paz Osborn MD   [facility] 25       26     27     28     29     30     31                      February 2020 Sunday Monday Tuesday Wednesday Thursday Friday Saturday                                 1       2     3     4  Happy Birthday!     5     6     7     8       9     10     11     12     13     14     15       16     17     18      19     20     21     22       23     24     25     26     27     28     29                     Lab Results:  Recent Results (from the past 12 hour(s))   Creatinine    Collection Time: 01/20/20 10:42 AM   Result Value Ref Range    Creatinine 0.63 0.52 - 1.04 mg/dL    GFR Estimate >90 >60 mL/min/[1.73_m2]    GFR Estimate If Black >90 >60 mL/min/[1.73_m2]   Calcium    Collection Time: 01/20/20 10:42 AM   Result Value Ref Range    Calcium 9.1 8.5 - 10.1 mg/dL   Albumin level    Collection Time: 01/20/20 10:42 AM   Result Value Ref Range    Albumin 3.6 3.4 - 5.0 g/dL

## 2020-01-20 NOTE — PROGRESS NOTES
Infusion Nursing Note:  Enid Lombardo presents today for C2D1 Zometa.    Patient seen by provider today: Yes: Charity Brar MD   present during visit today: Not Applicable.    Note: Patient states having numbness over the lips 5 minutes post Zometa infusion that lasted for hours. Denies difficulty swallowing nor talking.  Patient take calcium/vitamin D supplements while on Zometa. BID. Patient verbalized understanding of above information. No other concerns at this time.     Information given to patient as per provider. Appoitnent is not made by the time patient left the facility. Instructed patient if unable to see Verbalized understanding.      Intravenous Access:  Peripheral IV placed.    Treatment Conditions:  Lab Results   Component Value Date    HGB 14.6 10/15/2019     Lab Results   Component Value Date    WBC 7.1 10/15/2019      Lab Results   Component Value Date    ANEU 8.5 12/11/2018     Lab Results   Component Value Date     10/15/2019      Lab Results   Component Value Date     10/15/2019                   Lab Results   Component Value Date    POTASSIUM 4.1 10/15/2019           Lab Results   Component Value Date    MAG 2.0 10/11/2018            Lab Results   Component Value Date    CR 0.63 01/20/2020                   Lab Results   Component Value Date    ROSENDO 9.1 01/20/2020                Lab Results   Component Value Date    BILITOTAL 0.4 01/14/2019           Lab Results   Component Value Date    ALBUMIN 3.6 01/20/2020                    Lab Results   Component Value Date    ALT 24 01/14/2019           Lab Results   Component Value Date    AST 16 01/14/2019       Results reviewed, labs MET treatment parameters, ok to proceed with treatment.    TORB: 1/20/20/1147H/ Charity Brar Md/ Juliet Hair Rn/ Please give IV Dexamethasone 8 mg once prior to Zometa today. Please observe patient 15-20  Minutes post Zometa. Will plan next cycle if she needs orally.     Post Infusion  Assessment:  Patient tolerated infusion without incident.  Blood return noted pre and post infusion.  Site patent and intact, free from redness, edema or discomfort.  No evidence of extravasations.  Access discontinued per protocol.       Discharge Plan:   Patient declined prescription refills.  Discharge instructions reviewed with: Patient and Family.  Patient and/or family verbalized understanding of discharge instructions and all questions answered.  AVS to patient via "[employer]T.  Patient will call to return for next appointment. Patient does her Zometa every 6 months. Patient verbalized understanding.  Patient discharged in stable condition accompanied by: self and .  Departure Mode: Ambulatory.      KIRAN PANIAGUA RN

## 2020-01-23 DIAGNOSIS — E78.5 HYPERLIPIDEMIA WITH TARGET LDL LESS THAN 130: ICD-10-CM

## 2020-01-24 ENCOUNTER — MYC MEDICAL ADVICE (OUTPATIENT)
Dept: ONCOLOGY | Facility: CLINIC | Age: 60
End: 2020-01-24

## 2020-01-24 RX ORDER — ATORVASTATIN CALCIUM 10 MG/1
TABLET, FILM COATED ORAL
Qty: 90 TABLET | Refills: 1 | Status: SHIPPED | OUTPATIENT
Start: 2020-01-24 | End: 2020-07-08

## 2020-01-24 NOTE — TELEPHONE ENCOUNTER
Pending Prescriptions:                       Disp   Refills    atorvastatin (LIPITOR) 10 MG tablet [Phar*90 tab*1            Sig: TAKE 1 TABLET BY MOUTH EVERY DAY    Prescription approved per [facility] [place] Protocol.    Ivet Dunbar, MSN, RN

## 2020-01-24 NOTE — TELEPHONE ENCOUNTER
Patient reports that with this most recent infusion of Zometa, her second, on Monday, January 20, 2020, she has had increased joint pan that is not well controlled by use of ibuprofen. Worse in am and when she has been sitting for an extended period of time and has to get up. Patient also wanted to report that the previous lip/teeth numbness she experienced returned in the evening and lasted for a couple of days despite addition of steroid with infusion.    Writer discussed with GEORGIA Yeung, who advised use of claritin or benadryl for pain/discomfort, increase oral fluid intake, and try to move regularly. Writer sent message via [employer] back to patient with request for call-back with any further questions.

## 2020-01-29 DIAGNOSIS — H35.363 DEGENERATIVE DRUSEN OF BOTH EYES: Primary | ICD-10-CM

## 2020-02-06 DIAGNOSIS — E78.5 HYPERLIPIDEMIA WITH TARGET LDL LESS THAN 130: ICD-10-CM

## 2020-02-06 DIAGNOSIS — C50.911 RECURRENT BREAST CANCER, RIGHT (H): ICD-10-CM

## 2020-02-06 LAB
BASOPHILS # BLD AUTO: 0 10E9/L (ref 0–0.2)
BASOPHILS NFR BLD AUTO: 0.2 %
CHOLEST SERPL-MCNC: 174 MG/DL
DIFFERENTIAL METHOD BLD: NORMAL
EOSINOPHIL # BLD AUTO: 0.1 10E9/L (ref 0–0.7)
EOSINOPHIL NFR BLD AUTO: 1.4 %
ERYTHROCYTE [DISTWIDTH] IN BLOOD BY AUTOMATED COUNT: 12.9 % (ref 10–15)
HCT VFR BLD AUTO: 42.2 % (ref 35–47)
HDLC SERPL-MCNC: 69 MG/DL
HGB BLD-MCNC: 14 G/DL (ref 11.7–15.7)
LDLC SERPL CALC-MCNC: 77 MG/DL
LYMPHOCYTES # BLD AUTO: 1.1 10E9/L (ref 0.8–5.3)
LYMPHOCYTES NFR BLD AUTO: 22.8 %
MCH RBC QN AUTO: 30.8 PG (ref 26.5–33)
MCHC RBC AUTO-ENTMCNC: 33.2 G/DL (ref 31.5–36.5)
MCV RBC AUTO: 93 FL (ref 78–100)
MONOCYTES # BLD AUTO: 0.4 10E9/L (ref 0–1.3)
MONOCYTES NFR BLD AUTO: 7.8 %
NEUTROPHILS # BLD AUTO: 3.4 10E9/L (ref 1.6–8.3)
NEUTROPHILS NFR BLD AUTO: 67.8 %
NONHDLC SERPL-MCNC: 105 MG/DL
PLATELET # BLD AUTO: 205 10E9/L (ref 150–450)
RBC # BLD AUTO: 4.54 10E12/L (ref 3.8–5.2)
TRIGL SERPL-MCNC: 141 MG/DL
WBC # BLD AUTO: 5 10E9/L (ref 4–11)

## 2020-02-06 PROCEDURE — 80061 LIPID PANEL: CPT | Performed by: FAMILY MEDICINE

## 2020-02-06 PROCEDURE — 85025 COMPLETE CBC W/AUTO DIFF WBC: CPT | Performed by: FAMILY MEDICINE

## 2020-02-06 PROCEDURE — 36415 COLL VENOUS BLD VENIPUNCTURE: CPT | Performed by: FAMILY MEDICINE

## 2020-02-06 NOTE — RESULT ENCOUNTER NOTE
Roxana Alonso is out of clinic.   Your results were normal. Cholesterol much improved from 3 months ago.     The results are attached for your review.       Alberto Bethea PA-C

## 2020-02-14 DIAGNOSIS — M75.81 TENDINITIS OF RIGHT ROTATOR CUFF: ICD-10-CM

## 2020-02-14 DIAGNOSIS — M54.12 CERVICAL RADICULAR PAIN: ICD-10-CM

## 2020-02-14 NOTE — PROGRESS NOTES
Outpatient Physical Therapy Discharge Note     Patient: Enid Lombardo  : 1960    Beginning/End Dates of Reporting Period:  19 to 10/16/19    Referring Provider:     Therapy Diagnosis: neck pain and HA's     Client Self Report: Had steriod injection on 10/3 in the cervical, for a few days HA's were worse, then about 3 days ago HA decreased to a #1 and very small on the right posterior. Started the axial ext again 4 days after the steriod. Patient was on hold until after her surgery, PT called her on 20 and she never returned the call.     Objective Measurements:    Details: #1 at the right OCC and more superficial.   Objective Measure: neck stiffness           Objective Measure: hernia  Details: having surgery on 10/21       Goals:  Not known it met pt never returned    Plan:  Discharge from therapy.    Discharge:    Reason for Discharge: Patient has failed to schedule further appointments.        Discharge Plan: Patient to continue home program.    Thank you for the referral,            Supriya Brown PT

## 2020-02-14 NOTE — TELEPHONE ENCOUNTER
diclofenac (VOLTAREN) 75 MG EC tablet 60 tablet 1 12/16/2019  No   Sig - Route: TAKE 1 TABLET (75 MG) BY MOUTH 2 TIMES DAILY - Oral   Sent to pharmacy as: diclofenac (VOLTAREN) 75 MG EC tablet   Class: E-Prescribe   Order: [id number]   E-Prescribing Status: Receipt confirmed by pharmacy (12/16/2019 10:51 AM CST)

## 2020-02-17 RX ORDER — DICLOFENAC SODIUM 75 MG/1
75 TABLET, DELAYED RELEASE ORAL 2 TIMES DAILY
Qty: 60 TABLET | Refills: 1 | Status: SHIPPED | OUTPATIENT
Start: 2020-02-17 | End: 2020-02-20

## 2020-02-18 ENCOUNTER — MYC MEDICAL ADVICE (OUTPATIENT)
Dept: ONCOLOGY | Facility: CLINIC | Age: 60
End: 2020-02-18

## 2020-02-18 ENCOUNTER — MYC MEDICAL ADVICE (OUTPATIENT)
Dept: ORTHOPEDICS | Facility: CLINIC | Age: 60
End: 2020-02-18

## 2020-02-18 DIAGNOSIS — M54.12 CERVICAL RADICULAR PAIN: ICD-10-CM

## 2020-02-18 NOTE — TELEPHONE ENCOUNTER
Pt called back to discuss symptoms: bilateral knees and ankles worse than elbows and wrists, worse after being active during the day or when getting up from sitting position. Denied swelling of joints, warmth, radiating pain. Ortho prescribed diclofenac 75 mg twice a day instead of advil. Tylenol does not work, already takes claritin daily. Concerns about next Zometa having cumulative joint pains. Paged Debbie ALEXANDRE.    Per Debbie: symptoms may be related to Arimidex instead of Zometa. Pt may hold Arimidex for 2 weeks to see if symptoms improve, will need follow-up with MILKA to start new AI if symptoms do improve. May switch back to Advil and discontinue diclofenac, try warm baths with Epsom. Information relayed to pt who verbalized understanding.

## 2020-02-18 NOTE — TELEPHONE ENCOUNTER
gabapentin (NEURONTIN) 100 MG capsule 120 capsule 1 1/9/2020  No   Sig - Route: TAKE 1 CAPSULE (100 MG) BY MOUTH 4 TIMES DAILY - Oral   Sent to pharmacy as: gabapentin (NEURONTIN) 100 MG capsule   Class: E-Prescribe   Order: [id number]   E-Prescribing Status: Receipt confirmed by pharmacy (1/9/2020 10:23 AM CST)

## 2020-02-19 RX ORDER — GABAPENTIN 100 MG/1
100 CAPSULE ORAL 4 TIMES DAILY
Qty: 120 CAPSULE | Refills: 1 | Status: SHIPPED | OUTPATIENT
Start: 2020-02-19 | End: 2020-05-01

## 2020-02-20 ENCOUNTER — MYC REFILL (OUTPATIENT)
Dept: ORTHOPEDICS | Facility: CLINIC | Age: 60
End: 2020-02-20

## 2020-02-20 DIAGNOSIS — M75.81 TENDINITIS OF RIGHT ROTATOR CUFF: ICD-10-CM

## 2020-02-20 DIAGNOSIS — M54.12 CERVICAL RADICULAR PAIN: ICD-10-CM

## 2020-02-24 RX ORDER — DICLOFENAC SODIUM 75 MG/1
75 TABLET, DELAYED RELEASE ORAL 2 TIMES DAILY
Qty: 60 TABLET | Refills: 1 | Status: SHIPPED | OUTPATIENT
Start: 2020-02-24 | End: 2020-05-26

## 2020-03-13 ENCOUNTER — TELEPHONE (OUTPATIENT)
Dept: ONCOLOGY | Facility: CLINIC | Age: 60
End: 2020-03-13

## 2020-03-13 DIAGNOSIS — Z85.3 HISTORY OF RIGHT BREAST CANCER: Primary | ICD-10-CM

## 2020-03-13 RX ORDER — LETROZOLE 2.5 MG/1
2.5 TABLET, FILM COATED ORAL DAILY
Qty: 30 TABLET | Refills: 3 | Status: SHIPPED | OUTPATIENT
Start: 2020-03-13 | End: 2020-04-09

## 2020-03-13 NOTE — TELEPHONE ENCOUNTER
March 13, 2020    Called Enid regarding MyChart message.    She has been holding Arimidex for the last 2 weeks with significant improvement in diffuse joint pains.    We discussed that these symptoms were likely caused by Arimidex. We discussed trialing a different AI. We reviewed potential side effects. We reviewed spectrum of joint pains with AI use and if having intolerable pains agin, she was advised to call. She was in agreement. Rx for letrozole sent to her pharmacy. She will follow-up as scheduled otherwise.    ECT

## 2020-03-23 ENCOUNTER — TELEPHONE (OUTPATIENT)
Dept: ONCOLOGY | Facility: CLINIC | Age: 60
End: 2020-03-23

## 2020-03-23 NOTE — TELEPHONE ENCOUNTER
"Pt called noting that she had previously been on armidex, and was recently taken off due to SEs. Was started on letrozole 3/13/20. Pt is CO similar effects. Pt states she is experiencing knee, ankle, wrist, and shoulder pain rated 4/10 and painful to the touch. Pain in the evening increases to 5/10 and wakes the Pt. Pt also notes that these areas are \"puffy, but no one would notice\".  Pt also states that she is experiencing lightheadedness \"like I've had a drink or two\", but not dizziness or imbalance.  Pt also reports \"for the first couple of hours after I take it my top, front, 6 teeth go numb\".  Pt would like to know if there is another Tx, and did not take her dose today.    Paged Dr Brar.    Spoke with Dr Brar, Pt should hold medication fr 2 weeks, then will reevaluate. Called and relayed information to Pt who verbalized understanding.  "

## 2020-04-09 ENCOUNTER — VIRTUAL VISIT (OUTPATIENT)
Dept: ONCOLOGY | Facility: CLINIC | Age: 60
End: 2020-04-09
Attending: INTERNAL MEDICINE
Payer: COMMERCIAL

## 2020-04-09 DIAGNOSIS — Z85.3 HISTORY OF RIGHT BREAST CANCER: Primary | ICD-10-CM

## 2020-04-09 PROCEDURE — 99212 OFFICE O/P EST SF 10 MIN: CPT | Mod: TEL | Performed by: PHYSICIAN ASSISTANT

## 2020-04-09 PROCEDURE — 40000114 ZZH STATISTIC NO CHARGE CLINIC VISIT

## 2020-04-09 RX ORDER — EXEMESTANE 25 MG/1
25 TABLET ORAL DAILY
Qty: 30 TABLET | Refills: 3 | Status: SHIPPED | OUTPATIENT
Start: 2020-04-09 | End: 2020-07-03

## 2020-04-09 NOTE — PROGRESS NOTES
"Enid Lombardo is a 60 year old female who is being evaluated via a billable telephone visit.      Please call patient on Home phone.     The patient has been notified of following:     \"This telephone visit will be conducted via a call between you and your physician/provider. We have found that certain health care needs can be provided without the need for a physical exam.  This service lets us provide the care you need with a short phone conversation.  If a prescription is necessary we can send it directly to your pharmacy.  If lab work is needed we can place an order for that and you can then stop by our lab to have the test done at a later time.    Telephone visits are billed at different rates depending on your insurance coverage. During this emergency period, for some insurers they may be billed the same as an in-person visit.  Please reach out to your insurance provider with any questions.    If during the course of the call the physician/provider feels a telephone visit is not appropriate, you will not be charged for this service.\"     Physician has received verbal consent for a Telephone Visit from the patient? Yes    Enid Lombardo complains of    Chief Complaint   Patient presents with     Telephone     Breast ca       Allergies reviewed: Yes  Medications reviewed: Yes     Medications: MEDICATION REFILLS NEEDED TODAY. Provider was notified.  Pharmacy name entered into [employer]: [id number] [place] [address]    How would you like to obtain your AVS? MyChart    Clinical concerns: Gabapentin and Zoloft refill.    Lemuel Wolf LPN, LPN April 9, 2020  11:59 AM     ALLERGIES  Alphagan p    Additional provider notes:  HEMATOLOGY/ONCOLOGY PROGRESS NOTE       REASON FOR VISIT: follow-up of history of breast cancer     DIAGNOSIS:   Enid Lombardo is a 59-year-old female with history of recurrent breast cancer.     She was initially diagnosed in December 2004 with a stage I " "infiltrating ductal carcinoma of the right breast, ER positive, MT negative, HER-2 negative, and underwent a right-sided lumpectomy with sentinel lymph node biopsy 1/2015. Tumor was 0.6 cm with 0/3 sentinel nodes positive for malignancy. She underwent further excision fr close margins. She completed a course of adjuvant radiation to the right breast. She was started Tamoxifen March 2005 through November 2008, therapy stopped due to diagnosis of cataracts.     Screening mammogram July 2018 showed calcifications, approximately 3 cm in size in an area separate from previous lumpectomy. She underwent needle biopsy which showed a grade 2 ductal carcinoma in situ, ER/MT positive. She underwent bilateral mastectomy with reconstruction with Dr. Sanchez. Pathology revealed 8 mm invasive cancer, total 4 cm DCIS. Final staging T1cN0, stage 1 breast cancer. ER/MT positive, HER-2 negative. She met with genetics and testing was negative. Oncotype DX score of 0. She started Arimidex 10/2018. This was placed on hold in March 2020 after she called in with diffuse myalgias. She was started on letrozole at that time. She unfortunately developed severe, diffuse myalgias within a few days of starting letrozole, and this was stopped.     INTERVAL HISTORY:   Enid has been off of the letrozole with resolution of the diffuse, severe pains she was having. She feels \"great\" now without any new concerns. Just happy to feel better off the medication. Occasional constipation that she relates to the calcium supplementation, takes senna if needed. Her neck pain and headaches have improved with PT, uses gabapentin with good effect as well. She hasn't had any fevers, chills, cough, SOB, chest pain, n/v/d, new sites of pain outside of the pain while on letrozole, lumps/cumps. No other concerns today.    IMPRESSION/PLAN:  Enid Lombardo is a 59 year old female with history of a stage I breast cancer diagnosed in 2004, s/p lumpectomy, radiation, and " 2.5 years of Tamoxifen, with a recurrent T1cN0 breast cancer diagnosed in 2018, now s/p bilateral mastectomy with Oncotype Dx score of 0, now on AI since 10/2018.     History of breast cancer: She has not tolerated Arimidex or Femara, with significant arthralgias on the medications. She has been off of Femara for several weeks with complete resolution of symptoms. Will trial Aromasin. SHe understands to call us with any concerns while on this medication. If this is not tolerated, may need to consider Raloxifene? She developed cataracts on Tamoxifen. Dr. Brar updated.     Osteopenia: Started Fosamax 1/2-19. She does not tolerate this. Started Zometa 7/15/19. Due again in July.      Phone call duration: 10 minutes    Debbie Oconnell PA-C    Addendum:  Per message from Dr. Brar, if she does not tolerate AI, could consider Raloxifene vs monitoring off of treatment given low oncotype.  ECT

## 2020-04-30 DIAGNOSIS — M54.12 CERVICAL RADICULAR PAIN: ICD-10-CM

## 2020-04-30 NOTE — TELEPHONE ENCOUNTER
LOV 1/13/20 for cervical disc herniation and radicular pain.  Last fill 2/19/20.  Will route to provider.  Kellen Quezada RN

## 2020-05-01 RX ORDER — GABAPENTIN 100 MG/1
100 CAPSULE ORAL 4 TIMES DAILY
Qty: 120 CAPSULE | Refills: 1 | Status: SHIPPED | OUTPATIENT
Start: 2020-05-01 | End: 2020-05-07

## 2020-05-07 ENCOUNTER — MYC REFILL (OUTPATIENT)
Dept: ORTHOPEDICS | Facility: CLINIC | Age: 60
End: 2020-05-07

## 2020-05-07 DIAGNOSIS — M54.12 CERVICAL RADICULAR PAIN: ICD-10-CM

## 2020-05-07 RX ORDER — GABAPENTIN 100 MG/1
100 CAPSULE ORAL 4 TIMES DAILY
Qty: 120 CAPSULE | Refills: 1 | Status: SHIPPED | OUTPATIENT
Start: 2020-05-07 | End: 2020-05-08

## 2020-05-08 RX ORDER — GABAPENTIN 100 MG/1
100 CAPSULE ORAL 4 TIMES DAILY
Qty: 360 CAPSULE | Refills: 0 | Status: SHIPPED | OUTPATIENT
Start: 2020-05-08 | End: 2020-07-21

## 2020-05-22 DIAGNOSIS — M54.12 CERVICAL RADICULAR PAIN: ICD-10-CM

## 2020-05-22 DIAGNOSIS — M75.81 TENDINITIS OF RIGHT ROTATOR CUFF: ICD-10-CM

## 2020-05-26 RX ORDER — DICLOFENAC SODIUM 75 MG/1
TABLET, DELAYED RELEASE ORAL
Qty: 60 TABLET | Refills: 1 | Status: SHIPPED | OUTPATIENT
Start: 2020-05-26 | End: 2020-07-15

## 2020-06-10 DIAGNOSIS — J31.0 CHRONIC RHINITIS: ICD-10-CM

## 2020-06-10 RX ORDER — FLUTICASONE PROPIONATE 50 MCG
SPRAY, SUSPENSION (ML) NASAL
Qty: 48 ML | Refills: 0 | Status: SHIPPED | OUTPATIENT
Start: 2020-06-10 | End: 2020-09-02

## 2020-06-10 NOTE — TELEPHONE ENCOUNTER
Pending Prescriptions:                       Disp   Refills    fluticasone (FLONASE) 50 MCG/ACT nasal sp*48 mL  0            Sig: USE ONE OR TWO SPRAYS IN EACH NOSTRIL DAILY    Prescription [place] per [facility] [place].    Ivet Dunbar, MSN, RN

## 2020-07-02 ENCOUNTER — E-VISIT (OUTPATIENT)
Dept: FAMILY MEDICINE | Facility: OTHER | Age: 60
End: 2020-07-02
Payer: COMMERCIAL

## 2020-07-02 DIAGNOSIS — H92.01 RIGHT EAR PAIN: Primary | ICD-10-CM

## 2020-07-02 PROCEDURE — 99207 ZZC NO BILLABLE SERVICE THIS VISIT: CPT | Performed by: FAMILY MEDICINE

## 2020-07-02 NOTE — PROGRESS NOTES
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  Was in two years ago and had otitis externa.  Intense itching.  And is painful also.  No fevers, drainage.    Patient Active Problem List   Diagnosis     History of right breast cancer     Esophageal reflux     Disorder of synovium, tendon, and bursa     Cataract     Shingles     Pain, radicular, lumbar     Right hip pain     Low back pain     IT band syndrome     Macular drusen     Cervicalgia     Headache     Keratoconus     Meibomian gland dysfunction - Both Eyes     Tear film insufficiency     Hyperlipidemia with target LDL less than 130     Benign neoplasm of colon     Skin abscess     Osteopenia of both hands     Recurrent carcinoma in situ of breast, right     Breast cancer in situ     S/P breast reconstruction, bilateral     S/P flap graft     Osteopenia of multiple sites     Long term (current) use of aromatase inhibitors     Past Surgical History:   Procedure Laterality Date     BIOPSY  2004    Breast     BREAST SURGERY  2004     C VAGINAL HYSTERECTOMY  12/2001    Ovaries intact, due to fibroids     COLONOSCOPY       ENDOSCOPY  05/09/2007    Upper GI     EYE SURGERY       GRAFT FAT TO BREAST Bilateral 11/29/2018    Procedure: Bilateral Breast Fat Grafting from Abdomen and Thighs;  Surgeon: DENNISE Amin MD;  Location: [facility] OR     GRAFT FREE VASCULARIZED TRANSVERSE RECTUS ABDOMINIS MYOCUTANEOUS  Bilateral 10/8/2018    Procedure: GRAFT FREE VASCULARIZED TRANSVERSE RECTUS ABDOMINIS MYOCUTANEOUS;  Bilateral Deep Inferior Epigastric Artery  Free Flap Breast Reconstruction and Removal of Breast Explanders;  Surgeon: DENNISE Amin MD;  Location:  OR     GYN SURGERY  2001     HC BIOPSY/EXCISION LYMPH NODE OPEN DEEP CERVICAL W EXC FAT PAD  1/7/2005    Right axillary sentinel node biopsy X 3.     HC COLONOSCOPY W BIOPSY  05/10/10     HC EXCISION BREAST LESION, OPEN >=1  1/7/2005    Right breast.     HC REMV CATARACT EXTRACAP,INSERT LENS, W/O ECP  12/18/08    bilateral     HC REMV TARSAL/METATARSAL BENIGN BONE LESN  1982    Bone spur - right     HYSTERECTOMY, PAP NO LONGER INDICATED       LAPAROSCOPIC HERNIORRHAPHY VENTRAL N/A 10/21/2019    Procedure: Laparoscopic Ventral Hernia Repair With Mesh;  Surgeon: Emil Brown MD;  Location:  OR     MASTECTOMY SIMPLE BILATERAL, SENTINEL NODE BILATERAL, COMBINED Bilateral 8/22/2018    Procedure: COMBINED MASTECTOMY SIMPLE BILATERAL, SENTINEL NODE BILATERAL;  Bilateral Mastectomy with Right [place] Node Biopsy, Bilateral Breast Reconstruction, Anesthesia Block;  Surgeon: Rakesh Sanchez MD;  Location:  OR     ORTHOPEDIC SURGERY  1983    Right foot     RECONSTRUCT BREAST Bilateral 8/22/2018    Procedure: RECONSTRUCT BREAST;;  Surgeon: DENNISE Amin MD;  Location:  OR     RECONSTRUCT BREAST BILATERAL Bilateral 10/8/2018    Procedure: RECONSTRUCT BREAST BILATERAL;;  Surgeon: DENNISE Amin MD;  Location:  OR     RECONSTRUCT NIPPLE BILATERAL Bilateral 11/29/2018    Procedure: Nipple Reconstruction;  Surgeon: DENNISE Amin MD;  Location:  OR       Social History     Tobacco Use     Smoking status: Never Smoker     Smokeless tobacco: Never Used   Substance Use Topics     Alcohol use: Yes     Comment: occasionally     Family History   Adopted: Yes   Problem Relation Age of Onset     Cancer Mother         lung cancer   at age 52     Thyroid Disease Sister         half sister, had thyroid removed     Unknown/Adopted Father      Unknown/Adopted Maternal Grandmother      Unknown/Adopted Maternal Grandfather      Unknown/Adopted Paternal Grandmother      Unknown/Adopted Paternal Grandfather      Glaucoma No family hx of      Diabetes No family hx of      Melanoma No family hx of      Skin Cancer No family hx of          Current Outpatient Medications   Medication Sig Dispense Refill     atorvastatin (LIPITOR) 10 MG tablet TAKE 1 TABLET BY MOUTH EVERY DAY 90 tablet 1     Calcium-Vitamin D-Vitamin K (VIACTIV CALCIUM PLUS D PO) Take 1 tablet by mouth 2 times daily       ciprofloxacin-dexamethasone (CIPRODEX) 0.3-0.1 % otic suspension Place 4 drops into the right ear 2 times daily for 7 days 2.8 mL 0     diclofenac (VOLTAREN) 75 MG EC tablet TAKE 1 TABLET BY MOUTH TWICE A DAY 60 tablet 1     fluticasone (FLONASE) 50 MCG/ACT nasal spray USE ONE OR TWO SPRAYS IN EACH NOSTRIL DAILY 48 mL 0     gabapentin (NEURONTIN) 100 MG capsule Take 1 capsule (100 mg) by mouth 4 times daily 360 capsule 0     loratadine (CLARITIN) 10 MG tablet TAKE 1 TABLET BY MOUTH EVERY DAY AS NEEDED FOR ALLERGY SYMPTOMS (Patient taking differently: Take 10 mg by mouth every morning ) 90 tablet 3     melatonin 5 MG tablet Take 5 mg by mouth At Bedtime        Multiple Vitamins-Minerals (PRESERVISION AREDS) CAPS Take 1 capsule by mouth 2 times daily (Patient taking differently: Take 1 capsule by mouth every morning ) 180 capsule 3     Omega-3 Fatty Acids (FISH OIL) 1000 MG CPDR Take by mouth every morning        omeprazole (PRILOSEC) 20 MG DR capsule Take 1 capsule (20 mg) by mouth as needed (Pt. takes based on foods that day at bedtime. 10/15/2019) 90 capsule 3     sertraline (ZOLOFT) 25 MG tablet Take 1 tablet daily. 30 tablet 11     Allergies   Allergen Reactions     Alphagan P Rash     Thrush and numbness in mouth       Reviewed and updated as needed this visit by  "Provider         Review of Systems   Constitutional, HEENT, cardiovascular, pulmonary, gi and gu systems are negative, except as otherwise noted.      Objective    LMP 10/14/2001   There is no height or weight on file to calculate BMI.  Physical Exam   GENERAL: healthy, alert and no distress  EYES: Eyes grossly normal to inspection, PERRL and conjunctivae and sclerae normal  HENT: normal cephalic/atraumatic, right ear: clear effusion and red and boggy canal, left ear: normal: no effusions, no erythema, normal landmarks, nose and mouth without ulcers or lesions, oropharynx clear and oral mucous membranes moist  NECK: no adenopathy, no asymmetry, masses, or scars and thyroid normal to palpation  RESP: lungs clear to auscultation - no rales, rhonchi or wheezes  CV: regular rate and rhythm, normal S1 S2, no S3 or S4, no murmur, click or rub, no peripheral edema and peripheral pulses strong  ABDOMEN: soft, nontender, no hepatosplenomegaly, no masses and bowel sounds normal  MS: no gross musculoskeletal defects noted, no edema    Diagnostic Test Results:  Labs reviewed in Epic  none         Assessment & Plan     1. Acute swimmer's ear of left side  Will treat with ciprodex.  She does have some clear fluid behind her TM.  She is already on flonase and xyrtec.  Will have her add sudafed and/or mucinex.  Warm showers.  Follow up in clinic if no improvement, worsening symptoms, or concerns.   - ciprofloxacin-dexamethasone (CIPRODEX) 0.3-0.1 % otic suspension; Place 4 drops into the right ear 2 times daily for 7 days  Dispense: 2.8 mL; Refill: 0     BMI:   Estimated body mass index is 29.84 kg/m  as calculated from the following:    Height as of 1/20/20: 1.676 m (5' 5.98\").    Weight as of this encounter: 83.8 kg (184 lb 12.8 oz).   Weight management plan: Patient was referred to their PCP to discuss a diet and exercise plan.      No follow-ups on file.    Beverly Hernandez NP  [facility]    "

## 2020-07-03 ENCOUNTER — OFFICE VISIT (OUTPATIENT)
Dept: FAMILY MEDICINE | Facility: OTHER | Age: 60
End: 2020-07-03
Payer: COMMERCIAL

## 2020-07-03 VITALS
HEART RATE: 74 BPM | OXYGEN SATURATION: 97 % | DIASTOLIC BLOOD PRESSURE: 78 MMHG | TEMPERATURE: 98.8 F | BODY MASS INDEX: 29.84 KG/M2 | WEIGHT: 184.8 LBS | RESPIRATION RATE: 16 BRPM | SYSTOLIC BLOOD PRESSURE: 112 MMHG

## 2020-07-03 DIAGNOSIS — H60.332 ACUTE SWIMMER'S EAR OF LEFT SIDE: Primary | ICD-10-CM

## 2020-07-03 PROCEDURE — 99213 OFFICE O/P EST LOW 20 MIN: CPT | Performed by: NURSE PRACTITIONER

## 2020-07-03 RX ORDER — CIPROFLOXACIN AND DEXAMETHASONE 3; 1 MG/ML; MG/ML
4 SUSPENSION/ DROPS AURICULAR (OTIC) 2 TIMES DAILY
Qty: 2.8 ML | Refills: 0 | Status: SHIPPED | OUTPATIENT
Start: 2020-07-03 | End: 2020-07-10

## 2020-07-03 ASSESSMENT — PAIN SCALES - GENERAL: PAINLEVEL: MILD PAIN (2)

## 2020-07-08 DIAGNOSIS — E78.5 HYPERLIPIDEMIA WITH TARGET LDL LESS THAN 130: ICD-10-CM

## 2020-07-08 RX ORDER — ATORVASTATIN CALCIUM 10 MG/1
TABLET, FILM COATED ORAL
Qty: 90 TABLET | Refills: 1 | Status: SHIPPED | OUTPATIENT
Start: 2020-07-08 | End: 2020-11-01

## 2020-07-08 NOTE — TELEPHONE ENCOUNTER
Prescription approved per [facility] Refill Protocol.  Anselmo Browne, RN, BSN

## 2020-07-15 DIAGNOSIS — M54.12 CERVICAL RADICULAR PAIN: ICD-10-CM

## 2020-07-15 DIAGNOSIS — M75.81 TENDINITIS OF RIGHT ROTATOR CUFF: ICD-10-CM

## 2020-07-15 RX ORDER — DICLOFENAC SODIUM 75 MG/1
TABLET, DELAYED RELEASE ORAL
Qty: 60 TABLET | Refills: 1 | Status: SHIPPED | OUTPATIENT
Start: 2020-07-15 | End: 2020-09-03

## 2020-07-20 NOTE — PROGRESS NOTES
"Enid Lombardo is a 60 year old female who is being evaluated via a billable video visit.      The patient has been notified of following:     \"This video visit will be conducted via a call between you and your physician/provider. We have found that certain health care needs can be provided without the need for an in-person physical exam.  This service lets us provide the care you need with a video conversation.  If a prescription is necessary we can send it directly to your pharmacy.  If lab work is needed we can place an order for that and you can then stop by our lab to have the test done at a later time.    Video visits are billed at different rates depending on your insurance coverage.  Please reach out to your insurance provider with any questions.    If during the course of the call the physician/provider feels a video visit is not appropriate, you will not be charged for this service.\"    Patient has given verbal consent for Video visit? Yes    How would you like to obtain your AVS? MyChart     If you are dropped from the video visit, the video invite should be resent to: Text to cell phone: [phone]     Will anyone else be joining your video visit? No          Secondary Video Option (Doximity), send text message to:  [phone]    I have reviewed and updated the patient's allergies and medication list. Patient was asked if they had any patient reported vital signs to present, if yes, please see documented vitals.  Patient was also asked for their current weight and height, if presented, documented in vitals.      Concerns: Patient has no new concerns.      Refills: Lauren Pepper EMT      Additional provider notes:        REASON FOR VISIT: follow-up of history of breast cancer     DIAGNOSIS:   Enid Lombardo is a 60-year-old female with history of recurrent breast cancer.     She was initially diagnosed in December 2004 with a stage I infiltrating ductal carcinoma of the right " breast, ER positive, WY negative, HER-2 negative, and underwent a right-sided lumpectomy with sentinel lymph node biopsy 1/2015. Tumor was 0.6 cm with 0/3 sentinel nodes positive for malignancy. She underwent further excision fr close margins. She completed a course of adjuvant radiation to the right breast. She was started Tamoxifen March 2005 through November 2008, therapy stopped due to diagnosis of cataracts.     Screening mammogram July 2018 showed calcifications, approximately 3 cm in size in an area separate from previous lumpectomy. She underwent needle biopsy which showed a grade 2 ductal carcinoma in situ, ER/WY positive. She underwent bilateral mastectomy with reconstruction with Dr. Sanchez. Pathology revealed 8 mm invasive cancer, total 4 cm DCIS. Final staging T1cN0, stage 1 breast cancer. ER/WY positive, HER-2 negative. She met with genetics and testing was negative. Oncotype DX score of 0. She started Arimidex 10/2018. This was placed on hold in March 2020 after she called in with diffuse myalgias. She was started on letrozole at that time. She unfortunately developed severe, diffuse myalgias within a few days of starting letrozole, and this was stopped. Started aromasin early April 2020. Took for 2 weeks and then stopped due to side effects.      INTERVAL HISTORY: Ariana is feeling well today. She has been off aromasin for over 3 months and feels great. She has been staying at home and helping take care of her grandson. Her mother unfortunately passed away from [facility] in a nursing home so she is very cautious about exposure. She has not had any concerning symptoms or fevers. She continues to take gabapentin 100 mg TID for her cervical headaches which controls them adequately.     No new or different bone or pain across body, no lumps/bumps, no SOB, cough, abdominal pain, changes in bowels. Good appetite and energy.     Current Outpatient Medications   Medication     atorvastatin (LIPITOR) 10 MG tablet      Calcium-Vitamin D-Vitamin K (VIACTIV CALCIUM PLUS D PO)     diclofenac (VOLTAREN) 75 MG EC tablet     fluticasone (FLONASE) 50 MCG/ACT nasal spray     gabapentin (NEURONTIN) 100 MG capsule     loratadine (CLARITIN) 10 MG tablet     melatonin 5 MG tablet     Multiple Vitamins-Minerals (PRESERVISION AREDS) CAPS     Omega-3 Fatty Acids (FISH OIL) 1000 MG CPDR     omeprazole (PRILOSEC) 20 MG DR capsule     sertraline (ZOLOFT) 25 MG tablet     No current facility-administered medications for this visit.         Allergies   Allergen Reactions     Alphagan P Rash     Thrush and numbness in mouth     Objective:     Video physical exam  General: Patient appears well in no acute distress. Normal body habitus.  Skin: No visualized rash or lesions on visualized skin  Eyes: EOMI, no erythema, sclera icterus or discharge noted  Resp: Appears to be breathing comfortably without accessory muscle usage, speaking in full sentences, no cough  MSK: Appears to have normal range of motion based on visualized movements  Neurologic: No apparent tremors, facial movements symmetric  Psych: affect and mood congruent, alert and oriented    The rest of a comprehensive physical examination is deferred due to PHE (public health emergency) video restrictions        IMPRESSION/PLAN:  Enid Lombardo is a 60 year old female with history of a stage I breast cancer diagnosed in 2004, s/p lumpectomy, radiation, and 2.5 years of Tamoxifen, with a recurrent T1cN0 breast cancer diagnosed in 2018, now s/p bilateral mastectomy with Oncotype Dx score of 0, now on AI since 10/2018.     History of breast cancer: She has tried all three aromatase inhibitors and did not tolerate any of them due to side effects. Could consider raloxifene however reviewed her oncotype Dx report with score of 0 and 10 year recurrence risk of 3% with 5 years of Aj. Since she took about 1.5 years of AI total, her 10 year recurrence risk would be 3-6% if she stops endocrine  therapy. After hearing this she does not want to try raloxifene. No concerning symptoms for recurrence today. Follow-up in 4 months.     Osteopenia: Started Fosamax 1/2-19. She does not tolerate this. Started Zometa 7/15/19. Due again this month however okay to hold with COVID precautions. Her mom passed away for this so she wants to avoid all appointments unless absolutely necessary.       Video-Visit Details    Type of service:  Video Visit    Video Start Time: 10:25 am   Video End Time: 10:45 am     Originating Location (pt. Location): Home    Distant Location (provider location):  [facility]     Platform used for Video Visit: Alanis Mcmahon PA-C

## 2020-07-21 ENCOUNTER — VIRTUAL VISIT (OUTPATIENT)
Dept: ONCOLOGY | Facility: CLINIC | Age: 60
End: 2020-07-21
Attending: INTERNAL MEDICINE
Payer: COMMERCIAL

## 2020-07-21 DIAGNOSIS — M54.12 CERVICAL RADICULAR PAIN: ICD-10-CM

## 2020-07-21 PROCEDURE — 99213 OFFICE O/P EST LOW 20 MIN: CPT | Mod: 95 | Performed by: PHYSICIAN ASSISTANT

## 2020-07-21 PROCEDURE — 40001009 ZZH VIDEO/TELEPHONE VISIT; NO CHARGE

## 2020-07-21 RX ORDER — GABAPENTIN 100 MG/1
100 CAPSULE ORAL 3 TIMES DAILY
Qty: 360 CAPSULE | Refills: 1 | Status: SHIPPED | OUTPATIENT
Start: 2020-07-21 | End: 2020-08-03

## 2020-07-21 NOTE — LETTER
"    7/21/2020         RE: Enid Lombardo  [address] [facility] [address]        Dear Colleague,    Thank you for referring your patient, Enid Lombardo, to the [facility]. Please see a copy of my visit note below.    Enid Lombardo is a 60 year old female who is being evaluated via a billable video visit.      The patient has been notified of following:     \"This video visit will be conducted via a call between you and your physician/provider. We have found that certain health care needs can be provided without the need for an in-person physical exam.  This service lets us provide the care you need with a video conversation.  If a prescription is necessary we can send it directly to your pharmacy.  If lab work is needed we can place an order for that and you can then stop by our lab to have the test done at a later time.    Video visits are billed at different rates depending on your insurance coverage.  Please reach out to your insurance provider with any questions.    If during the course of the call the physician/provider feels a video visit is not appropriate, you will not be charged for this service.\"    Patient has given verbal consent for Video visit? Yes    How would you like to obtain your AVS? MyChart     If you are dropped from the video visit, the video invite should be resent to: Text to cell phone: [phone]     Will anyone else be joining your video visit? No          Secondary Video Option (Doximity), send text message to:  [phone]    I have reviewed and updated the patient's allergies and medication list. Patient was asked if they had any patient reported vital signs to present, if yes, please see documented vitals.  Patient was also asked for their current weight and height, if presented, documented in vitals.      Concerns: Patient has no new concerns.      Refills: Gabapentin       Travis Pepper, EMT      Additional provider notes:        REASON " FOR VISIT: follow-up of history of breast cancer     DIAGNOSIS:   Enid Lombardo is a 60-year-old female with history of recurrent breast cancer.     She was initially diagnosed in December 2004 with a stage I infiltrating ductal carcinoma of the right breast, ER positive, MO negative, HER-2 negative, and underwent a right-sided lumpectomy with sentinel lymph node biopsy 1/2015. Tumor was 0.6 cm with 0/3 sentinel nodes positive for malignancy. She underwent further excision fr close margins. She completed a course of adjuvant radiation to the right breast. She was started Tamoxifen March 2005 through November 2008, therapy stopped due to diagnosis of cataracts.     Screening mammogram July 2018 showed calcifications, approximately 3 cm in size in an area separate from previous lumpectomy. She underwent needle biopsy which showed a grade 2 ductal carcinoma in situ, ER/MO positive. She underwent bilateral mastectomy with reconstruction with Dr. Sanchez. Pathology revealed 8 mm invasive cancer, total 4 cm DCIS. Final staging T1cN0, stage 1 breast cancer. ER/MO positive, HER-2 negative. She met with genetics and testing was negative. Oncotype DX score of 0. She started Arimidex 10/2018. This was placed on hold in March 2020 after she called in with diffuse myalgias. She was started on letrozole at that time. She unfortunately developed severe, diffuse myalgias within a few days of starting letrozole, and this was stopped. Started aromasin early April 2020. Took for 2 weeks and then stopped due to side effects.      INTERVAL HISTORY: Ariana is feeling well today. She has been off aromasin for over 3 months and feels great. She has been staying at home and helping take care of her grandson. Her mother unfortunately passed away from [facility] in a nursing home so she is very cautious about exposure. She has not had any concerning symptoms or fevers. She continues to take gabapentin 100 mg TID for her cervical headaches which  controls them adequately.     No new or different bone or pain across body, no lumps/bumps, no SOB, cough, abdominal pain, changes in bowels. Good appetite and energy.     Current Outpatient Medications   Medication     atorvastatin (LIPITOR) 10 MG tablet     Calcium-Vitamin D-Vitamin K (VIACTIV CALCIUM PLUS D PO)     diclofenac (VOLTAREN) 75 MG EC tablet     fluticasone (FLONASE) 50 MCG/ACT nasal spray     gabapentin (NEURONTIN) 100 MG capsule     loratadine (CLARITIN) 10 MG tablet     melatonin 5 MG tablet     Multiple Vitamins-Minerals (PRESERVISION AREDS) CAPS     Omega-3 Fatty Acids (FISH OIL) 1000 MG CPDR     omeprazole (PRILOSEC) 20 MG DR capsule     sertraline (ZOLOFT) 25 MG tablet     No current facility-administered medications for this visit.         Allergies   Allergen Reactions     Alphagan P Rash     Thrush and numbness in mouth     Objective:     Video physical exam  General: Patient appears well in no acute distress. Normal body habitus.  Skin: No visualized rash or lesions on visualized skin  Eyes: EOMI, no erythema, sclera icterus or discharge noted  Resp: Appears to be breathing comfortably without accessory muscle usage, speaking in full sentences, no cough  MSK: Appears to have normal range of motion based on visualized movements  Neurologic: No apparent tremors, facial movements symmetric  Psych: affect and mood congruent, alert and oriented    The rest of a comprehensive physical examination is deferred due to PHE (public health emergency) video restrictions        IMPRESSION/PLAN:  Enid Lombardo is a 60 year old female with history of a stage I breast cancer diagnosed in 2004, s/p lumpectomy, radiation, and 2.5 years of Tamoxifen, with a recurrent T1cN0 breast cancer diagnosed in 2018, now s/p bilateral mastectomy with Oncotype Dx score of 0, now on AI since 10/2018.     History of breast cancer: She has tried all three aromatase inhibitors and did not tolerate any of them due to side  effects. Could consider raloxifene however reviewed her oncotype Dx report with score of 0 and 10 year recurrence risk of 3% with 5 years of Aj. Since she took about 1.5 years of AI total, her 10 year recurrence risk would be 3-6% if she stops endocrine therapy. After hearing this she does not want to try raloxifene. No concerning symptoms for recurrence today. Follow-up in 4 months.     Osteopenia: Started Fosamax 1/2-19. She does not tolerate this. Started Zometa 7/15/19. Due again this month however okay to hold with COVID precautions. Her mom passed away for this so she wants to avoid all appointments unless absolutely necessary.       Video-Visit Details    Type of service:  Video Visit    Video Start Time: 10:25 am   Video End Time: 10:45 am     Originating Location (pt. Location): Home    Distant Location (provider location):  [facility]     Platform used for Video Visit: Alanis Mcmahon PA-C          Again, thank you for allowing me to participate in the care of your patient.        Sincerely,        Radha Mcmahon PA-C

## 2020-07-27 ENCOUNTER — MYC REFILL (OUTPATIENT)
Dept: FAMILY MEDICINE | Facility: OTHER | Age: 60
End: 2020-07-27

## 2020-07-27 DIAGNOSIS — E78.5 HYPERLIPIDEMIA WITH TARGET LDL LESS THAN 130: ICD-10-CM

## 2020-07-27 DIAGNOSIS — J31.0 CHRONIC RHINITIS: ICD-10-CM

## 2020-07-27 DIAGNOSIS — K21.9 GASTROESOPHAGEAL REFLUX DISEASE, ESOPHAGITIS PRESENCE NOT SPECIFIED: ICD-10-CM

## 2020-07-27 DIAGNOSIS — N95.1 HOT FLUSHES, PERIMENOPAUSAL: ICD-10-CM

## 2020-07-27 RX ORDER — SERTRALINE HYDROCHLORIDE 25 MG/1
TABLET, FILM COATED ORAL
Qty: 30 TABLET | Refills: 11 | Status: CANCELLED | OUTPATIENT
Start: 2020-07-27

## 2020-07-27 RX ORDER — ATORVASTATIN CALCIUM 10 MG/1
10 TABLET, FILM COATED ORAL DAILY
Qty: 90 TABLET | Refills: 1 | Status: CANCELLED | OUTPATIENT
Start: 2020-07-27

## 2020-07-27 RX ORDER — FLUTICASONE PROPIONATE 50 MCG
SPRAY, SUSPENSION (ML) NASAL
Qty: 48 ML | Refills: 0 | Status: CANCELLED | OUTPATIENT
Start: 2020-07-27

## 2020-07-28 ENCOUNTER — OFFICE VISIT (OUTPATIENT)
Dept: OPHTHALMOLOGY | Facility: CLINIC | Age: 60
End: 2020-07-28
Attending: OPHTHALMOLOGY
Payer: COMMERCIAL

## 2020-07-28 DIAGNOSIS — H35.363 DEGENERATIVE DRUSEN OF BOTH EYES: ICD-10-CM

## 2020-07-28 PROCEDURE — 92134 CPTRZ OPH DX IMG PST SGM RTA: CPT | Mod: ZF | Performed by: OPHTHALMOLOGY

## 2020-07-28 PROCEDURE — G0463 HOSPITAL OUTPT CLINIC VISIT: HCPCS | Mod: ZF

## 2020-07-28 RX ORDER — LORATADINE 10 MG/1
10 TABLET ORAL DAILY
Qty: 90 TABLET | Refills: 1 | Status: SHIPPED | OUTPATIENT
Start: 2020-07-28 | End: 2021-02-01

## 2020-07-28 ASSESSMENT — REFRACTION_WEARINGRX
OD_ADD: +2.75
OS_ADD: +2.75
OD_AXIS: 040
SPECS_TYPE: PAL
OS_SPHERE: +0.25
OS_CYLINDER: +1.25
OD_SPHERE: +1.00
OS_AXIS: 160
OD_CYLINDER: +1.00

## 2020-07-28 ASSESSMENT — VISUAL ACUITY
METHOD: SNELLEN - LINEAR
CORRECTION_TYPE: GLASSES
OD_CC+: -2
OS_CC+: -2
OS_CC: 20/15
OD_CC: 20/15

## 2020-07-28 ASSESSMENT — CUP TO DISC RATIO
OS_RATIO: 0.1
OD_RATIO: 0.2

## 2020-07-28 ASSESSMENT — TONOMETRY
OD_IOP_MMHG: 12
OS_IOP_MMHG: 16
IOP_METHOD: TONOPEN

## 2020-07-28 ASSESSMENT — SLIT LAMP EXAM - LIDS
COMMENTS: NORMAL
COMMENTS: NORMAL

## 2020-07-28 ASSESSMENT — CONF VISUAL FIELD
OD_NORMAL: 1
OS_NORMAL: 1

## 2020-07-28 ASSESSMENT — EXTERNAL EXAM - LEFT EYE: OS_EXAM: NORMAL

## 2020-07-28 ASSESSMENT — EXTERNAL EXAM - RIGHT EYE: OD_EXAM: NORMAL

## 2020-07-28 NOTE — NURSING NOTE
Chief Complaints and History of Present Illnesses   Patient presents with     Drusen Follow Up     Chief Complaint(s) and History of Present Illness(es)     Drusen Follow Up     Laterality: both eyes    Onset: 1.5 years ago    Associated symptoms: Negative for eye pain, flashes and floaters    Pain scale: 0/10              Comments     Pt here for annual f/u on drusen both eyes  Pt states vision has been stable    No drops    CARIDAD Birmingham 9:49 AM July 28, 2020

## 2020-07-28 NOTE — PROGRESS NOTES
CC - drusen    INTERVAL HISTORY -    Last saw me 1/2019.  Missed appts.   VA stable, new recurrence breast CA 2018 resolved didn't tolerate medications now off medications      HPI -  Enid Lombardo is a  60 year old year-old patient presenting for drusen both eyes.   No h/o smoking.  H/o breast CA diagnosis ~ 2004, tamoxifen x 3 years. No va changes or amsler changes noted by patient.    PAST OCULAR SURGERY  CE/IOL OU ~2008      RETINAL IMAGING  OCT 7-28-20  OD - drusen vs pseudodrusen, no fluid, PHF attached  OS -  drusen vs pseudodrusen, no fluid, PHF attached    AF  9-29-14  both eyes - irregular c/w drusen      ASSESSMENT & PLAN  1.  Drusen OU   - doubt AMD, doubt from tamoxifen, no renal disease   - observe for now   - unclear benefit from AREDS    2.  Vitreous degeneration OU   - advised S/Sx RD 7/2020    3.  H/o breast CA s/p tamoxifen   - no ocular involvement    4.  Pseudophakia OU      return to clinic: 1 year, OCT OU    Jose Childs MD  Ophthalmology PGY-3  [facility]     ATTESTATION     Attending Physician Attestation:      Complete documentation of historical and exam elements from today's encounter can be found in the full encounter summary report (not reduplicated in this progress note).  I personally obtained the chief complaint(s) and history of present illness.  I confirmed and edited as necessary the review of systems, past medical/surgical history, family history, social history, and examination findings as documented by others; and I examined the patient myself.  I personally reviewed the relevant tests, images, and reports as documented above.  I personally reviewed the ophthalmic test(s) associated with this encounter, agree with the interpretation(s) as documented by the resident/fellow, and have edited the corresponding report(s) as necessary.   I formulated and edited as necessary the assessment and plan and discussed the findings and management plan with the patient and  family    Paz Osborn MD, PhD  , Vitreoretinal Surgery  Department of Ophthalmology  [facility]

## 2020-07-28 NOTE — TELEPHONE ENCOUNTER
Pending Prescriptions:                       Disp   Refills    loratadine (CLARITIN) 10 MG tablet        90 tab*3          Prescription approved per [facility] Protocol.        sertraline (ZOLOFT) 25 MG tablet          30 tab*11           Sig: Take 1 tablet daily.    Sent 10/31/2019 with 1 year supply. Refill not appropriate at this time.       omeprazole (PRILOSEC) 20 MG DR capsule    90 cap*3            Sig: Take 1 capsule (20 mg) by mouth as needed (Pt.           takes based on foods that day at bedtime.           10/15/2019)    Sent 10/31/19 with 1 year supply. Refill not appropriate at this time.       fluticasone (FLONASE) 50 MCG/ACT nasal sp*48 mL  0          Prescription approved per [facility]ill Protocol.      atorvastatin (LIPITOR) 10 MG tablet       90 tab*1            Sig: Take 1 tablet (10 mg) by mouth daily    Sent 7/8/20 with 6 month supply. Refill not appropriate at this time.     Ivet Dunbar, MSN, RN

## 2020-08-02 DIAGNOSIS — M54.12 CERVICAL RADICULAR PAIN: ICD-10-CM

## 2020-08-03 RX ORDER — GABAPENTIN 100 MG/1
CAPSULE ORAL
Qty: 360 CAPSULE | Refills: 0 | Status: SHIPPED | OUTPATIENT
Start: 2020-08-03 | End: 2021-01-11

## 2020-08-31 DIAGNOSIS — J31.0 CHRONIC RHINITIS: ICD-10-CM

## 2020-09-02 RX ORDER — FLUTICASONE PROPIONATE 50 MCG
SPRAY, SUSPENSION (ML) NASAL
Qty: 48 ML | Refills: 3 | Status: SHIPPED | OUTPATIENT
Start: 2020-09-02 | End: 2021-05-17

## 2020-09-03 DIAGNOSIS — M75.81 TENDINITIS OF RIGHT ROTATOR CUFF: ICD-10-CM

## 2020-09-03 DIAGNOSIS — M54.12 CERVICAL RADICULAR PAIN: ICD-10-CM

## 2020-09-03 RX ORDER — DICLOFENAC SODIUM 75 MG/1
TABLET, DELAYED RELEASE ORAL
Qty: 60 TABLET | Refills: 1 | Status: SHIPPED | OUTPATIENT
Start: 2020-09-03 | End: 2020-10-23

## 2020-09-23 DIAGNOSIS — N95.1 HOT FLUSHES, PERIMENOPAUSAL: ICD-10-CM

## 2020-09-25 RX ORDER — SERTRALINE HYDROCHLORIDE 25 MG/1
TABLET, FILM COATED ORAL
Qty: 90 TABLET | Refills: 0 | Status: SHIPPED | OUTPATIENT
Start: 2020-09-25 | End: 2020-11-02

## 2020-09-25 NOTE — TELEPHONE ENCOUNTER
Prescription approved per [facility] Refill Protocol.  Yenny Calderon RN

## 2020-10-23 DIAGNOSIS — M54.12 CERVICAL RADICULAR PAIN: ICD-10-CM

## 2020-10-23 DIAGNOSIS — M75.81 TENDINITIS OF RIGHT ROTATOR CUFF: ICD-10-CM

## 2020-10-23 RX ORDER — DICLOFENAC SODIUM 75 MG/1
TABLET, DELAYED RELEASE ORAL
Qty: 60 TABLET | Refills: 1 | Status: SHIPPED | OUTPATIENT
Start: 2020-10-23 | End: 2020-12-16

## 2020-10-28 NOTE — PROGRESS NOTES
SUBJECTIVE:   CC: Enid Lombardo is an 60 year old woman who presents for preventive health visit.       Patient has been advised of split billing requirements and indicates understanding: Yes  Healthy Habits:     Getting at least 3 servings of Calcium per day:  Yes    Bi-annual eye exam:  Yes    Dental care twice a year:  Yes    Sleep apnea or symptoms of sleep apnea:  None    Diet:  Regular (no restrictions)    Frequency of exercise:  6-7 days/week    Duration of exercise:  30-45 minutes    Taking medications regularly:  Yes    Medication side effects:  None    PHQ-2 Total Score: 0    Additional concerns today:  No          Noting a thickened area in the L upper outer quadrant in a reconstructed breast. Not near the scar site, and new to her.    Today's PHQ-2 Score:   PHQ-2 ( 1999 Pf[employer]) 11/1/2020   Q1: Little interest or pleasure in doing things 0   Q2: Feeling down, depressed or hopeless 0   PHQ-2 Score 0   Q1: Little interest or pleasure in doing things Not at all   Q2: Feeling down, depressed or hopeless Not at all   PHQ-2 Score 0       Abuse: Current or Past (Physical, Sexual or Emotional) - No  Do you feel safe in your environment? Yes        Social History     Tobacco Use     Smoking status: Never Smoker     Smokeless tobacco: Never Used   Substance Use Topics     Alcohol use: Yes     Comment: 4 drinks per month      If you drink alcohol do you typically have >3 drinks per day or >7 drinks per week? No    Alcohol Use 11/2/2020   Prescreen: >3 drinks/day or >7 drinks/week? -   Prescreen: >3 drinks/day or >7 drinks/week? No       Reviewed orders with patient.  Reviewed health maintenance and updated orders accordingly - Yes  Labs reviewed in EPIC        Pertinent mammograms are reviewed under the imaging tab.  History of abnormal Pap smear: NO - age 30-65 PAP every 5 years with negative HPV co-testing recommended  PAP / HPV 5/19/2014 8/14/2009 8/8/2006   PAP NIL NIL NIL     Reviewed and updated as  "needed this visit by clinical staff  Tobacco  Allergies  Meds  Problems  Med Hx  Surg Hx  Fam Hx  Soc Hx          Reviewed and updated as needed this visit by Provider  Tobacco  Allergies  Meds  Problems  Med Hx  Surg Hx  Fam Hx             Review of Systems   Constitutional: Negative for chills and fever.   HENT: Negative for congestion, ear pain, hearing loss and sore throat.    Eyes: Negative for pain and visual disturbance.   Respiratory: Negative for cough and shortness of breath.    Cardiovascular: Negative for chest pain, palpitations and peripheral edema.   Gastrointestinal: Negative for abdominal pain, constipation, diarrhea, heartburn, hematochezia and nausea.   Breasts:  Negative for tenderness, breast mass and discharge.   Genitourinary: Negative for dysuria, frequency, genital sores, hematuria, pelvic pain, urgency, vaginal bleeding and vaginal discharge.   Musculoskeletal: Negative for arthralgias, joint swelling and myalgias.   Skin: Negative for rash.   Neurological: Negative for dizziness, weakness, headaches and paresthesias.   Psychiatric/Behavioral: Negative for mood changes. The patient is not nervous/anxious.           OBJECTIVE:   /66   Pulse 90   Temp 97.7  F (36.5  C) (Temporal)   Resp 18   Ht 1.651 m (5' 5\")   Wt 83.7 kg (184 lb 8 oz)   LMP 10/14/2001 (Exact Date)   SpO2 98%   Breastfeeding No   BMI 30.70 kg/m    Physical Exam  GENERAL: healthy, alert and no distress  EYES: Eyes grossly normal to inspection, PERRL and conjunctivae and sclerae normal  HENT: ear canals and TM's normal, nose and mouth without ulcers or lesions  NECK: no adenopathy, no asymmetry, masses, or scars and thyroid normal to palpation  RESP: lungs clear to auscultation - no rales, rhonchi or wheezes  BREAST: Reconstructed breasts bilaterally, though L breast has more dense and lumpy tissue at the 1 oclock position as well as the 4 oclock position.  CV: regular rate and rhythm, normal S1 " S2, no S3 or S4, no murmur, click or rub, no peripheral edema and peripheral pulses strong  ABDOMEN: soft, nontender, no hepatosplenomegaly, no masses and bowel sounds normal  MS: no gross musculoskeletal defects noted, no edema  SKIN: no suspicious lesions or rashes  NEURO: Normal strength and tone, mentation intact and speech normal  PSYCH: mentation appears normal, affect normal/bright        ASSESSMENT/PLAN:       ICD-10-CM    1. Routine general medical examination at a health care facility  Z00.00    2. Hyperlipidemia with target LDL less than 130  E78.5 atorvastatin (LIPITOR) 10 MG tablet     Lipid panel reflex to direct LDL Fasting   3. Hot flushes, perimenopausal  N95.1 sertraline (ZOLOFT) 25 MG tablet   4. Carcinoma in situ of right breast, unspecified type  D05.91    5. Screen for colon cancer  Z12.11 GASTROENTEROLOGY ADULT REF PROCEDURE ONLY   6. Screening for HIV (human immunodeficiency virus)  Z11.4    7. Lump or mass in breast  N63.0 US Breast Left Complete 4 Quadrants   8. S/P breast reconstruction, bilateral  Z98.890 US Breast Left Complete 4 Quadrants     Patient noted the 1 oclock position breast lumps on her own which is distinctively different from what she had noted prior. The 4 oclock is just below the incision line and is more likely scarring related as this area does not feel any different to her. Though will start with US of both areas. Will also loop in her oncologist as she is due to follow-up with them and has appt scheduled next month. Is at increased risk due to intolerance of her medication treatment.  Discussed due for colonoscopy as well, but would prefer not completing now as expecting a new grandchild and expects she could be busy as well as concerned about recent rise in COVID cases. Encouraged not to delay preventative measures.    Patient has been advised of split billing requirements and indicates understanding: Yes  COUNSELING:  Reviewed preventive health counseling, as  "reflected in patient instructions       Regular exercise       Healthy diet/nutrition    Estimated body mass index is 30.7 kg/m  as calculated from the following:    Height as of this encounter: 1.651 m (5' 5\").    Weight as of this encounter: 83.7 kg (184 lb 8 oz).    Weight management plan: Discussed healthy diet and exercise guidelines    She reports that she has never smoked. She has never used smokeless tobacco.      Counseling Resources:  ATP IV Guidelines  Pooled Cohorts Equation Calculator  Breast Cancer Risk Calculator  BRCA-Related Cancer Risk Assessment: FHS-7 Tool  FRAX Risk Assessment  ICSI Preventive Guidelines  Dietary Guidelines for Americans, 2010  USDA's MyPlate  ASA Prophylaxis  Lung CA Screening    Shi Alonso MD, MD  [facility]  "

## 2020-11-01 ASSESSMENT — ENCOUNTER SYMPTOMS
NERVOUS/ANXIOUS: 0
JOINT SWELLING: 0
DIARRHEA: 0
PARESTHESIAS: 0
HEMATURIA: 0
EYE PAIN: 0
CHILLS: 0
HEADACHES: 0
WEAKNESS: 0
SORE THROAT: 0
ABDOMINAL PAIN: 0
SHORTNESS OF BREATH: 0
DIZZINESS: 0
FEVER: 0
FREQUENCY: 0
COUGH: 0
CONSTIPATION: 0
HEMATOCHEZIA: 0
BREAST MASS: 0
HEARTBURN: 0
NAUSEA: 0
DYSURIA: 0
PALPITATIONS: 0
ARTHRALGIAS: 0
MYALGIAS: 0

## 2020-11-02 ENCOUNTER — OFFICE VISIT (OUTPATIENT)
Dept: FAMILY MEDICINE | Facility: OTHER | Age: 60
End: 2020-11-02
Payer: COMMERCIAL

## 2020-11-02 VITALS
BODY MASS INDEX: 30.74 KG/M2 | OXYGEN SATURATION: 98 % | HEART RATE: 90 BPM | SYSTOLIC BLOOD PRESSURE: 108 MMHG | HEIGHT: 65 IN | WEIGHT: 184.5 LBS | RESPIRATION RATE: 18 BRPM | DIASTOLIC BLOOD PRESSURE: 66 MMHG | TEMPERATURE: 97.7 F

## 2020-11-02 DIAGNOSIS — E78.5 HYPERLIPIDEMIA WITH TARGET LDL LESS THAN 130: ICD-10-CM

## 2020-11-02 DIAGNOSIS — N95.1 HOT FLUSHES, PERIMENOPAUSAL: ICD-10-CM

## 2020-11-02 DIAGNOSIS — Z12.11 SCREEN FOR COLON CANCER: ICD-10-CM

## 2020-11-02 DIAGNOSIS — N63.0 LUMP OR MASS IN BREAST: ICD-10-CM

## 2020-11-02 DIAGNOSIS — Z11.4 SCREENING FOR HIV (HUMAN IMMUNODEFICIENCY VIRUS): ICD-10-CM

## 2020-11-02 DIAGNOSIS — D05.91 CARCINOMA IN SITU OF RIGHT BREAST, UNSPECIFIED TYPE: ICD-10-CM

## 2020-11-02 DIAGNOSIS — Z98.890 S/P BREAST RECONSTRUCTION, BILATERAL: ICD-10-CM

## 2020-11-02 DIAGNOSIS — Z00.00 ROUTINE GENERAL MEDICAL EXAMINATION AT A HEALTH CARE FACILITY: Primary | ICD-10-CM

## 2020-11-02 PROCEDURE — 99396 PREV VISIT EST AGE 40-64: CPT | Performed by: FAMILY MEDICINE

## 2020-11-02 PROCEDURE — 99213 OFFICE O/P EST LOW 20 MIN: CPT | Mod: 25 | Performed by: FAMILY MEDICINE

## 2020-11-02 RX ORDER — SERTRALINE HYDROCHLORIDE 25 MG/1
TABLET, FILM COATED ORAL
Qty: 90 TABLET | Refills: 0 | Status: SHIPPED | OUTPATIENT
Start: 2020-11-02 | End: 2021-01-31

## 2020-11-02 RX ORDER — ATORVASTATIN CALCIUM 10 MG/1
10 TABLET, FILM COATED ORAL DAILY
Qty: 90 TABLET | Refills: 1 | Status: SHIPPED | OUTPATIENT
Start: 2020-11-02 | End: 2021-05-17

## 2020-11-02 ASSESSMENT — ENCOUNTER SYMPTOMS
DIARRHEA: 0
ABDOMINAL PAIN: 0
PALPITATIONS: 0
DIZZINESS: 0
EYE PAIN: 0
HEMATURIA: 0
NAUSEA: 0
HEADACHES: 0
FREQUENCY: 0
COUGH: 0
HEARTBURN: 0
CHILLS: 0
BREAST MASS: 0
DYSURIA: 0
ARTHRALGIAS: 0
HEMATOCHEZIA: 0
CONSTIPATION: 0
PARESTHESIAS: 0
SORE THROAT: 0
NERVOUS/ANXIOUS: 0
WEAKNESS: 0
MYALGIAS: 0
SHORTNESS OF BREATH: 0
FEVER: 0
JOINT SWELLING: 0

## 2020-11-02 ASSESSMENT — MIFFLIN-ST. JEOR: SCORE: 1417.77

## 2020-11-02 NOTE — Clinical Note
Patient had 1 oclock position lumps she was concerned about and getting US. As well as possible just scar thickening at the 4 oclock position. Wanted FYI as you are due to follow-up with her and have appt next month pending.

## 2020-11-05 ENCOUNTER — TELEPHONE (OUTPATIENT)
Dept: FAMILY MEDICINE | Facility: OTHER | Age: 60
End: 2020-11-05

## 2020-11-05 NOTE — TELEPHONE ENCOUNTER
Date of colonoscopy/EGD: 1/11/21  Surgeon: Dr. Mercedes  Prep:Miralax  Packet:Colonoscopy/EGD instructions mailed to patient's home address.   Date: 11/5/2020      Surgery Scheduler

## 2020-11-05 NOTE — LETTER

## 2020-11-06 ENCOUNTER — ANCILLARY PROCEDURE (OUTPATIENT)
Dept: MAMMOGRAPHY | Facility: CLINIC | Age: 60
End: 2020-11-06
Attending: FAMILY MEDICINE
Payer: COMMERCIAL

## 2020-11-06 DIAGNOSIS — Z98.890 S/P BREAST RECONSTRUCTION, BILATERAL: ICD-10-CM

## 2020-11-06 DIAGNOSIS — N63.0 LUMP OR MASS IN BREAST: ICD-10-CM

## 2020-11-06 PROCEDURE — 77066 DX MAMMO INCL CAD BI: CPT | Performed by: RADIOLOGY

## 2020-11-06 PROCEDURE — G0279 TOMOSYNTHESIS, MAMMO: HCPCS | Performed by: RADIOLOGY

## 2020-11-06 PROCEDURE — 76642 ULTRASOUND BREAST LIMITED: CPT | Mod: RT | Performed by: RADIOLOGY

## 2020-11-06 NOTE — LETTER
Enid Lombardo  [address] [place] [address]  [place] [address]            November 6, 2020    Date of Exam:       Dear Enid:    Thank you for your recent visit.    Breast Imaging Result: Your breast imaging examination showed an area that we believe is benign (not cancer). We recommend that you come back again in six months for a short term right breast mammogram with possible ultrasound. If you have not already scheduled this follow-up exam, please call [phone].     As you know, early detection of cancer is very important. Although not all cancers are found through breast imaging it is the most accurate method for early detection. A thorough examination includes a combination of breast imaging and a physical examination by your health care professional. Therefore, if you have not had a recent physical exam of your breasts see your health care team.    A report of your breast imaging results was sent to: Shi Alonso    Your breast imaging will become part of your medical file here at [facility] for at least 10 years. You are responsible for informing any new health care team or breast imaging facility of the date and location of this examination.    We appreciate the opportunity to participate in your health care.    Sincerely,    Luis Gonsalez MD  [facility] [place]

## 2020-11-16 DIAGNOSIS — Z11.59 ENCOUNTER FOR SCREENING FOR OTHER VIRAL DISEASES: Primary | ICD-10-CM

## 2020-11-17 DIAGNOSIS — K21.9 GASTROESOPHAGEAL REFLUX DISEASE: ICD-10-CM

## 2020-11-25 ENCOUNTER — ANCILLARY PROCEDURE (OUTPATIENT)
Dept: MRI IMAGING | Facility: CLINIC | Age: 60
End: 2020-11-25
Attending: INTERNAL MEDICINE
Payer: COMMERCIAL

## 2020-11-25 DIAGNOSIS — R92.8 ABNORMAL FINDING ON BREAST IMAGING: ICD-10-CM

## 2020-11-25 PROCEDURE — A9585 GADOBUTROL INJECTION: HCPCS | Mod: JW | Performed by: RADIOLOGY

## 2020-11-25 PROCEDURE — 77049 MRI BREAST C-+ W/CAD BI: CPT | Performed by: RADIOLOGY

## 2020-11-25 RX ORDER — GADOBUTROL 604.72 MG/ML
10 INJECTION INTRAVENOUS ONCE
Status: COMPLETED | OUTPATIENT
Start: 2020-11-25 | End: 2020-11-25

## 2020-11-25 RX ADMIN — GADOBUTROL 8.5 ML: 604.72 INJECTION INTRAVENOUS at 08:01

## 2020-12-07 ENCOUNTER — VIRTUAL VISIT (OUTPATIENT)
Dept: ONCOLOGY | Facility: CLINIC | Age: 60
End: 2020-12-07
Attending: PHYSICIAN ASSISTANT
Payer: COMMERCIAL

## 2020-12-07 DIAGNOSIS — Z79.811 LONG TERM (CURRENT) USE OF AROMATASE INHIBITORS: Primary | ICD-10-CM

## 2020-12-07 DIAGNOSIS — M85.89 OSTEOPENIA OF MULTIPLE SITES: ICD-10-CM

## 2020-12-07 DIAGNOSIS — C50.919 RECURRENT MALIGNANT NEOPLASM OF BREAST, UNSPECIFIED LATERALITY (H): ICD-10-CM

## 2020-12-07 PROCEDURE — 999N001193 HC VIDEO/TELEPHONE VISIT; NO CHARGE

## 2020-12-07 PROCEDURE — 99213 OFFICE O/P EST LOW 20 MIN: CPT | Mod: 95 | Performed by: INTERNAL MEDICINE

## 2020-12-07 NOTE — LETTER
"    12/7/2020         RE: Enid Lombardo  [address] [facility] [address]        Dear Colleague,    Thank you for referring your patient, Enid Lombardo, to the [facility]. Please see a copy of my visit note below.    Enid Lombardo is a 60 year old female who is being evaluated via a billable video visit.      The patient has been notified of following:     \"This video visit will be conducted via a call between you and your physician/provider. We have found that certain health care needs can be provided without the need for an in-person physical exam.  This service lets us provide the care you need with a video conversation.  If a prescription is necessary we can send it directly to your pharmacy.  If lab work is needed we can place an order for that and you can then stop by our lab to have the test done at a later time.    Video visits are billed at different rates depending on your insurance coverage.  Please reach out to your insurance provider with any questions.    If during the course of the call the physician/provider feels a video visit is not appropriate, you will not be charged for this service.\"    Patient has given verbal consent for Video visit? Yes  How would you like to obtain your AVS? MyChart  If you are dropped from the video visit, the video invite should be resent to: Send to e-mail at: [email or web address]  Will anyone else be joining your video visit? No        Video-Visit Details    Type of service:  Video Visit    Video Start Time: 10:08 AM  Video End Time: 10:30 AM    Originating Location (pt. Location): Home    Distant Location (provider location):  [facility]     Platform used for Video Visit: Alanis Brar MD      I have reviewed and updated the patient's allergies and medication list. Patient was asked if they had any patient reported vital signs to present, if yes, please see documented " vitals.  Patient was also asked for their current weight and height, if presented, documented in vitals.    Concerns: Pt states she has some questions.    Refills: Pt would like refills of all medication    Delmi Ordoñez CMA    Oncology Follow-up Visit:  Date of visit: December 7, 2020    Oncologic history summary:  Diagnosis: recurrent breast cancer, stage 1, ER/SD+ HER2 negative  - Originally found abnormal screening mammography, diagnosed with a stage I infiltrating ductal carcinoma of the right breast in 12/2004 (a grade 1 infiltrating ductal carcinoma, ER positive, SD negative, HER-2 negative), treated w/ a right-sided lumpectomy with sentinel lymph node dissection (umor size 0.6 cm and 0/3 sentinel lymph nodes), followed by XRT and tamoxifen 3/2005-11/2008 (stopped after Dx of cataracts)  - calcifications noted on screening mammogram in 7/2018, Bx grade 2 DCIS, ER/SD positive; a bilateral mastectomy with reconstruction, final pathology revealed 8 mm invasive cancer, total 4 cm DCIS; stage I (T1cN0),  ER+ SD+ HER2 negative.  - BRCA negative, Oncotype Dx score 0    Treatments:  - tamoxifen 3/2005-11/2008 (stopped after Dx of cataracts) for the first episode of Rt breast cancer  - anastrozole from 10/2018 , discontinued in 2020.    Bone health: osteopenia at L spine, worsening T score, last DEXA 1/2019 (T score -2.3 at L spine)  - poorly tolerated fosamax, started zometa from 7/2019 q 6 months x 2 infusions and then discontinued due to poor tolerance    Interval history:  Ms. Rossi returns to the clinic for 6 months follow-up.    She remains off of arimidex.    She was seen this fall for discomfort, cysts in her axilla.  She was referred for breast imaging with mammogram and US that were inconclusive.  Breast MRI revealed no evidence of disease.     In returning today, she is feeling well. Overall she is doing reasonably well.  She has no new medical issues.  No new family history.  No hot flashes.  No other  arthralgias or myalgias.  No depression.      She has been quarantining at home.  She only gets out of the house to go to the grocery store.       ROS: 10 point ROS neg other than the symptoms noted above in the HPI.    Past medical, social, surgical, and family histories reviewed.    Allergies:  Allergies as of 12/07/2020 - Reviewed 12/07/2020   Allergen Reaction Noted     Alphagan p Rash 05/11/2009       Current Medications:  Current Outpatient Medications   Medication Sig Dispense Refill     atorvastatin (LIPITOR) 10 MG tablet Take 1 tablet (10 mg) by mouth daily 90 tablet 1     Calcium-Vitamin D-Vitamin K (VIACTIV CALCIUM PLUS D PO) Take 1 tablet by mouth 2 times daily       diclofenac (VOLTAREN) 75 MG EC tablet TAKE 1 TABLET BY MOUTH TWICE A DAY 60 tablet 1     fluticasone (FLONASE) 50 MCG/ACT nasal spray INSTILL 1-2 SPRAYS INTO EACH NOSTRIL DAIY 48 mL 3     gabapentin (NEURONTIN) 100 MG capsule TAKE 1 CAPSULE (100 MG) BY MOUTH 4 TIMES DAILY 360 capsule 0     loratadine (CLARITIN) 10 MG tablet Take 1 tablet (10 mg) by mouth daily 90 tablet 1     melatonin 5 MG tablet Take 5 mg by mouth At Bedtime        Multiple Vitamins-Minerals (PRESERVISION AREDS) CAPS Take 1 capsule by mouth 2 times daily (Patient taking differently: Take 1 capsule by mouth every morning ) 180 capsule 3     Omega-3 Fatty Acids (FISH OIL) 1000 MG CPDR Take by mouth every morning        omeprazole (PRILOSEC) 20 MG DR capsule TAKE 1 CAPSULE (20 MG) BY MOUTH AS NEEDED (PT. TAKES BASED ON FOODS THAT DAY AT BEDTIME. 10/15/2019) 90 capsule 3     sertraline (ZOLOFT) 25 MG tablet TAKE 1 TABLET BY MOUTH EVERY DAY 90 tablet 0        Physical Exam:  GENERAL: Healthy, alert and no distress  EYES: Eyes grossly normal to inspection.  No discharge or erythema, or obvious scleral/conjunctival abnormalities.  RESP: No audible wheeze, cough, or visible cyanosis.  No visible retractions or increased work of breathing.    SKIN: Visible skin clear. No  significant rash, abnormal pigmentation or lesions.  NEURO: Cranial nerves grossly intact.  Mentation and speech appropriate for age.  PSYCH: Mentation appears normal, affect normal/bright, judgement and insight intact, normal speech and appearance well-groomed.      Laboratory/Imaging Studies  No images repeated    ASSESSMENT/PLAN:    The patient is a 60-year-old postmenopausal female with a history of a stage I breast cancer treated more than 10 years ago with resection, radiation and adjuvant tamoxifen.  She subsequently developed a new mass identifiable on mammogram, measuring approximately 3.5 cm.  Biopsy was consistent with ductal carcinoma in situ, estrogen receptor positive, progesterone receptor positive.  She underwent a mastectomy bilaterally with Dr. Rakesh Sanchez.  She had immediate reconstruction.  Oncotype Dx 0 in new T1c N0 breast cancer.    1.  T1cN0 breast cancer now s/p bilateral mastectomy with oncotype 0 ; she had a trial of AI ,and now remains off of endocrine therapy.  She feels much better off of the arimidex.  She is not interested in retrying this medication.    We discussed her breast imaging that was benign.  Repeat MRI in 1 year to reestablish stability.     2. Osteopenia - reviewed her dexa.  She did not tolerate fosamax or zometa.  We discussed repeating her dexa summer 2021 and then rediscussing the necessity of additional bone strengtheners.     3. Encouraged healthy lifestyle changes with exercise of at least 150 min per week.     Charity Brar MD

## 2020-12-07 NOTE — PROGRESS NOTES
"Enid Lombardo is a 60 year old female who is being evaluated via a billable video visit.      The patient has been notified of following:     \"This video visit will be conducted via a call between you and your physician/provider. We have found that certain health care needs can be provided without the need for an in-person physical exam.  This service lets us provide the care you need with a video conversation.  If a prescription is necessary we can send it directly to your pharmacy.  If lab work is needed we can place an order for that and you can then stop by our lab to have the test done at a later time.    Video visits are billed at different rates depending on your insurance coverage.  Please reach out to your insurance provider with any questions.    If during the course of the call the physician/provider feels a video visit is not appropriate, you will not be charged for this service.\"    Patient has given verbal consent for Video visit? Yes  How would you like to obtain your AVS? MyChart  If you are dropped from the video visit, the video invite should be resent to: Send to e-mail at: [email or web address]  Will anyone else be joining your video visit? No        Video-Visit Details    Type of service:  Video Visit    Video Start Time: 10:08 AM  Video End Time: 10:30 AM    Originating Location (pt. Location): Home    Distant Location (provider location):  [facility]     Platform used for Video Visit: Alanis Brar MD      I have reviewed and updated the patient's allergies and medication list. Patient was asked if they had any patient reported vital signs to present, if yes, please see documented vitals.  Patient was also asked for their current weight and height, if presented, documented in vitals.    Concerns: Pt states she has some questions.    Refills: Pt would like refills of all medication    Delmi Ordoñez CMA    Oncology Follow-up Visit:  Date of " visit: December 7, 2020    Oncologic history summary:  Diagnosis: recurrent breast cancer, stage 1, ER/IA+ HER2 negative  - Originally found abnormal screening mammography, diagnosed with a stage I infiltrating ductal carcinoma of the right breast in 12/2004 (a grade 1 infiltrating ductal carcinoma, ER positive, IA negative, HER-2 negative), treated w/ a right-sided lumpectomy with sentinel lymph node dissection (umor size 0.6 cm and 0/3 sentinel lymph nodes), followed by XRT and tamoxifen 3/2005-11/2008 (stopped after Dx of cataracts)  - calcifications noted on screening mammogram in 7/2018, Bx grade 2 DCIS, ER/IA positive; a bilateral mastectomy with reconstruction, final pathology revealed 8 mm invasive cancer, total 4 cm DCIS; stage I (T1cN0),  ER+ IA+ HER2 negative.  - BRCA negative, Oncotype Dx score 0    Treatments:  - tamoxifen 3/2005-11/2008 (stopped after Dx of cataracts) for the first episode of Rt breast cancer  - anastrozole from 10/2018 , discontinued in 2020.    Bone health: osteopenia at L spine, worsening T score, last DEXA 1/2019 (T score -2.3 at L spine)  - poorly tolerated fosamax, started zometa from 7/2019 q 6 months x 2 infusions and then discontinued due to poor tolerance    Interval history:  Ms. Rossi returns to the clinic for 6 months follow-up.    She remains off of arimidex.    She was seen this fall for discomfort, cysts in her axilla.  She was referred for breast imaging with mammogram and US that were inconclusive.  Breast MRI revealed no evidence of disease.     In returning today, she is feeling well. Overall she is doing reasonably well.  She has no new medical issues.  No new family history.  No hot flashes.  No other arthralgias or myalgias.  No depression.      She has been quarantining at home.  She only gets out of the house to go to the grocery store.       ROS: 10 point ROS neg other than the symptoms noted above in the HPI.    Past medical, social, surgical, and family  histories reviewed.    Allergies:  Allergies as of 12/07/2020 - Reviewed 12/07/2020   Allergen Reaction Noted     Alphagan p Rash 05/11/2009       Current Medications:  Current Outpatient Medications   Medication Sig Dispense Refill     atorvastatin (LIPITOR) 10 MG tablet Take 1 tablet (10 mg) by mouth daily 90 tablet 1     Calcium-Vitamin D-Vitamin K (VIACTIV CALCIUM PLUS D PO) Take 1 tablet by mouth 2 times daily       diclofenac (VOLTAREN) 75 MG EC tablet TAKE 1 TABLET BY MOUTH TWICE A DAY 60 tablet 1     fluticasone (FLONASE) 50 MCG/ACT nasal spray INSTILL 1-2 SPRAYS INTO EACH NOSTRIL DAIY 48 mL 3     gabapentin (NEURONTIN) 100 MG capsule TAKE 1 CAPSULE (100 MG) BY MOUTH 4 TIMES DAILY 360 capsule 0     loratadine (CLARITIN) 10 MG tablet Take 1 tablet (10 mg) by mouth daily 90 tablet 1     melatonin 5 MG tablet Take 5 mg by mouth At Bedtime        Multiple Vitamins-Minerals (PRESERVISION AREDS) CAPS Take 1 capsule by mouth 2 times daily (Patient taking differently: Take 1 capsule by mouth every morning ) 180 capsule 3     Omega-3 Fatty Acids (FISH OIL) 1000 MG CPDR Take by mouth every morning        omeprazole (PRILOSEC) 20 MG DR capsule TAKE 1 CAPSULE (20 MG) BY MOUTH AS NEEDED (PT. TAKES BASED ON FOODS THAT DAY AT BEDTIME. 10/15/2019) 90 capsule 3     sertraline (ZOLOFT) 25 MG tablet TAKE 1 TABLET BY MOUTH EVERY DAY 90 tablet 0        Physical Exam:  GENERAL: Healthy, alert and no distress  EYES: Eyes grossly normal to inspection.  No discharge or erythema, or obvious scleral/conjunctival abnormalities.  RESP: No audible wheeze, cough, or visible cyanosis.  No visible retractions or increased work of breathing.    SKIN: Visible skin clear. No significant rash, abnormal pigmentation or lesions.  NEURO: Cranial nerves grossly intact.  Mentation and speech appropriate for age.  PSYCH: Mentation appears normal, affect normal/bright, judgement and insight intact, normal speech and appearance  well-groomed.      Laboratory/Imaging Studies  No images repeated    ASSESSMENT/PLAN:    The patient is a 60-year-old postmenopausal female with a history of a stage I breast cancer treated more than 10 years ago with resection, radiation and adjuvant tamoxifen.  She subsequently developed a new mass identifiable on mammogram, measuring approximately 3.5 cm.  Biopsy was consistent with ductal carcinoma in situ, estrogen receptor positive, progesterone receptor positive.  She underwent a mastectomy bilaterally with Dr. Rakesh Sanchez.  She had immediate reconstruction.  Oncotype Dx 0 in new T1c N0 breast cancer.    1.  T1cN0 breast cancer now s/p bilateral mastectomy with oncotype 0 ; she had a trial of AI ,and now remains off of endocrine therapy.  She feels much better off of the arimidex.  She is not interested in retrying this medication.    We discussed her breast imaging that was benign.  Repeat MRI in 1 year to reestablish stability.     2. Osteopenia - reviewed her dexa.  She did not tolerate fosamax or zometa.  We discussed repeating her dexa summer 2021 and then rediscussing the necessity of additional bone strengtheners.     3. Encouraged healthy lifestyle changes with exercise of at least 150 min per week.     Charity Brar MD

## 2020-12-16 DIAGNOSIS — M54.12 CERVICAL RADICULAR PAIN: ICD-10-CM

## 2020-12-16 DIAGNOSIS — M75.81 TENDINITIS OF RIGHT ROTATOR CUFF: ICD-10-CM

## 2020-12-16 RX ORDER — DICLOFENAC SODIUM 75 MG/1
TABLET, DELAYED RELEASE ORAL
Qty: 60 TABLET | Refills: 1 | Status: SHIPPED | OUTPATIENT
Start: 2020-12-16 | End: 2021-02-05

## 2021-01-07 DIAGNOSIS — Z11.59 ENCOUNTER FOR SCREENING FOR OTHER VIRAL DISEASES: ICD-10-CM

## 2021-01-07 LAB
SARS-COV-2 RNA RESP QL NAA+PROBE: NORMAL
SPECIMEN SOURCE: NORMAL

## 2021-01-07 PROCEDURE — U0003 INFECTIOUS AGENT DETECTION BY NUCLEIC ACID (DNA OR RNA); SEVERE ACUTE RESPIRATORY SYNDROME CORONAVIRUS 2 (SARS-COV-2) (CORONAVIRUS DISEASE [COVID-19]), AMPLIFIED PROBE TECHNIQUE, MAKING USE OF HIGH THROUGHPUT TECHNOLOGIES AS DESCRIBED BY CMS-2020-01-R: HCPCS | Performed by: FAMILY MEDICINE

## 2021-01-07 PROCEDURE — U0005 INFEC AGEN DETEC AMPLI PROBE: HCPCS | Performed by: FAMILY MEDICINE

## 2021-01-08 LAB
LABORATORY COMMENT REPORT: NORMAL
SARS-COV-2 RNA RESP QL NAA+PROBE: NEGATIVE
SPECIMEN SOURCE: NORMAL

## 2021-01-10 DIAGNOSIS — M54.12 CERVICAL RADICULAR PAIN: ICD-10-CM

## 2021-01-11 ENCOUNTER — ANESTHESIA (OUTPATIENT)
Dept: GASTROENTEROLOGY | Facility: CLINIC | Age: 61
End: 2021-01-11
Payer: COMMERCIAL

## 2021-01-11 ENCOUNTER — HOSPITAL ENCOUNTER (OUTPATIENT)
Facility: CLINIC | Age: 61
Discharge: HOME | End: 2021-01-11
Attending: FAMILY MEDICINE | Admitting: FAMILY MEDICINE
Payer: COMMERCIAL

## 2021-01-11 ENCOUNTER — ANESTHESIA EVENT (OUTPATIENT)
Dept: GASTROENTEROLOGY | Facility: CLINIC | Age: 61
End: 2021-01-11
Payer: COMMERCIAL

## 2021-01-11 ENCOUNTER — SURGERY (OUTPATIENT)
Age: 61
End: 2021-01-11
Payer: COMMERCIAL

## 2021-01-11 VITALS
DIASTOLIC BLOOD PRESSURE: 93 MMHG | OXYGEN SATURATION: 100 % | SYSTOLIC BLOOD PRESSURE: 133 MMHG | HEART RATE: 58 BPM | RESPIRATION RATE: 14 BRPM | TEMPERATURE: 97.7 F

## 2021-01-11 LAB — COLONOSCOPY: NORMAL

## 2021-01-11 PROCEDURE — 45378 DIAGNOSTIC COLONOSCOPY: CPT | Performed by: FAMILY MEDICINE

## 2021-01-11 PROCEDURE — 250N000009 HC RX 250: Performed by: NURSE ANESTHETIST, CERTIFIED REGISTERED

## 2021-01-11 PROCEDURE — 250N000011 HC RX IP 250 OP 636: Performed by: NURSE ANESTHETIST, CERTIFIED REGISTERED

## 2021-01-11 PROCEDURE — 258N000003 HC RX IP 258 OP 636: Performed by: NURSE ANESTHETIST, CERTIFIED REGISTERED

## 2021-01-11 PROCEDURE — G0105 COLORECTAL SCRN; HI RISK IND: HCPCS | Performed by: FAMILY MEDICINE

## 2021-01-11 PROCEDURE — 370N000017 HC ANESTHESIA TECHNICAL FEE, PER MIN: Performed by: FAMILY MEDICINE

## 2021-01-11 RX ORDER — SODIUM CHLORIDE, SODIUM LACTATE, POTASSIUM CHLORIDE, CALCIUM CHLORIDE 600; 310; 30; 20 MG/100ML; MG/100ML; MG/100ML; MG/100ML
INJECTION, SOLUTION INTRAVENOUS CONTINUOUS
Status: DISCONTINUED | OUTPATIENT
Start: 2021-01-11 | End: 2021-01-11 | Stop reason: HOSPADM

## 2021-01-11 RX ORDER — FLUMAZENIL 0.1 MG/ML
0.2 INJECTION, SOLUTION INTRAVENOUS
Status: DISCONTINUED | OUTPATIENT
Start: 2021-01-11 | End: 2021-01-11 | Stop reason: HOSPADM

## 2021-01-11 RX ORDER — ONDANSETRON 2 MG/ML
4 INJECTION INTRAMUSCULAR; INTRAVENOUS EVERY 30 MIN PRN
Status: DISCONTINUED | OUTPATIENT
Start: 2021-01-11 | End: 2021-01-11 | Stop reason: HOSPADM

## 2021-01-11 RX ORDER — LIDOCAINE HYDROCHLORIDE 20 MG/ML
INJECTION, SOLUTION INFILTRATION; PERINEURAL PRN
Status: DISCONTINUED | OUTPATIENT
Start: 2021-01-11 | End: 2021-01-11

## 2021-01-11 RX ORDER — GABAPENTIN 100 MG/1
CAPSULE ORAL
Qty: 270 CAPSULE | Refills: 2 | Status: SHIPPED | OUTPATIENT
Start: 2021-01-11 | End: 2021-05-17

## 2021-01-11 RX ORDER — NALOXONE HYDROCHLORIDE 0.4 MG/ML
0.4 INJECTION, SOLUTION INTRAMUSCULAR; INTRAVENOUS; SUBCUTANEOUS
Status: DISCONTINUED | OUTPATIENT
Start: 2021-01-11 | End: 2021-01-11 | Stop reason: HOSPADM

## 2021-01-11 RX ORDER — PROPOFOL 10 MG/ML
INJECTION, EMULSION INTRAVENOUS PRN
Status: DISCONTINUED | OUTPATIENT
Start: 2021-01-11 | End: 2021-01-11

## 2021-01-11 RX ORDER — NALOXONE HYDROCHLORIDE 0.4 MG/ML
0.2 INJECTION, SOLUTION INTRAMUSCULAR; INTRAVENOUS; SUBCUTANEOUS
Status: DISCONTINUED | OUTPATIENT
Start: 2021-01-11 | End: 2021-01-11 | Stop reason: HOSPADM

## 2021-01-11 RX ORDER — LIDOCAINE 40 MG/G
CREAM TOPICAL
Status: DISCONTINUED | OUTPATIENT
Start: 2021-01-11 | End: 2021-01-11 | Stop reason: HOSPADM

## 2021-01-11 RX ORDER — ONDANSETRON 4 MG/1
4 TABLET, ORALLY DISINTEGRATING ORAL EVERY 30 MIN PRN
Status: DISCONTINUED | OUTPATIENT
Start: 2021-01-11 | End: 2021-01-11 | Stop reason: HOSPADM

## 2021-01-11 RX ORDER — PROCHLORPERAZINE MALEATE 5 MG
10 TABLET ORAL EVERY 6 HOURS PRN
Status: DISCONTINUED | OUTPATIENT
Start: 2021-01-11 | End: 2021-01-11 | Stop reason: HOSPADM

## 2021-01-11 RX ORDER — ONDANSETRON 2 MG/ML
4 INJECTION INTRAMUSCULAR; INTRAVENOUS
Status: DISCONTINUED | OUTPATIENT
Start: 2021-01-11 | End: 2021-01-11 | Stop reason: HOSPADM

## 2021-01-11 RX ORDER — ONDANSETRON 4 MG/1
4 TABLET, ORALLY DISINTEGRATING ORAL EVERY 6 HOURS PRN
Status: DISCONTINUED | OUTPATIENT
Start: 2021-01-11 | End: 2021-01-11 | Stop reason: HOSPADM

## 2021-01-11 RX ORDER — ONDANSETRON 2 MG/ML
4 INJECTION INTRAMUSCULAR; INTRAVENOUS EVERY 6 HOURS PRN
Status: DISCONTINUED | OUTPATIENT
Start: 2021-01-11 | End: 2021-01-11 | Stop reason: HOSPADM

## 2021-01-11 RX ORDER — PROPOFOL 10 MG/ML
INJECTION, EMULSION INTRAVENOUS CONTINUOUS PRN
Status: DISCONTINUED | OUTPATIENT
Start: 2021-01-11 | End: 2021-01-11

## 2021-01-11 RX ORDER — MEPERIDINE HYDROCHLORIDE 25 MG/ML
12.5 INJECTION INTRAMUSCULAR; INTRAVENOUS; SUBCUTANEOUS
Status: DISCONTINUED | OUTPATIENT
Start: 2021-01-11 | End: 2021-01-11 | Stop reason: HOSPADM

## 2021-01-11 RX ADMIN — PROPOFOL 70 MG: 10 INJECTION, EMULSION INTRAVENOUS at 08:07

## 2021-01-11 RX ADMIN — PROPOFOL 200 MCG/KG/MIN: 10 INJECTION, EMULSION INTRAVENOUS at 08:08

## 2021-01-11 RX ADMIN — SODIUM CHLORIDE, POTASSIUM CHLORIDE, SODIUM LACTATE AND CALCIUM CHLORIDE: 600; 310; 30; 20 INJECTION, SOLUTION INTRAVENOUS at 07:46

## 2021-01-11 RX ADMIN — LIDOCAINE HYDROCHLORIDE 60 MG: 20 INJECTION, SOLUTION INFILTRATION; PERINEURAL at 08:07

## 2021-01-11 ASSESSMENT — LIFESTYLE VARIABLES: TOBACCO_USE: 0

## 2021-01-11 NOTE — DISCHARGE INSTRUCTIONS
[facility]    Home Care Following Endoscopy          Activity:    You have just undergone an endoscopic procedure usually performed with conscious sedation.  Do not work or operate machinery (including a car) for at least 12 hours.      I encourage you to walk and attempt to pass this air as soon as possible.    Diet:    Return to the diet you were on before your procedure but eat lightly for the first 12-24 hours.    Drink plenty of water.    Resume any regular medications unless otherwise advised by your physician.  Please begin any new medication prescribed as a result of your procedure as directed by your physician.   Pain:    You may take Tylenol as needed for pain.  Expected Recovery:    You can expect some mild abdominal fullness and/or discomfort due to the air used to inflate your intestinal tract.    Call Your Physician if You Have:    After Colonoscopy:  o Worsening persisting abdominal pain which is worse with activity.  o Fevers (>101 degrees F), chills or shakes.  o Passage of continued blood with bowel movements.   Any questions or concerns about your recovery, please call [phone] or after hours [phone] Nurse Advice Line.

## 2021-01-11 NOTE — ANESTHESIA POSTPROCEDURE EVALUATION
Patient: Enid V Grupo    Procedure(s):  COLONOSCOPY    Diagnosis:Special screening for malignant neoplasms, colon [Z12.11]  Diagnosis Additional Information: No value filed.    Anesthesia Type:  No value filed.    Note:  Anesthesia Post Evaluation    Patient location during evaluation: Phase 2  Patient participation: Able to fully participate in evaluation  Level of consciousness: awake  Pain management: adequate  Airway patency: patent  Cardiovascular status: acceptable and hemodynamically stable  Respiratory status: acceptable, room air and nonlabored ventilation  Hydration status: stable  PONV: none     Anesthetic complications: None    Comments: Patient was happy with the anesthesia care received and no anesthesia related complications were noted.  I will follow up with the patient again if it is needed.        Last vitals:  Vitals:    01/11/21 0712 01/11/21 0844   BP: 126/88 105/69   Pulse: 79 68   Resp: 16 14   Temp: 97.7  F (36.5  C)    SpO2:  98%         Electronically Signed By: HERMILO Souza CRNA  January 11, 2021  8:50 AM

## 2021-01-11 NOTE — ANESTHESIA CARE TRANSFER NOTE
Patient: Enid V Grupo    Procedure(s):  COLONOSCOPY    Diagnosis: Special screening for malignant neoplasms, colon [Z12.11]  Diagnosis Additional Information: No value filed.    Anesthesia Type:   No value filed.     Note:  Airway :Room Air  Patient transferred to:Phase II  Handoff Report: Identifed the Patient, Identified the Reponsible Provider, Reviewed the pertinent medical history, Discussed the surgical course, Reviewed Intra-OP anesthesia mangement and issues during anesthesia, Set expectations for post-procedure period and Allowed opportunity for questions and acknowledgement of understanding      Vitals: (Last set prior to Anesthesia Care Transfer)    CRNA VITALS  1/11/2021 0812 - 1/11/2021 0849      1/11/2021             SpO2:  97 %    Resp Rate (observed):  18                Electronically Signed By: HERMILO Souza CRNA  January 11, 2021  8:49 AM

## 2021-01-11 NOTE — ANESTHESIA PREPROCEDURE EVALUATION
Anesthesia Pre-Procedure Evaluation    Patient: Enid Lombardo   MRN: [id number] : 1960          Preoperative Diagnosis: Special screening for malignant neoplasms, colon [Z12.11]    Procedure(s):  COLONOSCOPY    Past Medical History:   Diagnosis Date     Breast cancer (H)     lumpectomy, radiation, tamoxifen     Cancer (H)     Breast     Cataracts, bilateral      Complication of anesthesia     She prefers not to have versed until right before she leaves or can have after      Enthesopathy of hip region     right hip     Esophageal reflux      History of gestational diabetes      Hypertension      Macular drusen      PONV (postoperative nausea and vomiting)      Shingles 2012    left T6-T7 dermatome     Past Surgical History:   Procedure Laterality Date     BIOPSY      Breast     BREAST SURGERY       C VAGINAL HYSTERECTOMY  2001    Ovaries intact, due to fibroids     COLONOSCOPY       ENDOSCOPY  2007    Upper GI     EYE SURGERY       GRAFT FAT TO BREAST Bilateral 2018    Procedure: Bilateral Breast Fat Grafting from Abdomen and Thighs;  Surgeon: DENNISE Amin MD;  Location: [facility] OR     GRAFT FREE VASCULARIZED TRANSVERSE RECTUS ABDOMINIS MYOCUTANEOUS Bilateral 10/8/2018    Procedure: GRAFT FREE VASCULARIZED TRANSVERSE RECTUS ABDOMINIS MYOCUTANEOUS;  Bilateral Deep Inferior Epigastric Artery  Free Flap Breast Reconstruction and Removal of Breast Explanders;  Surgeon: DENNISE Amin MD;  Location: [facility] OR     GYN SURGERY       HC BIOPSY/EXCISION LYMPH NODE OPEN DEEP CERVICAL W EXC FAT PAD  2005    Right axillary sentinel node biopsy X 3.     HC COLONOSCOPY W BIOPSY  05/10/10     HC EXCISION BREAST LESION, OPEN >=1  2005    Right breast.     HC REMV CATARACT EXTRACAP,INSERT LENS, W/O ECP  08    bilateral     HC REMV TARSAL/METATARSAL BENIGN BONE LESN  1982    Bone spur - right     HYSTERECTOMY, PAP NO LONGER INDICATED        LAPAROSCOPIC HERNIORRHAPHY VENTRAL N/A 10/21/2019    Procedure: Laparoscopic Ventral Hernia Repair With Mesh;  Surgeon: Emil Brown MD;  Location: [facility] OR     MASTECTOMY SIMPLE BILATERAL, SENTINEL NODE BILATERAL, COMBINED Bilateral 8/22/2018    Procedure: COMBINED MASTECTOMY SIMPLE BILATERAL, SENTINEL NODE BILATERAL;  Bilateral Mastectomy with Right [place] Node Biopsy, Bilateral Breast Reconstruction, Anesthesia Block;  Surgeon: Rakesh Sanchez MD;  Location:  OR     ORTHOPEDIC SURGERY  1983    Right foot     RECONSTRUCT BREAST Bilateral 8/22/2018    Procedure: RECONSTRUCT BREAST;;  Surgeon: DENNISE Amin MD;  Location: [facility] OR     RECONSTRUCT BREAST BILATERAL Bilateral 10/8/2018    Procedure: RECONSTRUCT BREAST BILATERAL;;  Surgeon: DENNISE Amin MD;  Location:  OR     RECONSTRUCT NIPPLE BILATERAL Bilateral 11/29/2018    Procedure: Nipple Reconstruction;  Surgeon: DENNISE Amin MD;  Location:  OR       Anesthesia Evaluation     . Pt has had prior anesthetic.     History of anesthetic complications   - PONV        ROS/MED HX    ENT/Pulmonary:  - neg pulmonary ROS    (-) tobacco use   Neurologic:  - neg neurologic ROS     Cardiovascular:     (+) hypertension----. : . . . :. . Previous cardiac testing date:results:Stress Testdate:11-23-14 results:Rex Beyer MD  [phone] 11/24/2014     Narrative & Impression       Interpretation Summary  This was a normal stress echocardiogram. Good exercise tolerance ( 10:16   [place] protocol / 12.1 Mets )  PatientHeight: 65 in  PatientWeight: 162 lbs  SystolicPressure: 106 mmHg  DiastolicPressure: 74 mmHg  HeartRate: 93 bpm  BSA 1.8 m^2     Baseline  The patient is in normal sinus rhythm.  The baseline ECG displays diffuse abnormal ST segments.  The visual ejection fraction is estimated at 55-60%.  No regional wall motion abnormalities noted.     Stress  There was a maximum 0.5mm ST segment depression in the inferior  lead(s).  There was a maximum 0.5mm ST segment depression in the lateral lead(s).  This was a normal stress echocardiogram.  The visual ejection fraction is estimated at >70%.  Left ventricular cavity size decreases with exercise.  Global LV systolic function augments with exercise.  Normal resting wall motion and no stress-induced wall motion abnormality.     Left Ventricle  The left ventricle is normal in size.     Mitral Valve  The mitral valve leaflets appear normal. There is no evidence of stenosis,   fluttering, or prolapse.  There is no mitral regurgitation noted.  There is no mitral valve stenosis.     Tricuspid Valve  Normal tricuspid valve.  No tricuspid regurgitation.     Aortic Valve  The aortic valve is trileaflet.  No aortic regurgitation is present.  No aortic stenosis is present.     Procedure  Stress Echo Complete.  Contrast Optison.     Doppler Measurements & Calculations  TR Max donald: 218 cm/sec  TR Max P mmHg     Stress Results  Protocol: [place] HEART ASPEN    Target HR: 143 bpm          Maximum Predicted HR: 168 bpm      Stage        Duration(mm:ss)  HR(bpm)   BP        DOSE  Comment                    03:00          115       118/64                                     03:00          133       132/64                                     03:00          157       140/66                                     01:16          171       /               [id number]   RECOVERY       05:02          90        124/66                          Stress Duration: 10:16 mm:ss  Recovery Time: 05:02 mm:ss    Maximum Stress HR: 171 bpm    METS: 12      Interpreting Physician:  Rex Beyer,  electronically signed on   2014 12:07:13    ECG reviewed date: results:Sinus  Rhythm   -  Negative precordial T-waves.     WITHIN NORMAL LIMITS date: results:          METS/Exercise Tolerance:     Hematologic:  - neg hematologic  ROS       Musculoskeletal:  - neg musculoskeletal ROS       GI/Hepatic:     (+) GERD  "Asymptomatic on medication,       Renal/Genitourinary:  - ROS Renal section negative       Endo:  - neg endo ROS       Psychiatric:  - neg psychiatric ROS       Infectious Disease:  - neg infectious disease ROS       Malignancy:      - no malignancy   Other:    - neg other ROS                      Physical Exam  Normal systems: cardiovascular, pulmonary and dental    Airway   Mallampati: II  TM distance: >3 FB  Neck ROM: full    Dental     Cardiovascular   Rhythm and rate: regular and normal      Pulmonary    breath sounds clear to auscultation            Lab Results   Component Value Date    WBC 5.0 02/06/2020    HGB 14.0 02/06/2020    HCT 42.2 02/06/2020     02/06/2020    CRP <3.0 12/29/2006    SED 5 12/19/2005     10/15/2019    POTASSIUM 4.1 10/15/2019    CHLORIDE 105 10/15/2019    CO2 27 10/15/2019    BUN 11 10/15/2019    CR 0.63 01/20/2020     (H) 10/15/2019    ROSENDO 9.1 01/20/2020    PHOS 2.6 10/10/2018    MAG 2.0 10/11/2018    ALBUMIN 3.6 01/20/2020    PROTTOTAL 7.2 01/14/2019    ALT 24 01/14/2019    AST 16 01/14/2019    ALKPHOS 65 01/14/2019    BILITOTAL 0.4 01/14/2019    LIPASE 290 12/11/2018    AMYLASE 82 10/04/2010    INR 1.15 (H) 10/09/2018    FIBR 428 (H) 10/09/2018    TSH 1.23 12/10/2014       Preop Vitals  BP Readings from Last 3 Encounters:   01/11/21 126/88   11/02/20 108/66   07/03/20 112/78    Pulse Readings from Last 3 Encounters:   01/11/21 79   11/02/20 90   07/03/20 74      Resp Readings from Last 3 Encounters:   01/11/21 16   11/02/20 18   07/03/20 16    SpO2 Readings from Last 3 Encounters:   11/02/20 98%   07/03/20 97%   01/20/20 99%      Temp Readings from Last 1 Encounters:   01/11/21 97.7  F (36.5  C) (Oral)    Ht Readings from Last 1 Encounters:   11/02/20 1.651 m (5' 5\")      Wt Readings from Last 1 Encounters:   11/02/20 83.7 kg (184 lb 8 oz)    Estimated body mass index is 30.7 kg/m  as calculated from the following:    Height as of 11/2/20: 1.651 m (5' 5\").    " Weight as of 11/2/20: 83.7 kg (184 lb 8 oz).       Anesthesia Plan      History & Physical Review  History and physical reviewed and following examination; no interval change.    ASA Status:  2 .    NPO Status:  > 8 hours    Plan for MAC with Intravenous and Propofol induction. Maintenance will be TIVA.  Reason for MAC:  Deep or markedly invasive procedure (G8)    H&P was performed by Dr Mercedes with me present.  No change from my exam.        Postoperative Care      Consents  Anesthetic plan, risks, benefits and alternatives discussed with:  Patient.  Use of blood products discussed: No .   .                 HERMILO Souza CRNA

## 2021-01-11 NOTE — H&P
"Pre-Endoscopy History and Physical     Enid Lombardo MRN# [id number]   YOB: 1960 Age: 60 year old     Date of Procedure: 1/11/2021  Primary care provider: Shi Alonso  Type of Endoscopy: colonoscopy  Type of Anesthesia Anticipated: MAC     HPI:    Enid is a 60 year old female who will be undergoing screening or surveillance colonoscopy.    Enid is feeling well today. Can get up a single flight of stairs without dyspnea. Estimated METS > 4.      Patient Active Problem List   Diagnosis     History of right breast cancer     Esophageal reflux     Disorder of synovium, tendon, and bursa     Cataract     Shingles     Pain, radicular, lumbar     Right hip pain     Low back pain     IT band syndrome     Macular drusen     Cervicalgia     Headache     Keratoconus     Meibomian gland dysfunction - Both Eyes     Tear film insufficiency     Hyperlipidemia with target LDL less than 130     Benign neoplasm of colon     Skin abscess     Osteopenia of both hands     Recurrent carcinoma in situ of breast, right     Breast cancer in situ     S/P breast reconstruction, bilateral     S/P flap graft     Osteopenia of multiple sites     Long term (current) use of aromatase inhibitors          REVIEW OF SYSTEMS:     5 point ROS negative except as noted above in HPI, including Gen., Resp., CV, GI &  system review.      PHYSICAL EXAM:   /88   Pulse 79   Temp 97.7  F (36.5  C) (Oral)   Resp 16   LMP 10/14/2001 (Exact Date)    Estimated body mass index is 30.7 kg/m  as calculated from the following:    Height as of 11/2/20: 1.651 m (5' 5\").    Weight as of 11/2/20: 83.7 kg (184 lb 8 oz).    GENERAL APPEARANCE: alert and no distress  RESP: lungs clear and unlabored  CV: regular  ABD: soft, nt/nd    DIAGNOSTICS:    Not indicated  COVID-19 PCR Results    COVID-19 PCR Results 1/7/21 1/7/21    0908 0908   COVID-19 Virus PCR to [facility] - Result Test received-See reflex to IDDL test SARS CoV2 (COVID-19) Virus " RT-PCR    COVID-19 Virus PCR to [facility] - Source Nasopharyngeal    SARS-CoV-2 Virus Specimen Source  Nasopharyngeal   SARS-CoV-2 PCR Result  NEGATIVE      Comments are available for some flowsheets but are not being displayed.         COVID-19 Antibody Results, Testing for Immunity    COVID-19 Antibody Results, Testing for Immunity   No data to display.              IMPRESSION   ASA Class 2 - Mild systemic disease        PLAN:     Plan for colonoscopy. No medical contraindications to proceed, or further work up needed. The risks and benefits of the procedure and the sedation options and risks were discussed with the patient. These include infection, bleeding, and small risk of colon perforation (1/1000 to 1/31135 depending on patient characteristics and type of procedure). Enid was also explained to alternatives for colo-rectal screening. All questions were answered and informed consent was obtained.      The above has been forwarded to the consulting provider.      Signed Electronically by: Iain Mercedes MD  January 11, 2021

## 2021-01-29 DIAGNOSIS — J31.0 CHRONIC RHINITIS: Primary | ICD-10-CM

## 2021-01-31 ENCOUNTER — MYC REFILL (OUTPATIENT)
Dept: FAMILY MEDICINE | Facility: OTHER | Age: 61
End: 2021-01-31

## 2021-01-31 DIAGNOSIS — N95.1 HOT FLUSHES, PERIMENOPAUSAL: ICD-10-CM

## 2021-02-01 ENCOUNTER — MYC REFILL (OUTPATIENT)
Dept: FAMILY MEDICINE | Facility: OTHER | Age: 61
End: 2021-02-01

## 2021-02-01 DIAGNOSIS — N95.1 HOT FLUSHES, PERIMENOPAUSAL: ICD-10-CM

## 2021-02-01 RX ORDER — SERTRALINE HYDROCHLORIDE 25 MG/1
TABLET, FILM COATED ORAL
Qty: 90 TABLET | Refills: 0 | Status: SHIPPED | OUTPATIENT
Start: 2021-02-01 | End: 2021-05-03

## 2021-02-01 RX ORDER — LORATADINE 10 MG/1
TABLET ORAL
Qty: 90 TABLET | Refills: 1 | Status: SHIPPED | OUTPATIENT
Start: 2021-02-01 | End: 2021-05-17

## 2021-02-01 RX ORDER — SERTRALINE HYDROCHLORIDE 25 MG/1
TABLET, FILM COATED ORAL
Qty: 90 TABLET | Refills: 0 | OUTPATIENT
Start: 2021-02-01

## 2021-02-01 NOTE — TELEPHONE ENCOUNTER
Prescription approved per [facility] Refill Protocol.  Hayde Ortiz RN, BSN  [place]  February 1, 2021

## 2021-02-02 RX ORDER — SERTRALINE HYDROCHLORIDE 25 MG/1
TABLET, FILM COATED ORAL
Qty: 90 TABLET | Refills: 0 | OUTPATIENT
Start: 2021-02-02

## 2021-02-05 DIAGNOSIS — M54.12 CERVICAL RADICULAR PAIN: ICD-10-CM

## 2021-02-05 DIAGNOSIS — M75.81 TENDINITIS OF RIGHT ROTATOR CUFF: ICD-10-CM

## 2021-02-05 NOTE — TELEPHONE ENCOUNTER
LOV 1/13/20 for cervical disc herniation and radicular pain. Routing to provider.  Kellen Quezada RN

## 2021-02-08 RX ORDER — DICLOFENAC SODIUM 75 MG/1
TABLET, DELAYED RELEASE ORAL
Qty: 60 TABLET | Refills: 1 | Status: SHIPPED | OUTPATIENT
Start: 2021-02-08 | End: 2021-03-31

## 2021-03-24 DIAGNOSIS — E78.5 HYPERLIPIDEMIA WITH TARGET LDL LESS THAN 130: ICD-10-CM

## 2021-03-24 LAB
CHOLEST SERPL-MCNC: 172 MG/DL
HDLC SERPL-MCNC: 59 MG/DL
LDLC SERPL CALC-MCNC: 91 MG/DL
NONHDLC SERPL-MCNC: 113 MG/DL
TRIGL SERPL-MCNC: 110 MG/DL

## 2021-03-24 PROCEDURE — 36415 COLL VENOUS BLD VENIPUNCTURE: CPT | Performed by: FAMILY MEDICINE

## 2021-03-24 PROCEDURE — 80061 LIPID PANEL: CPT | Performed by: FAMILY MEDICINE

## 2021-03-31 DIAGNOSIS — M54.12 CERVICAL RADICULAR PAIN: ICD-10-CM

## 2021-03-31 DIAGNOSIS — M75.81 TENDINITIS OF RIGHT ROTATOR CUFF: ICD-10-CM

## 2021-03-31 RX ORDER — DICLOFENAC SODIUM 75 MG/1
TABLET, DELAYED RELEASE ORAL
Qty: 60 TABLET | Refills: 1 | Status: SHIPPED | OUTPATIENT
Start: 2021-03-31 | End: 2021-05-17

## 2021-04-16 ENCOUNTER — TELEPHONE (OUTPATIENT)
Dept: FAMILY MEDICINE | Facility: OTHER | Age: 61
End: 2021-04-16

## 2021-04-16 NOTE — TELEPHONE ENCOUNTER
Can you please add that I have received both doses of the covid vaccine to my health information? I received moderna on March 19 and April 16.

## 2021-05-02 DIAGNOSIS — N95.1 HOT FLUSHES, PERIMENOPAUSAL: ICD-10-CM

## 2021-05-03 RX ORDER — SERTRALINE HYDROCHLORIDE 25 MG/1
TABLET, FILM COATED ORAL
Qty: 90 TABLET | Refills: 0 | Status: SHIPPED | OUTPATIENT
Start: 2021-05-03 | End: 2021-05-17

## 2021-05-03 NOTE — TELEPHONE ENCOUNTER
Pending Prescriptions:                       Disp   Refills    sertraline (ZOLOFT) 25 MG tablet [Pharmac*90 tab*0            Sig: TAKE 1 TABLET BY MOUTH EVERY DAY    Medication is being filled for 1 time alex refill only due to:  Patient is due for med check    Please call and help schedule.  Thank you!

## 2021-05-13 NOTE — PROGRESS NOTES
Assessment & Plan       ICD-10-CM    1. Ganglion cyst of right foot  M67.471    2. Hyperlipidemia with target LDL less than 130  E78.5 atorvastatin (LIPITOR) 10 MG tablet   3. Hot flushes, perimenopausal  N95.1 sertraline (ZOLOFT) 25 MG tablet   4. Cervical radicular pain  M54.12 gabapentin (NEURONTIN) 100 MG capsule     diclofenac (VOLTAREN) 75 MG EC tablet     Basic metabolic panel  (Ca, Cl, CO2, Creat, Gluc, K, Na, BUN)   5. Tendinitis of right rotator cuff  M75.81 diclofenac (VOLTAREN) 75 MG EC tablet   6. Chronic rhinitis  J31.0 loratadine (CLARITIN) 10 MG tablet     fluticasone (FLONASE) 50 MCG/ACT nasal spray   7. Recurrent malignant neoplasm of breast, unspecified laterality (H)  C50.919       Patient has what looks like a ganglion cyst over her previous surgical scar area.  Discussed management techniques is likely surgical or drainage.  She is not having any pain or discomfort with this at this time and is not interested in any surgical management.  Will reevaluate the area underneath to find out why it might be swelling with x-ray and if normal continued to do conservative therapies where she can wrap it if it is giving her discomforts and/or visit with the orthopedic surgeon when ready.  As for her neck pain it has been quite stable and is not interested in returning to Dr. Mcginnis as she has been doing well with her at [facility] and has been able to reduce her medications.  Labs done today for the gabapentin and plan to renew her medications at current dose.  Follow-up as needed with any changes or at 1 year.  Allergies are also bothersome for her at times but has been managing relatively well with her Claritin and Flonase these were also renewed.  The swelling in her foot had her concerned about her breast cancer in any relationship though this is unlikely to be related due to the fact it is sitting upright at her surgical scar location.    Review of the result(s) of each unique test - Right foot  "x-ray  26 minutes spent on the date of the encounter doing chart review, history and exam, documentation and further activities per the note       BMI:   Estimated body mass index is 31.12 kg/m  as calculated from the following:    Height as of this encounter: 1.651 m (5' 5\").    Weight as of this encounter: 84.8 kg (187 lb).   Weight management plan: Discussed healthy diet and exercise guidelines      No follow-ups on file.    Shi Alonso MD, MD  [facility] REJI Rsosi is a 61 year old who presents for the following health issues     HPI     Hyperlipidemia Follow-Up      Are you regularly taking any medication or supplement to lower your cholesterol?   Yes- Atorvatatin    Are you having muscle aches or other side effects that you think could be caused by your cholesterol lowering medication?  No          Medication Followup of Sertraline (HOT FLASHES)    Taking Medication as prescribed: yes    Side Effects:  None    Medication Helping Symptoms:  yes     Concern - right foot swelling   Onset: couple months   Description: right foot - top of foot  Intensity: moderate  Progression of Symptoms:  same and waxing and waning  Accompanying Signs & Symptoms: achy   Previous history of similar problem: none  Precipitating factors:        Worsened by: the daily use    Alleviating factors:        Improved by: unknown   Therapies tried and outcome:  none         Review of Systems   Constitutional, HEENT, cardiovascular, pulmonary, GI, , musculoskeletal, neuro, skin, endocrine and psych systems are negative, except as otherwise noted.      Objective    /70   Pulse 80   Temp 98.4  F (36.9  C) (Temporal)   Resp 12   Ht 1.651 m (5' 5\")   Wt 84.8 kg (187 lb)   LMP 10/14/2001 (Exact Date)   SpO2 96%   BMI 31.12 kg/m    Body mass index is 31.12 kg/m .  Physical Exam   GENERAL: healthy, alert and no distress  RESP: lungs clear to auscultation - no rales, rhonchi or wheezes  CV: regular " rate and rhythm, normal S1 S2, no S3 or S4, no murmur, click or rub, no peripheral edema and peripheral pulses strong  ABDOMEN: soft, nontender, no hepatosplenomegaly, no masses and bowel sounds normal  MS: fluid filled cyst following surgical scar at her midfoot on R (area of her previous bone spur surgery.

## 2021-05-17 ENCOUNTER — OFFICE VISIT (OUTPATIENT)
Dept: FAMILY MEDICINE | Facility: OTHER | Age: 61
End: 2021-05-17
Payer: COMMERCIAL

## 2021-05-17 ENCOUNTER — ANCILLARY PROCEDURE (OUTPATIENT)
Dept: GENERAL RADIOLOGY | Facility: OTHER | Age: 61
End: 2021-05-17
Attending: FAMILY MEDICINE
Payer: COMMERCIAL

## 2021-05-17 VITALS
WEIGHT: 187 LBS | BODY MASS INDEX: 31.16 KG/M2 | TEMPERATURE: 98.4 F | DIASTOLIC BLOOD PRESSURE: 70 MMHG | OXYGEN SATURATION: 96 % | RESPIRATION RATE: 12 BRPM | HEIGHT: 65 IN | HEART RATE: 80 BPM | SYSTOLIC BLOOD PRESSURE: 108 MMHG

## 2021-05-17 DIAGNOSIS — M67.471 GANGLION CYST OF RIGHT FOOT: Primary | ICD-10-CM

## 2021-05-17 DIAGNOSIS — C50.919 RECURRENT MALIGNANT NEOPLASM OF BREAST, UNSPECIFIED LATERALITY (H): ICD-10-CM

## 2021-05-17 DIAGNOSIS — J31.0 CHRONIC RHINITIS: ICD-10-CM

## 2021-05-17 DIAGNOSIS — E78.5 HYPERLIPIDEMIA WITH TARGET LDL LESS THAN 130: ICD-10-CM

## 2021-05-17 DIAGNOSIS — M75.81 TENDINITIS OF RIGHT ROTATOR CUFF: ICD-10-CM

## 2021-05-17 DIAGNOSIS — N95.1 HOT FLUSHES, PERIMENOPAUSAL: ICD-10-CM

## 2021-05-17 DIAGNOSIS — M54.12 CERVICAL RADICULAR PAIN: ICD-10-CM

## 2021-05-17 DIAGNOSIS — M67.471 GANGLION CYST OF RIGHT FOOT: ICD-10-CM

## 2021-05-17 LAB
ANION GAP SERPL CALCULATED.3IONS-SCNC: 4 MMOL/L (ref 3–14)
BUN SERPL-MCNC: 12 MG/DL (ref 7–30)
CALCIUM SERPL-MCNC: 9.6 MG/DL (ref 8.5–10.1)
CHLORIDE SERPL-SCNC: 106 MMOL/L (ref 94–109)
CO2 SERPL-SCNC: 29 MMOL/L (ref 20–32)
CREAT SERPL-MCNC: 0.79 MG/DL (ref 0.52–1.04)
GFR SERPL CREATININE-BSD FRML MDRD: 82 ML/MIN/{1.73_M2}
GLUCOSE SERPL-MCNC: 100 MG/DL (ref 70–99)
POTASSIUM SERPL-SCNC: 4.3 MMOL/L (ref 3.4–5.3)
SODIUM SERPL-SCNC: 139 MMOL/L (ref 133–144)

## 2021-05-17 PROCEDURE — 36415 COLL VENOUS BLD VENIPUNCTURE: CPT | Performed by: FAMILY MEDICINE

## 2021-05-17 PROCEDURE — 99213 OFFICE O/P EST LOW 20 MIN: CPT | Performed by: FAMILY MEDICINE

## 2021-05-17 PROCEDURE — 80048 BASIC METABOLIC PNL TOTAL CA: CPT | Performed by: FAMILY MEDICINE

## 2021-05-17 PROCEDURE — 73630 X-RAY EXAM OF FOOT: CPT | Mod: RT | Performed by: RADIOLOGY

## 2021-05-17 RX ORDER — LORATADINE 10 MG/1
1 TABLET ORAL DAILY
Qty: 90 TABLET | Refills: 1 | Status: SHIPPED | OUTPATIENT
Start: 2021-05-17 | End: 2021-09-13

## 2021-05-17 RX ORDER — SERTRALINE HYDROCHLORIDE 25 MG/1
25 TABLET, FILM COATED ORAL DAILY
Qty: 90 TABLET | Refills: 3 | Status: SHIPPED | OUTPATIENT
Start: 2021-05-17 | End: 2021-12-20

## 2021-05-17 RX ORDER — GABAPENTIN 100 MG/1
CAPSULE ORAL
Qty: 270 CAPSULE | Refills: 3 | Status: SHIPPED | OUTPATIENT
Start: 2021-05-17 | End: 2022-03-23

## 2021-05-17 RX ORDER — FLUTICASONE PROPIONATE 50 MCG
SPRAY, SUSPENSION (ML) NASAL
Qty: 48 ML | Refills: 3 | Status: SHIPPED | OUTPATIENT
Start: 2021-05-17 | End: 2022-03-23

## 2021-05-17 RX ORDER — DICLOFENAC SODIUM 75 MG/1
TABLET, DELAYED RELEASE ORAL
Qty: 60 TABLET | Refills: 1 | Status: SHIPPED | OUTPATIENT
Start: 2021-05-17 | End: 2022-03-23

## 2021-05-17 RX ORDER — ATORVASTATIN CALCIUM 10 MG/1
10 TABLET, FILM COATED ORAL DAILY
Qty: 90 TABLET | Refills: 3 | Status: SHIPPED | OUTPATIENT
Start: 2021-05-17 | End: 2022-03-23

## 2021-05-17 ASSESSMENT — MIFFLIN-ST. JEOR: SCORE: 1424.11

## 2021-05-17 ASSESSMENT — PAIN SCALES - GENERAL: PAINLEVEL: NO PAIN (0)

## 2021-05-24 ENCOUNTER — OFFICE VISIT (OUTPATIENT)
Dept: PODIATRY | Facility: CLINIC | Age: 61
End: 2021-05-24
Attending: FAMILY MEDICINE
Payer: COMMERCIAL

## 2021-05-24 VITALS
BODY MASS INDEX: 31.16 KG/M2 | HEIGHT: 65 IN | SYSTOLIC BLOOD PRESSURE: 114 MMHG | DIASTOLIC BLOOD PRESSURE: 70 MMHG | WEIGHT: 187 LBS

## 2021-05-24 DIAGNOSIS — Q66.6 PES VALGUS: Primary | ICD-10-CM

## 2021-05-24 DIAGNOSIS — M19.071 PRIMARY OSTEOARTHRITIS OF RIGHT FOOT: ICD-10-CM

## 2021-05-24 DIAGNOSIS — M67.471 GANGLION CYST OF RIGHT FOOT: ICD-10-CM

## 2021-05-24 PROCEDURE — 99203 OFFICE O/P NEW LOW 30 MIN: CPT | Performed by: PODIATRIST

## 2021-05-24 ASSESSMENT — MIFFLIN-ST. JEOR: SCORE: 1424.11

## 2021-05-24 ASSESSMENT — PAIN SCALES - GENERAL: PAINLEVEL: NO PAIN (0)

## 2021-05-24 NOTE — LETTER
5/24/2021         RE: Enid Lombardo  [address]  [facility] [address]        Dear Colleague,    Thank you for referring your patient, Enid Lombardo, to the [facility]. Please see a copy of my visit note below.    HPI:  Enid Lombardo is a 61 year old female who is seen in consultation at the request of Shi Alonso MD    Pt presents for eval of:   (Onset, Location, L/R, Character, Treatments, Injury if yes)    XR Right foot 5/17/2021     Onset March 2021, dorsal Right foot mass at incision site from surgery 40 years ago for a bone spur that has increased in size. No injury noted. Presents today with flimsy flip flops.  History of cancer.    Constant dull ache, swelling. Intermittent burning and throbbing pain at night  Ibuprofen, ice, heat, elevation, rest, no change.    Retired.    Weight management plan: Patient was referred to their PCP to discuss a diet and exercise plan.     ROS:  10 point ROS neg other than the symptoms noted above in the HPI.    Patient Active Problem List   Diagnosis     History of right breast cancer     Esophageal reflux     Disorder of synovium, tendon, and bursa     Cataract     Shingles     Pain, radicular, lumbar     Right hip pain     Low back pain     IT band syndrome     Macular drusen     Cervicalgia     Headache     Keratoconus     Meibomian gland dysfunction - Both Eyes     Tear film insufficiency     Hyperlipidemia with target LDL less than 130     Benign neoplasm of colon     Skin abscess     Osteopenia of both hands     Recurrent carcinoma in situ of breast, right     Breast cancer in situ     S/P breast reconstruction, bilateral     S/P flap graft     Osteopenia of multiple sites     Long term (current) use of aromatase inhibitors     Recurrent malignant neoplasm of breast, unspecified laterality (H)       PAST MEDICAL HISTORY:   Past Medical History:   Diagnosis Date     Breast cancer (H) 2004    lumpectomy, radiation,  tamoxifen     Cancer (H) 2004    Breast     Cataracts, bilateral      Complication of anesthesia     She prefers not to have versed until right before she leaves or can have after      Enthesopathy of hip region 6/06    right hip     Esophageal reflux 2/06     History of gestational diabetes      Hypertension 2012     Macular drusen      PONV (postoperative nausea and vomiting)      Shingles 01/23/2012    left T6-T7 dermatome        PAST SURGICAL HISTORY:   Past Surgical History:   Procedure Laterality Date     BIOPSY  2004    Breast     BREAST SURGERY  2004     C VAGINAL HYSTERECTOMY  12/2001    Ovaries intact, due to fibroids     COLONOSCOPY       COLONOSCOPY N/A 1/11/2021    Procedure: COLONOSCOPY;  Surgeon: Iain Mercedes MD;  Location:  GI     ENDOSCOPY  05/09/2007    Upper GI     EYE SURGERY       GRAFT FAT TO BREAST Bilateral 11/29/2018    Procedure: Bilateral Breast Fat Grafting from Abdomen and Thighs;  Surgeon: DENNISE Amin MD;  [facility]     GRAFT FREE VASCULARIZED TRANSVERSE RECTUS ABDOMINIS MYOCUTANEOUS Bilateral 10/8/2018    Procedure: GRAFT FREE VASCULARIZED TRANSVERSE RECTUS ABDOMINIS MYOCUTANEOUS;  Bilateral Deep Inferior Epigastric Artery  Free Flap Breast Reconstruction and Removal of Breast Explanders;  Surgeon: DENNISE Amin MD;  Location: [facility] OR     GYN SURGERY  2001     HC BIOPSY/EXCISION LYMPH NODE OPEN DEEP CERVICAL W EXC FAT PAD  1/7/2005    Right axillary sentinel node biopsy X 3.     HC COLONOSCOPY W BIOPSY  05/10/10     HC EXCISION BREAST LESION, OPEN >=1  1/7/2005    Right breast.     HC REMV CATARACT EXTRACAP,INSERT LENS, W/O ECP  12/18/08    bilateral     HC REMV TARSAL/METATARSAL BENIGN BONE LESN  1982    Bone spur - right     HYSTERECTOMY, PAP NO LONGER INDICATED       LAPAROSCOPIC HERNIORRHAPHY VENTRAL N/A 10/21/2019    Procedure: Laparoscopic Ventral Hernia Repair With Mesh;  Surgeon: Emil Brown MD;  [facility]      MASTECTOMY SIMPLE BILATERAL, SENTINEL NODE BILATERAL, COMBINED Bilateral 8/22/2018    Procedure: COMBINED MASTECTOMY SIMPLE BILATERAL, SENTINEL NODE BILATERAL;  Bilateral Mastectomy with Right Camargo Node Biopsy, Bilateral Breast Reconstruction, Anesthesia Block;  Surgeon: Rakesh Sanchez MD;  Location:  OR     ORTHOPEDIC SURGERY  1983    Right foot     RECONSTRUCT BREAST Bilateral 8/22/2018    Procedure: RECONSTRUCT BREAST;;  Surgeon: DENNISE Amin MD;  [facility]: [facility] OR     RECONSTRUCT BREAST BILATERAL Bilateral 10/8/2018    Procedure: RECONSTRUCT BREAST BILATERAL;;  Surgeon: DENNISE Amin MD;  [facility]     RECONSTRUCT NIPPLE BILATERAL Bilateral 11/29/2018    Procedure: Nipple Reconstruction;  Surgeon: DENNISE Amin MD;  Location:  OR        MEDICATIONS:   Current Outpatient Medications:      atorvastatin (LIPITOR) 10 MG tablet, Take 1 tablet (10 mg) by mouth daily, Disp: 90 tablet, Rfl: 3     Calcium-Vitamin D-Vitamin K (VIACTIV CALCIUM PLUS D PO), Take 1 tablet by mouth 2 times daily, Disp: , Rfl:      diclofenac (VOLTAREN) 75 MG EC tablet, TAKE 1 TABLET BY MOUTH TWICE A DAY, Disp: 60 tablet, Rfl: 1     fluticasone (FLONASE) 50 MCG/ACT nasal spray, INSTILL 1-2 SPRAYS INTO EACH NOSTRIL DAIJACKIE, Disp: 48 mL, Rfl: 3     gabapentin (NEURONTIN) 100 MG capsule, TAKE 1 CAPSULE BY MOUTH THREE TIMES A DAY, Disp: 270 capsule, Rfl: 3     loratadine (CLARITIN) 10 MG tablet, Take 1 tablet (10 mg) by mouth daily, Disp: 90 tablet, Rfl: 1     melatonin 5 MG tablet, Take 5 mg by mouth At Bedtime , Disp: , Rfl:      Multiple Vitamins-Minerals (PRESERVISION AREDS) CAPS, Take 1 capsule by mouth 2 times daily (Patient taking differently: Take 1 capsule by mouth every morning ), Disp: 180 capsule, Rfl: 3     Omega-3 Fatty Acids (FISH OIL) 1000 MG CPDR, Take by mouth every morning , Disp: , Rfl:      omeprazole (PRILOSEC) 20 MG DR capsule, TAKE 1 CAPSULE (20 MG) BY MOUTH AS NEEDED (PT. TAKES BASED ON  FOODS THAT DAY AT BEDTIME. 10/15/2019), Disp: 90 capsule, Rfl: 3     sertraline (ZOLOFT) 25 MG tablet, Take 1 tablet (25 mg) by mouth daily, Disp: 90 tablet, Rfl: 3     ALLERGIES:    Allergies   Allergen Reactions     Alphagan P Rash     Thrush and numbness in mouth        SOCIAL HISTORY:   Social History     Socioeconomic History     Marital status:      Spouse name: Carson     Number of children: 3     Years of education: 24     Highest education level: Not on file   Occupational History     Occupation: Teacher     Employer: [email or web address]   Social Needs     Financial resource strain: Not on file     Food insecurity     Worry: Not on file     Inability: Not on file     Transportation needs     Medical: Not on file     Non-medical: Not on file   Tobacco Use     Smoking status: Never Smoker     Smokeless tobacco: Never Used   Substance and Sexual Activity     Alcohol use: Yes     Comment: 4 drinks per month      Drug use: No     Sexual activity: Yes     Partners: Male     Birth control/protection: Surgical     Comment: hysterectomy   Lifestyle     Physical activity     Days per week: Not on file     Minutes per session: Not on file     Stress: Not on file   Relationships     Social connections     Talks on phone: Not on file     Gets together: Not on file     Attends Restoration service: Not on file     Active member of club or organization: Not on file     Attends meetings of clubs or organizations: Not on file     Relationship status: Not on file     Intimate partner violence     Fear of current or ex partner: Not on file     Emotionally abused: Not on file     Physically abused: Not on file     Forced sexual activity: Not on file   Other Topics Concern      Service No     Blood Transfusions No     Caffeine Concern Yes     Comment: 4 cups per day     Occupational Exposure No     Hobby Hazards No     Sleep Concern No     Stress Concern No     Weight Concern No     Special Diet No     Back Care No  "    Exercise No     Bike Helmet Yes     Seat Belt Yes     Self-Exams Yes     Parent/sibling w/ CABG, MI or angioplasty before 65F 55M? No     Comment: Unsure - adopted   Social History Narrative     Not on file        FAMILY HISTORY:   Family History   Adopted: Yes   Problem Relation Age of Onset     Cancer Mother         lung cancer  at age 52     Thyroid Disease Sister         half sister, had thyroid removed     Unknown/Adopted Father      Unknown/Adopted Maternal Grandmother      Unknown/Adopted Maternal Grandfather      Unknown/Adopted Paternal Grandmother      Unknown/Adopted Paternal Grandfather      Glaucoma No family hx of      Diabetes No family hx of      Melanoma No family hx of      Skin Cancer No family hx of         EXAM:Vitals: /70 (BP Location: Left arm, Patient Position: Sitting, Cuff Size: Adult Regular)   Ht 1.651 m (5' 5\")   Wt 84.8 kg (187 lb)   LMP 10/14/2001 (Exact Date)   BMI 31.12 kg/m    BMI= Body mass index is 31.12 kg/m .    General appearance: Patient is alert and fully cooperative with history & exam.  No sign of distress is noted during the visit.     Psychiatric: Affect is pleasant & appropriate.  Patient appears motivated to improve health.     Respiratory: Breathing is regular & unlabored while sitting.     HEENT: Hearing is intact to spoken word.  Speech is clear.  No gross evidence of visual impairment that would impact ambulation.     Vascular: DP & PT pulses are intact & regular bilaterally.  No significant edema or varicosities noted.  CFT and skin temperature is normal to both lower extremities.     Neurologic: Lower extremity sensation is intact to light touch.  No evidence of weakness or contracture in the lower extremities.  No evidence of neuropathy.    Dermatologic: Skin is intact to both lower extremities with adequate texture, turgor and tone about the integument.  No paronychia or evidence of soft tissue infection is noted.     Musculoskeletal: Patient " is ambulatory without assistive device or brace.  Generalized valgus noted very flexible bilateral.  Hypermobility noted throughout the first metatarsal cuneiform joint bilateral with symptoms and crepitus throughout range of motion of the right first metatarsal and second metatarsal cuneiform joints.  Palpable ganglion cyst is noted from this joint as well.    Radiographs: Three views right foot demonstrate joint space narrowing at the second metatarsocuneiform joint with increased soft tissue density at the first metatarsocuneiform joint on the right foot.  Pes valgus noted with diminished calcaneal inclination angle.     ASSESSMENT:       ICD-10-CM    1. Pes valgus  Q66.6 Orthotics, Prosthetics and Custom Compression Order for DME - ONLY FOR DME   2. Ganglion cyst of right foot  M67.471 Orthopedic & Spine  Referral     Orthotics, Prosthetics and Custom Compression Order for DME - ONLY FOR DME   3. Primary osteoarthritis of right foot  M19.071         PLAN:  Reviewed patient's chart in [facility].      5/24/2021   Obtained and interpreted radiographs  Recommended sturdy shoes that reduce flexion and movement through the right midfoot.  Recommended an orthotic that is conformed to her foot to provide stiffness throughout the first metatarsocuneiform joint.  Discussed aspiration and injection of the joint with steroid versus arthrodesis.  At this point in time she does not feel her symptoms warrant surgical arthrodesis of the joint.  We will attempt changes in shoe gear, written instructions dispensed, and custom molded orthotics order placed today first.    Follow-up in 5 weeks if this remains symptomatic.    Abraham Dominguez DPM              Again, thank you for allowing me to participate in the care of your patient.        Sincerely,        Abraham Dominguez DPM

## 2021-05-24 NOTE — PATIENT INSTRUCTIONS
Reliable shoe stores: To maximize your experience and provide the best possible fit.  Be sure to show them your foot concerns and tell them Dr. Dominguez sent you.      Stores listed in bold have only athletic shoes, and stores that are not bold are mostly casual or variety of shoes    [employer]  [address] [place] [address]  [phone]    [employer] [place]  [address] [place] [address]  [phone]     [employer] [facility]  [address] [place] [address]  [phone]    [employer]  [address] [facility]  [facility] [address]  [phone]    [employer]  [address] [place], [place] [address] [phone]    [employer]  [address] [place] [address]  [phone]                         [employer] Locations:     [address] [place] [address]   [phone]     [address] [place] [address] [address] [place] [address]   [phone]     [address] [place] [address]   [phone]     [address] [facility] [place] [address]   [phone]     [address] [place] [address]   [phone]     [address] [place] [address]   [phone]     [address] [facility] [address]   [place] [address]   [phone]     [address] [facility]   [place] [address]   [phone]             How to find reasonable shoes          The correct width    Correct Fitting    Correct Length      Foot Distortion    Posture Distortion                          Torsional Rigidity      Grasp behind the heel and underneath the foot and twist      Bad    Excessive torsion/twist in midfoot     Less torsion/twist in midfoot is better                   Heel Counter Rigidity      Grasp just above   midsole and squeeze      Bad    Soft heel counter      Good    Rigid Heel Counter      Flexion Rigidity      Grasp shoe and bend from forefoot to rearfoot

## 2021-06-09 ENCOUNTER — ANCILLARY PROCEDURE (OUTPATIENT)
Dept: BONE DENSITY | Facility: CLINIC | Age: 61
End: 2021-06-09
Attending: INTERNAL MEDICINE
Payer: COMMERCIAL

## 2021-06-09 PROCEDURE — 77080 DXA BONE DENSITY AXIAL: CPT | Performed by: RADIOLOGY

## 2021-06-14 ENCOUNTER — VIRTUAL VISIT (OUTPATIENT)
Dept: ONCOLOGY | Facility: CLINIC | Age: 61
End: 2021-06-14
Attending: INTERNAL MEDICINE
Payer: COMMERCIAL

## 2021-06-14 DIAGNOSIS — C50.919 RECURRENT MALIGNANT NEOPLASM OF BREAST, UNSPECIFIED LATERALITY (H): Primary | ICD-10-CM

## 2021-06-14 PROCEDURE — 99214 OFFICE O/P EST MOD 30 MIN: CPT | Mod: 95 | Performed by: INTERNAL MEDICINE

## 2021-06-14 PROCEDURE — 999N001193 HC VIDEO/TELEPHONE VISIT; NO CHARGE

## 2021-06-14 NOTE — LETTER
6/14/2021         RE: Enid Lombardo  [address] [facility] [address]        Dear Colleague,    Thank you for referring your patient, Enid Lombardo, to the [facility]. Please see a copy of my visit note below.    Enid is a 61 year old who is being evaluated via a billable video visit.      How would you like to obtain your AVS? MyChart  If the video visit is dropped, the invitation should be resent by: Text to cell phone: [phone]  Will anyone else be joining your video visit? No       JUNAID Miner      Video Start Time: 9:43 AM  Video-Visit Details    Type of service:  Video Visit    Video End Time: 10:00 AM    Originating Location (pt. Location): Home    Distant Location (provider location):  [facility]     Platform used for Video Visit: [employer]      June 14, 2021    Oncologic history summary:  Diagnosis: recurrent breast cancer, stage 1, ER/OR+ HER2 negative  - Originally found abnormal screening mammography, diagnosed with a stage I infiltrating ductal carcinoma of the right breast in 12/2004 (a grade 1 infiltrating ductal carcinoma, ER positive, OR negative, HER-2 negative), treated w/ a right-sided lumpectomy with sentinel lymph node dissection (umor size 0.6 cm and 0/3 sentinel lymph nodes), followed by XRT and tamoxifen 3/2005-11/2008 (stopped after Dx of cataracts)  - calcifications noted on screening mammogram in 7/2018, Bx grade 2 DCIS, ER/OR positive; a bilateral mastectomy with reconstruction, final pathology revealed 8 mm invasive cancer, total 4 cm DCIS; stage I (T1cN0),  ER+ OR+ HER2 negative.  - BRCA negative, Oncotype Dx score 0    Treatments:  - tamoxifen 3/2005-11/2008 (stopped after Dx of cataracts) for the first episode of Rt breast cancer  - anastrozole from 10/2018 , discontinued in 2020.    Bone health: osteopenia at L spine, worsening T score, last DEXA 1/2019 (T score -2.3 at L spine)  - poorly  tolerated fosamax, started zometa from 7/2019 q 6 months x 2 infusions and then discontinued due to poor tolerance    Interval history:  Ms. Rossi returns to the clinic for 6 months follow-up.    She remains off of arimidex.    She has been having difficulty with pain in her right foot.  She was diagnosed with a ganglion cyst.  She has been fitted for orthotics.  No other new medical problems.   She was advised to lose weight.    No new family history of cancer history.       She has had her covid vaccines.  She is happy about this.       ROS: 10 point ROS neg other than the symptoms noted above in the HPI.    Past medical, social, surgical, and family histories reviewed.    Allergies:  Allergies as of 06/14/2021 - Reviewed 05/24/2021   Allergen Reaction Noted     Alphagan p Rash 05/11/2009       Current Medications:  Current Outpatient Medications   Medication Sig Dispense Refill     atorvastatin (LIPITOR) 10 MG tablet Take 1 tablet (10 mg) by mouth daily 90 tablet 3     Calcium-Vitamin D-Vitamin K (VIACTIV CALCIUM PLUS D PO) Take 1 tablet by mouth 2 times daily       diclofenac (VOLTAREN) 75 MG EC tablet TAKE 1 TABLET BY MOUTH TWICE A DAY 60 tablet 1     fluticasone (FLONASE) 50 MCG/ACT nasal spray INSTILL 1-2 SPRAYS INTO EACH NOSTRIL DAIY 48 mL 3     gabapentin (NEURONTIN) 100 MG capsule TAKE 1 CAPSULE BY MOUTH THREE TIMES A  capsule 3     loratadine (CLARITIN) 10 MG tablet Take 1 tablet (10 mg) by mouth daily 90 tablet 1     melatonin 5 MG tablet Take 5 mg by mouth At Bedtime        Multiple Vitamins-Minerals (PRESERVISION AREDS) CAPS Take 1 capsule by mouth 2 times daily (Patient taking differently: Take 1 capsule by mouth every morning ) 180 capsule 3     Omega-3 Fatty Acids (FISH OIL) 1000 MG CPDR Take by mouth every morning        omeprazole (PRILOSEC) 20 MG DR capsule TAKE 1 CAPSULE (20 MG) BY MOUTH AS NEEDED (PT. TAKES BASED ON FOODS THAT DAY AT BEDTIME. 10/15/2019) 90 capsule 3     sertraline  (ZOLOFT) 25 MG tablet Take 1 tablet (25 mg) by mouth daily 90 tablet 3        Physical Exam:  GENERAL: Healthy, alert and no distress  EYES: Eyes grossly normal to inspection.  No discharge or erythema, or obvious scleral/conjunctival abnormalities.  RESP: No audible wheeze, cough, or visible cyanosis.  No visible retractions or increased work of breathing.    SKIN: Visible skin clear. No significant rash, abnormal pigmentation or lesions.  NEURO: Cranial nerves grossly intact.  Mentation and speech appropriate for age.  PSYCH: Mentation appears normal, affect normal/bright, judgement and insight intact, normal speech and appearance well-groomed.      Laboratory/Imaging Studies  Reviewed outside records from IM, FP and podiatry.    ASSESSMENT/PLAN:    The patient is a 61 year-old postmenopausal female with a history of a stage I breast cancer treated more than 10 years ago with resection, radiation and adjuvant tamoxifen.  She subsequently developed a new mass identifiable on mammogram, measuring approximately 3.5 cm.  Biopsy was consistent with ductal carcinoma in situ, estrogen receptor positive, progesterone receptor positive.  She underwent a mastectomy bilaterally with Dr. Rakesh Sanchez.  She had immediate reconstruction.  Oncotype Dx 0 in new T1c N0 breast cancer.    1.  T1cN0 breast cancer now s/p bilateral mastectomy with oncotype 0 ; she had a trial of AI ,and now remains off of endocrine therapy.  She feels much better off of the arimidex.  She is not interested in retrying this medication.    We discussed her breast imaging that was benign in November 2020; she had noticed a lump   Repeat MRI in 1 year to reestablish stability.  We will arrange for follow up MRI in November.      Assuming the MRI is normal, I suggested she transition to our [facility] clinic for breast cancer survivors with Ragini Muniz.     2. Osteopenia - reviewed her dexa.  She did not tolerate fosamax or zometa.  We discussed that she has had a  10% increase in her lumbar spine and 3.5% increase in her hips.  She has been taking calcium and vitamin D.  She is lifting weights.       3. Encouraged healthy lifestyle changes with exercise of at least 150 min per week.  She has been encouraged by podiatry to lose weight.  We discussed if she is not successful with her current strategies that we could pursue a referral to weight mgmt clinic.    4. Colonoscopy - she had a colonoscopy in January and this was normal.  They wanted her to come back in 5 years.     No appts on November 11.    Charity Brar MD

## 2021-06-14 NOTE — PROGRESS NOTES
Enid is a 61 year old who is being evaluated via a billable video visit.      How would you like to obtain your AVS? MyChart  If the video visit is dropped, the invitation should be resent by: Text to cell phone: [phone]  Will anyone else be joining your video visit? JUNAID Griffiths      Video Start Time: 9:43 AM  Video-Visit Details    Type of service:  Video Visit    Video End Time: 10:00 AM    Originating Location (pt. Location): Home    Distant Location (provider location):  [facility]     Platform used for Video Visit: Cellvine      June 14, 2021    Oncologic history summary:  Diagnosis: recurrent breast cancer, stage 1, ER/LA+ HER2 negative  - Originally found abnormal screening mammography, diagnosed with a stage I infiltrating ductal carcinoma of the right breast in 12/2004 (a grade 1 infiltrating ductal carcinoma, ER positive, LA negative, HER-2 negative), treated w/ a right-sided lumpectomy with sentinel lymph node dissection (umor size 0.6 cm and 0/3 sentinel lymph nodes), followed by XRT and tamoxifen 3/2005-11/2008 (stopped after Dx of cataracts)  - calcifications noted on screening mammogram in 7/2018, Bx grade 2 DCIS, ER/LA positive; a bilateral mastectomy with reconstruction, final pathology revealed 8 mm invasive cancer, total 4 cm DCIS; stage I (T1cN0),  ER+ LA+ HER2 negative.  - BRCA negative, Oncotype Dx score 0    Treatments:  - tamoxifen 3/2005-11/2008 (stopped after Dx of cataracts) for the first episode of Rt breast cancer  - anastrozole from 10/2018 , discontinued in 2020.    Bone health: osteopenia at L spine, worsening T score, last DEXA 1/2019 (T score -2.3 at L spine)  - poorly tolerated fosamax, started zometa from 7/2019 q 6 months x 2 infusions and then discontinued due to poor tolerance    Interval history:  Ms. Rossi returns to the clinic for 6 months follow-up.    She remains off of arimidex.    She has been having difficulty with pain  in her right foot.  She was diagnosed with a ganglion cyst.  She has been fitted for orthotics.  No other new medical problems.   She was advised to lose weight.    No new family history of cancer history.       She has had her covid vaccines.  She is happy about this.       ROS: 10 point ROS neg other than the symptoms noted above in the HPI.    Past medical, social, surgical, and family histories reviewed.    Allergies:  Allergies as of 06/14/2021 - Reviewed 05/24/2021   Allergen Reaction Noted     Alphagan p Rash 05/11/2009       Current Medications:  Current Outpatient Medications   Medication Sig Dispense Refill     atorvastatin (LIPITOR) 10 MG tablet Take 1 tablet (10 mg) by mouth daily 90 tablet 3     Calcium-Vitamin D-Vitamin K (VIACTIV CALCIUM PLUS D PO) Take 1 tablet by mouth 2 times daily       diclofenac (VOLTAREN) 75 MG EC tablet TAKE 1 TABLET BY MOUTH TWICE A DAY 60 tablet 1     fluticasone (FLONASE) 50 MCG/ACT nasal spray INSTILL 1-2 SPRAYS INTO EACH NOSTRIL DAIY 48 mL 3     gabapentin (NEURONTIN) 100 MG capsule TAKE 1 CAPSULE BY MOUTH THREE TIMES A  capsule 3     loratadine (CLARITIN) 10 MG tablet Take 1 tablet (10 mg) by mouth daily 90 tablet 1     melatonin 5 MG tablet Take 5 mg by mouth At Bedtime        Multiple Vitamins-Minerals (PRESERVISION AREDS) CAPS Take 1 capsule by mouth 2 times daily (Patient taking differently: Take 1 capsule by mouth every morning ) 180 capsule 3     Omega-3 Fatty Acids (FISH OIL) 1000 MG CPDR Take by mouth every morning        omeprazole (PRILOSEC) 20 MG DR capsule TAKE 1 CAPSULE (20 MG) BY MOUTH AS NEEDED (PT. TAKES BASED ON FOODS THAT DAY AT BEDTIME. 10/15/2019) 90 capsule 3     sertraline (ZOLOFT) 25 MG tablet Take 1 tablet (25 mg) by mouth daily 90 tablet 3        Physical Exam:  GENERAL: Healthy, alert and no distress  EYES: Eyes grossly normal to inspection.  No discharge or erythema, or obvious scleral/conjunctival abnormalities.  RESP: No audible  wheeze, cough, or visible cyanosis.  No visible retractions or increased work of breathing.    SKIN: Visible skin clear. No significant rash, abnormal pigmentation or lesions.  NEURO: Cranial nerves grossly intact.  Mentation and speech appropriate for age.  PSYCH: Mentation appears normal, affect normal/bright, judgement and insight intact, normal speech and appearance well-groomed.      Laboratory/Imaging Studies  Reviewed outside records from IM, FP and podiatry.    ASSESSMENT/PLAN:    The patient is a 61 year-old postmenopausal female with a history of a stage I breast cancer treated more than 10 years ago with resection, radiation and adjuvant tamoxifen.  She subsequently developed a new mass identifiable on mammogram, measuring approximately 3.5 cm.  Biopsy was consistent with ductal carcinoma in situ, estrogen receptor positive, progesterone receptor positive.  She underwent a mastectomy bilaterally with Dr. Rakesh Sanchez.  She had immediate reconstruction.  Oncotype Dx 0 in new T1c N0 breast cancer.    1.  T1cN0 breast cancer now s/p bilateral mastectomy with oncotype 0 ; she had a trial of AI ,and now remains off of endocrine therapy.  She feels much better off of the arimidex.  She is not interested in retrying this medication.    We discussed her breast imaging that was benign in November 2020; she had noticed a lump   Repeat MRI in 1 year to reestablish stability.  We will arrange for follow up MRI in November.      Assuming the MRI is normal, I suggested she transition to our [facility] clinic for breast cancer survivors with Ragini Flavio.     2. Osteopenia - reviewed her dexa.  She did not tolerate fosamax or zometa.  We discussed that she has had a 10% increase in her lumbar spine and 3.5% increase in her hips.  She has been taking calcium and vitamin D.  She is lifting weights.       3. Encouraged healthy lifestyle changes with exercise of at least 150 min per week.  She has been encouraged by podiatry to lose  weight.  We discussed if she is not successful with her current strategies that we could pursue a referral to weight mgmt clinic.    4. Colonoscopy - she had a colonoscopy in January and this was normal.  They wanted her to come back in 5 years.     No appts on November 11.    Charity Brra MD

## 2021-06-24 ENCOUNTER — TELEPHONE (OUTPATIENT)
Dept: PODIATRY | Facility: CLINIC | Age: 61
End: 2021-06-24

## 2021-06-24 NOTE — TELEPHONE ENCOUNTER
Reason for Call:  Form, our goal is to have forms completed with 72 hours, however, some forms may require a visit or additional information.    Type of letter, form or note:  medical    Who is the form from?: Patient    Where did the form come from: Patient or family brought in       What clinic location was the form placed at?: [facility]    Where the form was placed: Dr Alberto [facility]arro [facility] Box/Folder        Additional comments: please mail back to the pt when it is completed. Thank You Ivelisse      Call taken on 6/24/2021 at 8:01 AM by Ivelisse Rodríguez

## 2021-06-25 ENCOUNTER — MEDICAL CORRESPONDENCE (OUTPATIENT)
Dept: HEALTH INFORMATION MANAGEMENT | Facility: CLINIC | Age: 61
End: 2021-06-25

## 2021-06-25 NOTE — TELEPHONE ENCOUNTER
Form completed and signed by Dr. Dominguez, copy sent to scanning, copy kept in drawer and original mailed to patient per her request. Left message informing.     Radha LAZO, Lead RN, BSN. . .  6/25/2021, 12:58 PM

## 2021-07-20 DIAGNOSIS — H35.363 DEGENERATIVE DRUSEN OF BOTH EYES: Primary | ICD-10-CM

## 2021-07-30 ENCOUNTER — OFFICE VISIT (OUTPATIENT)
Dept: OPHTHALMOLOGY | Facility: CLINIC | Age: 61
End: 2021-07-30
Attending: OPHTHALMOLOGY
Payer: COMMERCIAL

## 2021-07-30 DIAGNOSIS — H35.363 DEGENERATIVE DRUSEN OF BOTH EYES: ICD-10-CM

## 2021-07-30 PROCEDURE — G0463 HOSPITAL OUTPT CLINIC VISIT: HCPCS

## 2021-07-30 PROCEDURE — 92014 COMPRE OPH EXAM EST PT 1/>: CPT | Mod: GC | Performed by: OPHTHALMOLOGY

## 2021-07-30 PROCEDURE — 92134 CPTRZ OPH DX IMG PST SGM RTA: CPT | Performed by: OPHTHALMOLOGY

## 2021-07-30 ASSESSMENT — SLIT LAMP EXAM - LIDS
COMMENTS: NORMAL
COMMENTS: NORMAL

## 2021-07-30 ASSESSMENT — CUP TO DISC RATIO
OS_RATIO: 0.1
OD_RATIO: 0.2

## 2021-07-30 ASSESSMENT — REFRACTION_WEARINGRX
OS_SPHERE: +0.25
OD_AXIS: 040
SPECS_TYPE: PAL
OS_ADD: +2.75
OS_CYLINDER: +1.00
OD_ADD: +2.75
OD_CYLINDER: +1.00
OD_SPHERE: +1.00
OS_AXIS: 158

## 2021-07-30 ASSESSMENT — TONOMETRY
IOP_METHOD: TONOPEN
OS_IOP_MMHG: 16
OD_IOP_MMHG: 16

## 2021-07-30 ASSESSMENT — VISUAL ACUITY
METHOD: SNELLEN - LINEAR
OD_CC: 20/20
OS_CC: 20/20
CORRECTION_TYPE: GLASSES

## 2021-07-30 ASSESSMENT — EXTERNAL EXAM - LEFT EYE: OS_EXAM: NORMAL

## 2021-07-30 ASSESSMENT — CONF VISUAL FIELD
METHOD: COUNTING FINGERS
OS_NORMAL: 1
OD_NORMAL: 1

## 2021-07-30 ASSESSMENT — EXTERNAL EXAM - RIGHT EYE: OD_EXAM: NORMAL

## 2021-07-30 NOTE — PROGRESS NOTES
CC - drusen    INTERVAL HISTORY -     VA stable, on AREDS      PMH -  Enid Lombardo is a  61 year old year-old patient presenting for drusen both eyes.   No h/o smoking.  H/o breast CA diagnosis ~ 2004, tamoxifen x 3 years. No va changes or amsler changes noted by patient.  New recurrence in 2018 Tx with mastectomy      PAST OCULAR SURGERY  CE/IOL OU ~2008      RETINAL IMAGING  OCT 7-28-20  OD - drusen vs pseudodrusen, no fluid, PHF attached  OS -  drusen vs pseudodrusen, no fluid, PHF attached    AF  9-29-14  both eyes - irregular c/w drusen      ASSESSMENT & PLAN  #.  Drusen OU   - initially not AMD, may become AMD in future   - doubt from tamoxifen, no renal disease   - on AREDS    #.  Vitreous degeneration OU   - advised S/Sx RD 7/2021    #.  H/o breast CA s/p tamoxifen   - stopped ~2005. no ocular involvement    #  Pseudophakia OU      return to clinic: 1 year, OCT each eye, fundus photo on topcon    Santiago Steel MD  Vitreoretinal Surgery Fellow  [facility]     ATTESTATION     Attending Physician Attestation:      Complete documentation of historical and exam elements from today's encounter can be found in the full encounter summary report (not reduplicated in this progress note).  I personally obtained the chief complaint(s) and history of present illness.  I confirmed and edited as necessary the review of systems, past medical/surgical history, family history, social history, and examination findings as documented by others; and I examined the patient myself.  I personally reviewed the relevant tests, images, and reports as documented above.  I personally reviewed the ophthalmic test(s) associated with this encounter, agree with the interpretation(s) as documented by the resident/fellow, and have edited the corresponding report(s) as necessary.   I formulated and edited as necessary the assessment and plan and discussed the findings and management plan with the patient and family    Paz  MD Irena, PhD  , Vitreoretinal Surgery  Department of Ophthalmology  [facility]

## 2021-07-30 NOTE — NURSING NOTE
Chief Complaints and History of Present Illnesses   Patient presents with     Annual Eye Exam     Degenerative drusen of both eyes     Chief Complaint(s) and History of Present Illness(es)     Annual Eye Exam     Laterality: both eyes    Associated symptoms: Negative for eye pain, redness, tearing, flashes and floaters    Treatments tried: no treatments    Pain scale: 0/10    Comments: Degenerative drusen of both eyes              Comments     Pt states no change in VA since last visit  Using preservision     Zoey Denney COT 10:04 AM July 30, 2021

## 2021-08-18 ENCOUNTER — MYC MEDICAL ADVICE (OUTPATIENT)
Dept: FAMILY MEDICINE | Facility: OTHER | Age: 61
End: 2021-08-18

## 2021-08-28 ENCOUNTER — TRANSFERRED RECORDS (OUTPATIENT)
Dept: HEALTH INFORMATION MANAGEMENT | Facility: CLINIC | Age: 61
End: 2021-08-28

## 2021-08-30 ENCOUNTER — VIRTUAL VISIT (OUTPATIENT)
Dept: FAMILY MEDICINE | Facility: OTHER | Age: 61
End: 2021-08-30
Payer: COMMERCIAL

## 2021-08-30 ENCOUNTER — MYC MEDICAL ADVICE (OUTPATIENT)
Dept: FAMILY MEDICINE | Facility: OTHER | Age: 61
End: 2021-08-30

## 2021-08-30 DIAGNOSIS — B35.0 TINEA CAPITIS: Primary | ICD-10-CM

## 2021-08-30 PROCEDURE — 99213 OFFICE O/P EST LOW 20 MIN: CPT | Mod: 95 | Performed by: PHYSICIAN ASSISTANT

## 2021-08-30 RX ORDER — FLUCONAZOLE 150 MG/1
150 TABLET ORAL
Qty: 4 TABLET | Refills: 0 | Status: SHIPPED | OUTPATIENT
Start: 2021-08-30 | End: 2021-09-09

## 2021-08-30 NOTE — PROGRESS NOTES
Enid is a 61 year old who is being evaluated via a billable video visit.      How would you like to obtain your AVS? MyChart  If the video visit is dropped, the invitation should be resent by: Text to cell phone: [phone]  Will anyone else be joining your video visit? No    Video Start Time: 0712    Assessment & Plan     Tinea capitis  Very poor video quality so I am having to make a judgment call based on what she can describe.  This certainly could be psoriasis as well as tinea capitis but we will have her try treatment as noted below along with continuing her topical Lamisil that she is picked up from the pharmacy and Selsun Blue shampoo will not hurt in this area as well.  If she is not making improvement in 2 weeks she needs to be seen face-to-face for further evaluation and treatment options.  - fluconazole (DIFLUCAN) 150 MG tablet; Take 1 tablet (150 mg) by mouth every 3 days for 4 doses     No follow-ups on file.    Luis Fernando Soto PA-C  [facility]   Enid is a 61 year old who presents for the following health issues     HPI     APPOINTMENT NOTE -    I concerned that a circular rash on my scalp will spread. I went into [facility] for this issue. I was diagnosed with ringworm. They were unable to give me meds since it s in my scalp. I was told to see someone at my primary clinic.      Review of Systems   Constitutional, HEENT, cardiovascular, pulmonary, GI, , musculoskeletal, neuro, skin, endocrine and psych systems are negative, except as otherwise noted.      Objective           Vitals:  No vitals were obtained today due to virtual visit.    Physical Exam   GENERAL: Healthy, alert and no distress  EYES: Eyes grossly normal to inspection.  No discharge or erythema, or obvious scleral/conjunctival abnormalities.  RESP: No audible wheeze, cough, or visible cyanosis.  No visible retractions or increased work of breathing.    SKIN: Visible  skin clear. No significant rash, abnormal pigmentation or lesions.  NEURO: Cranial nerves grossly intact.  Mentation and speech appropriate for age.  PSYCH: Mentation appears normal, affect normal/bright, judgement and insight intact, normal speech and appearance well-groomed.    No results found. However, due to the size of the patient record, not all encounters were searched. Please check Results Review for a complete set of results.          Video-Visit Details    Type of service:  Video Visit    Video End Time:0722    Originating Location (pt. Location): Home    Distant Location (provider location):  [facility]     Platform used for Video Visit: Relayware

## 2021-09-11 DIAGNOSIS — J31.0 CHRONIC RHINITIS: ICD-10-CM

## 2021-09-13 RX ORDER — LORATADINE 10 MG/1
TABLET ORAL
Qty: 90 TABLET | Refills: 1 | Status: SHIPPED | OUTPATIENT
Start: 2021-09-13 | End: 2022-03-10

## 2021-09-13 NOTE — TELEPHONE ENCOUNTER
Prescription approved per [facility] Refill Protocol.  MOOSE HoweN, RN, PHN  [place]  September 13, 2021

## 2021-09-15 ENCOUNTER — OFFICE VISIT (OUTPATIENT)
Dept: FAMILY MEDICINE | Facility: OTHER | Age: 61
End: 2021-09-15
Payer: COMMERCIAL

## 2021-09-15 VITALS
WEIGHT: 182 LBS | OXYGEN SATURATION: 96 % | BODY MASS INDEX: 30.29 KG/M2 | DIASTOLIC BLOOD PRESSURE: 72 MMHG | RESPIRATION RATE: 14 BRPM | SYSTOLIC BLOOD PRESSURE: 112 MMHG | HEART RATE: 69 BPM | TEMPERATURE: 98.2 F

## 2021-09-15 DIAGNOSIS — B35.4 TINEA CORPORIS: ICD-10-CM

## 2021-09-15 DIAGNOSIS — R21 RASH AND NONSPECIFIC SKIN ERUPTION: Primary | ICD-10-CM

## 2021-09-15 LAB
KOH PREPARATION: ABNORMAL
KOH PREPARATION: ABNORMAL

## 2021-09-15 PROCEDURE — 87220 TISSUE EXAM FOR FUNGI: CPT | Performed by: FAMILY MEDICINE

## 2021-09-15 PROCEDURE — 99213 OFFICE O/P EST LOW 20 MIN: CPT | Mod: 25 | Performed by: FAMILY MEDICINE

## 2021-09-15 PROCEDURE — 90682 RIV4 VACC RECOMBINANT DNA IM: CPT | Performed by: FAMILY MEDICINE

## 2021-09-15 PROCEDURE — 90471 IMMUNIZATION ADMIN: CPT | Performed by: FAMILY MEDICINE

## 2021-09-15 RX ORDER — TERBINAFINE HYDROCHLORIDE 250 MG/1
250 TABLET ORAL DAILY
Qty: 14 TABLET | Refills: 0 | Status: SHIPPED | OUTPATIENT
Start: 2021-09-15 | End: 2021-09-29

## 2021-09-15 NOTE — RESULT ENCOUNTER NOTE
Ms. Halverson    Called and left message on VM about positive fungal elements on the KOH. I sent an antifungal to the pharmacy    If you have any questions or concerns please contact me via My Chart or call the clinic at [phone]     Thank You  Ashley Etienne MD.

## 2021-09-15 NOTE — PROGRESS NOTES
Assessment & Plan     Problem List Items Addressed This Visit     None      Visit Diagnoses     Rash and nonspecific skin eruption    -  Primary    Relevant Orders    KOH prep (skin, hair or nails only)      RAHEEM positive for fungal elements  wiill try treating with oral lamisil for 2 weeks as it seems closer to the hair line on the posterior neck. I do not see an inflammatory response like kerion and hence choosing a shorter course.  Advise follow up in 4 weeks if no improvement    Ashley Etienne MD  [facility] REJI Rossi is a 61 year old who presents for the following health issues     HPI     Concern - Ring worm  Onset: 2-3 weeks  Description: back of head on the right  Intensity: moderate  Progression of Symptoms:  Patient cannot see it herself but her  said it looked very slightly improved  Accompanying Signs & Symptoms: noen  Previous history of similar problem: none  Precipitating factors:        Worsened by: none  Alleviating factors:        Improved by: Fluconazole, Selsun Blue shampoo   Therapies tried and outcome: Fluconazole, Selsun Blue shampoo       Review of Systems   Constitutional, HEENT, cardiovascular, pulmonary, gi and gu systems are negative, except as otherwise noted.      Objective    /72   Pulse 69   Temp 98.2  F (36.8  C) (Temporal)   Resp 14   Wt 82.6 kg (182 lb)   LMP 10/14/2001 (Exact Date)   SpO2 96%   BMI 30.29 kg/m    Body mass index is 30.29 kg/m .  Physical Exam   GENERAL: healthy, alert and no distress  SKIN:raised scaly , circular lesion on the right posterior neck with central clearing consistent with tinea infection.

## 2021-09-24 ENCOUNTER — MYC MEDICAL ADVICE (OUTPATIENT)
Dept: FAMILY MEDICINE | Facility: OTHER | Age: 61
End: 2021-09-24

## 2021-11-15 ENCOUNTER — ANCILLARY PROCEDURE (OUTPATIENT)
Dept: MRI IMAGING | Facility: CLINIC | Age: 61
End: 2021-11-15
Attending: INTERNAL MEDICINE
Payer: COMMERCIAL

## 2021-11-15 DIAGNOSIS — C50.919 RECURRENT MALIGNANT NEOPLASM OF BREAST, UNSPECIFIED LATERALITY (H): ICD-10-CM

## 2021-11-15 PROCEDURE — 77049 MRI BREAST C-+ W/CAD BI: CPT | Performed by: RADIOLOGY

## 2021-11-15 PROCEDURE — A9585 GADOBUTROL INJECTION: HCPCS | Performed by: RADIOLOGY

## 2021-11-15 RX ORDER — GADOBUTROL 604.72 MG/ML
10 INJECTION INTRAVENOUS ONCE
Status: COMPLETED | OUTPATIENT
Start: 2021-11-15 | End: 2021-11-15

## 2021-11-15 RX ADMIN — GADOBUTROL 8.5 ML: 604.72 INJECTION INTRAVENOUS at 11:03

## 2021-11-15 NOTE — DISCHARGE INSTRUCTIONS
MRI Contrast Discharge Instructions    The IV contrast you received today will pass out of your body in your  urine. This will happen in the next 24 hours. You will not feel this process.  Your urine will not change color.    Drink at least 4 extra glasses of water or juice today (unless your doctor  has restricted your fluids). This reduces the stress on your kidneys.  You may take your regular medicines.    If you are on dialysis: It is best to have dialysis today.    If you have a reaction: Most reactions happen right away. If you have  any new symptoms after leaving the hospital (such as hives or swelling),  call your hospital at the correct number below. Or call your family doctor.  If you have breathing distress or wheezing, call 911.    Special instructions: ***    I have read and understand the above information.    Signature:______________________________________ Date:___________    Staff:__________________________________________ Date:___________     Time:__________    [facility] Departments:    ___[place]: [phone]  ___[facility]: [phone]  ___[place]: [phone] ___[facility]: [phone]  ___[facility]: [phone]  ___[facility]: [phone]  ___[phone]  ___[facility]: [phone]  ___[phone]

## 2021-11-15 NOTE — PROGRESS NOTES
CANCER SURVIVORSHIP VISIT-virtual  Nov 18, 2021    Care Team   Primary Care Provider Shi Alonso   Surgeon  Rakesh Sanchez MD   Radiation Oncologist Not listed   Medical Oncologist  Kaden Holt MD, Charity Brar MD       REASON FOR VISIT: survivorship visit for history of breast cancer    CANCER HISTORY AND TREATMENT:    History of right-sided stage IA breast cancer, ER/NJ postive, HER2 negative, diagnosed in 2004 and again in 2018.  *Diagnosis: 12/2004. Abnormal screening mammogram age 44  *Surgery: 01/07/2005 right-sided lumpectomy with sentinel lymph node dissection. Infiltrating ductal carcinoma, grade 1, 0.6cm, 0/3 nodes, stage IA (TIb N0 M0).   *Radiation: right breast radiotherapy  *Endocrine therapy: Tamoxifen 3/2005-11/2008 (stopped after dx of cataracts)  *Local recurrence: calcifications on screening mammogram   *Surgery #2: 7/2018, s/p bilateral mastectomy with reconstruction, final pathology revealed 8 mm invasive cancer, total 4 cm DCIS; stage I (T1bN0),  ER+ NJ+ HER2 negative. Oncotype 0.   *Endocrine therapy: anastrozole 10/7409-8816  *Genetic testing: Negative in 2018  *Other information: KISHOR flap reconstruction with Dr Amin. Osteopenia,  poorly tolerated fosamax and zoledronic acid.     INTERVAL HISTORY:     No new lumps/bumps or new pain.     Late effects: tingling in right arm sometime after lifting her 25 lb grandchild.     Mood: good    Sexual health: no concerns        Past Medical History:   Diagnosis Date     Breast cancer (H) 2004    lumpectomy, radiation, tamoxifen     Cancer (H) 2004    Breast     Cataracts, bilateral      Complication of anesthesia     She prefers not to have versed until right before she leaves or can have after      Enthesopathy of hip region 6/06    right hip     Esophageal reflux 2/06     History of gestational diabetes      Hypertension 2012     Macular drusen      PONV (postoperative nausea and vomiting)      Shingles 01/23/2012    left T6-T7 dermatome        Current Outpatient Medications   Medication Sig Dispense Refill     atorvastatin (LIPITOR) 10 MG tablet Take 1 tablet (10 mg) by mouth daily 90 tablet 3     Calcium-Vitamin D-Vitamin K (VIACTIV CALCIUM PLUS D PO) Take 1 tablet by mouth 2 times daily       diclofenac (VOLTAREN) 75 MG EC tablet TAKE 1 TABLET BY MOUTH TWICE A DAY 60 tablet 1     fluticasone (FLONASE) 50 MCG/ACT nasal spray INSTILL 1-2 SPRAYS INTO EACH NOSTRIL DAIY 48 mL 3     gabapentin (NEURONTIN) 100 MG capsule TAKE 1 CAPSULE BY MOUTH THREE TIMES A  capsule 3     loratadine (CLARITIN) 10 MG tablet TAKE 1 TABLET BY MOUTH EVERY DAY 90 tablet 1     melatonin 5 MG tablet Take 5 mg by mouth At Bedtime        Multiple Vitamins-Minerals (PRESERVISION AREDS) CAPS Take 1 capsule by mouth 2 times daily (Patient taking differently: Take 1 capsule by mouth every morning ) 180 capsule 3     Omega-3 Fatty Acids (FISH OIL) 1000 MG CPDR Take by mouth every morning        omeprazole (PRILOSEC) 20 MG DR capsule TAKE 1 CAPSULE (20 MG) BY MOUTH AS NEEDED (PT. TAKES BASED ON FOODS THAT DAY AT BEDTIME. 10/15/2019) 90 capsule 3     sertraline (ZOLOFT) 25 MG tablet Take 1 tablet (25 mg) by mouth daily 90 tablet 3          Allergies   Allergen Reactions     Alphagan P Rash     Thrush and numbness in mouth       Family History   Adopted: Yes   Problem Relation Age of Onset     Cancer Mother         lung cancer  at age 52     Thyroid Disease Sister         half sister, had thyroid removed     Unknown/Adopted Father      Unknown/Adopted Maternal Grandmother      Unknown/Adopted Maternal Grandfather      Unknown/Adopted Paternal Grandmother      Unknown/Adopted Paternal Grandfather      Glaucoma No family hx of      Diabetes No family hx of      Melanoma No family hx of      Skin Cancer No family hx of    ?  [place] is adopted and has limited information on her biological family.   ? One maternal half-sister has thyroid issues.  ? Mother  at 52 from lung  cancer.  ? Maternal grandfather  from colon cancer.  ? Paternal family history is unknown.   ? [place] has three healthy adult children.       Social history:  Living situation: , 3 children  Occupation: Teacher, retired  Tobacco use: never smoker  ETOH use: 2 drinks a months  Frequency of exercise:  Gets 150 min of exercise a week  Diet: good      Video physical exam  BMI 28  General: Patient appears well in no acute distress.   Skin: No visualized rash or lesions on visualized skin  Eyes: EOMI, no erythema, sclera icterus or discharge noted  Resp: Appears to be breathing comfortably without accessory muscle usage, speaking in full sentences, no cough  MSK: Appears to have normal range of motion based on visualized movements  Neurologic: No apparent tremors, facial movements symmetric  Psych: affect versatile, alert and oriented    IMAGING  EXAMINATION: MR BREAST BILATERAL W/O & W CONTRAST, 11/15/2021  11:02 AM      HISTORY/FAMILY HISTORY: No current or new breast symptoms. History of  treated right breast cancer in 2004, recurrent DCIS in 2018, and  bilateral KISHOR flap reconstruction. History of palpable left axillary  tail fat necrosis.  Breast asymmetric thickening/nodularity; Recurrent malignant neoplasm  of breast, unspecified laterality (H)     COMPARISON: Breast MRI 2020, 12/10/2007, mammogram and  ultrasound 2020     TECHNIQUE: Axial T2 images with fat suppression and axial T1 without  fat saturation images were obtained of both breasts prior to  gadolinium administration. Following the uneventful administration of  weight-based gadolinium intravenously, high-resolution dynamic imaging  of both breasts was performed in the axial plane. Dynamic images are  reviewed with subtraction technique. Axial and coronal maximal  intensity projection images are displayed.  Kinetic analysis was  performed using a separate station. Contrast: 8.5cc's Gadavist     FINDINGS:  Breast composition:  Bilateral mastectomy  Background parenchymal enhancement: Minimal     There is no suspicious enhancement or lymphadenopathy in the right  central breast at posterior depth there is the vascular pedicle with  prominent blood vessels. This correlates to the density visualized on  mammogram..  New since the prior MRI, there are scattered punctate  foci in the central breast at middle depth and superior. These are  probably benign. For example on axial image 77.     No suspicious enhancement in the left breast. No lymphadenopathy.                                                                      IMPRESSION: BI-RADS CATEGORY: 3 - Probably Benign.     RECOMMENDED FOLLOW-UP: Short Interval Follow-up 6 Months.  Breast MRI.     MILLER AKHTAR MD     IMPRESSION/PLAN:    History of right-sided stage IA breast cancer, ER/FL postive, HER2 negative, diagnosed in 2004 and again in 2018.  S/p lumpectomy, radiotherapy and 3 years of Tamoxifen followed by bilateral mastectomy, KISHOR flap reconstruction and 2 years of AI therapy. Oncotype 0.   She is 3 years from her latest diagnosis  MRI in 6 mos to assess stability in right reconstructed breast, if normal, no further imaging needed    Potential late effects related to surgery/radiation:  -Risk of lymphedema: If she notices any swelling in her arm, she should be offered a referral to lymphedema physical therapy.     Potential late effects related to radiation:  - Possible radiation side effects include cardiotoxicity, lung injury, fracture, and second malignancies. She should seek medical attention if she notices any new skin lesions in the area of radiation. If she should develop any shortness of breath, hemoptysis, cough, or new pain, I would advise further evaluation with CT or X-ray.     Potential effects related to endocrine therapy:  Risk of developing osteoporosis: Last DEXA in 2021 with a lowest T-score of -1.6 with improvement, Recommend weightbearing exercises 2-3 days per  week, and to supplement with 1200 mg of calcium, 1000 international unites of vitamin D daily. Further bone density monitoring via her PCP.     General late effects screenings and recommendations    -Cancer screening. She should undergo routine screening for women in her age group.     -Healthy lifestyle. She should maintain a healthy weight with a BMI between 20-25. She should exercise at least 150 minutes weekly at moderate intensity. She should see the eye doctor every 1-2 years, and dentist every 6 months for cleanings. She should not use any tobacco. She should minimize alcohol intake. If continuing to drink, should follow CDC recommendations for no more than 1 alcoholic drink/day for women.    -Genetic testing. She has had genetic testing. Given her young age at diagnosis, recurrenct disease, and limited knowledge about FH, I will plan to refer her back to  in 2023, 5 years from 2018 testing.     Gave resources for our [facility] Cancer Survivorship class series and mailings list for educational opportunities. [email or web address]    Cancer treatment summary was sent electronically to the patient and her PCP.      The total time of this encounter amounted to 40 minutes.This time included time spent with the patient, prep work, ordering tests, and performing post visit documentation.    NILAY Pratt, PAFREDI  [facility] Cancer Survivorship [facility]

## 2021-11-18 ENCOUNTER — VIRTUAL VISIT (OUTPATIENT)
Dept: ONCOLOGY | Facility: CLINIC | Age: 61
End: 2021-11-18
Attending: PHYSICIAN ASSISTANT
Payer: COMMERCIAL

## 2021-11-18 DIAGNOSIS — Z85.3 HISTORY OF RIGHT BREAST CANCER: Primary | ICD-10-CM

## 2021-11-18 PROCEDURE — 99215 OFFICE O/P EST HI 40 MIN: CPT | Mod: 95 | Performed by: PHYSICIAN ASSISTANT

## 2021-11-18 PROCEDURE — 999N001193 HC VIDEO/TELEPHONE VISIT; NO CHARGE

## 2021-11-18 NOTE — NURSING NOTE
Patient ok per Student to join visit    Patient verified meds and allergies are correct via patients echeck in.    Beverly Whitney, Virtual Facilitator

## 2021-11-18 NOTE — LETTER
11/18/2021         RE: [place] Lombardo  [address] [facility] [address]      CANCER SURVIVORSHIP VISIT-virtual  Nov 18, 2021    Care Team   Primary Care Provider Shi Alonso   Surgeon  Rakesh Sanchez MD   Radiation Oncologist Not listed   Medical Oncologist  Kaden Holt MD, Charity Brar MD       REASON FOR VISIT: survivorship visit for history of breast cancer    CANCER HISTORY AND TREATMENT:    History of right-sided stage IA breast cancer, ER/IA postive, HER2 negative, diagnosed in 2004 and again in 2018.  *Diagnosis: 12/2004. Abnormal screening mammogram age 44  *Surgery: 01/07/2005 right-sided lumpectomy with sentinel lymph node dissection. Infiltrating ductal carcinoma, grade 1, 0.6cm, 0/3 nodes, stage IA (TIb N0 M0).   *Radiation: right breast radiotherapy  *Endocrine therapy: Tamoxifen 3/2005-11/2008 (stopped after dx of cataracts)  *Local recurrence: calcifications on screening mammogram   *Surgery #2: 7/2018, s/p bilateral mastectomy with reconstruction, final pathology revealed 8 mm invasive cancer, total 4 cm DCIS; stage I (T1bN0),  ER+ IA+ HER2 negative. Oncotype 0.   *Endocrine therapy: anastrozole 10/6612-7215  *Genetic testing: Negative in 2018  *Other information: KISHOR flap reconstruction with Dr Amin. Osteopenia,  poorly tolerated fosamax and zoledronic acid.     INTERVAL HISTORY:     No new lumps/bumps or new pain.     Late effects: tingling in right arm sometime after lifting her 25 lb grandchild.     Mood: good    Sexual health: no concerns        Past Medical History:   Diagnosis Date     Breast cancer (H) 2004    lumpectomy, radiation, tamoxifen     Cancer (H) 2004    Breast     Cataracts, bilateral      Complication of anesthesia     She prefers not to have versed until right before she leaves or can have after      Enthesopathy of hip region 6/06    right hip     Esophageal reflux 2/06     History of gestational diabetes      Hypertension 2012     Macular drusen       PONV (postoperative nausea and vomiting)      Shingles 2012    left T6-T7 dermatome       Current Outpatient Medications   Medication Sig Dispense Refill     atorvastatin (LIPITOR) 10 MG tablet Take 1 tablet (10 mg) by mouth daily 90 tablet 3     Calcium-Vitamin D-Vitamin K (VIACTIV CALCIUM PLUS D PO) Take 1 tablet by mouth 2 times daily       diclofenac (VOLTAREN) 75 MG EC tablet TAKE 1 TABLET BY MOUTH TWICE A DAY 60 tablet 1     fluticasone (FLONASE) 50 MCG/ACT nasal spray INSTILL 1-2 SPRAYS INTO EACH NOSTRIL DAIY 48 mL 3     gabapentin (NEURONTIN) 100 MG capsule TAKE 1 CAPSULE BY MOUTH THREE TIMES A  capsule 3     loratadine (CLARITIN) 10 MG tablet TAKE 1 TABLET BY MOUTH EVERY DAY 90 tablet 1     melatonin 5 MG tablet Take 5 mg by mouth At Bedtime        Multiple Vitamins-Minerals (PRESERVISION AREDS) CAPS Take 1 capsule by mouth 2 times daily (Patient taking differently: Take 1 capsule by mouth every morning ) 180 capsule 3     Omega-3 Fatty Acids (FISH OIL) 1000 MG CPDR Take by mouth every morning        omeprazole (PRILOSEC) 20 MG DR capsule TAKE 1 CAPSULE (20 MG) BY MOUTH AS NEEDED (PT. TAKES BASED ON FOODS THAT DAY AT BEDTIME. 10/15/2019) 90 capsule 3     sertraline (ZOLOFT) 25 MG tablet Take 1 tablet (25 mg) by mouth daily 90 tablet 3          Allergies   Allergen Reactions     Alphagan P Rash     Thrush and numbness in mouth       Family History   Adopted: Yes   Problem Relation Age of Onset     Cancer Mother         lung cancer  at age 52     Thyroid Disease Sister         half sister, had thyroid removed     Unknown/Adopted Father      Unknown/Adopted Maternal Grandmother      Unknown/Adopted Maternal Grandfather      Unknown/Adopted Paternal Grandmother      Unknown/Adopted Paternal Grandfather      Glaucoma No family hx of      Diabetes No family hx of      Melanoma No family hx of      Skin Cancer No family hx of    ?  [place] is adopted and has limited information on her biological  family.   ? One maternal half-sister has thyroid issues.  ? Mother  at 52 from lung cancer.  ? Maternal grandfather  from colon cancer.  ? Paternal family history is unknown.   ? [place] has three healthy adult children.       Social history:  Living situation: , 3 children  Occupation: Teacher, retired  Tobacco use: never smoker  ETOH use: 2 drinks a months  Frequency of exercise:  Gets 150 min of exercise a week  Diet: good      Video physical exam  BMI 28  General: Patient appears well in no acute distress.   Skin: No visualized rash or lesions on visualized skin  Eyes: EOMI, no erythema, sclera icterus or discharge noted  Resp: Appears to be breathing comfortably without accessory muscle usage, speaking in full sentences, no cough  MSK: Appears to have normal range of motion based on visualized movements  Neurologic: No apparent tremors, facial movements symmetric  Psych: affect versatile, alert and oriented    IMAGING  EXAMINATION: MR BREAST BILATERAL W/O & W CONTRAST, 11/15/2021  11:02 AM      HISTORY/FAMILY HISTORY: No current or new breast symptoms. History of  treated right breast cancer in 2004, recurrent DCIS in 2018, and  bilateral KISHOR flap reconstruction. History of palpable left axillary  tail fat necrosis.  Breast asymmetric thickening/nodularity; Recurrent malignant neoplasm  of breast, unspecified laterality (H)     COMPARISON: Breast MRI 2020, 12/10/2007, mammogram and  ultrasound 2020     TECHNIQUE: Axial T2 images with fat suppression and axial T1 without  fat saturation images were obtained of both breasts prior to  gadolinium administration. Following the uneventful administration of  weight-based gadolinium intravenously, high-resolution dynamic imaging  of both breasts was performed in the axial plane. Dynamic images are  reviewed with subtraction technique. Axial and coronal maximal  intensity projection images are displayed.  Kinetic analysis was  performed using a  separate station. Contrast: 8.5cc's Gadavist     FINDINGS:  Breast composition: Bilateral mastectomy  Background parenchymal enhancement: Minimal     There is no suspicious enhancement or lymphadenopathy in the right  central breast at posterior depth there is the vascular pedicle with  prominent blood vessels. This correlates to the density visualized on  mammogram..  New since the prior MRI, there are scattered punctate  foci in the central breast at middle depth and superior. These are  probably benign. For example on axial image 77.     No suspicious enhancement in the left breast. No lymphadenopathy.                                                                      IMPRESSION: BI-RADS CATEGORY: 3 - Probably Benign.     RECOMMENDED FOLLOW-UP: Short Interval Follow-up 6 Months.  Breast MRI.     MILLER AKHTAR MD     IMPRESSION/PLAN:    History of right-sided stage IA breast cancer, ER/MS postive, HER2 negative, diagnosed in 2004 and again in 2018.  S/p lumpectomy, radiotherapy and 3 years of Tamoxifen followed by bilateral mastectomy, KISHOR flap reconstruction and 2 years of AI therapy. Oncotype 0.   She is 3 years from her latest diagnosis  MRI in 6 mos to assess stability in right reconstructed breast, if normal, no further imaging needed    Potential late effects related to surgery/radiation:  -Risk of lymphedema: If she notices any swelling in her arm, she should be offered a referral to lymphedema physical therapy.     Potential late effects related to radiation:  - Possible radiation side effects include cardiotoxicity, lung injury, fracture, and second malignancies. She should seek medical attention if she notices any new skin lesions in the area of radiation. If she should develop any shortness of breath, hemoptysis, cough, or new pain, I would advise further evaluation with CT or X-ray.     Potential effects related to endocrine therapy:  Risk of developing osteoporosis: Last DEXA in 2021 with a lowest  T-score of -1.6 with improvement, Recommend weightbearing exercises 2-3 days per week, and to supplement with 1200 mg of calcium, 1000 international unites of vitamin D daily. Further bone density monitoring via her PCP.     General late effects screenings and recommendations    -Cancer screening. She should undergo routine screening for women in her age group.     -Healthy lifestyle. She should maintain a healthy weight with a BMI between 20-25. She should exercise at least 150 minutes weekly at moderate intensity. She should see the eye doctor every 1-2 years, and dentist every 6 months for cleanings. She should not use any tobacco. She should minimize alcohol intake. If continuing to drink, should follow CDC recommendations for no more than 1 alcoholic drink/day for women.    -Genetic testing. She has had genetic testing. Given her young age at diagnosis, recurrenct disease, and limited knowledge about FH, I will plan to refer her back to  in 2023, 5 years from 2018 testing.     Gave resources for our [facility] Cancer Survivorship class series and mailings list for educational opportunities. [email or web address]    Cancer treatment summary was sent electronically to the patient and her PCP.      The total time of this encounter amounted to 40 minutes.This time included time spent with the patient, prep work, ordering tests, and performing post visit documentation.    NILAY Pratt, PAFREDI  [facility] Cancer Survivorship Program

## 2021-11-30 NOTE — LETTER
1/16/2019         RE: Enid Lombardo  [address] [facility] [address]        Dear Colleague,    Thank you for referring your patient, Enid Lombardo, to the [facility]. Please see a copy of my visit note below.        [facility] Dermatology Note      Dermatology Problem List:  1. Left upper lip, s/p biopsy 2/5/2018 by PCP, read by surg path as parakeratosis and hyperorthokeratosis with   focal ulceration with reactive squamous atypia. Treated with LN2 but failed to resolve.  -Rebiopsy left upper lip, 1/16/2019  2. Relevant medical history: breast cancer    Encounter Date: Jan 16, 2019    CC:  Chief Complaint   Patient presents with     Lesion     left upper lip         History of Present Illness:  Ms. Enid Lombardo is a 58 year old female who presents as a referral from Dr. Alonso. She had a biopsy done on the edge of a spot on her left upper lip in Feb 2018, and she was told it came back with no malignancy. It was treated with liquid nitrogen. It has failed to heal over the last year. At first, she thought this was not healing after the biopsy because the area was scratched by a dog. She was it multiple times a day with Cetaphil. She has tried acne cream and Aquaphor, but neither were helpful. No personal history of skin cancer. Patient notes she would have came in sooner, but she has been dealing with breast cancer. She had double mastectomy in Aug and then reconstruction after. She now notes that she is feeling well. No other concerns addressed today.      Past Medical History:   Patient Active Problem List   Diagnosis     History of right breast cancer     Esophageal reflux     Disorder of synovium, tendon, and bursa     Cataract     Shingles     Pain, radicular, lumbar     Right hip pain     Low back pain     IT band syndrome     Macular drusen     Cervicalgia     Headache     Keratoconus     Meibomian gland dysfunction - Both Eyes     Tear film  [facility]   Discharge Summary                                                                             [facility] Patient Status:  [place]    2021 MRN [id number]   [facility] 2SW-N Attending Nadine Forman DO 11/26/21 1146       35.3 % 11/22/21 1729       35.9 % 09/01/21 0806    HGB  10.6 g/dL 11/28/21 0737       12.0 g/dL 11/27/21 0936       12.4 g/dL 11/26/21 1950       12.1 g/dL 11/26/21 1146       12.7 g/dL 11/22/21 1729       12.0 g/dL 09/01/21 0806    Sujatha insufficiency     Hyperlipidemia with target LDL less than 130     Benign neoplasm of colon     Skin abscess     Osteopenia of both hands     Recurrent carcinoma in situ of breast, right     Breast cancer in situ     S/P breast reconstruction, bilateral     S/P flap graft     Past Medical History:   Diagnosis Date     Breast cancer (H) 2004    lumpectomy, radiation, tamoxifen     Cancer (H) 2004    Breast     Cataracts, bilateral      Complication of anesthesia     She prefers not to have versed until right before she leaves or can have after      Enthesopathy of hip region 6/06    right hip     Esophageal reflux 2/06     History of gestational diabetes      Hypertension 2012     Macular drusen      PONV (postoperative nausea and vomiting)      Shingles 01/23/2012    left T6-T7 dermatome     Past Surgical History:   Procedure Laterality Date     BIOPSY  2004    Breast     BREAST SURGERY  2004     C VAGINAL HYSTERECTOMY  12/2001    Ovaries intact, due to fibroids     COLONOSCOPY       ENDOSCOPY  05/09/2007    Upper GI     EYE SURGERY       GRAFT FAT TO BREAST Bilateral 11/29/2018    Procedure: Bilateral Breast Fat Grafting from Abdomen and Thighs;  Surgeon: DENNISE Amin MD;  Location: [facility] OR     GRAFT FREE VASCULARIZED TRANSVERSE RECTUS ABDOMINIS MYOCUTANEOUS Bilateral 10/8/2018    Procedure: GRAFT FREE VASCULARIZED TRANSVERSE RECTUS ABDOMINIS MYOCUTANEOUS;  Bilateral Deep Inferior Epigastric Artery  Free Flap Breast Reconstruction and Removal of Breast Explanders;  Surgeon: DENNISE Amin MD;  Location: [facility] OR     GYN SURGERY  2001     HC BIOPSY/EXCISION LYMPH NODE OPEN DEEP CERVICAL W EXC FAT PAD  1/7/2005    Right axillary sentinel node biopsy X 3.     HC COLONOSCOPY W BIOPSY  05/10/10     HC EXCISION BREAST LESION, OPEN >=1  1/7/2005    Right breast.     HC REMV CATARACT EXTRACAP,INSERT LENS  12/18/08    bilateral     HC REMV TARSAL/METATARSAL BENIGN BONE LESN  1982    Bone spur - right      MASTECTOMY SIMPLE BILATERAL, SENTINEL NODE BILATERAL, COMBINED Bilateral 2018    Procedure: COMBINED MASTECTOMY SIMPLE BILATERAL, SENTINEL NODE BILATERAL;  Bilateral Mastectomy with Right Cicero Node Biopsy, Bilateral Breast Reconstruction, Anesthesia Block;  Surgeon: Rakesh Sanchez MD;  Location:  OR     ORTHOPEDIC SURGERY  1983    Right foot     RECONSTRUCT BREAST Bilateral 2018    Procedure: RECONSTRUCT BREAST;;  Surgeon: DENNISE Amin MD;  Location:  OR     RECONSTRUCT BREAST BILATERAL Bilateral 10/8/2018    Procedure: RECONSTRUCT BREAST BILATERAL;;  Surgeon: DENNISE Amin MD;  Location:  OR     RECONSTRUCT NIPPLE BILATERAL Bilateral 2018    Procedure: Nipple Reconstruction;  Surgeon: DENNISE Amin MD;  Location:  OR       Social History:  Patient reports that  has never smoked. she has never used smokeless tobacco. She reports that she drinks alcohol. She reports that she does not use drugs. Patient is a retired teacher.     Family History:  Family History   Adopted: Yes   Problem Relation Age of Onset     Cancer Mother         lung cancer  at age 52     Thyroid Disease Sister         half sister, had thyroid removed     Unknown/Adopted Father      Unknown/Adopted Maternal Grandmother      Unknown/Adopted Maternal Grandfather      Unknown/Adopted Paternal Grandmother      Unknown/Adopted Paternal Grandfather      Glaucoma No family hx of      Diabetes No family hx of        Medications:  Current Outpatient Medications   Medication Sig Dispense Refill     ACETAMINOPHEN PO Take 325 mg by mouth every 8 hours as needed for pain       alendronate (FOSAMAX) 70 MG tablet Take 1 tablet (70 mg) by mouth every 7 days 12 tablet 3     anastrozole (ARIMIDEX) 1 MG tablet Take 1 tablet (1 mg) by mouth daily 90 tablet 3     fluticasone (FLONASE) 50 MCG/ACT spray USE ONE OR TWO SPRAYS IN EACH NOSTRIL DAILY 16 mL 10     ibuprofen (ADVIL) 200 MG tablet Take 400 mg by  no nasal discharge, no nasal flaring, oral mucous membranes moist  Lungs:   Clear to auscultation bilaterally, equal air entry, no wheezing, no crackles  Chest:  Regular rate and rhythm, no murmur present  Abd:   Soft, nontender, nondistended, + bowel soun Infant Age 22     Risk Nomogram High-Intermediate Risk Zone     Phototherapy guide No     hearing test    Collection Time: 21  2:52 AM   Result Value Ref Range    Right ear 1st attempt Pass - AABR     Left ear 1st attempt Pass - AABR    POC mouth as needed for mild pain       loratadine (CLARITIN) 10 MG tablet TAKE 1 TABLET BY MOUTH EVERY DAY AS NEEDED FOR ALLERGY SYMPTOMS 90 tablet 2     Omega-3 Fatty Acids (FISH OIL) 1000 MG CPDR Take  by mouth.       omeprazole (PRILOSEC) 20 MG CR capsule TAKE 1 CAPSULE (20 MG) BY MOUTH DAILY 90 capsule 3     sertraline (ZOLOFT) 25 MG tablet Take 1 tablet daily. (Patient taking differently: 25 mg every morning Take 1 tablet daily.) 30 tablet 11       Allergies   Allergen Reactions     Alphagan P Rash     Thrush and numbness in mouth       Review of Systems:  -Constitutional: Patient is otherwise feeling well, in usual state of health.   -Skin: As above in HPI. No additional skin concerns.    Physical exam:  Vitals: LMP 10/14/2001   GEN: This is a well developed, well-nourished female in no acute distress, in a pleasant mood.    SKIN: Sun-exposed skin, which includes the head/face, neck, both arms, digits, and/or nails was examined.   - Left upper lip: umbilicated shiny papule there are white lobules throughout it, no central keratin debris  - No other lesions of concern on areas examined.             Impression/Plan:  1. NUB, left upper lip. Ddx: SCC vs desmoplastic trichoep vs BCC. Previous biopsy done by PCP may not have been an adequate sample. Read by surg path as reactive squamous atypia. Since lesion has failed to resolve, repeat biopsy is indicated. Patient noted understanding and is agreeable to this plan.     Shave biopsy:  After discussion of benefits and risks including but not limited to bleeding/bruising, pain/swelling, infection, scar, incomplete removal, nerve damage/numbness, recurrence, and non-diagnostic biopsy, written consent, verbal consent and photographs were obtained. Time-out was performed. The area was cleaned with isopropyl alcohol. 0.5ml of 1% lidocaine with 1:100,000 epinephrine was injected to obtain adequate anesthesia. A shave biopsy was performed. Hemostasis was achieved with  aluminium chloride. Vaseline and a sterile dressing were applied. The patient tolerated the procedure and no complications were noted. The patient was provided with verbal and written post care instructions.    CC Shi Alonso MD on close of this encounter.  Follow-up pending biopsy results.       Staff Involved:  Scribe/Staff    Scribe Disclosure  I, Kaylyn Vijay, am serving as a scribe to document services personally performed by Dr. Lupis Gibbs MD, based on data collection and the provider's statements to me.     Provider Disclosure:   The documentation recorded by the scribe accurately reflects the services I personally performed and the decisions made by me.    Lupis Gibbs MD    Department of Dermatology  [facility]: Phone: [phone], Fax:[phone]  [facility]: Phone: [phone], Fax: [phone]            ain, thank you for allowing me to participate in the care of your patient.        Sincerely,        Lupis Gibbs MD

## 2021-12-18 ENCOUNTER — HEALTH MAINTENANCE LETTER (OUTPATIENT)
Age: 61
End: 2021-12-18

## 2021-12-20 ENCOUNTER — TELEPHONE (OUTPATIENT)
Dept: FAMILY MEDICINE | Facility: OTHER | Age: 61
End: 2021-12-20
Payer: COMMERCIAL

## 2021-12-20 DIAGNOSIS — N95.1 HOT FLUSHES, PERIMENOPAUSAL: ICD-10-CM

## 2021-12-20 RX ORDER — SERTRALINE HYDROCHLORIDE 25 MG/1
25 TABLET, FILM COATED ORAL DAILY
Qty: 90 TABLET | Refills: 1 | Status: SHIPPED | OUTPATIENT
Start: 2021-12-20 | End: 2022-03-23

## 2021-12-20 NOTE — TELEPHONE ENCOUNTER
Switching to mailorder.    Anselmo Browne, MOOSEN, RN, PHN  [facility] ~ Registered Nurse  Clinic Triage ~ [place] [facility] [place]  December 20, 2021

## 2022-03-10 DIAGNOSIS — J31.0 CHRONIC RHINITIS: ICD-10-CM

## 2022-03-10 RX ORDER — LORATADINE 10 MG/1
TABLET ORAL
Qty: 90 TABLET | Refills: 1 | Status: SHIPPED | OUTPATIENT
Start: 2022-03-10 | End: 2022-09-07

## 2022-03-20 ASSESSMENT — ENCOUNTER SYMPTOMS
JOINT SWELLING: 0
HEARTBURN: 0
HEMATURIA: 0
COUGH: 0
PARESTHESIAS: 0
ABDOMINAL PAIN: 0
HEMATOCHEZIA: 0
ARTHRALGIAS: 0
CONSTIPATION: 0
EYE PAIN: 0
WEAKNESS: 0
NERVOUS/ANXIOUS: 0
NAUSEA: 0
CHILLS: 0
BREAST MASS: 0
MYALGIAS: 0
DIARRHEA: 0
PALPITATIONS: 0
DIZZINESS: 0
HEADACHES: 0
SORE THROAT: 0
DYSURIA: 0
FREQUENCY: 0
FEVER: 0
SHORTNESS OF BREATH: 0

## 2022-03-23 ENCOUNTER — OFFICE VISIT (OUTPATIENT)
Dept: FAMILY MEDICINE | Facility: OTHER | Age: 62
End: 2022-03-23
Payer: COMMERCIAL

## 2022-03-23 VITALS
TEMPERATURE: 97.8 F | SYSTOLIC BLOOD PRESSURE: 100 MMHG | BODY MASS INDEX: 29.91 KG/M2 | DIASTOLIC BLOOD PRESSURE: 80 MMHG | OXYGEN SATURATION: 95 % | WEIGHT: 179.5 LBS | RESPIRATION RATE: 16 BRPM | HEIGHT: 65 IN | HEART RATE: 70 BPM

## 2022-03-23 DIAGNOSIS — K21.9 GASTROESOPHAGEAL REFLUX DISEASE WITHOUT ESOPHAGITIS: ICD-10-CM

## 2022-03-23 DIAGNOSIS — J31.0 CHRONIC RHINITIS: ICD-10-CM

## 2022-03-23 DIAGNOSIS — N63.0 LUMP OR MASS IN BREAST: ICD-10-CM

## 2022-03-23 DIAGNOSIS — N95.1 HOT FLUSHES, PERIMENOPAUSAL: ICD-10-CM

## 2022-03-23 DIAGNOSIS — Z00.00 ROUTINE GENERAL MEDICAL EXAMINATION AT A HEALTH CARE FACILITY: Primary | ICD-10-CM

## 2022-03-23 DIAGNOSIS — M75.81 TENDINITIS OF RIGHT ROTATOR CUFF: ICD-10-CM

## 2022-03-23 DIAGNOSIS — E78.5 HYPERLIPIDEMIA WITH TARGET LDL LESS THAN 130: ICD-10-CM

## 2022-03-23 DIAGNOSIS — Z86.19 H/O HERPES ZOSTER: ICD-10-CM

## 2022-03-23 DIAGNOSIS — M54.12 CERVICAL RADICULAR PAIN: ICD-10-CM

## 2022-03-23 DIAGNOSIS — Z85.3 HISTORY OF RIGHT BREAST CANCER: ICD-10-CM

## 2022-03-23 LAB
CHOLEST SERPL-MCNC: 193 MG/DL
FASTING STATUS PATIENT QL REPORTED: YES
HDLC SERPL-MCNC: 62 MG/DL
LDLC SERPL CALC-MCNC: 80 MG/DL
NONHDLC SERPL-MCNC: 131 MG/DL
TRIGL SERPL-MCNC: 257 MG/DL

## 2022-03-23 PROCEDURE — 36415 COLL VENOUS BLD VENIPUNCTURE: CPT | Performed by: FAMILY MEDICINE

## 2022-03-23 PROCEDURE — 90750 HZV VACC RECOMBINANT IM: CPT | Performed by: FAMILY MEDICINE

## 2022-03-23 PROCEDURE — 90471 IMMUNIZATION ADMIN: CPT | Performed by: FAMILY MEDICINE

## 2022-03-23 PROCEDURE — 80061 LIPID PANEL: CPT | Performed by: FAMILY MEDICINE

## 2022-03-23 PROCEDURE — 99396 PREV VISIT EST AGE 40-64: CPT | Mod: 25 | Performed by: FAMILY MEDICINE

## 2022-03-23 PROCEDURE — 99213 OFFICE O/P EST LOW 20 MIN: CPT | Mod: 25 | Performed by: FAMILY MEDICINE

## 2022-03-23 RX ORDER — SERTRALINE HYDROCHLORIDE 25 MG/1
25 TABLET, FILM COATED ORAL DAILY
Qty: 90 TABLET | Refills: 1 | Status: SHIPPED | OUTPATIENT
Start: 2022-03-23 | End: 2022-12-15

## 2022-03-23 RX ORDER — GABAPENTIN 100 MG/1
CAPSULE ORAL
Qty: 270 CAPSULE | Refills: 3 | Status: SHIPPED | OUTPATIENT
Start: 2022-03-23 | End: 2023-03-01

## 2022-03-23 RX ORDER — DICLOFENAC SODIUM 75 MG/1
TABLET, DELAYED RELEASE ORAL
Qty: 60 TABLET | Refills: 1 | Status: CANCELLED | OUTPATIENT
Start: 2022-03-23

## 2022-03-23 RX ORDER — FLUTICASONE PROPIONATE 50 MCG
SPRAY, SUSPENSION (ML) NASAL
Qty: 48 ML | Refills: 3 | Status: SHIPPED | OUTPATIENT
Start: 2022-03-23 | End: 2023-03-06

## 2022-03-23 RX ORDER — ATORVASTATIN CALCIUM 10 MG/1
10 TABLET, FILM COATED ORAL DAILY
Qty: 90 TABLET | Refills: 3 | Status: SHIPPED | OUTPATIENT
Start: 2022-03-23 | End: 2023-03-30

## 2022-03-23 ASSESSMENT — ENCOUNTER SYMPTOMS
DYSURIA: 0
COUGH: 0
MYALGIAS: 0
NAUSEA: 0
HEARTBURN: 0
DIZZINESS: 0
PARESTHESIAS: 0
DIARRHEA: 0
FEVER: 0
CONSTIPATION: 0
EYE PAIN: 0
PALPITATIONS: 0
HEADACHES: 0
WEAKNESS: 0
FREQUENCY: 0
SHORTNESS OF BREATH: 0
JOINT SWELLING: 0
HEMATOCHEZIA: 0
SORE THROAT: 0
ARTHRALGIAS: 0
CHILLS: 0
NERVOUS/ANXIOUS: 0
ABDOMINAL PAIN: 0
HEMATURIA: 0
BREAST MASS: 0

## 2022-03-23 NOTE — PROGRESS NOTES
SUBJECTIVE:   CC: Enid Lombardo is an 62 year old woman who presents for preventive health visit.       Patient has been advised of split billing requirements and indicates understanding: Yes  Healthy Habits:     Getting at least 3 servings of Calcium per day:  Yes    Bi-annual eye exam:  Yes    Dental care twice a year:  Yes    Sleep apnea or symptoms of sleep apnea:  None    Diet:  Regular (no restrictions)    Frequency of exercise:  6-7 days/week    Duration of exercise:  30-45 minutes    Taking medications regularly:  Yes    Medication side effects:  None    PHQ-2 Total Score: 0    Additional concerns today:  No        Today's PHQ-2 Score:   PHQ-2 ( 1999 Pf[employer]) 3/20/2022   Q1: Little interest or pleasure in doing things 0   Q2: Feeling down, depressed or hopeless 0   PHQ-2 Score 0   PHQ-2 Total Score (12-17 Years)- Positive if 3 or more points; Administer PHQ-A if positive -   Q1: Little interest or pleasure in doing things Not at all   Q2: Feeling down, depressed or hopeless Not at all   PHQ-2 Score 0       Abuse: Current or Past (Physical, Sexual or Emotional) - No  Do you feel safe in your environment? Yes        Social History     Tobacco Use     Smoking status: Never Smoker     Smokeless tobacco: Never Used   Substance Use Topics     Alcohol use: Yes     Comment: 4 drinks per month      If you drink alcohol do you typically have >3 drinks per day or >7 drinks per week? No    Alcohol Use 3/23/2022   Prescreen: >3 drinks/day or >7 drinks/week? -   Prescreen: >3 drinks/day or >7 drinks/week? No       Reviewed orders with patient.  Reviewed health maintenance and updated orders accordingly - Yes      Breast Cancer Screening:  Any new diagnosis of family breast, ovarian, or bowel cancer? No    FHS-7: No flowsheet data found.    Mammogram Screening: Recommended mammography every 1-2 years with patient discussion and risk factor consideration  Pertinent mammograms are reviewed under the imaging  "tab.    History of abnormal Pap smear: Status post benign hysterectomy. Health Maintenance and Surgical History updated.  PAP / HPV 5/19/2014 8/14/2009 8/8/2006   PAP (Historical) NIL NIL NIL     Reviewed and updated as needed this visit by clinical staff   Tobacco  Allergies  Meds  Problems  Med Hx  Surg Hx  Fam Hx  Soc   Hx        Reviewed and updated as needed this visit by Provider   Tobacco  Allergies  Meds  Problems  Med Hx  Surg Hx  Fam Hx             Review of Systems   Constitutional: Negative for chills and fever.   HENT: Negative for congestion, ear pain, hearing loss and sore throat.    Eyes: Negative for pain and visual disturbance.   Respiratory: Negative for cough and shortness of breath.    Cardiovascular: Negative for chest pain, palpitations and peripheral edema.   Gastrointestinal: Negative for abdominal pain, constipation, diarrhea, heartburn, hematochezia and nausea.   Breasts:  Negative for tenderness, breast mass and discharge.   Genitourinary: Negative for dysuria, frequency, genital sores, hematuria, pelvic pain, urgency, vaginal bleeding and vaginal discharge.   Musculoskeletal: Negative for arthralgias, joint swelling and myalgias.   Skin: Negative for rash.   Neurological: Negative for dizziness, weakness, headaches and paresthesias.   Psychiatric/Behavioral: Negative for mood changes. The patient is not nervous/anxious.           OBJECTIVE:   /80   Pulse 70   Temp 97.8  F (36.6  C) (Temporal)   Resp 16   Ht 1.651 m (5' 5\")   Wt 81.4 kg (179 lb 8 oz)   LMP 10/14/2001 (Exact Date)   SpO2 95%   BMI 29.87 kg/m    Physical Exam  GENERAL: healthy, alert and no distress  RESP: lungs clear to auscultation - no rales, rhonchi or wheezes  BREAST: L breast with lump in lateral 5 oclock area noted  CV: regular rate and rhythm, normal S1 S2, no S3 or S4, no murmur, click or rub, no peripheral edema and peripheral pulses strong  ABDOMEN: soft, nontender, no " hepatosplenomegaly, no masses and bowel sounds normal  MS: no gross musculoskeletal defects noted, no edema  SKIN: no suspicious lesions or rashes  NEURO: Normal strength and tone, mentation intact and speech normal  PSYCH: mentation appears normal, affect normal/bright        ASSESSMENT/PLAN:       ICD-10-CM    1. Routine general medical examination at a health care facility  Z00.00    2. H/O herpes zoster  Z86.19    3. History of right breast cancer  Z85.3    4. Hyperlipidemia with target LDL less than 130  E78.5 Lipid panel reflex to direct LDL Fasting     atorvastatin (LIPITOR) 10 MG tablet   5. Chronic rhinitis  J31.0 fluticasone (FLONASE) 50 MCG/ACT nasal spray   6. Cervical radicular pain  M54.12 gabapentin (NEURONTIN) 100 MG capsule   7. Tendinitis of right rotator cuff  M75.81    8. Gastroesophageal reflux disease without esophagitis  K21.9    9. Hot flushes, perimenopausal  N95.1 sertraline (ZOLOFT) 25 MG tablet     Patient breast cancer history and mastectomy and has been continuing to follow-up with breast MRI.  Her next one is due in just over a month though today her exam found a new breast lump on her left.  We advised that imaging would be needed to evaluate this though oftentimes a breast ultrasound can be used as an intermediary given her history my guess is at breast MRI early would be advised.  We did reach out to her oncologist to clarify their preferences as well and will get her scheduled soon.  Patient is also due for shingles vaccination and this was given today as well as she is due for her lab work which was completed.  She has been able to reduce medications for her reflux as well as her cervical radicular pain though is still needing low-dose gabapentin TID.  Hot flushes have significantly improved though she has not done a trial off of Zoloft yet so would like a renewal of this today.    Patient has been advised of split billing requirements and indicates understanding:  "Yes    COUNSELING:  Reviewed preventive health counseling, as reflected in patient instructions       Regular exercise       Healthy diet/nutrition    Estimated body mass index is 29.87 kg/m  as calculated from the following:    Height as of this encounter: 1.651 m (5' 5\").    Weight as of this encounter: 81.4 kg (179 lb 8 oz).    Weight management plan: Discussed healthy diet and exercise guidelines    She reports that she has never smoked. She has never used smokeless tobacco.      Counseling Resources:  ATP IV Guidelines  Pooled Cohorts Equation Calculator  Breast Cancer Risk Calculator  BRCA-Related Cancer Risk Assessment: FHS-7 Tool  FRAX Risk Assessment  ICSI Preventive Guidelines  Dietary Guidelines for Americans, 2010  USDA's MyPlate  ASA Prophylaxis  Lung CA Screening    Shi Alonso MD, MD  [facility]    "

## 2022-03-24 ENCOUNTER — ANCILLARY PROCEDURE (OUTPATIENT)
Dept: ULTRASOUND IMAGING | Facility: CLINIC | Age: 62
End: 2022-03-24
Attending: FAMILY MEDICINE
Payer: COMMERCIAL

## 2022-03-24 DIAGNOSIS — N63.0 LUMP OR MASS IN BREAST: ICD-10-CM

## 2022-03-24 PROCEDURE — 76642 ULTRASOUND BREAST LIMITED: CPT | Mod: LT | Performed by: RADIOLOGY

## 2022-06-06 DIAGNOSIS — N95.1 HOT FLUSHES, PERIMENOPAUSAL: ICD-10-CM

## 2022-06-07 RX ORDER — SERTRALINE HYDROCHLORIDE 25 MG/1
TABLET, FILM COATED ORAL
Qty: 90 TABLET | Refills: 1 | OUTPATIENT
Start: 2022-06-07

## 2022-06-16 DIAGNOSIS — M89.9 BONE LESION: Primary | ICD-10-CM

## 2022-06-16 NOTE — PROGRESS NOTES
Ariana has an abnormality in her breast MRI on the right side.  She has a history of bilateral mastectomy with Jennifer flap reconstruction.  The MRI shows 2 separate masses which are new from the previous MRI she is scheduled for a biopsy.  There was also concern about a several millimeter enhancing focus on the right side of the sternum.  I will order a bone scan to further evaluate this.    Her disease has seem to be locally persistent however I would be very surprised if she has disease in her sternum.  Her Oncotype score was a 0.    Patient and I have been communicating via [employer].    Ragini Muniz PA-C

## 2022-06-20 ENCOUNTER — ANCILLARY PROCEDURE (OUTPATIENT)
Dept: ULTRASOUND IMAGING | Facility: CLINIC | Age: 62
End: 2022-06-20
Attending: PHYSICIAN ASSISTANT
Payer: COMMERCIAL

## 2022-06-20 DIAGNOSIS — R92.8 ABNORMAL MRI, BREAST: ICD-10-CM

## 2022-06-20 PROCEDURE — 19083 BX BREAST 1ST LESION US IMAG: CPT | Mod: RT | Performed by: STUDENT IN AN ORGANIZED HEALTH CARE EDUCATION/TRAINING PROGRAM

## 2022-06-20 PROCEDURE — 88305 TISSUE EXAM BY PATHOLOGIST: CPT | Performed by: PATHOLOGY

## 2022-06-20 PROCEDURE — 76641 ULTRASOUND BREAST COMPLETE: CPT | Mod: RT | Performed by: STUDENT IN AN ORGANIZED HEALTH CARE EDUCATION/TRAINING PROGRAM

## 2022-06-21 ENCOUNTER — ANCILLARY PROCEDURE (OUTPATIENT)
Dept: NUCLEAR MEDICINE | Facility: CLINIC | Age: 62
End: 2022-06-21
Attending: PHYSICIAN ASSISTANT
Payer: COMMERCIAL

## 2022-06-21 DIAGNOSIS — N95.1 HOT FLUSHES, PERIMENOPAUSAL: ICD-10-CM

## 2022-06-21 DIAGNOSIS — M89.9 BONE LESION: ICD-10-CM

## 2022-06-21 PROCEDURE — A9503 TC99M MEDRONATE: HCPCS

## 2022-06-21 PROCEDURE — 78306 BONE IMAGING WHOLE BODY: CPT

## 2022-06-21 RX ORDER — TC 99M MEDRONATE 20 MG/10ML
26.5 INJECTION, POWDER, LYOPHILIZED, FOR SOLUTION INTRAVENOUS ONCE
Status: COMPLETED | OUTPATIENT
Start: 2022-06-21 | End: 2022-06-21

## 2022-06-21 RX ADMIN — TC 99M MEDRONATE 26.5 MCI.: 20 INJECTION, POWDER, LYOPHILIZED, FOR SOLUTION INTRAVENOUS at 10:50

## 2022-06-21 NOTE — TELEPHONE ENCOUNTER
[facility]     Reference # [id number]  Call back number if any questions: [phone]

## 2022-06-22 RX ORDER — SERTRALINE HYDROCHLORIDE 25 MG/1
25 TABLET, FILM COATED ORAL DAILY
Qty: 90 TABLET | Refills: 1 | OUTPATIENT
Start: 2022-06-22

## 2022-06-22 NOTE — TELEPHONE ENCOUNTER
Was sent on 3/23/22 with refills, should not be out at this time,     Kim Santo RN  [facility] ~ Registered Nurse  Clinic Triage ~ [place] & [place]  June 22, 2022

## 2022-06-23 LAB
PATH REPORT.COMMENTS IMP SPEC: NORMAL
PATH REPORT.COMMENTS IMP SPEC: NORMAL
PATH REPORT.FINAL DX SPEC: NORMAL
PATH REPORT.GROSS SPEC: NORMAL
PATH REPORT.MICROSCOPIC SPEC OTHER STN: NORMAL
PATH REPORT.RELEVANT HX SPEC: NORMAL
PHOTO IMAGE: NORMAL

## 2022-06-24 ENCOUNTER — TELEPHONE (OUTPATIENT)
Dept: GENERAL RADIOLOGY | Facility: CLINIC | Age: 62
End: 2022-06-24

## 2022-06-24 NOTE — TELEPHONE ENCOUNTER
Spoke to patient regarding Right breast biopsy done on 6/20/22 with finding of fat necrosis. Notified patient that the Radiologist recommendation is One-year follow-up breast MRI, order placed and patient given Imaging scheduling number to call to schedule. Patient verbalized understanding and all questions and concerns answered to patients satisfaction.

## 2022-07-14 ENCOUNTER — ALLIED HEALTH/NURSE VISIT (OUTPATIENT)
Dept: FAMILY MEDICINE | Facility: OTHER | Age: 62
End: 2022-07-14
Payer: COMMERCIAL

## 2022-07-14 DIAGNOSIS — Z23 NEED FOR SHINGLES VACCINE: Primary | ICD-10-CM

## 2022-07-14 PROCEDURE — 90750 HZV VACC RECOMBINANT IM: CPT

## 2022-07-14 PROCEDURE — 90471 IMMUNIZATION ADMIN: CPT

## 2022-09-02 DIAGNOSIS — J31.0 CHRONIC RHINITIS: ICD-10-CM

## 2022-09-07 RX ORDER — LORATADINE 10 MG/1
TABLET ORAL
Qty: 90 TABLET | Refills: 1 | Status: SHIPPED | OUTPATIENT
Start: 2022-09-07 | End: 2023-02-28

## 2022-10-09 ENCOUNTER — HEALTH MAINTENANCE LETTER (OUTPATIENT)
Age: 62
End: 2022-10-09

## 2022-11-01 ENCOUNTER — OFFICE VISIT (OUTPATIENT)
Dept: FAMILY MEDICINE | Facility: OTHER | Age: 62
End: 2022-11-01
Payer: COMMERCIAL

## 2022-11-01 ENCOUNTER — MYC MEDICAL ADVICE (OUTPATIENT)
Dept: FAMILY MEDICINE | Facility: OTHER | Age: 62
End: 2022-11-01

## 2022-11-01 VITALS
DIASTOLIC BLOOD PRESSURE: 72 MMHG | TEMPERATURE: 98.9 F | HEART RATE: 72 BPM | HEIGHT: 65 IN | RESPIRATION RATE: 16 BRPM | OXYGEN SATURATION: 98 % | BODY MASS INDEX: 29.82 KG/M2 | SYSTOLIC BLOOD PRESSURE: 120 MMHG | WEIGHT: 179 LBS

## 2022-11-01 DIAGNOSIS — R07.0 THROAT PAIN: Primary | ICD-10-CM

## 2022-11-01 DIAGNOSIS — C50.919 RECURRENT MALIGNANT NEOPLASM OF BREAST, UNSPECIFIED LATERALITY (H): ICD-10-CM

## 2022-11-01 DIAGNOSIS — Z20.818 STREPTOCOCCUS EXPOSURE: ICD-10-CM

## 2022-11-01 LAB
DEPRECATED S PYO AG THROAT QL EIA: NEGATIVE
GROUP A STREP BY PCR: NOT DETECTED

## 2022-11-01 PROCEDURE — 99213 OFFICE O/P EST LOW 20 MIN: CPT | Performed by: PHYSICIAN ASSISTANT

## 2022-11-01 PROCEDURE — 87651 STREP A DNA AMP PROBE: CPT | Performed by: PHYSICIAN ASSISTANT

## 2022-11-01 ASSESSMENT — PAIN SCALES - GENERAL: PAINLEVEL: NO PAIN (0)

## 2022-11-01 ASSESSMENT — ENCOUNTER SYMPTOMS: SORE THROAT: 1

## 2022-11-01 NOTE — TELEPHONE ENCOUNTER
Pt is scheduled for an appointment but was scheduled as a virtual. Provider indicated this needs to be face to face. Called pt and updated her. She is able to be to the clinic by 0910. Appointment changed.     Grecia Galaviz, MOOSEN, RN, PHN  Registered Nurse-Clinic Triage  [facility]  11/1/2022 at 8:29 AM

## 2022-11-01 NOTE — PROGRESS NOTES
"  Assessment & Plan     Throat pain  Streptococcus exposure  Negative for strep throat today.  Awaiting culture.  Home remedies encouraged.  Follow-up as needed.    - Streptococcus A Rapid Screen w/Reflex to PCR - Clinic Collect    Recurrent malignant neoplasm of breast, unspecified laterality (H)  Historically noted.  No new concerns.  Ongoing follow-up via specialist is encouraged PRN     BMI:   Estimated body mass index is 29.79 kg/m  as calculated from the following:    Height as of this encounter: 1.651 m (5' 5\").    Weight as of this encounter: 81.2 kg (179 lb).   Weight management plan: Patient was referred to their PCP to discuss a diet and exercise plan.    No follow-ups on file.    KAM[facility] REJI Rossi is a 62 year old, presenting for the following health issues:  Pharyngitis  She has tested negative for COVID-19 at home.    Pharyngitis     History of Present Illness       Reason for visit:  Strep  Symptom onset:  1-3 days ago  Symptoms include:  Minor sore throat, chills, body aches  Symptom intensity:  Mild  Symptom progression:  Worsening  Had these symptoms before:  No  What makes it worse:  Daily activity  What makes it better:  Advil    She eats 4 or more servings of fruits and vegetables daily.She consumes 1 sweetened beverage(s) daily.She exercises with enough effort to increase her heart rate 30 to 60 minutes per day.  She exercises with enough effort to increase her heart rate 6 days per week.   She is taking medications regularly.     Review of Systems   HENT: Positive for sore throat.       Constitutional, HEENT, cardiovascular, pulmonary, GI, , musculoskeletal, neuro, skin, endocrine and psych systems are negative, except as otherwise noted.      Objective    /72 (BP Location: Right arm, Patient Position: Sitting, Cuff Size: Adult Regular)   Pulse 72   Temp 98.9  F (37.2  C) (Temporal)   Resp 16   Ht 1.651 m (5' 5\")   Wt 81.2 " kg (179 lb)   LMP 10/14/2001 (Exact Date)   SpO2 98%   BMI 29.79 kg/m    Body mass index is 29.79 kg/m .  Physical Exam   GENERAL: healthy, alert and no distress  HENT: ear canals and TM's normal, nose and mouth without ulcers or lesions  NECK: no adenopathy, no asymmetry, masses, or scars and thyroid normal to palpation  RESP: lungs clear to auscultation - no rales, rhonchi or wheezes  CV: regular rate and rhythm, normal S1 S2, no S3 or S4, no murmur, click or rub, no peripheral edema and peripheral pulses strong  ABDOMEN: soft, nontender, no hepatosplenomegaly, no masses and bowel sounds normal  MS: no gross musculoskeletal defects noted, no edema    No results found. However, due to the size of the patient record, not all encounters were searched. Please check Results Review for a complete set of results.

## 2022-11-15 DIAGNOSIS — H35.363 DRUSEN OF MACULA OF BOTH EYES: Primary | ICD-10-CM

## 2022-11-30 NOTE — PROGRESS NOTES
CANCER SURVIVORSHIP VISIT-virtual  Follow up visit  Dec 2, 2022    Care Team   Primary Care Provider Shi Alonso   Surgeon  Rakesh Sanchez MD   Radiation Oncologist Not listed   Medical Oncologist  Kaden Holt MD, Charity Brar MD       REASON FOR VISIT: survivorship visit for history of breast cancer    CANCER HISTORY AND TREATMENT:    History of right-sided stage IA breast cancer, ER/HI postive, HER2 negative, diagnosed in 2004 and again in 2018.  *Diagnosis: 12/2004. Abnormal screening mammogram age 44  *Surgery: 01/07/2005 right-sided lumpectomy with sentinel lymph node dissection. Infiltrating ductal carcinoma, grade 1, 0.6cm, 0/3 nodes, stage IA (TIb N0 M0).   *Radiation: right breast radiotherapy  *Endocrine therapy: Tamoxifen 3/2005-11/2008 (stopped after dx of cataracts)  *Local recurrence: calcifications on screening mammogram   *Surgery #2: 7/2018, s/p bilateral mastectomy with reconstruction, final pathology revealed 8 mm invasive cancer, total 4 cm DCIS; stage I (T1bN0),  ER+ HI+ HER2 negative. Oncotype 0.   *Endocrine therapy: anastrozole 10/6570-7013  *Genetic testing: Negative in 2018  *Other information: KISHOR flap reconstruction with Dr Amin. Osteopenia,  poorly tolerated fosamax and zoledronic acid.     INTERVAL HISTORY:     Enid returns after 1 year for a cancer survivorship follow up visit. She has no specific concerns today. She had an abnormality on her right reconstructed (KISHOR) breast on MRI (biopsy negative- fat necrosis) and an abnormality on the right side of her sternum (bone scan was ordered which was unremarkable). Radiology recommended 1 year follow-up MRI (scheduled for June). She had her annual PCP wellness exam 3/2022 who does her chest wall exams.     No new lumps/bumps or new pain.     Late effects: none    Mood: good    Sexual health: no concerns    She has had two URI's recently, improving. She has had her covid booster and flu shot this fall.         Past Medical  History:   Diagnosis Date     Breast cancer (H)     lumpectomy, radiation, tamoxifen     Cancer (H)     Breast     Cataracts, bilateral      Complication of anesthesia     She prefers not to have versed until right before she leaves or can have after      Enthesopathy of hip region     right hip     Esophageal reflux      History of gestational diabetes      Hypertension      Macular drusen      PONV (postoperative nausea and vomiting)      Shingles 2012    left T6-T7 dermatome       Current Outpatient Medications   Medication Sig Dispense Refill     atorvastatin (LIPITOR) 10 MG tablet Take 1 tablet (10 mg) by mouth daily 90 tablet 3     Calcium-Vitamin D-Vitamin K (VIACTIV CALCIUM PLUS D PO) Take 1 tablet by mouth 2 times daily       fluticasone (FLONASE) 50 MCG/ACT nasal spray INSTILL 1-2 SPRAYS INTO EACH NOSTRIL DAIY 48 mL 3     gabapentin (NEURONTIN) 100 MG capsule TAKE 1 CAPSULE BY MOUTH THREE TIMES A  capsule 3     loratadine (CLARITIN) 10 MG tablet TAKE 1 TABLET BY MOUTH EVERY DAY 90 tablet 1     melatonin 5 MG tablet Take 5 mg by mouth At Bedtime        Multiple Vitamins-Minerals (PRESERVISION AREDS) CAPS Take 1 capsule by mouth 2 times daily (Patient taking differently: Take 1 capsule by mouth every morning) 180 capsule 3     Omega-3 Fatty Acids (FISH OIL) 1000 MG CPDR Take by mouth every morning        sertraline (ZOLOFT) 25 MG tablet Take 1 tablet (25 mg) by mouth daily 90 tablet 1          Allergies   Allergen Reactions     Alphagan P Rash     Thrush and numbness in mouth       Family History   Adopted: Yes   Problem Relation Age of Onset     Cancer Mother         lung cancer  at age 52     Thyroid Disease Sister         half sister, had thyroid removed     Unknown/Adopted Father      Unknown/Adopted Maternal Grandmother      Unknown/Adopted Maternal Grandfather      Unknown/Adopted Paternal Grandmother      Unknown/Adopted Paternal Grandfather      Glaucoma No family  hx of      Diabetes No family hx of      Melanoma No family hx of      Skin Cancer No family hx of    ?  Enid is adopted and has limited information on her biological family.   ? One maternal half-sister has thyroid issues.  ? Mother  at 52 from lung cancer.  ? Maternal grandfather  from colon cancer.  ? Paternal family history is unknown.   ? Enid has three healthy adult children.     Social history:  Living situation: , 3 children  Occupation: Teacher, retired  Tobacco use: never smoker  ETOH use: 2 drinks a months  Frequency of exercise:  Gets 150 min of exercise a week  Diet: good    Video physical exam  BMI 29 2022  General: Patient appears well in no acute distress.   Skin: No visualized rash or lesions on visualized skin  Eyes: EOMI, no erythema, sclera icterus or discharge noted  Resp: Appears to be breathing comfortably without accessory muscle usage, speaking in full sentences, no cough  MSK: Appears to have normal range of motion based on visualized movements  Neurologic: No apparent tremors, facial movements symmetric  Psych: affect versatile, alert and oriented     IMPRESSION/PLAN:    History of right-sided stage IA breast cancer, ER/LA postive, HER2 negative, diagnosed in 2004 and again in 2018.  S/p lumpectomy, radiotherapy and 3 years of Tamoxifen followed by bilateral mastectomy, KISHOR flap reconstruction and 2 years of AI therapy. Oncotype 0.   She is 3 years from her latest diagnosis  MRI in  to assess stability in right reconstructed breast.  1 year follow-up virtually (chest wall exams through her PCP)    Potential late effects related to surgery/radiation:  -Risk of lymphedema: If she notices any swelling in her arm, she should be offered a referral to lymphedema physical therapy.     Potential late effects related to radiation:  - Possible radiation side effects include cardiotoxicity, lung injury, fracture, and second malignancies. She should seek medical attention if  she notices any new skin lesions in the area of radiation. If she should develop any shortness of breath, hemoptysis, cough, or new pain, I would advise further evaluation with CT or X-ray.     Potential effects related to endocrine therapy:  Risk of developing osteoporosis: Last DEXA in 2021 with a lowest T-score of -1.6 with improvement, Recommend weightbearing exercises 2-3 days per week, and to supplement with 1200 mg of calcium, 1000 international units of vitamin D daily. Further bone density monitoring via her PCP.     General late effects screenings and recommendations    -Cancer screening. She should undergo routine screening for women in her age group. Colonoscopy due 2026.  S/p hysterctomy    -Healthy lifestyle. She should maintain a healthy weight with a BMI between 20-25. She should exercise at least 150 minutes weekly at moderate intensity. She should see the eye doctor every 1-2 years, and dentist every 6 months for cleanings. She should not use any tobacco. She should minimize alcohol intake. If continuing to drink, should follow CDC recommendations for no more than 1 alcoholic drink/day for women.    -Genetic testing. She has had genetic testing. Given her young age at diagnosis, recurrenct disease, and limited knowledge about FH, I will plan to refer her back to [facility] in 2023, 5 years from 2018 testing.     Gave resources for our [facility] Cancer Survivorship class series and mailings list for educational opportunities. [email or web address]    Cancer treatment summary was sent electronically to the patient and her PCP.      The total time of this encounter amounted to 30 minutes.This time included time spent with the patient, reviewing her chart, and performing post visit documentation.    Ragini Muniz MPAS, PA-C  [facility] Cancer Survivorship [facility]

## 2022-12-02 ENCOUNTER — VIRTUAL VISIT (OUTPATIENT)
Dept: ONCOLOGY | Facility: CLINIC | Age: 62
End: 2022-12-02
Attending: PHYSICIAN ASSISTANT
Payer: COMMERCIAL

## 2022-12-02 DIAGNOSIS — Z85.3 HISTORY OF RIGHT BREAST CANCER: Primary | ICD-10-CM

## 2022-12-02 PROCEDURE — G0463 HOSPITAL OUTPT CLINIC VISIT: HCPCS | Mod: PN,GT | Performed by: PHYSICIAN ASSISTANT

## 2022-12-02 PROCEDURE — 99214 OFFICE O/P EST MOD 30 MIN: CPT | Mod: 95 | Performed by: PHYSICIAN ASSISTANT

## 2022-12-02 NOTE — PROGRESS NOTES
Enid is a 62 year old who is being evaluated via a billable video visit.      How would you like to obtain your AVS? Shoplocalhart  If the video visit is dropped, the invitation should be resent by: Text to cell phone: [phone]  Will anyone else be joining your video visit? No        Video-Visit Details    Video Start Time: 8:53 AM    Type of service:  Video Visit    Video End Time:9:05 AM    Originating Location (pt. Location): Home        Distant Location (provider location):  On-site    Platform used for Video Visit: [employer]Well

## 2022-12-02 NOTE — LETTER
12/2/2022         RE: Enid Lombardo  [address] [facility] [address]      CANCER SURVIVORSHIP VISIT-virtual  Follow up visit  Dec 2, 2022    Care Team   Primary Care Provider Shi Alonso   Surgeon  Rakesh Sanchez MD   Radiation Oncologist Not listed   Medical Oncologist  Kaden Holt MD, Charity Brar MD       REASON FOR VISIT: survivorship visit for history of breast cancer    CANCER HISTORY AND TREATMENT:    History of right-sided stage IA breast cancer, ER/VT postive, HER2 negative, diagnosed in 2004 and again in 2018.  *Diagnosis: 12/2004. Abnormal screening mammogram age 44  *Surgery: 01/07/2005 right-sided lumpectomy with sentinel lymph node dissection. Infiltrating ductal carcinoma, grade 1, 0.6cm, 0/3 nodes, stage IA (TIb N0 M0).   *Radiation: right breast radiotherapy  *Endocrine therapy: Tamoxifen 3/2005-11/2008 (stopped after dx of cataracts)  *Local recurrence: calcifications on screening mammogram   *Surgery #2: 7/2018, s/p bilateral mastectomy with reconstruction, final pathology revealed 8 mm invasive cancer, total 4 cm DCIS; stage I (T1bN0),  ER+ VT+ HER2 negative. Oncotype 0.   *Endocrine therapy: anastrozole 10/2028-7962  *Genetic testing: Negative in 2018  *Other information: KISHOR flap reconstruction with Dr Amin. Osteopenia,  poorly tolerated fosamax and zoledronic acid.     INTERVAL HISTORY:     Enid returns after 1 year for a cancer survivorship follow up visit. She has no specific concerns today. She had an abnormality on her right reconstructed (KISHOR) breast on MRI (biopsy negative- fat necrosis) and an abnormality on the right side of her sternum (bone scan was ordered which was unremarkable). Radiology recommended 1 year follow-up MRI (scheduled for June). She had her annual PCP wellness exam 3/2022 who does her chest wall exams.     No new lumps/bumps or new pain.     Late effects: none    Mood: good    Sexual health: no concerns    She has had two URI's recently,  improving. She has had her covid booster and flu shot this fall.         Past Medical History:   Diagnosis Date     Breast cancer (H)     lumpectomy, radiation, tamoxifen     Cancer (H)     Breast     Cataracts, bilateral      Complication of anesthesia     She prefers not to have versed until right before she leaves or can have after      Enthesopathy of hip region     right hip     Esophageal reflux      History of gestational diabetes      Hypertension      Macular drusen      PONV (postoperative nausea and vomiting)      Shingles 2012    left T6-T7 dermatome       Current Outpatient Medications   Medication Sig Dispense Refill     atorvastatin (LIPITOR) 10 MG tablet Take 1 tablet (10 mg) by mouth daily 90 tablet 3     Calcium-Vitamin D-Vitamin K (VIACTIV CALCIUM PLUS D PO) Take 1 tablet by mouth 2 times daily       fluticasone (FLONASE) 50 MCG/ACT nasal spray INSTILL 1-2 SPRAYS INTO EACH NOSTRIL DAIY 48 mL 3     gabapentin (NEURONTIN) 100 MG capsule TAKE 1 CAPSULE BY MOUTH THREE TIMES A  capsule 3     loratadine (CLARITIN) 10 MG tablet TAKE 1 TABLET BY MOUTH EVERY DAY 90 tablet 1     melatonin 5 MG tablet Take 5 mg by mouth At Bedtime        Multiple Vitamins-Minerals (PRESERVISION AREDS) CAPS Take 1 capsule by mouth 2 times daily (Patient taking differently: Take 1 capsule by mouth every morning) 180 capsule 3     Omega-3 Fatty Acids (FISH OIL) 1000 MG CPDR Take by mouth every morning        sertraline (ZOLOFT) 25 MG tablet Take 1 tablet (25 mg) by mouth daily 90 tablet 1          Allergies   Allergen Reactions     Alphagan P Rash     Thrush and numbness in mouth       Family History   Adopted: Yes   Problem Relation Age of Onset     Cancer Mother         lung cancer  at age 52     Thyroid Disease Sister         half sister, had thyroid removed     Unknown/Adopted Father      Unknown/Adopted Maternal Grandmother      Unknown/Adopted Maternal Grandfather      Unknown/Adopted  Paternal Grandmother      Unknown/Adopted Paternal Grandfather      Glaucoma No family hx of      Diabetes No family hx of      Melanoma No family hx of      Skin Cancer No family hx of    ?  Enid is adopted and has limited information on her biological family.   ? One maternal half-sister has thyroid issues.  ? Mother  at 52 from lung cancer.  ? Maternal grandfather  from colon cancer.  ? Paternal family history is unknown.   ? Enid has three healthy adult children.     Social history:  Living situation: , 3 children  Occupation: Teacher, retired  Tobacco use: never smoker  ETOH use: 2 drinks a months  Frequency of exercise:  Gets 150 min of exercise a week  Diet: good    Video physical exam  BMI 29 2022  General: Patient appears well in no acute distress.   Skin: No visualized rash or lesions on visualized skin  Eyes: EOMI, no erythema, sclera icterus or discharge noted  Resp: Appears to be breathing comfortably without accessory muscle usage, speaking in full sentences, no cough  MSK: Appears to have normal range of motion based on visualized movements  Neurologic: No apparent tremors, facial movements symmetric  Psych: affect versatile, alert and oriented     IMPRESSION/PLAN:    History of right-sided stage IA breast cancer, ER/HI postive, HER2 negative, diagnosed in 2004 and again in 2018.  S/p lumpectomy, radiotherapy and 3 years of Tamoxifen followed by bilateral mastectomy, KISHOR flap reconstruction and 2 years of AI therapy. Oncotype 0.   She is 3 years from her latest diagnosis  MRI in  to assess stability in right reconstructed breast.  1 year follow-up virtually (chest wall exams through her PCP)    Potential late effects related to surgery/radiation:  -Risk of lymphedema: If she notices any swelling in her arm, she should be offered a referral to lymphedema physical therapy.     Potential late effects related to radiation:  - Possible radiation side effects include cardiotoxicity,  lung injury, fracture, and second malignancies. She should seek medical attention if she notices any new skin lesions in the area of radiation. If she should develop any shortness of breath, hemoptysis, cough, or new pain, I would advise further evaluation with CT or X-ray.     Potential effects related to endocrine therapy:  Risk of developing osteoporosis: Last DEXA in 2021 with a lowest T-score of -1.6 with improvement, Recommend weightbearing exercises 2-3 days per week, and to supplement with 1200 mg of calcium, 1000 international units of vitamin D daily. Further bone density monitoring via her PCP.     General late effects screenings and recommendations    -Cancer screening. She should undergo routine screening for women in her age group. Colonoscopy due 2026.  S/p hysterctomy    -Healthy lifestyle. She should maintain a healthy weight with a BMI between 20-25. She should exercise at least 150 minutes weekly at moderate intensity. She should see the eye doctor every 1-2 years, and dentist every 6 months for cleanings. She should not use any tobacco. She should minimize alcohol intake. If continuing to drink, should follow CDC recommendations for no more than 1 alcoholic drink/day for women.    -Genetic testing. She has had genetic testing. Given her young age at diagnosis, recurrenct disease, and limited knowledge about FH, I will plan to refer her back to [facility] in 2023, 5 years from 2018 testing.     Gave resources for our [facility] Cancer Survivorship class series and mailings list for educational opportunities. [email or web address]    Cancer treatment summary was sent electronically to the patient and her PCP.      The total time of this encounter amounted to 30 minutes.This time included time spent with the patient, reviewing her chart, and performing post visit documentation.    Ragini Muniz MPAS, PA-C  M [facility] Cancer Survivorship Vinay Rossi is a  62 year old who is being evaluated via a billable video visit.      How would you like to obtain your AVS? MyChart  If the video visit is dropped, the invitation should be resent by: Text to cell phone: [phone]  Will anyone else be joining your video visit? No        Video-Visit Details    Video Start Time: 8:53 AM    Type of service:  Video Visit    Video End Time:9:05 AM    Originating Location (pt. Location): Home        Distant Location (provider location):  On-site    Platform used for Video Visit: Alanis Muniz PA-C

## 2023-01-16 ENCOUNTER — MYC REFILL (OUTPATIENT)
Dept: FAMILY MEDICINE | Facility: OTHER | Age: 63
End: 2023-01-16

## 2023-01-16 DIAGNOSIS — N95.1 HOT FLUSHES, PERIMENOPAUSAL: ICD-10-CM

## 2023-01-16 RX ORDER — SERTRALINE HYDROCHLORIDE 25 MG/1
25 TABLET, FILM COATED ORAL DAILY
Qty: 90 TABLET | Refills: 0 | Status: SHIPPED | OUTPATIENT
Start: 2023-01-16 | End: 2023-04-17

## 2023-01-25 NOTE — PROGRESS NOTES
HPI:  Enid Lombardo is a 61 year old female who is seen in consultation at the request of Shi Alonso MD    Pt presents for eval of:   (Onset, Location, L/R, Character, Treatments, Injury if yes)    XR Right foot 5/17/2021     Onset March 2021, dorsal Right foot mass at incision site from surgery 40 years ago for a bone spur that has increased in size. No injury noted. Presents today with flimsy flip flops.  History of cancer.    Constant dull ache, swelling. Intermittent burning and throbbing pain at night  Ibuprofen, ice, heat, elevation, rest, no change.    Retired.    Weight management plan: Patient was referred to their PCP to discuss a diet and exercise plan.     ROS:  10 point ROS neg other than the symptoms noted above in the HPI.    Patient Active Problem List   Diagnosis     History of right breast cancer     Esophageal reflux     Disorder of synovium, tendon, and bursa     Cataract     Shingles     Pain, radicular, lumbar     Right hip pain     Low back pain     IT band syndrome     Macular drusen     Cervicalgia     Headache     Keratoconus     Meibomian gland dysfunction - Both Eyes     Tear film insufficiency     Hyperlipidemia with target LDL less than 130     Benign neoplasm of colon     Skin abscess     Osteopenia of both hands     Recurrent carcinoma in situ of breast, right     Breast cancer in situ     S/P breast reconstruction, bilateral     S/P flap graft     Osteopenia of multiple sites     Long term (current) use of aromatase inhibitors     Recurrent malignant neoplasm of breast, unspecified laterality (H)       PAST MEDICAL HISTORY:   Past Medical History:   Diagnosis Date     Breast cancer (H) 2004    lumpectomy, radiation, tamoxifen     Cancer (H) 2004    Breast     Cataracts, bilateral      Complication of anesthesia     She prefers not to have versed until right before she leaves or can have after      Enthesopathy of hip region 6/06    right hip     Esophageal reflux 2/06      History of gestational diabetes      Hypertension 2012     Macular drusen      PONV (postoperative nausea and vomiting)      Shingles 01/23/2012    left T6-T7 dermatome        PAST SURGICAL HISTORY:   Past Surgical History:   Procedure Laterality Date     BIOPSY  2004    Breast     BREAST SURGERY  2004     C VAGINAL HYSTERECTOMY  12/2001    Ovaries intact, due to fibroids     COLONOSCOPY       COLONOSCOPY N/A 1/11/2021    Procedure: COLONOSCOPY;  Surgeon: Iain Mercedes MD;  Location:  GI     ENDOSCOPY  05/09/2007    Upper GI     EYE SURGERY       GRAFT FAT TO BREAST Bilateral 11/29/2018    Procedure: Bilateral Breast Fat Grafting from Abdomen and Thighs;  Surgeon: DENNISE Amin MD;  Location: [facility]     GRAFT FREE VASCULARIZED TRANSVERSE RECTUS ABDOMINIS MYOCUTANEOUS Bilateral 10/8/2018    Procedure: GRAFT FREE VASCULARIZED TRANSVERSE RECTUS ABDOMINIS MYOCUTANEOUS;  Bilateral Deep Inferior Epigastric Artery  Free Flap Breast Reconstruction and Removal of Breast Explanders;  Surgeon: DENNISE Amin MD;  Location: [facility] OR     GYN SURGERY  2001     HC BIOPSY/EXCISION LYMPH NODE OPEN DEEP CERVICAL W EXC FAT PAD  1/7/2005    Right axillary sentinel node biopsy X 3.     HC COLONOSCOPY W BIOPSY  05/10/10     HC EXCISION BREAST LESION, OPEN >=1  1/7/2005    Right breast.     HC REMV CATARACT EXTRACAP,INSERT LENS, W/O ECP  12/18/08    bilateral     HC REMV TARSAL/METATARSAL BENIGN BONE LESN  1982    Bone spur - right     HYSTERECTOMY, PAP NO LONGER INDICATED       LAPAROSCOPIC HERNIORRHAPHY VENTRAL N/A 10/21/2019    Procedure: Laparoscopic Ventral Hernia Repair With Mesh;  Surgeon: Emil Brown MD;  Location: [facility] OR     MASTECTOMY SIMPLE BILATERAL, SENTINEL NODE BILATERAL, COMBINED Bilateral 8/22/2018    Procedure: COMBINED MASTECTOMY SIMPLE BILATERAL, SENTINEL NODE BILATERAL;  Bilateral Mastectomy with Right [place]ah Node Biopsy, Bilateral Breast Reconstruction, Anesthesia Block;   Surgeon: Rakesh Sanchez MD;  Location:  OR     ORTHOPEDIC SURGERY  1983    Right foot     RECONSTRUCT BREAST Bilateral 8/22/2018    Procedure: RECONSTRUCT BREAST;;  Surgeon: DENNISE Amin MD;  Location:  OR     RECONSTRUCT BREAST BILATERAL Bilateral 10/8/2018    Procedure: RECONSTRUCT BREAST BILATERAL;;  Surgeon: DENNISE Amin MD;  Location:  OR     RECONSTRUCT NIPPLE BILATERAL Bilateral 11/29/2018    Procedure: Nipple Reconstruction;  Surgeon: DENNISE Amin MD;  Location:  OR        MEDICATIONS:   Current Outpatient Medications:      atorvastatin (LIPITOR) 10 MG tablet, Take 1 tablet (10 mg) by mouth daily, Disp: 90 tablet, Rfl: 3     Calcium-Vitamin D-Vitamin K (VIACTIV CALCIUM PLUS D PO), Take 1 tablet by mouth 2 times daily, Disp: , Rfl:      diclofenac (VOLTAREN) 75 MG EC tablet, TAKE 1 TABLET BY MOUTH TWICE A DAY, Disp: 60 tablet, Rfl: 1     fluticasone (FLONASE) 50 MCG/ACT nasal spray, INSTILL 1-2 SPRAYS INTO EACH NOSTRIL JENNY, Disp: 48 mL, Rfl: 3     gabapentin (NEURONTIN) 100 MG capsule, TAKE 1 CAPSULE BY MOUTH THREE TIMES A DAY, Disp: 270 capsule, Rfl: 3     loratadine (CLARITIN) 10 MG tablet, Take 1 tablet (10 mg) by mouth daily, Disp: 90 tablet, Rfl: 1     melatonin 5 MG tablet, Take 5 mg by mouth At Bedtime , Disp: , Rfl:      Multiple Vitamins-Minerals (PRESERVISION AREDS) CAPS, Take 1 capsule by mouth 2 times daily (Patient taking differently: Take 1 capsule by mouth every morning ), Disp: 180 capsule, Rfl: 3     Omega-3 Fatty Acids (FISH OIL) 1000 MG CPDR, Take by mouth every morning , Disp: , Rfl:      omeprazole (PRILOSEC) 20 MG DR capsule, TAKE 1 CAPSULE (20 MG) BY MOUTH AS NEEDED (PT. TAKES BASED ON FOODS THAT DAY AT BEDTIME. 10/15/2019), Disp: 90 capsule, Rfl: 3     sertraline (ZOLOFT) 25 MG tablet, Take 1 tablet (25 mg) by mouth daily, Disp: 90 tablet, Rfl: 3     ALLERGIES:    Allergies   Allergen Reactions     Alphagan P Rash     Thrush and numbness in mouth         SOCIAL HISTORY:   Social History     Socioeconomic History     Marital status:      Spouse name: Carson     Number of children: 3     Years of education: 24     Highest education level: Not on file   Occupational History     Occupation: Teacher     Employer: [employer]   Social Needs     Financial resource strain: Not on file     Food insecurity     Worry: Not on file     Inability: Not on file     Transportation needs     Medical: Not on file     Non-medical: Not on file   Tobacco Use     Smoking status: Never Smoker     Smokeless tobacco: Never Used   Substance and Sexual Activity     Alcohol use: Yes     Comment: 4 drinks per month      Drug use: No     Sexual activity: Yes     Partners: Male     Birth control/protection: Surgical     Comment: hysterectomy   Lifestyle     Physical activity     Days per week: Not on file     Minutes per session: Not on file     Stress: Not on file   Relationships     Social connections     Talks on phone: Not on file     Gets together: Not on file     Attends [employer] service: Not on file     Active member of club or organization: Not on file     Attends meetings of clubs or organizations: Not on file     Relationship status: Not on file     Intimate partner violence     Fear of current or ex partner: Not on file     Emotionally abused: Not on file     Physically abused: Not on file     Forced sexual activity: Not on file   Other Topics Concern      Service No     Blood Transfusions No     Caffeine Concern Yes     Comment: 4 cups per day     Occupational Exposure No     Hobby Hazards No     Sleep Concern No     Stress Concern No     Weight Concern No     Special Diet No     Back Care No     Exercise No     Bike Helmet Yes     Seat Belt Yes     Self-Exams Yes     Parent/sibling w/ CABG, MI or angioplasty before 65F 55M? No     Comment: Unsure - adopted   Social History Narrative     Not on file        FAMILY HISTORY:   Family History   Adopted: Yes  "  Problem Relation Age of Onset     Cancer Mother         lung cancer  at age 52     Thyroid Disease Sister         half sister, had thyroid removed     Unknown/Adopted Father      Unknown/Adopted Maternal Grandmother      Unknown/Adopted Maternal Grandfather      Unknown/Adopted Paternal Grandmother      Unknown/Adopted Paternal Grandfather      Glaucoma No family hx of      Diabetes No family hx of      Melanoma No family hx of      Skin Cancer No family hx of         EXAM:Vitals: /70 (BP Location: Left arm, Patient Position: Sitting, Cuff Size: Adult Regular)   Ht 1.651 m (5' 5\")   Wt 84.8 kg (187 lb)   LMP 10/14/2001 (Exact Date)   BMI 31.12 kg/m    BMI= Body mass index is 31.12 kg/m .    General appearance: Patient is alert and fully cooperative with history & exam.  No sign of distress is noted during the visit.     Psychiatric: Affect is pleasant & appropriate.  Patient appears motivated to improve health.     Respiratory: Breathing is regular & unlabored while sitting.     HEENT: Hearing is intact to spoken word.  Speech is clear.  No gross evidence of visual impairment that would impact ambulation.     Vascular: DP & PT pulses are intact & regular bilaterally.  No significant edema or varicosities noted.  CFT and skin temperature is normal to both lower extremities.     Neurologic: Lower extremity sensation is intact to light touch.  No evidence of weakness or contracture in the lower extremities.  No evidence of neuropathy.    Dermatologic: Skin is intact to both lower extremities with adequate texture, turgor and tone about the integument.  No paronychia or evidence of soft tissue infection is noted.     Musculoskeletal: Patient is ambulatory without assistive device or brace.  Generalized valgus noted very flexible bilateral.  Hypermobility noted throughout the first metatarsal cuneiform joint bilateral with symptoms and crepitus throughout range of motion of the right first metatarsal and " second metatarsal cuneiform joints.  Palpable ganglion cyst is noted from this joint as well.    Radiographs: Three views right foot demonstrate joint space narrowing at the second metatarsocuneiform joint with increased soft tissue density at the first metatarsocuneiform joint on the right foot.  Pes valgus noted with diminished calcaneal inclination angle.     ASSESSMENT:       ICD-10-CM    1. Pes valgus  Q66.6 Orthotics, Prosthetics and Custom Compression Order for DME - ONLY FOR DME   2. Ganglion cyst of right foot  M67.471 Orthopedic & Spine  Referral     Orthotics, Prosthetics and Custom Compression Order for DME - ONLY FOR DME   3. Primary osteoarthritis of right foot  M19.071         PLAN:  Reviewed patient's chart in [facility].      5/24/2021   Obtained and interpreted radiographs  Recommended sturdy shoes that reduce flexion and movement through the right midfoot.  Recommended an orthotic that is conformed to her foot to provide stiffness throughout the first metatarsocuneiform joint.  Discussed aspiration and injection of the joint with steroid versus arthrodesis.  At this point in time she does not feel her symptoms warrant surgical arthrodesis of the joint.  We will attempt changes in shoe gear, written instructions dispensed, and custom molded orthotics order placed today first.    Follow-up in 5 weeks if this remains symptomatic.    Abraham Dominguez DPM           large/normal/pink

## 2023-01-27 ENCOUNTER — OFFICE VISIT (OUTPATIENT)
Dept: OPHTHALMOLOGY | Facility: CLINIC | Age: 63
End: 2023-01-27
Attending: OPHTHALMOLOGY
Payer: COMMERCIAL

## 2023-01-27 DIAGNOSIS — H35.363 DRUSEN OF MACULA OF BOTH EYES: ICD-10-CM

## 2023-01-27 PROCEDURE — G0463 HOSPITAL OUTPT CLINIC VISIT: HCPCS | Mod: 25

## 2023-01-27 PROCEDURE — 99213 OFFICE O/P EST LOW 20 MIN: CPT | Mod: GC | Performed by: OPHTHALMOLOGY

## 2023-01-27 PROCEDURE — 92134 CPTRZ OPH DX IMG PST SGM RTA: CPT | Performed by: OPHTHALMOLOGY

## 2023-01-27 PROCEDURE — 92015 DETERMINE REFRACTIVE STATE: CPT

## 2023-01-27 ASSESSMENT — CONF VISUAL FIELD
OS_INFERIOR_TEMPORAL_RESTRICTION: 0
OS_NORMAL: 1
OD_NORMAL: 1
OS_SUPERIOR_TEMPORAL_RESTRICTION: 0
OD_SUPERIOR_NASAL_RESTRICTION: 0
OD_INFERIOR_NASAL_RESTRICTION: 0
OS_INFERIOR_NASAL_RESTRICTION: 0
METHOD: COUNTING FINGERS
OD_INFERIOR_TEMPORAL_RESTRICTION: 0
OD_SUPERIOR_TEMPORAL_RESTRICTION: 0
OS_SUPERIOR_NASAL_RESTRICTION: 0

## 2023-01-27 ASSESSMENT — VISUAL ACUITY
METHOD: SNELLEN - LINEAR
OD_CC: 20/20
OS_CC: 20/20
CORRECTION_TYPE: GLASSES
OS_CC+: -1

## 2023-01-27 ASSESSMENT — REFRACTION_MANIFEST
OD_ADD: +2.50
OS_ADD: +2.50
OS_SPHERE: +0.25
OD_CYLINDER: +1.25
OD_AXIS: 050
OS_CYLINDER: +1.00
OS_AXIS: 160
OD_SPHERE: +0.75

## 2023-01-27 ASSESSMENT — REFRACTION_WEARINGRX
OS_ADD: +2.75
OD_AXIS: 040
OD_ADD: +2.75
OD_SPHERE: +1.00
SPECS_TYPE: PAL
OS_CYLINDER: +1.00
OD_CYLINDER: +1.00
OS_SPHERE: +0.25
OS_AXIS: 158

## 2023-01-27 ASSESSMENT — SLIT LAMP EXAM - LIDS
COMMENTS: NORMAL
COMMENTS: NORMAL

## 2023-01-27 ASSESSMENT — CUP TO DISC RATIO
OS_RATIO: 0.1
OD_RATIO: 0.2

## 2023-01-27 ASSESSMENT — TONOMETRY
OD_IOP_MMHG: 15
OS_IOP_MMHG: 13
IOP_METHOD: TONOPEN

## 2023-01-27 ASSESSMENT — EXTERNAL EXAM - RIGHT EYE: OD_EXAM: NORMAL

## 2023-01-27 ASSESSMENT — EXTERNAL EXAM - LEFT EYE: OS_EXAM: NORMAL

## 2023-01-27 NOTE — NURSING NOTE
Chief Complaints and History of Present Illnesses   Patient presents with     Follow Up     1 year follow-up Drusen OU     Chief Complaint(s) and History of Present Illness(es)     Follow Up            Laterality: both eyes    Treatments tried: no treatments    Pain scale: 0/10    Comments: 1 year follow-up Drusen OU          Comments    Pt states vision has been stable and the same since the last visit. No pain, redness, tearing. No flashes or new floaters.  Wants to update glasses RX today.     Luis Sousa 10:04 AM January 27, 2023

## 2023-01-27 NOTE — PROGRESS NOTES
CC - drusen    INTERVAL HISTORY -    No vision changes       PMH -  Enid Lombardo is a 62 year old  patient with drusen both eyes.   No h/o smoking.  H/o breast CA diagnosis ~ 2004, tamoxifen x 3 years. No va changes or amsler changes noted by patient.  New recurrence in 2018 Tx with mastectomy      PAST OCULAR SURGERY  CE/IOL OU ~2008  YAG OS      RETINAL IMAGING  OCT 1/27/23  OD - drusen, no fluid, PHF attached - stable compared to 7/30/21  OS -  drusen, no fluid, PHF attached - stable compared to 7/30/21    AF  9-29-14  both eyes - irregular c/w drusen      ASSESSMENT & PLAN  #.  intermediate Dry AMD OU   - initially not considered AMD, given age now considered AMD   - family history unknown (adopted)   - doubt from tamoxifen, no renal disease   - on AREDS    #.  Vitreous degeneration OU   - advised S/Sx RD 1/2023    #.  H/o breast CA s/p tamoxifen   - stopped ~2005. no ocular involvement    #  Pseudophakia OU      return to clinic: 1 year, OCT each eye, fundus photo OU    Maureen Ireland MD  Ophthalmology Resident, PGY-3  [facility]      ATTESTATION     Attending Physician Attestation:      Complete documentation of historical and exam elements from today's encounter can be found in the full encounter summary report (not reduplicated in this progress note).  I personally obtained the chief complaint(s) and history of present illness.  I confirmed and edited as necessary the review of systems, past medical/surgical history, family history, social history, and examination findings as documented by others; and I examined the patient myself.  I personally reviewed the relevant tests, images, and reports as documented above.  I personally reviewed the ophthalmic test(s) associated with this encounter, agree with the interpretation(s) as documented by the resident/fellow, and have edited the corresponding report(s) as necessary.   I formulated and edited as necessary the assessment and plan and  discussed the findings and management plan with the patient and family    Paz Osborn MD, PhD  , Vitreoretinal Surgery  Department of Ophthalmology  [facility]

## 2023-02-25 DIAGNOSIS — M54.12 CERVICAL RADICULAR PAIN: ICD-10-CM

## 2023-02-25 DIAGNOSIS — J31.0 CHRONIC RHINITIS: ICD-10-CM

## 2023-02-28 RX ORDER — LORATADINE 10 MG/1
TABLET ORAL
Qty: 90 TABLET | Refills: 1 | Status: SHIPPED | OUTPATIENT
Start: 2023-02-28 | End: 2023-08-15

## 2023-02-28 NOTE — TELEPHONE ENCOUNTER
Pending Prescriptions:                       Disp   Refills    gabapentin (NEURONTIN) 100 MG capsule [Pha*270 ca*3        Sig: TAKE 1 CAPSULE BY MOUTH THREE TIMES A DAY    Signed Prescriptions:                        Disp   Refills    loratadine (CLARITIN) 10 MG tablet         90 tab*1        Sig: TAKE 1 TABLET BY MOUTH EVERY DAY  Authorizing Provider: CAROLINE SAAVEDRA  Ordering User: OMI HERRERA    Routing refill request to provider for review/approval because:  Drug not on the [facility] refill protocol

## 2023-03-01 DIAGNOSIS — J31.0 CHRONIC RHINITIS: ICD-10-CM

## 2023-03-01 RX ORDER — GABAPENTIN 100 MG/1
CAPSULE ORAL
Qty: 270 CAPSULE | Refills: 0 | Status: SHIPPED | OUTPATIENT
Start: 2023-03-01 | End: 2023-04-17

## 2023-03-01 NOTE — TELEPHONE ENCOUNTER
Attempted to call patient.  Left detailed message that office visit is needed for further refills.  Left clinic number to call back and schedule.

## 2023-03-01 NOTE — TELEPHONE ENCOUNTER
Due for appt. Sonia given, please schedule. (Phone, ov, or evisit as appropriate).  Shi Alonso MD

## 2023-03-06 RX ORDER — FLUTICASONE PROPIONATE 50 MCG
SPRAY, SUSPENSION (ML) NASAL
Qty: 48 ML | Refills: 3 | Status: SHIPPED | OUTPATIENT
Start: 2023-03-06 | End: 2024-01-29

## 2023-04-10 ASSESSMENT — ENCOUNTER SYMPTOMS
NAUSEA: 0
DYSURIA: 0
SORE THROAT: 0
DIARRHEA: 0
BREAST MASS: 0
NERVOUS/ANXIOUS: 0
HEARTBURN: 0
SHORTNESS OF BREATH: 0
HEMATURIA: 0
CONSTIPATION: 0
DIZZINESS: 0
HEMATOCHEZIA: 0
FEVER: 0
HEADACHES: 0
CHILLS: 0
ABDOMINAL PAIN: 0
FREQUENCY: 0
EYE PAIN: 0
WEAKNESS: 0
JOINT SWELLING: 0
MYALGIAS: 0
ARTHRALGIAS: 0
COUGH: 0
PALPITATIONS: 0

## 2023-04-17 ENCOUNTER — PATIENT OUTREACH (OUTPATIENT)
Dept: ONCOLOGY | Facility: CLINIC | Age: 63
End: 2023-04-17

## 2023-04-17 ENCOUNTER — OFFICE VISIT (OUTPATIENT)
Dept: FAMILY MEDICINE | Facility: OTHER | Age: 63
End: 2023-04-17
Payer: COMMERCIAL

## 2023-04-17 VITALS
TEMPERATURE: 98.5 F | OXYGEN SATURATION: 97 % | HEIGHT: 65 IN | HEART RATE: 77 BPM | SYSTOLIC BLOOD PRESSURE: 98 MMHG | RESPIRATION RATE: 20 BRPM | WEIGHT: 178 LBS | DIASTOLIC BLOOD PRESSURE: 62 MMHG | BODY MASS INDEX: 29.66 KG/M2

## 2023-04-17 DIAGNOSIS — Z00.00 ROUTINE GENERAL MEDICAL EXAMINATION AT A HEALTH CARE FACILITY: Primary | ICD-10-CM

## 2023-04-17 DIAGNOSIS — E78.5 HYPERLIPIDEMIA WITH TARGET LDL LESS THAN 130: ICD-10-CM

## 2023-04-17 DIAGNOSIS — C50.919 RECURRENT MALIGNANT NEOPLASM OF BREAST, UNSPECIFIED LATERALITY (H): ICD-10-CM

## 2023-04-17 DIAGNOSIS — J31.0 CHRONIC RHINITIS: ICD-10-CM

## 2023-04-17 DIAGNOSIS — M54.12 CERVICAL RADICULAR PAIN: ICD-10-CM

## 2023-04-17 LAB
ANION GAP SERPL CALCULATED.3IONS-SCNC: 7 MMOL/L (ref 7–15)
BUN SERPL-MCNC: 13 MG/DL (ref 8–23)
CALCIUM SERPL-MCNC: 9.1 MG/DL (ref 8.8–10.2)
CHLORIDE SERPL-SCNC: 102 MMOL/L (ref 98–107)
CHOLEST SERPL-MCNC: 169 MG/DL
CREAT SERPL-MCNC: 0.73 MG/DL (ref 0.51–0.95)
DEPRECATED HCO3 PLAS-SCNC: 29 MMOL/L (ref 22–29)
GFR SERPL CREATININE-BSD FRML MDRD: >90 ML/MIN/1.73M2
GLUCOSE SERPL-MCNC: 92 MG/DL (ref 70–99)
HDLC SERPL-MCNC: 67 MG/DL
LDLC SERPL CALC-MCNC: 81 MG/DL
NONHDLC SERPL-MCNC: 102 MG/DL
POTASSIUM SERPL-SCNC: 4 MMOL/L (ref 3.4–5.3)
SODIUM SERPL-SCNC: 138 MMOL/L (ref 136–145)
TRIGL SERPL-MCNC: 105 MG/DL

## 2023-04-17 PROCEDURE — 36415 COLL VENOUS BLD VENIPUNCTURE: CPT | Performed by: FAMILY MEDICINE

## 2023-04-17 PROCEDURE — 80061 LIPID PANEL: CPT | Performed by: FAMILY MEDICINE

## 2023-04-17 PROCEDURE — 99396 PREV VISIT EST AGE 40-64: CPT | Performed by: FAMILY MEDICINE

## 2023-04-17 PROCEDURE — 99213 OFFICE O/P EST LOW 20 MIN: CPT | Mod: 25 | Performed by: FAMILY MEDICINE

## 2023-04-17 PROCEDURE — 80048 BASIC METABOLIC PNL TOTAL CA: CPT | Performed by: FAMILY MEDICINE

## 2023-04-17 RX ORDER — LORATADINE 10 MG/1
1 TABLET ORAL DAILY
Qty: 90 TABLET | Refills: 1 | Status: CANCELLED | OUTPATIENT
Start: 2023-04-17

## 2023-04-17 RX ORDER — GABAPENTIN 100 MG/1
100 CAPSULE ORAL 3 TIMES DAILY
Qty: 270 CAPSULE | Refills: 3 | Status: SHIPPED | OUTPATIENT
Start: 2023-04-17 | End: 2024-07-15

## 2023-04-17 RX ORDER — ATORVASTATIN CALCIUM 10 MG/1
10 TABLET, FILM COATED ORAL DAILY
Qty: 90 TABLET | Refills: 3 | Status: SHIPPED | OUTPATIENT
Start: 2023-04-17 | End: 2024-06-10

## 2023-04-17 RX ORDER — FLUTICASONE PROPIONATE 50 MCG
SPRAY, SUSPENSION (ML) NASAL
Qty: 48 ML | Refills: 3 | Status: CANCELLED | OUTPATIENT
Start: 2023-04-17

## 2023-04-17 ASSESSMENT — PAIN SCALES - GENERAL: PAINLEVEL: NO PAIN (0)

## 2023-04-17 NOTE — PROGRESS NOTES
SUBJECTIVE:   CC: Enid is an 63 year old who presents for preventive health visit.       4/17/2023     6:56 AM   Additional Questions   Roomed by Elvia   Accompanied by Self   Patient has been advised of split billing requirements and indicates understanding: Yes     Healthy Habits:     Getting at least 3 servings of Calcium per day:  Yes    Bi-annual eye exam:  Yes    Dental care twice a year:  Yes    Sleep apnea or symptoms of sleep apnea:  None    Diet:  Regular (no restrictions)    Frequency of exercise:  6-7 days/week    Duration of exercise:  30-45 minutes    Taking medications regularly:  Yes    Medication side effects:  None    PHQ-2 Total Score: 0    Additional concerns today:  Yes (fasting labs)    Medication Followup of Sertraline     Taking Medication as prescribed: yes    Side Effects:  None    Medication Helping Symptoms:  Seems to be helping with the hot flashes     Hyperlipidemia Follow-Up      Are you regularly taking any medication or supplement to lower your cholesterol?   Yes- lipitor    Are you having muscle aches or other side effects that you think could be caused by your cholesterol lowering medication?  No      Today's PHQ-2 Score:       4/10/2023     9:56 AM   PHQ-2 ( 1999 [employer])   Q1: Little interest or pleasure in doing things 0   Q2: Feeling down, depressed or hopeless 0   PHQ-2 Score 0   Q1: Little interest or pleasure in doing things Not at all   Q2: Feeling down, depressed or hopeless Not at all   PHQ-2 Score 0           Social History     Tobacco Use     Smoking status: Never     Smokeless tobacco: Never   Vaping Use     Vaping status: Never Used   Substance Use Topics     Alcohol use: Yes     Comment: 4 drinks per month              4/10/2023     9:56 AM   Alcohol Use   Prescreen: >3 drinks/day or >7 drinks/week? No     Reviewed orders with patient.  Reviewed health maintenance and updated orders accordingly - Yes  Lab work is in process    Breast Cancer Screening:    FHS-7:        6/15/2022     9:04 AM   Breast CA Risk Assessment (FHS-7)   Did any of your first-degree relatives have breast or ovarian cancer? Unknown   Did any of your relatives have bilateral breast cancer? Unknown   Did any man in your family have breast cancer? Unknown   Did any woman in your family have breast and ovarian cancer? Unknown   Did any woman in your family have breast cancer before age 50 y? Unknown   Do you have 2 or more relatives with breast and/or ovarian cancer? Unknown   Do you have 2 or more relatives with breast and/or bowel cancer? Unknown       Mammogram Screening: Recommended mammography every 1-2 years with patient discussion and risk factor consideration  Pertinent mammograms are reviewed under the imaging tab.    History of abnormal Pap smear: Status post benign hysterectomy. Health Maintenance and Surgical History updated.      5/19/2014    12:00 AM 8/14/2009    12:00 AM 8/8/2006    12:00 AM   PAP / HPV   PAP (Historical) NIL   NIL   NIL       Reviewed and updated as needed this visit by clinical staff   Tobacco  Allergies  Meds  Problems  Med Hx  Surg Hx  Fam Hx          Reviewed and updated as needed this visit by Provider   Tobacco  Allergies  Meds  Problems  Med Hx  Surg Hx  Fam Hx             Review of Systems  CONSTITUTIONAL: NEGATIVE for fever, chills, change in weight  INTEGUMENTARU/SKIN: NEGATIVE for worrisome rashes, moles or lesions  EYES: NEGATIVE for vision changes or irritation  ENT: NEGATIVE for ear, mouth and throat problems  RESP: NEGATIVE for significant cough or SOB  BREAST: NEGATIVE for masses, tenderness or discharge  CV: NEGATIVE for chest pain, palpitations or peripheral edema  GI: NEGATIVE for nausea, abdominal pain, heartburn, or change in bowel habits  : NEGATIVE for unusual urinary or vaginal symptoms. Periods are regular.  MUSCULOSKELETAL: NEGATIVE for significant arthralgias or myalgia  NEURO: NEGATIVE for weakness, dizziness or  "paresthesias  PSYCHIATRIC: NEGATIVE for changes in mood or affect     OBJECTIVE:   BP 98/62   Pulse 77   Temp 98.5  F (36.9  C) (Temporal)   Resp 20   Ht 1.645 m (5' 4.76\")   Wt 80.7 kg (178 lb)   LMP 10/14/2001 (Exact Date)   SpO2 97%   BMI 29.84 kg/m    Physical Exam  GENERAL: healthy, alert and no distress  EYES: Eyes grossly normal to inspection, PERRL and conjunctivae and sclerae normal  HENT: ear canals and TM's normal, nose and mouth without ulcers or lesions  NECK: no adenopathy, no asymmetry, masses, or scars and thyroid normal to palpation  RESP: lungs clear to auscultation - no rales, rhonchi or wheezes  BREAST: normal without masses, tenderness or nipple discharge and no palpable axillary masses or adenopathy  CV: regular rate and rhythm, normal S1 S2, no S3 or S4, no murmur, click or rub, no peripheral edema and peripheral pulses strong  ABDOMEN: soft, nontender, no hepatosplenomegaly, no masses and bowel sounds normal  MS: no gross musculoskeletal defects noted, no edema  SKIN: no suspicious lesions or rashes  NEURO: Normal strength and tone, mentation intact and speech normal  PSYCH: mentation appears normal, affect normal/bright        ASSESSMENT/PLAN:       ICD-10-CM    1. Routine general medical examination at a health care facility  Z00.00       2. Cervical radicular pain  M54.12 gabapentin (NEURONTIN) 100 MG capsule     BASIC METABOLIC PANEL      3. Chronic rhinitis  J31.0       4. Hyperlipidemia with target LDL less than 130  E78.5 Lipid panel reflex to direct LDL Non-fasting     atorvastatin (LIPITOR) 10 MG tablet      5. Recurrent malignant neoplasm of breast, unspecified laterality (H)  C50.919 Adult Genetics & Metabolism Referral        Weighted been stable to decreased and she continues to work on healthy habits.  She has been following with oncology survivors that she had from her breast cancer and they intended to have genetic counseling repeat evaluation this year and so we did " refer this for her.  She is continuing with breast MRI and this is upcoming and we did do the chest wall exam today with no notable change.  She does have problems with chronic rhinitis for which she is managing with allergy pills and nasal spray which were renewed today.  Lastly we will check her cholesterol and electrolytes that she can continue to her medication management.  Her cervical radicular pain and neck pain has been nonexistent though if she stops her gabapentin she does find some increasing in this neck discomfort.  She has done physical therapy and continues to try and do the stretches to maintain this but continues to need the gabapentin at this time.  We did advise her due to her lack of hot flushes and her age that likely she can stop her sertraline and did advise her to do this today.    Patient has been advised of split billing requirements and indicates understanding: Yes      COUNSELING:  Reviewed preventive health counseling, as reflected in patient instructions       Regular exercise       Healthy diet/nutrition        She reports that she has never smoked. She has never used smokeless tobacco.      Shi Alonso MD, MD  [facility]

## 2023-04-17 NOTE — PROGRESS NOTES
Writer received Genetics - Cancer Risk referral, referred for:     Recurrent malignant neoplasm of breast, unspecified laterality (H), most recent testing with Alethea Sue in 2018     Reviewed for appropriate plan, and sent to New Patient Scheduling for completion.

## 2023-04-19 ENCOUNTER — LAB (OUTPATIENT)
Dept: LAB | Facility: OTHER | Age: 63
End: 2023-04-19
Payer: COMMERCIAL

## 2023-04-19 ENCOUNTER — E-VISIT (OUTPATIENT)
Dept: FAMILY MEDICINE | Facility: OTHER | Age: 63
End: 2023-04-19
Payer: COMMERCIAL

## 2023-04-19 DIAGNOSIS — N39.0 ACUTE UTI (URINARY TRACT INFECTION): ICD-10-CM

## 2023-04-19 DIAGNOSIS — N39.0 ACUTE UTI (URINARY TRACT INFECTION): Primary | ICD-10-CM

## 2023-04-19 LAB
ALBUMIN UR-MCNC: NEGATIVE MG/DL
APPEARANCE UR: ABNORMAL
BACTERIA #/AREA URNS HPF: ABNORMAL /HPF
BILIRUB UR QL STRIP: NEGATIVE
COLOR UR AUTO: YELLOW
GLUCOSE UR STRIP-MCNC: NEGATIVE MG/DL
HGB UR QL STRIP: ABNORMAL
KETONES UR STRIP-MCNC: NEGATIVE MG/DL
LEUKOCYTE ESTERASE UR QL STRIP: ABNORMAL
NITRATE UR QL: POSITIVE
PH UR STRIP: 7 [PH] (ref 5–7)
RBC #/AREA URNS AUTO: ABNORMAL /HPF
SP GR UR STRIP: 1.01 (ref 1–1.03)
SQUAMOUS #/AREA URNS AUTO: ABNORMAL /LPF
UROBILINOGEN UR STRIP-ACNC: 0.2 E.U./DL
WBC #/AREA URNS AUTO: >100 /HPF
WBC CLUMPS #/AREA URNS HPF: PRESENT /HPF

## 2023-04-19 PROCEDURE — 99421 OL DIG E/M SVC 5-10 MIN: CPT | Performed by: FAMILY MEDICINE

## 2023-04-19 PROCEDURE — 87086 URINE CULTURE/COLONY COUNT: CPT

## 2023-04-19 PROCEDURE — 81001 URINALYSIS AUTO W/SCOPE: CPT

## 2023-04-19 RX ORDER — NITROFURANTOIN 25; 75 MG/1; MG/1
100 CAPSULE ORAL 2 TIMES DAILY
Qty: 10 CAPSULE | Refills: 0 | Status: SHIPPED | OUTPATIENT
Start: 2023-04-19 | End: 2023-04-24

## 2023-04-19 NOTE — PATIENT INSTRUCTIONS
Dear Enid Lombardo    After reviewing your responses, I've been able to diagnose you with a urinary tract infection, which is a common infection of the bladder with bacteria.  This is not a sexually transmitted infection, though urinating immediately after intercourse can help prevent infections.  Drinking lots of fluids is also helpful to clear your current infection and prevent the next one.      I have sent a prescription for antibiotics to your pharmacy to treat this infection.    It is important that you take all of your prescribed medication even if your symptoms are improving after a few doses.  Taking all of your medicine helps prevent the symptoms from returning.     If your symptoms worsen, you develop pain in your back or stomach, develop fevers, or are not improving in 5 days, please contact your primary care provider for an appointment or visit any of our convenient Walk-in or Urgent Care Centers to be seen, which can be found on our website here.    Thanks again for choosing us as your health care partner,    Shi Alonso MD, MD

## 2023-04-21 LAB — BACTERIA UR CULT: NORMAL

## 2023-04-24 ENCOUNTER — MYC MEDICAL ADVICE (OUTPATIENT)
Dept: FAMILY MEDICINE | Facility: OTHER | Age: 63
End: 2023-04-24
Payer: COMMERCIAL

## 2023-04-24 DIAGNOSIS — R30.0 DYSURIA: Primary | ICD-10-CM

## 2023-04-25 ENCOUNTER — LAB (OUTPATIENT)
Dept: LAB | Facility: OTHER | Age: 63
End: 2023-04-25
Payer: COMMERCIAL

## 2023-04-25 DIAGNOSIS — R30.0 DYSURIA: ICD-10-CM

## 2023-04-25 LAB
ALBUMIN UR-MCNC: NEGATIVE MG/DL
APPEARANCE UR: CLEAR
BILIRUB UR QL STRIP: NEGATIVE
COLOR UR AUTO: YELLOW
GLUCOSE UR STRIP-MCNC: NEGATIVE MG/DL
HGB UR QL STRIP: NEGATIVE
KETONES UR STRIP-MCNC: NEGATIVE MG/DL
LEUKOCYTE ESTERASE UR QL STRIP: NEGATIVE
NITRATE UR QL: NEGATIVE
PH UR STRIP: 6 [PH] (ref 5–7)
SP GR UR STRIP: 1.01 (ref 1–1.03)
UROBILINOGEN UR STRIP-ACNC: 0.2 E.U./DL

## 2023-04-25 PROCEDURE — 81003 URINALYSIS AUTO W/O SCOPE: CPT

## 2023-06-19 ENCOUNTER — ANCILLARY PROCEDURE (OUTPATIENT)
Dept: MRI IMAGING | Facility: CLINIC | Age: 63
End: 2023-06-19
Attending: PHYSICIAN ASSISTANT
Payer: COMMERCIAL

## 2023-06-19 DIAGNOSIS — Z09 ENCOUNTER FOR FOLLOW-UP: ICD-10-CM

## 2023-06-19 PROCEDURE — 77049 MRI BREAST C-+ W/CAD BI: CPT

## 2023-06-19 PROCEDURE — A9585 GADOBUTROL INJECTION: HCPCS

## 2023-06-19 RX ORDER — GADOBUTROL 604.72 MG/ML
10 INJECTION INTRAVENOUS ONCE
Status: COMPLETED | OUTPATIENT
Start: 2023-06-19 | End: 2023-06-19

## 2023-06-19 RX ADMIN — GADOBUTROL 8 ML: 604.72 INJECTION INTRAVENOUS at 07:53

## 2023-06-22 DIAGNOSIS — Z85.3 HISTORY OF RIGHT BREAST CANCER: Primary | ICD-10-CM

## 2023-06-22 DIAGNOSIS — R92.8 ABNORMAL MRI, BREAST: ICD-10-CM

## 2023-08-15 DIAGNOSIS — J31.0 CHRONIC RHINITIS: ICD-10-CM

## 2023-08-15 RX ORDER — LORATADINE 10 MG/1
TABLET ORAL
Qty: 90 TABLET | Refills: 1 | Status: SHIPPED | OUTPATIENT
Start: 2023-08-15 | End: 2024-01-29

## 2023-08-16 NOTE — PROGRESS NOTES
Virtual Visit Details    Type of service:  Video Visit     Originating Location (pt. Location): Home  Distant Location (provider location):  Off-site  Platform used for Video Visit: [employer]   Time spent over video: 15 minutes    8/17/2023    Referring Provider: Shi Alonso MD    Presenting Information:   I spoke with Enid Lombardo over video today for genetic counseling to discuss her personal and family history of cancer. This appointment was conducted virtually due to COVID-19 precautions. We talked today to review this history, cancer screening recommendations, and available genetic testing options.    Personal History:  Enid is a 63 year old female. She has previously been seen for genetic counseling and testing in 2018 and 2008. Please see previous genetic counseling notes by Alethea Sue MS, [facility] dated 8/1/18, 8/15/18, and 8/21/18 and by Jinny Drew [place], [facility] [place] 8/6/2008, 8/18/08, and 9/24/08 for complete details.     In summary:     She was diagnosed with a right breast cancer at age 44 in 2004. She underwent lumpectomy, radiation, and took tamoxifen.      She had genetic testing of the BRCA1 and BRCA2 genes in 2008 (comprehensive BRACAnalysis and LUCINA analysis, [facility]). Results were NEGATIVE.     In 2018 she was diagnosed with a recurrence of her breast cancer. She underwent bilateral mastectomy and took anastrozole.    She had genetic testing of the BRCAPlus Panel with reflex to the CancerNext Panel (total of 34 genes analyzed) in 2018 ([employer]). Results were NEGATIVE.    She is followed by NILAY Caldwell PA-C in the Cancer Survivorship Progam.     She returns today to discuss updated genetic testing options.     Enid reports no changes to her personal history since her previous genetic counseling in 2018.    Family History: (Please see scanned pedigree for detailed family history information). Her family history information has been obtained and updated at her previous two  "genetic counseling appointments.     Enid reports no changes to her family history of cancer since her previous genetics visit.     Enid is adopted and has limited information on her biological family.     One maternal half-sister has thyroid issues.    Mother  at 52 from lung cancer.    Maternal grandfather  from colon cancer.    Paternal family history is unknown.     Enid has three healthy adult children.    Discussion:    We reviewed her most recent genetic testing from 2018. She had testing of the BRCAPlus Panel with reflex to the CancerNext Panel (total of 34 genes analyzed) at [facility] with negative results.    We compared this with the current CancerNext panel offered at [facility]. There are now 3 genes (AXIN2, MSH3, and NTHL1) associated with an increased risk for colon polyps and colon cancer, and 1 gene (RECQL) with preliminary evidence of an association with breast cancer that were not on her panel previously. We discussed that there are also larger panel options that would analyze genes associated with more rare tumors/cancers (such as kidney, brain, etc.). Lastly, we discussed that one lab ([facility]) offers a panel of \"preliminary-evidence\" genes for breast cancer, although these genes do not yet have a definitive clinical association with breast cancer and risks are not well defined at this time.     Given that Enid's genetic testing is still up to date for genes associated with breast cancer risk (except for those with preliminary evidence), she elected not to proceed with any additional genetic testing at this time.     Genetic testing is rapidly advancing, and new cancer susceptibility genes will most likely be identified in the future. Therefore, Enid is encouraged to contact me regularly or if there are changes in her personal or family history. This may change how we assess her cancer risk, screening, and the testing we would offer.    She is also encouraged to " contact me with any additional questions or if she decides that she would like to proceed with one of the additional genetic testing options we discussed today.    Plan:  1) No additional genetic testing was ordered at this time.   2) Enid is encouraged to contact me regularly or if there are changes in her personal or family history, or if she decides that she would like to proceed with additional genetic testing.     Paty Davenport MS, [facility] [place]  Licensed, Certified Genetic Counselor  Office: [phone]  Email: [email or web address]

## 2023-08-17 ENCOUNTER — VIRTUAL VISIT (OUTPATIENT)
Dept: ONCOLOGY | Facility: CLINIC | Age: 63
End: 2023-08-17
Attending: FAMILY MEDICINE
Payer: COMMERCIAL

## 2023-08-17 DIAGNOSIS — Z85.3 HISTORY OF RIGHT BREAST CANCER: Primary | ICD-10-CM

## 2023-08-17 DIAGNOSIS — C50.919 RECURRENT MALIGNANT NEOPLASM OF BREAST, UNSPECIFIED LATERALITY (H): ICD-10-CM

## 2023-08-17 PROCEDURE — 999N000069 HC STATISTIC GENETIC COUNSELING, < 16 MIN: Mod: GT,95 | Performed by: GENETIC COUNSELOR, MS

## 2023-08-17 NOTE — NURSING NOTE
Is the patient currently in the state of [place]? YES    Visit mode:VIDEO    If the visit is dropped, the patient can be reconnected by: VIDEO VISIT: Text to cell phone: [phone]    Will anyone else be joining the visit? NO      How would you like to obtain your AVS? MyChart    Are changes needed to the allergy or medication list? NO    Reason for visit: Video Visit (Consult)

## 2023-08-17 NOTE — PROGRESS NOTES
Virtual Visit Details    Type of service:  Video Visit     Originating Location (pt. Location): Home  Distant Location (provider location):  Off-site  Platform used for Video Visit: [employer]   Time spent over video: 15 minutes

## 2023-08-17 NOTE — Clinical Note
8/17/2023         RE: Enid Lombardo  [address]  [place] [id number]        Dear Colleague,    Thank you for referring your patient, Enid Lombardo, to the [facility]. Please see a copy of my visit note below.    Virtual Visit Details    Type of service:  Video Visit     Originating Location (pt. Location): Home  Distant Location (provider location):  Off-site  Platform used for Video Visit: [employer]   Time spent over video: 15 minutes    8/17/2023    Referring Provider: Shi Alonso MD    Presenting Information:   I spoke with Enid Lombardo over video today for genetic counseling to discuss her personal and family history of cancer. This appointment was conducted virtually due to COVID-19 precautions. We talked today to review this history, cancer screening recommendations, and available genetic testing options.    Personal History:  Enid is a 63 year old female. She has previously been seen for genetic counseling and testing in 2018 and 2008. Please see previous genetic counseling notes by Alethea Sue MS, [facility] dated 8/1/18, 8/15/18, and 8/21/18 and by Jinny Drew MS, [facility] dated 8/6/2008, 8/18/08, and 9/24/08 for complete details.     In summary:     She was diagnosed with a right breast cancer at age 44 in 2004. She underwent lumpectomy, radiation, and took tamoxifen.      She had genetic testing of the BRCA1 and BRCA2 genes in 2008 (comprehensive BRACAnalysis and LUCINA analysis, [facility]). Results were NEGATIVE.     In 2018 she was diagnosed with a recurrence of her breast cancer. She underwent bilateral mastectomy and took anastrozole.    She had genetic testing of the BRCAPlus Panel with reflex to the CancerNext Panel (total of 34 genes analyzed) in 2018 ([facility]). Results were NEGATIVE.    She is followed by NILAY Caldwell PA-C in the Cancer Survivorship Progam.     She returns today to discuss updated genetic testing options.  "    Enid reports no changes to her personal history since her previous genetic counseling in 2018.    Family History: (Please see scanned pedigree for detailed family history information). Her family history information has been obtained and updated at her previous two genetic counseling appointments.     Enid reports no changes to her family history of cancer since her previous genetics visit.     Enid is adopted and has limited information on her biological family.     One maternal half-sister has thyroid issues.    Mother  at 52 from lung cancer.    Maternal grandfather  from colon cancer.    Paternal family history is unknown.     Enid has three healthy adult children.    Discussion:    We reviewed her most recent genetic testing from 2018. She had testing of the BRCAPlus Panel with reflex to the CancerNext Panel (total of 34 genes analyzed) at [facility] with negative results.    We compared this with the current CancerNext panel offered at [facility]. There are now 3 genes (AXIN2, MSH3, and NTHL1) associated with an increased risk for colon polyps and colon cancer, and 1 gene (RECQL) with preliminary evidence of an association with breast cancer that were not on her panel previously. We discussed that there are also larger panel options that would analyze genes associated with more rare tumors/cancers (such as kidney, brain, etc.). Lastly, we discussed that one lab ([facility]) offers a panel of \"preliminary-evidence\" genes for breast cancer, although these genes do not yet have a definitive clinical association with breast cancer and risks are not well defined at this time.     Given that Enid's genetic testing is still up to date for genes associated with breast cancer risk (except for those with preliminary evidence), she elected not to proceed with any additional genetic testing at this time.     Genetic testing is rapidly advancing, and new cancer susceptibility genes will most " likely be identified in the future. Therefore, Enid is encouraged to contact me regularly or if there are changes in her personal or family history. This may change how we assess her cancer risk, screening, and the testing we would offer.    She is also encouraged to contact me with any additional questions or if she decides that she would like to proceed with one of the additional genetic testing options we discussed today.    Plan:  1) No additional genetic testing was ordered at this time.   2) Enid is encouraged to contact me regularly or if there are changes in her personal or family history, or if she decides that she would like to proceed with additional genetic testing.     Paty Davenport MS, [facility]  Licensed, Certified Genetic Counselor  Office: [phone]  Email: [email or web address]         Again, thank you for allowing me to participate in the care of your patient.        Sincerely,        Paty Davenport GC

## 2023-08-21 ENCOUNTER — MYC MEDICAL ADVICE (OUTPATIENT)
Dept: FAMILY MEDICINE | Facility: OTHER | Age: 63
End: 2023-08-21

## 2023-08-21 ENCOUNTER — E-VISIT (OUTPATIENT)
Dept: URGENT CARE | Facility: CLINIC | Age: 63
End: 2023-08-21
Payer: COMMERCIAL

## 2023-08-21 DIAGNOSIS — N89.8 VAGINAL DISCHARGE: Primary | ICD-10-CM

## 2023-08-21 PROCEDURE — 99207 PR NON-BILLABLE SERV PER CHARTING: CPT | Performed by: FAMILY MEDICINE

## 2023-08-21 NOTE — PATIENT INSTRUCTIONS
Dear Enid Lombardo,    We are sorry you are not feeling well. Based on the responses you provided, it is recommended that you be seen in-person in urgent care so we can better evaluate your symptoms. Please click here to find the nearest urgent care location to you.   You will not be charged for this Visit. Thank you for trusting us with your care.    Lois Deras MD

## 2023-08-23 RX ORDER — TRIAMCINOLONE ACETONIDE 1 MG/G
CREAM TOPICAL 2 TIMES DAILY
COMMUNITY
End: 2023-11-29

## 2023-09-05 ENCOUNTER — OFFICE VISIT (OUTPATIENT)
Dept: FAMILY MEDICINE | Facility: CLINIC | Age: 63
End: 2023-09-05
Payer: COMMERCIAL

## 2023-09-05 VITALS
DIASTOLIC BLOOD PRESSURE: 80 MMHG | WEIGHT: 177 LBS | TEMPERATURE: 97.9 F | RESPIRATION RATE: 18 BRPM | SYSTOLIC BLOOD PRESSURE: 118 MMHG | OXYGEN SATURATION: 98 % | HEART RATE: 85 BPM | HEIGHT: 65 IN | BODY MASS INDEX: 29.49 KG/M2

## 2023-09-05 DIAGNOSIS — N81.11 CYSTOCELE, MIDLINE: ICD-10-CM

## 2023-09-05 DIAGNOSIS — N81.6 RECTOCELE: Primary | ICD-10-CM

## 2023-09-05 PROCEDURE — 99213 OFFICE O/P EST LOW 20 MIN: CPT | Performed by: FAMILY MEDICINE

## 2023-09-05 ASSESSMENT — PAIN SCALES - GENERAL: PAINLEVEL: NO PAIN (0)

## 2023-09-05 NOTE — PROGRESS NOTES
"  Assessment & Plan     Rectocele      Cystocele, midline      Difficult to determine what appears the patient may have small cystocele and a small rectocele.  We will need to get a second opinion from the specialist down at the [facility] that deal with the perineum and its repair.        Toño Singh MD, MD  [facility] LEONELA Rossi is a 63 year old, presenting for the following health issues:  Vaginal Problem (prolapse)        4/17/2023     6:56 AM   Additional Questions   Roomed by Elvia   Accompanied by Self       History of Present Illness       Reason for visit:  Pelvic prolapse  Symptom onset:  More than a month  Symptoms include:  Bulge protruding  Symptom intensity:  Moderate  Symptom progression:  Worsening  Had these symptoms before:  Yes  Has tried/received treatment for these symptoms:  No  What makes it worse:  Extended standing  What makes it better:  Laying down    She eats 4 or more servings of fruits and vegetables daily.She consumes 0 sweetened beverage(s) daily.She exercises with enough effort to increase her heart rate 60 or more minutes per day.  She exercises with enough effort to increase her heart rate 6 days per week.   She is taking medications regularly.       She feels like her vagina is protruding at times when she strains or even is up on her feet for most of the day.  She states has been getting worse over the past few years and thinks it may be time to have something done about it.          Review of Systems   Genitourinary:  Positive for vaginal discharge.      Constitutional, HEENT, cardiovascular, pulmonary, gi and gu systems are negative, except as otherwise noted.      Objective    /80   Pulse 85   Temp 97.9  F (36.6  C) (Temporal)   Resp 18   Ht 1.651 m (5' 5\")   Wt 80.3 kg (177 lb)   LMP 10/14/2001 (Exact Date)   SpO2 98%   BMI 29.45 kg/m    Body mass index is 29.45 kg/m .  Physical Exam   GENERAL: healthy, alert and " no distress   (female): normal female external genitalia, normal urethral meatus , vaginal mucosa pink, moist, well rugated, cystocele present, and rectocele present

## 2023-09-12 ENCOUNTER — OFFICE VISIT (OUTPATIENT)
Dept: UROLOGY | Facility: CLINIC | Age: 63
End: 2023-09-12
Attending: OBSTETRICS & GYNECOLOGY
Payer: COMMERCIAL

## 2023-09-12 VITALS
BODY MASS INDEX: 29.71 KG/M2 | WEIGHT: 178.3 LBS | HEIGHT: 65 IN | HEART RATE: 94 BPM | DIASTOLIC BLOOD PRESSURE: 78 MMHG | SYSTOLIC BLOOD PRESSURE: 141 MMHG

## 2023-09-12 DIAGNOSIS — N81.6 RECTOCELE: ICD-10-CM

## 2023-09-12 DIAGNOSIS — N95.2 VAGINAL ATROPHY: Primary | ICD-10-CM

## 2023-09-12 DIAGNOSIS — N39.43 POST-VOID DRIBBLING: ICD-10-CM

## 2023-09-12 DIAGNOSIS — L90.0 LICHEN SCLEROSUS: ICD-10-CM

## 2023-09-12 PROCEDURE — 99204 OFFICE O/P NEW MOD 45 MIN: CPT | Performed by: OBSTETRICS & GYNECOLOGY

## 2023-09-12 PROCEDURE — G0463 HOSPITAL OUTPT CLINIC VISIT: HCPCS | Performed by: OBSTETRICS & GYNECOLOGY

## 2023-09-12 RX ORDER — CLOBETASOL PROPIONATE 0.5 MG/G
OINTMENT TOPICAL
Qty: 45 G | Refills: 1 | Status: SHIPPED | OUTPATIENT
Start: 2023-09-12 | End: 2024-04-04

## 2023-09-12 RX ORDER — ESTRADIOL 0.1 MG/G
1 CREAM VAGINAL
Qty: 42.5 G | Refills: 3 | Status: SHIPPED | OUTPATIENT
Start: 2023-09-13 | End: 2024-05-06

## 2023-09-12 ASSESSMENT — PAIN SCALES - GENERAL: PAINLEVEL: NO PAIN (1)

## 2023-09-12 NOTE — LETTER
2023       RE: Enid Lombardo  [address] [facility] [address]     Dear Colleague,    Thank you for referring your patient, Enid Lombardo, to the [facility] [place] at [facility]. Please see a copy of my visit note below.    2023    Referring Provider: Toño Singh MD  [address] [place] [address]    Primary Care Provider: Shi Alonso    CC: Vaginal bulge    HPI:  Enid Lombardo is a 64 yo  with med hx sig for cHTN, hx of recurrent breast ca ( s/p mastectomy in 2018 for recurrent dz) who presents for evaluation of worsening vaginal bulge/protrusion over the last several months.  She notices the bulge and says it is causing a lot of pelvic area discomfort. Worse at the end of the day.     Urinary Symptoms/Voiding function  Rare stress leakage with bladder is full and she has a hard sneeze. No urgency leaks. Has occasional post void dribbling for which she uses a pantiliner. Had one UTI this summer. No gross hematuria.     Pelvic Organ Prolapse Symptoms  As above    Gastrointestinal Symptoms:  Denies chronic diarrhea, constipation. Denies bothersome fecal or flatal incontinence. Denies defecatory difficulties.     Sexual function/Pelvic floor pain/GYN:   Sexually active, no pain with intercourse. Had some vulvar itching which improved with steroid cream. Has had vaginal hysterectomy a long time ago for fibroids      Relevant Medical History:    Diabetes? no  High Blood pressure? yes     Recurrent UTIs? no  Sleep Apnea? no  Obesity? Body mass index is 29.67 kg/m .  History of Blood clots? no  Other medical problems: hx of breast ca in 2004 (radiation, lumpectomy, tamoxifen)with recurrence in 2015( mastectomy in 2018)     Surgical History:      Past Surgical History:   Procedure Laterality Date    ABDOMEN SURGERY  2019    Breast reconstruction    BIOPSY  2004    Breast    BREAST SURGERY   2004    COLONOSCOPY      COLONOSCOPY N/A 01/11/2021    Procedure: COLONOSCOPY;  Surgeon: Iain Mercedes MD;  Location:  GI    ENDOSCOPY  05/09/2007    Upper GI    EYE SURGERY      GRAFT FAT TO BREAST Bilateral 11/29/2018    Procedure: Bilateral Breast Fat Grafting from Abdomen and Thighs;  Surgeon: DENNISE Amin MD;  [facility]    GRAFT FREE VASCULARIZED TRANSVERSE RECTUS ABDOMINIS MYOCUTANEOUS Bilateral 10/08/2018    Procedure: GRAFT FREE VASCULARIZED TRANSVERSE RECTUS ABDOMINIS MYOCUTANEOUS;  Bilateral Deep Inferior Epigastric Artery  Free Flap Breast Reconstruction and Removal of Breast Explanders;  Surgeon: DENNISE Amin MD;  Location: [facility] OR    GYN SURGERY  2001    HC BIOPSY/EXCISION LYMPH NODE OPEN DEEP CERVICAL W EXC FAT PAD  01/07/2005    Right axillary sentinel node biopsy X 3.    HC EXCISION BREAST LESION, OPEN >=1  01/07/2005    Right breast.    HC REMV CATARACT EXTRACAP,INSERT LENS, W/O ECP  12/18/2008    bilateral    HC REMV TARSAL/METATARSAL BENIGN BONE LESN  1982    Bone spur - right    HERNIA REPAIR  2019    HYSTERECTOMY, PAP NO LONGER INDICATED      LAPAROSCOPIC HERNIORRHAPHY VENTRAL N/A 10/21/2019    Procedure: Laparoscopic Ventral Hernia Repair With Mesh;  Surgeon: Emil Brown MD;  Location:  OR    MASTECTOMY SIMPLE BILATERAL, SENTINEL NODE BILATERAL, COMBINED Bilateral 08/22/2018    Procedure: COMBINED MASTECTOMY SIMPLE BILATERAL, SENTINEL NODE BILATERAL;  Bilateral Mastectomy with Right [place] Node Biopsy, Bilateral Breast Reconstruction, Anesthesia Block;  Surgeon: Rakesh Sanchez MD;  Location:  OR    ORTHOPEDIC SURGERY  1983    Right foot    RECONSTRUCT BREAST Bilateral 08/22/2018    Procedure: RECONSTRUCT BREAST;;  Surgeon: DENNISE Amin MD;  Location:  OR    RECONSTRUCT BREAST BILATERAL Bilateral 10/08/2018    Procedure: RECONSTRUCT BREAST BILATERAL;;  Surgeon: DENNISE Amin MD;  Location:  OR    RECONSTRUCT  NIPPLE BILATERAL Bilateral 2018    Procedure: Nipple Reconstruction;  Surgeon: DENNISE Amin MD;  Location:  OR    [facility] VAGINAL HYSTERECTOMY  2001    Ovaries intact, due to fibroids    [facility] COLONOSCOPY W BIOPSY  05/10/2010       OB/Gyn History:  OB History    Para Term  AB Living   3 3 0 0 0 3   SAB IAB Ectopic Multiple Live Births   0 0 0 0 0      # Outcome Date GA Lbr Arben/2nd Weight Sex Delivery Anes PTL Lv   3 Para            2 Para            1 Para                Medications/Vitamins/Supplements:   Current Outpatient Medications   Medication    atorvastatin (LIPITOR) 10 MG tablet    Calcium-Vitamin D-Vitamin K (VIACTIV CALCIUM PLUS D PO)    fluticasone (FLONASE) 50 MCG/ACT nasal spray    gabapentin (NEURONTIN) 100 MG capsule    loratadine (CLARITIN) 10 MG tablet    melatonin 5 MG tablet    Multiple Vitamins-Minerals (PRESERVISION AREDS) CAPS    Omega-3 Fatty Acids (FISH OIL) 1000 MG CPDR    triamcinolone (KENALOG) 0.1 % external cream     No current facility-administered medications for this visit.         Medical History:      Past Medical History:   Diagnosis Date    Breast cancer (H)     lumpectomy, radiation, tamoxifen    Cancer (H)     Breast    Cataracts, bilateral     Complication of anesthesia     She prefers not to have versed until right before she leaves or can have after     Diabetes (H)     Gestational    Enthesopathy of hip region 2006    right hip    Esophageal reflux 2006    History of gestational diabetes     Hypertension     Macular drusen     PONV (postoperative nausea and vomiting)     Shingles 2012    left T6-T7 dermatome     ROS  Social History    Social History     Socioeconomic History    Marital status:      Spouse name: Carson    Number of children: 3    Years of education: 24    Highest education level: Not on file   Occupational History    Occupation: Teacher     Employer: [employer]   Tobacco Use     Smoking status: Never    Smokeless tobacco: Never   Vaping Use    Vaping Use: Never used   Substance and Sexual Activity    Alcohol use: Yes     Comment: 4 drinks per month     Drug use: No    Sexual activity: Yes     Partners: Male     Birth control/protection: Female Surgical     Comment: hysterectomy   Other Topics Concern     Service No    Blood Transfusions No    Caffeine Concern Yes     Comment: 4 cups per day    Occupational Exposure No    Hobby Hazards No    Sleep Concern No    Stress Concern No    Weight Concern No    Special Diet No    Back Care No    Exercise No    Bike Helmet Yes    Seat Belt Yes    Self-Exams Yes    Parent/sibling w/ CABG, MI or angioplasty before 65F 55M? No   Social History Narrative    Not on file     Social Determinants of Health     Financial Resource Strain: Not on file   Food Insecurity: Not on file   Transportation Needs: Not on file   Physical Activity: Not on file   Stress: Not on file   Social Connections: Not on file   Intimate Partner Violence: Not on file   Housing Stability: Not on file       Family History  Family History   Adopted: Yes   Problem Relation Age of Onset    Cancer Mother         lung cancer  at age 52    Thyroid Disease Sister         half sister, had thyroid removed    Unknown/Adopted Father     Unknown/Adopted Maternal Grandmother     Unknown/Adopted Maternal Grandfather     Unknown/Adopted Paternal Grandmother     Unknown/Adopted Paternal Grandfather     Glaucoma No family hx of     Diabetes No family hx of     Melanoma No family hx of     Skin Cancer No family hx of        Allergy    Allergies   Allergen Reactions    Brimonidine Tartrate Rash     Thrush and numbness in mouth       Current Outpatient Medications   Medication    atorvastatin (LIPITOR) 10 MG tablet    Calcium-Vitamin D-Vitamin K (VIACTIV CALCIUM PLUS D PO)    fluticasone (FLONASE) 50 MCG/ACT nasal spray    gabapentin (NEURONTIN) 100 MG capsule    loratadine (CLARITIN) 10 MG  "tablet    melatonin 5 MG tablet    Multiple Vitamins-Minerals (PRESERVISION AREDS) CAPS    Omega-3 Fatty Acids (FISH OIL) 1000 MG CPDR    triamcinolone (KENALOG) 0.1 % external cream     No current facility-administered medications for this visit.       Physical Exam:   BP (!) 141/78   Pulse 94   Ht 1.651 m (5' 5\")   Wt 80.9 kg (178 lb 4.8 oz)   LMP 10/14/2001 (Exact Date)   BMI 29.67 kg/m   Patient's last menstrual period was 10/14/2001 (exact date). Body mass index is 29.67 kg/m .    Gen:  is alert, comfortable in no acute distress,   Abdomen: Abdomen is soft, non-tender, non-distended,   Lungs: non-labored breathing.     Pelvic Exam:   Normal external female genitalia. The urethra was normal.    Vagina: stage 2 posterior prolapse with good anterior and apical support, mild to mod atrophy  Uterus: surgically absent  Ovaries: no palpable mass  Vulva:  area of depigmentation, well circumscribed, currently not pruritic  Rectal: deferred    Pelvic floor strength: 3/5 kegels.    Pelvic floor muscles: no myalgia, normal tone    POPQ EXAM FOR PROLAPSE SEVERITY  (Aa):   -2 (Ba):   -2 (C ):    -7   (GH):   3 (PB):  1.5 (TVL):  9   (Ap):   0 (Bp):  0 (D):   na     ICS Stage (1-4):  2    Voiding trial:    VOID 600 ml  PVR 8 mL by Bladder ultrasound  Leak with Cough stress test : no, Prolapse reduced No    Labs:   Color Urine (no units)   Date Value   04/25/2023 Yellow   02/04/2017 Yellow     Appearance Urine (no units)   Date Value   04/25/2023 Clear   02/04/2017 Clear     Glucose Urine (mg/dL)   Date Value   04/25/2023 Negative   02/04/2017 Negative     Bilirubin Urine (no units)   Date Value   04/25/2023 Negative   02/04/2017 Negative     Ketones Urine (mg/dL)   Date Value   04/25/2023 Negative   02/04/2017 Negative     Specific Gravity Urine (no units)   Date Value   04/25/2023 1.010   02/04/2017 <=1.005     pH Urine   Date Value   04/25/2023 6.0   02/04/2017 6.0 pH     Protein Albumin Urine (mg/dL)   Date Value "   04/25/2023 Negative   02/04/2017 Negative     Urobilinogen Urine   Date Value   04/25/2023 0.2 E.U./dL   02/04/2017 0.2 EU/dL     Nitrite Urine (no units)   Date Value   04/25/2023 Negative   02/04/2017 Negative     Leukocyte Esterase Urine (no units)   Date Value   04/25/2023 Negative   02/04/2017 Negative     CBC RESULTS:   Recent Labs   Lab Test 02/06/20  0740   WBC 5.0   RBC 4.54   HGB 14.0   HCT 42.2   MCV 93   MCH 30.8   MCHC 33.2   RDW 12.9        @lastcmp@    A/P: Enid Lombardo is a 63 year old F with   Enid was seen today for consult.    Diagnoses and all orders for this visit:    Vaginal atrophy  -     estradiol (ESTRACE) 0.1 MG/GM vaginal cream; Place 1 g vaginally three times a week    Rectocele  -     Ob/Gyn Referral  -     Physical Therapy Referral; Future    Post-void dribbling    Lichen sclerosus  -     clobetasol (TEMOVATE) 0.05 % external ointment; Apply a small amount to the vulva daily for 4 weeks, then 3 times a week for 4 weeks, then once a week for 4 weeks    Normal apical and anterior support. Stage 2 rectocele  Currently with mild bother and no defecatory issues. She is open to doing pelvic floor PT for now for her incontinence and prolpase  We discussed that if symptoms don't improve, we will consider pessary vs surgery . For isolated rectocele, we may have to resort to surgery if symptoms progreass  Estrace cream for atrophy  Follow up with me as needed       I spent a total of 45 minutes with  Enid Lombardo  on the date of the encounter in chart review, face to face patient visit, review of tests, documentation and/or discussion with other providers about the issues documented above.     Ese Wells MD, MCR  , Department of OBGYN  Female Pelvic Medicine and Reconstructive Surgery ( Urogynecology)  CC  Patient Care Team:  Shi Alonso MD as PCP - General (Family Practice)  Paz Osborn MD as MD (Ophthalmology)  Shi Alonso MD as  Assigned PCP  Paz Osborn MD as Assigned Surgical Provider  Ragini Muniz PA-C as Assigned Cancer Care Provider  Paty Davenport GC as Genetic Counselor (Genetic Counselor, MS)  Ese Wells MD as MD (OB/Gyn)  Toño Perrin MD as MD (Family Medicine)  TOÑO PERRIN

## 2023-09-12 NOTE — PROGRESS NOTES
2023    Referring Provider: Toño Singh MD  [address] [place] [address]    Primary Care Provider: Shi Alonso    CC: Vaginal bulge    HPI:  Enid Lombardo is a 62 yo  with med hx sig for cHTN, hx of recurrent breast ca ( s/p mastectomy in 2018 for recurrent dz) who presents for evaluation of worsening vaginal bulge/protrusion over the last several months.  She notices the bulge and says it is causing a lot of pelvic area discomfort. Worse at the end of the day.     Urinary Symptoms/Voiding function  Rare stress leakage with bladder is full and she has a hard sneeze. No urgency leaks. Has occasional post void dribbling for which she uses a pantiliner. Had one UTI this summer. No gross hematuria.     Pelvic Organ Prolapse Symptoms  As above    Gastrointestinal Symptoms:  Denies chronic diarrhea, constipation. Denies bothersome fecal or flatal incontinence. Denies defecatory difficulties.     Sexual function/Pelvic floor pain/GYN:   Sexually active, no pain with intercourse. Had some vulvar itching which improved with steroid cream. Has had vaginal hysterectomy a long time ago for fibroids      Relevant Medical History:    Diabetes? no  High Blood pressure? yes     Recurrent UTIs? no  Sleep Apnea? no  Obesity? Body mass index is 29.67 kg/m .  History of Blood clots? no  Other medical problems: hx of breast ca in 2004 (radiation, lumpectomy, tamoxifen)with recurrence in 2015( mastectomy in 2018)     Surgical History:      Past Surgical History:   Procedure Laterality Date    ABDOMEN SURGERY  2019    Breast reconstruction    BIOPSY  2004    Breast    BREAST SURGERY      COLONOSCOPY      COLONOSCOPY N/A 2021    Procedure: COLONOSCOPY;  Surgeon: Iain Mercedes MD;  Location:  GI    ENDOSCOPY  2007    Upper GI    EYE SURGERY      GRAFT FAT TO BREAST Bilateral 2018    Procedure: Bilateral Breast Fat Grafting from Abdomen and Thighs;  Surgeon: Eloisa  DENNISE Dallas MD;  [facility]    GRAFT FREE VASCULARIZED TRANSVERSE RECTUS ABDOMINIS MYOCUTANEOUS Bilateral 10/08/2018    Procedure: GRAFT FREE VASCULARIZED TRANSVERSE RECTUS ABDOMINIS MYOCUTANEOUS;  Bilateral Deep Inferior Epigastric Artery  Free Flap Breast Reconstruction and Removal of Breast Explanders;  Surgeon: DENNISE Amin MD;  Location: [facility] OR    GYN SURGERY      HC BIOPSY/EXCISION LYMPH NODE OPEN DEEP CERVICAL W EXC FAT PAD  2005    Right axillary sentinel node biopsy X 3.    HC EXCISION BREAST LESION, OPEN >=1  2005    Right breast.    HC REMV CATARACT EXTRACAP,INSERT LENS, W/O ECP  2008    bilateral    HC REMV TARSAL/METATARSAL BENIGN BONE LESN  1982    Bone spur - right    HERNIA REPAIR  2019    HYSTERECTOMY, PAP NO LONGER INDICATED      LAPAROSCOPIC HERNIORRHAPHY VENTRAL N/A 10/21/2019    Procedure: Laparoscopic Ventral Hernia Repair With Mesh;  Surgeon: Emil Brown MD;  Location:  OR    MASTECTOMY SIMPLE BILATERAL, SENTINEL NODE BILATERAL, COMBINED Bilateral 2018    Procedure: COMBINED MASTECTOMY SIMPLE BILATERAL, SENTINEL NODE BILATERAL;  Bilateral Mastectomy with Right [place] Node Biopsy, Bilateral Breast Reconstruction, Anesthesia Block;  Surgeon: Rakesh Sanchez MD;  Location:  OR    ORTHOPEDIC SURGERY  1983    Right foot    RECONSTRUCT BREAST Bilateral 2018    Procedure: RECONSTRUCT BREAST;;  Surgeon: DENNISE Amin MD;  Location:  OR    RECONSTRUCT BREAST BILATERAL Bilateral 10/08/2018    Procedure: RECONSTRUCT BREAST BILATERAL;;  Surgeon: DENNISE Amin MD;  Location:  OR    RECONSTRUCT NIPPLE BILATERAL Bilateral 2018    Procedure: Nipple Reconstruction;  Surgeon: DENNISE Amin MD;  Location:  OR    [facility] VAGINAL HYSTERECTOMY  2001    Ovaries intact, due to fibroids    [facility] COLONOSCOPY W BIOPSY  05/10/2010       OB/Gyn History:  OB History    Para Term  AB Living   3 3 0 0  0 3   SAB IAB Ectopic Multiple Live Births   0 0 0 0 0      # Outcome Date GA Lbr Arben/2nd Weight Sex Delivery Anes PTL Lv   3 Para            2 Para            1 Para                Medications/Vitamins/Supplements:   Current Outpatient Medications   Medication    atorvastatin (LIPITOR) 10 MG tablet    Calcium-Vitamin D-Vitamin K (VIACTIV CALCIUM PLUS D PO)    fluticasone (FLONASE) 50 MCG/ACT nasal spray    gabapentin (NEURONTIN) 100 MG capsule    loratadine (CLARITIN) 10 MG tablet    melatonin 5 MG tablet    Multiple Vitamins-Minerals (PRESERVISION AREDS) CAPS    Omega-3 Fatty Acids (FISH OIL) 1000 MG CPDR    triamcinolone (KENALOG) 0.1 % external cream     No current facility-administered medications for this visit.         Medical History:      Past Medical History:   Diagnosis Date    Breast cancer (H)     lumpectomy, radiation, tamoxifen    Cancer (H)     Breast    Cataracts, bilateral     Complication of anesthesia     She prefers not to have versed until right before she leaves or can have after     Diabetes (H)     Gestational    Enthesopathy of hip region 2006    right hip    Esophageal reflux 2006    History of gestational diabetes     Hypertension     Macular drusen     PONV (postoperative nausea and vomiting)     Shingles 2012    left T6-T7 dermatome     ROS  Social History    Social History     Socioeconomic History    Marital status:      Spouse name: Carson    Number of children: 3    Years of education: 24    Highest education level: Not on file   Occupational History    Occupation: Teacher     Employer: [employer]   Tobacco Use    Smoking status: Never    Smokeless tobacco: Never   Vaping Use    Vaping Use: Never used   Substance and Sexual Activity    Alcohol use: Yes     Comment: 4 drinks per month     Drug use: No    Sexual activity: Yes     Partners: Male     Birth control/protection: Female Surgical     Comment: hysterectomy   Other Topics  "Concern     Service No    Blood Transfusions No    Caffeine Concern Yes     Comment: 4 cups per day    Occupational Exposure No    Hobby Hazards No    Sleep Concern No    Stress Concern No    Weight Concern No    Special Diet No    Back Care No    Exercise No    Bike Helmet Yes    Seat Belt Yes    Self-Exams Yes    Parent/sibling w/ CABG, MI or angioplasty before 65F 55M? No   Social History Narrative    Not on file     Social Determinants of Health     Financial Resource Strain: Not on file   Food Insecurity: Not on file   Transportation Needs: Not on file   Physical Activity: Not on file   Stress: Not on file   Social Connections: Not on file   Intimate Partner Violence: Not on file   Housing Stability: Not on file       Family History  Family History   Adopted: Yes   Problem Relation Age of Onset    Cancer Mother         lung cancer  at age 52    Thyroid Disease Sister         half sister, had thyroid removed    Unknown/Adopted Father     Unknown/Adopted Maternal Grandmother     Unknown/Adopted Maternal Grandfather     Unknown/Adopted Paternal Grandmother     Unknown/Adopted Paternal Grandfather     Glaucoma No family hx of     Diabetes No family hx of     Melanoma No family hx of     Skin Cancer No family hx of        Allergy    Allergies   Allergen Reactions    Brimonidine Tartrate Rash     Thrush and numbness in mouth       Current Outpatient Medications   Medication    atorvastatin (LIPITOR) 10 MG tablet    Calcium-Vitamin D-Vitamin K (VIACTIV CALCIUM PLUS D PO)    fluticasone (FLONASE) 50 MCG/ACT nasal spray    gabapentin (NEURONTIN) 100 MG capsule    loratadine (CLARITIN) 10 MG tablet    melatonin 5 MG tablet    Multiple Vitamins-Minerals (PRESERVISION AREDS) CAPS    Omega-3 Fatty Acids (FISH OIL) 1000 MG CPDR    triamcinolone (KENALOG) 0.1 % external cream     No current facility-administered medications for this visit.       Physical Exam:   BP (!) 141/78   Pulse 94   Ht 1.651 m (5' 5\")   " Wt 80.9 kg (178 lb 4.8 oz)   LMP 10/14/2001 (Exact Date)   BMI 29.67 kg/m   Patient's last menstrual period was 10/14/2001 (exact date). Body mass index is 29.67 kg/m .    Gen:  is alert, comfortable in no acute distress,   Abdomen: Abdomen is soft, non-tender, non-distended,   Lungs: non-labored breathing.     Pelvic Exam:   Normal external female genitalia. The urethra was normal.    Vagina: stage 2 posterior prolapse with good anterior and apical support, mild to mod atrophy  Uterus: surgically absent  Ovaries: no palpable mass  Vulva:  area of depigmentation, well circumscribed, currently not pruritic  Rectal: deferred    Pelvic floor strength: 3/5 kegels.    Pelvic floor muscles: no myalgia, normal tone    POPQ EXAM FOR PROLAPSE SEVERITY  (Aa):   -2 (Ba):   -2 (C ):    -7   (GH):   3 (PB):  1.5 (TVL):  9   (Ap):   0 (Bp):  0 (D):   na     ICS Stage (1-4):  2    Voiding trial:    VOID 600 ml  PVR 8 mL by Bladder ultrasound  Leak with Cough stress test : no, Prolapse reduced No    Labs:   Color Urine (no units)   Date Value   04/25/2023 Yellow   02/04/2017 Yellow     Appearance Urine (no units)   Date Value   04/25/2023 Clear   02/04/2017 Clear     Glucose Urine (mg/dL)   Date Value   04/25/2023 Negative   02/04/2017 Negative     Bilirubin Urine (no units)   Date Value   04/25/2023 Negative   02/04/2017 Negative     Ketones Urine (mg/dL)   Date Value   04/25/2023 Negative   02/04/2017 Negative     Specific Gravity Urine (no units)   Date Value   04/25/2023 1.010   02/04/2017 <=1.005     pH Urine   Date Value   04/25/2023 6.0   02/04/2017 6.0 pH     Protein Albumin Urine (mg/dL)   Date Value   04/25/2023 Negative   02/04/2017 Negative     Urobilinogen Urine   Date Value   04/25/2023 0.2 E.U./dL   02/04/2017 0.2 EU/dL     Nitrite Urine (no units)   Date Value   04/25/2023 Negative   02/04/2017 Negative     Leukocyte Esterase Urine (no units)   Date Value   04/25/2023 Negative   02/04/2017 Negative     CBC  RESULTS:   Recent Labs   Lab Test 02/06/20  0740   WBC 5.0   RBC 4.54   HGB 14.0   HCT 42.2   MCV 93   MCH 30.8   MCHC 33.2   RDW 12.9        @lastcmp@    A/P: Enid Lombardo is a 63 year old F with   Enid was seen today for consult.    Diagnoses and all orders for this visit:    Vaginal atrophy  -     estradiol (ESTRACE) 0.1 MG/GM vaginal cream; Place 1 g vaginally three times a week    Rectocele  -     Ob/Gyn Referral  -     Physical Therapy Referral; Future    Post-void dribbling    Lichen sclerosus  -     clobetasol (TEMOVATE) 0.05 % external ointment; Apply a small amount to the vulva daily for 4 weeks, then 3 times a week for 4 weeks, then once a week for 4 weeks    Normal apical and anterior support. Stage 2 rectocele  Currently with mild bother and no defecatory issues. She is open to doing pelvic floor PT for now for her incontinence and prolpase  We discussed that if symptoms don't improve, we will consider pessary vs surgery . For isolated rectocele, we may have to resort to surgery if symptoms progreass  Estrace cream for atrophy  Follow up with me as needed       I spent a total of 45 minutes with  Enid Lombardo  on the date of the encounter in chart review, face to face patient visit, review of tests, documentation and/or discussion with other providers about the issues documented above.     Ese Wells MD, [facility]  , Department of OBGYN  Female Pelvic Medicine and Reconstructive Surgery ( Urogynecology)  CC  Patient Care Team:  Shi Alonso MD as PCP - General (Family Practice)  Paz Osborn MD as MD (Ophthalmology)  Shi Alonso MD as Assigned PCP  Paz Osborn MD as Assigned Surgical Provider  Ragini Muniz PA-C as Assigned Cancer Care Provider  Paty Davenport GC as Genetic Counselor (Genetic Counselor, MS)  Ese Wells MD as MD (OB/Gyn)  Toño Perrin MD as MD (Family Medicine)  TOÑO PERRIN

## 2023-09-29 ENCOUNTER — THERAPY VISIT (OUTPATIENT)
Dept: PHYSICAL THERAPY | Facility: CLINIC | Age: 63
End: 2023-09-29
Attending: OBSTETRICS & GYNECOLOGY
Payer: COMMERCIAL

## 2023-09-29 DIAGNOSIS — N81.6 RECTOCELE: ICD-10-CM

## 2023-09-29 PROCEDURE — 97161 PT EVAL LOW COMPLEX 20 MIN: CPT | Mod: GP | Performed by: PHYSICAL THERAPIST

## 2023-09-29 PROCEDURE — 97110 THERAPEUTIC EXERCISES: CPT | Mod: GP | Performed by: PHYSICAL THERAPIST

## 2023-09-29 PROCEDURE — 97530 THERAPEUTIC ACTIVITIES: CPT | Mod: GP | Performed by: PHYSICAL THERAPIST

## 2023-09-29 NOTE — PROGRESS NOTES
PHYSICAL THERAPY EVALUATION  Type of Visit: Evaluation    See electronic medical record for Abuse and Falls Screening details.    Subjective       Presenting condition or subjective complaint: Vaginal bulge  Date of onset: 09/29/19 (Pt. reports symptoms of bulging started 3-4 years ago)    Relevant medical history: Cancer; Menopause; Migraines or headaches; Vision problems   Dates & types of surgery: Foot, hysterectomy, lumpectomy, cataract, mastectomy, KISHOR breast reconstructive surgery, hernia    Prior diagnostic imaging/testing results:       Prior therapy history for the same diagnosis, illness or injury: No      Prior Level of Function  Transfers: Independent  Ambulation: Independent  ADL: Independent      Living Environment  Social support: With a significant other or spouse   Type of home: House   Stairs to enter the home: Yes 1 Is there a railing: No   Ramp: No   Stairs inside the home: Yes 10 Is there a railing: Yes   Help at home: None  Equipment owned:       Employment: No    Hobbies/Interests: Hiking, sewing, crocheting, misc. crafts, reading    Patient goals for therapy: Improve or Fix this issue/ I don t want it to get worse    Pain assessment:  Pt. Denies pain, she states that on a bad day if feels like there is extra stuff down there, which makes it uncomfortable.      Objective      PELVIC EVALUATION  ADDITIONAL HISTORY:  Sex assigned at birth: Female  Gender identity: Female    Pronouns: She/Her Hers      Bladder History:  Feels bladder filling: No  Triggers for feeling of inability to wait to go to the bathroom: No    How long can you wait to urinate: A few hours, depending the amount of liquid I have had  Gets up at night to urinate: Yes 0-1 time, depending on how much water I have had before bed  Can stop the flow of urine when urinating: Yes  Volume of urine usually released: Average   Other issues:    Number of bladder infections in last 12 months:    Fluid intake per day: 48+ oz 20 oz     Medications taken for bladder: No     Activities causing urine leak: Sneeze    Amount of urine typically leaked: Dribble, but only if a sneeze unexpectedly  Pads used to help with leaking: Yes I use this many pads per day: 0-1 I use this type/brand: Lightest    Bowel History:  Frequency of bowel movement: Almost every day  Consistency of stool: Soft-formed    Ignores the urge to defecate: No  Other bowel issues:    Length of time spent trying to have a bowel movement:      Sexual Function History:  Sexual orientation: Straight    Sexually active: Yes  Lubrication used: No No  Pelvic pain:      Pain or difficulty with orgasms/erection/ejaculation: No    State of menopause: Post-menopause (I am done with menopause)  Hormone medications: No      Are you currently pregnant: No, Number of previous pregnancies: 3, Number of deliveries: 3, If you have delivered before, did you have any of these issues during delivery: Tearing; Vaginal delivery, Have you been diagnosed with pelvic prolapse or abdominal separation: Yes, Do you get regular exercise: Yes, I do this type of exercise: Free weights, walking, hiking, Have you tried pelvic floor strengthening exercises for 4 weeks: Yes, Do you have any history of trauma that is relevant to your care that you d like to share: No    Discussed reason for referral regarding pelvic health needs and external/internal pelvic floor muscle examination with patient/guardian.  Opportunity provided to ask questions and verbal consent for assessment and intervention was given.      POSTURE: WNL  LUMBAR SCREEN: AROM WNL    PELVIC/SI SCREEN:  WNL   PAIN PROVOCATION TEST: WNL  PELVIS/SI SPECIAL TESTS: WNL  BREATHING SYMMETRY: WNL    PELVIC EXAM  External Visual Inspection:  Perineal ascent with voluntary perineal contraction  Perineal descent with voluntary relaxation  Perineal descent with valsalva    Integumentary:   Introitus is unremarkable    External Digital Palpation per Perineum:    Ischiocavernosis: Unremarkable  Bulbo cavernosis: Unremarkable  Transverse perineal: Unremarkable  Levator ani: Unremarkable  Perineal body: Unremarkable    Scar:   Location/Type: Across the lower abdomen  Mobility: WNL    Internal Digital Palpation:  Per Vagina:  Tone: normal  Digital Muscle Performance: P (Power): 4/5  E (Endurance): 10 seconds  R (Repetitions): 5  F (Fast Twitch): NT  Relaxation Post-Contraction: Partial/delayed relaxation       Pelvic Organ Prolapse:   Posterior: At the level of the hymen    ABDOMINAL ASSESSMENT  Scar assessment, numbness, no pain, good mobility    Abdominal Activation/Strength: Jamar lowering test (measured in degrees): Good control     Scar:   Location/Type: Across lower abdomen due to KISHOR flap surgery  Mobility: WNL    Fascial Tension/Restriction/Tone: WNL    Assessment & Plan   CLINICAL IMPRESSIONS  Medical Diagnosis: Rectocele    Treatment Diagnosis: Pelvic floor dysfunction   Impression/Assessment: Patient is a 63 year old female with a feeling of a vaginal bulge/ discomfort as the day progresses complaints.  The following significant findings have been identified: Decreased strength. These impairments interfere with their ability to perform self care tasks, recreational activities, and household chores as compared to previous level of function.     Clinical Decision Making (Complexity):  Clinical Presentation: Stable/Uncomplicated  Clinical Presentation Rationale: based on medical and personal factors listed in PT evaluation  Clinical Decision Making (Complexity): Low complexity    PLAN OF CARE  Treatment Interventions:  Modalities: Biofeedback  Interventions: Manual Therapy, Neuromuscular Re-education, Therapeutic Exercise    Long Term Goals     PT Goal 1  Goal Identifier: Symptom control  Goal Description: Enid will use a Poise Impressa to assess if it helps to control her rectocele symptoms when on her feet for along periods of time  Rationale: to maximize safety  and independence with performance of ADLs and functional tasks  Target Date: 10/29/23  PT Goal 2  Goal Identifier: Function  Goal Description: Enid will make it through most days of the week without a feeling of extra pressure in her perineum.  Rationale: to maximize safety and independence with performance of ADLs and functional tasks;to maximize safety and independence within the home;to maximize safety and independence within the community  Target Date: 11/28/23  PT Goal 3  Goal Identifier: Home management  Goal Description: Enid will demonstrate independence with home program allowing her to continue to manage symptoms independently  Goal Progress: t+90      Frequency of Treatment: 1x every 2 weeks  Duration of Treatment: 8 visits    Education Assessment:   Learner/Method: Patient;Listening;Reading;No Barriers to Learning  Education Comments: HEP, bladder normals, caffeine and water intake    Risks and benefits of evaluation/treatment have been explained.   Patient/Family/caregiver agrees with Plan of Care.     Evaluation Time:     PT Eval, Low Complexity Minutes (22627): 30       Signing Clinician: Cordelia Upton PT

## 2023-10-12 ENCOUNTER — THERAPY VISIT (OUTPATIENT)
Dept: PHYSICAL THERAPY | Facility: CLINIC | Age: 63
End: 2023-10-12
Attending: OBSTETRICS & GYNECOLOGY
Payer: COMMERCIAL

## 2023-10-12 DIAGNOSIS — N81.6 RECTOCELE: Primary | ICD-10-CM

## 2023-10-12 PROCEDURE — 97110 THERAPEUTIC EXERCISES: CPT | Mod: GP | Performed by: PHYSICAL THERAPIST

## 2023-10-31 ENCOUNTER — THERAPY VISIT (OUTPATIENT)
Dept: PHYSICAL THERAPY | Facility: CLINIC | Age: 63
End: 2023-10-31
Attending: OBSTETRICS & GYNECOLOGY
Payer: COMMERCIAL

## 2023-10-31 DIAGNOSIS — N81.6 RECTOCELE: Primary | ICD-10-CM

## 2023-10-31 PROCEDURE — 97110 THERAPEUTIC EXERCISES: CPT | Mod: GP | Performed by: PHYSICAL THERAPIST

## 2023-10-31 PROCEDURE — 97530 THERAPEUTIC ACTIVITIES: CPT | Mod: GP | Performed by: PHYSICAL THERAPIST

## 2023-11-14 ENCOUNTER — THERAPY VISIT (OUTPATIENT)
Dept: PHYSICAL THERAPY | Facility: CLINIC | Age: 63
End: 2023-11-14
Attending: OBSTETRICS & GYNECOLOGY
Payer: COMMERCIAL

## 2023-11-14 DIAGNOSIS — N81.6 RECTOCELE: Primary | ICD-10-CM

## 2023-11-14 PROCEDURE — 97110 THERAPEUTIC EXERCISES: CPT | Mod: GP | Performed by: PHYSICAL THERAPIST

## 2023-11-14 PROCEDURE — 97530 THERAPEUTIC ACTIVITIES: CPT | Mod: GP | Performed by: PHYSICAL THERAPIST

## 2023-11-29 ENCOUNTER — OFFICE VISIT (OUTPATIENT)
Dept: UROLOGY | Facility: CLINIC | Age: 63
End: 2023-11-29
Attending: OBSTETRICS & GYNECOLOGY
Payer: COMMERCIAL

## 2023-11-29 VITALS
BODY MASS INDEX: 29.79 KG/M2 | HEART RATE: 75 BPM | DIASTOLIC BLOOD PRESSURE: 79 MMHG | WEIGHT: 179 LBS | SYSTOLIC BLOOD PRESSURE: 132 MMHG

## 2023-11-29 DIAGNOSIS — N81.6 RECTOCELE: ICD-10-CM

## 2023-11-29 DIAGNOSIS — N81.11 CYSTOCELE, MIDLINE: Primary | ICD-10-CM

## 2023-11-29 DIAGNOSIS — N81.9 VAGINAL VAULT PROLAPSE: ICD-10-CM

## 2023-11-29 DIAGNOSIS — N39.3 STRESS INCONTINENCE OF URINE: ICD-10-CM

## 2023-11-29 PROCEDURE — G0463 HOSPITAL OUTPT CLINIC VISIT: HCPCS | Performed by: OBSTETRICS & GYNECOLOGY

## 2023-11-29 PROCEDURE — 99215 OFFICE O/P EST HI 40 MIN: CPT | Performed by: OBSTETRICS & GYNECOLOGY

## 2023-11-29 NOTE — PATIENT INSTRUCTIONS
Thank you for trusting us with your care!     If you need to contact us for questions about:  Symptoms, Scheduling & Medical Questions; Non-urgent (2-3 day response) Zehra message, Urgent (needing response today) [phone] (if after 3:30pm next day response)   Prescriptions: Please call your Pharmacy   Billing: [facility] [phone] or [facility]:[phone]

## 2023-11-29 NOTE — LETTER
11/29/2023       RE: Enid Lombardo  [address] [facility] [address]     Dear Colleague,    Thank you for referring your patient, Enid Lombardo, to the [facility] [place] at . Please see a copy of my visit note below.    Ms Lombardo is here to follow up for her stage 2 rectocele (symptomatic )and rare stress incontinence (not bothersom). I last saw her as a new patient on 9.12.23 and the plan was for her to try pelvic PT first and if this didn't help, for us to consider pessary vs posterior colporrhaphy.  Has done pelvic PT but says still has bothersome bulge and feels that it is coming from the roof of the vagina and not from her rectocele. Tried impressa which did not help. She is really bothered by her bulge and says it is affecting her quality of life and her sense of self.         Pelvic exam    POPQ EXAM FOR PROLAPSE SEVERITY  (Aa):  +1 (Ba):   +3 (C ):    -5   (GH):   3 (PB):  1.5 (TVL):  9   (Ap):   0 (Bp):  0 (D):   na     ICS Stage (1-4):  3      Assessment and plan    Enid was seen today for recheck.    Diagnoses and all orders for this visit:    Cystocele, midline  -     Case Request: SACROCOLPOPEXY, ROBOT-ASSISTED, WITH CYSTOSCOPY, possible posterior repair, cystoscopy, midurethral sling ( TVT); Standing  -     Case Request: SACROCOLPOPEXY, ROBOT-ASSISTED, WITH CYSTOSCOPY, possible posterior repair, cystoscopy, midurethral sling ( TVT)    Rectocele  -     Case Request: SACROCOLPOPEXY, ROBOT-ASSISTED, WITH CYSTOSCOPY, possible posterior repair, cystoscopy, midurethral sling ( TVT); Standing  -     Case Request: SACROCOLPOPEXY, ROBOT-ASSISTED, WITH CYSTOSCOPY, possible posterior repair, cystoscopy, midurethral sling ( TVT)    Vaginal vault prolapse  -     Case Request: SACROCOLPOPEXY, ROBOT-ASSISTED, WITH CYSTOSCOPY, possible posterior repair, cystoscopy, midurethral sling ( TVT); Standing  -     Case  Request: SACROCOLPOPEXY, ROBOT-ASSISTED, WITH CYSTOSCOPY, possible posterior repair, cystoscopy, midurethral sling ( TVT)    Stress incontinence of urine  -     Case Request: SACROCOLPOPEXY, ROBOT-ASSISTED, WITH CYSTOSCOPY, possible posterior repair, cystoscopy, midurethral sling ( TVT); Standing  -     Case Request: SACROCOLPOPEXY, ROBOT-ASSISTED, WITH CYSTOSCOPY, possible posterior repair, cystoscopy, midurethral sling ( TVT)          Would like to proceed with surgical management    We discussed both vaginal non mesh native tissue repair options as well as minimally invasive robotic assisted mesh augmented repair ( sacrocopopexy). We discussed that both surgical options may require removing her uterus.   We discussed that with surgery, there is risk of bleeding, infection, injury to surrounding organs, vascular, bowel, cardiac or respiratory complications secondary to surgery or anaesthesia, persistent pain that may be chronic as well as unanticipated neurologic complications and even death. With mesh procedure, there is additional risk of mesh erosion. This may necessitate removing mesh either vaginally or abdominally. Even with surgery, there is a risk of recurrence of prolapse (or failure of surgery) that may necessitate repeat surgery. Our abdominal mesh procedure in general may have longer duration of cure, and reduced need for repeat surgery which may or may not be attainable with vaginal procedures that depend on your own tissue for repair. However mesh augmented abdominal repairs may be longer surgeries.      She would like to proceed with mesh augmented repair and surgery for her incontinence    For robotic assisted supracervical hysterectomy, bilateral salpingectomy, sacrocolpopexy with mesh, cystoscopy, possible posterior colporrhaphy  and mid urethral sling with TVT      I spent 45  minutes face-to-face with Enid Lombardo on the date of the encounter in chart review, patient visit, review of  tests, documentation and/or discussion with other providers about the issues documented above.       Ese Wells MD

## 2023-11-29 NOTE — PROGRESS NOTES
Ms Lombardo is here to follow up for her stage 2 rectocele (symptomatic )and rare stress incontinence (not bothersom). I last saw her as a new patient on 9.12.23 and the plan was for her to try pelvic PT first and if this didn't help, for us to consider pessary vs posterior colporrhaphy.  Has done pelvic PT but says still has bothersome bulge and feels that it is coming from the roof of the vagina and not from her rectocele. Tried impressa which did not help. She is really bothered by her bulge and says it is affecting her quality of life and her sense of self.         Pelvic exam    POPQ EXAM FOR PROLAPSE SEVERITY  (Aa):  +1 (Ba):   +3 (C ):    -5   (GH):   3 (PB):  1.5 (TVL):  9   (Ap):   0 (Bp):  0 (D):   na     ICS Stage (1-4):  3      Assessment and plan    Enid was seen today for recheck.    Diagnoses and all orders for this visit:    Cystocele, midline  -     Case Request: SACROCOLPOPEXY, ROBOT-ASSISTED, WITH CYSTOSCOPY, possible posterior repair, cystoscopy, midurethral sling ( TVT); Standing  -     Case Request: SACROCOLPOPEXY, ROBOT-ASSISTED, WITH CYSTOSCOPY, possible posterior repair, cystoscopy, midurethral sling ( TVT)    Rectocele  -     Case Request: SACROCOLPOPEXY, ROBOT-ASSISTED, WITH CYSTOSCOPY, possible posterior repair, cystoscopy, midurethral sling ( TVT); Standing  -     Case Request: SACROCOLPOPEXY, ROBOT-ASSISTED, WITH CYSTOSCOPY, possible posterior repair, cystoscopy, midurethral sling ( TVT)    Vaginal vault prolapse  -     Case Request: SACROCOLPOPEXY, ROBOT-ASSISTED, WITH CYSTOSCOPY, possible posterior repair, cystoscopy, midurethral sling ( TVT); Standing  -     Case Request: SACROCOLPOPEXY, ROBOT-ASSISTED, WITH CYSTOSCOPY, possible posterior repair, cystoscopy, midurethral sling ( TVT)    Stress incontinence of urine  -     Case Request: SACROCOLPOPEXY, ROBOT-ASSISTED, WITH CYSTOSCOPY, possible posterior repair, cystoscopy, midurethral sling ( TVT); Standing  -     Case Request:  SACROCOLPOPEXY, ROBOT-ASSISTED, WITH CYSTOSCOPY, possible posterior repair, cystoscopy, midurethral sling ( TVT)          Would like to proceed with surgical management    We discussed both vaginal non mesh native tissue repair options as well as minimally invasive robotic assisted mesh augmented repair ( sacrocopopexy). We discussed that both surgical options may require removing her uterus.   We discussed that with surgery, there is risk of bleeding, infection, injury to surrounding organs, vascular, bowel, cardiac or respiratory complications secondary to surgery or anaesthesia, persistent pain that may be chronic as well as unanticipated neurologic complications and even death. With mesh procedure, there is additional risk of mesh erosion. This may necessitate removing mesh either vaginally or abdominally. Even with surgery, there is a risk of recurrence of prolapse (or failure of surgery) that may necessitate repeat surgery. Our abdominal mesh procedure in general may have longer duration of cure, and reduced need for repeat surgery which may or may not be attainable with vaginal procedures that depend on your own tissue for repair. However mesh augmented abdominal repairs may be longer surgeries.      She would like to proceed with mesh augmented repair and surgery for her incontinence    For robotic assisted supracervical hysterectomy, bilateral salpingectomy, sacrocolpopexy with mesh, cystoscopy, possible posterior colporrhaphy  and mid urethral sling with TVT      I spent 45  minutes face-to-face with Enid Lombardo on the date of the encounter in chart review, patient visit, review of tests, documentation and/or discussion with other providers about the issues documented above.

## 2023-12-06 ENCOUNTER — PATIENT OUTREACH (OUTPATIENT)
Dept: GASTROENTEROLOGY | Facility: CLINIC | Age: 63
End: 2023-12-06
Payer: COMMERCIAL

## 2023-12-14 NOTE — PROGRESS NOTES
Virtual Visit Details    Type of service:  Video Visit     Originating Location (pt. Location): Home    Distant Location (provider location):  On-site  Platform used for Video Visit: [facility]    CANCER SURVIVORSHIP VISIT-virtual  Follow up visit  Dec 21, 2023    Care Team   Primary Care Provider Shi Alonso   Surgeon  Rakesh Sanchez MD   Radiation Oncologist Not listed   Medical Oncologist  Kaden Holt MD, Charity Brar MD       REASON FOR VISIT: survivorship visit for history of breast cancer    CANCER HISTORY AND TREATMENT:    History of right-sided stage IA breast cancer, ER/SD postive, HER2 negative, diagnosed in 2004 and again in 2018.  *Diagnosis: 12/2004. Abnormal screening mammogram age 44  *Surgery: 01/07/2005 right-sided lumpectomy with sentinel lymph node dissection. Infiltrating ductal carcinoma, grade 1, 0.6cm, 0/3 nodes, stage IA (TIb N0 M0).   *Radiation: right breast radiotherapy  *Endocrine therapy: Tamoxifen 3/2005-11/2008 (stopped after dx of cataracts)  *Local recurrence: calcifications on screening mammogram   *Surgery #2: 7/2018, s/p bilateral mastectomy with reconstruction, final pathology revealed 8 mm invasive cancer, total 4 cm DCIS; stage I (T1bN0),  ER+ SD+ HER2 negative. Oncotype 0.   *Endocrine therapy: anastrozole 10/0744-8589  *Genetic testing: Negative in 2008 and 2018. Spoke with CONSUELO in 2023, no further testing ordered.   *Other information: KISHOR flap reconstruction with Dr Amin. Osteopenia,  poorly tolerated fosamax and zoledronic acid.     INTERVAL HISTORY:     Enid returns after 1 year for a cancer survivorship follow up visit. She met with Paty Davenport GC in 2023 and is up to date on screening for her history of breast cancer. She will contact Paty if there are changes in her personal or family history.     Having pelvic floor issues (pressure) which she reports is related to cystocele- saw Dr. Wells and PT. Tried a pessary. She is considering surgery in April.     No new  lumps/bumps or new pain.     Late effects: none    Mood: good    Sexual health: no concerns    Past Medical History:   Diagnosis Date    Breast cancer (H) 2004    lumpectomy, radiation, tamoxifen    Cancer (H) 2004    Breast    Cataracts, bilateral     Complication of anesthesia     She prefers not to have versed until right before she leaves or can have after     Diabetes (H)     Gestational    Enthesopathy of hip region 06/2006    right hip    Esophageal reflux 02/2006    History of gestational diabetes     Hypertension 2012    Macular drusen     PONV (postoperative nausea and vomiting)     Shingles 01/23/2012    left T6-T7 dermatome       Current Outpatient Medications   Medication Sig Dispense Refill    atorvastatin (LIPITOR) 10 MG tablet Take 1 tablet (10 mg) by mouth daily 90 tablet 3    Calcium-Vitamin D-Vitamin K (VIACTIV CALCIUM PLUS D PO) Take 1 tablet by mouth 2 times daily      clobetasol (TEMOVATE) 0.05 % external ointment Apply a small amount to the vulva daily for 4 weeks, then 3 times a week for 4 weeks, then once a week for 4 weeks 45 g 1    estradiol (ESTRACE) 0.1 MG/GM vaginal cream Place 1 g vaginally three times a week 42.5 g 3    fluticasone (FLONASE) 50 MCG/ACT nasal spray INSTILL 1-2 SPRAYS INTO EACH NOSTRIL DAILY 48 mL 3    gabapentin (NEURONTIN) 100 MG capsule Take 1 capsule (100 mg) by mouth 3 times daily 270 capsule 3    loratadine (CLARITIN) 10 MG tablet TAKE 1 TABLET BY MOUTH EVERY DAY 90 tablet 1    melatonin 5 MG tablet Take 5 mg by mouth At Bedtime       Multiple Vitamins-Minerals (PRESERVISION AREDS) CAPS Take 1 capsule by mouth 2 times daily (Patient taking differently: Take 1 capsule by mouth every morning) 180 capsule 3    Omega-3 Fatty Acids (FISH OIL) 1000 MG CPDR Take by mouth every morning             Allergies   Allergen Reactions    Brimonidine Tartrate Rash     Thrush and numbness in mouth       Family History   Adopted: Yes   Problem Relation Age of Onset    Cancer Mother          lung cancer  at age 52    Thyroid Disease Sister         half sister, had thyroid removed    Unknown/Adopted Father     Unknown/Adopted Maternal Grandmother     Unknown/Adopted Maternal Grandfather     Unknown/Adopted Paternal Grandmother     Unknown/Adopted Paternal Grandfather     Glaucoma No family hx of     Diabetes No family hx of     Melanoma No family hx of     Skin Cancer No family hx of     Enid is adopted and has limited information on her biological family.   One maternal half-sister has thyroid issues.  Mother  at 52 from lung cancer.  Maternal grandfather  from colon cancer.  Paternal family history is unknown.   Enid has three healthy adult children.     Social history:  Living situation: , 3 children  Occupation: Teacher, retired  Tobacco use: never smoker  ETOH use: 2 drinks a months  Frequency of exercise:  Gets 150 min of exercise a week  Diet: good    Video physical exam  BMI 28 2023  General: Patient appears well in no acute distress.   Skin: No visualized rash or lesions on visualized skin  Eyes: EOMI, no erythema, sclera icterus or discharge noted  Resp: Appears to be breathing comfortably without accessory muscle usage, speaking in full sentences, no cough  MSK: Appears to have normal range of motion based on visualized movements  Neurologic: No apparent tremors, facial movements symmetric  Psych: affect versatile, alert and oriented     Imaging:  EXAMINATION: MR BREAST BILATERAL W/O & W CONTRAST, 2023  8:19 AM      HISTORY/FAMILY HISTORY: Right breast cancer status post lumpectomy and  subsequent recurrence status post bilateral mastectomy with KISHOR  reconstruction. Previous right reconstructed breast biopsy with fat  necrosis. Recent breast MRI with few new enhancing foci in the left  reconstructed breast similar to the biopsied right fat necrosis.  BI-RADS 3 follow-up.     COMPARISON: 2023, 2022, 2022, 6/15/2022, 3/24/2022,  11/15/2021,  bone scan 6/21/2022     TECHNIQUE: Axial T2 images with fat suppression and axial T1 without  fat saturation images were obtained of both breasts prior to  gadolinium administration. Following the uneventful administration of  weight-based gadolinium intravenously, high-resolution dynamic imaging  of both breasts was performed in the axial plane. Dynamic images are  reviewed with subtraction technique. Axial and coronal maximal  intensity projection images are displayed.  Kinetic analysis was  performed using a separate station. Contrast: 8.5 ml Gadavist     FINDINGS:  Breast composition: Status post bilateral mastectomy with KISHOR flap  reconstruction  Background parenchymal enhancement: Not applicable     Motion artifact is present.     Postoperative changes of bilateral mastectomy with KISHOR flap  reconstruction. The previously noted asymmetric enhancing foci in the  left breast have largely decreased in conspicuity, with  similar-appearing waxing/waning foci bilaterally. No suspicious  enhancement. In particular, no suspicious enhancement in the left  superior medial breast corresponding to calcifications visualized on  MRI grams dated 11/6/2020.     No suspicious lymphadenopathy. Stable subcentimeter enhancing focus in  the right aspect of the sternum. Scattered T2 hyperintense benign  hepatic cysts.                                                                       IMPRESSION: BI-RADS CATEGORY: 2 - Benign.     RECOMMENDED FOLLOW-UP: Clinical Follow-up.     I have personally reviewed the examination and initial interpretation  and I agree with the findings.     MILLER AKHTAR MD     IMPRESSION/PLAN:    History of right-sided stage IA breast cancer, ER/WV postive, HER2 negative, diagnosed in 2004 and again in 2018.  S/p lumpectomy, radiotherapy and 3 years of Tamoxifen followed by bilateral mastectomy, KISHOR flap reconstruction and 2 years of AI therapy. Oncotype 0.   She is 5 years from her latest diagnosis  No  further routine labs or imaging indicated  1 year follow-up virtually (chest wall exams through her PCP) for now    Potential late effects related to surgery/radiation:  -Risk of lymphedema: If she notices any swelling in her arm, she should be offered a referral to lymphedema physical therapy.     Potential late effects related to radiation:  - Possible radiation side effects include cardiotoxicity, lung injury, fracture, and second malignancies. She should seek medical attention if she notices any new skin lesions in the area of radiation. If she should develop any shortness of breath, hemoptysis, cough, or new pain, I would advise further evaluation with CT or X-ray.     Potential effects related to endocrine therapy:  Risk of developing osteoporosis: Last DEXA in 2021 with a lowest T-score of -1.6 with improvement, Recommend weightbearing exercises 2-3 days per week, and to supplement with 1200 mg of calcium, 1000 international units of vitamin D daily. Further bone density monitoring via her PCP.     General late effects screenings and recommendations    -Cancer screening. She should undergo routine screening for women in her age group. Colonoscopy due 2026.  S/p hysterctomy    -Healthy lifestyle. She should maintain a healthy weight with a BMI between 20-25. She should exercise at least 150 minutes weekly at moderate intensity. She should see the eye doctor every 1-2 years, and dentist every 6 months for cleanings. She should not use any tobacco. She should minimize alcohol intake. If continuing to drink, should follow CDC recommendations for no more than 1 alcoholic drink/day for women.    Gave resources for our [facility] Cancer Survivorship class series and mailings list for educational opportunities. [email or web address]    Cancer treatment summary was sent electronically to the patient and her PCP.      The total time of this encounter amounted to 30  minutes.This time included time spent with the patient, reviewing her chart, and performing post visit documentation.    Ragini Muniz MPAS, PA-C  [facility]

## 2023-12-20 ENCOUNTER — ANCILLARY PROCEDURE (OUTPATIENT)
Dept: MRI IMAGING | Facility: CLINIC | Age: 63
End: 2023-12-20
Attending: PHYSICIAN ASSISTANT
Payer: COMMERCIAL

## 2023-12-20 DIAGNOSIS — R92.8 ABNORMAL MRI, BREAST: ICD-10-CM

## 2023-12-20 DIAGNOSIS — Z85.3 HISTORY OF RIGHT BREAST CANCER: ICD-10-CM

## 2023-12-20 PROCEDURE — A9585 GADOBUTROL INJECTION: HCPCS | Mod: JZ | Performed by: RADIOLOGY

## 2023-12-20 PROCEDURE — 77049 MRI BREAST C-+ W/CAD BI: CPT | Mod: GC | Performed by: RADIOLOGY

## 2023-12-20 RX ORDER — GADOBUTROL 604.72 MG/ML
10 INJECTION INTRAVENOUS ONCE
Status: COMPLETED | OUTPATIENT
Start: 2023-12-20 | End: 2023-12-20

## 2023-12-20 RX ADMIN — GADOBUTROL 8.5 ML: 604.72 INJECTION INTRAVENOUS at 07:57

## 2023-12-21 ENCOUNTER — VIRTUAL VISIT (OUTPATIENT)
Dept: ONCOLOGY | Facility: CLINIC | Age: 63
End: 2023-12-21
Attending: PHYSICIAN ASSISTANT
Payer: COMMERCIAL

## 2023-12-21 VITALS — WEIGHT: 172 LBS | HEIGHT: 65 IN | BODY MASS INDEX: 28.66 KG/M2

## 2023-12-21 DIAGNOSIS — C50.919 RECURRENT MALIGNANT NEOPLASM OF BREAST, UNSPECIFIED LATERALITY (H): Primary | ICD-10-CM

## 2023-12-21 PROCEDURE — 99214 OFFICE O/P EST MOD 30 MIN: CPT | Mod: VID | Performed by: PHYSICIAN ASSISTANT

## 2023-12-21 ASSESSMENT — PAIN SCALES - GENERAL: PAINLEVEL: NO PAIN (0)

## 2023-12-21 NOTE — LETTER
12/21/2023         RE: Enid Lombardo  [address] [place] [place] [address]        Dear Colleague,    Thank you for referring your patient, Enid Lombardo, to the [facility]. Please see a copy of my visit note below.    Virtual Visit Details    Type of service:  Video Visit     Originating Location (pt. Location): Home    Distant Location (provider location):  On-site  Platform used for Video Visit: [facility]    CANCER SURVIVORSHIP VISIT-virtual  Follow up visit  Dec 21, 2023    Care Team   Primary Care Provider Shi Alonso   Surgeon  Rakesh Sanchez MD   Radiation Oncologist Not listed   Medical Oncologist  Kaden Holt MD, Charity Brar MD       REASON FOR VISIT: survivorship visit for history of breast cancer    CANCER HISTORY AND TREATMENT:    History of right-sided stage IA breast cancer, ER/GA postive, HER2 negative, diagnosed in 2004 and again in 2018.  *Diagnosis: 12/2004. Abnormal screening mammogram age 44  *Surgery: 01/07/2005 right-sided lumpectomy with sentinel lymph node dissection. Infiltrating ductal carcinoma, grade 1, 0.6cm, 0/3 nodes, stage IA (TIb N0 M0).   *Radiation: right breast radiotherapy  *Endocrine therapy: Tamoxifen 3/2005-11/2008 (stopped after dx of cataracts)  *Local recurrence: calcifications on screening mammogram   *Surgery #2: 7/2018, s/p bilateral mastectomy with reconstruction, final pathology revealed 8 mm invasive cancer, total 4 cm DCIS; stage I (T1bN0),  ER+ GA+ HER2 negative. Oncotype 0.   *Endocrine therapy: anastrozole 10/5517-2097  *Genetic testing: Negative in 2008 and 2018. Spoke with CONSUELO in 2023, no further testing ordered.   *Other information: KISHOR flap reconstruction with Dr Amin. Osteopenia,  poorly tolerated fosamax and zoledronic acid.     INTERVAL HISTORY:     Enid returns after 1 year for a cancer survivorship follow up visit. She met with Paty Davenport GC in 2023 and is up to date on screening for her history of  breast cancer. She will contact Paty if there are changes in her personal or family history.     Having pelvic floor issues (pressure) which she reports is related to cystocele- saw Dr. Wells and PT. Tried a pessary. She is considering surgery in April.     No new lumps/bumps or new pain.     Late effects: none    Mood: good    Sexual health: no concerns    Past Medical History:   Diagnosis Date    Breast cancer (H) 2004    lumpectomy, radiation, tamoxifen    Cancer (H) 2004    Breast    Cataracts, bilateral     Complication of anesthesia     She prefers not to have versed until right before she leaves or can have after     Diabetes (H)     Gestational    Enthesopathy of hip region 06/2006    right hip    Esophageal reflux 02/2006    History of gestational diabetes     Hypertension 2012    Macular drusen     PONV (postoperative nausea and vomiting)     Shingles 01/23/2012    left T6-T7 dermatome       Current Outpatient Medications   Medication Sig Dispense Refill    atorvastatin (LIPITOR) 10 MG tablet Take 1 tablet (10 mg) by mouth daily 90 tablet 3    Calcium-Vitamin D-Vitamin K (VIACTIV CALCIUM PLUS D PO) Take 1 tablet by mouth 2 times daily      clobetasol (TEMOVATE) 0.05 % external ointment Apply a small amount to the vulva daily for 4 weeks, then 3 times a week for 4 weeks, then once a week for 4 weeks 45 g 1    estradiol (ESTRACE) 0.1 MG/GM vaginal cream Place 1 g vaginally three times a week 42.5 g 3    fluticasone (FLONASE) 50 MCG/ACT nasal spray INSTILL 1-2 SPRAYS INTO EACH NOSTRIL DAILY 48 mL 3    gabapentin (NEURONTIN) 100 MG capsule Take 1 capsule (100 mg) by mouth 3 times daily 270 capsule 3    loratadine (CLARITIN) 10 MG tablet TAKE 1 TABLET BY MOUTH EVERY DAY 90 tablet 1    melatonin 5 MG tablet Take 5 mg by mouth At Bedtime       Multiple Vitamins-Minerals (PRESERVISION AREDS) CAPS Take 1 capsule by mouth 2 times daily (Patient taking differently: Take 1 capsule by mouth every morning) 180 capsule  3    Omega-3 Fatty Acids (FISH OIL) 1000 MG CPDR Take by mouth every morning             Allergies   Allergen Reactions    Brimonidine Tartrate Rash     Thrush and numbness in mouth       Family History   Adopted: Yes   Problem Relation Age of Onset    Cancer Mother         lung cancer  at age 52    Thyroid Disease Sister         half sister, had thyroid removed    Unknown/Adopted Father     Unknown/Adopted Maternal Grandmother     Unknown/Adopted Maternal Grandfather     Unknown/Adopted Paternal Grandmother     Unknown/Adopted Paternal Grandfather     Glaucoma No family hx of     Diabetes No family hx of     Melanoma No family hx of     Skin Cancer No family hx of     Enid is adopted and has limited information on her biological family.   One maternal half-sister has thyroid issues.  Mother  at 52 from lung cancer.  Maternal grandfather  from colon cancer.  Paternal family history is unknown.   Enid has three healthy adult children.     Social history:  Living situation: , 3 children  Occupation: Teacher, retired  Tobacco use: never smoker  ETOH use: 2 drinks a months  Frequency of exercise:  Gets 150 min of exercise a week  Diet: good    Video physical exam  BMI 28 2023  General: Patient appears well in no acute distress.   Skin: No visualized rash or lesions on visualized skin  Eyes: EOMI, no erythema, sclera icterus or discharge noted  Resp: Appears to be breathing comfortably without accessory muscle usage, speaking in full sentences, no cough  MSK: Appears to have normal range of motion based on visualized movements  Neurologic: No apparent tremors, facial movements symmetric  Psych: affect versatile, alert and oriented     Imaging:  EXAMINATION: MR BREAST BILATERAL W/O & W CONTRAST, 2023  8:19 AM      HISTORY/FAMILY HISTORY: Right breast cancer status post lumpectomy and  subsequent recurrence status post bilateral mastectomy with KISHOR  reconstruction. Previous right  reconstructed breast biopsy with fat  necrosis. Recent breast MRI with few new enhancing foci in the left  reconstructed breast similar to the biopsied right fat necrosis.  BI-RADS 3 follow-up.     COMPARISON: 6/19/2023, 6/21/2022, 6/20/2022, 6/15/2022, 3/24/2022,  11/15/2021, bone scan 6/21/2022     TECHNIQUE: Axial T2 images with fat suppression and axial T1 without  fat saturation images were obtained of both breasts prior to  gadolinium administration. Following the uneventful administration of  weight-based gadolinium intravenously, high-resolution dynamic imaging  of both breasts was performed in the axial plane. Dynamic images are  reviewed with subtraction technique. Axial and coronal maximal  intensity projection images are displayed.  Kinetic analysis was  performed using a separate station. Contrast: 8.5 ml Gadavist     FINDINGS:  Breast composition: Status post bilateral mastectomy with KISHOR flap  reconstruction  Background parenchymal enhancement: Not applicable     Motion artifact is present.     Postoperative changes of bilateral mastectomy with KISHOR flap  reconstruction. The previously noted asymmetric enhancing foci in the  left breast have largely decreased in conspicuity, with  similar-appearing waxing/waning foci bilaterally. No suspicious  enhancement. In particular, no suspicious enhancement in the left  superior medial breast corresponding to calcifications visualized on  MRI grams dated 11/6/2020.     No suspicious lymphadenopathy. Stable subcentimeter enhancing focus in  the right aspect of the sternum. Scattered T2 hyperintense benign  hepatic cysts.                                                                       IMPRESSION: BI-RADS CATEGORY: 2 - Benign.     RECOMMENDED FOLLOW-UP: Clinical Follow-up.     I have personally reviewed the examination and initial interpretation  and I agree with the findings.     MILLER AKHTAR MD     IMPRESSION/PLAN:    History of right-sided stage IA breast  cancer, ER/DC postive, HER2 negative, diagnosed in 2004 and again in 2018.  S/p lumpectomy, radiotherapy and 3 years of Tamoxifen followed by bilateral mastectomy, KISHOR flap reconstruction and 2 years of AI therapy. Oncotype 0.   She is 5 years from her latest diagnosis  No further routine labs or imaging indicated  1 year follow-up virtually (chest wall exams through her PCP) for now    Potential late effects related to surgery/radiation:  -Risk of lymphedema: If she notices any swelling in her arm, she should be offered a referral to lymphedema physical therapy.     Potential late effects related to radiation:  - Possible radiation side effects include cardiotoxicity, lung injury, fracture, and second malignancies. She should seek medical attention if she notices any new skin lesions in the area of radiation. If she should develop any shortness of breath, hemoptysis, cough, or new pain, I would advise further evaluation with CT or X-ray.     Potential effects related to endocrine therapy:  Risk of developing osteoporosis: Last DEXA in 2021 with a lowest T-score of -1.6 with improvement, Recommend weightbearing exercises 2-3 days per week, and to supplement with 1200 mg of calcium, 1000 international units of vitamin D daily. Further bone density monitoring via her PCP.     General late effects screenings and recommendations    -Cancer screening. She should undergo routine screening for women in her age group. Colonoscopy due 2026.  S/p hysterctomy    -Healthy lifestyle. She should maintain a healthy weight with a BMI between 20-25. She should exercise at least 150 minutes weekly at moderate intensity. She should see the eye doctor every 1-2 years, and dentist every 6 months for cleanings. She should not use any tobacco. She should minimize alcohol intake. If continuing to drink, should follow CDC recommendations for no more than 1 alcoholic drink/day for women.    Gave resources for our [facility] Cancer Survivorship  class series and mailings list for educational opportunities. [email or web address]    Cancer treatment summary was sent electronically to the patient and her PCP.      The total time of this encounter amounted to 30 minutes.This time included time spent with the patient, reviewing her chart, and performing post visit documentation.    NILAY Pratt, PA-C  [facility]

## 2023-12-21 NOTE — NURSING NOTE
Patient confirms medications and allergies are accurate via patients echeck in completion, and or denies any changes since last reviewed/verified.       Is the patient currently in the state of [place]? YES    Visit mode:VIDEO    If the visit is dropped, the patient can be reconnected by: VIDEO VISIT: Text to cell phone:   Telephone Information:   Mobile [phone]       Will anyone else be joining the visit? NO  (If patient encounters technical issues they should call [phone])    How would you like to obtain your AVS? MyChart    Are changes needed to the allergy or medication list? No    Reason for visit: RECHECK    Beverly BOYCE

## 2024-01-18 ENCOUNTER — TELEPHONE (OUTPATIENT)
Dept: UROLOGY | Facility: CLINIC | Age: 64
End: 2024-01-18
Payer: COMMERCIAL

## 2024-01-18 NOTE — TELEPHONE ENCOUNTER
[facility]    Phone Message    May a detailed message be left on voicemail: yes     Reason for Call: Other: Pt requesting to switch providers. She initially saw Dr. Wells but would like to switch over to Dr. Alfonso in [place]. It is closer to home. Please advise, Thank you.     Action Taken: Message routed to:  Other: uro    Travel Screening: Not Applicable

## 2024-01-18 NOTE — TELEPHONE ENCOUNTER
Sending the patient a Njini message letting her know that she can call and switch to Dr. Alfonso if it is closer for her.

## 2024-01-22 ENCOUNTER — OFFICE VISIT (OUTPATIENT)
Dept: UROLOGY | Facility: CLINIC | Age: 64
End: 2024-01-22
Payer: COMMERCIAL

## 2024-01-22 ENCOUNTER — DOCUMENTATION ONLY (OUTPATIENT)
Dept: UROLOGY | Facility: CLINIC | Age: 64
End: 2024-01-22

## 2024-01-22 DIAGNOSIS — N39.3 STRESS INCONTINENCE: ICD-10-CM

## 2024-01-22 DIAGNOSIS — N95.8 GENITOURINARY SYNDROME OF MENOPAUSE: ICD-10-CM

## 2024-01-22 DIAGNOSIS — N81.6 RECTOCELE: ICD-10-CM

## 2024-01-22 DIAGNOSIS — N81.11 MIDLINE CYSTOCELE: Primary | ICD-10-CM

## 2024-01-22 PROCEDURE — 99204 OFFICE O/P NEW MOD 45 MIN: CPT | Performed by: UROLOGY

## 2024-01-22 NOTE — NURSING NOTE
Enid Lombardo's goals for this visit include:   Chief Complaint   Patient presents with    RECHECK     Cystocele, last seen by Dr. Wells 11/29/23         She requests these members of her care team be copied on today's visit information:     PCP: hSi Alonso    Referring Provider:  Ese Wells MD  [address] [place] [id number]    [facility] 10/14/2001 (Exact Date)     Do you need any medication refills at today's visit?       Kate Finley MA on 1/22/2024 at 9:18 AM

## 2024-01-22 NOTE — PATIENT INSTRUCTIONS
-Schedule robotic scp with midurethral sling for the stress incontinence  -schedule apt with Dr. Cortés from general surgery to help with port placement for the robotic procedure.      Surgery Instructions    Always follow your surgeon s instructions. If you don t, your surgery could be cancelled. Please use the following checklist.  Your surgery is on: The surgery scheduler will contact you within 1 week of your consult with the surgeon. If you do not hear from them, please call the clinic or RN Care Coordinator for your provider.    Time: Prearrival times can vary depending on location/type of surgery.  [place] - 2 hour pre-arrival  [facility] [place] - 2 hour pre-arrival    Note:  These times may change. A nurse will call you before surgery to confirm. If you have not received a call or if you have more questions, please call us on the working day before your surgery:  [facility] [place]: [phone] (8am to 6pm)  [place]: [phone] (9am to 5pm)  [facility] [phone] (7am to 4pm)  Prior to surgery  Have a pre-op physical exam with your Primary Doctor within 30 days of surgery  Ask your doctor to send all of your results to the surgery center/hospital before surgery. Your doctor also may ask you to bring the results with you on the day of surgery.  Tell your doctor if:  You are allergic to latex or rubber (latex and rubber gloves are often used in medical care).  You are taking any medicines (including aspirin), vitamins, or herbal products. You may need to stop taking some medicines before surgery.  You have any medical problems (allergies, diabetes, or heart disease, for example).  You have a pacemaker or an AICD (automatic implanted cardiac defibrillator). If you do, please bring the ID card with you on the day of surgery.  People who smoke have a higher risk of infection after surgery. Ask your doctor how you can quit smoking.  If you Primary Doctor is not within the [place] system, you will need to have  "your pre-op physical faxed to us to be scanned into your chart.  [facility] ([place]): [phone]  Possible surgery delay   Like you, we want your surgery to happen when it's scheduled. But sometimes the hospital is so full that it's not safe for you to have your surgery. This is especially true during the pandemic. Your surgery may need to be rescheduled to a later date. If this happens, we will call and tell you.   Day of your surgery or procedure  Please come wearing a face covering that covers both your nose and mouth.  When you arrive, we'll ask you some questions to find out if you've had any exposures to COVID-19 or have any signs of COVID-19.   Ask your care team if you can have visitors. All visitors must wear face coverings and will be screened for exposure to, or signs of, COVID-19.   - The rules for visitors change often, depending on how much the virus is spreading. To learn more, see \"Visiting a Loved One in the Hospital during the COVID-19 Outbreak,\" at: www.[email or web address]/[email or web address].pdf.   Call your insurance company. Ask if you need pre-approval for your surgery. If you do not have insurance, please let us know. If you wish to speak to the , please alert the clinic staff so this can be arranged.  Arrange for someone to drive you home after surgery.  will need to be a responsible adult (18 years or older) that will provide transportation to and from surgery and stay in the waiting room during your surgery. You may not drive yourself or take public transportation to and from surgery.  Arrange for someone to stay with you for 24 hours after you go home. This person must be a responsible adult (18 years or older).  Call your surgeon or their nurse if there is any change in your health (cold, flu, infections, hospitalizations).  Do not smoke, drink alcohol, or take over-the-counter medicine for 24 hours before and after surgery.  If you take prescribed drugs, you may need to " stop them until after the surgery.  Discuss what medications to take or not take prior to surgery with your Primary Doctor at your pre-op physical. Avoid over-the-counter blood-thinning medications such as Aspirin, Ibuprofen, vitamin E, or fish oil 7 days prior to surgery (unless otherwise directed by your Primary Doctor). Tylenol is a good alternative for mild pain relief prior to surgery.  Eating and drinking guidelines prior to surgery:  Stop all solid food consumption 8 hours prior to surgery  You may drink clear liquids up to 1 hour prior to surgery (water, fruit juices without pulp, jello, tea/coffee without creamer, sports drinks, clear-fat free broth (bouillon or consomme), popsicles (without milk, bits of fruit, or seeds/nuts)  Follow instructions given for showering or bathing before surgery.    Use 8 ounces of antiseptic surgical soap, like:  Hibiclens, Scrub Care, or Exidine  You can find it at your local pharmacy, clinic, or retail store. If you have trouble, ask your pharmacist to help you find the right substitute.  Please wash with one of the above soaps twice before coming to the hospital for your surgery. This will decrease bacteria (germs) on your skin. It will also help reduce your chance of infection after surgery.  Items you will need for showerin newly washed washcloths  2 newly washed towels  8 ounces of one of the above soaps  Following these instructions:  The evening before surgery: Shower or bathe as you normally would, using your regular soap and a clean washcloth. Give special attention to places where your incision (surgical cut) or catheters will be. This includes your groin area. Rinse well. You may wash your hair with your regular shampoo. Next, wash your body with 4 ounces of the antiseptic soap. Use a clean, damp washcloth and gently clean your body (from the chin down). If your surgery involves your head, use the special soap on your head and scalp. Rinse well and dry off  using a newly washed towel.  The morning of surgery: Repeat the same process as the evening shower.  Other suggestions:  Do not shave within 12 inches of your incision (surgical cut) area for at least 3 days before surgery. Shaving can make small cuts in the skin. This puts you at higher risk of infection.  Wear freshly washed pajamas or clothing after your evening shower.  Wear freshly washed clothes the day of surgery.  Wash and change your bed sheets the day before surgery to have clean bed sheets after your shower and when you get home from surgery.  If you have trouble washing all areas, make sure someone helps you.  Don't use any deodorant, lotion or powder after your shower.  Women who are menstruating should wear a fresh sanitary pad to the hospital.  Do not wear or add deodorant, cologne, lotion, makeup, nail polish or jewelry to surgery. If you wear fake nails, please remove at least one nail before coming to surgery (an oxygen monitor needs to be placed on your finger during surgery).  Bring these items to the surgery center/hospital:  Insurance card  Money for parking and co-pays, if needed  A list of all the medicines you take. Include vitamins, minerals, herbs, and over-the-counter drugs.  Note any drug allergies.  A copy of your advance health care directive, if you have one. This tells us what treatment you would want--and who would make health care decisions--if you could no longer speak for yourself. You may request this form in advance or download it from [email or web address].  A case for glasses, contact lenses, hearing aids, or dentures.  Your inhaler or CPAP machine, if you use these at home.  Leave extra cash, jewelry, and other valuables at home.  When you arrive  When you get to the surgery center/hospital, you will:  Check in. If you are under age 18, you must be with a parent or legal guardian.  Sign consent forms, if you haven t already. These forms state that you know the risks and  benefits of surgery. When you sign the forms, you give us permission to do the surgery. Do not sign them unless you understand what will happen during and after your surgery. If you have any questions about your surgery, ask to speak with your doctor before you sign the forms. If you don t understand the answers, ask again.  Receive a copy of the Patient s Bill of Rights. If you do not receive a copy, please ask for one.  Change into hospital clothes. Your belongings will be placed in a bag. We will return them to you after surgery.  Meet with the anesthesia provider. He or she will tell you what kind of anesthesia (medicine) will be used to keep you comfortable during surgery.  Remember: it s okay to remind doctors and nurses to wash their hands before touching you.  In most cases, your surgeon will use a marker to write his or her initials on the surgery site. This ensures that the exact site is operated on.  For safety reasons, we will ask you the same questions many times. For example, we may ask your name and birth date over and over again.  Friends and family can stay with you until it s time for surgery. While you re in surgery, they will be in the waiting area. Please note that cell phones are not allowed in some patient care areas.  If you have questions about what will happen in the operating room, talk to your care team.  After surgery  We will move you to a recovery room, where we will watch you closely. If you have any pain or discomfort, tell your nurse. He or she will try to make you comfortable.  If you are staying overnight, we will move you to your hospital room after you are awake.  If you are going home, we will move you to another room. Friends and family may be able to join you. The length of time you spend in recovery depend on the type of medicine you received, your medical condition, the type of surgery you had, or your response to the anesthesia given during your procedure.  When you are  discharged from the recovery room, the nurses will review instructions with you and your caregiver.  Please wash your hands every time you touch the wound or change bandages or dressings.  Do not submerge the wound in water.  You may not use a bathtub or hot tub until the wound is closed. The wait time frame is generally 2-3 weeks, but any open area can be a source of incoming bacteria, so it is better to be on the safe side and avoid water submersion until your wound is fully healed.  You may take a shower 24 hours after surgery. Double check with your surgeon if it is OK for water to run over the wound, whether it has been sutured, stapled, glued, or is open. You may gently wash the wound using the antiseptic soap provided for your pre-surgery showering (do not use a washcloth). Any mild soap will work as well.  Many surgical wounds will have small white strips of tape on them called steri-strips.  Do not remove these. The edges will curl and fall off within 7-10 days with normal showering.  If you are going home with sutures (stitches) or staples, you must return to the clinic to have them taken out, usually within 1-2 weeks. Some stitches are dissolvable and do not require removal. Make sure to clarify with your surgeon or surgery nurse reviewing discharge paperwork what kind of sutures you have.  Signs and symptoms of infection include:  Fever, temperature over 101.5   F  Redness  Swelling  Increased pain  Green or yellow drainage which may or may not have a foul odor  Dealing with pain  A nurse will check your comfort level often during your stay. He or she will work with you to manage your pain.  Remember:  All pain is real. There are many ways to control pain. We can help you decide what works best for you.  Ask for pain medicine when you need it. Don t try to  tough it out --this can make you feel worse. Always take your medicine as ordered.  Medicine doesn t work the same for everyone. If your medicine  isn t working, tell your nurse. There may be other medicines or treatments we can try.  Going home  We will let you know when you re ready to leave the surgery center or hospital. Before you leave, we will tell you how to care for yourself at home and prevent infections. If you do not understand something, please say so. We will answer any questions you have. We will then help you get ready to leave.  Remember, you must have a responsible adult (18 years or older) to stay with you 24 hours after you leave the hospital.  Take it easy when you get home. You will need some time to recover--you may be more tired than you realize at first. Rest and relax for at least the first 24 hours at home. You ll feel better and heal faster if you take good care of yourself.  Follow the discharge instructions that are given to you when you leave the surgery center or hospital  Please call the clinic if you experience any problems during regular clinic hours (Monday-Friday 8:00am-5:00pm).  If you experience problems during non-clinic hours, please call the [facility] on-call line at [phone] and ask the  to page the on-call Provider for your specialty. The on-call Provider will call you back and can triage your symptoms and further advise. If you are having an emergency, always call 911 or seek immediate evaluation at the Emergency Room.  Locations  [facility] ([facility])  [address] [place] [id number]  [phone]   [email or web address]    [address] [address]  [phone] (patient registration)  [phone] (main line)  [email or web address]  [facility]  [address] [place] [address] [id number] [place] [address], 3rd floor for check-in  [phone] (patient  registration)  [phone] (main line)  [email or web address]

## 2024-01-22 NOTE — PROGRESS NOTES
Sling consent reviewed and signed by patient in clinic today. Consent sent for stat scanning into patient's chart.    Jinny Ramon RN, BSN

## 2024-01-22 NOTE — PROGRESS NOTES
HPI:  Enid Lombardo is a 63 year old female with hx of pelvic organ prolapse.  She has tried pelvic floor PT but did not feel like it helped.  She also tried a pessary but finds it difficult to use.  She is very bothered by it and finds it to be rather sore at the end of the day and after intercourse.  She had a hysterectomy.  She has a hx of stress urinary incontinence as well.      Reviewed previous notes from Dr. Wells on 11/29/23  Pelvic exam     POPQ EXAM FOR PROLAPSE SEVERITY  (Aa):  +1 (Ba):   +3 (C ):    -5   (GH):   3 (PB):  1.5 (TVL):  9   (Ap):   0 (Bp):  0 (D):   na      ICS Stage (1-4):  3    Reviewed operative note by Dr. Brown from 10/21/19 regarding abdominal hernia repair with mesh.    Exam:  LMP 10/14/2001 (Exact Date)   GENERAL: alert and no distress  EYES: Eyes grossly normal to inspection.  No discharge or erythema, or obvious scleral/conjunctival abnormalities.  RESP: No audible wheeze, cough, or visible cyanosis.    SKIN: Visible skin clear. No significant rash, abnormal pigmentation or lesions.  NEURO: Cranial nerves grossly intact.  Mentation and speech appropriate for age.  PSYCH: Appropriate affect, tone, and pace of words    Review of Imaging:  The following imaging exams were reviewed by me and discussed with patient:  9/5/2019 CT abd/pelvis:  FINDINGS: There are 2 small fat-containing ventral hernias in the  epigastric region just right of midline (series 3 images 75 and 84),  new since the previous exam. The largest of these ventral hernias has  an associated anterior abdominal wall defect measuring 1.7 cm. No  evidence for associated bowel obstruction. No convincing evidence for  colitis or diverticulitis. The appendix is well seen, and is  unremarkable.      No free fluid in the pelvis. Uterus is not seen, consistent with  history of prior hysterectomy. Scattered small hepatic cysts are  noted, with the largest measuring 0.9 cm. The liver, gallbladder,  spleen, adrenal  glands, pancreas, and kidneys are unremarkable. No  hydronephrosis. No intra-abdominal fluid collections. No free  intraperitoneal air. Small hiatal hernia. The visualized lung bases  are clear.                                                                      IMPRESSION: Two small fat-containing ventral hernias in the epigastric  region just right of midline are new since the previous exam      Review of Labs:  The following labs were reviewed by me and discussed with the patient:  Lab Results   Component Value Date    WBC 5.0 02/06/2020     Lab Results   Component Value Date    RBC 4.54 02/06/2020     Lab Results   Component Value Date    HGB 14.0 02/06/2020     Lab Results   Component Value Date    HCT 42.2 02/06/2020     Lab Results   Component Value Date    MCV 93 02/06/2020     Lab Results   Component Value Date    MCH 30.8 02/06/2020     Lab Results   Component Value Date    MCHC 33.2 02/06/2020     Lab Results   Component Value Date    RDW 12.9 02/06/2020     Lab Results   Component Value Date     02/06/2020        Last Comprehensive Metabolic Panel:  Sodium   Date Value Ref Range Status   04/17/2023 138 136 - 145 mmol/L Final   05/17/2021 139 133 - 144 mmol/L Final     Potassium   Date Value Ref Range Status   04/17/2023 4.0 3.4 - 5.3 mmol/L Final   05/17/2021 4.3 3.4 - 5.3 mmol/L Final     Chloride   Date Value Ref Range Status   04/17/2023 102 98 - 107 mmol/L Final   05/17/2021 106 94 - 109 mmol/L Final     Carbon Dioxide   Date Value Ref Range Status   05/17/2021 29 20 - 32 mmol/L Final     Carbon Dioxide (CO2)   Date Value Ref Range Status   04/17/2023 29 22 - 29 mmol/L Final     Anion Gap   Date Value Ref Range Status   04/17/2023 7 7 - 15 mmol/L Final   05/17/2021 4 3 - 14 mmol/L Final     Glucose   Date Value Ref Range Status   04/17/2023 92 70 - 99 mg/dL Final   05/17/2021 100 (H) 70 - 99 mg/dL Final     Urea Nitrogen   Date Value Ref Range Status   04/17/2023 13.0 8.0 - 23.0 mg/dL Final    05/17/2021 12 7 - 30 mg/dL Final     Creatinine   Date Value Ref Range Status   04/17/2023 0.73 0.51 - 0.95 mg/dL Final   05/17/2021 0.79 0.52 - 1.04 mg/dL Final     GFR Estimate   Date Value Ref Range Status   04/17/2023 >90 >60 mL/min/1.73m2 Final     Comment:     eGFR calculated using 2021 CKD-EPI equation.   05/17/2021 82 >60 mL/min/[1.73_m2] Final     Comment:     Non  GFR Calc  Starting 12/18/2018, serum creatinine based estimated GFR (eGFR) will be   calculated using the Chronic Kidney Disease Epidemiology Collaboration   (CKD-EPI) equation.       Calcium   Date Value Ref Range Status   04/17/2023 9.1 8.8 - 10.2 mg/dL Final   05/17/2021 9.6 8.5 - 10.1 mg/dL Final     Bilirubin Total   Date Value Ref Range Status   01/14/2019 0.4 0.2 - 1.3 mg/dL Final     Alkaline Phosphatase   Date Value Ref Range Status   01/14/2019 65 40 - 150 U/L Final     ALT   Date Value Ref Range Status   01/14/2019 24 0 - 50 U/L Final     AST   Date Value Ref Range Status   01/14/2019 16 0 - 45 U/L Final                Color Urine (no units)   Date Value   04/25/2023 Yellow   02/04/2017 Yellow     Appearance Urine (no units)   Date Value   04/25/2023 Clear   02/04/2017 Clear     Glucose Urine (mg/dL)   Date Value   04/25/2023 Negative   02/04/2017 Negative     Bilirubin Urine (no units)   Date Value   04/25/2023 Negative   02/04/2017 Negative     Ketones Urine (mg/dL)   Date Value   04/25/2023 Negative   02/04/2017 Negative     Specific Gravity Urine (no units)   Date Value   04/25/2023 1.010   02/04/2017 <=1.005     pH Urine   Date Value   04/25/2023 6.0   02/04/2017 6.0 pH     Protein Albumin Urine (mg/dL)   Date Value   04/25/2023 Negative   02/04/2017 Negative     Urobilinogen Urine   Date Value   04/25/2023 0.2 E.U./dL   02/04/2017 0.2 EU/dL     Nitrite Urine (no units)   Date Value   04/25/2023 Negative   02/04/2017 Negative     Leukocyte Esterase Urine (no units)   Date Value   04/25/2023 Negative   02/04/2017  Negative          Assessment & Plan   64 y/o female with grade 3 cystocele and rectocele who is very bothered by it.  She has tried pessary and PFPT but continues to be bothered by it.  We discussed options for management and she would like to proceed with a robotic scp.  We discussed the use of mesh in surgery and the risks which include but are not limited to infection, bleeding, exposure of mesh, vaginal pain, injury to the bladder/urethra/rectum or any structures in the abdomen or pelvis, as well as risk of incontinence or urinary retention. There is also risk of need for catheterization and possible further surgery. The pt. Verbalized understanding and had a chance to ask her questions which were all answered.    -Schedule robotic scp with midurethral sling for the stress incontinence  -schedule apt with Dr. Cortés from general surgery to help with port placement for the robotic procedure.    Jarrod Alfonso MD  [facility]      ==========================

## 2024-01-25 ENCOUNTER — TELEPHONE (OUTPATIENT)
Dept: UROLOGY | Facility: CLINIC | Age: 64
End: 2024-01-25
Payer: COMMERCIAL

## 2024-01-25 PROBLEM — N81.6 RECTOCELE: Status: ACTIVE | Noted: 2023-10-12

## 2024-01-25 PROBLEM — N81.11 MIDLINE CYSTOCELE: Status: ACTIVE | Noted: 2024-01-22

## 2024-01-25 PROBLEM — N39.3 STRESS INCONTINENCE: Status: ACTIVE | Noted: 2024-01-22

## 2024-01-25 NOTE — TELEPHONE ENCOUNTER
Patient is scheduled for a surgical procedure with Dr. Alfonso and Dr. Cortés      Spoke with: Patient via phone      Date of Surgery/Procedure: Tuesday April 23, 2024    Location: [facility] OR      Informed patient they will need an adult : Yes     Pre-op: Yes      H&P: Patient to schedule    Additional imaging/appointments:     Post-op: Monday May 06, 2024     Post-op: Monday June 03, 2024     Additional comments:      Surgery packet: To be sent via [email or web address]     Patient is aware that surgery time is tentative to change and to expect a call 3-1 business days from Pre Admission Nursing for instructions and arrival time

## 2024-01-25 NOTE — TELEPHONE ENCOUNTER
Karsten, Jinny Mendoza, RN  Cc: [facility] Urology Adult [place]  Caller: Unspecified (Today,  9:52 AM)   I have already discussed dates with Dr. Cortés's  I could offer her. Please let patient know we are working on it.    Becky Contreras, RN  [facility] hours ago (10:23 AM)     SP  The patient would not need to meet with a General Surgeon prior to surgery if just assisting with port placement. The Urology  would just need to reach out to our  to coordinate a date.    Becky Caceres RN, BSN  [facility] General Surgery       Received the above updates from Dr. Cortés's team and Dr. Alfonso's surgery scheduler. Called and spoke to patient who is aware of the above information. Patient verbalized understanding and was grateful for the call. Patient aware that they are working on coordinating a surgery date and that she will be contacted soon for scheduling.    Jinny Ramon RN, BSN

## 2024-01-25 NOTE — TELEPHONE ENCOUNTER
Hello,     Patient met with Dr. Alfonso ( Urology) and Dr. Alfonso is recommending patient meets with Dr. Cortés to discuss port placement for robotic surgery with Dr. Alfonso. Can someone please assist in scheduling an appointment with Dr. Cortés?     Thank you    Cherelle jc Complex   Dermatology, Surgery, Urology  [facility]

## 2024-01-29 DIAGNOSIS — J31.0 CHRONIC RHINITIS: ICD-10-CM

## 2024-01-29 RX ORDER — FLUTICASONE PROPIONATE 50 MCG
SPRAY, SUSPENSION (ML) NASAL
Qty: 48 ML | Refills: 0 | Status: SHIPPED | OUTPATIENT
Start: 2024-01-29 | End: 2024-05-07

## 2024-01-29 RX ORDER — LORATADINE 10 MG/1
TABLET ORAL
Qty: 90 TABLET | Refills: 0 | Status: SHIPPED | OUTPATIENT
Start: 2024-01-29 | End: 2024-04-12

## 2024-02-21 NOTE — PROGRESS NOTES
DISCHARGE  Reason for Discharge: Patient returning to [place]    Equipment Issued:     Discharge Plan: Patient to continue home program.    Referring Provider:  Ese Wells

## 2024-03-08 ENCOUNTER — MYC MEDICAL ADVICE (OUTPATIENT)
Dept: UROLOGY | Facility: CLINIC | Age: 64
End: 2024-03-08
Payer: COMMERCIAL

## 2024-03-29 ENCOUNTER — MYC MEDICAL ADVICE (OUTPATIENT)
Dept: UROLOGY | Facility: CLINIC | Age: 64
End: 2024-03-29
Payer: COMMERCIAL

## 2024-03-29 NOTE — CONFIDENTIAL NOTE
Lois Gardner PA-C Berkenes, Melissa, RN  There is always a risk of bleeding with any surgery. Vaginal bleeding is typically minimal and lasts typically a few days. But if for some reason develops vaginal bleeding where bleeding through a pad an hour after surgery to contact clinic.  After surgery, post-op staff check to ensure patient able to void well on their own prior to discharge. If unable to void, may have catheter in for a week to allow time for bladder to go back to normal with a TOV to follow. Her surgery is not until 4/23/24. If has further questions, would recommend virtual visit any day of the week to further discuss her questions more at length or if she prefers to see Dr. Alfonso virtually to further discuss, would recommend that follow-up prior to her surgery.    Catherine      Message was sent to Lois Gardner PA-C with request to review and advise on patient questions. Received the above message from Lois Gardner PA-C. My chart message sent to patient.     Jinny Ramon RN, BSN

## 2024-04-01 DIAGNOSIS — L90.0 LICHEN SCLEROSUS: ICD-10-CM

## 2024-04-04 ENCOUNTER — OFFICE VISIT (OUTPATIENT)
Dept: FAMILY MEDICINE | Facility: OTHER | Age: 64
End: 2024-04-04
Payer: COMMERCIAL

## 2024-04-04 VITALS
DIASTOLIC BLOOD PRESSURE: 64 MMHG | HEART RATE: 73 BPM | HEIGHT: 65 IN | OXYGEN SATURATION: 98 % | SYSTOLIC BLOOD PRESSURE: 122 MMHG | WEIGHT: 181.5 LBS | TEMPERATURE: 97.6 F | RESPIRATION RATE: 16 BRPM | BODY MASS INDEX: 30.24 KG/M2

## 2024-04-04 DIAGNOSIS — L90.0 LICHEN SCLEROSUS: ICD-10-CM

## 2024-04-04 DIAGNOSIS — Z01.818 PREOP GENERAL PHYSICAL EXAM: Primary | ICD-10-CM

## 2024-04-04 DIAGNOSIS — N81.11 CYSTOCELE, MIDLINE: ICD-10-CM

## 2024-04-04 DIAGNOSIS — E78.5 HYPERLIPIDEMIA WITH TARGET LDL LESS THAN 130: ICD-10-CM

## 2024-04-04 LAB
ANION GAP SERPL CALCULATED.3IONS-SCNC: 9 MMOL/L (ref 7–15)
BUN SERPL-MCNC: 11.2 MG/DL (ref 8–23)
CALCIUM SERPL-MCNC: 10.1 MG/DL (ref 8.8–10.2)
CHLORIDE SERPL-SCNC: 105 MMOL/L (ref 98–107)
CHOLEST SERPL-MCNC: 201 MG/DL
CREAT SERPL-MCNC: 0.81 MG/DL (ref 0.51–0.95)
DEPRECATED HCO3 PLAS-SCNC: 27 MMOL/L (ref 22–29)
EGFRCR SERPLBLD CKD-EPI 2021: 82 ML/MIN/1.73M2
FASTING STATUS PATIENT QL REPORTED: NO
GLUCOSE SERPL-MCNC: 99 MG/DL (ref 70–99)
HDLC SERPL-MCNC: 66 MG/DL
LDLC SERPL CALC-MCNC: 101 MG/DL
NONHDLC SERPL-MCNC: 135 MG/DL
POTASSIUM SERPL-SCNC: 4.5 MMOL/L (ref 3.4–5.3)
SODIUM SERPL-SCNC: 141 MMOL/L (ref 135–145)
TRIGL SERPL-MCNC: 171 MG/DL

## 2024-04-04 PROCEDURE — 80048 BASIC METABOLIC PNL TOTAL CA: CPT | Performed by: FAMILY MEDICINE

## 2024-04-04 PROCEDURE — 36415 COLL VENOUS BLD VENIPUNCTURE: CPT | Performed by: FAMILY MEDICINE

## 2024-04-04 PROCEDURE — 80061 LIPID PANEL: CPT | Performed by: FAMILY MEDICINE

## 2024-04-04 PROCEDURE — 99214 OFFICE O/P EST MOD 30 MIN: CPT | Performed by: FAMILY MEDICINE

## 2024-04-04 RX ORDER — CLOBETASOL PROPIONATE 0.5 MG/G
OINTMENT TOPICAL
Qty: 45 G | Refills: 1 | OUTPATIENT
Start: 2024-04-04

## 2024-04-04 RX ORDER — CLOBETASOL PROPIONATE 0.5 MG/G
OINTMENT TOPICAL
Qty: 45 G | Refills: 0 | Status: SHIPPED | OUTPATIENT
Start: 2024-04-04 | End: 2024-07-15

## 2024-04-04 ASSESSMENT — PAIN SCALES - GENERAL: PAINLEVEL: NO PAIN (0)

## 2024-04-04 NOTE — PROGRESS NOTES
Preoperative Evaluation  [facility]  [address]  [facility] [address]  Phone: [phone]  Primary Provider: Shi Alonso  Pre-op Performing Provider: SHI ALONSO  Apr 4, 2024       Enid is a 64 year old, presenting for the following:  Pre-Op Exam      Surgical Information  Surgery/Procedure: SACROCOLPOPEXY, ROBOT-ASSISTED, LAPAROSCOPIC, WITH INSERTION OF MIDURETHRAL SLING AND CYSTOSCOPY (support the bladder, rectum, and urethra with mesh and look in the  bladder)   Surgery Location: [facility] and surgery [facility]  Surgeon: brady Alfonso   Surgery Date: 04/23/2024  Time of Surgery: TBD  Where patient plans to recover: At home with family  Fax number for surgical facility: Note does not need to be faxed, will be available electronically in Epic.    Assessment & Plan     The proposed surgical procedure is considered INTERMEDIATE risk.        ICD-10-CM    1. Preop general physical exam  Z01.818       2. Cystocele, midline  N81.11       3. Hyperlipidemia with target LDL less than 130  E78.5           Patient is set up for her preop and has been approved.  She does have chronic medical concerns that we will draw for her cholesterol today so she can be prepared with this though she does have history of breast cancer and lichen sclerosus which will require a physical exam beyond what we needed to do for preop today and she is planning to schedule this in the next few months so we did renew her medications to bridge her through    No LOS data to display   Time spent by me doing chart review, history and exam, documentation and further activities per the note    Shi Alonso MD             - No identified additional risk factors other than previously addressed    Antiplatelet or Anticoagulation Medication Instructions   - Patient is on no antiplatelet or anticoagulation medications.    Additional Medication Instructions  Patient is to take all  scheduled medications on the day of surgery EXCEPT for modifications listed below:  Omega 3s, no as needed ibuprofen    Recommendation  APPROVAL GIVEN to proceed with proposed procedure, without further diagnostic evaluation.          Subjective       HPI related to upcoming procedure: Bladder prolapse failing conservative treatment        3/28/2024     1:23 PM   Preop Questions   1. Have you ever had a heart attack or stroke? No   2. Have you ever had surgery on your heart or blood vessels, such as a stent placement, a coronary artery bypass, or surgery on an artery in your head, neck, heart, or legs? No   3. Do you have chest pain with activity? No   4. Do you have a history of  heart failure? No   5. Do you currently have a cold, bronchitis or symptoms of other infection? No   6. Do you have a cough, shortness of breath, or wheezing? No   7. Do you or anyone in your family have previous history of blood clots? No   8. Do you or does anyone in your family have a serious bleeding problem such as prolonged bleeding following surgeries or cuts? No   9. Have you ever had problems with anemia or been told to take iron pills? No   10. Have you had any abnormal blood loss such as black, tarry or bloody stools, or abnormal vaginal bleeding? No   11. Have you ever had a blood transfusion? No   12. Are you willing to have a blood transfusion if it is medically needed before, during, or after your surgery? Yes   13. Have you or any of your relatives ever had problems with anesthesia? No   14. Do you have sleep apnea, excessive snoring or daytime drowsiness? No   15. Do you have any artifical heart valves or other implanted medical devices like a pacemaker, defibrillator, or continuous glucose monitor? No   16. Do you have artificial joints? No   17. Are you allergic to latex? No       Health Care Directive  Patient does not have a Health Care Directive or Living Will: Discussed advance care planning with patient; information  given to patient to review.    Preoperative Review of    reviewed - controlled substances reflected in medication list.          Patient Active Problem List    Diagnosis Date Noted    Midline cystocele 01/22/2024     Priority: Medium    Stress incontinence 01/22/2024     Priority: Medium    Cystocele, midline 11/29/2023     Priority: Medium    Vaginal vault prolapse 11/29/2023     Priority: Medium    Stress incontinence of urine 11/29/2023     Priority: Medium    Rectocele 10/12/2023     Priority: Medium    Recurrent malignant neoplasm of breast, unspecified laterality (H) 05/17/2021     Priority: Medium    Osteopenia of multiple sites 04/05/2019     Priority: Medium    Long term (current) use of aromatase inhibitors 04/05/2019     Priority: Medium    S/P flap graft 10/08/2018     Priority: Medium    S/P breast reconstruction, bilateral 08/28/2018     Priority: Medium    Breast cancer in situ 08/22/2018     Priority: Medium    Recurrent carcinoma in situ of breast, right 07/27/2018     Priority: Medium    Osteopenia of both hands 04/10/2018     Priority: Medium    Skin abscess 05/03/2016     Priority: Medium    Benign neoplasm of colon 03/18/2015     Priority: Medium    Keratoconus 10/16/2014     Priority: Medium    Meibomian gland dysfunction - Both Eyes 10/16/2014     Priority: Medium    Tear film insufficiency 10/16/2014     Priority: Medium     Problem list name updated by automated process. Provider to review      Cervicalgia 06/11/2014     Priority: Medium    Headache 06/11/2014     Priority: Medium     Problem list name updated by automated process. Provider to review      Hyperlipidemia with target LDL less than 130 05/19/2014     Priority: Medium     Diagnosis updated by automated process. Provider to review and confirm.      Macular drusen      Priority: Medium    Low back pain 07/22/2013     Priority: Medium     Diagnosis updated by automated process. Provider to review and confirm.      IT band  syndrome 07/22/2013     Priority: Medium    Right hip pain 04/08/2013     Priority: Medium    Pain, radicular, lumbar 03/21/2013     Priority: Medium    H/O herpes zoster 01/27/2012     Priority: Medium    Cataract 12/11/2008     Priority: Medium     Utility update for deleted IMO code  Imo Update utility      Disorder of synovium, tendon, and bursa 12/11/2007     Priority: Medium     Problem list name updated by automated process. Provider to review      Esophageal reflux 08/08/2006     Priority: Medium    History of right breast cancer 12/22/2004     Priority: Medium     s/p lumpectomy 1/05 and radiation  Problem list name updated by automated process. Provider to review        Past Medical History:   Diagnosis Date    Breast cancer (H) 2004    lumpectomy, radiation, tamoxifen    Cancer (H) 2004    Breast    Cataracts, bilateral     Complication of anesthesia     She prefers not to have versed until right before she leaves or can have after     Diabetes (H)     Gestational    Enthesopathy of hip region 06/2006    right hip    Esophageal reflux 02/2006    History of gestational diabetes     Hypertension 2012    Macular drusen     PONV (postoperative nausea and vomiting)     Shingles 01/23/2012    left T6-T7 dermatome     Past Surgical History:   Procedure Laterality Date    ABDOMEN SURGERY  2019    Breast reconstruction    BIOPSY  2004    Breast    BREAST SURGERY  2004    COLONOSCOPY      COLONOSCOPY N/A 01/11/2021    Procedure: COLONOSCOPY;  Surgeon: Iain Mercedes MD;  Location: [facility]    ENDOSCOPY  05/09/2007    Upper GI    EYE SURGERY      GRAFT FAT TO BREAST Bilateral 11/29/2018    Procedure: Bilateral Breast Fat Grafting from Abdomen and Thighs;  Surgeon: DENNISE Amin MD;  Location: [facility]    GRAFT FREE VASCULARIZED TRANSVERSE RECTUS ABDOMINIS MYOCUTANEOUS Bilateral 10/08/2018    Procedure: GRAFT FREE VASCULARIZED TRANSVERSE RECTUS ABDOMINIS MYOCUTANEOUS;  Bilateral Deep Inferior Epigastric  Artery  Free Flap Breast Reconstruction and Removal of Breast Explanders;  Surgeon: DENNISE Amin MD;  [facility]    GYN SURGERY  2001    HC BIOPSY/EXCISION LYMPH NODE OPEN DEEP CERVICAL W EXC FAT PAD  01/07/2005    Right axillary sentinel node biopsy X 3.    HC EXCISION BREAST LESION, OPEN >=1  01/07/2005    Right breast.    HC REMV CATARACT EXTRACAP,INSERT LENS, W/O ECP  12/18/2008    bilateral    HC REMV TARSAL/METATARSAL BENIGN BONE LESN  1982    Bone spur - right    HERNIA REPAIR  2019    HYSTERECTOMY, PAP NO LONGER INDICATED      LAPAROSCOPIC HERNIORRHAPHY VENTRAL N/A 10/21/2019    Procedure: Laparoscopic Ventral Hernia Repair With Mesh;  Surgeon: Emil Brown MD;  [facility]    MASTECTOMY SIMPLE BILATERAL, SENTINEL NODE BILATERAL, COMBINED Bilateral 08/22/2018    Procedure: COMBINED MASTECTOMY SIMPLE BILATERAL, SENTINEL NODE BILATERAL;  Bilateral Mastectomy with Right [place] Node Biopsy, Bilateral Breast Reconstruction, Anesthesia Block;  Surgeon: Rakesh Sanchez MD;  Location:  OR    ORTHOPEDIC SURGERY  1983    Right foot    RECONSTRUCT BREAST Bilateral 08/22/2018    Procedure: RECONSTRUCT BREAST;;  Surgeon: DENNISE Amin MD;  Location:  OR    RECONSTRUCT BREAST BILATERAL Bilateral 10/08/2018    Procedure: RECONSTRUCT BREAST BILATERAL;;  Surgeon: DENNISE Amin MD;  Location:  OR    RECONSTRUCT NIPPLE BILATERAL Bilateral 11/29/2018    Procedure: Nipple Reconstruction;  Surgeon: DENNISE Amin MD;  Location: [facility] VAGINAL HYSTERECTOMY  12/2001    Ovaries intact, due to fibroids    [facility] COLONOSCOPY W BIOPSY  05/10/2010     Current Outpatient Medications   Medication Sig Dispense Refill    atorvastatin (LIPITOR) 10 MG tablet Take 1 tablet (10 mg) by mouth daily 90 tablet 3    Calcium-Vitamin D-Vitamin K (VIACTIV CALCIUM PLUS D PO) Take 1 tablet by mouth 2 times daily      clobetasol (TEMOVATE) 0.05 % external ointment Apply a small  "amount to the vulva daily for 4 weeks, then 3 times a week for 4 weeks, then once a week for 4 weeks 45 g 1    estradiol (ESTRACE) 0.1 MG/GM vaginal cream Place 1 g vaginally three times a week 42.5 g 3    fluticasone (FLONASE) 50 MCG/ACT nasal spray INSTILL 1-2 SPRAYS INTO EACH NOSTRIL EVERY DAY 48 mL 0    gabapentin (NEURONTIN) 100 MG capsule Take 1 capsule (100 mg) by mouth 3 times daily 270 capsule 3    loratadine (CLARITIN) 10 MG tablet TAKE 1 TABLET BY MOUTH EVERY DAY 90 tablet 0    melatonin 5 MG tablet Take 5 mg by mouth At Bedtime       Multiple Vitamins-Minerals (PRESERVISION AREDS) CAPS Take 1 capsule by mouth 2 times daily (Patient taking differently: Take 1 capsule by mouth every morning) 180 capsule 3    Omega-3 Fatty Acids (FISH OIL) 1000 MG CPDR Take by mouth every morning          Allergies   Allergen Reactions    Brimonidine Tartrate Rash     Thrush and numbness in mouth        Social History     Tobacco Use    Smoking status: Never    Smokeless tobacco: Never   Substance Use Topics    Alcohol use: Yes     Comment: 4 drinks per month        History   Drug Use No         Review of Systems    Review of Systems  Constitutional, HEENT, cardiovascular, pulmonary, GI, , musculoskeletal, neuro, skin, endocrine and psych systems are negative, except as otherwise noted.    Objective    /64   Pulse 73   Temp 97.6  F (36.4  C) (Temporal)   Resp 16   Ht 5' 4.65\" (1.642 m)   Wt 181 lb 8 oz (82.3 kg)   LMP 10/14/2001 (Exact Date)   SpO2 98%   BMI 30.53 kg/m     Estimated body mass index is 30.53 kg/m  as calculated from the following:    Height as of this encounter: 5' 4.65\" (1.642 m).    Weight as of this encounter: 181 lb 8 oz (82.3 kg).  Physical Exam  GENERAL: alert and no distress  EYES: Eyes grossly normal to inspection, PERRL and conjunctivae and sclerae normal  HENT: ear canals and TM's normal, nose and mouth without ulcers or lesions  NECK: no adenopathy, no asymmetry, masses, or " scars  RESP: lungs clear to auscultation - no rales, rhonchi or wheezes  CV: regular rate and rhythm, normal S1 S2, no S3 or S4, no murmur, click or rub, no peripheral edema  ABDOMEN: soft, nontender, no hepatosplenomegaly, no masses and bowel sounds normal  MS: no gross musculoskeletal defects noted, no edema  SKIN: no suspicious lesions or rashes  NEURO: Normal strength and tone, mentation intact and speech normal  PSYCH: mentation appears normal, affect normal/bright    Recent Labs   Lab Test 04/17/23  0728      POTASSIUM 4.0   CR 0.73        Diagnostics  Labs pending at this time.  Results will be reviewed when available.   No EKG required, no history of coronary heart disease, significant arrhythmia, peripheral arterial disease or other structural heart disease.    Revised Cardiac Risk Index (RCRI)  The patient has the following serious cardiovascular risks for perioperative complications:   - No serious cardiac risks = 0 points     RCRI Interpretation: 0 points: Class I (very low risk - 0.4% complication rate)         Signed Electronically by: Shi Alonso MD, MD  Copy of this evaluation report is provided to requesting physician.

## 2024-04-04 NOTE — TELEPHONE ENCOUNTER
clobetasol (TEMOVATE) 0.05 % external ointment       Last Written Prescription Date:  9/12/23  Last Fill Quantity: 45g,   # refills: 1  Last Office Visit : 1/12/24  Future Office visit:  5/6/24    Routing refill request to provider for review/approval because:  Drug not on the [facility], [facility] or [facility] refill protocol

## 2024-04-04 NOTE — TELEPHONE ENCOUNTER
Reviewed chart.  Medication already refilled by pt's PCP.      Closing encounter.  Alla Tong RN

## 2024-04-11 ENCOUNTER — MYC MEDICAL ADVICE (OUTPATIENT)
Dept: UROLOGY | Facility: CLINIC | Age: 64
End: 2024-04-11
Payer: COMMERCIAL

## 2024-04-12 DIAGNOSIS — J31.0 CHRONIC RHINITIS: ICD-10-CM

## 2024-04-12 RX ORDER — LORATADINE 10 MG/1
TABLET ORAL
Qty: 90 TABLET | Refills: 2 | Status: SHIPPED | OUTPATIENT
Start: 2024-04-12

## 2024-04-16 DIAGNOSIS — N39.3 STRESS INCONTINENCE OF URINE: Primary | ICD-10-CM

## 2024-04-17 ENCOUNTER — TELEPHONE (OUTPATIENT)
Dept: UROLOGY | Facility: CLINIC | Age: 64
End: 2024-04-17
Payer: COMMERCIAL

## 2024-04-17 NOTE — TELEPHONE ENCOUNTER
Discussed surgery details with the patient about her surgery with Dr Alfonso 4/23/24.  Discussed Bowel prep start the day before. No solid foods the day before. Clear liquids only.Magnesium citrate at noon.Explained why to the patient and she verbalized understanding.  Urine culture to be done tomorrow    Ronda Sutherland, RN, BSN  Care Coordinator Urology  [facility] [place]  [facility]  [phone]

## 2024-04-18 ENCOUNTER — LAB (OUTPATIENT)
Dept: LAB | Facility: CLINIC | Age: 64
End: 2024-04-18
Payer: COMMERCIAL

## 2024-04-18 DIAGNOSIS — N39.3 STRESS INCONTINENCE OF URINE: ICD-10-CM

## 2024-04-18 LAB
ALBUMIN UR-MCNC: NEGATIVE MG/DL
APPEARANCE UR: CLEAR
BACTERIA #/AREA URNS HPF: ABNORMAL /HPF
BILIRUB UR QL STRIP: NEGATIVE
COLOR UR AUTO: YELLOW
GLUCOSE UR STRIP-MCNC: NEGATIVE MG/DL
HGB UR QL STRIP: NEGATIVE
KETONES UR STRIP-MCNC: NEGATIVE MG/DL
LEUKOCYTE ESTERASE UR QL STRIP: NEGATIVE
NITRATE UR QL: NEGATIVE
PH UR STRIP: 7 [PH] (ref 5–7)
RBC URINE: 0 /HPF
SP GR UR STRIP: 1.01 (ref 1–1.03)
SQUAMOUS EPITHELIAL: 1 /HPF
UROBILINOGEN UR STRIP-MCNC: NORMAL MG/DL
WBC URINE: <1 /HPF

## 2024-04-18 PROCEDURE — 81001 URINALYSIS AUTO W/SCOPE: CPT

## 2024-04-18 PROCEDURE — 87086 URINE CULTURE/COLONY COUNT: CPT

## 2024-04-19 LAB — BACTERIA UR CULT: NORMAL

## 2024-04-22 ENCOUNTER — MYC MEDICAL ADVICE (OUTPATIENT)
Dept: ONCOLOGY | Facility: CLINIC | Age: 64
End: 2024-04-22

## 2024-04-22 ENCOUNTER — ANESTHESIA EVENT (OUTPATIENT)
Dept: SURGERY | Facility: AMBULATORY SURGERY CENTER | Age: 64
End: 2024-04-22
Payer: COMMERCIAL

## 2024-04-23 ENCOUNTER — ANESTHESIA (OUTPATIENT)
Dept: SURGERY | Facility: AMBULATORY SURGERY CENTER | Age: 64
End: 2024-04-23
Payer: COMMERCIAL

## 2024-04-23 ENCOUNTER — HOSPITAL ENCOUNTER (OUTPATIENT)
Facility: AMBULATORY SURGERY CENTER | Age: 64
Discharge: HOME | End: 2024-04-23
Attending: UROLOGY
Payer: COMMERCIAL

## 2024-04-23 VITALS
BODY MASS INDEX: 30.16 KG/M2 | SYSTOLIC BLOOD PRESSURE: 135 MMHG | TEMPERATURE: 97.1 F | HEART RATE: 84 BPM | RESPIRATION RATE: 16 BRPM | WEIGHT: 181 LBS | OXYGEN SATURATION: 98 % | HEIGHT: 65 IN | DIASTOLIC BLOOD PRESSURE: 75 MMHG

## 2024-04-23 DIAGNOSIS — N81.11 MIDLINE CYSTOCELE: Primary | ICD-10-CM

## 2024-04-23 DIAGNOSIS — N81.9 VAGINAL VAULT PROLAPSE: ICD-10-CM

## 2024-04-23 DIAGNOSIS — R11.2 PONV (POSTOPERATIVE NAUSEA AND VOMITING): ICD-10-CM

## 2024-04-23 DIAGNOSIS — Z98.890 PONV (POSTOPERATIVE NAUSEA AND VOMITING): ICD-10-CM

## 2024-04-23 DIAGNOSIS — N81.11 CYSTOCELE, MIDLINE: ICD-10-CM

## 2024-04-23 DIAGNOSIS — N81.6 RECTOCELE: ICD-10-CM

## 2024-04-23 DIAGNOSIS — N39.3 STRESS INCONTINENCE OF URINE: ICD-10-CM

## 2024-04-23 PROCEDURE — 51992 LAPARO SLING OPERATION: CPT | Performed by: STUDENT IN AN ORGANIZED HEALTH CARE EDUCATION/TRAINING PROGRAM

## 2024-04-23 PROCEDURE — 57425 LAPAROSCOPY SURG COLPOPEXY: CPT | Mod: GC | Performed by: UROLOGY

## 2024-04-23 PROCEDURE — C1771 REP DEV, URINARY, W/SLING: HCPCS

## 2024-04-23 PROCEDURE — 57425 LAPAROSCOPY SURG COLPOPEXY: CPT

## 2024-04-23 PROCEDURE — 64488 TAP BLOCK BI INJECTION: CPT | Mod: 59 | Performed by: STUDENT IN AN ORGANIZED HEALTH CARE EDUCATION/TRAINING PROGRAM

## 2024-04-23 PROCEDURE — 51992 LAPARO SLING OPERATION: CPT | Performed by: NURSE ANESTHETIST, CERTIFIED REGISTERED

## 2024-04-23 PROCEDURE — 57288 REPAIR BLADDER DEFECT: CPT

## 2024-04-23 PROCEDURE — 57288 REPAIR BLADDER DEFECT: CPT | Mod: GC | Performed by: UROLOGY

## 2024-04-23 DEVICE — MESH SLING Y SHAPE RESTORELLE 24X4CM 501420: Type: IMPLANTABLE DEVICE | Site: ABDOMEN | Status: FUNCTIONAL

## 2024-04-23 DEVICE — SLING SOLYX TRANSVAGINAL MESH M0068507000: Type: IMPLANTABLE DEVICE | Site: ABDOMEN | Status: FUNCTIONAL

## 2024-04-23 RX ORDER — LIDOCAINE HYDROCHLORIDE 20 MG/ML
INJECTION, SOLUTION INFILTRATION; PERINEURAL PRN
Status: DISCONTINUED | OUTPATIENT
Start: 2024-04-23 | End: 2024-04-23

## 2024-04-23 RX ORDER — HYDROMORPHONE HYDROCHLORIDE 1 MG/ML
0.4 INJECTION, SOLUTION INTRAMUSCULAR; INTRAVENOUS; SUBCUTANEOUS EVERY 5 MIN PRN
Status: DISCONTINUED | OUTPATIENT
Start: 2024-04-23 | End: 2024-04-23 | Stop reason: HOSPADM

## 2024-04-23 RX ORDER — FENTANYL CITRATE 50 UG/ML
INJECTION, SOLUTION INTRAMUSCULAR; INTRAVENOUS PRN
Status: DISCONTINUED | OUTPATIENT
Start: 2024-04-23 | End: 2024-04-23

## 2024-04-23 RX ORDER — NALOXONE HYDROCHLORIDE 0.4 MG/ML
0.1 INJECTION, SOLUTION INTRAMUSCULAR; INTRAVENOUS; SUBCUTANEOUS
Status: DISCONTINUED | OUTPATIENT
Start: 2024-04-23 | End: 2024-04-23 | Stop reason: HOSPADM

## 2024-04-23 RX ORDER — ONDANSETRON 2 MG/ML
4 INJECTION INTRAMUSCULAR; INTRAVENOUS EVERY 30 MIN PRN
Status: DISCONTINUED | OUTPATIENT
Start: 2024-04-23 | End: 2024-04-24 | Stop reason: HOSPADM

## 2024-04-23 RX ORDER — FENTANYL CITRATE 50 UG/ML
50 INJECTION, SOLUTION INTRAMUSCULAR; INTRAVENOUS EVERY 5 MIN PRN
Status: DISCONTINUED | OUTPATIENT
Start: 2024-04-23 | End: 2024-04-23 | Stop reason: HOSPADM

## 2024-04-23 RX ORDER — EPHEDRINE SULFATE 50 MG/ML
INJECTION, SOLUTION INTRAMUSCULAR; INTRAVENOUS; SUBCUTANEOUS PRN
Status: DISCONTINUED | OUTPATIENT
Start: 2024-04-23 | End: 2024-04-23

## 2024-04-23 RX ORDER — OXYCODONE HYDROCHLORIDE 5 MG/1
10 TABLET ORAL
Status: DISCONTINUED | OUTPATIENT
Start: 2024-04-23 | End: 2024-04-24 | Stop reason: HOSPADM

## 2024-04-23 RX ORDER — NALOXONE HYDROCHLORIDE 0.4 MG/ML
0.4 INJECTION, SOLUTION INTRAMUSCULAR; INTRAVENOUS; SUBCUTANEOUS
Status: DISCONTINUED | OUTPATIENT
Start: 2024-04-23 | End: 2024-04-23 | Stop reason: HOSPADM

## 2024-04-23 RX ORDER — ACETAMINOPHEN 325 MG/1
975 TABLET ORAL ONCE
Status: COMPLETED | OUTPATIENT
Start: 2024-04-23 | End: 2024-04-23

## 2024-04-23 RX ORDER — SENNA AND DOCUSATE SODIUM 50; 8.6 MG/1; MG/1
1 TABLET, FILM COATED ORAL AT BEDTIME
Qty: 10 TABLET | Refills: 0 | Status: SHIPPED | OUTPATIENT
Start: 2024-04-23

## 2024-04-23 RX ORDER — LIDOCAINE 40 MG/G
CREAM TOPICAL
Status: DISCONTINUED | OUTPATIENT
Start: 2024-04-23 | End: 2024-04-23 | Stop reason: HOSPADM

## 2024-04-23 RX ORDER — SODIUM CHLORIDE, SODIUM LACTATE, POTASSIUM CHLORIDE, CALCIUM CHLORIDE 600; 310; 30; 20 MG/100ML; MG/100ML; MG/100ML; MG/100ML
INJECTION, SOLUTION INTRAVENOUS CONTINUOUS
Status: DISCONTINUED | OUTPATIENT
Start: 2024-04-23 | End: 2024-04-23 | Stop reason: HOSPADM

## 2024-04-23 RX ORDER — OXYCODONE HYDROCHLORIDE 5 MG/1
5 TABLET ORAL EVERY 6 HOURS PRN
Qty: 12 TABLET | Refills: 0 | Status: SHIPPED | OUTPATIENT
Start: 2024-04-23 | End: 2024-04-26

## 2024-04-23 RX ORDER — BUPIVACAINE HYDROCHLORIDE AND EPINEPHRINE 2.5; 5 MG/ML; UG/ML
INJECTION, SOLUTION INFILTRATION; PERINEURAL
Status: COMPLETED | OUTPATIENT
Start: 2024-04-23 | End: 2024-04-23

## 2024-04-23 RX ORDER — CEFAZOLIN SODIUM 2 G/50ML
2 SOLUTION INTRAVENOUS SEE ADMIN INSTRUCTIONS
Status: DISCONTINUED | OUTPATIENT
Start: 2024-04-23 | End: 2024-04-23 | Stop reason: HOSPADM

## 2024-04-23 RX ORDER — NALOXONE HYDROCHLORIDE 0.4 MG/ML
0.1 INJECTION, SOLUTION INTRAMUSCULAR; INTRAVENOUS; SUBCUTANEOUS
Status: DISCONTINUED | OUTPATIENT
Start: 2024-04-23 | End: 2024-04-24 | Stop reason: HOSPADM

## 2024-04-23 RX ORDER — PROPOFOL 10 MG/ML
INJECTION, EMULSION INTRAVENOUS CONTINUOUS PRN
Status: DISCONTINUED | OUTPATIENT
Start: 2024-04-23 | End: 2024-04-23

## 2024-04-23 RX ORDER — FLUMAZENIL 0.1 MG/ML
0.2 INJECTION, SOLUTION INTRAVENOUS
Status: DISCONTINUED | OUTPATIENT
Start: 2024-04-23 | End: 2024-04-23 | Stop reason: HOSPADM

## 2024-04-23 RX ORDER — PROPOFOL 10 MG/ML
INJECTION, EMULSION INTRAVENOUS PRN
Status: DISCONTINUED | OUTPATIENT
Start: 2024-04-23 | End: 2024-04-23

## 2024-04-23 RX ORDER — ONDANSETRON 4 MG/1
4 TABLET, ORALLY DISINTEGRATING ORAL EVERY 30 MIN PRN
Status: DISCONTINUED | OUTPATIENT
Start: 2024-04-23 | End: 2024-04-24 | Stop reason: HOSPADM

## 2024-04-23 RX ORDER — DEXAMETHASONE SODIUM PHOSPHATE 4 MG/ML
INJECTION, SOLUTION INTRA-ARTICULAR; INTRALESIONAL; INTRAMUSCULAR; INTRAVENOUS; SOFT TISSUE PRN
Status: DISCONTINUED | OUTPATIENT
Start: 2024-04-23 | End: 2024-04-23

## 2024-04-23 RX ORDER — BUPIVACAINE HYDROCHLORIDE 2.5 MG/ML
INJECTION, SOLUTION INFILTRATION; PERINEURAL PRN
Status: DISCONTINUED | OUTPATIENT
Start: 2024-04-23 | End: 2024-04-23 | Stop reason: HOSPADM

## 2024-04-23 RX ORDER — ONDANSETRON 4 MG/1
4 TABLET, FILM COATED ORAL EVERY 8 HOURS PRN
Qty: 12 TABLET | Refills: 0 | Status: SHIPPED | OUTPATIENT
Start: 2024-04-23 | End: 2024-07-15

## 2024-04-23 RX ORDER — OXYCODONE HYDROCHLORIDE 5 MG/1
5 TABLET ORAL
Status: DISCONTINUED | OUTPATIENT
Start: 2024-04-23 | End: 2024-04-24 | Stop reason: HOSPADM

## 2024-04-23 RX ORDER — CEFAZOLIN SODIUM 2 G/50ML
2 SOLUTION INTRAVENOUS
Status: COMPLETED | OUTPATIENT
Start: 2024-04-23 | End: 2024-04-23

## 2024-04-23 RX ORDER — GLYCOPYRROLATE 0.2 MG/ML
INJECTION, SOLUTION INTRAMUSCULAR; INTRAVENOUS PRN
Status: DISCONTINUED | OUTPATIENT
Start: 2024-04-23 | End: 2024-04-23

## 2024-04-23 RX ORDER — NALOXONE HYDROCHLORIDE 0.4 MG/ML
0.2 INJECTION, SOLUTION INTRAMUSCULAR; INTRAVENOUS; SUBCUTANEOUS
Status: DISCONTINUED | OUTPATIENT
Start: 2024-04-23 | End: 2024-04-23 | Stop reason: HOSPADM

## 2024-04-23 RX ORDER — HYDROMORPHONE HYDROCHLORIDE 1 MG/ML
0.2 INJECTION, SOLUTION INTRAMUSCULAR; INTRAVENOUS; SUBCUTANEOUS EVERY 5 MIN PRN
Status: DISCONTINUED | OUTPATIENT
Start: 2024-04-23 | End: 2024-04-23 | Stop reason: HOSPADM

## 2024-04-23 RX ORDER — ONDANSETRON 2 MG/ML
INJECTION INTRAMUSCULAR; INTRAVENOUS PRN
Status: DISCONTINUED | OUTPATIENT
Start: 2024-04-23 | End: 2024-04-23

## 2024-04-23 RX ORDER — ONDANSETRON 2 MG/ML
4 INJECTION INTRAMUSCULAR; INTRAVENOUS EVERY 30 MIN PRN
Status: DISCONTINUED | OUTPATIENT
Start: 2024-04-23 | End: 2024-04-23 | Stop reason: HOSPADM

## 2024-04-23 RX ORDER — ONDANSETRON 4 MG/1
4 TABLET, ORALLY DISINTEGRATING ORAL EVERY 30 MIN PRN
Status: DISCONTINUED | OUTPATIENT
Start: 2024-04-23 | End: 2024-04-23 | Stop reason: HOSPADM

## 2024-04-23 RX ORDER — FENTANYL CITRATE 50 UG/ML
25 INJECTION, SOLUTION INTRAMUSCULAR; INTRAVENOUS EVERY 5 MIN PRN
Status: DISCONTINUED | OUTPATIENT
Start: 2024-04-23 | End: 2024-04-23 | Stop reason: HOSPADM

## 2024-04-23 RX ORDER — FENTANYL CITRATE 50 UG/ML
25-50 INJECTION, SOLUTION INTRAMUSCULAR; INTRAVENOUS
Status: DISCONTINUED | OUTPATIENT
Start: 2024-04-23 | End: 2024-04-23 | Stop reason: HOSPADM

## 2024-04-23 RX ADMIN — SODIUM CHLORIDE, SODIUM LACTATE, POTASSIUM CHLORIDE, CALCIUM CHLORIDE: 600; 310; 30; 20 INJECTION, SOLUTION INTRAVENOUS at 10:24

## 2024-04-23 RX ADMIN — EPHEDRINE SULFATE 10 MG: 50 INJECTION, SOLUTION INTRAMUSCULAR; INTRAVENOUS; SUBCUTANEOUS at 12:44

## 2024-04-23 RX ADMIN — Medication 100 MCG: at 13:00

## 2024-04-23 RX ADMIN — FENTANYL CITRATE 50 MCG: 50 INJECTION, SOLUTION INTRAMUSCULAR; INTRAVENOUS at 12:35

## 2024-04-23 RX ADMIN — Medication 50 MG: at 12:12

## 2024-04-23 RX ADMIN — FENTANYL CITRATE 25 MCG: 50 INJECTION, SOLUTION INTRAMUSCULAR; INTRAVENOUS at 11:09

## 2024-04-23 RX ADMIN — PROPOFOL 50 MG: 10 INJECTION, EMULSION INTRAVENOUS at 13:45

## 2024-04-23 RX ADMIN — FENTANYL CITRATE 50 MCG: 50 INJECTION, SOLUTION INTRAMUSCULAR; INTRAVENOUS at 12:11

## 2024-04-23 RX ADMIN — PROPOFOL 125 MCG/KG/MIN: 10 INJECTION, EMULSION INTRAVENOUS at 12:52

## 2024-04-23 RX ADMIN — PROPOFOL 125 MCG/KG/MIN: 10 INJECTION, EMULSION INTRAVENOUS at 13:43

## 2024-04-23 RX ADMIN — PROPOFOL 200 MG: 10 INJECTION, EMULSION INTRAVENOUS at 12:11

## 2024-04-23 RX ADMIN — LIDOCAINE HYDROCHLORIDE 100 MG: 20 INJECTION, SOLUTION INFILTRATION; PERINEURAL at 12:11

## 2024-04-23 RX ADMIN — DEXAMETHASONE SODIUM PHOSPHATE 4 MG: 4 INJECTION, SOLUTION INTRA-ARTICULAR; INTRALESIONAL; INTRAMUSCULAR; INTRAVENOUS; SOFT TISSUE at 12:27

## 2024-04-23 RX ADMIN — SODIUM CHLORIDE, SODIUM LACTATE, POTASSIUM CHLORIDE, CALCIUM CHLORIDE: 600; 310; 30; 20 INJECTION, SOLUTION INTRAVENOUS at 14:00

## 2024-04-23 RX ADMIN — Medication 100 MCG: at 13:23

## 2024-04-23 RX ADMIN — PROPOFOL 150 MCG/KG/MIN: 10 INJECTION, EMULSION INTRAVENOUS at 12:11

## 2024-04-23 RX ADMIN — ONDANSETRON 4 MG: 2 INJECTION INTRAMUSCULAR; INTRAVENOUS at 14:06

## 2024-04-23 RX ADMIN — CEFAZOLIN SODIUM 2 G: 2 SOLUTION INTRAVENOUS at 12:04

## 2024-04-23 RX ADMIN — GLYCOPYRROLATE 0.2 MG: 0.2 INJECTION, SOLUTION INTRAMUSCULAR; INTRAVENOUS at 12:32

## 2024-04-23 RX ADMIN — FENTANYL CITRATE 25 MCG: 50 INJECTION, SOLUTION INTRAMUSCULAR; INTRAVENOUS at 11:06

## 2024-04-23 RX ADMIN — PROPOFOL 50 MG: 10 INJECTION, EMULSION INTRAVENOUS at 14:00

## 2024-04-23 RX ADMIN — Medication 0.5 MG: at 13:14

## 2024-04-23 RX ADMIN — BUPIVACAINE HYDROCHLORIDE AND EPINEPHRINE 20 ML: 2.5; 5 INJECTION, SOLUTION INFILTRATION; PERINEURAL at 11:08

## 2024-04-23 RX ADMIN — ACETAMINOPHEN 975 MG: 325 TABLET ORAL at 10:24

## 2024-04-23 NOTE — OP NOTE
Operative Report  4/23/24    Pre-op Dx: Cystocele, rectocele, stress urinary incontinence   Post-op Dx: Same     Procedure performed:   1. Robotic sacrocolpopexy and cystoscopy  2. Midurethral sling    Surgeon: Jarrod Alfonso MD   Assistant surgeons: Ayah Whitehead MD    Anesthesia: General   EBL: 10 cc   Tubes/drains: 16Fr Rodríguez catheter   Specimens: None   Complications: None   Disposition: Stable to PACU     Clinical Indication: Ms. Enid Lombardo is a 64 year old female with a history of pelvic organ prolapse refractory to non-surgical management, including pelvic floor physical therapy. She also has a history of stress urinary incontinence.  She presented for repair and we discussed different management options including transvaginal repair and sacrocolpopexy and she elected to proceed with the latter robotically. Given her surgical history, including prior rectus abdominus flaps after mastectomy and ventral hernia repair with mesh, Dr. Cortés from general surgery was available to help with port placement.     Operative Procedure:  The patient was identified corrected, consented and taken to the operating room.  After being induced with general anesthesia and given daphne-operative antibiotics the patient was placed in the dorsal lithotomy position and secured into placed with foam pads and tape to make sure she stayed in place when in the Trendelenburg position. Once secured the patient underwent a pelvic and abdominal prep and she was draped in the usual sterile fashion.  A catheter was placed and the vaginal cuff manipulator was set in place.     At this point, Dr. Cortés assisted with port placement and began by inserting the Veress needle into the abdomen for insufflation at the left mid-clavicular line. Once the abdomen was insufflated to [id number], we proceeded to place our ports, starting with the midline supraumbilical camera port. The other trocar sites were marked at approximately 10 cm on either  side of the camera port and then as lateral as possible.  We placed 8 mm ports throughout for all the robotic arms and then an 8 mm port was used for the assistant. The patient was then placed in Trendelenburg and the robot was docked to the left side.     We began with the anterior vaginal dissection to bring the bladder off the anterior vaginal cuff. Once an adequate length of dissection was achieved the same was done posteriorly towards the rectum again with the aid of a uterine positioning device in the vagina. Of note, the posterior vaginal cuff was quite thin and adhered to the peritoneal fold over the rectum, and the plane was difficult to find. Once this was completed, the sacral promontory was exposed with its anterior ligament and using 2 0-gortex sutures, two anchoring stitches were placed. We then dissected through the peritoneum to develop a plain from that point to the vaginal cuff.      Next, the Y-mesh was then trimmed to an appropriate length and suturing of the mesh started on the posterior vaginal side with an 0-V-lock suture. Once an appropriate amount of sutures were placed to secure the mesh in place, this was repeated anteriorly. The third arm of the mesh was then brought through the retroperitoneal tunnel and tied down with the two previously placed anchoring sutures onto the anterior ligament of the sacral promontory. Next the peritoneal reflection was then closed over the mesh and retroperitonealized using a 2-0 vicryl. The robot was undocked and ports were removed. We then performed a cystoscopy, which demonstrated no foreign bodies, active efflux from bilateral ureteral orifices, and some mild cystitis changes. Skin closure was then performed on all the port site incisions using a 4-0 Monocryl and dermabond was applied.      At this point we directed our attention to the midurethral sling.  An Allis clamp was used to grasp the vaginal mucosa approximately 1 cm from the urethral meatus at  the mid urethra. A 25-gauge needle, was used to inject 1% lidocaine with epinephrine to hydrodissect the tissues. We then made a 1-cm incision through the vaginal mucosa with a #15 blade. We then dissected this laterally out passed the fornices of the vagina in the pubocervical fascia, out laterally to the pelvic side wall, first on the right-hand side and then on the left hand side. We then placed the single-incision Solyx sling on the trocar and placed this first through the obturator membrane on the left side and then deployed it. This was then repeated on the right side. We then placed a cystoscope through the urethra and into the bladder, noting bilateral ureteral orifices effluxing clear urine. The floor of the bladder was intact as well as the bladder neck and the lateral edges. The urethra was without tape. We then withdrew the cystoscope with a full bladder and pressed on the suprapubic region and did not note any incontinence from the urethral meatus. The Rodríguez catheter was replaced. A running 2-0 Vicryl was then used to reapproximate the mucosa of the vagina after irrigation.    The procedures were then concluded. The patient was then returned to the supine position and transported to recovery in stable condition.     Post-operative plan:   - TOV in PACU  - Discharge once meeting PACU criteria  - Discharge meds as ordered  - Follow up as scheduled on 5/6/24    Ayah Whitehead MD  Urology Resident PGY-3    Patient was seen, evaluated and plan was formulated in conjunction with me and I agree with the above.  I was present for the entire procedure.  Jarrod Alfonso MD

## 2024-04-23 NOTE — DISCHARGE INSTRUCTIONS
"[facility]  Home Care Following Anesthesia  For 24 hours after surgery:  Get plenty of rest.  A responsible adult must stay with you for at least 24 hours after you leave the surgery center.  Do not drive or use heavy equipment.  If you have weakness or tingling, don't drive or use heavy equipment until this feeling goes away.   Do not drink alcohol.   Avoid strenuous or risky activities.  Ask for help when climbing stairs.  You may feel lightheaded.  IF so, sit for a few minutes before standing.  Have someone help you get up.   If you have nausea (feel sick to your stomach): Drink only clear liquids such as apple juice, ginger ale, broth or 7-Up.  Rest may also help.  Be sure to drink enough fluids.  Move to a regular diet as you feel able.   You may have a slight fever.  Call the doctor if your fever is over 100 F (37.7 C) (taken under the tongue) or lasts longer than 24 hours.  You may have a dry mouth, a sore throat, muscle aches or trouble sleeping. These should go away after 24 hours.  Do not make important or legal decisions.   It is recommended to avoid smoking.        Today you received a Marcaine or bupivacaine block to numb the nerves near your surgery site.  This is a block using local anesthetic or \"numbing\" medication injected around the nerves to anesthetize or \"numb\" the area supplied by those nerves.  This block is injected into the muscle layer near your surgical site.  The medication may numb the location where you had surgery for 6-18 hours, but may last up to 24 hours.  If your surgical site is an arm or leg you should be careful with your affected limb, since it is possible to injure your limb without being aware of it due to the numbing.  Until full feeling returns, you should guard against bumping or hitting your limb, and avoid extreme hot or cold temperatures on the skin.  As the block wears off, the feeling will return as a tingling or prickly " sensation near your surgical site.  You will experience more discomfort from your incision as the feeling returns.  You may want to take a pain pill (a narcotic or Tylenol if this was prescribed by your surgeon) when you start to experience mild pain before the pain beccomes more severe.  If your pain medications do not control your pain you should notifiy your surgeon.    Tips for taking pain medications  To get the best pain relief possible, remember these points:  Take pain medications as directed, before pain becomes severe.  Pain medication can upset your stomach: taking it with food may help.  Constipation is a common side effect of pain medication. Drink plenty of  fluids.  Eat foods high in fiber. Take a stool softener if recommended by your doctor or pharmacist.  Do not drink alcohol, drive or operate machinery while taking pain medications.  Ask about other ways to control pain, such as with heat, ice or relaxation.    Tylenol/Acetaminophen Consumption    If you feel your pain relief is insufficient, you may take Tylenol/Acetaminophen in addition to your narcotic pain medication.   Be careful not to exceed 4,000 mg of Tylenol/Acetaminophen in a 24 hour period from all sources.  If you are taking extra strength Tylenol/acetaminophen (500 mg), the maximum dose is 8 tablets in 24 hours.  If you are taking regular strength acetaminophen (325 mg), the maximum dose is 12 tablets in 24 hours.    Call a doctor for any of the following:  Signs of infection (fever, growing tenderness at the surgery site, a large amount of drainage or bleeding, severe pain, foul-smelling drainage, redness, swelling).  It has been over 8 to 10 hours since surgery and you are still not able to urinate (pass water).  Headache for over 24 hours.  Numbness, tingling or weakness the day after surgery (if you had spinal anesthesia).  Signs of Covid-19 infection (temperature over 100 degrees, shortness of breath, cough, loss of taste/smell,  generalized body aches, persistent headache, chills, sore throat, nausea/vomiting/diarrhea)  Your doctor is:       Dr. Jarrod Alfonso, Prostate and Urology: [phone]               Or dial [phone] and ask for the resident on call for:  Prostate Urology  For emergency care, call the:  [facility]:  [phone] (TTY for hearing impaired: [phone])

## 2024-04-23 NOTE — ANESTHESIA PROCEDURE NOTES
Airway       Patient location during procedure: OR       Procedure Start/Stop Times: 4/23/2024 12:14 PM  Staff -        CRNA: Janie Lawson APRN CRNA       Performed By: CRNA  Consent for Airway        Urgency: elective  Indications and Patient Condition       Indications for airway management: daphne-procedural       Induction type:intravenous       Mask difficulty assessment: 1 - vent by mask    Final Airway Details       Final airway type: endotracheal airway       Successful airway: ETT - single and Oral  Endotracheal Airway Details        ETT size (mm): 7.0       Cuffed: yes       Successful intubation technique: direct laryngoscopy       DL Blade Type: MAC 4       Grade View of Cords: 1       Adjucts: stylet       Position: Right       Measured from: lips       Secured at (cm): 22       Bite block used: Oral Airway    Post intubation assessment        Placement verified by: capnometry, equal breath sounds and chest rise        Number of attempts at approach: 1       Secured with: tape       Ease of procedure: easy       Dentition: Intact and Unchanged    Medication(s) Administered   Medication Administration Time: 4/23/2024 12:14 PM

## 2024-04-23 NOTE — ANESTHESIA CARE TRANSFER NOTE
Patient: Enid Lombardo    Procedure: Procedure(s):  SACROCOLPOPEXY, ROBOT-ASSISTED, LAPAROSCOPIC, WITH INSERTION OF MIDURETHRAL SLING AND CYSTOSCOPY (support the bladder, rectum, and urethra with mesh and look in the  bladder)       Diagnosis: Midline cystocele [N81.11]  Rectocele [N81.6]  Stress incontinence [N39.3]  Diagnosis Additional Information: No value filed.    Anesthesia Type:   General     Note:    Oropharynx: oropharynx clear of all foreign objects and spontaneously breathing  Level of Consciousness: awake  Oxygen Supplementation: face mask    Independent Airway: airway patency satisfactory and stable  Dentition: dentition unchanged  Vital Signs Stable: post-procedure vital signs reviewed and stable  Report to RN Given: handoff report given  Patient transferred to: PACU    Handoff Report: Identifed the Patient, Identified the Reponsible Provider, Reviewed the pertinent medical history, Discussed the surgical course, Reviewed Intra-OP anesthesia mangement and issues during anesthesia, Set expectations for post-procedure period and Allowed opportunity for questions and acknowledgement of understanding      Vitals:  Vitals Value Taken Time   /64 04/23/24 1441   Temp 36.1  C (97  F) 04/23/24 1435   Pulse 74 04/23/24 1442   Resp 34 04/23/24 1442   SpO2 98 % 04/23/24 1442   Vitals shown include unfiled device data.    Electronically Signed By: HERMILO Mendoza CRNA  April 23, 2024  2:43 PM

## 2024-04-23 NOTE — OR NURSING
Patient received bilateral Transverse Abdominis Plane nerve block  with Exparel.  Fentanyl 50mcg given. Tolerated procedure well.

## 2024-04-23 NOTE — ANESTHESIA PROCEDURE NOTES
TAP Procedure Note    Pre-Procedure   Staff -        Anesthesiologist:  Gallo Woodall MD       Performed By: anesthesiologist       Location: pre-op       Procedure Start/Stop Times: 4/23/2024 11:08 AM and 4/23/2024 11:18 AM       Pre-Anesthestic Checklist: patient identified, IV checked, site marked, risks and benefits discussed, informed consent, monitors and equipment checked, pre-op evaluation, at physician/surgeon's request and post-op pain management  Timeout:       Correct Patient: Yes        Correct Procedure: Yes        Correct Site: Yes        Correct Position: Yes        Correct Laterality: Yes        Site Marked: Yes  Procedure Documentation  Procedure: TAP       Laterality: bilateral       Patient Position: supine       Skin prep: Chloraprep       Needle Type: short bevel       Needle Gauge: 17.        Needle Length (millimeters): 100        Ultrasound guided       1. Ultrasound was used to identify targeted nerve, plexus, vascular marker, or fascial plane and place a needle adjacent to it in real-time.       2. Ultrasound was used to visualize the spread of anesthetic in close proximity to the above referenced structure.       3. A permanent image is entered into the patient's record.       4. The visualized anatomic structures appeared normal.       5. There were no apparent abnormal pathologic findings.    Assessment/Narrative         The placement was negative for: blood aspirated, painful injection and site bleeding       Paresthesias: No.       Insertion/Infusion Method: Single Shot       Complications: none    Medication(s) Administered   Bupivacaine 0.25% w/ 1:200K Epi (Injection) - Injection   20 mL - 4/23/2024 11:08:00 AM  Bupivacaine liposome (Exparel) 1.3% LA inj susp (Infiltration) - Infiltration   20 mL - 4/23/2024 11:08:00 AM  Medication Administration Time: 4/23/2024 11:08 AM     Comments:  Risk benefit alternatives explained. Patient agrees to undergo a block.   Procedure done with  "no issues, no complications, good visualization under US, local anesthetic spread satisfactory. Patient tolerated the procedure well.        FOR [facility] ([facility]) ONLY:   Pain Team Contact information: please page the Pain Team Via [employer]. Search \"Pain\". During daytime hours, please page the attending first. At night please page the resident first.      "

## 2024-04-23 NOTE — BRIEF OP NOTE
[facility]    Brief Operative Note    Pre-operative diagnosis: Midline cystocele [N81.11]  Rectocele [N81.6]  Stress incontinence [N39.3]  Post-operative diagnosis Same as pre-operative diagnosis    Procedure: SACROCOLPOPEXY, ROBOT-ASSISTED, LAPAROSCOPIC, WITH INSERTION OF MIDURETHRAL SLING AND CYSTOSCOPY (support the bladder, rectum, and urethra with mesh and look in the  bladder), N/A - Abdomen    Surgeon: Surgeons and Role:     * Jarrod Alfonso MD - Primary     * Ayah Whitehead MD - Resident - Assisting     * Yo Cortés MD    Anesthesia: General with Block   Estimated Blood Loss: 10 mL from 4/23/2024 12:06 PM to 4/23/2024  2:34 PM    Drains: 16Fr Rodríguez catheter, to be removed in PACU  Specimens: * No specimens in log *  Findings: Posterior vaginal plane more difficult to dissect. Vaginal vault with good apical suspension. Midurethral sling placed. Cystoscopy normal and without foreign bodies.   Complications: None.  Implants:   Implant Name Type Inv. Item Serial No.  Lot No. LRB No. Used Action   MESH SLING Y SHAPE RESTORELLE [id number] - [id number] Mesh MESH SLING Y SHAPE RESTORELLE [id number]  COLOP[employer]  N/A 1 Implanted   SLING SOLYX TRANSVAGINAL MESH [id number] - [id number] Mesh SLING SOLYX TRANSVAGINAL MESH [id number]  [employer] [id number] N/A 1 Implanted   SLING SOLYX TRANSVAGINAL MESH [id number] - [id number] Mesh SLING SOLYX TRANSVAGINAL MESH [id number]  [employer]  N/A 1 Wasted

## 2024-04-23 NOTE — PROGRESS NOTES
Resident paged for clarifying orders for voiding trial. Instructed to instill 200 ml into bladder and bladder scan after voiding. Instructed to check if bladder mostly empty, 50 ml or less.     Addendum: Patient was not able to void following a instillation of 200 mL.  Bladder scanned and is now showing greater than 500 ml in the bladder and the patient is reporting being uncomfortable and feeling bladder is distended.  Paged Dr. Alfonso.  Order received to insert aguilar catheter for patient to have upon discharge.  Instructed to discuss with patient whether she would want to follow up on Friday 4/26/24 or the following Monday 4/29/24 and to then message Dr. Alfonso so that she can work with her staff to make a follow up appointment.  Patient wishes to follow up on Friday 4/26/24.  Message sent in EPIC.  Aguilar inserted without issue.

## 2024-04-23 NOTE — ANESTHESIA PREPROCEDURE EVALUATION
Anesthesia Pre-Procedure Evaluation    Patient: Enid Lombardo   MRN: [id number] : 1960        Procedure : Procedure(s):  SACROCOLPOPEXY, ROBOT-ASSISTED, LAPAROSCOPIC, WITH INSERTION OF MIDURETHRAL SLING AND CYSTOSCOPY (support the bladder, rectum, and urethra with mesh and look in the  bladder)          Past Medical History:   Diagnosis Date    Breast cancer (H)     lumpectomy, radiation, tamoxifen    Cancer (H)     Breast    Cataracts, bilateral     Complication of anesthesia     She prefers not to have versed until right before she leaves or can have after     Diabetes (H)     Gestational    Enthesopathy of hip region 2006    right hip    Esophageal reflux 2006    History of gestational diabetes     Hypertension     Macular drusen     PONV (postoperative nausea and vomiting)     Shingles 2012    left T6-T7 dermatome      Past Surgical History:   Procedure Laterality Date    ABDOMEN SURGERY  2019    Breast reconstruction    BIOPSY      Breast    BREAST SURGERY      COLONOSCOPY      COLONOSCOPY N/A 2021    Procedure: COLONOSCOPY;  Surgeon: Iain Mercedes MD;  Location: [facility]    ENDOSCOPY  2007    Upper GI    EYE SURGERY      GRAFT FAT TO BREAST Bilateral 2018    Procedure: Bilateral Breast Fat Grafting from Abdomen and Thighs;  Surgeon: DENNISE Amin MD;  Location: [facility]    GRAFT FREE VASCULARIZED TRANSVERSE RECTUS ABDOMINIS MYOCUTANEOUS Bilateral 10/08/2018    Procedure: GRAFT FREE VASCULARIZED TRANSVERSE RECTUS ABDOMINIS MYOCUTANEOUS;  Bilateral Deep Inferior Epigastric Artery  Free Flap Breast Reconstruction and Removal of Breast Explanders;  Surgeon: DENNISE Amin MD;  Location: [facility] OR    GYN SURGERY       BIOPSY/EXCISION LYMPH NODE OPEN DEEP CERVICAL W EXC FAT PAD  2005    Right axillary sentinel node biopsy X 3.     EXCISION BREAST LESION, OPEN >=1  2005    Right breast.    HC REMV CATARACT  EXTRACAP,INSERT LENS, W/O ECP  12/18/2008    bilateral    HC REMV TARSAL/METATARSAL BENIGN BONE LESN  1982    Bone spur - right    HERNIA REPAIR  2019    HYSTERECTOMY, PAP NO LONGER INDICATED      LAPAROSCOPIC HERNIORRHAPHY VENTRAL N/A 10/21/2019    Procedure: Laparoscopic Ventral Hernia Repair With Mesh;  Surgeon: Emil Brown MD;  Location: [facility] OR    MASTECTOMY SIMPLE BILATERAL, SENTINEL NODE BILATERAL, COMBINED Bilateral 08/22/2018    Procedure: COMBINED MASTECTOMY SIMPLE BILATERAL, SENTINEL NODE BILATERAL;  Bilateral Mastectomy with Right Delta Node Biopsy, Bilateral Breast Reconstruction, Anesthesia Block;  Surgeon: Rakesh Sanchez MD;  Location:  OR    ORTHOPEDIC SURGERY  1983    Right foot    RECONSTRUCT BREAST Bilateral 08/22/2018    Procedure: RECONSTRUCT BREAST;;  Surgeon: DENNISE Amin MD;  Location:  OR    RECONSTRUCT BREAST BILATERAL Bilateral 10/08/2018    Procedure: RECONSTRUCT BREAST BILATERAL;;  Surgeon: DENNISE Amin MD;  Location:  OR    RECONSTRUCT NIPPLE BILATERAL Bilateral 11/29/2018    Procedure: Nipple Reconstruction;  Surgeon: DENNISE Amin MD;  Location:  OR    [facility] VAGINAL HYSTERECTOMY  12/2001    Ovaries intact, due to fibroids    [facility] COLONOSCOPY W BIOPSY  05/10/2010      Allergies   Allergen Reactions    Brimonidine Tartrate Rash     Thrush and numbness in mouth      Social History     Tobacco Use    Smoking status: Never    Smokeless tobacco: Never   Substance Use Topics    Alcohol use: Yes     Comment: 4 drinks per month       Wt Readings from Last 1 Encounters:   04/04/24 82.3 kg (181 lb 8 oz)        Anesthesia Evaluation   Pt has had prior anesthetic.     No history of anesthetic complications       ROS/MED HX  ENT/Pulmonary:  - neg pulmonary ROS     Neurologic:  - neg neurologic ROS     Cardiovascular:  - neg cardiovascular ROS   (+) Dyslipidemia hypertension- -   -  - -                                      METS/Exercise Tolerance: >4 METS     Hematologic:  - neg hematologic  ROS     Musculoskeletal:  - neg musculoskeletal ROS     GI/Hepatic:  - neg GI/hepatic ROS   (+) GERD,                   Renal/Genitourinary:  - neg Renal ROS     Endo:  - neg endo ROS  (-) Type I DM and Type II DM   Psychiatric/Substance Use:  - neg psychiatric ROS     Infectious Disease:  - neg infectious disease ROS     Malignancy:  - neg malignancy ROS     Other:  - neg other ROS          Physical Exam    Airway        Mallampati: II   TM distance: > 3 FB   Neck ROM: full   Mouth opening: > 3 cm    Respiratory Devices and Support         Dental       (+) Minor Abnormalities - some fillings, tiny chips      Cardiovascular   cardiovascular exam normal          Pulmonary   pulmonary exam normal                OUTSIDE LABS:  CBC:   Lab Results   Component Value Date    WBC 5.0 02/06/2020    WBC 7.1 10/15/2019    HGB 14.0 02/06/2020    HGB 14.6 10/15/2019    HCT 42.2 02/06/2020    HCT 44.8 10/15/2019     02/06/2020     10/15/2019     BMP:   Lab Results   Component Value Date     04/04/2024     04/17/2023    POTASSIUM 4.5 04/04/2024    POTASSIUM 4.0 04/17/2023    CHLORIDE 105 04/04/2024    CHLORIDE 102 04/17/2023    CO2 27 04/04/2024    CO2 29 04/17/2023    BUN 11.2 04/04/2024    BUN 13.0 04/17/2023    CR 0.81 04/04/2024    CR 0.73 04/17/2023    GLC 99 04/04/2024    GLC 92 04/17/2023     COAGS:   Lab Results   Component Value Date    INR 1.15 (H) 10/09/2018    FIBR 428 (H) 10/09/2018     POC:   Lab Results   Component Value Date    BGM 88 10/21/2019     HEPATIC:   Lab Results   Component Value Date    ALBUMIN 3.6 01/20/2020    PROTTOTAL 7.2 01/14/2019    ALT 24 01/14/2019    AST 16 01/14/2019    ALKPHOS 65 01/14/2019    BILITOTAL 0.4 01/14/2019     OTHER:   Lab Results   Component Value Date    ROSENDO 10.1 04/04/2024    PHOS 2.6 10/10/2018    MAG 2.0 10/11/2018    LIPASE 290 12/11/2018    AMYLASE 82 10/04/2010    TSH 1.23 12/10/2014    CRP <3.0 12/29/2006     "SED 5 12/19/2005       Anesthesia Plan    ASA Status:  2    NPO Status:  NPO Appropriate    Anesthesia Type: General.     - Airway: ETT   Induction: Intravenous.   Maintenance: TIVA.        Consents    Anesthesia Plan(s) and associated risks, benefits, and realistic alternatives discussed. Questions answered and patient/representative(s) expressed understanding.     - Discussed:     - Discussed with:  Patient            Postoperative Care    Pain management: IV analgesics, Oral pain medications, Peripheral nerve block (Single Shot), Multi-modal analgesia.   PONV prophylaxis: Ondansetron (or other 5HT-3), Dexamethasone or Solumedrol, Background Propofol Infusion     Comments:    Other Comments: Discussed risks of general anesthesia, including aspiration pneumonia, sore throat/hoarse voice, abrasions/damage to lips/tongue/teeth, nausea, rare complications (including medication reactions, cardiac, pulmonary, hypoxia/low oxygen, recall). Ensured understanding, invited questions and all questions were answered. Patient wishes to proceed.     Discussed preoperative TAP block. Discussed risks of nerve block, including nerve injury, bleeding, infection, incomplete analgesia. Discussed anticipated areas of sensory and motor block, limb precautions, and fall precautions. Discussed alternative of not performing a nerve block. Ensured understanding, invited questions and all questions were answered. Patient wishes to proceed.             Gallo Woodall MD    I have reviewed the pertinent notes and labs in the chart from the past 30 days and (re)examined the patient.  Any updates or changes from those notes are reflected in this note.              # Obesity: Estimated body mass index is 30.53 kg/m  as calculated from the following:    Height as of 4/4/24: 1.642 m (5' 4.65\").    Weight as of 4/4/24: 82.3 kg (181 lb 8 oz).      "

## 2024-04-24 ENCOUNTER — TELEPHONE (OUTPATIENT)
Dept: UROLOGY | Facility: CLINIC | Age: 64
End: 2024-04-24
Payer: COMMERCIAL

## 2024-04-24 ENCOUNTER — MYC MEDICAL ADVICE (OUTPATIENT)
Dept: UROLOGY | Facility: CLINIC | Age: 64
End: 2024-04-24
Payer: COMMERCIAL

## 2024-04-24 NOTE — TELEPHONE ENCOUNTER
Called pt to discuss catheter removal/TOV.  We will need to clarify timing with Dr. Alfonso prior to scheduling.      I let her know we will call her back as soon as we get confirmation from Dr. Alfonso.    Pt verbalized understanding.  Alla Tong RN

## 2024-04-24 NOTE — TELEPHONE ENCOUNTER
Pt also called twice.  See TE dated 4/24.  Discussed with pt over the phone.    Alla Tong RN

## 2024-04-24 NOTE — TELEPHONE ENCOUNTER
Pt called again. She was told at hospital that she would get a call by 2pm today. Writer wasn't able to schedule anything in [place]. Pt is uncomfortable and wants to make sure she can get it out on Friday. Please call. Thank you!

## 2024-04-24 NOTE — TELEPHONE ENCOUNTER
[facility] Call Center    Phone Message    May a detailed message be left on voicemail: yes     Reason for Call: Other: . Pt is calling to schedule her post op cath TOV, please call pt, thank you    Action Taken: Message routed to:  Other: uro    Travel Screening: Not Applicable

## 2024-04-24 NOTE — TELEPHONE ENCOUNTER
Call received from eddie Gomez for TOV Friday.      Called pt to schedule.  Friday at 11am in .  Pt confirmed date and time will work.  Pt scheduled.    Encouraged pt to monitor catheter to ensure it continues to drain.  We will see her Friday.  Pt confirmed she will ensure catheter continues to drain or else she will call the clinic back.  Alla Tong RN

## 2024-04-24 NOTE — ANESTHESIA POSTPROCEDURE EVALUATION
Patient: Enid Lombardo    Procedure: Procedure(s):  SACROCOLPOPEXY, ROBOT-ASSISTED, LAPAROSCOPIC, WITH INSERTION OF MIDURETHRAL SLING AND CYSTOSCOPY (support the bladder, rectum, and urethra with mesh and look in the  bladder)       Anesthesia Type:  General    Note:  Disposition: Outpatient   Postop Pain Control: Uneventful            Sign Out: Well controlled pain   PONV: No   Neuro/Psych: Uneventful            Sign Out: Acceptable/Baseline neuro status   Airway/Respiratory: Uneventful            Sign Out: Acceptable/Baseline resp. status   CV/Hemodynamics: Uneventful            Sign Out: Acceptable CV status; No obvious hypovolemia; No obvious fluid overload   Other NRE: NONE   DID A NON-ROUTINE EVENT OCCUR? No           Last vitals:  Vitals Value Taken Time   /64 04/23/24 1500   Temp 36.1  C (96.9  F) 04/23/24 1500   Pulse 74 04/23/24 1500   Resp 8 04/23/24 1500   SpO2 97 % 04/23/24 1500       Electronically Signed By: Gallo Woodall MD  April 24, 2024  1:05 PM

## 2024-04-26 ENCOUNTER — MYC MEDICAL ADVICE (OUTPATIENT)
Dept: UROLOGY | Facility: CLINIC | Age: 64
End: 2024-04-26

## 2024-04-26 ENCOUNTER — ALLIED HEALTH/NURSE VISIT (OUTPATIENT)
Dept: NURSING | Facility: CLINIC | Age: 64
End: 2024-04-26
Payer: COMMERCIAL

## 2024-04-26 ENCOUNTER — TELEPHONE (OUTPATIENT)
Dept: UROLOGY | Facility: CLINIC | Age: 64
End: 2024-04-26

## 2024-04-26 DIAGNOSIS — N81.11 MIDLINE CYSTOCELE: Primary | ICD-10-CM

## 2024-04-26 PROCEDURE — 99024 POSTOP FOLLOW-UP VISIT: CPT

## 2024-04-26 PROCEDURE — 51700 IRRIGATION OF BLADDER: CPT

## 2024-04-26 RX ORDER — CEPHALEXIN 500 MG/1
500 CAPSULE ORAL ONCE
Status: COMPLETED | OUTPATIENT
Start: 2024-04-26 | End: 2024-04-26

## 2024-04-26 RX ADMIN — CEPHALEXIN 500 MG: 500 CAPSULE ORAL at 11:04

## 2024-04-26 NOTE — TELEPHONE ENCOUNTER
Per Loly Donaldson PA-C ok to alternate advil and tylenol after a sling procedure.     Herminia Fish LPN on 4/26/2024 at 8:58 AM

## 2024-04-26 NOTE — TELEPHONE ENCOUNTER
Follow up with pt Monday morning to see how weekend went with [facility].  Has voiding improved?  Send to Dr. Alfonso.    If ok, can cancel nurse visit scheduled for 1000am 4/29.    Alla Tong RN

## 2024-04-26 NOTE — TELEPHONE ENCOUNTER
Patient reviewed Mychart message. Closing encounter.     Herminia Fish LPN on 4/26/2024 at 10:55 AM

## 2024-04-26 NOTE — NURSING NOTE
Enid Lombardo comes into clinic today at the request of Dr. Alfonso Ordering Provider for TOV (trial of void).    Enid Lombardo presented today for a trial of void.  Approximately 300 mL of normal saline instilled into bladder via catheter.  Patient stated She had urge to urinate and catheter was removed without difficulty.  Patient was given a commode and cylinder to measure urine output.  Patient voided approximately 150 mL of clear urine.   mL.    Keflex 500 given per protocol: Yes    Patient did tolerate procedure well.    Discussed in clinic with Loly Donaldson about what to do post op with this pt who failed her TOV.  Per Loly, we can either replace the catheter or teach CIC teaching.  Pt prefers to learn CIC.      Per Loly patient to complete CIC as needed each day with use of 14F french catheter for diagnosis of chronic retention which is expected to last greater than 90 days.    With written and verbal instructions, patient was educated on clean intermittent catheterization. Patient successfully completed CIC and is aware to complete this as needed through the weekend. Informed patient to call the clinic with any questions or concerns.   Pt will monitor voiding amounts and discuss with nurse Monday morning.  Nurse visit scheduled tentatively in case pt needs aguilar replaced.      This service provided today was under the supervising provider of the day Loly Donaldson, who was available if needed.    Alla Tong RN  4/26/2024  11:39 AM

## 2024-04-29 ENCOUNTER — MYC MEDICAL ADVICE (OUTPATIENT)
Dept: UROLOGY | Facility: CLINIC | Age: 64
End: 2024-04-29

## 2024-04-29 ENCOUNTER — LAB (OUTPATIENT)
Dept: LAB | Facility: CLINIC | Age: 64
End: 2024-04-29
Payer: COMMERCIAL

## 2024-04-29 DIAGNOSIS — N39.0 URINARY TRACT INFECTION: ICD-10-CM

## 2024-04-29 DIAGNOSIS — N39.0 URINARY TRACT INFECTION: Primary | ICD-10-CM

## 2024-04-29 LAB
ALBUMIN UR-MCNC: 20 MG/DL
APPEARANCE UR: ABNORMAL
BACTERIA #/AREA URNS HPF: ABNORMAL /HPF
BILIRUB UR QL STRIP: NEGATIVE
COLOR UR AUTO: YELLOW
GLUCOSE UR STRIP-MCNC: NEGATIVE MG/DL
HGB UR QL STRIP: ABNORMAL
KETONES UR STRIP-MCNC: NEGATIVE MG/DL
LEUKOCYTE ESTERASE UR QL STRIP: ABNORMAL
NITRATE UR QL: POSITIVE
PH UR STRIP: 6 [PH] (ref 5–7)
RBC #/AREA URNS AUTO: ABNORMAL /HPF
SKIP: ABNORMAL
SP GR UR STRIP: 1.01 (ref 1–1.03)
SQUAMOUS #/AREA URNS AUTO: ABNORMAL /LPF
UROBILINOGEN UR STRIP-MCNC: NORMAL MG/DL
WBC #/AREA URNS AUTO: >100 /HPF

## 2024-04-29 PROCEDURE — 87186 SC STD MICRODIL/AGAR DIL: CPT

## 2024-04-29 PROCEDURE — 81001 URINALYSIS AUTO W/SCOPE: CPT

## 2024-04-29 PROCEDURE — 87086 URINE CULTURE/COLONY COUNT: CPT

## 2024-04-29 RX ORDER — NITROFURANTOIN 25; 75 MG/1; MG/1
100 CAPSULE ORAL 2 TIMES DAILY
Qty: 10 CAPSULE | Refills: 0 | Status: SHIPPED | OUTPATIENT
Start: 2024-04-29 | End: 2024-05-04

## 2024-04-29 NOTE — TELEPHONE ENCOUNTER
UA/UC orders placed.    Order additional catheter supplies once Dr. Alfonso responds.    Alla Tong RN

## 2024-04-29 NOTE — TELEPHONE ENCOUNTER
[facility] [employer] Order completed online for pt.  Faxed notes from chart to [facility] [employer].  Fax confirmation received.     Alla Tong RN

## 2024-04-29 NOTE — TELEPHONE ENCOUNTER
Bag with additional straight catheters left at Desk D for pt pickup today prior to lab appt at 1:20pm.    Alla Tong RN

## 2024-04-30 LAB — BACTERIA UR CULT: ABNORMAL

## 2024-05-06 ENCOUNTER — OFFICE VISIT (OUTPATIENT)
Dept: UROLOGY | Facility: CLINIC | Age: 64
End: 2024-05-06
Payer: COMMERCIAL

## 2024-05-06 ENCOUNTER — MYC MEDICAL ADVICE (OUTPATIENT)
Dept: UROLOGY | Facility: CLINIC | Age: 64
End: 2024-05-06

## 2024-05-06 DIAGNOSIS — L90.0 LICHEN SCLEROSUS: ICD-10-CM

## 2024-05-06 DIAGNOSIS — N95.2 VAGINAL ATROPHY: ICD-10-CM

## 2024-05-06 DIAGNOSIS — Z48.89 ENCOUNTER FOR POSTOPERATIVE CARE: Primary | ICD-10-CM

## 2024-05-06 PROCEDURE — 99207 PR MEASURE POST-VOID RESIDUAL URINE/BLADDER CAPACITY, US NON-IMAGING: CPT | Performed by: PHYSICIAN ASSISTANT

## 2024-05-06 PROCEDURE — 99024 POSTOP FOLLOW-UP VISIT: CPT | Performed by: PHYSICIAN ASSISTANT

## 2024-05-06 RX ORDER — CLOBETASOL PROPIONATE 0.5 MG/G
OINTMENT TOPICAL
Qty: 60 G | Refills: 3 | Status: SHIPPED | OUTPATIENT
Start: 2024-05-06

## 2024-05-06 RX ORDER — ESTRADIOL 0.1 MG/G
1 CREAM VAGINAL
Qty: 42.5 G | Refills: 3 | Status: SHIPPED | OUTPATIENT
Start: 2024-05-06

## 2024-05-06 NOTE — PROGRESS NOTES
May 6, 2024    Post operative visit    Patient returns today for follow up s/p sacrocolpopexy with midurethral slling and cysto by Dr. Alfonso on 4/23/24. State she is doing well. Cathing TID with PVRs of 75-250mL and continue to improve. Denies vaginal pain, bleeding, abdominal pain, fevers/chills, s/s of UTI today. States since her constipation has improved, her cath amounts have improved. Patient has multiple post-op questions and all were answered.  Of note, patient has used estrogen cream in the past prescribed by Dr. Wells. Patient states that she has cleared estrogen with her oncologist for hx of breast cancer in the past and they are ok with her using estrogen cream. Patient also states she is using another cream, but do not see this cream on her med list and patient does not recall the name of the cream. Patient voices no other concerns at this time.     LMP 10/14/2001 (Exact Date)   Patient is comfortable, in no distress, non-labored breathing.  Abdomen is soft, non-tender, non-distended.  Abdominal incisions healing appropriately with no signs of infection, bleeding, lesions, rashes, pain to palpation, drainage. Vulva unremarkable. Speculum exam obtained. 2 sutures noted suburethrally and healing appropriately without signs of infection, drainage, bleeding, pain to palpation. No mesh extrusion, vaginal bleeding, abnormal discharge, obvious masses/abscess, lesions, ulcers. No pain to palpation.     PVR 74 mL by bladder scan    A/P: Patient is s/p sacrocolpopexy with midurethral slling and cysto by Dr. Alfonso on 4/23/24.    - Continue CIC TID for 3 more days. Will have nursing staff call her on Wednesday for void/cath amounts.  - continue post-op restrictions for 4 more weeks. Reviewed post-op restrictions with patient today at length.  - ok to resume estrogen cream as long as oncology still ok with; renewed today.  - patient will call clinic about the other cream she was using from Dr. Wells. Patient able  to find cream and was steroid ointment for lichen sclerosus. Advised to use twice weekly at night along vulva indefinitely.   - continue senna daily. Can add fiber supplement daily.  - contact clinic if develops s/s of UTI in the future.   - keep follow-up appointment with Dr. Alfonso as planned.     Losi Gardner PA-C  Urology    CC  Patient Care Team:  Shi Alonso MD as PCP - General (Family Practice)  Paz Osborn MD as MD (Ophthalmology)  Shi Alonso MD as Assigned PCP  Ragini Muniz PA-C as Assigned Cancer Care Provider  Paty Davenport GC as Genetic Counselor (Genetic Counselor, MS)  Ese Wells MD as MD (OB/Gyn)  Toño Singh MD as MD (Family Medicine)  Ese Wells MD as Assigned OBGYN Provider  Jarrod Alfonso MD as MD (Urology)  Jarrod Alfonso MD as Assigned Surgical Provider  SHI ALONSO

## 2024-05-06 NOTE — PATIENT INSTRUCTIONS
- continue post-op restrictions for 4 more weeks.   - ok to resume estrogen cream as long as oncology still ok with continued use.  - keep followup appointment with Dr. Alfonso as planned.  - nurse will call you on Wednesday for catheterized amounts/void amounts.   - contact clinic if you develop symptoms of a UTI in the future.

## 2024-05-06 NOTE — NURSING NOTE
Eind Lombardo's goals for this visit include:   Chief Complaint   Patient presents with    RECHECK     2 weeks post op DOS 4/23 with Dr. Alfonso SACROCOLPOPEXY, ROBOT-ASSISTED, LAPAROSCOPIC, WITH INSERTION OF MIDURETHRAL SLING AND CYSTOSCOPY       She requests these members of her care team be copied on today's visit information:     PCP: Shi Alonso    Referring Provider:  Shi Alonso MD  [address] [place] [address] [id number] [place] [address] 10/14/2001 (Exact Date)     Do you need any medication refills at today's visit?     post void residual: 74 mL    Kate Finley MA on 5/6/2024 at 12:51 PM

## 2024-05-07 DIAGNOSIS — J31.0 CHRONIC RHINITIS: ICD-10-CM

## 2024-05-07 RX ORDER — FLUTICASONE PROPIONATE 50 MCG
SPRAY, SUSPENSION (ML) NASAL
Qty: 48 ML | Refills: 0 | Status: SHIPPED | OUTPATIENT
Start: 2024-05-07 | End: 2024-08-05

## 2024-05-08 ENCOUNTER — MYC MEDICAL ADVICE (OUTPATIENT)
Dept: UROLOGY | Facility: CLINIC | Age: 64
End: 2024-05-08
Payer: COMMERCIAL

## 2024-05-09 ENCOUNTER — MYC MEDICAL ADVICE (OUTPATIENT)
Dept: UROLOGY | Facility: CLINIC | Age: 64
End: 2024-05-09

## 2024-05-09 ENCOUNTER — LAB (OUTPATIENT)
Dept: LAB | Facility: CLINIC | Age: 64
End: 2024-05-09
Payer: COMMERCIAL

## 2024-05-09 DIAGNOSIS — R30.0 DYSURIA: Primary | ICD-10-CM

## 2024-05-09 DIAGNOSIS — R30.0 DYSURIA: ICD-10-CM

## 2024-05-09 LAB
ALBUMIN UR-MCNC: NEGATIVE MG/DL
APPEARANCE UR: CLEAR
BACTERIA #/AREA URNS HPF: ABNORMAL /HPF
BILIRUB UR QL STRIP: NEGATIVE
COLOR UR AUTO: COLORLESS
GLUCOSE UR STRIP-MCNC: NEGATIVE MG/DL
HGB UR QL STRIP: NEGATIVE
KETONES UR STRIP-MCNC: NEGATIVE MG/DL
LEUKOCYTE ESTERASE UR QL STRIP: ABNORMAL
NITRATE UR QL: NEGATIVE
PH UR STRIP: 6.5 [PH] (ref 5–7)
RBC #/AREA URNS AUTO: ABNORMAL /HPF
SKIP: ABNORMAL
SP GR UR STRIP: 1 (ref 1–1.03)
SQUAMOUS #/AREA URNS AUTO: ABNORMAL /LPF
UROBILINOGEN UR STRIP-MCNC: NORMAL MG/DL
WBC #/AREA URNS AUTO: ABNORMAL /HPF
WBC CLUMPS #/AREA URNS HPF: PRESENT /HPF

## 2024-05-09 PROCEDURE — 87186 SC STD MICRODIL/AGAR DIL: CPT

## 2024-05-09 PROCEDURE — 87086 URINE CULTURE/COLONY COUNT: CPT

## 2024-05-09 PROCEDURE — 87088 URINE BACTERIA CULTURE: CPT

## 2024-05-09 PROCEDURE — 81001 URINALYSIS AUTO W/SCOPE: CPT

## 2024-05-09 RX ORDER — NITROFURANTOIN 25; 75 MG/1; MG/1
100 CAPSULE ORAL 2 TIMES DAILY
Qty: 10 CAPSULE | Refills: 0 | Status: SHIPPED | OUTPATIENT
Start: 2024-05-09 | End: 2024-05-14

## 2024-05-09 NOTE — CONFIDENTIAL NOTE
"5/9/24 UA abnormal, UC in process. Called and spoke to patient who is aware of these results. Informed patient that the urine culture results can take up to 2 days for the final results. Offered to send an antibiotic per protocol given symptoms and patient would like this. Patient stated, \"I am getting uncomfortable.\"     Macrobid 100mg po BID for 5 days ordered per protocol. Prescription sent to [facility] in [place] per patient request. Informed patient that we will watch for the final  urine culture results and contact her once available. Patient was comfortable with plan.     Received update from Lois Gardner PA-C who reports that if patient's post void catheter amounts for 3 consecutive days are all less than 100mL then can stop CIC and cath as needed after that point. Informed patient of this update. Patient verbalized understanding and reports that her volumes are not to that point yet, so she will continue with twice daily catheterizations and follow up with volumes on Monday.    Jinny Ramon RN, BSN      "

## 2024-05-10 DIAGNOSIS — R30.0 DYSURIA: Primary | ICD-10-CM

## 2024-05-10 RX ORDER — CIPROFLOXACIN 500 MG/1
500 TABLET, FILM COATED ORAL 2 TIMES DAILY
Qty: 10 TABLET | Refills: 0 | Status: SHIPPED | OUTPATIENT
Start: 2024-05-10 | End: 2024-07-15

## 2024-05-10 NOTE — TELEPHONE ENCOUNTER
Called patient and informed her of the recommendations. No further questions.     Herminia Fish LPN on 5/10/2024 at 4:17 PM

## 2024-05-10 NOTE — TELEPHONE ENCOUNTER
Lois Gardner PA-C  P [facility] Adult [place]  Prelim showing UTI. Rx for ciprofloxacin 500mg BID x5 days sent to pharmacy. Please notify patient and to hold her calcium/vit. D supplement as this can interact with cipro. Can resume after completion of cipro. May need to adjust abx based on final UC results. Encourage not to do any strenuous activity while on cipro as has risk of tendon rupture. Like any antibiotic can cause diarrhea so encourage probiotic.    Catherine      Received message above from Lois Gardner PA-C. Patient was started on nitroFURantoin macrocrystal-monohydrate (MACROBID) 100 MG capsule yesterday.  Will inform patient to stop Macrobid and take Ciprofloxacin along with the other recommendations.

## 2024-05-11 LAB — BACTERIA UR CULT: ABNORMAL

## 2024-05-13 ENCOUNTER — MYC MEDICAL ADVICE (OUTPATIENT)
Dept: UROLOGY | Facility: CLINIC | Age: 64
End: 2024-05-13
Payer: COMMERCIAL

## 2024-05-13 NOTE — CONFIDENTIAL NOTE
Lois Gardner PA-C  [facility] [place] minutes ago (8:39 AM)     KB  Recommend cathing once nightly, and as needed if has feeling of incomplete emptying during the day. Please send update of cath amounts on Friday this week.    henry     Received the above message from Lois Gardner PA-C. Discussed questions further with Lois Gardner PA-C who recommends that it is okay to place a bandage during the day and then take the bandage off at night to keep the incision open to air. Per Lois Gardner PA-C, patient can apply an over-the-counter triple antibiotic ointment before placing the bandage to prevent the bandage from rubbing on it. Per Lois Gardner PA-C, patient can take a full cap of miralax twice daily and also add in over-the-counter metamucil.     My chart message sent to patient.    Jinny Ramon RN, BSN

## 2024-05-13 NOTE — CONFIDENTIAL NOTE
Patient has been contacted with the final  urine culture results. See 5/13/24 my chart encounter.    Jinny Ramon RN, BSN

## 2024-05-17 ENCOUNTER — MYC MEDICAL ADVICE (OUTPATIENT)
Dept: UROLOGY | Facility: CLINIC | Age: 64
End: 2024-05-17
Payer: COMMERCIAL

## 2024-05-17 NOTE — CONFIDENTIAL NOTE
Lois Gardner PA-C  [facility] [place] minutes ago (10:11 AM)     KB  She can stop cathing and only cath prn for when she feels like she doesn't empty.    henry     Received the above message from Lois Gardner PA-C. My chart message sent to patient.    Jinny Ramon RN, BSN

## 2024-05-25 ENCOUNTER — HEALTH MAINTENANCE LETTER (OUTPATIENT)
Age: 64
End: 2024-05-25

## 2024-06-03 ENCOUNTER — VIRTUAL VISIT (OUTPATIENT)
Dept: UROLOGY | Facility: CLINIC | Age: 64
End: 2024-06-03
Payer: COMMERCIAL

## 2024-06-03 VITALS — HEIGHT: 65 IN | WEIGHT: 172 LBS | BODY MASS INDEX: 28.66 KG/M2

## 2024-06-03 DIAGNOSIS — N95.2 ATROPHIC VAGINITIS: ICD-10-CM

## 2024-06-03 DIAGNOSIS — N95.8 GENITOURINARY SYNDROME OF MENOPAUSE: ICD-10-CM

## 2024-06-03 DIAGNOSIS — N81.6 RECTOCELE: ICD-10-CM

## 2024-06-03 DIAGNOSIS — N81.11 MIDLINE CYSTOCELE: Primary | ICD-10-CM

## 2024-06-03 PROCEDURE — 99024 POSTOP FOLLOW-UP VISIT: CPT | Mod: 95 | Performed by: UROLOGY

## 2024-06-03 ASSESSMENT — PAIN SCALES - GENERAL: PAINLEVEL: NO PAIN (0)

## 2024-06-03 NOTE — PROGRESS NOTES
Virtual Visit Details    Type of service:  Video Visit     Originating Location (pt. Location): Home    Distant Location (provider location):  Off-site  Platform used for Video Visit: Alanis

## 2024-06-03 NOTE — PROGRESS NOTES
Reason for Visit: 6 week post op for robotic sacrocolpopexy on 4/23/24    Clinical Data: Ms. Enid Lombardo  is a 64 year old female with a history of the above. She returns today for her post operative visit.  She states that she is doing well.  No stress incontinence, no urinary urgency or urge inontinence. She is having some constipation but it is improving.    She reports that her incisions are healing well.    A/P s/p Robotic sacrocolpopexy doing well  -discontinue with restrictions  -continue to use estrogen cream indefinitely, may be refilled by primary.  -f/u prn    Thank you for allowing me to participate in the care of  Ms.Renae RINA Lombardo and I will keep you updated on her progress.    Jarrod Alfonso MD

## 2024-06-03 NOTE — NURSING NOTE
Is the patient currently in the state of [place]? YES    Visit mode:VIDEO    If the visit is dropped, the patient can be reconnected by: VIDEO VISIT: Text to cell phone:   Telephone Information:   Mobile [phone]       Will anyone else be joining the visit? NO  (If patient encounters technical issues they should call [phone]    How would you like to obtain your AVS? MyChart    Are changes needed to the allergy or medication list? No    Are refills needed on medications prescribed by this physician? NO    Reason for visit: RECHECK    Stephenie BOYCE

## 2024-06-09 DIAGNOSIS — E78.5 HYPERLIPIDEMIA WITH TARGET LDL LESS THAN 130: ICD-10-CM

## 2024-06-10 RX ORDER — ATORVASTATIN CALCIUM 10 MG/1
10 TABLET, FILM COATED ORAL DAILY
Qty: 90 TABLET | Refills: 1 | Status: SHIPPED | OUTPATIENT
Start: 2024-06-10 | End: 2024-07-15

## 2024-07-10 SDOH — HEALTH STABILITY: PHYSICAL HEALTH: ON AVERAGE, HOW MANY DAYS PER WEEK DO YOU ENGAGE IN MODERATE TO STRENUOUS EXERCISE (LIKE A BRISK WALK)?: 6 DAYS

## 2024-07-10 SDOH — HEALTH STABILITY: PHYSICAL HEALTH: ON AVERAGE, HOW MANY MINUTES DO YOU ENGAGE IN EXERCISE AT THIS LEVEL?: 40 MIN

## 2024-07-10 ASSESSMENT — SOCIAL DETERMINANTS OF HEALTH (SDOH): HOW OFTEN DO YOU GET TOGETHER WITH FRIENDS OR RELATIVES?: ONCE A WEEK

## 2024-07-15 ENCOUNTER — OFFICE VISIT (OUTPATIENT)
Dept: FAMILY MEDICINE | Facility: OTHER | Age: 64
End: 2024-07-15
Payer: COMMERCIAL

## 2024-07-15 VITALS
TEMPERATURE: 97.6 F | BODY MASS INDEX: 31.07 KG/M2 | RESPIRATION RATE: 16 BRPM | HEART RATE: 80 BPM | WEIGHT: 182 LBS | HEIGHT: 64 IN | DIASTOLIC BLOOD PRESSURE: 72 MMHG | SYSTOLIC BLOOD PRESSURE: 124 MMHG | OXYGEN SATURATION: 96 %

## 2024-07-15 DIAGNOSIS — M54.12 CERVICAL RADICULAR PAIN: ICD-10-CM

## 2024-07-15 DIAGNOSIS — E78.5 HYPERLIPIDEMIA WITH TARGET LDL LESS THAN 130: ICD-10-CM

## 2024-07-15 DIAGNOSIS — Z00.00 ROUTINE GENERAL MEDICAL EXAMINATION AT A HEALTH CARE FACILITY: Primary | ICD-10-CM

## 2024-07-15 DIAGNOSIS — C50.919 RECURRENT MALIGNANT NEOPLASM OF BREAST, UNSPECIFIED LATERALITY (H): ICD-10-CM

## 2024-07-15 DIAGNOSIS — K59.02 CONSTIPATION DUE TO OUTLET DYSFUNCTION: ICD-10-CM

## 2024-07-15 PROCEDURE — 99396 PREV VISIT EST AGE 40-64: CPT | Performed by: FAMILY MEDICINE

## 2024-07-15 PROCEDURE — 99213 OFFICE O/P EST LOW 20 MIN: CPT | Mod: 25 | Performed by: FAMILY MEDICINE

## 2024-07-15 RX ORDER — GABAPENTIN 100 MG/1
100 CAPSULE ORAL 3 TIMES DAILY
Qty: 270 CAPSULE | Refills: 3 | Status: SHIPPED | OUTPATIENT
Start: 2024-07-15

## 2024-07-15 RX ORDER — ATORVASTATIN CALCIUM 10 MG/1
10 TABLET, FILM COATED ORAL DAILY
Qty: 90 TABLET | Refills: 3 | Status: SHIPPED | OUTPATIENT
Start: 2024-07-15

## 2024-07-15 ASSESSMENT — PAIN SCALES - GENERAL: PAINLEVEL: NO PAIN (0)

## 2024-07-15 NOTE — PATIENT INSTRUCTIONS
Patient Education   Preventive Care Advice   This is general advice given by our system to help you stay healthy. However, your care team may have specific advice just for you. Please talk to your care team about your preventive care needs.  Nutrition  Eat 5 or more servings of fruits and vegetables each day.  Try wheat bread, brown rice and whole grain pasta (instead of white bread, rice, and pasta).  Get enough calcium and vitamin D. Check the label on foods and aim for 100% of the RDA (recommended daily allowance).  Lifestyle  Exercise at least 150 minutes each week  (30 minutes a day, 5 days a week).  Do muscle strengthening activities 2 days a week. These help control your weight and prevent disease.  No smoking.  Wear sunscreen to prevent skin cancer.  Have a dental exam and cleaning every 6 months.  Yearly exams  See your health care team every year to talk about:  Any changes in your health.  Any medicines your care team has prescribed.  Preventive care, family planning, and ways to prevent chronic diseases.  Shots (vaccines)   HPV shots (up to age 26), if you've never had them before.  Hepatitis B shots (up to age 59), if you've never had them before.  COVID-19 shot: Get this shot when it's due.  Flu shot: Get a flu shot every year.  Tetanus shot: Get a tetanus shot every 10 years.  Pneumococcal, hepatitis A, and RSV shots: Ask your care team if you need these based on your risk.  Shingles shot (for age 50 and up)  General health tests  Diabetes screening:  Starting at age 35, Get screened for diabetes at least every 3 years.  If you are younger than age 35, ask your care team if you should be screened for diabetes.  Cholesterol test: At age 39, start having a cholesterol test every 5 years, or more often if advised.  Bone density scan (DEXA): At age 50, ask your care team if you should have this scan for osteoporosis (brittle bones).  Hepatitis C: Get tested at least once in your life.  STIs (sexually  transmitted infections)  Before age 24: Ask your care team if you should be screened for STIs.  After age 24: Get screened for STIs if you're at risk. You are at risk for STIs (including HIV) if:  You are sexually active with more than one person.  You don't use condoms every time.  You or a partner was diagnosed with a sexually transmitted infection.  If you are at risk for HIV, ask about PrEP medicine to prevent HIV.  Get tested for HIV at least once in your life, whether you are at risk for HIV or not.  Cancer screening tests  Cervical cancer screening: If you have a cervix, begin getting regular cervical cancer screening tests starting at age 21.  Breast cancer scan (mammogram): If you've ever had breasts, begin having regular mammograms starting at age 40. This is a scan to check for breast cancer.  Colon cancer screening: It is important to start screening for colon cancer at age 45.  Have a colonoscopy test every 10 years (or more often if you're at risk) Or, ask your provider about stool tests like a FIT test every year or Cologuard test every 3 years.  To learn more about your testing options, visit:   .  For help making a decision, visit:   https://bit.ly/ur87093.  Prostate cancer screening test: If you have a prostate, ask your care team if a prostate cancer screening test (PSA) at age 55 is right for you.  Lung cancer screening: If you are a current or former smoker ages 50 to 80, ask your care team if ongoing lung cancer screenings are right for you.  For informational purposes only. Not to replace the advice of your health care provider. Copyright   2023 [facility]. All rights reserved. Clinically reviewed by the [facility]. Multistat 253337 - REV 01/24.  Substance Use Disorder: Care Instructions  Overview     You can improve your life and health by stopping your use of alcohol or drugs. When you don't drink or use drugs, you may feel and sleep better. You may  get along better with your family, friends, and coworkers. There are medicines and programs that can help with substance use disorder.  How can you care for yourself at home?  Here are some ways to help you stay sober and prevent relapse.  If you have been given medicine to help keep you sober or reduce your cravings, be sure to take it exactly as prescribed.  Talk to your doctor about programs that can help you stop using drugs or drinking alcohol.  Do not keep alcohol or drugs in your home.  Plan ahead. Think about what you'll say if other people ask you to drink or use drugs. Try not to spend time with people who drink or use drugs.  Use the time and money spent on drinking or drugs to do something that's important to you.  Preventing a relapse  Have a plan to deal with relapse. Learn to recognize changes in your thinking that lead you to drink or use drugs. Get help before you start to drink or use drugs again.  Try to stay away from situations, friends, or places that may lead you to drink or use drugs.  If you feel the need to drink alcohol or use drugs again, seek help right away. Call a trusted friend or family member. Some people get support from organizations such as Narcotics Anonymous or [employer] or from treatment facilities.  If you relapse, get help as soon as you can. Some people make a plan with another person that outlines what they want that person to do for them if they relapse. The plan usually includes how to handle the relapse and who to notify in case of relapse.  Don't give up. Remember that a relapse doesn't mean that you have failed. Use the experience to learn the triggers that lead you to drink or use drugs. Then quit again. Recovery is a lifelong process. Many people have several relapses before they are able to quit for good.  Follow-up care is a key part of your treatment and safety. Be sure to make and go to all appointments, and call your doctor if you are having problems. It's  "also a good idea to know your test results and keep a list of the medicines you take.  When should you call for help?   Call 911  anytime you think you may need emergency care. For example, call if you or someone else:    Has overdosed or has withdrawal signs. Be sure to tell the emergency workers that you are or someone else is using or trying to quit using drugs. Overdose or withdrawal signs may include:  Losing consciousness.  Seizure.  Seeing or hearing things that aren't there (hallucinations).     Is thinking or talking about suicide or harming others.   Where to get help 24 hours a day, 7 days a week   If you or someone you know talks about suicide, self-harm, a mental health crisis, a substance use crisis, or any other kind of emotional distress, get help right away. You can:    Call the Suicide and Crisis Lifeline at 988.     Call 5-551-123-TALK (1-317.203.2303).     Text HOME to 297347 to access the Crisis Text Line.   Consider saving these numbers in your phone.  Go to [email or web address] for more information or to chat online.  Call your doctor now or seek immediate medical care if:    You are having withdrawal symptoms. These may include nausea or vomiting, sweating, shakiness, and anxiety.   Watch closely for changes in your health, and be sure to contact your doctor if:    You have a relapse.     You need more help or support to stop.   Where can you learn more?  Go to https://www.[employer].net/patiented  Enter H573 in the search box to learn more about \"Substance Use Disorder: Care Instructions.\"  Current as of: November 15, 2023               Content Version: 14.0    9881-7933 [employer].   Care instructions adapted under license by your healthcare professional. If you have questions about a medical condition or this instruction, always ask your healthcare professional. [employer] disclaims any warranty or liability for your use of this information.         "

## 2024-07-15 NOTE — PROGRESS NOTES
Preventive Care Visit  [facility]  Shi Alonso MD, MD, Family Medicine  Jul 15, 2024      Assessment & Plan         ICD-10-CM    1. Routine general medical examination at a health care facility  Z00.00       2. Recurrent malignant neoplasm of breast, unspecified laterality (H)  C50.919       3. Cervical radicular pain  M54.12 gabapentin (NEURONTIN) 100 MG capsule      4. Hyperlipidemia with target LDL less than 130  E78.5 atorvastatin (LIPITOR) 10 MG tablet      5. Constipation due to outlet dysfunction  K59.02           Patient has had chronic cervical radicular pain for which she is taking gabapentin and had been weaning off of this though after her surgical procedure she found the physician she must been lying and exacerbated her neck pain and she has been having worsened pain.  She is currently on 100 mg 3 times a day and plans to take this regularly during the initial recovery here.  She may choose to wean again but will be slow on this given the change in mobility after surgery.  She continues to have constipation likely due to some of the procedural issues and she is taking fiber for this which we did talk about the bulking agent of this and that the senna that had been prescribed for would be preferable though if she is looking for an alternative MiraLAX can also be used.  Lastly she is due for labs as well as cholesterol and we will adjust medications as needed but she has been looking quite well other than the surgical recovery things.  Her urologist had mentioned  That she was unclear on the frequency and need for her clobetasol given its thinning effect though given the scarring of lichen sclerosus clobetasol is used twice weekly lifelong to maintain the area without scarring over the urethra or other areas.    I spent a total of 20 minutes on the day of the visit.   Time spent by me doing chart review, history and exam, documentation and further activities per the note    Shi  "MD Gavin     Patient has been advised of split billing requirements and indicates understanding: Yes        BMI  Estimated body mass index is 30.77 kg/m  as calculated from the following:    Height as of this encounter: 1.638 m (5' 4.49\").    Weight as of this encounter: 82.6 kg (182 lb).   Weight management plan: Discussed healthy diet and exercise guidelines    Counseling  Appropriate preventive services were discussed with this patient, including applicable screening as appropriate for fall prevention, nutrition, physical activity, Tobacco-use cessation, weight loss and cognition.  Checklist reviewing preventive services available has been given to the patient.  Reviewed patient's diet, addressing concerns and/or questions.           Fernando Rossi is a 64 year old, presenting for the following:  Physical        7/15/2024    10:41 AM   Additional Questions   Roomed by franklin   Accompanied by alone         7/15/2024    10:41 AM   Patient Reported Additional Medications   Patient reports taking the following new medications none        Health Care Directive  Patient does not have a Health Care Directive or Living Will: Discussed advance care planning with patient; information given to patient to review.    HPI    Patient had recent surgical procedure and has been dealing with some constipation since though her bladder retention has improved.          7/10/2024   General Health   How would you rate your overall physical health? Good   Feel stress (tense, anxious, or unable to sleep) Not at all            7/10/2024   Nutrition   Three or more servings of calcium each day? Yes   Diet: Regular (no restrictions)   How many servings of fruit and vegetables per day? (!) 2-3   How many sweetened beverages each day? 0-1            7/10/2024   Exercise   Days per week of moderate/strenous exercise 6 days   Average minutes spent exercising at this level 40 min            7/10/2024   Social Factors   Frequency of " gathering with friends or relatives Once a week   Worry food won't last until get money to buy more No   Food not last or not have enough money for food? No   Do you have housing? (Housing is defined as stable permanent housing and does not include staying ouside in a car, in a tent, in an abandoned building, in an overnight shelter, or couch-surfing.) Yes   Are you worried about losing your housing? No   Lack of transportation? No   Unable to get utilities (heat,electricity)? No            7/10/2024   Fall Risk   Fallen 2 or more times in the past year? No   Trouble with walking or balance? No             7/10/2024   Dental   Dentist two times every year? Yes            7/10/2024   TB Screening   Were you born outside of the [place]? No              Today's PHQ-2 Score:       7/15/2024    10:46 AM   PHQ-2 ( 1999 [employer])   Q1: Little interest or pleasure in doing things 0   Q2: Feeling down, depressed or hopeless 0   PHQ-2 Score 0         7/10/2024   Substance Use   Alcohol more than 3/day or more than 7/wk No   Do you use any other substances recreationally? (!) ALCOHOL    (!) CANNABIS PRODUCTS       Multiple values from one day are sorted in reverse-chronological order     Social History     Tobacco Use    Smoking status: Never    Smokeless tobacco: Never   Vaping Use    Vaping status: Never Used   Substance Use Topics    Alcohol use: Yes     Comment: 4 drinks per month     Drug use: No           12/20/2023   LAST FHS-7 RESULTS   1st degree relative breast or ovarian cancer Unknown   Any relative bilateral breast cancer Unknown   Any male have breast cancer Unknown   Any ONE woman have BOTH breast AND ovarian cancer Unknown   Any woman with breast cancer before 50yrs Unknown   2 or more relatives with breast AND/OR ovarian cancer Unknown   2 or more relatives with breast AND/OR bowel cancer Unknown           Mammogram Screening - Mammogram every 1-2 years updated in Health Maintenance based on mutual decision  "making        7/10/2024   STI Screening   New sexual partner(s) since last STI/HIV test? No        History of abnormal Pap smear: Status post hysterectomy with removal of cervix and no history of CIN2 or greater or cervical cancer. Health Maintenance and Surgical History updated.        5/19/2014    12:00 AM 8/14/2009    12:00 AM 8/8/2006    12:00 AM   PAP / HPV   PAP (Historical) NIL  NIL  NIL      ASCVD Risk   The 10-year ASCVD risk score (Lana GALLEGOS, et al., 2019) is: 3.5%    Values used to calculate the score:      Age: 64 years      Sex: Female      Is Non- : No      Diabetic: No      Tobacco smoker: No      Systolic Blood Pressure: 124 mmHg      Is BP treated: No      HDL Cholesterol: 66 mg/dL      Total Cholesterol: 201 mg/dL           Reviewed and updated as needed this visit by Provider   Tobacco  Allergies  Meds  Problems  Med Hx  Surg Hx  Fam Hx                     Objective    Exam  /72   Pulse 80   Temp 97.6  F (36.4  C) (Temporal)   Resp 16   Ht 1.638 m (5' 4.49\")   Wt 82.6 kg (182 lb)   LMP 10/14/2001 (Exact Date)   SpO2 96%   BMI 30.77 kg/m     Estimated body mass index is 30.77 kg/m  as calculated from the following:    Height as of this encounter: 1.638 m (5' 4.49\").    Weight as of this encounter: 82.6 kg (182 lb).    Physical Exam  GENERAL: alert and no distress  RESP: lungs clear to auscultation - no rales, rhonchi or wheezes  CV: regular rate and rhythm, normal S1 S2, no S3 or S4, no murmur, click or rub, no peripheral edema  ABDOMEN: soft, nontender, no hepatosplenomegaly, no masses and bowel sounds normal  MS: no gross musculoskeletal defects noted, no edema  SKIN: no suspicious lesions or rashes  NEURO: Normal strength and tone, mentation intact and speech normal  PSYCH: mentation appears normal, affect normal/bright        Signed Electronically by: Shi Alonso MD, MD    "

## 2024-08-04 DIAGNOSIS — J31.0 CHRONIC RHINITIS: ICD-10-CM

## 2024-08-05 RX ORDER — FLUTICASONE PROPIONATE 50 MCG
SPRAY, SUSPENSION (ML) NASAL
Qty: 48 ML | Refills: 0 | Status: SHIPPED | OUTPATIENT
Start: 2024-08-05

## 2024-09-24 ENCOUNTER — IMMUNIZATION (OUTPATIENT)
Dept: FAMILY MEDICINE | Facility: OTHER | Age: 64
End: 2024-09-24
Payer: COMMERCIAL

## 2024-09-24 DIAGNOSIS — Z23 ENCOUNTER FOR IMMUNIZATION: Primary | ICD-10-CM

## 2024-09-24 PROCEDURE — 91320 SARSCV2 VAC 30MCG TRS-SUC IM: CPT

## 2024-09-24 PROCEDURE — 90673 RIV3 VACCINE NO PRESERV IM: CPT

## 2024-09-24 PROCEDURE — 90480 ADMN SARSCOV2 VAC 1/ONLY CMP: CPT

## 2024-09-24 PROCEDURE — 90471 IMMUNIZATION ADMIN: CPT

## 2024-09-24 PROCEDURE — 99207 PR NO CHARGE NURSE ONLY: CPT

## 2024-09-24 NOTE — PROGRESS NOTES
Prior to immunization administration, verified patients identity using patient s name and date of birth. Please see Immunization Activity for additional information.     Is the patient's temperature normal (100.5 or less)? Yes     Patient MEETS CRITERIA. PROCEED with vaccine administration.      Patient instructed to remain in clinic for 15 minutes afterwards, and to report any adverse reactions.      Link to Ancillary Visit Immunization Standing Orders SmartSet     Screening performed by Nayana Campa MA on 9/24/2024 at 12:42 PM.

## 2024-10-31 DIAGNOSIS — J31.0 CHRONIC RHINITIS: ICD-10-CM

## 2024-10-31 RX ORDER — FLUTICASONE PROPIONATE 50 MCG
SPRAY, SUSPENSION (ML) NASAL
Qty: 48 G | Refills: 1 | Status: SHIPPED | OUTPATIENT
Start: 2024-10-31

## 2024-11-04 ENCOUNTER — VIRTUAL VISIT (OUTPATIENT)
Dept: FAMILY MEDICINE | Facility: OTHER | Age: 64
End: 2024-11-04
Payer: COMMERCIAL

## 2024-11-04 ENCOUNTER — LAB (OUTPATIENT)
Dept: LAB | Facility: OTHER | Age: 64
End: 2024-11-04
Payer: COMMERCIAL

## 2024-11-04 DIAGNOSIS — B96.89 BV (BACTERIAL VAGINOSIS): ICD-10-CM

## 2024-11-04 DIAGNOSIS — R30.0 DYSURIA: Primary | ICD-10-CM

## 2024-11-04 DIAGNOSIS — N39.0 URINARY TRACT INFECTION WITHOUT HEMATURIA, SITE UNSPECIFIED: Primary | ICD-10-CM

## 2024-11-04 DIAGNOSIS — R30.0 DYSURIA: ICD-10-CM

## 2024-11-04 DIAGNOSIS — N76.0 BV (BACTERIAL VAGINOSIS): ICD-10-CM

## 2024-11-04 DIAGNOSIS — N39.0 URINARY TRACT INFECTION WITHOUT HEMATURIA, SITE UNSPECIFIED: ICD-10-CM

## 2024-11-04 LAB
ALBUMIN UR-MCNC: NEGATIVE MG/DL
APPEARANCE UR: CLEAR
BACTERIA #/AREA URNS HPF: ABNORMAL /HPF
BILIRUB UR QL STRIP: NEGATIVE
CLUE CELLS: PRESENT
COLOR UR AUTO: YELLOW
GLUCOSE UR STRIP-MCNC: NEGATIVE MG/DL
HGB UR QL STRIP: ABNORMAL
KETONES UR STRIP-MCNC: NEGATIVE MG/DL
LEUKOCYTE ESTERASE UR QL STRIP: ABNORMAL
NITRATE UR QL: POSITIVE
PH UR STRIP: 6.5 [PH] (ref 5–7)
RBC #/AREA URNS AUTO: ABNORMAL /HPF
SP GR UR STRIP: 1.01 (ref 1–1.03)
SQUAMOUS #/AREA URNS AUTO: ABNORMAL /LPF
TRICHOMONAS, WET PREP: ABNORMAL
UROBILINOGEN UR STRIP-ACNC: 0.2 E.U./DL
WBC #/AREA URNS AUTO: ABNORMAL /HPF
WBC CLUMPS #/AREA URNS HPF: PRESENT /HPF
WBC'S/HIGH POWER FIELD, WET PREP: ABNORMAL
YEAST, WET PREP: ABNORMAL

## 2024-11-04 PROCEDURE — 81001 URINALYSIS AUTO W/SCOPE: CPT

## 2024-11-04 PROCEDURE — 87186 SC STD MICRODIL/AGAR DIL: CPT

## 2024-11-04 PROCEDURE — 87088 URINE BACTERIA CULTURE: CPT

## 2024-11-04 PROCEDURE — 87086 URINE CULTURE/COLONY COUNT: CPT

## 2024-11-04 PROCEDURE — 99213 OFFICE O/P EST LOW 20 MIN: CPT | Mod: 95 | Performed by: PHYSICIAN ASSISTANT

## 2024-11-04 PROCEDURE — 87210 SMEAR WET MOUNT SALINE/INK: CPT

## 2024-11-04 RX ORDER — METRONIDAZOLE 500 MG/1
500 TABLET ORAL 2 TIMES DAILY
Qty: 14 TABLET | Refills: 0 | Status: SHIPPED | OUTPATIENT
Start: 2024-11-04 | End: 2024-11-11

## 2024-11-04 RX ORDER — CEPHALEXIN 500 MG/1
500 CAPSULE ORAL 2 TIMES DAILY
Qty: 10 CAPSULE | Refills: 0 | Status: SHIPPED | OUTPATIENT
Start: 2024-11-04 | End: 2024-11-04

## 2024-11-04 RX ORDER — CEPHALEXIN 500 MG/1
500 CAPSULE ORAL 2 TIMES DAILY
Qty: 14 CAPSULE | Refills: 0 | Status: SHIPPED | OUTPATIENT
Start: 2024-11-04

## 2024-11-04 NOTE — PROGRESS NOTES
Enid is a 64 year old who is being evaluated via a billable video visit.    How would you like to obtain your AVS? MyChart  If the video visit is dropped, the invitation should be resent by: Send to e-mail at: [email or web address]  Will anyone else be joining your video visit? No      Assessment & Plan     Dysuria  Patient had completed a virtual visit with an external health care system about 1.5 weeks ago. She was started on macrobid for suspected UTI. She recalls that the symptoms did improve, temporarily, while taking the medication. However, since completing the course the symptoms have returned. We discussed options for repeating empiric treatment with different antibiotic vs testing. Patient would like to complete UA and wet prep to ensure that she is being treated correctly. I have put in orders for these tests and she will connect with the lab to have them done this afternoon. I will send out treatment according to results.   - UA Macroscopic with reflex to Microscopic and Culture - Lab Collect; Future  - Wet prep - lab collect; Future    Subjective   Enid is a 64 year old, presenting for the following health issues:  UTI Symptoms        11/4/2024    12:29 PM   Additional Questions   Roomed by Willy     History of Present Illness       Reason for visit:  UTI Symptoms  Symptom onset:  1-2 weeks ago  Symptoms include:  Did virtual 9 days. She had macrobid for 5 days. Improved at first but is now worsening.  Symptom intensity:  Mild  Symptom progression:  Worsening  Had these symptoms before:  Yes  Has tried/received treatment for these symptoms:  Yes  Previous treatment was successful:  Yes  Prior treatment description:  Prescribed medication          Review of Systems  Constitutional, HEENT, cardiovascular, pulmonary, gi and gu systems are negative, except as otherwise noted.      Objective    Vitals - Patient Reported  Weight (Patient Reported): 75.8 kg (167 lb)  Pain Score: No Pain (0)        Physical  Exam   GENERAL: alert and no distress  EYES: Eyes grossly normal to inspection.  No discharge or erythema, or obvious scleral/conjunctival abnormalities.  RESP: No audible wheeze, cough, or visible cyanosis.    SKIN: Visible skin clear. No significant rash, abnormal pigmentation or lesions.  NEURO: Cranial nerves grossly intact.  Mentation and speech appropriate for age.  PSYCH: Appropriate affect, tone, and pace of words          Video-Visit Details    Type of service:  Video Visit   Joined the call at 11/4/2024, 1:22:13 pm.  Left the call at 11/4/2024, 1:31:05 pm.  You were on the call for 8 minutes 51 seconds .  Originating Location (pt. Location): Home    Distant Location (provider location):  Off-site  Platform used for Video Visit: Alanis  Signed Electronically by: Raul Hitchcock PA-C

## 2024-11-05 LAB — BACTERIA UR CULT: ABNORMAL

## 2024-12-26 NOTE — PROGRESS NOTES
CANCER SURVIVORSHIP VISIT  Follow up visit  Jan 2, 2025    Care Team   Primary Care Provider Shi Alonso   Surgeon  Rakesh Sanchez MD   Radiation Oncologist Not listed   Medical Oncologist  Kaden Holt MD, Charity Brar MD       REASON FOR VISIT: survivorship visit for history of breast cancer    CANCER HISTORY AND TREATMENT:    History of right-sided stage IA breast cancer, ER/SD postive, HER2 negative, diagnosed in 2004 and again in 2018.  *Diagnosis: 12/2004. Abnormal screening mammogram age 44  *Surgery: 01/07/2005 right-sided lumpectomy with sentinel lymph node dissection. Infiltrating ductal carcinoma, grade 1, 0.6cm, 0/3 nodes, stage IA (TIb N0 M0).   *Radiation: right breast radiotherapy  *Endocrine therapy: Tamoxifen 3/2005-11/2008 (stopped after dx of cataracts)  *Local recurrence: calcifications on screening mammogram   *Surgery #2: 7/2018, s/p bilateral mastectomy with reconstruction, final pathology revealed 8 mm invasive cancer, total 4 cm DCIS; stage I (T1bN0),  ER+ SD+ HER2 negative. Oncotype 0.   *Endocrine therapy: anastrozole 10/1229-7218  *Genetic testing: Negative in 2008 and 2018. Spoke with GC in 2023, no further testing ordered.   *Other information: KISHOR flap reconstruction with Dr Amin. Osteopenia,  poorly tolerated fosamax and zoledronic acid.     INTERVAL HISTORY:     Enid returns after 1 year for a cancer survivorship follow up visit.     Hx of cystocele and rectocele and had surgery last April with improvement of her symptoms. She has had a few UTI's since her surgery which concerns her.     No new lumps/bumps or new pain.     Late effects: none    Mood: good    Sexual health: no concerns    Past Medical History:   Diagnosis Date    Breast cancer (H) 2004    lumpectomy, radiation, tamoxifen    Cancer (H) 2004    Breast    Cataracts, bilateral     Complication of anesthesia     She prefers not to have versed until right before she leaves or can have after     Diabetes (H)      Gestational    Enthesopathy of hip region 2006    right hip    Esophageal reflux 2006    History of gestational diabetes     Hypertension     Macular drusen     PONV (postoperative nausea and vomiting)     Shingles 2012    left T6-T7 dermatome       Current Outpatient Medications   Medication Sig Dispense Refill    atorvastatin (LIPITOR) 10 MG tablet Take 1 tablet (10 mg) by mouth daily 90 tablet 3    Calcium-Vitamin D-Vitamin K (VIACTIV CALCIUM PLUS D PO) Take 1 tablet by mouth 2 times daily      cephALEXin (KEFLEX) 500 MG capsule Take 1 capsule (500 mg) by mouth 2 times daily. 14 capsule 0    clobetasol (TEMOVATE) 0.05 % external ointment Apply topically twice a week APPLY TO VULVA TWICE WEEKLY AT NIGHT. 60 g 3    estradiol (ESTRACE) 0.1 MG/GM vaginal cream Place 1 g vaginally three times a week 42.5 g 3    fluticasone (FLONASE) 50 MCG/ACT nasal spray INSTILL 1-2 SPRAYS INTO EACH NOSTRIL EVERY DAY 48 g 1    gabapentin (NEURONTIN) 100 MG capsule Take 1 capsule (100 mg) by mouth 3 times daily 270 capsule 3    loratadine (CLARITIN) 10 MG tablet TAKE 1 TABLET BY MOUTH EVERY DAY 90 tablet 2    melatonin 5 MG tablet Take 5 mg by mouth At Bedtime       Multiple Vitamins-Minerals (PRESERVISION AREDS) CAPS Take 1 capsule by mouth 2 times daily (Patient taking differently: Take 1 capsule by mouth every morning) 180 capsule 3    Omega-3 Fatty Acids (FISH OIL) 1000 MG CPDR Take by mouth every morning       SENNA-docusate sodium (SENNA S) 8.6-50 MG tablet Take 1 tablet by mouth at bedtime (Patient not taking: Reported on 7/15/2024) 10 tablet 0          Allergies   Allergen Reactions    Brimonidine Tartrate Rash     Thrush and numbness in mouth       Family History   Adopted: Yes   Problem Relation Age of Onset    Cancer Mother         lung cancer  at age 52    Thyroid Disease Sister         half sister, had thyroid removed    Unknown/Adopted Father     Unknown/Adopted Maternal Grandmother      Unknown/Adopted Maternal Grandfather     Unknown/Adopted Paternal Grandmother     Unknown/Adopted Paternal Grandfather     Glaucoma No family hx of     Diabetes No family hx of     Melanoma No family hx of     Skin Cancer No family hx of     Enid is adopted and has limited information on her biological family.   One maternal half-sister has thyroid issues.  Mother  at 52 from lung cancer.  Maternal grandfather  from colon cancer.  Paternal family history is unknown.   Enid has three healthy adult children.     Social history:  Living situation: , 3 children  Occupation: Teacher, retired  Tobacco use: never smoker  ETOH use: rarely  Frequency of exercise:  Gets 150 min of exercise a week  Diet: good    Physical exam  /75 (BP Location: Right arm, Patient Position: Sitting, Cuff Size: Adult Regular)   Pulse 76   Temp 97.8  F (36.6  C) (Oral)   Resp 16   Wt 81.8 kg (180 lb 4.8 oz)   LMP 10/14/2001 (Exact Date)   SpO2 97%   BMI 30.48 kg/m    Wt Readings from Last 4 Encounters:   25 81.8 kg (180 lb 4.8 oz)   07/15/24 82.6 kg (182 lb)   24 78 kg (172 lb)   24 82.1 kg (181 lb)    General: No acute distress.    HEENT: Sclera anicteric.   Lymph: No lymphadenopathy in neck, supraclavicular, and axillary areas  Heart: RRR  Lungs: Clear to ascultation bilaterally  Chest: S/p bilateral mastectomy and autologous reconstruction. No palpable masses or concerning skin findings.   Extremities: no lower extremity edema  Skin: No rash on exposed skin  Neuro: Cranial nerves grossly intact       IMPRESSION/PLAN:    History of right-sided stage IA breast cancer, ER/AL postive, HER2 negative, diagnosed in 2004 and again in 2018.  S/p lumpectomy, radiotherapy and 3 years of Tamoxifen followed by bilateral mastectomy, KISHOR flap reconstruction and 2 years of AI therapy. Oncotype 0.   She is 6 years from her latest diagnosis  No further routine labs or imaging indicated  1 year follow-up per her  preference    Potential late effects related to surgery/radiation:  -Risk of lymphedema: If she notices any swelling in her arm, she should be offered a referral to lymphedema physical therapy.   - Possible radiation side effects include cardiotoxicity, lung injury, fracture, and second malignancies. She should seek medical attention if she notices any new skin lesions in the area of radiation. If she should develop any shortness of breath, hemoptysis, cough, or new pain, I would advise further evaluation with CT or X-ray.     Potential effects related to endocrine therapy:  Risk of developing osteoporosis: Last DEXA in 2021 with a lowest T-score of -1.6 with improvement, Recommend weightbearing exercises 2-3 days per week, and to supplement with 1200 mg of calcium, 1000 international units of vitamin D daily. Further bone density monitoring via her PCP.     General late effects screenings and recommendations    -Cancer screening. She should undergo routine screening for women in her age group. Colonoscopy due 2026.  S/p hysterctomy    -Healthy lifestyle. She should maintain a healthy weight with a BMI between 20-25. She should exercise at least 150 minutes weekly at moderate intensity. She should see the eye doctor every 1-2 years, and dentist every 6 months for cleanings. She should not use any tobacco. She should minimize alcohol intake. If continuing to drink, should follow CDC recommendations for no more than 1 alcoholic drink/day for women.    Gave resources for our [facility] Cancer Survivorship class series and mailings list for educational opportunities. [email or web address]    Cancer treatment summary was sent electronically to the patient and her PCP.      The total time of this encounter amounted to 30 minutes.This time included time spent with the patient, reviewing her chart, and performing post visit documentation.    Ragini Muniz, PhD, MPAS, PA-[facility]  [facility]

## 2025-01-02 ENCOUNTER — ONCOLOGY SURVIVORSHIP VISIT (OUTPATIENT)
Dept: ONCOLOGY | Facility: CLINIC | Age: 65
End: 2025-01-02
Payer: COMMERCIAL

## 2025-01-02 VITALS
TEMPERATURE: 97.8 F | DIASTOLIC BLOOD PRESSURE: 75 MMHG | WEIGHT: 180.3 LBS | RESPIRATION RATE: 16 BRPM | OXYGEN SATURATION: 97 % | HEART RATE: 76 BPM | SYSTOLIC BLOOD PRESSURE: 112 MMHG | BODY MASS INDEX: 30.48 KG/M2

## 2025-01-02 DIAGNOSIS — Z85.3 HISTORY OF RIGHT BREAST CANCER: Primary | ICD-10-CM

## 2025-01-02 DIAGNOSIS — C50.919 RECURRENT MALIGNANT NEOPLASM OF BREAST, UNSPECIFIED LATERALITY (H): ICD-10-CM

## 2025-01-02 ASSESSMENT — PAIN SCALES - GENERAL: PAINLEVEL_OUTOF10: NO PAIN (0)

## 2025-01-02 NOTE — PATIENT INSTRUCTIONS
Recommend weightbearing exercises 2-3 days per week, and to supplement with 1200 mg of calcium (ideally in diet if possible), 800-1000 international units of vitamin D daily. Further bone density monitoring via your PCP.

## 2025-01-02 NOTE — LETTER
1/2/2025      Enid Lombardo  [address] [facility] [address]      Dear Colleague,    Thank you for referring your patient, Enid Lombardo, to the [facility]. Please see a copy of my visit note below.      CANCER SURVIVORSHIP VISIT  Follow up visit  Jan 2, 2025    Care Team   Primary Care Provider Shi Alonso   Surgeon  Rakesh Sanchez MD   Radiation Oncologist Not listed   Medical Oncologist  Kaden Holt MD, Charity Brar MD       REASON FOR VISIT: survivorship visit for history of breast cancer    CANCER HISTORY AND TREATMENT:    History of right-sided stage IA breast cancer, ER/TN postive, HER2 negative, diagnosed in 2004 and again in 2018.  *Diagnosis: 12/2004. Abnormal screening mammogram age 44  *Surgery: 01/07/2005 right-sided lumpectomy with sentinel lymph node dissection. Infiltrating ductal carcinoma, grade 1, 0.6cm, 0/3 nodes, stage IA (TIb N0 M0).   *Radiation: right breast radiotherapy  *Endocrine therapy: Tamoxifen 3/2005-11/2008 (stopped after dx of cataracts)  *Local recurrence: calcifications on screening mammogram   *Surgery #2: 7/2018, s/p bilateral mastectomy with reconstruction, final pathology revealed 8 mm invasive cancer, total 4 cm DCIS; stage I (T1bN0),  ER+ TN+ HER2 negative. Oncotype 0.   *Endocrine therapy: anastrozole 10/4669-9565  *Genetic testing: Negative in 2008 and 2018. Spoke with GC in 2023, no further testing ordered.   *Other information: KISHOR flap reconstruction with Dr Amin. Osteopenia,  poorly tolerated fosamax and zoledronic acid.     INTERVAL HISTORY:     Enid returns after 1 year for a cancer survivorship follow up visit.     Hx of cystocele and rectocele and had surgery last April with improvement of her symptoms. She has had a few UTI's since her surgery which concerns her.     No new lumps/bumps or new pain.     Late effects: none    Mood: good    Sexual health: no concerns    Past Medical History:   Diagnosis Date      Breast cancer (H) 2004    lumpectomy, radiation, tamoxifen     Cancer (H) 2004    Breast     Cataracts, bilateral      Complication of anesthesia     She prefers not to have versed until right before she leaves or can have after      Diabetes (H)     Gestational     Enthesopathy of hip region 06/2006    right hip     Esophageal reflux 02/2006     History of gestational diabetes      Hypertension 2012     Macular drusen      PONV (postoperative nausea and vomiting)      Shingles 01/23/2012    left T6-T7 dermatome       Current Outpatient Medications   Medication Sig Dispense Refill     atorvastatin (LIPITOR) 10 MG tablet Take 1 tablet (10 mg) by mouth daily 90 tablet 3     Calcium-Vitamin D-Vitamin K (VIACTIV CALCIUM PLUS D PO) Take 1 tablet by mouth 2 times daily       cephALEXin (KEFLEX) 500 MG capsule Take 1 capsule (500 mg) by mouth 2 times daily. 14 capsule 0     clobetasol (TEMOVATE) 0.05 % external ointment Apply topically twice a week APPLY TO VULVA TWICE WEEKLY AT NIGHT. 60 g 3     estradiol (ESTRACE) 0.1 MG/GM vaginal cream Place 1 g vaginally three times a week 42.5 g 3     fluticasone (FLONASE) 50 MCG/ACT nasal spray INSTILL 1-2 SPRAYS INTO EACH NOSTRIL EVERY DAY 48 g 1     gabapentin (NEURONTIN) 100 MG capsule Take 1 capsule (100 mg) by mouth 3 times daily 270 capsule 3     loratadine (CLARITIN) 10 MG tablet TAKE 1 TABLET BY MOUTH EVERY DAY 90 tablet 2     melatonin 5 MG tablet Take 5 mg by mouth At Bedtime        Multiple Vitamins-Minerals (PRESERVISION AREDS) CAPS Take 1 capsule by mouth 2 times daily (Patient taking differently: Take 1 capsule by mouth every morning) 180 capsule 3     Omega-3 Fatty Acids (FISH OIL) 1000 MG CPDR Take by mouth every morning        SENNA-docusate sodium (SENNA S) 8.6-50 MG tablet Take 1 tablet by mouth at bedtime (Patient not taking: Reported on 7/15/2024) 10 tablet 0          Allergies   Allergen Reactions     Brimonidine Tartrate Rash     Thrush and numbness in  mouth       Family History   Adopted: Yes   Problem Relation Age of Onset     Cancer Mother         lung cancer  at age 52     Thyroid Disease Sister         half sister, had thyroid removed     Unknown/Adopted Father      Unknown/Adopted Maternal Grandmother      Unknown/Adopted Maternal Grandfather      Unknown/Adopted Paternal Grandmother      Unknown/Adopted Paternal Grandfather      Glaucoma No family hx of      Diabetes No family hx of      Melanoma No family hx of      Skin Cancer No family hx of     Enid is adopted and has limited information on her biological family.   One maternal half-sister has thyroid issues.  Mother  at 52 from lung cancer.  Maternal grandfather  from colon cancer.  Paternal family history is unknown.   Enid has three healthy adult children.     Social history:  Living situation: , 3 children  Occupation: Teacher, retired  Tobacco use: never smoker  ETOH use: rarely  Frequency of exercise:  Gets 150 min of exercise a week  Diet: good    Physical exam  /75 (BP Location: Right arm, Patient Position: Sitting, Cuff Size: Adult Regular)   Pulse 76   Temp 97.8  F (36.6  C) (Oral)   Resp 16   Wt 81.8 kg (180 lb 4.8 oz)   LMP 10/14/2001 (Exact Date)   SpO2 97%   BMI 30.48 kg/m    Wt Readings from Last 4 Encounters:   25 81.8 kg (180 lb 4.8 oz)   07/15/24 82.6 kg (182 lb)   24 78 kg (172 lb)   24 82.1 kg (181 lb)    General: No acute distress.    HEENT: Sclera anicteric.   Lymph: No lymphadenopathy in neck, supraclavicular, and axillary areas  Heart: RRR  Lungs: Clear to ascultation bilaterally  Chest: S/p bilateral mastectomy and autologous reconstruction. No palpable masses or concerning skin findings.   Extremities: no lower extremity edema  Skin: No rash on exposed skin  Neuro: Cranial nerves grossly intact       IMPRESSION/PLAN:    History of right-sided stage IA breast cancer, ER/WA postive, HER2 negative, diagnosed in  and again in  2018.  S/p lumpectomy, radiotherapy and 3 years of Tamoxifen followed by bilateral mastectomy, KISHOR flap reconstruction and 2 years of AI therapy. Oncotype 0.   She is 6 years from her latest diagnosis  No further routine labs or imaging indicated  1 year follow-up per her preference    Potential late effects related to surgery/radiation:  -Risk of lymphedema: If she notices any swelling in her arm, she should be offered a referral to lymphedema physical therapy.   - Possible radiation side effects include cardiotoxicity, lung injury, fracture, and second malignancies. She should seek medical attention if she notices any new skin lesions in the area of radiation. If she should develop any shortness of breath, hemoptysis, cough, or new pain, I would advise further evaluation with CT or X-ray.     Potential effects related to endocrine therapy:  Risk of developing osteoporosis: Last DEXA in 2021 with a lowest T-score of -1.6 with improvement, Recommend weightbearing exercises 2-3 days per week, and to supplement with 1200 mg of calcium, 1000 international units of vitamin D daily. Further bone density monitoring via her PCP.     General late effects screenings and recommendations    -Cancer screening. She should undergo routine screening for women in her age group. Colonoscopy due 2026.  S/p hysterctomy    -Healthy lifestyle. She should maintain a healthy weight with a BMI between 20-25. She should exercise at least 150 minutes weekly at moderate intensity. She should see the eye doctor every 1-2 years, and dentist every 6 months for cleanings. She should not use any tobacco. She should minimize alcohol intake. If continuing to drink, should follow CDC recommendations for no more than 1 alcoholic drink/day for women.    Gave resources for our [facility] Cancer Survivorship class series and mailings list for educational opportunities. [email or web address]    Cancer treatment  summary was sent electronically to the patient and her PCP.      The total time of this encounter amounted to 30 minutes.This time included time spent with the patient, reviewing her chart, and performing post visit documentation.    Ragini Muniz, PhD, MPAS, PA-C  [facility]      Again, thank you for allowing me to participate in the care of your patient.        Sincerely,        Ragini Muniz PA-C    Electronically signed

## 2025-01-02 NOTE — NURSING NOTE
"Oncology Rooming Note    January 2, 2025 7:38 AM   Enid Lombardo is a 64 year old female who presents for:    Chief Complaint   Patient presents with    Oncology Clinic Visit     History of right breast cancer     Initial Vitals: /75 (BP Location: Right arm, Patient Position: Sitting, Cuff Size: Adult Regular)   Pulse 76   Temp 97.8  F (36.6  C) (Oral)   Resp 16   Wt 81.8 kg (180 lb 4.8 oz)   LMP 10/14/2001 (Exact Date)   SpO2 97%   BMI 30.48 kg/m   Estimated body mass index is 30.48 kg/m  as calculated from the following:    Height as of 7/15/24: 1.638 m (5' 4.49\").    Weight as of this encounter: 81.8 kg (180 lb 4.8 oz). Body surface area is 1.93 meters squared.  No Pain (0) Comment: Data Unavailable   Patient's last menstrual period was 10/14/2001 (exact date).  Allergies reviewed: Yes  Medications reviewed: Yes    Medications: Medication refills not needed today.  Pharmacy name entered into [employer]:    [id number] [place] [facility] - [place], [place] - [address] [place] [address] [id number] - [place], [place] - [address] [facility] - [address] - ONE [facility] AT PORTAL TO REGISTERED [facility] SITES    Frailty Screening:   Is the patient here for a new oncology consult visit in cancer care? 2. No      Clinical concerns: Pt would like to know if they should continue to get mammograms.       Nguyen Salcedo              "

## 2025-01-23 ENCOUNTER — E-VISIT (OUTPATIENT)
Dept: URGENT CARE | Facility: CLINIC | Age: 65
End: 2025-01-23
Payer: COMMERCIAL

## 2025-01-23 DIAGNOSIS — N94.9 VAGINAL DISCOMFORT: Primary | ICD-10-CM

## 2025-01-23 NOTE — PATIENT INSTRUCTIONS
Thank you for choosing us for your care. I think an in-clinic or virtual visit would be the best next step based on your symptoms. Please schedule a clinic appointment; you won t be charged for this eVisit.      You can schedule an appointment by clicking here in [employer], or call [phone].    I recommend going in to See GYN

## 2025-01-25 ENCOUNTER — NURSE TRIAGE (OUTPATIENT)
Dept: NURSING | Facility: CLINIC | Age: 65
End: 2025-01-25
Payer: COMMERCIAL

## 2025-01-25 ENCOUNTER — HOSPITAL ENCOUNTER (EMERGENCY)
Facility: CLINIC | Age: 65
Discharge: HOME | End: 2025-01-25
Attending: EMERGENCY MEDICINE
Payer: COMMERCIAL

## 2025-01-25 VITALS
HEIGHT: 65 IN | TEMPERATURE: 98 F | HEART RATE: 72 BPM | WEIGHT: 189.6 LBS | OXYGEN SATURATION: 98 % | DIASTOLIC BLOOD PRESSURE: 90 MMHG | SYSTOLIC BLOOD PRESSURE: 141 MMHG | BODY MASS INDEX: 31.59 KG/M2 | RESPIRATION RATE: 18 BRPM

## 2025-01-25 DIAGNOSIS — L29.9 ITCHING: ICD-10-CM

## 2025-01-25 DIAGNOSIS — N90.4 LICHEN SCLEROSUS OF FEMALE GENITALIA: ICD-10-CM

## 2025-01-25 LAB — HOLD SPECIMEN: NORMAL

## 2025-01-25 PROCEDURE — 99283 EMERGENCY DEPT VISIT LOW MDM: CPT | Performed by: EMERGENCY MEDICINE

## 2025-01-25 PROCEDURE — 250N000013 HC RX MED GY IP 250 OP 250 PS 637: Performed by: EMERGENCY MEDICINE

## 2025-01-25 RX ORDER — HYDROXYZINE HYDROCHLORIDE 25 MG/1
25 TABLET, FILM COATED ORAL EVERY 6 HOURS PRN
Qty: 20 TABLET | Refills: 0 | Status: SHIPPED | OUTPATIENT
Start: 2025-01-25

## 2025-01-25 RX ORDER — HYDROXYZINE HYDROCHLORIDE 25 MG/1
25 TABLET, FILM COATED ORAL ONCE
Status: COMPLETED | OUTPATIENT
Start: 2025-01-25 | End: 2025-01-25

## 2025-01-25 RX ORDER — TRAZODONE HYDROCHLORIDE 50 MG/1
50 TABLET, FILM COATED ORAL ONCE
Status: COMPLETED | OUTPATIENT
Start: 2025-01-25 | End: 2025-01-25

## 2025-01-25 RX ADMIN — TRAZODONE HYDROCHLORIDE 50 MG: 50 TABLET ORAL at 08:02

## 2025-01-25 RX ADMIN — HYDROXYZINE HYDROCHLORIDE 25 MG: 25 TABLET ORAL at 08:02

## 2025-01-25 ASSESSMENT — ACTIVITIES OF DAILY LIVING (ADL)
ADLS_ACUITY_SCORE: 42
ADLS_ACUITY_SCORE: 42

## 2025-01-25 NOTE — ED PROVIDER NOTES
History     Chief Complaint   Patient presents with     Vaginal Problem     Medication Reaction     HPI  Enid Lombardo is a 64 year old female who ***    Allergies:  Allergies   Allergen Reactions     Brimonidine Tartrate Rash     Thrush and numbness in mouth       Problem List:    Patient Active Problem List    Diagnosis Date Noted     Midline cystocele 01/22/2024     Priority: Medium     Stress incontinence 01/22/2024     Priority: Medium     Cystocele, midline 11/29/2023     Priority: Medium     Vaginal vault prolapse 11/29/2023     Priority: Medium     Stress incontinence of urine 11/29/2023     Priority: Medium     Rectocele 10/12/2023     Priority: Medium     Recurrent malignant neoplasm of breast, unspecified laterality (H) 05/17/2021     Priority: Medium     Osteopenia of multiple sites 04/05/2019     Priority: Medium     Long term (current) use of aromatase inhibitors 04/05/2019     Priority: Medium     S/P flap graft 10/08/2018     Priority: Medium     S/P breast reconstruction, bilateral 08/28/2018     Priority: Medium     Breast cancer in situ 08/22/2018     Priority: Medium     Recurrent carcinoma in situ of breast, right 07/27/2018     Priority: Medium     Osteopenia of both hands 04/10/2018     Priority: Medium     Skin abscess 05/03/2016     Priority: Medium     Benign neoplasm of colon 03/18/2015     Priority: Medium     Keratoconus 10/16/2014     Priority: Medium     Meibomian gland dysfunction - Both Eyes 10/16/2014     Priority: Medium     Tear film insufficiency 10/16/2014     Priority: Medium     Problem list name updated by automated process. Provider to review       Cervicalgia 06/11/2014     Priority: Medium     Headache 06/11/2014     Priority: Medium     Problem list name updated by automated process. Provider to review       Hyperlipidemia with target LDL less than 130 05/19/2014     Priority: Medium     Diagnosis updated by automated process. Provider to review and confirm.        Macular drusen      Priority: Medium     Low back pain 07/22/2013     Priority: Medium     Diagnosis updated by automated process. Provider to review and confirm.       IT band syndrome 07/22/2013     Priority: Medium     Right hip pain 04/08/2013     Priority: Medium     Pain, radicular, lumbar 03/21/2013     Priority: Medium     H/O herpes zoster 01/27/2012     Priority: Medium     Cataract 12/11/2008     Priority: Medium     Utility update for deleted IMO code  Imo Update utility       Disorder of synovium, tendon, and bursa 12/11/2007     Priority: Medium     Problem list name updated by automated process. Provider to review       Esophageal reflux 08/08/2006     Priority: Medium     History of right breast cancer 12/22/2004     Priority: Medium     s/p lumpectomy 1/05 and radiation  Problem list name updated by automated process. Provider to review          Past Medical History:    Past Medical History:   Diagnosis Date     Breast cancer (H) 2004     Cancer (H) 2004     Cataracts, bilateral      Complication of anesthesia      Diabetes (H)      Enthesopathy of hip region 06/2006     Esophageal reflux 02/2006     History of gestational diabetes      Hypertension 2012     Macular drusen      PONV (postoperative nausea and vomiting)      Shingles 01/23/2012       Past Surgical History:    Past Surgical History:   Procedure Laterality Date     ABDOMEN SURGERY  2019    Breast reconstruction     BIOPSY  2004    Breast     BREAST SURGERY  2004     COLONOSCOPY       COLONOSCOPY N/A 01/11/2021    Procedure: COLONOSCOPY;  Surgeon: Iain Mercedes MD;  [facility]     [facility] MID[facility]THRAL [facility], CYSTOSCOPY N/A 4/23/2024    Procedure: SACROCOLPOPEXY, ROBOT-ASSISTED, LAPAROSCOPIC, WITH INSERTION OF MIDURETHRAL SLING AND CYSTOSCOPY (support the bladder, rectum, and urethra with mesh and look in the  bladder);  Surgeon: Jarrod Alfonso MD;  Location: [facility] [place]     ENDOSCOPY  05/09/2007    Upper GI      EYE SURGERY       GRAFT FAT TO BREAST Bilateral 11/29/2018    Procedure: Bilateral Breast Fat Grafting from Abdomen and Thighs;  Surgeon: DENNISE Amin MD;  Location: [facility] OR     GRAFT FREE VASCULARIZED TRANSVERSE RECTUS ABDOMINIS MYOCUTANEOUS Bilateral 10/08/2018    Procedure: GRAFT FREE VASCULARIZED TRANSVERSE RECTUS ABDOMINIS MYOCUTANEOUS;  Bilateral Deep Inferior Epigastric Artery  Free Flap Breast Reconstruction and Removal of Breast Explanders;  Surgeon: DENNISE Amin MD;  Location:  OR     GYN SURGERY  2001     HC BIOPSY/EXCISION LYMPH NODE OPEN DEEP CERVICAL W EXC FAT PAD  01/07/2005    Right axillary sentinel node biopsy X 3.     HC EXCISION BREAST LESION, OPEN >=1  01/07/2005    Right breast.     HC REMV CATARACT EXTRACAP,INSERT LENS, W/O ECP  12/18/2008    bilateral     HC REMV TARSAL/METATARSAL BENIGN BONE LESN  1982    Bone spur - right     HERNIA REPAIR  2019     HYSTERECTOMY, PAP NO LONGER INDICATED       LAPAROSCOPIC HERNIORRHAPHY VENTRAL N/A 10/21/2019    Procedure: Laparoscopic Ventral Hernia Repair With Mesh;  Surgeon: Emil Brown MD;  Location:  OR     MASTECTOMY SIMPLE BILATERAL, SENTINEL NODE BILATERAL, COMBINED Bilateral 08/22/2018    Procedure: COMBINED MASTECTOMY SIMPLE BILATERAL, SENTINEL NODE BILATERAL;  Bilateral Mastectomy with Right Kress Node Biopsy, Bilateral Breast Reconstruction, Anesthesia Block;  Surgeon: Rakesh Sanchez MD;  Location:  OR     ORTHOPEDIC SURGERY  1983    Right foot     RECONSTRUCT BREAST Bilateral 08/22/2018    Procedure: RECONSTRUCT BREAST;;  Surgeon: DENNISE Amin MD;  Location:  OR     RECONSTRUCT BREAST BILATERAL Bilateral 10/08/2018    Procedure: RECONSTRUCT BREAST BILATERAL;;  Surgeon: DENNISE Amin MD;  Location:  OR     RECONSTRUCT NIPPLE BILATERAL Bilateral 11/29/2018    Procedure: Nipple Reconstruction;  Surgeon: DENNISE Amin MD;  Location:  OR     [facility] VAGINAL HYSTERECTOMY   "2001    Ovaries intact, due to fibroids     ZZHC COLONOSCOPY W BIOPSY  05/10/2010       Family History:    Family History   Adopted: Yes   Problem Relation Age of Onset     Cancer Mother         lung cancer  at age 52     Thyroid Disease Sister         half sister, had thyroid removed     Unknown/Adopted Father      Unknown/Adopted Maternal Grandmother      Unknown/Adopted Maternal Grandfather      Unknown/Adopted Paternal Grandmother      Unknown/Adopted Paternal Grandfather      Glaucoma No family hx of      Diabetes No family hx of      Melanoma No family hx of      Skin Cancer No family hx of        Social History:  Marital Status:   [2]  Social History     Tobacco Use     Smoking status: Never     Smokeless tobacco: Never   Vaping Use     Vaping status: Never Used   Substance Use Topics     Alcohol use: Yes     Comment: 4 drinks per month      Drug use: No        Medications:    atorvastatin (LIPITOR) 10 MG tablet  Calcium-Vitamin D-Vitamin K (VIACTIV CALCIUM PLUS D PO)  cephALEXin (KEFLEX) 500 MG capsule  clobetasol (TEMOVATE) 0.05 % external ointment  estradiol (ESTRACE) 0.1 MG/GM vaginal cream  fluticasone (FLONASE) 50 MCG/ACT nasal spray  gabapentin (NEURONTIN) 100 MG capsule  loratadine (CLARITIN) 10 MG tablet  melatonin 5 MG tablet  metroNIDAZOLE (FLAGYL) 500 MG tablet  Multiple Vitamins-Minerals (PRESERVISION AREDS) CAPS  Omega-3 Fatty Acids (FISH OIL) 1000 MG CPDR  SENNA-docusate sodium (SENNA S) 8.6-50 MG tablet  tacrolimus (PROTOPIC) 0.1 % external ointment          Review of Systems   All other systems reviewed and are negative.      Physical Exam   BP: (!) 147/101  Pulse: 76  Temp: 97.9  F (36.6  C)  Resp: 18  Height: 165.1 cm (5' 5\")  Weight: 86 kg (189 lb 9.6 oz)  SpO2: 100 %      Physical Exam  Vitals and nursing note reviewed.   Constitutional:       General: She is in acute distress (Uncomfortable).      Appearance: She is not toxic-appearing.   Pulmonary:      Effort: Pulmonary " effort is normal.   Neurological:      Mental Status: She is alert.       ED Course        Procedures              Critical Care time:  none              Results for orders placed or performed during the hospital encounter of 01/25/25 (from the past 24 hours)   Extra Tube    Narrative    The following orders were created for panel order Extra Tube.  Procedure                               Abnormality         Status                     ---------                               -----------         ------                     Extra Urine Collection[[id number]]                           In process                   Please view results for these tests on the individual orders.     *Note: Due to a large number of results and/or encounters for the requested time period, some results have not been displayed. A complete set of results can be found in Results Review.       Medications - No data to display    Assessments & Plan (with Medical Decision Making)     I have reviewed the nursing notes.    I have reviewed the findings, diagnosis, plan and need for follow up with the patient.           Medical Decision Making  The patient's presentation was of low complexity (a stable chronic illness).    The patient's evaluation involved:  history and exam without other MDM data elements    The patient's management necessitated moderate risk (prescription drug management including medications given in the ED).        Discharge Medication List as of 1/25/2025  8:09 AM        START taking these medications    Details   hydrOXYzine HCl (ATARAX) 25 MG tablet Take 1 tablet (25 mg) by mouth every 6 hours as needed for itching., Disp-20 tablet, R-0, E-Prescribe             Final diagnoses:   Lichen sclerosus of female genitalia   Itching       1/25/2025   [facility] EMERGENCY DEPT     display    Assessments & Plan (with Medical Decision Making)  Enid is a 64-year-old female with past medical history of lichen sclerosis of the vulva who is presenting for possible allergic reaction to recently started medication.  See history and physical exam as above  64-year-old female who appears to be in mild distress secondary to discomfort related to vulvar itching, is hypertensive with blood pressure of 147/101 likely due to this discomfort, but otherwise vitally stable and afebrile.  Physical exam revealed signs of folliculitis, patient does admit that she will often need to wear panty liners due to applying number of creams or ointments, and pubic hair was getting caught in the adhesive and was sticking to the pantiliner.  She had shaved the hair she try and alleviate this problem but feels that her skin is a bit more irritated.  Hair has started to grow back since she did this a few weeks ago, but is short and does appear to have scattered areas of folliculitis and irritation from shaving.  After thorough discussion with the patient, she is only using some of these topical ointments or creams for 1 to 2 days and then feeling that she does not get relief so she stopped using them and then is switching.  Additionally, I think that the pantiliner may be causing some additional irritation as well as the recently shaved pubic hair.  Discussed with her that she needs to give medication more than just a few days to work.  She has been given all of the medications that are appropriate for managing this condition.  She does have an appointment with OB/GYN, as her primary did refer her based on these recent findings and her diagnosis.  Patient had to reschedule that appointment as she was initially scheduled to see a male OB/GYN provider and she preferred a female provider.  This pushed the date out a bit further.  I explained that some of her irritation may be from shaving, from sweet products too quickly, and  from pantiliner.  She was instructed on doing sitz bath's with only warm water and no additives or other scented products.  Then  advised to pat the area dry very carefully, Do not rub or scrub.  She can then continue using the tacrolimus ointment.  She may also find it helpful to apply a barrier ointment or an emollient such as Vaseline or the Aquaphor that she was previously using.  We discussed no longer shaving any pubic hair, but if she does find that it is getting caught or is irritating or is not otherwise bothersome, it would be okay to trim the hair close to the skin, but do not recommend shaving it off completely as this is seeming to cause more irritation.  Also advised against the use of pantiliners as some of these products can be irritating to skin.  Also recommended she spend some time during the day without any underwear or pants to allow her skin to be open to air.  Advised to treated as if we would a child with a severe diaper rash, doing gentle cleansing and applying appropriate products, but then leaving open to air so additional irritation can be avoided.  She may want to lay down a towel and just rest in bed for that amount of time if she is able.  I also encouraged her to keep the appointment with OB/GYN.  ED return precautions were discussed.  She was provided with hydroxyzine to help with the itching.  She was also driven here by her , and request something to help her sleep as she has not been able to sleep all night due to the severe irritation.  Offered the patient a single dose of trazodone, which we could give her here in the emergency room and then her  could drive her home and hopefully she would be able to get some sleep afterwards.  She excepted.  Discharged in stable condition.     I have reviewed the nursing notes.    I have reviewed the findings, diagnosis, plan and need for follow up with the patient.           Medical Decision Making  The patient's presentation  was of low complexity (a stable chronic illness).    The patient's evaluation involved:  history and exam without other MDM data elements    The patient's management necessitated moderate risk (prescription drug management including medications given in the ED).        Discharge Medication List as of 1/25/2025  8:09 AM        START taking these medications    Details   hydrOXYzine HCl (ATARAX) 25 MG tablet Take 1 tablet (25 mg) by mouth every 6 hours as needed for itching., Disp-20 tablet, R-0, E-Prescribe             Final diagnoses:   Lichen sclerosus of female genitalia   Itching       1/25/2025   [facility] EMERGENCY DEPT       Rosemary Hahn DO  01/31/25 0810

## 2025-01-25 NOTE — ED TRIAGE NOTES
Pt reports waking up with burning and severe vaginal pain. Pt seen in clinic yesterday and prescribed flagyl and tacrolimus cream. Pt states she believes she is having a reaction to medication as symptoms have gotten worse since starting the meds.      Triage Assessment (Adult)       Row Name 01/25/25 0607          Triage Assessment    Airway WDL WDL        Respiratory WDL    Respiratory WDL WDL        Cardiac WDL    Cardiac WDL WDL        Peripheral/Neurovascular WDL    Peripheral Neurovascular WDL WDL        Cognitive/Neuro/Behavioral WDL    Cognitive/Neuro/Behavioral WDL WDL

## 2025-01-25 NOTE — DISCHARGE INSTRUCTIONS
It appears you have skin irritation, likely from numerous sources.  It could be from changing topical therapies frequently, could be from recent shaving and having some ingrown hairs, and could also be due to the lichen sclerosis causing worsening symptoms.  Hopefully the medication provided will help with your ongoing symptoms    You were given a dose of hydroxyzine here in the emergency room.  You can take this up to every 6 hours as needed for any itching.    Also given a tablet of trazodone to help you sleep.  This may have some recurrent drowsiness, especially when waking later    You can refer to the printed hygiene practices on the chart of how to best apply cleansing products, fransisca creams or other topicals, and drying    It may be beneficial to spend some time without any underwear, if you are going to be at home.  You could do a sitz bath, apply the topical tacrolimus and an emollient like the Aquaphor over the top, and spend some time without underwear to keep skin open to air and reduce irritation    May use the Michele bottle provided after urinating, make sure you wipe front to back if you are using any toilet paper, but the bottle can help rinse the area and keep it clean rather than wiping    Keep your appointment with OB/GYN as already scheduled    If you have any new or worsening symptoms do not hesitate to return to the emergency room for evaluation

## 2025-01-25 NOTE — TELEPHONE ENCOUNTER
Patient diagnosed with bacterial vaginosis on 1/24/25 and prescribed flagyl and tacrolimus. Patient states that her vagina now itches worse than it did prior to taking the flagyl. Patient also used prescribed ointment which was ineffective for relief. Patient is advised to try taking a cool bath to see if it provides any relief.  If itching does not stop patient is advised to go to  in the morning.  Patient verbalized understanding of care advice.  Roxana Sheppard RN on 1/25/2025 at 2:41 AM      Reason for Disposition   [1] Caller has NON-URGENT medicine question about med that PCP prescribed AND [2] triager unable to answer question    Protocols used: Medication Question Call-A-

## 2025-02-07 NOTE — PATIENT INSTRUCTIONS
Cal Rossi,    The plan going forward:    Apply Clobetasol to the vulva and perineum nightly for 3 months. Follow up with me if NO improvement in 6-8 weeks and stop Clobetasol for 7 days before. Follow up with me if you HAVE improvement in 3 months.  I send in insertable vaginal estrogen tablets for you. I also message your Oncology team to let them know we are switching to this and will let you know if they have any concerns or issues with it.    If you have labs or imaging done, the results will automatically release in [facility] without an interpretation.  Your health care professional will review those results and send an interpretation with recommendations as soon as possible, but this may be 1-3 business days.    If you have any questions regarding your visit, please contact your care team.     [facility]: [phone]  Women s Health CLINIC HOURS TELEPHONE NUMBER   SAPNA Lerner-ROSALIND Brasher-ROSALIND Nicholson-Surgery Scheduler  Christiane-       Monday- [place]  8:00 am-4:00 pm    [facility]    [facility]    Thursday- [place] 8:00 am-4:00 pm    Friday- [place]  8:00 am-4:00 pm [facility]  [address] [place] [address]  PH: [phone]  Fax: [phone]    Imaging Scheduling all locations  PH: [phone]     [facility]  [address] [place] [address]  [phone]    [facility]  [address] [place] [address]  PH: [phone]     **Surgeries** Our Surgery Schedulers will contact you to schedule. If you do not receive a call within 3 business days, please call [phone].  Urgent Care locations:  [facility]       Monday-Friday   10 am - 8 pm    Saturday and Sunday   9 am - 5 pm   [phone]   If you need a medication  refill, please contact your pharmacy. Please allow 3 business days for your refill to be completed.  As always, Thank you for trusting us with your healthcare needs!

## 2025-02-12 ENCOUNTER — OFFICE VISIT (OUTPATIENT)
Dept: OBGYN | Facility: CLINIC | Age: 65
End: 2025-02-12
Payer: COMMERCIAL

## 2025-02-12 ENCOUNTER — TELEPHONE (OUTPATIENT)
Dept: OBGYN | Facility: CLINIC | Age: 65
End: 2025-02-12

## 2025-02-12 VITALS — HEART RATE: 62 BPM | TEMPERATURE: 97.3 F | SYSTOLIC BLOOD PRESSURE: 126 MMHG | DIASTOLIC BLOOD PRESSURE: 78 MMHG

## 2025-02-12 DIAGNOSIS — Z87.42 HISTORY OF VAGINAL INFECTION: ICD-10-CM

## 2025-02-12 DIAGNOSIS — L90.0 LICHEN SCLEROSUS: Primary | ICD-10-CM

## 2025-02-12 DIAGNOSIS — N95.2 VAGINAL ATROPHY: ICD-10-CM

## 2025-02-12 PROCEDURE — 99214 OFFICE O/P EST MOD 30 MIN: CPT

## 2025-02-12 RX ORDER — CLOBETASOL PROPIONATE 0.5 MG/G
OINTMENT TOPICAL
Qty: 60 G | Refills: 0 | Status: SHIPPED | OUTPATIENT
Start: 2025-02-12

## 2025-02-12 RX ORDER — NITROFURANTOIN 25; 75 MG/1; MG/1
CAPSULE ORAL
COMMUNITY
Start: 2024-10-27

## 2025-02-12 RX ORDER — ESTRADIOL 10 UG/1
10 TABLET, FILM COATED VAGINAL
Qty: 24 TABLET | Refills: 3 | Status: SHIPPED | OUTPATIENT
Start: 2025-02-13

## 2025-02-12 NOTE — TELEPHONE ENCOUNTER
Hello-    Just updating you on the plan for this patient regarding her vaginal atrophy and lichen sclerosus. I see back in 2023 a [employer] message stating that you are OK with topical estrogen for the patient. I am wanting to switch her to Vagifem (vaginal tablet that is inserted into the vagina) and wanted to inform you and ensure this is alright with you.    Thanks!  Leelee Lara, DANIEL-BC

## 2025-02-12 NOTE — PROGRESS NOTES
Subjective:  Enid is a 65 year old   is here today with the following concerns:    Lichen sclerosus: patient was previously on Clobetasol but was initially started only on twice weekly application. She reports she had some improvement with this but would start to feel itching close to the time when her next application was due. She was switching to Tacrolimus for her itching a couple weeks ago and has not started this yet nor been applying clobetasol. She currently is having some vulvar itching.  She was also started on vaginal topical estrogen for vaginal atrophy. Hx of ER+ breast cancer and is Ok'd by oncology for this.   Vaginal infection:  she currently does not feel she has an infection but has been found to have BV two times in the past 3 months. She is wondering why she suddenly is struggling with this and what she can do.    ROS: Pertinent ROS as above.    Medical history  OB History    Para Term  AB Living   3 3 0 0 0 3   SAB IAB Ectopic Multiple Live Births   0 0 0 0 0      # Outcome Date GA Lbr Arben/2nd Weight Sex Type Anes PTL Lv   3 Para            2 Para            1 Para               Past Medical History:   Diagnosis Date    Breast cancer (H) 2004    lumpectomy, radiation, tamoxifen    Cancer (H) 2004    Breast    Cataracts, bilateral     Complication of anesthesia     She prefers not to have versed until right before she leaves or can have after     Diabetes (H)     Gestational    Enthesopathy of hip region 2006    right hip    Esophageal reflux 2006    History of gestational diabetes     Hypertension     Macular drusen     PONV (postoperative nausea and vomiting)     Shingles 2012    left T6-T7 dermatome      Past Surgical History:   Procedure Laterality Date    ABDOMEN SURGERY  2019    Breast reconstruction    BIOPSY  2004    Breast    BREAST SURGERY  2004    COLONOSCOPY      COLONOSCOPY N/A 2021    Procedure: COLONOSCOPY;  Surgeon: Mago  Iain lCemens MD;  Location: [facility]    DAVINCI SACROCOLPOPEXY, MIDURETHRAL SLING, CYSTOSCOPY N/A 4/23/2024    Procedure: SACROCOLPOPEXY, ROBOT-ASSISTED, LAPAROSCOPIC, WITH INSERTION OF MIDURETHRAL SLING AND CYSTOSCOPY (support the bladder, rectum, and urethra with mesh and look in the  bladder);  Surgeon: Jarrod Alfonso MD;  Location: [facility] OR    ENDOSCOPY  05/09/2007    Upper GI    EYE SURGERY      GRAFT FAT TO BREAST Bilateral 11/29/2018    Procedure: Bilateral Breast Fat Grafting from Abdomen and Thighs;  Surgeon: DENNISE Amin MD;  [facility]    GRAFT FREE VASCULARIZED TRANSVERSE RECTUS ABDOMINIS MYOCUTANEOUS Bilateral 10/08/2018    Procedure: GRAFT FREE VASCULARIZED TRANSVERSE RECTUS ABDOMINIS MYOCUTANEOUS;  Bilateral Deep Inferior Epigastric Artery  Free Flap Breast Reconstruction and Removal of Breast Explanders;  Surgeon: DENNISE Amin MD;  [facility]    GYN SURGERY  2001    HC BIOPSY/EXCISION LYMPH NODE OPEN DEEP CERVICAL W EXC FAT PAD  01/07/2005    Right axillary sentinel node biopsy X 3.    HC EXCISION BREAST LESION, OPEN >=1  01/07/2005    Right breast.    HC REMV CATARACT EXTRACAP,INSERT LENS, W/O ECP  12/18/2008    bilateral    HC REMV TARSAL/METATARSAL BENIGN BONE LESN  1982    Bone spur - right    HERNIA REPAIR  2019    HYSTERECTOMY, PAP NO LONGER INDICATED      LAPAROSCOPIC HERNIORRHAPHY VENTRAL N/A 10/21/2019    Procedure: Laparoscopic Ventral Hernia Repair With Mesh;  Surgeon: Emil Brown MD;  [facility] OR    MASTECTOMY SIMPLE BILATERAL, SENTINEL NODE BILATERAL, COMBINED Bilateral 08/22/2018    Procedure: COMBINED MASTECTOMY SIMPLE BILATERAL, SENTINEL NODE BILATERAL;  Bilateral Mastectomy with Right [place] Node Biopsy, Bilateral Breast Reconstruction, Anesthesia Block;  Surgeon: Rakesh Sanchez MD;  Location:  OR    ORTHOPEDIC SURGERY  1983    Right foot    RECONSTRUCT BREAST Bilateral 08/22/2018    Procedure: RECONSTRUCT BREAST;;  Surgeon:  DENNISE Amin MD;  [facility] OR    RECONSTRUCT BREAST BILATERAL Bilateral 10/08/2018    Procedure: RECONSTRUCT BREAST BILATERAL;;  Surgeon: DENNISE Amin MD;  [facility] OR    RECONSTRUCT NIPPLE BILATERAL Bilateral 2018    Procedure: Nipple Reconstruction;  Surgeon: DENNISE Amin MD;  Location:  OR    [facility] VAGINAL HYSTERECTOMY  2001    Ovaries intact, due to fibroids    [facility] COLONOSCOPY W BIOPSY  05/10/2010       ALL/Meds: Her medication and allergy histories were reviewed and are documented in their appropriate chart areas.    SH: Reviewed and documented in the appropriate area of the chart.  FH:  Her family history is reviewed and updated in the chart, today.  PMH: Her past medical, surgical, and obstetric histories were reviewed and updated today in the appropriate chart areas.    Objective:  PE: /78   Pulse 62   Temp 97.3  F (36.3  C)   LMP 10/14/2001 (Exact Date)   There is no height or weight on file to calculate BMI.    Pertinent Physical exam findings:    GENERAL: Active, alert, in no acute distress.  SKIN: Clear. No significant rash, abnormal pigmentation or lesions  MS: no gross musculoskeletal defects noted, no edema  PSYCH: Age-appropriate alertness and orientation    Pelvic:       - Ext: Vulva and perineum are normal without lesion, mass or discharge. Mild hypopigmentation in perineum.    A/P:  Enid Lombardo is a 65 year old  here today with the following concerns:  (L90.0) Lichen sclerosus  (primary encounter diagnosis)  Comment: We discussed that since the clobetasol appeared to help her and she was only ever initially started on twice weekly application, I would like to try having her apply this nightly for 3 months and then follow up at that time if she is doing well. We discussed that we could consider decreasing to twice weekly at that point but I suspect that she is responsive to clobetasol but was never started on frequent enough  application to adequately suppress lichen. We discussed cessation of clobetasol x 7 days and return to clinic if she is not improving after 6-8 weeks of daily application.  Plan: clobetasol (TEMOVATE) 0.05 % external ointment          (N95.2) Vaginal atrophy  Comment: We discussed switching to insertable vaginal tablets for ease due to her new instructions for nightly application of clobetasol. She is agreeable to this. TE sent to Oncology to inform them of this switch and further verify they are still OK with this.  Plan: estradiol (VAGIFEM) 10 MCG TABS vaginal tablet          (Z87.42) History of vaginal infection  Plan: We discussed that she should assess for any possible aggravating triggers for vaginal infection going forward and she can try OTC vaginal boric acid 600mg if she desires. We discussed that this is toxic if taken orally and that it should only be inserted into the vagina. We discussed common triggers, such as SIC/semen, scented products, douching, etc. Follow up PRN for this concern.     She is agreeable to the plan above and has no further questions or concerns. She did not request a chaperone for the physical exam component of the visit today.     38 minutes spent on the date of the encounter doing chart review, review of test results, interpretation of tests, patient visit, documentation, and discussion with other provider(s)      HERMILO Landin CNP

## 2025-02-16 ENCOUNTER — MYC MEDICAL ADVICE (OUTPATIENT)
Dept: FAMILY MEDICINE | Facility: OTHER | Age: 65
End: 2025-02-16
Payer: COMMERCIAL

## 2025-02-17 NOTE — TELEPHONE ENCOUNTER
If she wants this done schedule for MA visit and route encounter for provider to sign for PCV20    Leslye Rosario PA-C

## 2025-02-20 ENCOUNTER — ALLIED HEALTH/NURSE VISIT (OUTPATIENT)
Dept: FAMILY MEDICINE | Facility: OTHER | Age: 65
End: 2025-02-20
Payer: COMMERCIAL

## 2025-02-20 DIAGNOSIS — Z23 NEED FOR VACCINATION: Primary | ICD-10-CM

## 2025-02-20 NOTE — PROGRESS NOTES
Prior to immunization administration, verified patients identity using patient s name and date of birth. Please see Immunization Activity for additional information.     Screening Questionnaire for Adult Immunization    Are you sick today?   No   Do you have allergies to medications, food, a vaccine component or latex?   No   Have you ever had a serious reaction after receiving a vaccination?   No   Do you have a long-term health problem with heart, lung, kidney, or metabolic disease (e.g., diabetes), asthma, a blood disorder, no spleen, complement component deficiency, a cochlear implant, or a spinal fluid leak?  Are you on long-term aspirin therapy?   No   Do you have cancer, leukemia, HIV/AIDS, or any other immune system problem?   No   Do you have a parent, brother, or sister with an immune system problem?   No   In the past 3 months, have you taken medications that affect  your immune system, such as prednisone, other steroids, or anticancer drugs; drugs for the treatment of rheumatoid arthritis, Crohn s disease, or psoriasis; or have you had radiation treatments?   No   Have you had a seizure, or a brain or other nervous system problem?   No   During the past year, have you received a transfusion of blood or blood    products, or been given immune (gamma) globulin or antiviral drug?   No   For women: Are you pregnant or is there a chance you could become       pregnant during the next month?   No   Have you received any vaccinations in the past 4 weeks?   No     Immunization questionnaire answers were all negative.    I have reviewed the following standing orders: This patient is due and qualifies for the Pneumococcal vaccine.    Click here for Pneumococcal (Adult) Standing Order    I have reviewed the vaccines inclusion and exclusion criteria;No concerns regarding eligibility.     Patient instructed to remain in clinic for 15 minutes afterwards, and to report any adverse reactions.     Screening performed by  Danielle Tsang MA on 2/20/2025 at 8:45 AM.

## 2025-03-09 DIAGNOSIS — L90.0 LICHEN SCLEROSUS: ICD-10-CM

## 2025-06-02 ENCOUNTER — MYC MEDICAL ADVICE (OUTPATIENT)
Dept: FAMILY MEDICINE | Facility: OTHER | Age: 65
End: 2025-06-02
Payer: COMMERCIAL

## 2025-06-03 NOTE — PATIENT INSTRUCTIONS
If you have labs or imaging done, the results will automatically release in [employer]. com without an interpretation.  Your health care professional will review those results and send an interpretation with recommendations as soon as possible, but this may be 1-3 business days.    If you have any questions regarding your visit, please contact your care team.     [employer]: [phone]  [facility] CLINIC HOURS TELEPHONE NUMBER   Leelee Lara, SAPNA Flanagan-ROSALIND Brasher-ROSALIND Nicholson-Surgery Scheduler  Christiane-       Monday- [place]  8:00 am-4:00 pm    Tuesday- [place]  8:00 am-4:00 pm    Wednesday- [place] 8:00 am-4:00 pm    Thursday- [place] 8:00 am-4:00 pm    Friday- [place]  8:00 am-4:00 pm [facility]  [address] [place]  [place] [address]  PH: [phone]  Fax: [phone]    Imaging Scheduling all locations  PH: [phone]     [facility]  [address] [place] [address] Dr.  [place], [place] [address] [phone]    [facility]  [address] [place] [address]  PH: [phone]     **Surgeries** Our Surgery Schedulers will contact you to schedule. If you do not receive a call within 3 business days, please call [phone].  Urgent Care locations:  [facility]       Monday-Friday   10 am - 8 pm    Saturday and Sunday   9 am - 5 pm   [phone]   If you need a medication refill, please contact your pharmacy. Please allow 3 business days for your refill to be completed.  As always, Thank you for trusting us with your healthcare needs!

## 2025-06-04 ENCOUNTER — MYC MEDICAL ADVICE (OUTPATIENT)
Dept: OBGYN | Facility: CLINIC | Age: 65
End: 2025-06-04

## 2025-06-04 ENCOUNTER — OFFICE VISIT (OUTPATIENT)
Dept: OBGYN | Facility: CLINIC | Age: 65
End: 2025-06-04
Payer: COMMERCIAL

## 2025-06-04 VITALS
BODY MASS INDEX: 30.34 KG/M2 | SYSTOLIC BLOOD PRESSURE: 128 MMHG | HEIGHT: 65 IN | HEART RATE: 71 BPM | WEIGHT: 182.1 LBS | OXYGEN SATURATION: 97 % | DIASTOLIC BLOOD PRESSURE: 81 MMHG

## 2025-06-04 DIAGNOSIS — N95.2 VAGINAL ATROPHY: ICD-10-CM

## 2025-06-04 DIAGNOSIS — T75.3XXA MOTION SICKNESS, INITIAL ENCOUNTER: ICD-10-CM

## 2025-06-04 DIAGNOSIS — T75.3XXA MOTION SICKNESS, INITIAL ENCOUNTER: Primary | ICD-10-CM

## 2025-06-04 DIAGNOSIS — L90.0 LICHEN SCLEROSUS: Primary | ICD-10-CM

## 2025-06-04 PROCEDURE — 99214 OFFICE O/P EST MOD 30 MIN: CPT

## 2025-06-04 RX ORDER — SCOPOLAMINE 1 MG/3D
1 PATCH, EXTENDED RELEASE TRANSDERMAL
Qty: 5 PATCH | Refills: 0 | Status: SHIPPED | OUTPATIENT
Start: 2025-06-04

## 2025-06-04 RX ORDER — ESTRADIOL 10 UG/1
10 TABLET, FILM COATED VAGINAL
Qty: 24 TABLET | Refills: 3 | Status: SHIPPED | OUTPATIENT
Start: 2025-06-05

## 2025-06-04 RX ORDER — CLOBETASOL PROPIONATE 0.5 MG/G
OINTMENT TOPICAL
Qty: 30 G | Refills: 1 | Status: SHIPPED | OUTPATIENT
Start: 2025-06-05

## 2025-06-04 NOTE — PROGRESS NOTES
"Subjective:  Enid is a 65 year old   is here today with the following concerns:    Lichen Sclerosus F/U: seen previously in 2025 for this. She has since had complete resolution of her itching since she started nightly application. She occasionally visualizes her vulva and has not noted any new lesions, plaques, patches, or skin changes.  Motion sickness: going on cruise and hoping to get Scopolamine script today.  Vaginal atrophy: reports significant decrease in frequency of UTI since starting Vagifem. She would like to continue this at this time. OK per Oncology message on 25.    Onc Message 25  \"Hi Leelee,   Thanks for reaching out. I am comfortable with vaginal estrogen for her if she understands risks/benefits. Very little risk of long term increase in serum estradiol with vagifem and she had relatively low risk cancer.     Thanks,   Ragini \"    ROS: Pertinent ROS as above.    Medical history  OB History    Para Term  AB Living   3 3 0 0 0 3   SAB IAB Ectopic Multiple Live Births   0 0 0 0 0      # Outcome Date GA Lbr Arben/2nd Weight Sex Type Anes PTL Lv   3 Para            2 Para            1 Para               Past Medical History:   Diagnosis Date    Breast cancer (H)     lumpectomy, radiation, tamoxifen    Cancer (H) ,     Breast    Cataracts, bilateral     Complication of anesthesia     She prefers not to have versed until right before she leaves or can have after     Coronary artery disease 2014    Diabetes (H)     Gestational    Enthesopathy of hip region 2006    right hip    Esophageal reflux 2006    History of gestational diabetes     Hypertension     Macular drusen     PONV (postoperative nausea and vomiting)     Shingles 2012    left T6-T7 dermatome      Past Surgical History:   Procedure Laterality Date    ABDOMEN SURGERY  2019    Breast reconstruction    BIOPSY  2005, 2018    Breast    BREAST SURGERY  2005    " 8/22/2018    COLONOSCOPY  01/2021, 05/10/10, 7/2015    COLONOSCOPY N/A 01/11/2021    Procedure: COLONOSCOPY;  Surgeon: Iain Mercedes MD;  [facility]    [facility] CYSTOSCOPY N/A 04/23/2024    Procedure: SACROCOLPOPEXY, ROBOT-ASSISTED, LAPAROSCOPIC, WITH INSERTION OF MIDURETHRAL SLING AND CYSTOSCOPY (support the bladder, rectum, and urethra with mesh and look in the  bladder);  Surgeon: Jarrod Alfonso MD;  Location: [facility] OR    ENDOSCOPY  05/09/2007    Upper GI    EYE SURGERY  12/18/08, 5/2009    GRAFT FAT TO BREAST Bilateral 11/29/2018    Procedure: Bilateral Breast Fat Grafting from Abdomen and Thighs;  Surgeon: DENNISE Amin MD;  Location:  OR    GRAFT FREE VASCULARIZED TRANSVERSE RECTUS ABDOMINIS MYOCUTANEOUS Bilateral 10/08/2018    Procedure: GRAFT FREE VASCULARIZED TRANSVERSE RECTUS ABDOMINIS MYOCUTANEOUS;  Bilateral Deep Inferior Epigastric Artery  Free Flap Breast Reconstruction and Removal of Breast Explanders;  Surgeon: DENNISE Amin MD;  Location:  OR    GYN SURGERY  12/2001    HC BIOPSY/EXCISION LYMPH NODE OPEN DEEP CERVICAL W EXC FAT PAD  01/07/2005    Right axillary sentinel node biopsy X 3.    HC EXCISION BREAST LESION, OPEN >=1  01/07/2005    Right breast.    HC REMV CATARACT EXTRACAP,INSERT LENS, W/O ECP  12/18/2008    bilateral    HC REMV TARSAL/METATARSAL BENIGN BONE LESN  1982    Bone spur - right    HERNIA REPAIR  2019    HYSTERECTOMY, PAP NO LONGER INDICATED      LAPAROSCOPIC HERNIORRHAPHY VENTRAL N/A 10/21/2019    Procedure: Laparoscopic Ventral Hernia Repair With Mesh;  Surgeon: Emil Brown MD;  [facility]    MASTECTOMY SIMPLE BILATERAL, SENTINEL NODE BILATERAL, COMBINED Bilateral 08/22/2018    Procedure: COMBINED MASTECTOMY SIMPLE BILATERAL, SENTINEL NODE BILATERAL;  Bilateral Mastectomy with Right [place] Node Biopsy, Bilateral Breast Reconstruction, Anesthesia Block;  Surgeon: Rakesh Sanchez MD;   "[facility] OR    ORTHOPEDIC SURGERY  1982    Right foot    RECONSTRUCT BREAST Bilateral 08/22/2018    Procedure: RECONSTRUCT BREAST;;  Surgeon: DENNISE Amin MD;  [facility] OR    RECONSTRUCT BREAST BILATERAL Bilateral 10/08/2018    Procedure: RECONSTRUCT BREAST BILATERAL;;  Surgeon: DENNISE Amin MD;  [facility]    RECONSTRUCT NIPPLE BILATERAL Bilateral 11/29/2018    Procedure: Nipple Reconstruction;  Surgeon: DENNISE Amin MD;  Location:  OR    [facility] VAGINAL HYSTERECTOMY  12/2001    Ovaries intact, due to fibroids    [facility]  [facility]       ALL/Meds: Her medication and allergy histories were reviewed and are documented in their appropriate chart areas.    SH: Reviewed and documented in the appropriate area of the chart.  FH:  Her family history is reviewed and updated in the chart, today.  PMH: Her past medical, surgical, and obstetric histories were reviewed and updated today in the appropriate chart areas.    Objective:  PE: /81 (BP Location: Right arm, Patient Position: Sitting, Cuff Size: Adult Regular)   Pulse 71   Ht 1.651 m (5' 5\")   Wt 82.6 kg (182 lb 1.6 oz)   LMP 10/14/2001 (Exact Date)   SpO2 97%   BMI 30.30 kg/m    Body mass index is 30.3 kg/m .    Pertinent Physical exam findings:    GENERAL: Active, alert, in no acute distress.  SKIN: Clear. No significant rash, abnormal pigmentation or lesions  MS: no gross musculoskeletal defects noted, no edema  PSYCH: Age-appropriate alertness and orientation    Pelvic:       - Ext: Overall normal appearing vulva and perineum are normal without lesion, mass or discharge. No hypopigmentation noted as previously documented. There is 2mm in diameter bright pink/red marking in upper left vulva. When inquired, patient has not noticed this before and it is not noted on previous exam. No itching or irritation in area.       - Urethra: normal without discharge or scarring      A/P:  Enid V Grupo is " a 65 year old  here today with the following concerns:  (L90.0) Lichen sclerosus  (primary encounter diagnosis)  Comment: We discussed that since she has a new appearing navneet we should proceed with biopsy of this area. She would like to go on her cruise first but will schedule follow up to complete biopsy in the coming weeks/month. Aware she should stop Clobetasol x 7d prior to biopsy. We discussed that otherwise her PE looks normal and she appears to have her symptoms well managed, so she can decrease clobetasol application frequency to twice weekly. We discussed visual and palpable monitoring of the vulva and need for her to reach out if she develops any new skin changes, plaques, fissures, lesions, masses, or new or worsening symptoms.   Plan: clobetasol (TEMOVATE) 0.05 % external ointment          (T75.3XXA) Motion sickness, initial encounter  Plan: scopolamine (TRANSDERM) 1 MG/3DAYS 72 hr patch          (N95.2) Vaginal atrophy  Comment: Continue with twice weekly insertion. Reach out PRN for concerns or new/worsening symptoms.  Plan: estradiol (VAGIFEM) 10 MCG TABS vaginal tablet          She is agreeable to the plan above and has no further questions or concerns. She did not request a chaperone for the physical exam component of the visit today.     30 minutes spent on the date of the encounter doing chart review, patient visit, and documentation      HERMILO Landin CNP

## 2025-06-04 NOTE — TELEPHONE ENCOUNTER
Patient was seen in clinic earlier today for Lichen Sclerosus, pt is request prescription for Zofran.     Mychart received from patient asking for prescription for ondansetron for an upcoming cruise.   Patient states her husbands provider recommended it for him b/c they are going to be on a very small boat to go whale watching.     Uses [facility] in [place].     Routing request to provider.     Fanny STREETER RN - [facility]

## 2025-06-05 RX ORDER — ONDANSETRON 4 MG/1
4 TABLET, ORALLY DISINTEGRATING ORAL EVERY 8 HOURS PRN
Qty: 14 TABLET | Refills: 0 | Status: SHIPPED | OUTPATIENT
Start: 2025-06-05

## 2025-06-07 RX ORDER — CLOBETASOL PROPIONATE 0.5 MG/G
OINTMENT TOPICAL
Qty: 60 G | Refills: 0 | OUTPATIENT
Start: 2025-06-07

## 2025-07-08 ENCOUNTER — E-VISIT (OUTPATIENT)
Dept: URGENT CARE | Facility: CLINIC | Age: 65
End: 2025-07-08
Payer: COMMERCIAL

## 2025-07-08 DIAGNOSIS — U07.1 INFECTION DUE TO COVID-19 VIRUS VARIANT OF CONCERN: Primary | ICD-10-CM

## 2025-07-08 PROCEDURE — 99207 PR NON-BILLABLE SERV PER CHARTING: CPT | Performed by: EMERGENCY MEDICINE

## 2025-07-08 NOTE — PATIENT INSTRUCTIONS
Deashikha Rossi,    Thank you for submitting an evisit. I am sorry you are not feeling well. Based on your responses and your symptoms, you are eligible for treatment of your COVID-19 symptoms with Paxlovid. This is a medication that you will take twice daily for 5 days. To learn more information about Paxlovid, please visit: [email or web address]       If you take Paxlovid with other medicines, it could make you sick. This means that you need to stop taking some of your normal medicines while you're taking Paxlovid. Please carefully follow these directions so that you can take Paxlovid safely. You are taking:    Atorvastatin (Lipitor) for high cholesterol.  Stop taking atorvastatin and start taking Paxlovid the next day.  Do not take atorvastatin while you're taking Paxlovid.  Start taking Atorvastatin 3 days after you finish taking Paxlovid.     If you are not improving, having difficulty breathing, difficulty drinking or staying hydrated, or experiencing new or worsening symptoms, please call [phone] to speak to a triage nurse about your symptoms.     Paxlovid is no longer free from the government. There are financial assistance programs available. You can learn more at https://paxlovid.[employer].unbound technologies/ or 1-635.646.7842, press 2 for patient options. Please look into this before you go to the pharmacy to  your medicine.    Your Paxlovid prescription was sent to the pharmacy you requested. If you have difficulty getting the medication at this pharmacy, the following [place] [facility] carry Paxlovid. If you need to transfer your Paxlovid prescription from your selected pharmacy, please call your preferred [place] [facility] below and they can assist you in transferring your prescription.     [facility]: [phone] (M-F 8a-6p, Sat/Sun 8a-4p)  [place]: [phone] (M-F 9a-5p)  [place]: [phone] (M-F 8a-6p, Sat 9a-12:30p)  [place]: [phone] (M-F 8:30a-6p, Sat/Sun  8:30a-12:30p)  [place]: [phone] (M-F 7a-7p)  [place]: [phone] (M-Th 8a-5:30p, Fri 8a-5p)  [place]: [phone] (M-F 8a-5p)  [place]:  [phone] (M-F 830a-6p; Sat/Sun 9a-1p)  [facility]): [phone] (M-F 8a-7p, Sat/Sun 8a-5p)  [place]: [phone] (M-F 9a-7p, Sat/Sun 9a-1p)   [place]: [phone] (M-F 8a-8:30p, Sat 9a-5p, Sun 9a-4p)  [place]: [phone] (M-F 8a-5p, Sat 9a-12p)  [place]: [phone]  (M-F 9a-5p, Sat/Sun 8a-4p)  [place]: [phone] (M-F 8a-5p)  [phone] (M-F 8am-9p, Sat/Sun 9a-5p)    Please call [phone] if you have other questions.     Adithya Burns MD      Coronavirus (COVID-19): Care Instructions  What is COVID-19?  COVID-19 is a disease caused by a type of coronavirus. This illness was first found in 2019 and has since spread worldwide (pandemic). Symptoms can range from mild, such as fever and body aches, to severe, including trouble breathing. COVID-19 can be deadly.  Coronaviruses are a large group of viruses. Some types cause the common cold. Others cause more serious illnesses like [place] East respiratory syndrome (MERS) and severe acute respiratory syndrome (SARS).  Follow-up care is a key part of your treatment and safety. Be sure to make and go to all appointments, and call your doctor if you are having problems. It's also a good idea to know your test results and keep a list of the medicines you take.  How can you self-isolate when you have COVID-19?  If you have COVID-19, there are things you can do to help avoid spreading the virus to others.  Stay home, and avoid contact with other people.  Limit contact with people in your home. If possible, stay in a separate bedroom and use a separate bathroom.  Wear a high-quality mask when you are around other people.  Improve airflow. If you have to spend time indoors with others, open windows and doors. Or you can use a fan to blow air away from people and out a  "window.  Avoid contact with pets and other animals.  Cover your mouth and nose with a tissue when you cough or sneeze. Then throw it in the trash right away.  Wash your hands often, especially after you cough or sneeze. Use soap and water, and scrub for at least 20 seconds. If soap and water aren't available, use an alcohol-based hand .  Don't share personal household items. These include bedding, towels, cups and glasses, and eating utensils.  Wash laundry in the warmest water allowed for the fabric type, and dry it completely. It's okay to wash other people's laundry with yours.  Clean and disinfect your home. Use household  and disinfectant wipes or sprays.  Go to the CDC website at cdc.gov if you have questions.  When can you end self-isolation for COVID-19?  If you know or think that you have the virus, you may need to self-isolate. When you can be around other people you live with and leave home depends on whether you have symptoms.  If you tested positive but had no symptoms, wear a mask for at least 5 days.  If you have symptoms, you need to wait until your symptoms are getting better and you haven't had a fever for 24 hours while not taking medicines to lower the fever. Once you leave isolation, wear a mask for at least 5 more days when you are around other people.  If you were very sick, were in the hospital for COVID, or have a weakened immune system, talk to your doctor about how long you should isolate and wear a mask. It might be longer than 5 days.  Call your doctor or seek care if you have questions about your symptoms or when to end isolation.  Check the CDC website at cdc.gov for the most current information.  Where can you learn more?  Go to https://www.[employer].net/patiented  Enter C007 in the search box to learn more about \"Coronavirus (COVID-19): Care Instructions.\"  Current as of: May 28, 2025  Content Version: 14.5    8626-6120 [employer] [place].   Care instructions " adapted under license by your healthcare professional. If you have questions about a medical condition or this instruction, always ask your healthcare professional. [employer] disclaims any warranty or liability for your use of this information.

## 2025-07-21 ENCOUNTER — MYC MEDICAL ADVICE (OUTPATIENT)
Dept: OBGYN | Facility: CLINIC | Age: 65
End: 2025-07-21

## 2025-07-21 ENCOUNTER — TELEPHONE (OUTPATIENT)
Dept: FAMILY MEDICINE | Facility: OTHER | Age: 65
End: 2025-07-21

## 2025-07-21 NOTE — TELEPHONE ENCOUNTER
Patient calling to report rebound covid symptoms. Was treated with paxlovid starting 07/07/25. Home COVID negative on 07/14/25, and felt symptoms completely resolved.     On 07/18/25 developed symptoms of cough, and runny nose. Denies fever, breathing difficulty, N/V, dizziness/lightheadedness.     RN advised to take fluids, utilize shower steam for cough, and honey as natural cough suppressant. Advised COVID positive test could remain for 90 days. Advised to isolate for 5 days of reinfection and 24 hours fever free without antipyretics.     Patient verbalized understanding.     Marvin Benitez RN on 7/21/2025 at 9:33 AM

## 2025-07-21 NOTE — TELEPHONE ENCOUNTER
Pt last seen 6/4/2025 for lichen sclerosus, Pt asking if waiting until 11/2025 is too long for a vulvar biopsy (had to reschedule due to having covid).    RN routing to provider to advise.    Masha Vasques RN on 7/21/2025 at 9:39 AM

## 2025-08-04 ENCOUNTER — MYC MEDICAL ADVICE (OUTPATIENT)
Dept: FAMILY MEDICINE | Facility: OTHER | Age: 65
End: 2025-08-04
Payer: COMMERCIAL

## 2025-08-04 ENCOUNTER — OFFICE VISIT (OUTPATIENT)
Dept: FAMILY MEDICINE | Facility: OTHER | Age: 65
End: 2025-08-04
Payer: COMMERCIAL

## 2025-08-04 ENCOUNTER — ANCILLARY PROCEDURE (OUTPATIENT)
Dept: GENERAL RADIOLOGY | Facility: OTHER | Age: 65
End: 2025-08-04
Attending: FAMILY MEDICINE
Payer: COMMERCIAL

## 2025-08-04 VITALS
RESPIRATION RATE: 12 BRPM | OXYGEN SATURATION: 99 % | DIASTOLIC BLOOD PRESSURE: 84 MMHG | TEMPERATURE: 98 F | HEIGHT: 65 IN | BODY MASS INDEX: 30.32 KG/M2 | HEART RATE: 77 BPM | SYSTOLIC BLOOD PRESSURE: 133 MMHG | WEIGHT: 182 LBS

## 2025-08-04 DIAGNOSIS — E78.5 HYPERLIPIDEMIA WITH TARGET LDL LESS THAN 130: ICD-10-CM

## 2025-08-04 DIAGNOSIS — U07.1 INFECTION DUE TO 2019 NOVEL CORONAVIRUS: ICD-10-CM

## 2025-08-04 DIAGNOSIS — U07.1 INFECTION DUE TO 2019 NOVEL CORONAVIRUS: Primary | ICD-10-CM

## 2025-08-04 DIAGNOSIS — Z13.6 SCREENING FOR CARDIOVASCULAR CONDITION: ICD-10-CM

## 2025-08-04 DIAGNOSIS — M54.12 CERVICAL RADICULAR PAIN: ICD-10-CM

## 2025-08-04 PROCEDURE — 71046 X-RAY EXAM CHEST 2 VIEWS: CPT | Mod: TC | Performed by: RADIOLOGY

## 2025-08-04 PROCEDURE — 99213 OFFICE O/P EST LOW 20 MIN: CPT | Performed by: FAMILY MEDICINE

## 2025-08-04 RX ORDER — ATORVASTATIN CALCIUM 10 MG/1
10 TABLET, FILM COATED ORAL DAILY
Qty: 90 TABLET | Refills: 0 | Status: SHIPPED | OUTPATIENT
Start: 2025-08-04 | End: 2026-02-03

## 2025-08-04 RX ORDER — ALBUTEROL SULFATE 90 UG/1
2 INHALANT RESPIRATORY (INHALATION) EVERY 6 HOURS PRN
Qty: 18 G | Refills: 0 | Status: SHIPPED | OUTPATIENT
Start: 2025-08-04

## 2025-08-04 RX ORDER — GABAPENTIN 100 MG/1
100 CAPSULE ORAL 3 TIMES DAILY
Qty: 270 CAPSULE | Refills: 0 | Status: SHIPPED | OUTPATIENT
Start: 2025-08-04

## 2025-08-04 ASSESSMENT — PAIN SCALES - GENERAL: PAINLEVEL_OUTOF10: NO PAIN (0)

## 2025-08-10 SDOH — HEALTH STABILITY: PHYSICAL HEALTH: ON AVERAGE, HOW MANY DAYS PER WEEK DO YOU ENGAGE IN MODERATE TO STRENUOUS EXERCISE (LIKE A BRISK WALK)?: 4 DAYS

## 2025-08-10 SDOH — HEALTH STABILITY: PHYSICAL HEALTH: ON AVERAGE, HOW MANY MINUTES DO YOU ENGAGE IN EXERCISE AT THIS LEVEL?: 40 MIN

## 2025-08-10 ASSESSMENT — SOCIAL DETERMINANTS OF HEALTH (SDOH): HOW OFTEN DO YOU GET TOGETHER WITH FRIENDS OR RELATIVES?: TWICE A WEEK

## 2025-08-12 ENCOUNTER — LAB (OUTPATIENT)
Dept: LAB | Facility: OTHER | Age: 65
End: 2025-08-12
Payer: COMMERCIAL

## 2025-08-12 DIAGNOSIS — M54.12 CERVICAL RADICULAR PAIN: ICD-10-CM

## 2025-08-12 DIAGNOSIS — E78.5 HYPERLIPIDEMIA WITH TARGET LDL LESS THAN 130: ICD-10-CM

## 2025-08-12 LAB
ANION GAP SERPL CALCULATED.3IONS-SCNC: 10 MMOL/L (ref 7–15)
BUN SERPL-MCNC: 6.8 MG/DL (ref 8–23)
CALCIUM SERPL-MCNC: 9.1 MG/DL (ref 8.8–10.4)
CHLORIDE SERPL-SCNC: 102 MMOL/L (ref 98–107)
CHOLEST SERPL-MCNC: 182 MG/DL
CREAT SERPL-MCNC: 0.72 MG/DL (ref 0.51–0.95)
EGFRCR SERPLBLD CKD-EPI 2021: >90 ML/MIN/1.73M2
FASTING STATUS PATIENT QL REPORTED: YES
FASTING STATUS PATIENT QL REPORTED: YES
GLUCOSE SERPL-MCNC: 97 MG/DL (ref 70–99)
HCO3 SERPL-SCNC: 25 MMOL/L (ref 22–29)
HDLC SERPL-MCNC: 67 MG/DL
LDLC SERPL CALC-MCNC: 94 MG/DL
NONHDLC SERPL-MCNC: 115 MG/DL
POTASSIUM SERPL-SCNC: 4.1 MMOL/L (ref 3.4–5.3)
SODIUM SERPL-SCNC: 137 MMOL/L (ref 135–145)
TRIGL SERPL-MCNC: 105 MG/DL

## 2025-08-12 PROCEDURE — 36415 COLL VENOUS BLD VENIPUNCTURE: CPT

## 2025-08-12 PROCEDURE — 80061 LIPID PANEL: CPT

## 2025-08-12 PROCEDURE — 80048 BASIC METABOLIC PNL TOTAL CA: CPT

## 2025-08-13 ENCOUNTER — OFFICE VISIT (OUTPATIENT)
Dept: FAMILY MEDICINE | Facility: OTHER | Age: 65
End: 2025-08-13
Attending: FAMILY MEDICINE
Payer: COMMERCIAL

## 2025-08-13 VITALS
RESPIRATION RATE: 18 BRPM | BODY MASS INDEX: 29.07 KG/M2 | WEIGHT: 174.5 LBS | HEART RATE: 74 BPM | SYSTOLIC BLOOD PRESSURE: 117 MMHG | OXYGEN SATURATION: 98 % | HEIGHT: 65 IN | TEMPERATURE: 97.3 F | DIASTOLIC BLOOD PRESSURE: 75 MMHG

## 2025-08-13 DIAGNOSIS — M54.12 CERVICAL RADICULAR PAIN: ICD-10-CM

## 2025-08-13 DIAGNOSIS — C50.919 RECURRENT MALIGNANT NEOPLASM OF BREAST, UNSPECIFIED LATERALITY (H): ICD-10-CM

## 2025-08-13 DIAGNOSIS — U07.1 INFECTION DUE TO 2019 NOVEL CORONAVIRUS: ICD-10-CM

## 2025-08-13 DIAGNOSIS — E78.5 HYPERLIPIDEMIA WITH TARGET LDL LESS THAN 130: ICD-10-CM

## 2025-08-13 DIAGNOSIS — R53.82 CHRONIC FATIGUE: ICD-10-CM

## 2025-08-13 DIAGNOSIS — Z00.00 ROUTINE GENERAL MEDICAL EXAMINATION AT A HEALTH CARE FACILITY: Primary | ICD-10-CM

## 2025-08-13 LAB
ALBUMIN SERPL BCG-MCNC: 4.2 G/DL (ref 3.5–5.2)
ALP SERPL-CCNC: 60 U/L (ref 40–150)
ALT SERPL W P-5'-P-CCNC: 17 U/L (ref 0–50)
AST SERPL W P-5'-P-CCNC: 18 U/L (ref 0–45)
BILIRUB DIRECT SERPL-MCNC: 0.15 MG/DL (ref 0–0.3)
BILIRUB SERPL-MCNC: 0.5 MG/DL
CRP SERPL-MCNC: <3 MG/L
ERYTHROCYTE [DISTWIDTH] IN BLOOD BY AUTOMATED COUNT: 12.9 % (ref 10–15)
ERYTHROCYTE [SEDIMENTATION RATE] IN BLOOD BY WESTERGREN METHOD: 7 MM/HR (ref 0–30)
FERRITIN SERPL-MCNC: 132 NG/ML (ref 11–328)
HCT VFR BLD AUTO: 41.7 % (ref 35–47)
HGB BLD-MCNC: 14.2 G/DL (ref 11.7–15.7)
MAGNESIUM SERPL-MCNC: 2.3 MG/DL (ref 1.7–2.3)
MCH RBC QN AUTO: 30.9 PG (ref 26.5–33)
MCHC RBC AUTO-ENTMCNC: 34.1 G/DL (ref 31.5–36.5)
MCV RBC AUTO: 90.7 FL (ref 78–100)
PHOSPHATE SERPL-MCNC: 3.4 MG/DL (ref 2.5–4.5)
PLATELET # BLD AUTO: 214 10E3/UL (ref 150–450)
PROT SERPL-MCNC: 6.7 G/DL (ref 6.4–8.3)
RBC # BLD AUTO: 4.6 10E6/UL (ref 3.8–5.2)
TSH SERPL DL<=0.005 MIU/L-ACNC: 1.69 UIU/ML (ref 0.3–4.2)
VIT D+METAB SERPL-MCNC: 39 NG/ML (ref 20–50)
WBC # BLD AUTO: 5.13 10E3/UL (ref 4–11)

## 2025-08-13 PROCEDURE — 82306 VITAMIN D 25 HYDROXY: CPT | Performed by: FAMILY MEDICINE

## 2025-08-13 PROCEDURE — 80076 HEPATIC FUNCTION PANEL: CPT | Performed by: FAMILY MEDICINE

## 2025-08-13 PROCEDURE — 84443 ASSAY THYROID STIM HORMONE: CPT | Performed by: FAMILY MEDICINE

## 2025-08-13 PROCEDURE — 85652 RBC SED RATE AUTOMATED: CPT | Performed by: FAMILY MEDICINE

## 2025-08-13 PROCEDURE — 82728 ASSAY OF FERRITIN: CPT | Performed by: FAMILY MEDICINE

## 2025-08-13 PROCEDURE — 84100 ASSAY OF PHOSPHORUS: CPT | Performed by: FAMILY MEDICINE

## 2025-08-13 PROCEDURE — 36415 COLL VENOUS BLD VENIPUNCTURE: CPT | Performed by: FAMILY MEDICINE

## 2025-08-13 PROCEDURE — 86140 C-REACTIVE PROTEIN: CPT | Performed by: FAMILY MEDICINE

## 2025-08-13 PROCEDURE — 85027 COMPLETE CBC AUTOMATED: CPT | Performed by: FAMILY MEDICINE

## 2025-08-13 PROCEDURE — 83735 ASSAY OF MAGNESIUM: CPT | Performed by: FAMILY MEDICINE

## 2025-08-13 RX ORDER — AZITHROMYCIN 250 MG/1
TABLET, FILM COATED ORAL
Qty: 6 TABLET | Refills: 0 | Status: SHIPPED | OUTPATIENT
Start: 2025-08-13 | End: 2025-08-18

## 2025-08-13 RX ORDER — ALBUTEROL SULFATE 90 UG/1
2 INHALANT RESPIRATORY (INHALATION) EVERY 6 HOURS PRN
Qty: 18 G | Refills: 0 | Status: SHIPPED | OUTPATIENT
Start: 2025-08-13

## 2025-08-13 RX ORDER — ATORVASTATIN CALCIUM 10 MG/1
10 TABLET, FILM COATED ORAL DAILY
Qty: 90 TABLET | Refills: 3 | Status: SHIPPED | OUTPATIENT
Start: 2025-08-13

## 2025-08-13 RX ORDER — GABAPENTIN 100 MG/1
100 CAPSULE ORAL 3 TIMES DAILY
Qty: 270 CAPSULE | Refills: 3 | Status: SHIPPED | OUTPATIENT
Start: 2025-08-13

## 2025-08-13 ASSESSMENT — PAIN SCALES - GENERAL: PAINLEVEL_OUTOF10: NO PAIN (0)

## 2025-08-27 ENCOUNTER — MYC MEDICAL ADVICE (OUTPATIENT)
Dept: FAMILY MEDICINE | Facility: OTHER | Age: 65
End: 2025-08-27
Payer: COMMERCIAL

## 2025-09-03 ENCOUNTER — OFFICE VISIT (OUTPATIENT)
Dept: OBGYN | Facility: CLINIC | Age: 65
End: 2025-09-03
Payer: COMMERCIAL

## 2025-09-03 VITALS — SYSTOLIC BLOOD PRESSURE: 133 MMHG | HEART RATE: 72 BPM | OXYGEN SATURATION: 100 % | DIASTOLIC BLOOD PRESSURE: 88 MMHG

## 2025-09-03 DIAGNOSIS — L90.0 LICHEN SCLEROSUS: ICD-10-CM

## 2025-09-03 DIAGNOSIS — N90.9 LESION OF FEMALE PERINEUM: Primary | ICD-10-CM

## 2025-09-03 DIAGNOSIS — L29.3 PERINEAL ITCHING, FEMALE: ICD-10-CM

## (undated) DEVICE — DRAIN JACKSON PRATT CHANNEL 15FR ROUND HUBLESS SIL JP-2228

## (undated) DEVICE — ESU PENCIL W/COATED BLADE E2450H

## (undated) DEVICE — SPONGE LAP 18X18" X8435

## (undated) DEVICE — BNDG ELASTIC 6"X5YDS UNSTERILE 6611-60

## (undated) DEVICE — LINEN TOWEL PACK X30 5481

## (undated) DEVICE — DAVINCI XI DRAPE COLUMN 470341

## (undated) DEVICE — SU ETHILON 3-0 PS-1 18" 1663H

## (undated) DEVICE — LINEN TOWEL PACK X6 WHITE 5487

## (undated) DEVICE — PAD CHUX UNDERPAD 23X24" 7136

## (undated) DEVICE — SU PROLENE 5-0 RB-1DA 36"  8556H

## (undated) DEVICE — Device

## (undated) DEVICE — SYR 50ML CATH TIP W/O NDL 309620

## (undated) DEVICE — DEVICE FIXATION ABSORBLE TACK 5MM SINGLE ABSTACK30X

## (undated) DEVICE — STPL SKIN 35W ROTATING HEAD PRW35

## (undated) DEVICE — PREP CHLORAPREP 26ML TINTED ORANGE  260815

## (undated) DEVICE — PACK LAP CHOLE CUSTOM ASC

## (undated) DEVICE — ESU ELEC BLADE 2.75" COATED/INSULATED E1455

## (undated) DEVICE — SU STRATAFIX PDS PLUS 3-0 SPIRAL SH 15CM SXPP1B420

## (undated) DEVICE — SU DERMABOND ADVANCED .7ML DNX12

## (undated) DEVICE — DRAPE IOBAN INCISE 23X17" 6650EZ

## (undated) DEVICE — SU WND CLOSURE VLOC 0 GS-22 9" VLOCL2246 CL2246

## (undated) DEVICE — SU MONOCRYL 4-0 PS-2 27" UND Y426H

## (undated) DEVICE — SU VICRYL 3-0 RB-1 27" UND J215H

## (undated) DEVICE — TUBING SUCTION 12"X1/4" N612

## (undated) DEVICE — ENDO TROCAR FIRST ENTRY KII FIOS Z-THRD 12X100MM CTF73

## (undated) DEVICE — SUCTION MANIFOLD NEPTUNE 2 SYS 4 PORT 0702-020-000

## (undated) DEVICE — DRAPE SHEET MED 44X70" 9355

## (undated) DEVICE — CLIP GEM MICRO GEM2431

## (undated) DEVICE — GLOVE BIOGEL PI MICRO INDICATOR UNDERGLOVE SZ 7.5 48975

## (undated) DEVICE — NDL 25GA 2"  8881200441

## (undated) DEVICE — ANTIFOG SOLUTION SEE SHARP 150M TROCAR SWABS 30978

## (undated) DEVICE — LINEN TOWEL PACK X5 5464

## (undated) DEVICE — GLOVE PROTEXIS POWDER FREE SMT 7.0  2D72PT70X

## (undated) DEVICE — WIPES FOLEY CARE SURESTEP PROVON DFC100

## (undated) DEVICE — BNDG ELASTIC 6" DBL LENGTH UNSTERILE 6611-16

## (undated) DEVICE — DAVINCI XI DRAPE ARM 470015

## (undated) DEVICE — SU MONOCRYL 4-0 PS-2 18" UND Y496G

## (undated) DEVICE — PHOTON GUIDE INVUITY WIDE FLAT 104015

## (undated) DEVICE — SU DERMABOND PRINEO 22CM CLR222US

## (undated) DEVICE — SU VICRYL 0 CT-1 27" UND J260H

## (undated) DEVICE — DRAPE U SPLIT 74X120" 29440

## (undated) DEVICE — SUCTION MANIFOLD DORNOCH ULTRA CART UL-CL500

## (undated) DEVICE — ENDO TROCAR FIRST ENTRY KII FIOS Z-THRD 05X100MM CTF03

## (undated) DEVICE — DAVINCI HOT SHEARS TIP COVER  400180

## (undated) DEVICE — SU MONOCRYL 2-0 SH 27" UND Y417H

## (undated) DEVICE — DRAPE LAVH/LAPAROSCOPY W/POUCH 29474

## (undated) DEVICE — SYR 10ML LL W/O NDL 302995

## (undated) DEVICE — SPONGE RAY-TEC 4X8" 7318

## (undated) DEVICE — DRSG KERLIX 4 1/2"X4YDS ROLL 6730

## (undated) DEVICE — NDL BLUNT 18GA 1" W/O FILTER 305181

## (undated) DEVICE — DRSG TEGADERM 2 3/8X2 3/4" 1624W

## (undated) DEVICE — EYE SPONGE SPEAR WECK CEL 0008685

## (undated) DEVICE — SU ETHILON 8-0 BV130-4 5" 2815G

## (undated) DEVICE — TUBING SUCTION 10'X3/16" N510

## (undated) DEVICE — NDL INSUFFLATION 13GA 150MM C2202

## (undated) DEVICE — SU VICRYL 2-0 SH 27" UND J417H

## (undated) DEVICE — SOL WATER IRRIG 1000ML BOTTLE 2F7114

## (undated) DEVICE — ESU CORD BIPOLAR GREEN 10-4000

## (undated) DEVICE — DAVINCI XI MONOPOLAR SCISSORS HOT SHEARS 8MM 470179

## (undated) DEVICE — SPONGE KITTNER 30-101

## (undated) DEVICE — LIGHT HANDLE X1 31140133

## (undated) DEVICE — ESU GROUND PAD ADULT W/CORD E7507

## (undated) DEVICE — DRAPE WARMER 66X44" ORS-300

## (undated) DEVICE — DRAPE UNDER BUTTOCK 8483

## (undated) DEVICE — WAND SUCTION LP SOFT 15.2CM SU-22702

## (undated) DEVICE — CLIP ETHICON LIGACLIP SM BLUE LT100

## (undated) DEVICE — SOL NACL 0.9% INJ 1000ML BAG 07983-09

## (undated) DEVICE — NDL 18GA 1.5" 305196

## (undated) DEVICE — GLOVE PROTEXIS W/NEU-THERA 7.0  2D73TE70

## (undated) DEVICE — SU MONOCRYL 3-0 PS-1 27" Y936H

## (undated) DEVICE — NDL ANGIOCATH 22GA 1" 4050

## (undated) DEVICE — SU CHROMIC 4-0 J-1 18" 724G

## (undated) DEVICE — NDL ANGIOCATH 24GA 0.75" 4053

## (undated) DEVICE — STPL SKIN 35W 6.9MM  PXW35

## (undated) DEVICE — SENSOR MONITOR SM PATCH TISSUE VIOPTIX OXY-2-SPD-1-P5

## (undated) DEVICE — DRSG KERLIX 4 1/2"X4YDS ROLL 6715

## (undated) DEVICE — DRAPE ISOLATION BAG 1003

## (undated) DEVICE — DRAPE POUCH INSTRUMENT 1018

## (undated) DEVICE — JELLY LUBRICATING SURGILUBE 2OZ TUBE

## (undated) DEVICE — CATH TRAY FOLEY SURESTEP 16FR W/DRAIN BAG LF A300416A

## (undated) DEVICE — BLADE KNIFE SURG 15 371115

## (undated) DEVICE — WIPE INSTRUMENT MEROCEL 400200

## (undated) DEVICE — PROTECTOR ARM ONE-STEP TRENDELENBURG 40418

## (undated) DEVICE — SU VICRYL 0 UR-6 27" J603H

## (undated) DEVICE — CLIP HORIZON SM RED WIDE SLOT 001201

## (undated) DEVICE — RETR RING LONE STAR 28.3X18.3CM W/CATH CLIPSX2 3308G

## (undated) DEVICE — PITCHER STERILE 1000ML  SSK9004A

## (undated) DEVICE — LINEN DRAPE 54X72" 5467

## (undated) DEVICE — DRAIN JACKSON PRATT RESERVOIR 100ML SU130-1305

## (undated) DEVICE — PACK MINOR CUSTOM ASC

## (undated) DEVICE — DAVINCI XI NDL DRIVER MEGA SUTURE CUT 8MM 470309

## (undated) DEVICE — RETR ELASTIC STAYS LONE STAR SHARP 5MM 8/PACK 3311-8G

## (undated) DEVICE — SOL WATER IRRIG 500ML BOTTLE 2F7113

## (undated) DEVICE — KIT PATIENT POSITIONING PIGAZZI LATEX FREE 40580

## (undated) DEVICE — BLADE KNIFE SURG 11 371111

## (undated) DEVICE — BOWL STERILE 32OZ DYND50320

## (undated) DEVICE — CLIP HORIZON LG ORANGE 004200

## (undated) DEVICE — ENDO OBTURATOR ACCESS PORT BLADELESS 8X100MM IAS8-100LP

## (undated) DEVICE — ANTIFOG SOLUTION W/FOAM PAD 31142527

## (undated) DEVICE — SYR 03ML LL W/O NDL 309657

## (undated) DEVICE — SU SILK 3-0 SH 30" K832H

## (undated) DEVICE — VESSEL LOOPS RED MINI 31145710

## (undated) DEVICE — DRSG GAUZE 2X2" 8042

## (undated) DEVICE — SYR BULB IRRIG 50ML LATEX FREE 0035280

## (undated) DEVICE — DRSG BANDAID 1X3" FABRIC CURITY LATEX FREE KC44101

## (undated) DEVICE — CLIP HORIZON MED BLUE 002200

## (undated) DEVICE — BNDG ABDOMINAL BINDER 9X45-62" 79-89071

## (undated) DEVICE — RETR ELASTIC STAYS LONE STAR BLUNT DUAL LEAD 3550-1G

## (undated) DEVICE — GLOVE BIOGEL PI MICRO SZ 7.0 48570

## (undated) DEVICE — NDL INSUFFLATION 13GA 120MM C2201

## (undated) DEVICE — CLIP GEM MICRO GEM1521

## (undated) DEVICE — SU PDS II 0 CTX 36" Z370T

## (undated) DEVICE — DAVINCI XI FCP BIPOLAR FENESTRATED 470205

## (undated) DEVICE — SOL NACL 0.9% IRRIG 1000ML BOTTLE 2F7124

## (undated) DEVICE — SYR 10ML FINGER CONTROL W/O NDL 309695

## (undated) DEVICE — SYR 05ML LL W/O NDL

## (undated) DEVICE — BNDG ESMARK 4" STERILE LF 820-3412

## (undated) DEVICE — ENDO TROCAR SLEEVE KII Z-THREADED 05X100MM CTS02

## (undated) DEVICE — LABEL MEDICATION SYSTEM 3303-P

## (undated) DEVICE — ESU HARMONIC LAPAROSCOPIC SHEARS HD 1000I 36CM HARHD36

## (undated) DEVICE — CATH TRAY FOLEY SURESTEP 16FR W/TMP PRB STLK LATEX A319416AM

## (undated) DEVICE — SOL NACL 0.9% IRRIG 500ML BOTTLE 2F7123

## (undated) DEVICE — DRSG PRIMAPORE 02X3" 7133

## (undated) DEVICE — CATH TRAY FOLEY SURESTEP 16FR W/URNE MTR STLK LATEX A303316A

## (undated) DEVICE — DAVINCI XI SEAL UNIVERSAL 5-8MM 470361

## (undated) DEVICE — DRAPE LEGGINGS CLEAR 8430

## (undated) DEVICE — ADAPTER DRAPE ALLY AU-AD

## (undated) DEVICE — SYR PISTON IRRIGATION 60 ML DYND20325

## (undated) DEVICE — SUCTION IRR STRYKERFLOW II W/TIP 250-070-520

## (undated) DEVICE — ANTIFOG SOLUTION W/FOAM PAD CF-1001

## (undated) DEVICE — CLIP ETHICON LIGACLIP MED WHITE LT200

## (undated) DEVICE — SU STRATAFIX MONOCRYL 3-0 SPIRAL PS-2 45CM SXMP1B107

## (undated) DEVICE — SYR 10ML LL W/O NDL

## (undated) DEVICE — TUBING CONMED AIRSEAL SMOKE EVAC INSUFFLATION ASM-EVAC

## (undated) DEVICE — ESU PENCIL SMOKE EVAC W/ROCKER SWITCH 0703-047-000

## (undated) DEVICE — NDL COUNTER 20CT 31142493

## (undated) DEVICE — SU PDS II 2-0 CT-1 27" Z339H

## (undated) DEVICE — GLOVE PROTEXIS POWDER FREE SMT 8.0  2D72PT80X

## (undated) DEVICE — GEL ULTRASOUND AQUASONIC 20GM 01-01

## (undated) DEVICE — SOL NACL 0.9% INJ 1000ML BAG 2B1324X

## (undated) DEVICE — DRAPE MAYO STAND 23X54 8337

## (undated) DEVICE — TUBING IRRIG CYSTO/BLADDER SET 81" LF 2C4040

## (undated) DEVICE — ADH SKIN CLOSURE PREMIERPRO EXOFIN 1.0ML 3470

## (undated) DEVICE — DRAPE MICRO ZEISS PENTERO 120X54" G650DL

## (undated) DEVICE — DEVICE SUTURE PASSER 14GA WECK EFX EFXSP2

## (undated) DEVICE — PREP DYNA-HEX 4% CHG SCRUB 4OZ BOTTLE MDS098710

## (undated) RX ORDER — EPHEDRINE SULFATE 50 MG/ML
INJECTION, SOLUTION INTRAMUSCULAR; INTRAVENOUS; SUBCUTANEOUS
Status: DISPENSED
Start: 2024-04-23

## (undated) RX ORDER — CALCIUM CHLORIDE 100 MG/ML
INJECTION INTRAVENOUS; INTRAVENTRICULAR
Status: DISPENSED
Start: 2018-10-08

## (undated) RX ORDER — CEFAZOLIN SODIUM 1 G/3ML
INJECTION, POWDER, FOR SOLUTION INTRAMUSCULAR; INTRAVENOUS
Status: DISPENSED
Start: 2018-08-22

## (undated) RX ORDER — LIDOCAINE HYDROCHLORIDE 20 MG/ML
INJECTION, SOLUTION EPIDURAL; INFILTRATION; INTRACAUDAL; PERINEURAL
Status: DISPENSED
Start: 2018-11-29

## (undated) RX ORDER — FENTANYL CITRATE 50 UG/ML
INJECTION, SOLUTION INTRAMUSCULAR; INTRAVENOUS
Status: DISPENSED
Start: 2018-08-22

## (undated) RX ORDER — KETOROLAC TROMETHAMINE 30 MG/ML
INJECTION, SOLUTION INTRAMUSCULAR; INTRAVENOUS
Status: DISPENSED
Start: 2018-11-29

## (undated) RX ORDER — ONDANSETRON 2 MG/ML
INJECTION INTRAMUSCULAR; INTRAVENOUS
Status: DISPENSED
Start: 2018-11-29

## (undated) RX ORDER — ISOSULFAN BLUE 50 MG/5ML
INJECTION, SOLUTION SUBCUTANEOUS
Status: DISPENSED
Start: 2018-08-22

## (undated) RX ORDER — HYDROMORPHONE HYDROCHLORIDE 1 MG/ML
INJECTION, SOLUTION INTRAMUSCULAR; INTRAVENOUS; SUBCUTANEOUS
Status: DISPENSED
Start: 2018-10-08

## (undated) RX ORDER — LIDOCAINE HYDROCHLORIDE 10 MG/ML
INJECTION, SOLUTION EPIDURAL; INFILTRATION; INTRACAUDAL; PERINEURAL
Status: DISPENSED
Start: 2018-11-29

## (undated) RX ORDER — OXYCODONE HYDROCHLORIDE 5 MG/1
TABLET ORAL
Status: DISPENSED
Start: 2019-10-21

## (undated) RX ORDER — ONDANSETRON 2 MG/ML
INJECTION INTRAMUSCULAR; INTRAVENOUS
Status: DISPENSED
Start: 2024-04-23

## (undated) RX ORDER — CEFAZOLIN SODIUM 2 G/50ML
SOLUTION INTRAVENOUS
Status: DISPENSED
Start: 2024-04-23

## (undated) RX ORDER — FENTANYL CITRATE 50 UG/ML
INJECTION, SOLUTION INTRAMUSCULAR; INTRAVENOUS
Status: DISPENSED
Start: 2024-04-23

## (undated) RX ORDER — SCOLOPAMINE TRANSDERMAL SYSTEM 1 MG/1
PATCH, EXTENDED RELEASE TRANSDERMAL
Status: DISPENSED
Start: 2018-11-29

## (undated) RX ORDER — BUPIVACAINE HYDROCHLORIDE 2.5 MG/ML
INJECTION, SOLUTION EPIDURAL; INFILTRATION; INTRACAUDAL
Status: DISPENSED
Start: 2024-04-23

## (undated) RX ORDER — PROPOFOL 10 MG/ML
INJECTION, EMULSION INTRAVENOUS
Status: DISPENSED
Start: 2021-01-11

## (undated) RX ORDER — HYDROMORPHONE HYDROCHLORIDE 1 MG/ML
INJECTION, SOLUTION INTRAMUSCULAR; INTRAVENOUS; SUBCUTANEOUS
Status: DISPENSED
Start: 2019-10-21

## (undated) RX ORDER — GLYCOPYRROLATE 0.2 MG/ML
INJECTION INTRAMUSCULAR; INTRAVENOUS
Status: DISPENSED
Start: 2024-04-23

## (undated) RX ORDER — OXYCODONE HYDROCHLORIDE 5 MG/1
TABLET ORAL
Status: DISPENSED
Start: 2018-11-29

## (undated) RX ORDER — PROPOFOL 10 MG/ML
INJECTION, EMULSION INTRAVENOUS
Status: DISPENSED
Start: 2024-04-23

## (undated) RX ORDER — ALBUMIN, HUMAN INJ 5% 5 %
SOLUTION INTRAVENOUS
Status: DISPENSED
Start: 2018-10-08

## (undated) RX ORDER — ALBUMIN, HUMAN INJ 5% 5 %
SOLUTION INTRAVENOUS
Status: DISPENSED
Start: 2018-08-22

## (undated) RX ORDER — FENTANYL CITRATE 50 UG/ML
INJECTION, SOLUTION INTRAMUSCULAR; INTRAVENOUS
Status: DISPENSED
Start: 2018-10-08

## (undated) RX ORDER — EPINEPHRINE 1 MG/ML
INJECTION, SOLUTION, CONCENTRATE INTRAVENOUS
Status: DISPENSED
Start: 2018-11-29

## (undated) RX ORDER — FENTANYL CITRATE-0.9 % NACL/PF 10 MCG/ML
PLASTIC BAG, INJECTION (ML) INTRAVENOUS
Status: DISPENSED
Start: 2024-04-23

## (undated) RX ORDER — FENTANYL CITRATE 50 UG/ML
INJECTION, SOLUTION INTRAMUSCULAR; INTRAVENOUS
Status: DISPENSED
Start: 2018-11-29

## (undated) RX ORDER — PROPOFOL 10 MG/ML
INJECTION, EMULSION INTRAVENOUS
Status: DISPENSED
Start: 2018-10-08

## (undated) RX ORDER — DEXAMETHASONE SODIUM PHOSPHATE 4 MG/ML
INJECTION, SOLUTION INTRA-ARTICULAR; INTRALESIONAL; INTRAMUSCULAR; INTRAVENOUS; SOFT TISSUE
Status: DISPENSED
Start: 2018-08-22

## (undated) RX ORDER — PHENYLEPHRINE HCL IN 0.9% NACL 1 MG/10 ML
SYRINGE (ML) INTRAVENOUS
Status: DISPENSED
Start: 2018-08-22

## (undated) RX ORDER — HYDROMORPHONE HYDROCHLORIDE 1 MG/ML
INJECTION, SOLUTION INTRAMUSCULAR; INTRAVENOUS; SUBCUTANEOUS
Status: DISPENSED
Start: 2024-04-23

## (undated) RX ORDER — DEXAMETHASONE SODIUM PHOSPHATE 4 MG/ML
INJECTION, SOLUTION INTRA-ARTICULAR; INTRALESIONAL; INTRAMUSCULAR; INTRAVENOUS; SOFT TISSUE
Status: DISPENSED
Start: 2018-11-29

## (undated) RX ORDER — PAPAVERINE HYDROCHLORIDE 30 MG/ML
INJECTION INTRAMUSCULAR; INTRAVENOUS
Status: DISPENSED
Start: 2018-10-08

## (undated) RX ORDER — ONDANSETRON 2 MG/ML
INJECTION INTRAMUSCULAR; INTRAVENOUS
Status: DISPENSED
Start: 2018-10-08

## (undated) RX ORDER — CEFAZOLIN SODIUM 2 G/100ML
INJECTION, SOLUTION INTRAVENOUS
Status: DISPENSED
Start: 2018-08-22

## (undated) RX ORDER — ONDANSETRON 2 MG/ML
INJECTION INTRAMUSCULAR; INTRAVENOUS
Status: DISPENSED
Start: 2018-08-22

## (undated) RX ORDER — PROPOFOL 10 MG/ML
INJECTION, EMULSION INTRAVENOUS
Status: DISPENSED
Start: 2018-08-22

## (undated) RX ORDER — GABAPENTIN 300 MG/1
CAPSULE ORAL
Status: DISPENSED
Start: 2018-08-22

## (undated) RX ORDER — ACETAMINOPHEN 325 MG/1
TABLET ORAL
Status: DISPENSED
Start: 2019-10-21

## (undated) RX ORDER — CEFAZOLIN SODIUM 1 G/3ML
INJECTION, POWDER, FOR SOLUTION INTRAMUSCULAR; INTRAVENOUS
Status: DISPENSED
Start: 2018-10-08

## (undated) RX ORDER — SCOLOPAMINE TRANSDERMAL SYSTEM 1 MG/1
PATCH, EXTENDED RELEASE TRANSDERMAL
Status: DISPENSED
Start: 2018-10-08

## (undated) RX ORDER — DEXAMETHASONE SODIUM PHOSPHATE 4 MG/ML
INJECTION, SOLUTION INTRA-ARTICULAR; INTRALESIONAL; INTRAMUSCULAR; INTRAVENOUS; SOFT TISSUE
Status: DISPENSED
Start: 2018-10-08

## (undated) RX ORDER — PROPOFOL 10 MG/ML
INJECTION, EMULSION INTRAVENOUS
Status: DISPENSED
Start: 2018-11-29

## (undated) RX ORDER — PROPOFOL 10 MG/ML
INJECTION, EMULSION INTRAVENOUS
Status: DISPENSED
Start: 2019-10-21

## (undated) RX ORDER — CEFAZOLIN SODIUM 2 G/100ML
INJECTION, SOLUTION INTRAVENOUS
Status: DISPENSED
Start: 2018-10-08

## (undated) RX ORDER — EPHEDRINE SULFATE 50 MG/ML
INJECTION, SOLUTION INTRAMUSCULAR; INTRAVENOUS; SUBCUTANEOUS
Status: DISPENSED
Start: 2018-10-08

## (undated) RX ORDER — ACETAMINOPHEN 325 MG/1
TABLET ORAL
Status: DISPENSED
Start: 2018-11-29

## (undated) RX ORDER — ACETAMINOPHEN 325 MG/1
TABLET ORAL
Status: DISPENSED
Start: 2018-08-22

## (undated) RX ORDER — GABAPENTIN 300 MG/1
CAPSULE ORAL
Status: DISPENSED
Start: 2018-11-29

## (undated) RX ORDER — CEFAZOLIN SODIUM 2 G/100ML
INJECTION, SOLUTION INTRAVENOUS
Status: DISPENSED
Start: 2019-10-21

## (undated) RX ORDER — LIDOCAINE HYDROCHLORIDE 20 MG/ML
INJECTION, SOLUTION EPIDURAL; INFILTRATION; INTRACAUDAL; PERINEURAL
Status: DISPENSED
Start: 2019-10-21

## (undated) RX ORDER — LIDOCAINE HYDROCHLORIDE AND EPINEPHRINE 10; 10 MG/ML; UG/ML
INJECTION, SOLUTION INFILTRATION; PERINEURAL
Status: DISPENSED
Start: 2019-10-21

## (undated) RX ORDER — EPHEDRINE SULFATE 50 MG/ML
INJECTION, SOLUTION INTRAMUSCULAR; INTRAVENOUS; SUBCUTANEOUS
Status: DISPENSED
Start: 2018-08-22

## (undated) RX ORDER — ONDANSETRON 2 MG/ML
INJECTION INTRAMUSCULAR; INTRAVENOUS
Status: DISPENSED
Start: 2019-10-21

## (undated) RX ORDER — HEPARIN SODIUM 1000 [USP'U]/ML
INJECTION, SOLUTION INTRAVENOUS; SUBCUTANEOUS
Status: DISPENSED
Start: 2018-10-08

## (undated) RX ORDER — GABAPENTIN 100 MG/1
CAPSULE ORAL
Status: DISPENSED
Start: 2018-10-08

## (undated) RX ORDER — HYDROMORPHONE HYDROCHLORIDE 1 MG/ML
INJECTION, SOLUTION INTRAMUSCULAR; INTRAVENOUS; SUBCUTANEOUS
Status: DISPENSED
Start: 2018-08-22

## (undated) RX ORDER — DEXAMETHASONE SODIUM PHOSPHATE 4 MG/ML
INJECTION, SOLUTION INTRA-ARTICULAR; INTRALESIONAL; INTRAMUSCULAR; INTRAVENOUS; SOFT TISSUE
Status: DISPENSED
Start: 2019-10-21

## (undated) RX ORDER — ACETAMINOPHEN 325 MG/1
TABLET ORAL
Status: DISPENSED
Start: 2024-04-23

## (undated) RX ORDER — CEFAZOLIN SODIUM 1 G/3ML
INJECTION, POWDER, FOR SOLUTION INTRAMUSCULAR; INTRAVENOUS
Status: DISPENSED
Start: 2018-11-29

## (undated) RX ORDER — LIDOCAINE HYDROCHLORIDE 20 MG/ML
INJECTION, SOLUTION EPIDURAL; INFILTRATION; INTRACAUDAL; PERINEURAL
Status: DISPENSED
Start: 2018-10-08

## (undated) RX ORDER — FENTANYL CITRATE 50 UG/ML
INJECTION, SOLUTION INTRAMUSCULAR; INTRAVENOUS
Status: DISPENSED
Start: 2019-10-21

## (undated) RX ORDER — ACETAMINOPHEN 325 MG/1
TABLET ORAL
Status: DISPENSED
Start: 2018-10-08

## (undated) RX ORDER — BUPIVACAINE HYDROCHLORIDE AND EPINEPHRINE 2.5; 5 MG/ML; UG/ML
INJECTION, SOLUTION EPIDURAL; INFILTRATION; INTRACAUDAL; PERINEURAL
Status: DISPENSED
Start: 2019-10-21

## (undated) RX ORDER — DEXAMETHASONE SODIUM PHOSPHATE 4 MG/ML
INJECTION, SOLUTION INTRA-ARTICULAR; INTRALESIONAL; INTRAMUSCULAR; INTRAVENOUS; SOFT TISSUE
Status: DISPENSED
Start: 2024-04-23

## (undated) RX ORDER — LIDOCAINE HYDROCHLORIDE 20 MG/ML
INJECTION, SOLUTION EPIDURAL; INFILTRATION; INTRACAUDAL; PERINEURAL
Status: DISPENSED
Start: 2021-01-11

## (undated) RX ORDER — EPHEDRINE SULFATE 50 MG/ML
INJECTION, SOLUTION INTRAMUSCULAR; INTRAVENOUS; SUBCUTANEOUS
Status: DISPENSED
Start: 2018-11-29